# Patient Record
Sex: FEMALE | Race: WHITE | NOT HISPANIC OR LATINO | Employment: OTHER | URBAN - METROPOLITAN AREA
[De-identification: names, ages, dates, MRNs, and addresses within clinical notes are randomized per-mention and may not be internally consistent; named-entity substitution may affect disease eponyms.]

---

## 2017-01-10 ENCOUNTER — TELEPHONE (OUTPATIENT)
Dept: FAMILY MEDICINE | Facility: CLINIC | Age: 82
End: 2017-01-10

## 2017-01-10 DIAGNOSIS — M47.816 ARTHRITIS, LUMBAR SPINE: Primary | ICD-10-CM

## 2017-01-10 DIAGNOSIS — M48.062 SPINAL STENOSIS OF LUMBAR REGION WITH NEUROGENIC CLAUDICATION: ICD-10-CM

## 2017-01-10 DIAGNOSIS — G89.4 CHRONIC PAIN DISORDER: ICD-10-CM

## 2017-01-10 RX ORDER — OXYCODONE AND ACETAMINOPHEN 5; 325 MG/1; MG/1
1-2 TABLET ORAL EVERY 4 HOURS PRN
Qty: 240 TABLET | Refills: 0 | Status: SHIPPED | OUTPATIENT
Start: 2017-01-10 | End: 2017-01-16

## 2017-01-10 NOTE — TELEPHONE ENCOUNTER
Reason for Call:  Medication or medication refill:    Do you use a [facility]?  Name of the pharmacy and phone number for the current request:  patient will     Name of the medication requested: oxyCODONE-acetaminophen (PERCOCET) 5-325 MG per tablet     Other request: patient will     Can we leave a detailed message on this number? YES    Phone number patient can be reached at: Home number on file [phone] (home)    Best Time: any    Call taken on 1/10/2017 at 10:05 AM by Sarah Pitts

## 2017-01-10 NOTE — TELEPHONE ENCOUNTER
Advised patient of the message below per provider.  Script brought to the  to .  Appointment made for 1/16/17 with Demetrice Gutierrez NP.  Tara Marlow RN CPC Triage.

## 2017-01-10 NOTE — TELEPHONE ENCOUNTER
Patient has been using Oxycodone for pain.  Please see below message.  Should patient come in to establish care to discuss with a new provdier.  Tara Marlow RN CPC Triage.

## 2017-01-10 NOTE — TELEPHONE ENCOUNTER
Patient needs to be seen to establish care and to determine need for her medication.  New policies will dictate that we need to have her sign a pain contract and get a urine drug screen.

## 2017-01-10 NOTE — TELEPHONE ENCOUNTER
oxyCODONE-acetaminophen (PERCOCET) 5-325 MG per tablet       Last Written Prescription Date:  12/6/16  Last Fill Quantity: 240,   # refills: 0  Last Office Visit with [facility], [facility] or [facility] prescribing provider: 11/1/16  Future Office visit:       Routing refill request to provider for review/approval because:  Drug not on the [facility], [facility] or [facility] refill protocol or controlled substance

## 2017-01-10 NOTE — TELEPHONE ENCOUNTER
Reason for call:  Patient reporting a symptom    Symptom or request: Spinal stenosis of lumbar region with neurogenic claudication    Additional comments: patient has some questions regarding this. Anything to help with this? Any other therapies?    Phone Number patient can be reached at:  Home number on file [phone] (home)    Best Time:  any    Can we leave a detailed message on this number:  YES    Call taken on 1/10/2017 at 10:07 AM by Sarah Pitts

## 2017-01-10 NOTE — TELEPHONE ENCOUNTER
Please see Dr. LOZANO's note from earlier today. I will refill one month, but she needs to come to clinic and establish with a new provider for additional refills.     Lauren Engelmann, MD

## 2017-01-12 NOTE — TELEPHONE ENCOUNTER
Called patient.  She has an appointment Monday with Demetrice Gutierrez.  Tara Marlow RN CPC Triage.

## 2017-01-16 ENCOUNTER — OFFICE VISIT (OUTPATIENT)
Dept: INTERNAL MEDICINE | Facility: CLINIC | Age: 82
End: 2017-01-16
Payer: COMMERCIAL

## 2017-01-16 ENCOUNTER — TELEPHONE (OUTPATIENT)
Dept: PALLIATIVE MEDICINE | Facility: CLINIC | Age: 82
End: 2017-01-16

## 2017-01-16 VITALS
BODY MASS INDEX: 29.07 KG/M2 | HEART RATE: 85 BPM | OXYGEN SATURATION: 97 % | WEIGHT: 154 LBS | SYSTOLIC BLOOD PRESSURE: 132 MMHG | TEMPERATURE: 98.6 F | DIASTOLIC BLOOD PRESSURE: 75 MMHG | HEIGHT: 61 IN

## 2017-01-16 DIAGNOSIS — G89.4 CHRONIC PAIN SYNDROME: Primary | ICD-10-CM

## 2017-01-16 DIAGNOSIS — G89.4 CHRONIC PAIN DISORDER: ICD-10-CM

## 2017-01-16 DIAGNOSIS — M47.816 ARTHRITIS, LUMBAR SPINE: ICD-10-CM

## 2017-01-16 DIAGNOSIS — M48.062 SPINAL STENOSIS OF LUMBAR REGION WITH NEUROGENIC CLAUDICATION: ICD-10-CM

## 2017-01-16 DIAGNOSIS — M47.817 DJD (DEGENERATIVE JOINT DISEASE), LUMBOSACRAL: ICD-10-CM

## 2017-01-16 PROCEDURE — 99000 SPECIMEN HANDLING OFFICE-LAB: CPT | Performed by: NURSE PRACTITIONER

## 2017-01-16 PROCEDURE — 80307 DRUG TEST PRSMV CHEM ANLYZR: CPT | Mod: 90 | Performed by: NURSE PRACTITIONER

## 2017-01-16 PROCEDURE — 99213 OFFICE O/P EST LOW 20 MIN: CPT | Performed by: NURSE PRACTITIONER

## 2017-01-16 RX ORDER — OXYCODONE AND ACETAMINOPHEN 5; 325 MG/1; MG/1
1-2 TABLET ORAL EVERY 4 HOURS PRN
Qty: 240 TABLET | Refills: 0 | Status: SHIPPED | OUTPATIENT
Start: 2017-01-16 | End: 2017-01-16

## 2017-01-16 ASSESSMENT — PAIN SCALES - GENERAL: PAINLEVEL: EXTREME PAIN (8)

## 2017-01-16 NOTE — MR AVS SNAPSHOT
After Visit Summary   1/16/2017    Teresa Lopez    MRN: [id number]           Patient Information     Date Of Birth          1935        Visit Information        Provider Department      1/16/2017 9:40 AM Demetrice Gutierrez APRN [facility]        Today's Diagnoses     Chronic pain syndrome    -  1     DJD (degenerative joint disease), lumbosacral         Spinal stenosis of lumbar region with neurogenic claudication         Arthritis, lumbar spine         Chronic pain disorder           Care Instructions    Return to clinic in 1 month. Schedule annual preventative exam. Come fasting and then we can check your cholesterol.  Schedule an appointment with the pain clinic.         Follow-ups after your visit        Additional Services     PA[facility] ([place]) REFERRAL       Your provider has referred you to the [facility].    Reason for Referral: Consult only - without ongoing management    Please complete the following questions:    What is your diagnosis for the patient's pain? Patient has lumbar stenosis, progressively worsening. On percocet 240 tablets/month and chronic suppressive prednisone therapy. Would consider steroid injection, or other therapies.     Do you have any specific questions for the pain specialist? No    Are there any red flags that may impact the assessment or management of the patient? None    **ANY DIAGNOSTIC TESTS THAT ARE NOT IN EPIC SHOULD BE SENT TO THE PAIN CENTER**    Please note the Pre-Op Pain Consult must be scheduled 2-3 weeks prior to the patient's surgery.  Patient's trying to schedule within 2 weeks of surgery may not be accommodated.     Pre-Op Pain Consults are only good for 30 days.    REGARDING OPIOID MEDICATIONS:  We will always address appropriateness of opioid pain medications, but we generally will not automatically take on a prescribing role. When we do take on prescribing of opioids for chronic  pain, it is in collaboration with the referring physician for an intermediate period of time (months), with an expectation that the primary physician or provider will assume the prescribing role if medications are effective at stable doses with demonstrated compliance.  Therefore, please do not assume that your prescribing responsibilities end on the day of pain clinic consultation.  Is this agreeable to you? YES    For any questions, contact the [facility] at [phone].    Please be aware that coverage of these services is subject to the terms and limitations of your health insurance plan.  Call member services at your health plan with any benefit or coverage questions.      Please bring the following with you to your appointment:    (1) Any X-Rays, CTs or MRIs which have been performed.  Contact the facility where they were done to arrange for  prior to your scheduled appointment.    (2) List of current medications   (3) This referral request   (4) Any documents/labs given to you for this referral                  Who to contact     If you have questions or need follow up information about today's clinic visit or your schedule please contact [facility] directly at [phone].  Normal or non-critical lab and imaging results will be communicated to you by [employer], letter or phone within 4 business days after the clinic has received the results. If you do not hear from us within 7 days, please contact the clinic through [employer] or phone. If you have a critical or abnormal lab result, we will notify you by phone as soon as possible.  Submit refill requests through [employer] or call your pharmacy and they will forward the refill request to us. Please allow 3 business days for your refill to be completed.          Additional Information About Your Visit        [employer] Information     [employer] lets you send messages to your doctor, view your test results, renew your  "prescriptions, schedule appointments and more. To sign up, go to [email or web address] . Click on \"Log in\" on the left side of the screen, which will take you to the Welcome page. Then click on \"Sign up Now\" on the right side of the page.     You will be asked to enter the access code listed below, as well as some personal information. Please follow the directions to create your username and password.     Your access code is: [email or web address]  Expires: 2017 10:46 AM     Your access code will  in 90 days. If you need help or a new code, please call your [facility] clinic or [phone].        Care EveryWhere ID     This is your Care EveryWhere ID. This could be used by other organizations to access your [facility] medical records  [id number]        Your Vitals Were     Pulse Temperature Height BMI (Body Mass Index) Pulse Oximetry Breastfeeding?    85 98.6  F (37  C) (Oral) 5' 1.15\" (1.553 m) 28.96 kg/m2 97% No       Blood Pressure from Last 3 Encounters:   17 132/75   16 123/84   16 132/74    Weight from Last 3 Encounters:   17 154 lb (69.854 kg)   16 157 lb (71.215 kg)   16 160 lb (72.576 kg)              We Performed the Following     Drug Screen Comprehensive , Urine with Reported Meds (Pain Care Package)     PAIN [facility]NA[facility]EMENT CENTER ([place]) REFERRAL          Today's Medication Changes          These changes are accurate as of: 17 10:46 AM.  If you have any questions, ask your nurse or doctor.               Stop taking these medicines if you haven't already. Please contact your care team if you have questions.     oxyCODONE-acetaminophen 5-325 MG per tablet   Commonly known as:  PERCOCET   Stopped by:  Demetrice Gutierrez APRN CNP                    Primary Care Provider Office Phone # Fax #    MYRON sAhford [phone]       [address] [address]        Thank you!     Thank you for " choosing [facility]  for your care. Our goal is always to provide you with excellent care. Hearing back from our patients is one way we can continue to improve our services. Please take a few minutes to complete the written survey that you may receive in the mail after your visit with us. Thank you!             Your Updated Medication List - Protect others around you: Learn how to safely use, store and throw away your medicines at www.disposemymeds.org.          This list is accurate as of: 1/16/17 10:46 AM.  Always use your most recent med list.                   Brand Name Dispense Instructions for use    ADVIL 200 MG capsule   Generic drug:  ibuprofen      1 CAPSULE EVERY 4 TO 6 HOURS AS NEEDED       amLODIPine 2.5 MG tablet    NORVASC    90 tablet    Take 1 tablet (2.5 mg) by mouth daily 1 po at bedtime       aspirin 162 MG EC tablet      Take 162 mg by mouth daily. Takes 500 mg       blood glucose monitoring test strip    no brand specified    1 Box    Use 1 daily       CALCIUM 1200 PO      None Entered       CLARITIN 10 MG capsule   Generic drug:  loratadine      Take 10 mg by mouth as needed.       GLUCOSAMINE 1500 COMPLEX PO      Take 1,500 mg by mouth daily.       losartan-hydrochlorothiazide 100-25 MG per tablet    HYZAAR    90 tablet    Take 1 tablet by mouth daily       Multi-vitamin Tabs tablet   Generic drug:  multivitamin, therapeutic with minerals      1 TABLET DAILY       ONE TOUCH LANCETS Misc     100 each    1 each daily       predniSONE 5 MG tablet    DELTASONE    120 tablet    1 tablet daily and 2 on Mon Wed Fri.       PRILOSEC PO      None Entered       SALMON OIL-1000 PO      take 1,000 Caps by mouth daily.       senna-docusate 8.6-50 MG per tablet    SENOKOT-S;PERICOLACE    120 tablet    Take 1 tablet by mouth 2 times daily       VITAMIN C PO      None Entered       VITAMIN D PO      Take 1,200 mg by mouth daily.       vitamin E 400 UNIT capsule      None Entered

## 2017-01-16 NOTE — TELEPHONE ENCOUNTER
Pain Management Center Referral      1. Confirmed address with patient? Yes  2. Confirmed phone number with patient? Yes  3. Confirmed referring provider? Yes  4. Is the PCP the same as the referring provider? Yes  5. Has the patient been to any previous pain clinics? No  (If yes, send [facility] with welcome letter)  6. Which insurance are we to bill for this appointment?  [employer]    7. Informed pt of cancellation (48 hour) policy? Yes    REGARDING OPIOID MEDICATIONS: We will always address appropriateness of opioid pain medications, but we generally will not automatically take on a prescribing role. When we do take on prescribing of opioids for chronic pain, it is in collaboration with the referring physician for an intermediate period of time (months), with an expectation that the primary physician or provider will assume the prescribing role if medications are effective at stable doses with demonstrated compliance. Therefore, please do not assume that your prescribing responsibilities end on the day of pain clinic consultation.  7. Informed pt of prescribing policy? Yes      8. Referring Provider: Demetrice Gutierrez

## 2017-01-16 NOTE — Clinical Note
[facility]    01/16/2017    Patient: Teresa Lopez  YOB: 1935  Medical Record Number: [id number]    Controlled Substance Agreement  I understand that my care provider has prescribed controlled substances (narcotics, tranquilizers, and/or stimulants) to help manage my condition(s).  I am taking this medicine to help me function or work.  I know that this is strong medicine.  It could have serious side effects and even cause a dependency on the drug.  If I stop these medicines suddenly, I could have severe withdrawal symptoms.    The risks, benefits, and side effects of these medication(s) were explained to me.  I agree that:  1. I will take part in other treatments as advised by my provider.  This may be psychiatry or counseling, physical therapy, behavioral therapy, group treatment, or a referral to a pain clinic.  I will reduce or stop my medicine when my provider tells me to do so.   2. I will take my medicines as prescribed.  I will not change the dose or schedule unless my provider tells me to.  There will be no refills if I  run out early.   I may be contacted at any time without warning and asked to complete a drug test or pill count.   3. I will keep all my appointments at the clinic.  If I miss appointments or fail to follow instructions, my provider may stop my medicine.  4. I will not ask other providers to prescribe controlled substances. And I will not accept controlled substances from other people. If I need another prescribed controlled substance for a new reason, I will notify my provider within one business day.  5. If I enroll in the [place] Medical Marijuana program, I will tell my provider.  I will also sign an agreement to share my medical records with my provider.  6. I will use one pharmacy to fill all of my controlled substance prescriptions.  If my prescription is mailed to my pharmacy, it may take 5 to 7 days for my medicine to be ready.  7. I  understand that my provider, clinic care team, and pharmacy can track controlled substance prescriptions from other providers through a central database (prescription monitoring program).  8. I will bring in my list of medications (or my medicine bottles) each time I come to the clinic.  Page 1 of 2      [facility]    01/16/2017    Patient: Teresa Lopez  YOB: 1935  Medical Record Number: [id number]    9. Refills of controlled substances will be made only during office hours.  It is up to me to make sure that I do not run out of my medicines on weekends or holidays.    10. I am responsible for my prescriptions.  If the medicine is lost or stolen, it will not be replaced.   I also agree not to share these medicines with anyone.  11. I agree to not use ANY illegal or recreational drugs.  This includes marijuana, cocaine, bath salts or other drugs.  I agree not to use alcohol unless my provider says I may.  I agree to give urine samples whenever asked.  If I fail to give a urine sample, the provider may stop my medicine.     12. I will tell my nurse or provider right away if I become pregnant or have a new medical problem treated outside of [facility].  13. I understand that this medicine can affect my thinking and judgment.  It may be unsafe for me to drive, use machinery and do dangerous tasks.  I will not do any of these things until I know how the medicine affects me.  If my dose changes, I will wait to see how it affects me.  I will contact my provider if I have concerns about medicine side effects.  I understand that if I do not follow any of the conditions above, my prescriptions or treatment may be stopped.    I agree that my provider, clinic care team, and pharmacy may work with any city, state or federal law enforcement agency that investigates the misuse, sale, or other diversion of my controlled medicine. I will allow my provider to discuss my care with or share a  copy of this agreement with any other treating provider, pharmacy or emergency room where I receive care.  I agree to give up (waive) any right of privacy or confidentiality with respect to these authorizations.   I have read this agreement and have asked questions about anything I did not understand.   ___________________________________    ___________________________  Patient Signature                                                             Date and Time  ___________________________________     ____________________________  Witness                                                                              Date and Time  ___________________________________  MYRON Bruce CNP  Page 2 of 2  Opioid Pain Medicines (also known as Narcotics)  What You Need to Know      What are opioids?   Opioids are pain medicines that must be prescribed by a doctor. Examples are:     morphine (MS Contin, Nyla)    oxycodone (Oxycontin)    oxycodone and acetaminophen (Percocet)    hydrocodone and acetaminophen (Vicodin, Norco)     fentanyl patch (Duragesic)     hydromorphone (Dilaudid)     methadone     What do opioids do well?   Opioids are best for short-term pain after a surgery or injury. They also work well for cancer pain. Unlike other pain medicines, they do not cause liver or kidney failure or ulcers. They may help some people with long-lasting (chronic) pain.     What do opioids NOT do well?   Opioids never get rid of pain entirely, and they do not work well for most patients with chronic pain. Opioids do not reduce swelling, one of the causes of pain. They also don t work well for nerve pain.     Side effects  Talk to your doctor before you start or decide to keep taking one of these medicines. Side effects include:    Lowers your breathing rate enough that it could cause death    Death due to taking more than the prescribed dose    Serious lifelong opioid use      Dependence is not the same as addiction.  Addiction is when people keep using a substance that harms their body, their mind or their relations with others. If you have a history of drug or alcohol abuse, taking opioids can cause a relapse.  Over time, opioids don t work as well. Most people will need higher and higher doses. The higher the dose, the more serious the side effects. We don t know the long-term effects of opioids.   People who have used opioids for a long time have a lower quality of life, worse depression, higher levels of pain and more visits to doctors.  Overdose from prescription drugs is the second leading cause of death in the [place] The risk of overdose rises when opioids are taken with other drugs such as:    Medicines used for anxiety and panic attacks (such as lorazepam, alprazolam, clonazepam    Other sedatives    Alcohol    Illegal drugs such as heroin  Never share your opioids with others. Be sure to store opioids in a secure place, locked if possible.Young children can easily swallow them and overdose.     Are there other ways to manage pain?  Ways to help reduce pain:    Exercise every day.    Treat health problems that may be causing pain.    Treat mental health problems like depression and anxiety.     Worse depression symptoms; Less pleasure in things you usually enjoy    Feeling tired or sluggish    Slower thoughts or cloudy thinking    Being more sensitive to pain over time; Pain is harder to control.    Trouble sleeping or restless sleep    Changes in hormone levels (for example, less testosterone).     Changes in sex drive or ability to have sex    Long lasting nausea and constipation    Trouble breathing while asleep; This is worse with lung problems like COPD or sleep apnea.    Unsafe driving    Getting sick more often    Itching    Feeling dizzy    Dry mouth    Sweating    Trouble emptying the bladder (peeing). This is worse if you have an enlarged prostate or get urinary tract infections (UTIs).    What else should I  know about opioids?  When someone takes opioids for too long or too often, they become dependent. This means that if you stop or reduce the medicine too quickly, you will have withdrawal symptoms.          Practice good sleep habits.  Try to go to bed and get up at the same time every day.    Stop smoking.  Tobacco use can make pain worse.    Do things that you enjoy.    Find a way to work through pain without drugs.  Try deep breathing, meditation, visual imagery and aromatherapy.    Ask your doctor to help you create a plan to manage your pain.

## 2017-01-16 NOTE — NURSING NOTE
"Chief Complaint   Patient presents with     Pain Management     Chronic back pain       Initial /75 mmHg  Pulse 85  Temp(Src) 98.6  F (37  C) (Oral)  Ht 5' 1.15\" (1.553 m)  Wt 154 lb (69.854 kg)  BMI 28.96 kg/m2  SpO2 97%  Breastfeeding? No Estimated body mass index is 28.96 kg/(m^2) as calculated from the following:    Height as of this encounter: 5' 1.15\" (1.553 m).    Weight as of this encounter: 154 lb (69.854 kg).  BP completed using cuff size: regular.  JAMES Tirado MA      "

## 2017-01-16 NOTE — PROGRESS NOTES
SUBJECTIVE:                                                    Teresa Lopez is a 81 year old female who presents to clinic today for the following health issues:        Chronic Pain Follow-Up       Type / Location of Pain: Lumbar area pain  Analgesia/pain control:       Recent changes:  improved      Overall control: Tolerable with discomfort  Activity level/function:      Daily activities:  Can do most things most days, with some rest    Work:  not applicable  Adverse effects:  No  Adherance    Taking medication as directed?  Yes    Participating in other treatments: Yes, going to chiropractor  Risk Factors:    Sleep:  Good    Mood/anxiety:  Non issue    Recent family or social stressors:  none noted    Other aggravating factors: prolonged standing  PHQ-9 SCORE 7/1/2013 10/13/2015   Total Score 8 -   Total Score - 10     GOLDEN-7 SCORE 2/15/2016   Total Score 8     Encounter-Level CSA:     There are no encounter-level csa.             Amount of exercise or physical activity: 2-3 days/week for an average of 15-30 minutes    Problems taking medications regularly: No    Medication side effects: none    Diet: regular (no restrictions)    Going to chiropractor for back pain  Goes every 3 weeks   up until her 70s  Likes to dance but it's more difficult    Taking percocet, 2 at 0800, 2 at 1200, 2 at 1600, 2 at 2000  On chronic prednisone  Has not been to a pain clinic  Active with [employer] three times a week  Uses a rowing machine    Here to establish care. Previous patient of Dr. Jerez      Problem list and histories reviewed & adjusted, as indicated.  Additional history: none    Patient Active Problem List   Diagnosis     Cerebral infarction (H)     Jaw Pain     Arthritis of knee     CARDIOVASCULAR SCREENING; LDL GOAL LESS THAN 100     Hypertension goal BP (blood pressure) < 140/90     Advanced directives, counseling/discussion     Hyperlipidemia LDL goal <130     OA (OSTEOARTHRITIS) OF KNEE - right      CKD (chronic kidney disease) stage 3, GFR 30-59 ml/min     Cataracts, both eyes     PVD (posterior vitreous detachment), both eyes     Chronic pain disorder     DJD (degenerative joint disease), lumbosacral     Spinal stenosis of lumbar region with neurogenic claudication     Slow transit constipation     Past Surgical History   Procedure Laterality Date     Hysterectomy, cervix status unknown  age 31       Social History   Substance Use Topics     Smoking status: Never Smoker      Smokeless tobacco: Never Used     Alcohol Use: 0.0 oz/week     0 Standard drinks or equivalent per week      Comment: 1 drink weekly     Family History   Problem Relation Age of Onset     Arthritis Mother      Unknown/Adopted Father      adopted     DIABETES Brother      CANCER Brother      Hypertension No family hx of      CEREBROVASCULAR DISEASE No family hx of      Thyroid Disease No family hx of      Glaucoma No family hx of      Macular Degeneration No family hx of          Current Outpatient Prescriptions   Medication Sig Dispense Refill     predniSONE (DELTASONE) 5 MG tablet 1 tablet daily and 2 on Mon Wed Fri. 120 tablet 3     senna-docusate (SENOKOT-S;PERICOLACE) 8.6-50 MG per tablet Take 1 tablet by mouth 2 times daily 120 tablet 12     losartan-hydrochlorothiazide (HYZAAR) 100-25 MG per tablet Take 1 tablet by mouth daily 90 tablet 3     amLODIPine (NORVASC) 2.5 MG tablet Take 1 tablet (2.5 mg) by mouth daily 1 po at bedtime 90 tablet 3     ONE TOUCH LANCETS MISC 1 each daily 100 each 12     aspirin 162 MG EC tablet Take 162 mg by mouth daily. Takes 500 mg        Glucosamine-Chondroit-Vit C-Mn (GLUCOSAMINE 1500 COMPLEX PO) Take 1,500 mg by mouth daily.       Loratadine (CLARITIN) 10 MG capsule Take 10 mg by mouth as needed.       Omega-3 Fatty Acids (SALMON OIL-1000 PO) take 1,000 Caps by mouth daily.       ADVIL 200 MG PO CAPS 1 CAPSULE EVERY 4 TO 6 HOURS AS NEEDED       MULTI-VITAMIN OR TABS 1 TABLET DAILY       CALCIUM 1200  "OR None Entered       VITAMIN C OR None Entered       VITAMIN D OR Take 1,200 mg by mouth daily.       VITAMIN E 400 UNIT OR CAPS None Entered       PRILOSEC OR None Entered       blood glucose test strip Use 1 daily 1 Box 12     Problem list, Medication list, Allergies, and Medical/Social/Surgical histories reviewed in [facility] and updated as appropriate.    ROS:  Noncontributory except as above    OBJECTIVE:                                                    /75 mmHg  Pulse 85  Temp(Src) 98.6  F (37  C) (Oral)  Ht 5' 1.15\" (1.553 m)  Wt 154 lb (69.854 kg)  BMI 28.96 kg/m2  SpO2 97%  Breastfeeding? No  Body mass index is 28.96 kg/(m^2).  GENERAL: healthy, alert and no distress  RESP: lungs clear to auscultation - no rales, rhonchi or wheezes  CV: regular rate and rhythm, normal S1 S2, no S3 or S4, no murmur, click or rub, no peripheral edema and peripheral pulses strong  MS: Patient with flexion, extension and rotation of lumbar spine      Diagnostic Test Results:  none      ASSESSMENT/PLAN:                                                        ICD-10-CM    1. Chronic pain syndrome G89.4 Drug Screen Comprehensive , Urine with Reported Meds (Pain Care Package)   2. DJD (degenerative joint disease), lumbosacral M51.37 [facility] REFERRAL   3. Spinal stenosis of lumbar region with neurogenic claudication M48.06 [facility] REFERRAL     DISCONTINUED: oxyCODONE-acetaminophen (PERCOCET) 5-325 MG per tablet   4. Arthritis, lumbar spine M47.9 DISCONTINUED: oxyCODONE-acetaminophen (PERCOCET) 5-325 MG per tablet   5. Chronic pain disorder G89.4 DISCONTINUED: oxyCODONE-acetaminophen (PERCOCET) 5-325 MG per tablet     Completed pain contract. Will be scanned into chart.  Patient had been receiving percocet by her previous primary care provider, Dr. Jerez, for years. Now establishing care with me.  I did order a refill of percocet before seeing that patient was not due " until February.  She will return in February for annual preventative exam and to establish care  Discussed the benefits of consult with our pain management team. Patient continuing to have low back pain despite 8 percocet daily. May benefit from steroid injection. She is agreeable to this.      MYRON Bruce [facility]

## 2017-01-16 NOTE — PATIENT INSTRUCTIONS
Return to clinic in 1 month. Schedule annual preventative exam. Come fasting and then we can check your cholesterol.  Schedule an appointment with the pain clinic.

## 2017-01-16 NOTE — Clinical Note
January 16, 2017    Teresa Lopez  [address] [place] [id number]    Dear Teresa,                                                                 Welcome to the [facility].  We are located on the 2nd floor ([address]) of the [facility], located at [address] [place] [id number].    Your appointment at the [facility] has been scheduled on March 1st at 3:00pm with Marion Munoz NP.    At your first visit, you will meet your team of caregivers who will help you to develop pain management strategies that will last a lifetime. You will meet with our support staff to review your insurance information, and collect your co-payment if required by your insurance company. You will also meet with a medical pain specialist and care coordinator who will assess your pain and develop a plan of care for your successful pain rehabilitation. You should expect to spend 1-2 hours at your first visit with us. Usually, patients work with us for a period of 6-12 months, and eventually return to their primary doctor once their pain management has stabilized.      To help us make your visit go as smoothly as possible, please bring the following items with you on your visit:     Completed Pain Questionnaire enclosed in this packet.  If you do not bring the completed questionnaire, we may have to reschedule your appointment.  List of any medicines that you are currently taking or have been prescribed  Important NON-[place] medical information such as medical records or tests results (X-rays, or laboratory tests)  Your health insurance card  Financial resources to cover your co-payment or balance due at the time of service (cash, personal check, Visa, and MasterCard are acceptable methods of payment)     Due to the demand for new patient evaluations, you must notify the scheduling department 48 hours in advance if you are not able to keep this appointment.  Failure to do so could affect your ability to reschedule with our clinic. Please be aware that we will not prescribe any medications at your first visit.     Please call [phone] with any questions regarding your appointment. We look forward to meeting you and working to address your health care needs.     Sincerely,    [facility]

## 2017-01-17 ASSESSMENT — PATIENT HEALTH QUESTIONNAIRE - PHQ9: SUM OF ALL RESPONSES TO PHQ QUESTIONS 1-9: 0

## 2017-01-19 LAB — PAIN DRUG SCR UR W RPTD MEDS: NORMAL

## 2017-02-16 ENCOUNTER — TELEPHONE (OUTPATIENT)
Dept: PEDIATRICS | Facility: CLINIC | Age: 82
End: 2017-02-16

## 2017-02-16 NOTE — TELEPHONE ENCOUNTER
Reason for Call:  Medication or medication refill:    Do you use a [facility]?  Name of the pharmacy and phone number for the current request:  patient will  at     Name of the medication requested: oxyCODONE-acetaminophen (PERCOCET) 5-325 MG per tablet     Other request: patient would also like to know how long she has been on this medication    Can we leave a detailed message on this number? YES    Phone number patient can be reached at: Home number on file [phone] (home)    Best Time: any    Call taken on 2/16/2017 at 9:33 AM by Sarah Pitts

## 2017-02-16 NOTE — TELEPHONE ENCOUNTER
oxyCODONE-acetaminophen (PERCOCET) 5-325 MG per tablet      Last Written Prescription Date:  1/10/17  Last Fill Quantity: 240,   # refills: 0  Last Office Visit with [facility], [facility] or [facility] prescribing provider: 11/1/16  Future Office visit:       Routing refill request to provider for review/approval because:  Drug not active on patient's medication list, controlled substance

## 2017-02-16 NOTE — PROGRESS NOTES
SUBJECTIVE:                                                            Teresa Lopez is a 81 year old female who presents for Preventive Visit.    Are you in the first 12 months of your Medicare Part B coverage?  No    Healthy Habits:    Do you get at least three servings of calcium containing foods daily (dairy, green leafy vegetables, etc.)? no, taking calcium and/or vitamin D supplement: yes - patient has no will to get up to cook     Amount of exercise or daily activities, outside of work: 3 day(s) per week also dances 4-5 per month    Problems taking medications regularly No    Medication side effects: No    Have you had an eye exam in the past two years? yes    Do you see a dentist twice per year? yes    Do you have sleep apnea, excessive snoring or daytime drowsiness?no    COGNITIVE SCREEN  1) Repeat 3 items (Banana, Sunrise, Chair)    2) Clock draw: NORMAL  3) 3 item recall: Recalls 3 objects  Results: 3 items recalled: COGNITIVE IMPAIRMENT LESS LIKELY    Mini-CogTM Copyright S Laurence. Licensed by the author for use in [facility]; reprinted with permission ([email or web address]). All rights reserved.      Has an appt with [place] pain team 3/1/17  She realized she's been on the oxycodone since 2015 and she thinks it's too long  Saw chiropractor yesterday and felt great afterward but today feels again at her baseline  Active with silver sneakers. She is still dancing  Has DJD of lumbar spine, osteoarthritis of right knee    Stroke in 2002  Denies residuals from stroke, other than states sometimes right fingers go numb since the stroke. This is not a new change for her    Lives in a 2 bedroom home. Independent. Denies safety concerns.     Complains of decreased appetite. Denies weight loss. Drinks boost every morning    She was checking her blood sugar in 2015. No longer. No diagnosis of diabetes.   Lab Results   Component Value Date    A1C 6.2 11/01/2016    A1C 6.9 02/15/2016    A1C 6.2 02/12/2015     A1C 6.1 02/10/2014    A1C 6.0 01/30/2013           All Histories reviewed and updated in EPIC as appropriate.  Social History   Substance Use Topics     Smoking status: Never Smoker     Smokeless tobacco: Never Used     Alcohol use 0.0 oz/week     0 Standard drinks or equivalent per week      Comment: 1 drink weekly       The patient does not drink >3 drinks per day nor >7 drinks per week.    Today's PHQ-2 Score:   PHQ-2 ( 1999 [employer]) 2/17/2017 2/15/2016   Q1: Little interest or pleasure in doing things 0 0   Q2: Feeling down, depressed or hopeless 0 0   PHQ-2 Score 0 0       Do you feel safe in your environment - Yes    Do you have a Health Care Directive?: Yes: Patient states has Advance Directive and will bring in a copy to clinic.    Current providers sharing in care for this patient include:   Patient Care Team:  Demetrice Gutierrez APRN CNP as PCP - General (Nurse Practitioner - Adult Health)      Hearing impairment: No    Ability to successfully perform activities of daily living: Yes, no assistance needed     Fall risk:  Fallen 2 or more times in the past year?: Yes  Any fall with injury in the past year?: No    Home safety:  none identified  click delete button to remove this line now    The following health maintenance items are reviewed in Epic and correct as of today:  Health Maintenance   Topic Date Due     FOOT EXAM Q1 YEAR( NO INBASKET)  07/01/2014     DEXA Q3 YR  02/04/2016     LIPID MONITORING Q1 YEAR( NO INBASKET)  02/15/2017     FALL RISK ASSESSMENT  02/15/2017     GOLDEN QUESTIONNAIRE 1 YEAR  02/15/2017     A1C Q6 MO( NO INBASKET)  05/01/2017     EYE EXAM Q1 YEAR( NO INBASKET)  06/01/2017     BMP Q1 YR (NO INBASKET)  07/18/2017     MICROALBUMIN Q1 YEAR( NO INBASKET)  07/18/2017     URINE DRUG SCREEN Q1 YR  01/16/2018     PHQ-9 Q1YR (NO INBASKET)  01/16/2018     TSH W/ FREE T4 REFLEX Q2 YEAR (NO INBASKET)  07/18/2018     ADVANCE DIRECTIVE PLANNING Q5 YRS (NO INBASKET)  02/25/2020     TETANUS  IMMUNIZATION (SYSTEM ASSIGNED)  10/08/2022     INFLUENZA VACCINE (SYSTEM ASSIGNED)  Completed     PNEUMOCOCCAL  Completed        ROS:  Constitutional, HEENT, cardiovascular, pulmonary, gi and gu systems are negative, except as otherwise noted.    Problem list, Medication list, Allergies, and Medical/Social/Surgical histories reviewed in [facility] and updated as appropriate.  Labs reviewed in EPIC  BP Readings from Last 3 Encounters:   02/17/17 128/73   01/16/17 132/75   11/01/16 123/84    Wt Readings from Last 3 Encounters:   02/17/17 151 lb (68.5 kg)   01/16/17 154 lb (69.9 kg)   11/01/16 157 lb (71.2 kg)                  Current Outpatient Prescriptions   Medication Sig Dispense Refill     losartan-hydrochlorothiazide (HYZAAR) 100-25 MG per tablet Take 1 tablet by mouth daily 90 tablet 3     amLODIPine (NORVASC) 2.5 MG tablet Take 1 tablet (2.5 mg) by mouth daily 1 po at bedtime 90 tablet 3     oxyCODONE-acetaminophen (PERCOCET) 5-325 MG per tablet Take 1-2 tablets by mouth every 4 hours as needed for moderate to severe pain (Not more than 8 tablets per day.) 240 tablet 0     predniSONE (DELTASONE) 5 MG tablet 1 tablet daily and 2 on Mon Wed Fri. 120 tablet 3     senna-docusate (SENOKOT-S;PERICOLACE) 8.6-50 MG per tablet Take 1 tablet by mouth 2 times daily 120 tablet 12     aspirin 162 MG EC tablet Take 162 mg by mouth daily. Takes 500 mg        Glucosamine-Chondroit-Vit C-Mn (GLUCOSAMINE 1500 COMPLEX PO) Take 1,500 mg by mouth daily.       Loratadine (CLARITIN) 10 MG capsule Take 10 mg by mouth as needed.       Omega-3 Fatty Acids (SALMON OIL-1000 PO) take 1,000 Caps by mouth daily.       MULTI-VITAMIN OR TABS 1 TABLET DAILY       CALCIUM 1200 OR None Entered       VITAMIN C OR None Entered       VITAMIN D OR Take 1,200 mg by mouth daily.       VITAMIN E 400 UNIT OR CAPS None Entered       PRILOSEC OR None Entered       [DISCONTINUED] losartan-hydrochlorothiazide (HYZAAR) 100-25 MG per tablet Take 1 tablet by mouth  "daily 90 tablet 3     [DISCONTINUED] amLODIPine (NORVASC) 2.5 MG tablet Take 1 tablet (2.5 mg) by mouth daily 1 po at bedtime 90 tablet 3     ADVIL 200 MG PO CAPS Reported on 2/17/2017       OBJECTIVE:                                                            /73 (BP Location: Right arm, Patient Position: Chair, Cuff Size: Adult Regular)  Pulse 79  Temp 97  F (36.1  C) (Oral)  Wt 151 lb (68.5 kg)  SpO2 97%  BMI 28.39 kg/m2 Estimated body mass index is 28.39 kg/(m^2) as calculated from the following:    Height as of 1/16/17: 5' 1.15\" (1.553 m).    Weight as of this encounter: 151 lb (68.5 kg).  EXAM:   GENERAL APPEARANCE: healthy, alert and no distress  EYES: Eyes grossly normal to inspection, PERRL and conjunctivae and sclerae normal  HENT: ear canals and TM's normal, nose and mouth without ulcers or lesions, oropharynx clear and oral mucous membranes moist  NECK: no adenopathy, no asymmetry, masses, or scars and thyroid normal to palpation  RESP: lungs clear to auscultation - no rales, rhonchi or wheezes  CV: regular rate and rhythm, normal S1 S2, no S3 or S4, no murmur, click or rub, no peripheral edema and peripheral pulses strong  ABDOMEN: soft, nontender, no hepatosplenomegaly, no masses and bowel sounds normal  MS: gait is age appropriate without ataxia  SKIN: no suspicious lesions or rashes  NEURO: Normal strength and tone, sensory exam grossly normal, mentation intact and speech normal  PSYCH: mentation appears normal and affect normal/bright    ASSESSMENT / PLAN:                                                            1. Routine general medical examination at a health care facility  - CBC with platelets  - Comprehensive metabolic panel  - TSH with free T4 reflex    2. Cerebral infarction, unspecified mechanism (H)  - stable, stroke 2002    3. Hypertension goal BP (blood pressure) < 140/90  - well controlled  - losartan-hydrochlorothiazide (HYZAAR) 100-25 MG per tablet; Take 1 tablet by mouth daily " " Dispense: 90 tablet; Refill: 3  - amLODIPine (NORVASC) 2.5 MG tablet; Take 1 tablet (2.5 mg) by mouth daily 1 po at bedtime  Dispense: 90 tablet; Refill: 3    4. Spinal stenosis of lumbar region with neurogenic claudication  - Pain clinic  - oxyCODONE-acetaminophen (PERCOCET) 5-325 MG per tablet; Take 1-2 tablets by mouth every 4 hours as needed for moderate to severe pain (Not more than 8 tablets per day.)  Dispense: 240 tablet; Refill:0  - Has pain contract    5. DJD (degenerative joint disease), lumbosacral  - see above    6. Chronic pain disorder  - oxyCODONE-acetaminophen (PERCOCET) 5-325 MG per tablet; Take 1-2 tablets by mouth every 4 hours as needed for moderate to severe pain (Not more than 8 tablets per day.)  Dispense: 240 tablet; Refill:0  - follow up in 3 months    7. CKD (chronic kidney disease) stage 3, GFR 30-59 ml/min  - CMP     8. Hyperlipidemia LDL goal <130  - Lipid panel reflex to direct LDL    9. Elevated hemoglobin A1c  - Hemoglobin A1c  - FOOT EXAM  - no diagnosis of diabetes on problem list. Previous A1c shows \"pre-diabetes\". At risk with chronic prednisone    10. Vitamin D deficiency  - Vitamin D Deficiency    11. Arthritis, lumbar spine  - see above  - oxyCODONE-acetaminophen (PERCOCET) 5-325 MG per tablet; Take 1-2 tablets by mouth every 4 hours as needed for moderate to severe pain (Not more than 8 tablets per day.)  Dispense: 240 tablet; Refill:0    End of Life Planning:  Patient currently has an advanced directive: Yes.  Practitioner is supportive of decision.    COUNSELING:  Reviewed preventive health counseling, as reflected in patient instructions       Regular exercise   Pain management. Staying active        Estimated body mass index is 28.39 kg/(m^2) as calculated from the following:    Height as of 1/16/17: 5' 1.15\" (1.553 m).    Weight as of this encounter: 151 lb (68.5 kg).     reports that she has never smoked. She has never used smokeless tobacco.      Appropriate " preventive services were discussed with this patient, including applicable screening as appropriate for cardiovascular disease, diabetes, osteopenia/osteoporosis, and glaucoma.  As appropriate for age/gender, discussed screening for colorectal cancer, prostate cancer, breast cancer, and cervical cancer. Checklist reviewing preventive services available has been given to the patient.    Reviewed patients plan of care and provided an AVS. The Basic Care Plan (routine screening as documented in Health Maintenance) for Teresa meets the Care Plan requirement. This Care Plan has been established and reviewed with the Patient.    Counseling Resources:  ATP IV Guidelines  Pooled Cohorts Equation Calculator  Breast Cancer Risk Calculator  FRAX Risk Assessment  ICSI Preventive Guidelines  Dietary Guidelines for Americans, 2010  USDA's MyPlate  ASA Prophylaxis  Lung CA Screening    MYRON Bruce CNP  [facility]

## 2017-02-16 NOTE — TELEPHONE ENCOUNTER
From last OV note 1/16/17:  Completed pain contract. Will be scanned into chart.  Patient had been receiving percocet by her previous primary care provider, Dr. Jerez, for years. Now establishing care with me.  I did order a refill of percocet before seeing that patient was not due until February.  She will return in February for annual preventative exam and to establish care  Discussed the benefits of consult with our pain management team. Patient continuing to have low back pain despite 8 percocet daily. May benefit from steroid injection. She is agreeable to this.    Rn tried calling patient and notified her that she is to come in for an annual exam and to establish care. Scheduled appointment for patient to come into see BK tomorrow to establish care.     Theodora Becerra RN  [facility]

## 2017-02-17 ENCOUNTER — OFFICE VISIT (OUTPATIENT)
Dept: INTERNAL MEDICINE | Facility: CLINIC | Age: 82
End: 2017-02-17
Payer: COMMERCIAL

## 2017-02-17 VITALS
HEART RATE: 79 BPM | BODY MASS INDEX: 28.39 KG/M2 | DIASTOLIC BLOOD PRESSURE: 73 MMHG | OXYGEN SATURATION: 97 % | TEMPERATURE: 97 F | SYSTOLIC BLOOD PRESSURE: 128 MMHG | WEIGHT: 151 LBS

## 2017-02-17 DIAGNOSIS — R73.09 ELEVATED HEMOGLOBIN A1C: ICD-10-CM

## 2017-02-17 DIAGNOSIS — I10 HYPERTENSION GOAL BP (BLOOD PRESSURE) < 140/90: ICD-10-CM

## 2017-02-17 DIAGNOSIS — N18.30 CKD (CHRONIC KIDNEY DISEASE) STAGE 3, GFR 30-59 ML/MIN (H): ICD-10-CM

## 2017-02-17 DIAGNOSIS — M47.817 DJD (DEGENERATIVE JOINT DISEASE), LUMBOSACRAL: ICD-10-CM

## 2017-02-17 DIAGNOSIS — E78.5 HYPERLIPIDEMIA LDL GOAL <130: ICD-10-CM

## 2017-02-17 DIAGNOSIS — M48.062 SPINAL STENOSIS OF LUMBAR REGION WITH NEUROGENIC CLAUDICATION: ICD-10-CM

## 2017-02-17 DIAGNOSIS — G89.4 CHRONIC PAIN DISORDER: ICD-10-CM

## 2017-02-17 DIAGNOSIS — Z00.00 ROUTINE GENERAL MEDICAL EXAMINATION AT A HEALTH CARE FACILITY: Primary | ICD-10-CM

## 2017-02-17 DIAGNOSIS — E55.9 VITAMIN D DEFICIENCY: ICD-10-CM

## 2017-02-17 DIAGNOSIS — I63.9 CEREBRAL INFARCTION, UNSPECIFIED MECHANISM (H): ICD-10-CM

## 2017-02-17 DIAGNOSIS — M47.816 ARTHRITIS, LUMBAR SPINE: ICD-10-CM

## 2017-02-17 LAB
ALBUMIN SERPL-MCNC: 3.7 G/DL (ref 3.4–5)
ALP SERPL-CCNC: 89 U/L (ref 40–150)
ALT SERPL W P-5'-P-CCNC: 23 U/L (ref 0–50)
ANION GAP SERPL CALCULATED.3IONS-SCNC: 13 MMOL/L (ref 3–14)
AST SERPL W P-5'-P-CCNC: 23 U/L (ref 0–45)
BILIRUB SERPL-MCNC: 0.5 MG/DL (ref 0.2–1.3)
BUN SERPL-MCNC: 23 MG/DL (ref 7–30)
CALCIUM SERPL-MCNC: 9.7 MG/DL (ref 8.5–10.1)
CHLORIDE SERPL-SCNC: 100 MMOL/L (ref 94–109)
CHOLEST SERPL-MCNC: 290 MG/DL
CO2 SERPL-SCNC: 25 MMOL/L (ref 20–32)
CREAT SERPL-MCNC: 1.17 MG/DL (ref 0.52–1.04)
ERYTHROCYTE [DISTWIDTH] IN BLOOD BY AUTOMATED COUNT: 12.6 % (ref 10–15)
GFR SERPL CREATININE-BSD FRML MDRD: 44 ML/MIN/1.7M2
GLUCOSE SERPL-MCNC: 142 MG/DL (ref 70–99)
HBA1C MFR BLD: 6.5 % (ref 4.3–6)
HCT VFR BLD AUTO: 37.7 % (ref 35–47)
HDLC SERPL-MCNC: 87 MG/DL
HGB BLD-MCNC: 13.3 G/DL (ref 11.7–15.7)
LDLC SERPL CALC-MCNC: 167 MG/DL
MCH RBC QN AUTO: 35.8 PG (ref 26.5–33)
MCHC RBC AUTO-ENTMCNC: 35.3 G/DL (ref 31.5–36.5)
MCV RBC AUTO: 102 FL (ref 78–100)
NONHDLC SERPL-MCNC: 203 MG/DL
PLATELET # BLD AUTO: 335 10E9/L (ref 150–450)
POTASSIUM SERPL-SCNC: 3.6 MMOL/L (ref 3.4–5.3)
PROT SERPL-MCNC: 8 G/DL (ref 6.8–8.8)
RBC # BLD AUTO: 3.71 10E12/L (ref 3.8–5.2)
SODIUM SERPL-SCNC: 138 MMOL/L (ref 133–144)
T4 FREE SERPL-MCNC: 1.16 NG/DL (ref 0.76–1.46)
TRIGL SERPL-MCNC: 182 MG/DL
TSH SERPL DL<=0.005 MIU/L-ACNC: 4.13 MU/L (ref 0.4–4)
WBC # BLD AUTO: 11.2 10E9/L (ref 4–11)

## 2017-02-17 PROCEDURE — 99397 PER PM REEVAL EST PAT 65+ YR: CPT | Performed by: NURSE PRACTITIONER

## 2017-02-17 PROCEDURE — 80053 COMPREHEN METABOLIC PANEL: CPT | Performed by: NURSE PRACTITIONER

## 2017-02-17 PROCEDURE — 99207 C FOOT EXAM  NO CHARGE: CPT | Mod: 25 | Performed by: NURSE PRACTITIONER

## 2017-02-17 PROCEDURE — 36415 COLL VENOUS BLD VENIPUNCTURE: CPT | Performed by: NURSE PRACTITIONER

## 2017-02-17 PROCEDURE — 99213 OFFICE O/P EST LOW 20 MIN: CPT | Mod: 25 | Performed by: NURSE PRACTITIONER

## 2017-02-17 PROCEDURE — 83036 HEMOGLOBIN GLYCOSYLATED A1C: CPT | Performed by: NURSE PRACTITIONER

## 2017-02-17 PROCEDURE — 80061 LIPID PANEL: CPT | Performed by: NURSE PRACTITIONER

## 2017-02-17 PROCEDURE — 84439 ASSAY OF FREE THYROXINE: CPT | Performed by: NURSE PRACTITIONER

## 2017-02-17 PROCEDURE — 85027 COMPLETE CBC AUTOMATED: CPT | Performed by: NURSE PRACTITIONER

## 2017-02-17 PROCEDURE — 84443 ASSAY THYROID STIM HORMONE: CPT | Performed by: NURSE PRACTITIONER

## 2017-02-17 PROCEDURE — 82306 VITAMIN D 25 HYDROXY: CPT | Performed by: NURSE PRACTITIONER

## 2017-02-17 RX ORDER — AMLODIPINE BESYLATE 2.5 MG/1
2.5 TABLET ORAL DAILY
Qty: 90 TABLET | Refills: 3 | Status: SHIPPED
Start: 2017-02-17 | End: 2018-04-16

## 2017-02-17 RX ORDER — LOSARTAN POTASSIUM AND HYDROCHLOROTHIAZIDE 25; 100 MG/1; MG/1
1 TABLET ORAL DAILY
Qty: 90 TABLET | Refills: 3 | Status: SHIPPED
Start: 2017-02-17 | End: 2018-03-15

## 2017-02-17 RX ORDER — OXYCODONE AND ACETAMINOPHEN 5; 325 MG/1; MG/1
1-2 TABLET ORAL EVERY 4 HOURS PRN
Qty: 240 TABLET | Refills: 0 | Status: SHIPPED | OUTPATIENT
Start: 2017-02-17 | End: 2017-03-22

## 2017-02-17 ASSESSMENT — PAIN SCALES - GENERAL: PAINLEVEL: WORST PAIN (10)

## 2017-02-17 NOTE — LETTER
[facility]  [address] [place]  [address]  [phone]        February 24, 2017    Teresa Lopez  [address] [facility] [address]        Dear Teresa,    The results of your recent labs are enclosed.  Your hemoglobin A1c is 6.5%. This is likely from the steroid (prednisone). 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 You do not need to take a medication to treat it as your diabetes is under control.   Your cholesterol increased from last year. Try to decrease the unhealthy fats you are eating, and eat more produce, whole grains and healthy (unsaturated fats).   Your white blood cell count is mildly elevated. Did you recently have an infection? That would explain why it's elevated. If you don't think you had any signs of an illness, we should recheck it in 1 month. You will need to schedule a lab visit for this.   Your other labs are stable.  I hope your visit with the pain team goes well. Staying active through dancing and silver sneakers will help to lower your cholesterol and keep your diabetes under control.     Please call the clinic if you have any concerns.    Results for orders placed or performed in visit on 02/17/17   CBC with platelets   Result Value Ref Range    WBC 11.2 (H) 4.0 - 11.0 10e9/L    RBC Count 3.71 (L) 3.8 - 5.2 10e12/L    Hemoglobin 13.3 11.7 - 15.7 g/dL    Hematocrit 37.7 35.0 - 47.0 %     (H) 78 - 100 fl    MCH 35.8 (H) 26.5 - 33.0 pg    MCHC 35.3 31.5 - 36.5 g/dL    RDW 12.6 10.0 - 15.0 %    Platelet Count 335 150 - 450 10e9/L   Comprehensive metabolic panel   Result Value Ref Range    Sodium 138 133 - 144 mmol/L    Potassium 3.6 3.4 - 5.3 mmol/L    Chloride 100 94 - 109 mmol/L    Carbon Dioxide 25 20 - 32 mmol/L    Anion Gap 13 3 - 14 mmol/L    Glucose 142 (H) 70 - 99 mg/dL    Urea Nitrogen 23 7 - 30 mg/dL    Creatinine 1.17 (H) 0.52 - 1.04 mg/dL    GFR Estimate 44 (L) >60 mL/min/1.7m2    GFR Estimate If Black 54 (L)  >60 mL/min/1.7m2    Calcium 9.7 8.5 - 10.1 mg/dL    Bilirubin Total 0.5 0.2 - 1.3 mg/dL    Albumin 3.7 3.4 - 5.0 g/dL    Protein Total 8.0 6.8 - 8.8 g/dL    Alkaline Phosphatase 89 40 - 150 U/L    ALT 23 0 - 50 U/L    AST 23 0 - 45 U/L   Lipid panel reflex to direct LDL   Result Value Ref Range    Cholesterol 290 (H) <200 mg/dL    Triglycerides 182 (H) <150 mg/dL    HDL Cholesterol 87 >49 mg/dL    LDL Cholesterol Calculated 167 (H) <100 mg/dL    Non HDL Cholesterol 203 (H) <130 mg/dL   TSH with free T4 reflex   Result Value Ref Range    TSH 4.13 (H) 0.40 - 4.00 mU/L   Vitamin D Deficiency   Result Value Ref Range    Vitamin D Deficiency screening 28 20 - 75 ug/L   Hemoglobin A1c   Result Value Ref Range    Hemoglobin A1C 6.5 (H) 4.3 - 6.0 %   T4 free   Result Value Ref Range    T4 Free 1.16 0.76 - 1.46 ng/dL       If you have any questions please call the clinic at [phone].    Sincerely,    Deemtrice SANCHES CNP  LMD

## 2017-02-17 NOTE — MR AVS SNAPSHOT
After Visit Summary   2/17/2017    Teresa Lopez    MRN: [id number]           Patient Information     Date Of Birth          1935        Visit Information        Provider Department      2/17/2017 9:00 AM Demetrice Gutierrez APRN [facility]        Today's Diagnoses     Routine general medical examination at a health care facility    -  1    Cerebral infarction, unspecified mechanism (H)        Hypertension goal BP (blood pressure) < 140/90        Spinal stenosis of lumbar region with neurogenic claudication        DJD (degenerative joint disease), lumbosacral        Chronic pain disorder        CKD (chronic kidney disease) stage 3, GFR 30-59 ml/min        Hyperlipidemia LDL goal <130        Elevated hemoglobin A1c        Vitamin D deficiency        Arthritis, lumbar spine          Care Instructions    Follow up in 3 months  I will send you a letter in the mail with your lab results      Preventive Health Recommendations    Female Ages 65 +    Yearly exam:     See your health care provider every year in order to  o Review health changes.   o Discuss preventive care.    o Review your medicines if your doctor has prescribed any.      You no longer need a yearly Pap test unless you've had an abnormal Pap test in the past 10 years. If you have vaginal symptoms, such as bleeding or discharge, be sure to talk with your provider about a Pap test.      Every 1 to 2 years, have a mammogram.  If you are over 69, talk with your health care provider about whether or not you want to continue having screening mammograms.      Every 10 years, have a colonoscopy. Or, have a yearly FIT test (stool test). These exams will check for colon cancer.       Have a cholesterol test every 5 years, or more often if your doctor advises it.       Have a diabetes test (fasting glucose) every three years. If you are at risk for diabetes, you should have this test more often.       At age 65, have a bone  density scan (DEXA) to check for osteoporosis (brittle bone disease).    Shots:    Get a flu shot each year.    Get a tetanus shot every 10 years.    Talk to your doctor about your pneumonia vaccines. There are now two you should receive - Pneumovax (PPSV 23) and Prevnar (PCV 13).    Talk to your doctor about the shingles vaccine.    Talk to your doctor about the hepatitis B vaccine.    Nutrition:     Eat at least 5 servings of fruits and vegetables each day.      Eat whole-grain bread, whole-wheat pasta and brown rice instead of white grains and rice.      Talk to your provider about Calcium and Vitamin D.     Lifestyle    Exercise at least 150 minutes a week (30 minutes a day, 5 days a week). This will help you control your weight and prevent disease.      Limit alcohol to one drink per day.      No smoking.       Wear sunscreen to prevent skin cancer.       See your dentist twice a year for an exam and cleaning.      See your eye doctor every 1 to 2 years to screen for conditions such as glaucoma, macular degeneration and cataracts.        Follow-ups after your visit        Your next 10 appointments already scheduled     Mar 01, 2017  3:00 PM CST   New Visit with MYRON Ahumada [facility]    [address] [facility] [address] [phone]              Who to contact     If you have questions or need follow up information about today's clinic visit or your schedule please contact [facility] directly at [phone].  Normal or non-critical lab and imaging results will be communicated to you by MyChart, letter or phone within 4 business days after the clinic has received the results. If you do not hear from us within 7 days, please contact the clinic through [employer] or phone. If you have a critical or abnormal lab result, we will notify you by phone as soon as possible.  Submit refill requests through [employer] or  "call your pharmacy and they will forward the refill request to us. Please allow 3 business days for your refill to be completed.          Additional Information About Your Visit        MyChart Information     [employer] lets you send messages to your doctor, view your test results, renew your prescriptions, schedule appointments and more. To sign up, go to [email or web address] . Click on \"Log in\" on the left side of the screen, which will take you to the Welcome page. Then click on \"Sign up Now\" on the right side of the page.     You will be asked to enter the access code listed below, as well as some personal information. Please follow the directions to create your username and password.     Your access code is: [email or web address]  Expires: 2017 10:46 AM     Your access code will  in 90 days. If you need help or a new code, please call your [place] clinic or [phone].        Care EveryWhere ID     This is your Care EveryWhere ID. This could be used by other organizations to access your [place] medical records  [id number]        Your Vitals Were     Pulse Temperature Pulse Oximetry BMI (Body Mass Index)          79 97  F (36.1  C) (Oral) 97% 28.39 kg/m2         Blood Pressure from Last 3 Encounters:   17 128/73   17 132/75   16 123/84    Weight from Last 3 Encounters:   17 151 lb (68.5 kg)   17 154 lb (69.9 kg)   16 157 lb (71.2 kg)              We Performed the Following     CBC with platelets     Comprehensive metabolic panel     Hemoglobin A1c     Lipid panel reflex to direct LDL     TSH with free T4 reflex     Vitamin D Deficiency          Today's Medication Changes          These changes are accurate as of: 17  9:36 AM.  If you have any questions, ask your nurse or doctor.               Start taking these medicines.        Dose/Directions    oxyCODONE-acetaminophen 5-325 MG per tablet   Commonly known as:  PERCOCET   Used for:  Arthritis, lumbar spine, " Chronic pain disorder, Spinal stenosis of lumbar region with neurogenic claudication   Started by:  Demetrice Gutierrez APRN CNP        Dose:  1-2 tablet   Take 1-2 tablets by mouth every 4 hours as needed for moderate to severe pain (Not more than 8 tablets per day.)   Quantity:  240 tablet   Refills:  0            Where to get your medicines      These medications were sent to [facility]/pharmacy #[id number] - [place], [place] - [address] [facility] [address]     Phone:  [phone]     amLODIPine 2.5 MG tablet    losartan-hydrochlorothiazide 100-25 MG per tablet         Some of these will need a paper prescription and others can be bought over the counter.  Ask your nurse if you have questions.     Bring a paper prescription for each of these medications     oxyCODONE-acetaminophen 5-325 MG per tablet                Primary Care Provider Office Phone # Fax #    MYRON Ashford [phone]       [facility] [address] [facility] [address]        Thank you!     Thank you for choosing [facility]  for your care. Our goal is always to provide you with excellent care. Hearing back from our patients is one way we can continue to improve our services. Please take a few minutes to complete the written survey that you may receive in the mail after your visit with us. Thank you!             Your Updated Medication List - Protect others around you: Learn how to safely use, store and throw away your medicines at www.disposemymeds.org.          This list is accurate as of: 2/17/17  9:36 AM.  Always use your most recent med list.                   Brand Name Dispense Instructions for use    ADVIL 200 MG capsule   Generic drug:  ibuprofen      Reported on 2/17/2017       amLODIPine 2.5 MG tablet    NORVASC    90 tablet    Take 1 tablet (2.5 mg) by mouth daily 1 po at bedtime       aspirin 162 MG EC tablet      Take 162 mg  by mouth daily. Takes 500 mg       CALCIUM 1200 PO      None Entered       CLARITIN 10 MG capsule   Generic drug:  loratadine      Take 10 mg by mouth as needed.       GLUCOSAMINE 1500 COMPLEX PO      Take 1,500 mg by mouth daily.       losartan-hydrochlorothiazide 100-25 MG per tablet    HYZAAR    90 tablet    Take 1 tablet by mouth daily       Multi-vitamin Tabs tablet   Generic drug:  multivitamin, therapeutic with minerals      1 TABLET DAILY       oxyCODONE-acetaminophen 5-325 MG per tablet    PERCOCET    240 tablet    Take 1-2 tablets by mouth every 4 hours as needed for moderate to severe pain (Not more than 8 tablets per day.)       predniSONE 5 MG tablet    DELTASONE    120 tablet    1 tablet daily and 2 on Mon Wed Fri.       PRILOSEC PO      None Entered       SALMON OIL-1000 PO      take 1,000 Caps by mouth daily.       senna-docusate 8.6-50 MG per tablet    SENOKOT-S;PERICOLACE    120 tablet    Take 1 tablet by mouth 2 times daily       VITAMIN C PO      None Entered       VITAMIN D PO      Take 1,200 mg by mouth daily.       vitamin E 400 UNIT capsule      None Entered

## 2017-02-17 NOTE — NURSING NOTE
"Chief Complaint   Patient presents with     Physical       Initial /73 (BP Location: Right arm, Patient Position: Chair, Cuff Size: Adult Regular)  Pulse 79  Temp 97  F (36.1  C) (Oral)  Wt 151 lb (68.5 kg)  SpO2 97%  BMI 28.39 kg/m2 Estimated body mass index is 28.39 kg/(m^2) as calculated from the following:    Height as of 1/16/17: 5' 1.15\" (1.553 m).    Weight as of this encounter: 151 lb (68.5 kg).  Medication Reconciliation: complete   TIMOTHY Pinzon CMA      "

## 2017-02-18 LAB — DEPRECATED CALCIDIOL+CALCIFEROL SERPL-MC: 28 UG/L (ref 20–75)

## 2017-02-21 PROBLEM — T38.0X5A STEROID-INDUCED DIABETES MELLITUS (H): Status: ACTIVE | Noted: 2017-02-21

## 2017-02-21 PROBLEM — E09.9 STEROID-INDUCED DIABETES MELLITUS (H): Status: ACTIVE | Noted: 2017-02-21

## 2017-02-24 NOTE — PROGRESS NOTES
[facility]  [address] [facility] [address]  Phone: [phone]      02/24/17    Teresa Lopez  [address] [facility] [address]      Dear Teresa,    The results of your recent labs are enclosed.  Your hemoglobin A1c is 6.5%. This is likely from the steroid (prednisone). 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 You do not need to take a medication to treat it as your diabetes is under control.   Your cholesterol increased from last year. Try to decrease the unhealthy fats you are eating, and eat more produce, whole grains and healthy (unsaturated fats).   Your white blood cell count is mildly elevated. Did you recently have an infection? That would explain why it's elevated. If you don't think you had any signs of an illness, we should recheck it in 1 month. You will need to schedule a lab visit for this.   Your other labs are stable.  I hope your visit with the pain team goes well. Staying active through dancing and silver sneakers will help to lower your cholesterol and keep your diabetes under control.     Please call the clinic if you have any concerns.    Sincerely,    MYRON Bruce CNP    [facility] Care Team

## 2017-03-01 ENCOUNTER — OFFICE VISIT (OUTPATIENT)
Dept: PALLIATIVE MEDICINE | Facility: CLINIC | Age: 82
End: 2017-03-01
Payer: COMMERCIAL

## 2017-03-01 VITALS
DIASTOLIC BLOOD PRESSURE: 95 MMHG | WEIGHT: 152 LBS | HEART RATE: 86 BPM | SYSTOLIC BLOOD PRESSURE: 169 MMHG | BODY MASS INDEX: 28.58 KG/M2

## 2017-03-01 DIAGNOSIS — M79.18 MYOFASCIAL PAIN: ICD-10-CM

## 2017-03-01 DIAGNOSIS — G89.29 CHRONIC BILATERAL LOW BACK PAIN WITHOUT SCIATICA: Primary | ICD-10-CM

## 2017-03-01 DIAGNOSIS — M48.062 SPINAL STENOSIS OF LUMBAR REGION WITH NEUROGENIC CLAUDICATION: ICD-10-CM

## 2017-03-01 DIAGNOSIS — M47.817 LUMBOSACRAL SPONDYLOSIS WITHOUT MYELOPATHY: ICD-10-CM

## 2017-03-01 DIAGNOSIS — M54.50 CHRONIC BILATERAL LOW BACK PAIN WITHOUT SCIATICA: Primary | ICD-10-CM

## 2017-03-01 DIAGNOSIS — M25.551 HIP PAIN, RIGHT: ICD-10-CM

## 2017-03-01 PROCEDURE — 99215 OFFICE O/P EST HI 40 MIN: CPT | Performed by: NURSE PRACTITIONER

## 2017-03-01 ASSESSMENT — PAIN SCALES - GENERAL: PAINLEVEL: EXTREME PAIN (9)

## 2017-03-01 NOTE — PATIENT INSTRUCTIONS
PLAN:  THIS IS A CONSULT ONLY. THESE ARE MY RECOMMENDATIONS:  I recommend obtaining a lumbar MRI.  I recommend obtaining a right hip x-ray (she tweaked this recently and it is bothering her)  I recommend starting low dose gabapentin for chronic pain  I also recommend potentially slowly doing some taper of the pain medication, I would consider doing this very slowly, by reducing 1/2 to one tablet per day every 1-2 months  I recommend trying aquatic therapy with [facility].  Once we have the MRI and hip x-rays back, patient may benefit from seeing non-surgical ortho for possible hip joint injection and she may also benefit from having a lumbar epidural steroid injection. We must have the MRI imaging before we do this, however.     Follow up with me only if needed if you and Demetrice SANCHES CNP decide you should follow with me in the pain clinic.   ----------------------------------------------------------------  Nurse Triage line:  [phone]   Call this number with any questions or concerns. You may leave a detailed message anytime. Calls are typically returned Monday through Friday between 8 AM and 4:30 PM. We usually get back to you within 2 business days depending on the issue/request.       Medication refills:    For non-narcotic medications, call your pharmacy directly to request a refill. The pharmacy will contact the [facility] for authorization. Please allow 3-4 days for these refills to be processed.     For narcotic refills, call the nurse triage line or send a [facility] [employer] message. Please contact us 7-10 days before your refill is due. The message MUST include the name of the specific medication(s) requested and how you would like to receive the prescription(s). The options are as follows:    Pain Clinic staff can mail the prescription to your pharmacy. Please tell us the name of the pharmacy.    You may pick the prescription up at the Pain Clinic (tell us the location) or during a  clinic visit with your pain provider    Pain Clinic staff can deliver the prescription to the [facility] pharmacy in the clinic building. Please tell us the location.      Scheduling number: [phone].  Call this number to schedule or change appointments.    We believe regular attendance is key to your success in our program.    Any time you are unable to keep your appointment we ask that you call us at least 24 hours in advance to let us know. This will allow us to offer the appointment time to another patient.

## 2017-03-01 NOTE — Clinical Note
Demetrice Thank you for this very nice consult. Please see my notes for recommendations. Feel free to message me with any questions or concerns.  Marion SANCHES, МАРИЯ CNP, FNP [facility] [place]

## 2017-03-01 NOTE — NURSING NOTE
"Chief Complaint   Patient presents with     Pain       Initial Wt 68.9 kg (152 lb)  BMI 28.58 kg/m2 Estimated body mass index is 28.58 kg/(m^2) as calculated from the following:    Height as of 1/16/17: 1.553 m (5' 1.15\").    Weight as of this encounter: 68.9 kg (152 lb).  Medication Reconciliation: afshin Ojeda CMA (AAMA)      "

## 2017-03-01 NOTE — PROGRESS NOTES
"  [facility] Consultation    Date of visit: 3/1/2017    Reason for consultation:    Teresa Lopez is a 81 year old female who is seen in consultation today at the request of her provider, Demetrice SANCHES CNP for  A CONSULT ONLY WITHOUT ONGOING MANAGEMENT RE: PROGRESSIVELY WORSENING LUMBAR SPINAL STENOSIS, ON PERCOCET 240 TABS PER MONTH AND CHRONIC SUPPRESSIVE PREDNISONE THERAPY, WOULD CONSIDER STEROID INJECTION OR OTHER THERAPIES.    Primary Care Provider is Demetrice Gutierrez.  Pain medications are being prescribed by Nicolasa SANCHES CNP.    Please see the [facility] [place] health questionnaire which the patient completed and reviewed with me in detail.    Chief Complaint:  Low back pain   Chief Complaint   Patient presents with     Pain       Pain history:  Teresa Lopez is a 81 year old female who first started having problems with pain as follows:     -Chronic low back pain  This low back pain began about 3 years ago and has worsened considerably in the past year. She has pain in the low back and into the buttocks and into the legs Left > right down to the level of the knees. This pain improves when she sits down (neurogenic claudication).  She is a dancing girl and loves to dance and play bingo.     Pain rating: intensity ranges from 0/10 to 10/10, and Averages 8/10 on a 0-10 scale.    Describes pain as \"aching and sharp.\"  Pain is constant.    Home self care includes: heating pad is very helpful and chiropractic    Aggravating factors include: standing too long (> 15 minutes); walking too much (half a block)    Relieving factors include: sitting and lying down    Any bowel or bladder incontinence: none    Current pain-related medication treatments include:  -Percocet 5/325mg take 1-2 tabs every 4 hours as needed for pain (she thinks it is helpful)  -Prednisone 5mg daily and 2 tabs Mon/Wed/Fri (helpful)  -Senna PRN   -ASA QD   -ibuprofen 200mg PRN (takes once per day at " 1000-helpful)    Other pertinent medications:  None    Previous medication treatments included:  OPIATES: Oxycodone (helpful), hydrocodone (not helpful)  NSAIDS: ibuprofen (helpful)  MUSCLE RELAXANTS: none  ANTI-MIGRAINE MEDS: none  ANTI-DEPRESSANTS:  Long time ago  SLEEP AIDS: none  ANTI-CONVULSANTS: none  TOPICALS: none  Other meds: Tylenol (unsure)      Other treatments have included:  Teresa Lopez has not been seen at a pain clinic in the past.    PT: none  Chiropractic: yes, helpful  [employer] Silver Sneakers: helpful  Acupuncture: none  TENs Unit: none    Injections: none    Past Medical History:  Past Medical History   Diagnosis Date     Cataract 3/7/2012     CVA (cerebral infarction)      Diabetes mellitus (H)      DJD (degenerative joint disease)      HTN      Past Surgical History:  Past Surgical History   Procedure Laterality Date     Hysterectomy, cervix status unknown  age 31     Medications:  Current Outpatient Prescriptions   Medication Sig Dispense Refill     losartan-hydrochlorothiazide (HYZAAR) 100-25 MG per tablet Take 1 tablet by mouth daily 90 tablet 3     amLODIPine (NORVASC) 2.5 MG tablet Take 1 tablet (2.5 mg) by mouth daily 1 po at bedtime 90 tablet 3     oxyCODONE-acetaminophen (PERCOCET) 5-325 MG per tablet Take 1-2 tablets by mouth every 4 hours as needed for moderate to severe pain (Not more than 8 tablets per day.) 240 tablet 0     predniSONE (DELTASONE) 5 MG tablet 1 tablet daily and 2 on Mon Wed Fri. 120 tablet 3     senna-docusate (SENOKOT-S;PERICOLACE) 8.6-50 MG per tablet Take 1 tablet by mouth 2 times daily 120 tablet 12     aspirin 162 MG EC tablet Take 162 mg by mouth daily. Takes 500 mg        Glucosamine-Chondroit-Vit C-Mn (GLUCOSAMINE 1500 COMPLEX PO) Take 1,500 mg by mouth daily.       Loratadine (CLARITIN) 10 MG capsule Take 10 mg by mouth as needed.       Omega-3 Fatty Acids (SALMON OIL-1000 PO) take 1,000 Caps by mouth daily.       ADVIL 200 MG PO CAPS Reported on 2/17/2017        MULTI-VITAMIN OR TABS 1 TABLET DAILY       CALCIUM 1200 OR None Entered       VITAMIN C OR None Entered       VITAMIN D OR Take 1,200 mg by mouth daily.       VITAMIN E 400 UNIT OR CAPS None Entered       PRILOSEC OR None Entered       Allergies:     Allergies   Allergen Reactions     Sulfa Drugs      Social History:  Home situation: lives alone  Occupation/Schooling: retired  Tobacco use: never  Alcohol use: rarely  Drug use: none  History of chemical dependency treatment: none    Family history:  Family History   Problem Relation Age of Onset     Arthritis Mother      Unknown/Adopted Father      adopted     DIABETES Brother      CANCER Brother      Hypertension No family hx of      CEREBROVASCULAR DISEASE No family hx of      Thyroid Disease No family hx of      Glaucoma No family hx of      Macular Degeneration No family hx of          Review of Systems:  Skin: negative  Eyes: negative  Ears/Nose/Throat: negative  Respiratory: No shortness of breath, dyspnea on exertion, cough, or hemoptysis  Cardiovascular: negative  Gastrointestinal: negative  Genitourinary: negative  Musculoskeletal: back pain, neck pain, arthritis, joint pain and stiffness  Neurologic: negative  Psychiatric: negative  Hematologic/Lymphatic/Immunologic: negative  Endocrine: negative    Physical Exam:  Vitals:    03/01/17 1511 03/01/17 1515   BP: (!) 188/99 (!) 169/95   Pulse: 86 86   Weight: 68.9 kg (152 lb)      Exam:  Constitutional: healthy, alert and no distress  Head: normocephalic. Atraumatic.   Eyes: no redness or jaundice noted   ENT: oropharnx normal.  MMM.  Neck supple.    Cardiovascular: RRR no m/g/r   Respiratory: clear to ascultation A/P. Respirations easy and unlabored. Able to speak in full sentences without SOB or cough noted.    Gastrointestinal: soft, non-tender, normoactive bowel sounds   : deferred  Skin: no suspicious lesions or rashes  Psychiatric: mentation appears normal and affect normal/bright    Musculoskeletal  exam:  Gait/Station/Posture: fair posture. Slow gait, antalgic on the right side.  Rises onto the left toes and heel but cannot on the right due to pain. Difficult to perform tandem gait without significant support.     Cervical spine:    Flex:  40 degrees   Ext: 30 degrees   Rotation to right: 90 degrees   Rotation to left: 90 degrees       Thoracic spine:  Normal     Lumbar spine:    Flex:  80 degrees   Ext: 20 degrees   Rotation/ext to right: painful    Rotation/ext to left: painful    SI joints: non-tender   Greater trochanters: non-tender    Myofascial tenderness:  Lower lumbar paraspinals  Straight leg exam: negative  Aurelio's maneuver: negative    Neurologic exam:  CN:  Cranial nerves 2-12 are  Grossly normal  Motor:  5/5 UE and LE strength  Reflexes:     Biceps:     R:  2+/4 L: 2+/4   Brachioradialis   R:  2+/4 L: 2+/4      Patella:  R:  2+/4 L: 2+/4   Achilles:  R:  2+/4 L: 2+/4  Other reflexes:  Toes downgoing. Martínez's negative   Sensory:  (upper and lower extremities):   Light touch: normal    Vibration: normal    Pin prick: normal    Allodynia: absent    Dysethesia: absent    Hyperalgesia: absent     Diagnostic tests:  XR LUMBAR SPINE 2-3 VIEWS 2/10/2014 3:17 PM     HISTORY: Spondylosis without myelopathy.     COMPARISON: None.     IMPRESSION  IMPRESSION: 3 views of the spine. Lumbar scoliosis convex right. 0.8  cm anterior listhesis of L4 on L5, chronicity and etiology not  delineated. Multilevel degenerative changes. No obvious acute process.    Other testing (labs, diagnostics):  2/17/2017  Cr. 1.17  Est. GFR 44      Screening tools:    PHQ-9 score today is 1. Patient DENIES any suicidal ideation.     DIRE Score for ongoing opioid management is calculated as follows:    Diagnosis = 2    Intractability = 1    Risk: Psych = 3  Chem Hlth = 2  Reliability = 3  Social = 3    Efficacy = 2    Total DIRE Score = 16 (14 or higher predicts good candidate for ongoing opioid management; 13 or lower predicts  poor candidate for opioid management)     Assessment:  1. Chronic lower back pain  2. Likely spinal stenosis with neurogenic claudication  3. Right hip pain  4. Lumbar spondylosis  5. Myofascial pain  6. PMHx includes: HTN, DM, CVA, cataracts  7. PSHx includes: hysterectomy        Plan:  CONSULT ONLY. THE FOLLOWING ARE RECOMMENDATIONS:    1. Physical Therapy: CONSIDER AQUATIC THERAPY AT [facility]  2. Clinical Health Psychologist to address issues of relaxation, behavioral change, coping style, and other factors important to improvement: NONE NEEDED AT PRESENT  3. Diagnostic Studies: PATIENT NEEDS TO HAVE A LUMBAR MRI DONE TO FURTHER CHARACTERIZE HER LUMBAR ISSUES. I THINK SHE MAY BENEFIT FROM A LUMBAR EPIDURAL STEROID INJECTION FROM HIGHLY SUSPECTED LUMBAR SPINAL STENOSIS WITH SYMPTOMS CONSISTENT WITH NEUROGENIC CLAUDICATION.---PATIENT SHOULD ALSO HAVE A RIGHT HIP X-RAYS AS I SUSPECT SHE HAS OSTEOARTHRITIS AND MAY BENEFIT FROM RIGHT HIP JOINT INJECTION. I RECOMMEND USING DR. POWER ELLINGTON IN [facility] IN [place] AS HE DOES ULTRASOUND GUIDED INTRA-ARTICULAR INJECTIONS  4. Medication Management:   1. START LOW DOSE GABAPENTIN FOR CHRONIC PAIN, 100MG DOSING AT BEDTIME AND INCREASE BY 100MG PER DAY EVERY 7 DAYS UNTIL TAKING 300MG THREE TIMES PER DAY  2. I RECOMMEND A SLOW REDUCTION IN HER USE OF THE PERCOCET. I WOULD RECOMMEND REDUCING THIS BY 1/2 TO 1 TABLET PER DAY EVERY 1-2 MONTHS.   5. Further procedures recommended: ONCE HIP X-RAYS AND LUMBAR MRI ARE OBTAINED, THEN CAN CONSIDER HIP JOINT INJECTION AS NOTED ABOVE AS WELL AS POSSIBLE LUMBAR EPIDURAL STEROID INJECTION.  6. Acupuncture: NONE  7. Urine toxicology screen today: RECOMMEND THIS YEARLY AND SIGNING AN OPIATE AGREEMENT ANNUALLY   8. Release of information: NONE  9. Follow up: WITH ME ONLY IF NEEDED IF HER Primary Care Provider DECIDES PATIENT SHOULD FOLLOW WITH ME WITH THE PAIN CLINIC FOR 6-12 MONTHS AVERAGE.     Total time spent was 75 minutes,  and more than 50% of face to face time was spent in counseling and/or coordination of care regarding principles of multidisciplinary care, medication management.      Marion SANCHES, RN CNP, FNP  [facility]

## 2017-03-01 NOTE — MR AVS SNAPSHOT
After Visit Summary   3/1/2017    Teresa Lopez    MRN: [id number]           Patient Information     Date Of Birth          1935        Visit Information        Provider Department      3/1/2017 3:00 PM Marion Munoz APRN CNP [facility]        Today's Diagnoses     Chronic bilateral low back pain without sciatica    -  1    Hip pain, right        Spinal stenosis of lumbar region with neurogenic claudication        Myofascial pain        Lumbosacral spondylosis without myelopathy          Care Instructions    PLAN:  THIS IS A CONSULT ONLY. THESE ARE MY RECOMMENDATIONS:  I recommend obtaining a lumbar MRI.  I recommend obtaining a right hip x-ray (she tweaked this recently and it is bothering her)  I recommend starting low dose gabapentin for chronic pain  I also recommend potentially slowly doing some taper of the pain medication, I would consider doing this very slowly, by reducing 1/2 to one tablet per day every 1-2 months  I recommend trying aquatic therapy with [facility].  Once we have the MRI and hip x-rays back, patient may benefit from seeing non-surgical ortho for possible hip joint injection and she may also benefit from having a lumbar epidural steroid injection. We must have the MRI imaging before we do this, however.     Follow up with me only if needed if you and Demetrice SANCHES CNP decide you should follow with me in the pain clinic.   ----------------------------------------------------------------  Nurse Triage line:  [phone]   Call this number with any questions or concerns. You may leave a detailed message anytime. Calls are typically returned Monday through Friday between 8 AM and 4:30 PM. We usually get back to you within 2 business days depending on the issue/request.       Medication refills:    For non-narcotic medications, call your pharmacy directly to request a refill. The pharmacy will contact the [facility] Management Center for  authorization. Please allow 3-4 days for these refills to be processed.     For narcotic refills, call the nurse triage line or send a [email or web address] message. Please contact us 7-10 days before your refill is due. The message MUST include the name of the specific medication(s) requested and how you would like to receive the prescription(s). The options are as follows:    Pain Clinic staff can mail the prescription to your pharmacy. Please tell us the name of the pharmacy.    You may pick the prescription up at the Pain Clinic (tell us the location) or during a clinic visit with your pain provider    Pain Clinic staff can deliver the prescription to the [place] pharmacy in the clinic building. Please tell us the location.      Scheduling number: [phone].  Call this number to schedule or change appointments.    We believe regular attendance is key to your success in our program.    Any time you are unable to keep your appointment we ask that you call us at least 24 hours in advance to let us know. This will allow us to offer the appointment time to another patient.             Follow-ups after your visit        Who to contact     If you have questions or need follow up information about today's clinic visit or your schedule please contact [facility] directly at [phone].  Normal or non-critical lab and imaging results will be communicated to you by MyChart, letter or phone within 4 business days after the clinic has received the results. If you do not hear from us within 7 days, please contact the clinic through [employer] or phone. If you have a critical or abnormal lab result, we will notify you by phone as soon as possible.  Submit refill requests through [email or web address] or call your pharmacy and they will forward the refill request to us. Please allow 3 business days for your refill to be completed.          Additional Information About Your Visit        [email or web address] Information     [email or web address] lets you send messages  "to your doctor, view your test results, renew your prescriptions, schedule appointments and more. To sign up, go to [email or web address] . Click on \"Log in\" on the left side of the screen, which will take you to the Welcome page. Then click on \"Sign up Now\" on the right side of the page.     You will be asked to enter the access code listed below, as well as some personal information. Please follow the directions to create your username and password.     Your access code is: [email or web address]  Expires: 2017 10:46 AM     Your access code will  in 90 days. If you need help or a new code, please call your [place] clinic or [phone].        Care EveryWhere ID     This is your Care EveryWhere ID. This could be used by other organizations to access your [place] medical records  [id number]        Your Vitals Were     Pulse BMI (Body Mass Index)                86 28.58 kg/m2           Blood Pressure from Last 3 Encounters:   17 (!) 169/95   17 128/73   17 132/75    Weight from Last 3 Encounters:   17 68.9 kg (152 lb)   17 68.5 kg (151 lb)   17 69.9 kg (154 lb)              Today, you had the following     No orders found for display       Primary Care Provider Office Phone # Fax #    Demetrice Dojohn APRJOHN [phone]       [address] [address]        Thank you!     Thank you for choosing [facility]3]  for your care. Our goal is always to provide you with excellent care. Hearing back from our patients is one way we can continue to improve our services. Please take a few minutes to complete the written survey that you may receive in the mail after your visit with us. Thank you!             Your Updated Medication List - Protect others around you: Learn how to safely use, store and throw away your medicines at www.disposemymeds.org.          This list is accurate as of: 3/1/17  4:41 PM.  " Always use your most recent med list.                   Brand Name Dispense Instructions for use    ADVIL 200 MG capsule   Generic drug:  ibuprofen      Reported on 2/17/2017       amLODIPine 2.5 MG tablet    NORVASC    90 tablet    Take 1 tablet (2.5 mg) by mouth daily 1 po at bedtime       aspirin 162 MG EC tablet      Take 162 mg by mouth daily. Takes 500 mg       CALCIUM 1200 PO      None Entered       CLARITIN 10 MG capsule   Generic drug:  loratadine      Take 10 mg by mouth as needed.       GLUCOSAMINE 1500 COMPLEX PO      Take 1,500 mg by mouth daily.       losartan-hydrochlorothiazide 100-25 MG per tablet    HYZAAR    90 tablet    Take 1 tablet by mouth daily       Multi-vitamin Tabs tablet   Generic drug:  multivitamin, therapeutic with minerals      1 TABLET DAILY       oxyCODONE-acetaminophen 5-325 MG per tablet    PERCOCET    240 tablet    Take 1-2 tablets by mouth every 4 hours as needed for moderate to severe pain (Not more than 8 tablets per day.)       predniSONE 5 MG tablet    DELTASONE    120 tablet    1 tablet daily and 2 on Mon Wed Fri.       PRILOSEC PO      None Entered       SALMON OIL-1000 PO      take 1,000 Caps by mouth daily.       senna-docusate 8.6-50 MG per tablet    SENOKOT-S;PERICOLACE    120 tablet    Take 1 tablet by mouth 2 times daily       VITAMIN C PO      None Entered       VITAMIN D PO      Take 1,200 mg by mouth daily.       vitamin E 400 UNIT capsule      None Entered

## 2017-03-13 ENCOUNTER — TELEPHONE (OUTPATIENT)
Dept: INTERNAL MEDICINE | Facility: CLINIC | Age: 82
End: 2017-03-13

## 2017-03-13 DIAGNOSIS — M25.551 HIP PAIN, RIGHT: ICD-10-CM

## 2017-03-13 DIAGNOSIS — M48.062 SPINAL STENOSIS OF LUMBAR REGION WITH NEUROGENIC CLAUDICATION: ICD-10-CM

## 2017-03-13 DIAGNOSIS — F40.240 CLAUSTROPHOBIA: ICD-10-CM

## 2017-03-13 DIAGNOSIS — M47.817 DJD (DEGENERATIVE JOINT DISEASE), LUMBOSACRAL: Primary | ICD-10-CM

## 2017-03-13 RX ORDER — GABAPENTIN 300 MG/1
CAPSULE ORAL
Qty: 90 CAPSULE | Refills: 1 | Status: SHIPPED
Start: 2017-03-13 | End: 2017-05-25 | Stop reason: DRUGHIGH

## 2017-03-13 RX ORDER — DIAZEPAM 5 MG
TABLET ORAL
Qty: 2 TABLET | Refills: 0 | Status: SHIPPED | OUTPATIENT
Start: 2017-03-13 | End: 2017-04-20

## 2017-03-13 NOTE — TELEPHONE ENCOUNTER
Called patient to discuss recommendations made at pain consult  Ordered MRI and Hip xray  I will call patient once resulted and consider appropriate referrals for epidural injection, steroid injection  Patient will start gabapentin. Discussed proper dosing and potential side effects and to report fluid retention, weight gain, increased fatigue  Patient reports difficulty with previous MRI d/t claustrophobia.  Ordered 5 mg valium to take before MRI. May repeat x1. I signed the prescription and placed in outgoing/fax box. Please fax to [facility] pharmacy  Her daughter will drive her to MRI

## 2017-03-17 ENCOUNTER — RADIANT APPOINTMENT (OUTPATIENT)
Dept: MRI IMAGING | Facility: CLINIC | Age: 82
End: 2017-03-17
Attending: NURSE PRACTITIONER
Payer: COMMERCIAL

## 2017-03-17 ENCOUNTER — TELEPHONE (OUTPATIENT)
Dept: INTERNAL MEDICINE | Facility: CLINIC | Age: 82
End: 2017-03-17

## 2017-03-17 ENCOUNTER — RADIANT APPOINTMENT (OUTPATIENT)
Dept: GENERAL RADIOLOGY | Facility: CLINIC | Age: 82
End: 2017-03-17
Attending: NURSE PRACTITIONER
Payer: COMMERCIAL

## 2017-03-17 DIAGNOSIS — M48.062 SPINAL STENOSIS OF LUMBAR REGION WITH NEUROGENIC CLAUDICATION: ICD-10-CM

## 2017-03-17 DIAGNOSIS — M16.11 ARTHRITIS OF RIGHT HIP: Primary | ICD-10-CM

## 2017-03-17 DIAGNOSIS — M47.817 DJD (DEGENERATIVE JOINT DISEASE), LUMBOSACRAL: ICD-10-CM

## 2017-03-17 DIAGNOSIS — M25.551 HIP PAIN, RIGHT: ICD-10-CM

## 2017-03-17 PROCEDURE — 72148 MRI LUMBAR SPINE W/O DYE: CPT | Mod: TC

## 2017-03-17 PROCEDURE — 73502 X-RAY EXAM HIP UNI 2-3 VIEWS: CPT

## 2017-03-20 NOTE — TELEPHONE ENCOUNTER
Please let her know I reviewed the results of the xray and MRI. Xray showed moderate to severe osteoarthritis of the right hip. She would benefit from a steroid injection. Marion Munoz CNP had recommended Dr. Jean Valverde for an ultrasound guided intra-articular injection. I put in a referral with the [facility] team. Dr. Valverde is at the [facility]. She can call  [phone] to schedule that appointment. MRI showed lumbar spinal stenosis. When the patient saw Marion Munoz at the pain clinic, she had recommend an epidural steroid injection. Given the findings of the MRI, she would likely benefit from this. She can call [phone] to schedule this. She shouldn't need an additional referral for this injection as she is already following with the pain team, but please let me know if I need to put one in.  Also let her know that I'm sorry I couldn't call her with the results, but I am out of the clinic today. If she has any questions, I can call her tomorrow.

## 2017-03-20 NOTE — TELEPHONE ENCOUNTER
Called and spoke to patient, relayed message (below). Patient verbalized understanding.  She denies questions at this time but will call back if anything comes up.     Dee Sr RN   March 20, 2017 9:26 AM  [facility] Triage   [phone]

## 2017-03-21 ENCOUNTER — TELEPHONE (OUTPATIENT)
Dept: PALLIATIVE MEDICINE | Facility: CLINIC | Age: 82
End: 2017-03-21

## 2017-03-21 DIAGNOSIS — M48.062 SPINAL STENOSIS OF LUMBAR REGION WITH NEUROGENIC CLAUDICATION: Primary | ICD-10-CM

## 2017-03-21 NOTE — TELEPHONE ENCOUNTER
Please let patient know that I placed the referral for the epidural injection for the lumbar spinal stenosis. She can now call to schedule  She should schedule this at least 2 weeks apart from the hip injection

## 2017-03-21 NOTE — TELEPHONE ENCOUNTER
Patient calling to schedule a lumbar injection, please review and place appropriate order.      Stacy THORNTON    [facility]

## 2017-03-21 NOTE — TELEPHONE ENCOUNTER
I am not planning on following up with the patient as I saw her as a consult only. I will also forward this note to her Primary Care Provider Nicolasa Gutierrez so that she can place the order for a lumbar epidural steroid injection.  Any steroid injections should be done at least 14 days apart (from any injection she may receive from Dr. Jean Valverde).    Thanks.  Marion SANCHES, RN CNP, FNP  [facility] [place]

## 2017-03-22 DIAGNOSIS — M48.062 SPINAL STENOSIS OF LUMBAR REGION WITH NEUROGENIC CLAUDICATION: ICD-10-CM

## 2017-03-22 DIAGNOSIS — M47.816 ARTHRITIS, LUMBAR SPINE: ICD-10-CM

## 2017-03-22 DIAGNOSIS — G89.4 CHRONIC PAIN DISORDER: ICD-10-CM

## 2017-03-22 RX ORDER — OXYCODONE AND ACETAMINOPHEN 5; 325 MG/1; MG/1
1-2 TABLET ORAL EVERY 4 HOURS PRN
Qty: 240 TABLET | Refills: 0 | Status: SHIPPED | OUTPATIENT
Start: 2017-03-22 | End: 2017-03-23

## 2017-03-22 NOTE — TELEPHONE ENCOUNTER
Reason for Call:  Medication or medication refill:    Do you use a [facility]?  Name of the pharmacy and phone number for the current request:  [facility]    Name of the medication requested: oxyCODONE-acetaminophen (PERCOCET) 5-325 MG per tablet    Other request: Patient will pick hard copy up at     Can we leave a detailed message on this number? YES    Phone number patient can be reached at: Home number on file [phone] (home)    Best Time: any  oxyCODONE-acetaminophen (PERCOCET) 5-325 MG per tablet      Last Written Prescription Date:  02/17/2017  Last Fill Quantity: 240,   # refills: 0  Last Office Visit with [facility], P or [email or web address] prescribing provider: 02/17/2017  Future Office visit:       Routing refill request to provider for review/approval because:  Drug not on the [facility], [facility] or [email or web address] refill protocol or controlled substance      Call taken on 3/22/2017 at 9:29 AM by Pratima Alexander

## 2017-03-22 NOTE — TELEPHONE ENCOUNTER
Patient returned phone call and informed that referral has been placed. Patient given scheduling line phone number.

## 2017-03-23 DIAGNOSIS — G89.4 CHRONIC PAIN DISORDER: ICD-10-CM

## 2017-03-23 DIAGNOSIS — M48.062 SPINAL STENOSIS OF LUMBAR REGION WITH NEUROGENIC CLAUDICATION: ICD-10-CM

## 2017-03-23 DIAGNOSIS — M47.816 ARTHRITIS, LUMBAR SPINE: ICD-10-CM

## 2017-03-23 RX ORDER — OXYCODONE AND ACETAMINOPHEN 5; 325 MG/1; MG/1
1-2 TABLET ORAL EVERY 4 HOURS PRN
Qty: 240 TABLET | Refills: 0 | Status: SHIPPED | OUTPATIENT
Start: 2017-03-23 | End: 2017-05-22

## 2017-03-23 NOTE — TELEPHONE ENCOUNTER
I signed a new prescription today. I do not see the one I sent from home in the clinic. Nor do I see documentation that another provider signed it.   If it is found, it will need to be shred

## 2017-03-30 ENCOUNTER — TELEPHONE (OUTPATIENT)
Dept: INTERNAL MEDICINE | Facility: CLINIC | Age: 82
End: 2017-03-30

## 2017-03-30 NOTE — TELEPHONE ENCOUNTER
Reason for Call:  Other     Detailed comments: Patient calling to update Demetrice Gutierrez that she has been taking the gabapentin. For a couple days she has absolutely no pain in her hip. She is amazed. She does feel tightness still in the lumbar spine. Patient is scheduled to have injections tomorrow and now she is questioning if she still needs it. She is wondering if she should postpone these injections now that her hip is feeling so much better. Patient is also inquiring if she will need to be on the gabapentin ongoing or if this is something she will be able to stop at some point in the future. Patient still having pain in her right ankle that she has had for the last year. Patient thinks it is possible that she didn't share this with Demetrice.  Patient feels like this is a concern that she previously shared with Dr Jerez that never was really addressed by him. Patient is also still taking the oxycodone as prescribed and is feeling that she doesn't really need this anymore. Patient states this never really has helped but has taken it because Dr Jerez told her that she should take it.     Phone Number Patient can be reached at: Home number on file [phone] (home)    Best Time: any    Can we leave a detailed message on this number? YES    Call taken on 3/30/2017 at 9:07 AM by Pratima Alexander

## 2017-03-30 NOTE — TELEPHONE ENCOUNTER
I called patient and advised her of provider response below.  She did not talk to the pain clinic about her foot as she thought they were to address just her back.  She will call for appt to evaluate foot after she sees how the injection goes tomorrow.   She plans to proceed even though the gabapentin is helping.  Cherelle Holden RN  [facility]

## 2017-03-30 NOTE — TELEPHONE ENCOUNTER
I'm glad that the gabapentin is working for you. This is not a medication that makes the cause of the pain go away, so yes, you will need to take it as long as your symptoms are distressing you. If your hip pain is not bothering you you can postpone the injection and always reschedule it in the future.   The oxycodone is to be taken as needed, so if you feel like you aren't having pain you don't need to take it.   I don't know anything about the foot pain you are having it and I don't see mention of it in previous notes. Did you mention it to the pain specialist when you went in for your consult? If you have foot pain that is distressing to you, I recommend you make an appointment for a clinic visit.

## 2017-03-31 ENCOUNTER — OFFICE VISIT (OUTPATIENT)
Dept: ORTHOPEDICS | Facility: CLINIC | Age: 82
End: 2017-03-31
Payer: COMMERCIAL

## 2017-03-31 VITALS
WEIGHT: 152 LBS | HEIGHT: 61 IN | BODY MASS INDEX: 28.7 KG/M2 | SYSTOLIC BLOOD PRESSURE: 165 MMHG | DIASTOLIC BLOOD PRESSURE: 90 MMHG

## 2017-03-31 DIAGNOSIS — M16.11 PRIMARY OSTEOARTHRITIS OF RIGHT HIP: ICD-10-CM

## 2017-03-31 DIAGNOSIS — M25.551 RIGHT HIP PAIN: Primary | ICD-10-CM

## 2017-03-31 PROCEDURE — 99203 OFFICE O/P NEW LOW 30 MIN: CPT | Performed by: FAMILY MEDICINE

## 2017-03-31 NOTE — PROGRESS NOTES
"Teresa Lopez  :  1935  DOS: 3/31/2017  MRN: [id number]    Sports Medicine Clinic Visit    PCP: Demetrice Gutierrez    Teresa Lopez is a 81 year old female who is seen in consultation at the request of  Marion Munoz CNP presenting with chronic right hip pain.    Injury: Gradual onset of chronic mild-moderate right hip pain over last 6 - 7 months.  Pain located in deep lateral, anterior hip, radiating to right thigh.  Reports intermittent radiating, pain to right anterior, lateral thigh.  Additional Features:  Positive: grinding and weakness.  Symptoms are better with Rest.  Symptoms are worse with: prolonged walking, tying shoes, going from sit to stand position.  Other evaluation and/or treatments so far consists of: Rest and pain management consult, pain medication.  Recent imaging completed: X-rays completed 3/17/17.  Prior History of related problems: Patient is currently being treated for chronic low back pain issues by pain management.  Difficult to keep her focused on particular issue today.    Social History: retired  Presents with her daughter today    Review of Systems  Musculoskeletal: as above  Remainder of review of systems is negative including constitutional, CV, pulmonary, GI, Skin and Neurologic except as noted in HPI or medical history.    Past Medical History:   Diagnosis Date     Cataract 3/7/2012     CVA (cerebral infarction)      Diabetes mellitus (H)      DJD (degenerative joint disease)      HTN      Past Surgical History:   Procedure Laterality Date     HYSTERECTOMY, CERVIX STATUS UNKNOWN  age 31       Objective  /90  Ht 5' 1.15\" (1.553 m)  Wt 152 lb (68.9 kg)  BMI 28.58 kg/m2    General: healthy, alert and in no distress    HEENT: no scleral icterus or conjunctival erythema   Skin: no suspicious lesions or rash. No jaundice.   CV: regular rhythm by palpation, 2+ distal pulses, no pedal edema    Resp: normal respiratory effort without conversational dyspnea   Psych: " normal mood and affect    Gait: antalgic, appropriate coordination and balance, trendelenburg  Neuro: normal light touch sensory exam of the extremities. Motor strength as noted below     Right hip exam    Inspection:        no edema or ecchymosis in hip area    ROM:       Flexion 110       internal rotation 15      external rotation 30      Range of motion limited by pain    Strength:        flexion 4/5       extension 4/5       abduction 3/5       adduction 4/5    Tender:        greater trochanter       Anterior hip joint       Mild ipsilateral SI joint TTP    Non Tender:        remainder of hip area       illiac crest       ASIS       pubis    Sensation:        grossly intact in hip and thigh    Skin:       well perfused       capillary refill brisk    Special Tests:        neg (-) LUDY       positive (+) FADIR       positive (+) scour       neg (-) Ankit    Neg slump and SLR, knee stable on exam      Radiology  XR HIP RIGHT 2-3 VIEWS 3/17/2017 2:49 PM     COMPARISON: None.     HISTORY: Right hip pain, no trauma history.         IMPRESSION: Moderate to severe left hip osteoarthritis with  significant joint space loss. No fractures are seen.    Assessment:  1. Right hip pain    2. Primary osteoarthritis of right hip        Plan:  Discussed the assessment with the patient.  Follow up: prn  Offered injection today to the right hip joint  She had a difficult time staying on topic in our visit, very tangential  Despite her multiple regional complaints, her description and exam seems to point to hip as primary pain generator  Troch bursa injection an option in the future as well  Lumbar issue mgmt per pain mgmt team following her  She can return for injection for diagnostic and therapeutic value any time  PT options reviewed, as well as HEP  Topical OTC medication use and oral OTC medication use reviewed  Currently on prednisone  We discussed modified progressive pain-free activity as tolerated  Home handouts provided  and supportive care reviewed  All questions were answered today  Contact us with additional questions or concerns  Signs and sx of concern reviewed    Thanks very much for sending this nice lady to us, I will keep you updated with her progress      Jean Valverde DO, CAQ  Primary Care Sports Medicine  [facility]           Disclaimer: This note consists of symbols derived from keyboarding, dictation and/or voice recognition software. As a result, there may be errors in the script that have gone undetected. Please consider this when interpreting information found in this chart.

## 2017-03-31 NOTE — NURSING NOTE
"Chief Complaint   Patient presents with     Musculoskeletal Problem     right hip pain > 6 months       Initial /90  Ht 5' 1.15\" (1.553 m)  Wt 152 lb (68.9 kg)  BMI 28.58 kg/m2 Estimated body mass index is 28.58 kg/(m^2) as calculated from the following:    Height as of this encounter: 5' 1.15\" (1.553 m).    Weight as of this encounter: 152 lb (68.9 kg).  Medication Reconciliation: complete     Andrew Daniel, ATC  "

## 2017-04-05 ENCOUNTER — TELEPHONE (OUTPATIENT)
Dept: INTERNAL MEDICINE | Facility: CLINIC | Age: 82
End: 2017-04-05

## 2017-04-05 NOTE — TELEPHONE ENCOUNTER
Attempted to call patient at [phone] (home) , no answer.  Left VM to return call to RN Triage line.    Shelby Chatterjee RN  [facility]

## 2017-04-05 NOTE — TELEPHONE ENCOUNTER
I think she should be seen in clinic.   I have not discussed the ankle pain with her before and I don't see mention of it in her record so that she be addressed in clinic.  We also can discuss tapering off the prednisone.

## 2017-04-05 NOTE — TELEPHONE ENCOUNTER
Called and spoke with patient.      1.) She would like to taper off of the Prednisone, she's been tapering off herself so far.  Can Gabapentin take the place of Prednisone as the Prednisone is giving her big cheeks?    2.) She has pain in her lumbar area of the back, at the waist line right over the top.  She does have a chiropractor.  She would like to know if Demetrice would advise to see chiropractor or what treatment she'd advise for the lumbar pain?     3.) The ankle pain - they did Xrays and didn't see anything.  She's walking lopsided.  She did not ask the pain team about this.  RN advised she was going to make an appt to discuss, she would like to know what PCP advises.      Routed to PCP to advise.    Shelby Chatterjee RN  [facility]

## 2017-04-05 NOTE — TELEPHONE ENCOUNTER
Reason for Call:  Other     Detailed comments: Patient said that she feels better from the injection she had with Dr Valverde and would like to discuss treatments of possibly a chiropractor and tapering off her prednisone due to Dr Valverde said she does not need to use.    Phone Number Patient can be reached at: Home number on file [phone] (home)    Best Time:     Can we leave a detailed message on this number? Not Applicable    Call taken on 4/5/2017 at 9:36 AM by Angella Davis

## 2017-04-07 NOTE — TELEPHONE ENCOUNTER
I already put in the referral for the hip injection. This would be done with Dr. Valverde. She can call his office to schedule that.  I would like to see her in clinic next week. I can help her taper off of the prednisone, but I would like to discuss this with her in clinic so it's done safely.   That is ok to go to the chiropractor.

## 2017-04-07 NOTE — TELEPHONE ENCOUNTER
She is scheduled to see you next week.  Perhaps when she's here TC can assist with scheduling with Dr. Valverde; she seems to get flustered when discussing setting up more than 1 appointment.    Shelby Chatterjee RN  [facility]

## 2017-04-07 NOTE — TELEPHONE ENCOUNTER
Called and spoke with patient.  She's still experiencing pain at the top of her hip bone almost like a bruise.  She wonders if she can get a shot in her hip for this.  RN advised she needs to make an appointment.     She states she's been tapering off of the Prednisone herself and will discuss this with PCP.    She would like to continue going to the chiropractor to keep herself limber for dancing.'    Routed to PCP for FYI.    Shelby Chatterjee RN  [facility]

## 2017-04-20 ENCOUNTER — OFFICE VISIT (OUTPATIENT)
Dept: INTERNAL MEDICINE | Facility: CLINIC | Age: 82
End: 2017-04-20
Payer: COMMERCIAL

## 2017-04-20 ENCOUNTER — RADIANT APPOINTMENT (OUTPATIENT)
Dept: GENERAL RADIOLOGY | Facility: CLINIC | Age: 82
End: 2017-04-20
Attending: NURSE PRACTITIONER
Payer: COMMERCIAL

## 2017-04-20 VITALS
OXYGEN SATURATION: 99 % | WEIGHT: 148 LBS | BODY MASS INDEX: 27.83 KG/M2 | HEART RATE: 92 BPM | DIASTOLIC BLOOD PRESSURE: 74 MMHG | TEMPERATURE: 98 F | SYSTOLIC BLOOD PRESSURE: 116 MMHG

## 2017-04-20 DIAGNOSIS — M47.817 DJD (DEGENERATIVE JOINT DISEASE), LUMBOSACRAL: ICD-10-CM

## 2017-04-20 DIAGNOSIS — R50.9 FEVER, UNSPECIFIED: Primary | ICD-10-CM

## 2017-04-20 DIAGNOSIS — M25.571 CHRONIC PAIN OF RIGHT ANKLE: ICD-10-CM

## 2017-04-20 DIAGNOSIS — M19.071 ARTHRITIS OF RIGHT ANKLE: ICD-10-CM

## 2017-04-20 DIAGNOSIS — L98.8 SKIN PLAQUE: ICD-10-CM

## 2017-04-20 DIAGNOSIS — G89.29 CHRONIC PAIN OF RIGHT ANKLE: ICD-10-CM

## 2017-04-20 DIAGNOSIS — F11.93 OPIOID WITHDRAWAL (H): ICD-10-CM

## 2017-04-20 DIAGNOSIS — Z71.89 ADVANCED DIRECTIVES, COUNSELING/DISCUSSION: ICD-10-CM

## 2017-04-20 DIAGNOSIS — L30.4 INTERTRIGO: ICD-10-CM

## 2017-04-20 LAB
ERYTHROCYTE [DISTWIDTH] IN BLOOD BY AUTOMATED COUNT: 12.2 % (ref 10–15)
FLUAV+FLUBV AG SPEC QL: NEGATIVE
FLUAV+FLUBV AG SPEC QL: NORMAL
HCT VFR BLD AUTO: 39.3 % (ref 35–47)
HGB BLD-MCNC: 13.9 G/DL (ref 11.7–15.7)
MCH RBC QN AUTO: 36 PG (ref 26.5–33)
MCHC RBC AUTO-ENTMCNC: 35.4 G/DL (ref 31.5–36.5)
MCV RBC AUTO: 102 FL (ref 78–100)
PLATELET # BLD AUTO: 405 10E9/L (ref 150–450)
RBC # BLD AUTO: 3.86 10E12/L (ref 3.8–5.2)
SPECIMEN SOURCE: NORMAL
WBC # BLD AUTO: 8 10E9/L (ref 4–11)

## 2017-04-20 PROCEDURE — 85027 COMPLETE CBC AUTOMATED: CPT | Performed by: NURSE PRACTITIONER

## 2017-04-20 PROCEDURE — 73610 X-RAY EXAM OF ANKLE: CPT | Mod: RT

## 2017-04-20 PROCEDURE — 36415 COLL VENOUS BLD VENIPUNCTURE: CPT | Performed by: NURSE PRACTITIONER

## 2017-04-20 PROCEDURE — 99215 OFFICE O/P EST HI 40 MIN: CPT | Performed by: NURSE PRACTITIONER

## 2017-04-20 PROCEDURE — 87804 INFLUENZA ASSAY W/OPTIC: CPT | Performed by: NURSE PRACTITIONER

## 2017-04-20 RX ORDER — PREDNISONE 1 MG/1
TABLET ORAL
Qty: 210 TABLET | Refills: 0 | Status: SHIPPED | OUTPATIENT
Start: 2017-04-20 | End: 2017-07-31

## 2017-04-20 RX ORDER — NYSTATIN 100000 [USP'U]/G
POWDER TOPICAL 3 TIMES DAILY PRN
Qty: 60 G | Refills: 1 | Status: SHIPPED | OUTPATIENT
Start: 2017-04-20 | End: 2017-05-25

## 2017-04-20 ASSESSMENT — PAIN SCALES - GENERAL: PAINLEVEL: NO PAIN (0)

## 2017-04-20 NOTE — MR AVS SNAPSHOT
After Visit Summary   4/20/2017    Teresa Lopez    MRN: [id number]           Patient Information     Date Of Birth          1935        Visit Information        Provider Department      4/20/2017 9:40 AM Demetrice Gutierrez APRN [facility]        Today's Diagnoses     Fever, unspecified    -  1    Advanced directives, counseling/discussion        Intertrigo        Skin plaque        DJD (degenerative joint disease), lumbosacral        Chronic pain of right ankle          Care Instructions    Keep your groin and under the breast folds clean and dry. Apply thin coat of powder three times daily    Schedule appointment with your dermatologist. If you need a referral from your insurance, call clinic and let me know the name of the dermatologist and I will put in a referral    Restart prednisone at low dose to taper you off slowly:  Take 4 mg (4 tablets) daily for 3 weeks. Then take 3 mg (3 tablets) daily for 3 weeks. Then 2 mg (2 tablets) daily for 3 weeks. Then 1 mg (1 tablet) daily for 3 weeks. Then 1 tablet every other day for 3 weeks.    Take 1 percocet three times daily (with meals)                Follow-ups after your visit        Additional Services     DERMATOLOGY REFERRAL       Your provider has referred you to: N: [facility]  [place] [phone]   [email or web address]    Please be aware that coverage of these services is subject to the terms and limitations of your health insurance plan.  Call member services at your health plan with any benefit or coverage questions.      Please bring the following with you to your appointment:    (1) Any X-Rays, CTs or MRIs which have been performed.  Contact the facility where they were done to arrange for  prior to your scheduled appointment.    (2) List of current medications  (3) This referral request   (4) Any documents/labs given to you for this referral                  Who to  "contact     If you have questions or need follow up information about today's clinic visit or your schedule please contact [facility] directly at [phone].  Normal or non-critical lab and imaging results will be communicated to you by MyChart, letter or phone within 4 business days after the clinic has received the results. If you do not hear from us within 7 days, please contact the clinic through MyChart or phone. If you have a critical or abnormal lab result, we will notify you by phone as soon as possible.  Submit refill requests through [employer] or call your pharmacy and they will forward the refill request to us. Please allow 3 business days for your refill to be completed.          Additional Information About Your Visit        [employer] Information     [employer] lets you send messages to your doctor, view your test results, renew your prescriptions, schedule appointments and more. To sign up, go to [email or web address] . Click on \"Log in\" on the left side of the screen, which will take you to the Welcome page. Then click on \"Sign up Now\" on the right side of the page.     You will be asked to enter the access code listed below, as well as some personal information. Please follow the directions to create your username and password.     Your access code is: [email or web address]  Expires: 2017 10:56 AM     Your access code will  in 90 days. If you need help or a new code, please call your [facility] clinic or [phone].        Care EveryWhere ID     This is your Care EveryWhere ID. This could be used by other organizations to access your [facility] medical records  [id number]        Your Vitals Were     Pulse Temperature Pulse Oximetry Breastfeeding? BMI (Body Mass Index)       92 98  F (36.7  C) (Oral) 99% No 27.83 kg/m2        Blood Pressure from Last 3 Encounters:   17 116/74   17 165/90   17 (!) 169/95    Weight from Last 3 Encounters:   17 148 lb (67.1 " kg)   03/31/17 152 lb (68.9 kg)   03/01/17 152 lb (68.9 kg)              We Performed the Following     CBC with platelets     DERMATOLOGY REFERRAL     Influenza A/B antigen          Today's Medication Changes          These changes are accurate as of: 4/20/17 10:56 AM.  If you have any questions, ask your nurse or doctor.               Start taking these medicines.        Dose/Directions    nystatin 915335 UNIT/GM Powd   Commonly known as:  MYCOSTATIN   Used for:  Intertrigo   Started by:  Demetrice Gutierrez APRN CNP        Apply topically 3 times daily as needed   Quantity:  60 g   Refills:  1         These medicines have changed or have updated prescriptions.        Dose/Directions    * predniSONE 5 MG tablet   Commonly known as:  DELTASONE   This may have changed:  Another medication with the same name was added. Make sure you understand how and when to take each.   Used for:  DJD (degenerative joint disease), lumbosacral, Spinal stenosis of lumbar region with neurogenic claudication   Changed by:  Remigio Jerez MD        1 tablet daily and 2 on Mon Wed Fri.   Quantity:  120 tablet   Refills:  3       * predniSONE 1 MG tablet   Commonly known as:  DELTASONE   This may have changed:  You were already taking a medication with the same name, and this prescription was added. Make sure you understand how and when to take each.   Used for:  DJD (degenerative joint disease), lumbosacral   Changed by:  Demetrice Gutierrez APRN CNP        Take 4 mg (4 tablets) daily for 3 weeks. Then take 3 mg (3 tablets) daily for 3 weeks. Then 2 mg (2 tablets) daily for 3 weeks. Then 1 mg (1 tablet) daily for 3 weeks. Then 1 tablet every other day for 3 weeks.   Quantity:  210 tablet   Refills:  0       * Notice:  This list has 2 medication(s) that are the same as other medications prescribed for you. Read the directions carefully, and ask your doctor or other care provider to review them with you.         Where to get your  medicines      These medications were sent to [id number] - [place], [place] - [address], [facility] [address]     Phone:  [phone]     nystatin 182979 UNIT/GM Powd         Some of these will need a paper prescription and others can be bought over the counter.  Ask your nurse if you have questions.     Bring a paper prescription for each of these medications     predniSONE 1 MG tablet                Primary Care Provider Office Phone # Fax #    Demetrice Gutierrez MYRON DEMOND [phone]       [facility] [address]  [facility] [address]        Thank you!     Thank you for choosing [facility]  for your care. Our goal is always to provide you with excellent care. Hearing back from our patients is one way we can continue to improve our services. Please take a few minutes to complete the written survey that you may receive in the mail after your visit with us. Thank you!             Your Updated Medication List - Protect others around you: Learn how to safely use, store and throw away your medicines at www.disposemymeds.org.          This list is accurate as of: 4/20/17 10:56 AM.  Always use your most recent med list.                   Brand Name Dispense Instructions for use    ADVIL 200 MG capsule   Generic drug:  ibuprofen      Reported on 4/20/2017       amLODIPine 2.5 MG tablet    NORVASC    90 tablet    Take 1 tablet (2.5 mg) by mouth daily 1 po at bedtime       aspirin 162 MG EC tablet      Take 162 mg by mouth daily Reported on 4/20/2017       CALCIUM 1200 PO      Reported on 4/20/2017       CLARITIN 10 MG capsule   Generic drug:  loratadine      Take 10 mg by mouth as needed Reported on 4/20/2017       diazepam 5 MG tablet    VALIUM    2 tablet    Take 1 tablet 30 minutes before MRI. May repeat x1 prior to MRI if desired effect not achieved.       gabapentin 300 MG capsule    NEURONTIN     90 capsule    Take 1 tablet (300 mg) every night for 7 days,  then 1 tablet twice daily for 7 days, then 1 tablet three times daily for 7 days. Add 1 tablet every 7 days until taking 3 tablets 3 times daily.       GLUCOSAMINE 1500 COMPLEX PO      Take 1,500 mg by mouth daily Reported on 4/20/2017       losartan-hydrochlorothiazide 100-25 MG per tablet    HYZAAR    90 tablet    Take 1 tablet by mouth daily       Multi-vitamin Tabs tablet   Generic drug:  multivitamin, therapeutic with minerals      1 TABLET DAILY       nystatin 987795 UNIT/GM Powd    MYCOSTATIN    60 g    Apply topically 3 times daily as needed       oxyCODONE-acetaminophen 5-325 MG per tablet    PERCOCET    240 tablet    Take 1-2 tablets by mouth every 4 hours as needed for moderate to severe pain (Not more than 8 tablets per day.)       * predniSONE 5 MG tablet    DELTASONE    120 tablet    1 tablet daily and 2 on Mon Wed Fri.       * predniSONE 1 MG tablet    DELTASONE    210 tablet    Take 4 mg (4 tablets) daily for 3 weeks. Then take 3 mg (3 tablets) daily for 3 weeks. Then 2 mg (2 tablets) daily for 3 weeks. Then 1 mg (1 tablet) daily for 3 weeks. Then 1 tablet every other day for 3 weeks.       PRILOSEC PO      None Entered       SALMON OIL-1000 PO      take 1,000 Caps by mouth daily.       senna-docusate 8.6-50 MG per tablet    SENOKOT-S;PERICOLACE    120 tablet    Take 1 tablet by mouth 2 times daily       VITAMIN C PO      None Entered       VITAMIN D PO      Take 1,200 mg by mouth daily.       vitamin E 400 UNIT capsule      None Entered       * Notice:  This list has 2 medication(s) that are the same as other medications prescribed for you. Read the directions carefully, and ask your doctor or other care provider to review them with you.

## 2017-04-20 NOTE — PROGRESS NOTES
"  SUBJECTIVE:                                                    Teresa Lopez is a 81 year old female who presents to clinic today for the following health issues:      Acute Illness   Acute illness concerns: feverish and bodyaches  Onset: 1 week    Fever: YES    Chills/Sweats: YES    Headache (location?): YES- did    Sinus Pressure:no    Conjunctivitis:  no    Ear Pain: no    Rhinorrhea: no    Congestion: no    Sore Throat: no     Cough: no    Wheeze: no    Decreased Appetite: YES    Nausea: no    Vomiting: no    Diarrhea:  YES- loose stools    Dysuria/Freq.: no    Fatigue/Achiness: YES- bodyaches    Sick/Strep Exposure: no     Therapies Tried and outcome: Excedrin    She is with her daughter today  Her daughter reports that she called her 1 week ago and was incoherent. She was in bed. Temp 99.5  Her daughter was worried she had a stroke but she went to see her and she appeared ill and thought it was related to infection  Daughter thinks her mental status is now at basline  Complains of body aches, diarrhea, fatigue, slow speech  Diarrhea has resolve  Denies dysuria, hematuria      Rash  Duration of complaint: Under her breast and groin area x 4 days      Was previously on prednisone, 5 mg daily and 10 mg Mon, Wed and Fri. She reports that she weaned herself off over past 2 months. She went down to 5 mg daily for 6 weeks, then stopped 1 week ago. (There is no mention of this when she had consult with pain team in [place] 3/1/17)  She was previously on 8 percocet daily. Scheduled PRN but she was taking approximately 8 per day. She reports she is now down to 1/day. She is not sure when she made the change.   There are some inconsistencies in her story about when she made these medication changes.     Her daughter reports that patient had the right hip injection. \"It was helpful\"  There is no documentation of patient having received hip injection. Patient saw Dr. Valverde 3/31/17 where they appeared to discuss the " "injection, but it was not given. I explained this to patient and her daughter, but her daughter was quite adamant that she did receive it, and stated that her sister was there for the appointment.   Her low back is bothering her. Steroid epidural injection has not been done, per patient    Right ankle pain for 1-2 years  She didn't mention this at pain consult because she didn't think that's what the visit is for  She says the ankle pain is her most bothersome pain  Aggravated with activity  Remembers it first occurred when she was trying to get out of a car \"and I heard a pop\"  Went to  and xray was negative for acute fracture  No previous mention in chart of ankle pain        Problem list and histories reviewed & adjusted, as indicated.  Additional history: none    Patient Active Problem List   Diagnosis     Cerebral infarction (H)     Jaw Pain     Arthritis of knee     CARDIOVASCULAR SCREENING; LDL GOAL LESS THAN 100     Hypertension goal BP (blood pressure) < 140/90     Advanced directives, counseling/discussion     Hyperlipidemia LDL goal <130     OA (OSTEOARTHRITIS) OF KNEE - right     CKD (chronic kidney disease) stage 3, GFR 30-59 ml/min     Cataracts, both eyes     PVD (posterior vitreous detachment), both eyes     Chronic pain disorder     DJD (degenerative joint disease), lumbosacral     Spinal stenosis of lumbar region with neurogenic claudication     Slow transit constipation     Steroid-induced diabetes mellitus (H)     Past Surgical History:   Procedure Laterality Date     HYSTERECTOMY, CERVIX STATUS UNKNOWN  age 31       Social History   Substance Use Topics     Smoking status: Never Smoker     Smokeless tobacco: Never Used     Alcohol use 0.0 oz/week     0 Standard drinks or equivalent per week      Comment: 1 drink weekly     Family History   Problem Relation Age of Onset     Arthritis Mother      Unknown/Adopted Father      adopted     DIABETES Brother      CANCER Brother      Hypertension No " family hx of      CEREBROVASCULAR DISEASE No family hx of      Thyroid Disease No family hx of      Glaucoma No family hx of      Macular Degeneration No family hx of          Current Outpatient Prescriptions   Medication Sig Dispense Refill     nystatin (MYCOSTATIN) 120872 UNIT/GM POWD Apply topically 3 times daily as needed 60 g 1     predniSONE (DELTASONE) 1 MG tablet Take 4 mg (4 tablets) daily for 3 weeks. Then take 3 mg (3 tablets) daily for 3 weeks. Then 2 mg (2 tablets) daily for 3 weeks. Then 1 mg (1 tablet) daily for 3 weeks. Then 1 tablet every other day for 3 weeks. 210 tablet 0     oxyCODONE-acetaminophen (PERCOCET) 5-325 MG per tablet Take 1-2 tablets by mouth every 4 hours as needed for moderate to severe pain (Not more than 8 tablets per day.) 240 tablet 0     gabapentin (NEURONTIN) 300 MG capsule Take 1 tablet (300 mg) every night for 7 days,  then 1 tablet twice daily for 7 days, then 1 tablet three times daily for 7 days. Add 1 tablet every 7 days until taking 3 tablets 3 times daily. 90 capsule 1     losartan-hydrochlorothiazide (HYZAAR) 100-25 MG per tablet Take 1 tablet by mouth daily 90 tablet 3     amLODIPine (NORVASC) 2.5 MG tablet Take 1 tablet (2.5 mg) by mouth daily 1 po at bedtime 90 tablet 3     senna-docusate (SENOKOT-S;PERICOLACE) 8.6-50 MG per tablet Take 1 tablet by mouth 2 times daily 120 tablet 12     Omega-3 Fatty Acids (SALMON OIL-1000 PO) take 1,000 Caps by mouth daily.       MULTI-VITAMIN OR TABS 1 TABLET DAILY       VITAMIN C OR None Entered       VITAMIN D OR Take 1,200 mg by mouth daily.       VITAMIN E 400 UNIT OR CAPS None Entered       PRILOSEC OR None Entered       aspirin 162 MG EC tablet Take 162 mg by mouth daily Reported on 4/20/2017       Glucosamine-Chondroit-Vit C-Mn (GLUCOSAMINE 1500 COMPLEX PO) Take 1,500 mg by mouth daily Reported on 4/20/2017       Loratadine (CLARITIN) 10 MG capsule Take 10 mg by mouth as needed Reported on 4/20/2017       ADVIL 200 MG PO  CAPS Reported on 4/20/2017       CALCIUM 1200 OR Reported on 4/20/2017         Reviewed and updated as needed this visit by clinical staff  Tobacco  Allergies  Meds  Med Hx  Surg Hx  Fam Hx  Soc Hx      Reviewed and updated as needed this visit by Provider         ROS:  Constitutional, HEENT, cardiovascular, pulmonary, gi and gu systems are negative, except as otherwise noted.    OBJECTIVE:                                                    /74 (BP Location: Right arm, Patient Position: Chair, Cuff Size: Adult Regular)  Pulse 92  Temp 98  F (36.7  C) (Oral)  Wt 148 lb (67.1 kg)  SpO2 99%  Breastfeeding? No  BMI 27.83 kg/m2  Body mass index is 27.83 kg/(m^2).  GENERAL: alert, no distress and fatigued  EYES: Eyes grossly normal to inspection, PERRL and conjunctivae and sclerae normal  HENT: ear canals and TM's normal, nose and mouth without ulcers or lesions  NECK: no adenopathy, no asymmetry, masses, or scars and thyroid normal to palpation  RESP: lungs clear to auscultation - no rales, rhonchi or wheezes  CV: regular rate and rhythm, normal S1 S2, no S3 or S4, no murmur, click or rub, no peripheral edema and peripheral pulses strong  ABDOMEN: soft, nontender, no hepatosplenomegaly, no masses and bowel sounds normal  MS: Skin is moist and erythematous under breast folds and in groin. Tender to palpation under breast folds. Has erythematous macules with plaques under breast folds.   SKIN: no suspicious lesions or rashes, no erythema, ecchymosis, skin intact  NEURO: Normal strength and tone, mentation intact and speech normal  PSYCH: confused, tangential and appearance well groomed    Diagnostic Test Results:  Flu: neg  Xray: arthritis of tibiotalar joint     ASSESSMENT/PLAN:                                                        ICD-10-CM    1. Fever, unspecified R50.9 Influenza A/B antigen     CBC with platelets   2. Opioid withdrawal (H) F11.23    3. Intertrigo L30.4 nystatin (MYCOSTATIN) 405899  UNIT/GM POWD     DERMATOLOGY REFERRAL   4. Skin plaque L98.8 DERMATOLOGY REFERRAL   5. DJD (degenerative joint disease), lumbosacral M51.37 predniSONE (DELTASONE) 1 MG tablet   6. Chronic pain of right ankle M25.571 XR Ankle Right G/E 3 Views    G89.29    7. Advanced directives, counseling/discussion Z71.89    8. Arthritis of right ankle M19.90        Patient with body aches, fatigue and low grade fever. Negative flu test. She rapidly tapered herself off of oxycodone, but 8 tablets/day (for many years) to 1 tablet/day is too rapid. Concern that symptoms could be related to withdrawal. I don't know why she did she an abrupt taper as she has continued to complain about pain. I have some concern over her confusion but her daughter thinks she's back to baseline. I am going to increase the oxycodone to 3 tablets/day to help with pain. Will help with arthritic pain in right ankle. May also help to reduce some of her symptoms. Also concern for adrenal suppression after abruptly discontinuing prednisone. Will do slow taper. Instructions given to patient and daughter and explained the importance of gradual taper.   Nystatin for intertrigo but she also has some scaling and macules under breast folds that are concerning to me. Would like to be addressed by derm. She follows at [facility] dermatology and will make an appointment.   Follow up in 1 month.     More than 45 minutes spent with patient, more than 50% of which was spent on counseling and coordination of care.       MYRON [facility]      This encounter has been reviewed.  The patient was not examined by me.  MELANI BUSTOS M.D.   Supervising Physician in Internal Medicine, [place].

## 2017-04-20 NOTE — PATIENT INSTRUCTIONS
Keep your groin and under the breast folds clean and dry. Apply thin coat of powder three times daily    Schedule appointment with your dermatologist. If you need a referral from your insurance, call clinic and let me know the name of the dermatologist and I will put in a referral    Restart prednisone at low dose to taper you off slowly:  Take 4 mg (4 tablets) daily for 3 weeks. Then take 3 mg (3 tablets) daily for 3 weeks. Then 2 mg (2 tablets) daily for 3 weeks. Then 1 mg (1 tablet) daily for 3 weeks. Then 1 tablet every other day for 3 weeks.    Take 1 percocet three times daily (with meals)

## 2017-04-20 NOTE — NURSING NOTE
"Chief Complaint   Patient presents with     Sick     Derm Problem       Initial /74 (BP Location: Right arm, Patient Position: Chair, Cuff Size: Adult Regular)  Pulse 92  Temp 98  F (36.7  C) (Oral)  Wt 148 lb (67.1 kg)  SpO2 99%  Breastfeeding? No  BMI 27.83 kg/m2 Estimated body mass index is 27.83 kg/(m^2) as calculated from the following:    Height as of 3/31/17: 5' 1.15\" (1.553 m).    Weight as of this encounter: 148 lb (67.1 kg).  Medication Reconciliation: complete   JAMES Tirado MA      "

## 2017-04-21 ENCOUNTER — TELEPHONE (OUTPATIENT)
Dept: INTERNAL MEDICINE | Facility: CLINIC | Age: 82
End: 2017-04-21

## 2017-04-21 DIAGNOSIS — M19.079 ARTHRITIS OF ANKLE: Primary | ICD-10-CM

## 2017-04-21 NOTE — TELEPHONE ENCOUNTER
Attempted to call patient at [phone] (home), no answer.  Left VM to return call to RN Triage line.    Shelby Chatterjee RN  [facility]

## 2017-04-21 NOTE — TELEPHONE ENCOUNTER
Please call patient and let her know that I put in a referral for her to see our podiatrist in clinic for her ankle pain. Her xray shows arthritis and she may respond to an injection.   Please help her to schedule this appointment.

## 2017-04-24 NOTE — TELEPHONE ENCOUNTER
Called and spoke with patient, scheduled with Dr. Lewis for Friday the 28th.    Shelby Chatterjee RN  [facility]

## 2017-04-25 ENCOUNTER — TELEPHONE (OUTPATIENT)
Dept: INTERNAL MEDICINE | Facility: CLINIC | Age: 82
End: 2017-04-25

## 2017-04-25 NOTE — TELEPHONE ENCOUNTER
Maybe she was referring to the missed call from Friday?      Called and spoke with patient, advised of this.      Shelby Chatterjee RN  [facility]

## 2017-04-28 ENCOUNTER — OFFICE VISIT (OUTPATIENT)
Dept: PODIATRY | Facility: CLINIC | Age: 82
End: 2017-04-28
Payer: COMMERCIAL

## 2017-04-28 VITALS — HEART RATE: 86 BPM | OXYGEN SATURATION: 97 % | BODY MASS INDEX: 27.83 KG/M2 | WEIGHT: 148 LBS

## 2017-04-28 DIAGNOSIS — M19.071 PRIMARY LOCALIZED OSTEOARTHROSIS, ANKLE AND FOOT, RIGHT: Primary | ICD-10-CM

## 2017-04-28 PROCEDURE — 99203 OFFICE O/P NEW LOW 30 MIN: CPT | Mod: 25 | Performed by: PODIATRIST

## 2017-04-28 PROCEDURE — 20605 DRAIN/INJ JOINT/BURSA W/O US: CPT | Mod: RT | Performed by: PODIATRIST

## 2017-04-28 NOTE — MR AVS SNAPSHOT
After Visit Summary   4/28/2017    Teresa Lopez    MRN: [id number]           Patient Information     Date Of Birth          1935        Visit Information        Provider Department      4/28/2017 10:15 AM German Lewis DPM [facility]        Care Instructions    We wish you continued good healing. If you have any questions or concerns, please do not hesitate to contact us at [phone].      Please remember to call and schedule a follow up appointment if one was recommended at your earliest convenience.   PODIATRY CLINIC HOURS  TELEPHONE NUMBER    Dr. German MORRISPBETTINA FAC FAS    Clinics:  [facility]        Angela Esteves MA  Medical Assistant  Tuesday 1PM-6PM  [facility]  Wednesday 7AM-2PM  [place]  Thursday 10AM-6PM  [place] Friday 7AM-345PM  [place]  Specialty schedulers:   [phone] to make an appointment with any Specialty Provider.        Urgent Care locations:    [facility] Monday-Friday 5 pm - 9 pm. Saturday-Sunday 9 am -5pm    Monday-Friday 11 am - 9 pm Saturday 9 am - 5 pm     Monday-Sunday 12 noon-8PM [phone]     If you need a medication refill, please contact us you may need lab work and/or a follow up visit prior to your refill (i.e. Antifungal medications).    fypiohart (secure e-mail communication and access to your chart) to send a message or to make an appointment.    If MRI needed please call Kiko Pritchard at [phone]        Weight management plan: Patient was referred to their PCP to discuss a diet and exercise plan.          Follow-ups after your visit        Your next 10 appointments already scheduled     Apr 28, 2017 10:15 AM CDT   New Visit with German Lewis DPM   [facility]    [address] [place] [address]  "[facility] [id number] [phone]              Who to contact     If you have questions or need follow up information about today's clinic visit or your schedule please contact [facility] directly at [phone].  Normal or non-critical lab and imaging results will be communicated to you by MyChart, letter or phone within 4 business days after the clinic has received the results. If you do not hear from us within 7 days, please contact the clinic through MyChart or phone. If you have a critical or abnormal lab result, we will notify you by phone as soon as possible.  Submit refill requests through [email or web address] or call your pharmacy and they will forward the refill request to us. Please allow 3 business days for your refill to be completed.          Additional Information About Your Visit        VODECLICharYouHelp Information     [email or web address] lets you send messages to your doctor, view your test results, renew your prescriptions, schedule appointments and more. To sign up, go to [email or web address] . Click on \"Log in\" on the left side of the screen, which will take you to the Welcome page. Then click on \"Sign up Now\" on the right side of the page.     You will be asked to enter the access code listed below, as well as some personal information. Please follow the directions to create your username and password.     Your access code is: [email or web address]  Expires: 2017 10:56 AM     Your access code will  in 90 days. If you need help or a new code, please call your [facility] or [phone].        Care EveryWhere ID     This is your Care EveryWhere ID. This could be used by other organizations to access your [place] medical records  [id number]        Your Vitals Were     Pulse Pulse Oximetry BMI (Body Mass Index)             86 97% 27.83 kg/m2          Blood Pressure from Last 3 Encounters:   17 116/74   17 165/90   17 (!) 169/95    Weight from Last 3 Encounters:   17 " 67.1 kg (148 lb)   04/20/17 67.1 kg (148 lb)   03/31/17 68.9 kg (152 lb)              Today, you had the following     No orders found for display       Primary Care Provider Office Phone # Fax #    MYRON Bose [phone]       [facility] [address] [facility] [address]        Thank you!     Thank you for choosing [facility]  for your care. Our goal is always to provide you with excellent care. Hearing back from our patients is one way we can continue to improve our services. Please take a few minutes to complete the written survey that you may receive in the mail after your visit with us. Thank you!             Your Updated Medication List - Protect others around you: Learn how to safely use, store and throw away your medicines at www.disposemymeds.org.          This list is accurate as of: 4/28/17 10:13 AM.  Always use your most recent med list.                   Brand Name Dispense Instructions for use    ADVIL 200 MG capsule   Generic drug:  ibuprofen      Reported on 4/20/2017       amLODIPine 2.5 MG tablet    NORVASC    90 tablet    Take 1 tablet (2.5 mg) by mouth daily 1 po at bedtime       aspirin 162 MG EC tablet      Take 162 mg by mouth daily Reported on 4/20/2017       CALCIUM 1200 PO      Reported on 4/20/2017       CLARITIN 10 MG capsule   Generic drug:  loratadine      Take 10 mg by mouth as needed Reported on 4/20/2017       gabapentin 300 MG capsule    NEURONTIN    90 capsule    Take 1 tablet (300 mg) every night for 7 days,  then 1 tablet twice daily for 7 days, then 1 tablet three times daily for 7 days. Add 1 tablet every 7 days until taking 3 tablets 3 times daily.       GLUCOSAMINE 1500 COMPLEX PO      Take 1,500 mg by mouth daily Reported on 4/20/2017       losartan-hydrochlorothiazide 100-25 MG per tablet    HYZAAR    90 tablet    Take 1 tablet by mouth daily       Multi-vitamin Tabs tablet    Generic drug:  multivitamin, therapeutic with minerals      1 TABLET DAILY       nystatin 710887 UNIT/GM Powd    MYCOSTATIN    60 g    Apply topically 3 times daily as needed       oxyCODONE-acetaminophen 5-325 MG per tablet    PERCOCET    240 tablet    Take 1-2 tablets by mouth every 4 hours as needed for moderate to severe pain (Not more than 8 tablets per day.)       predniSONE 1 MG tablet    DELTASONE    210 tablet    Take 4 mg (4 tablets) daily for 3 weeks. Then take 3 mg (3 tablets) daily for 3 weeks. Then 2 mg (2 tablets) daily for 3 weeks. Then 1 mg (1 tablet) daily for 3 weeks. Then 1 tablet every other day for 3 weeks.       PRILOSEC PO      None Entered       SALMON OIL-1000 PO      take 1,000 Caps by mouth daily.       senna-docusate 8.6-50 MG per tablet    SENOKOT-S;PERICOLACE    120 tablet    Take 1 tablet by mouth 2 times daily       VITAMIN C PO      None Entered       VITAMIN D PO      Take 1,200 mg by mouth daily.       vitamin E 400 UNIT capsule      None Entered

## 2017-04-28 NOTE — PATIENT INSTRUCTIONS
We wish you continued good healing. If you have any questions or concerns, please do not hesitate to contact us at [phone].      Please remember to call and schedule a follow up appointment if one was recommended at your earliest convenience.   PODIATRY CLINIC HOURS  TELEPHONE NUMBER    Dr. German Lewis D.P.M [facility]    Clinics:  [facility]        Angela Esteves MA  Medical Assistant  Tuesday 1PM-6PM  [facility]  Wednesday 7AM-2PM  [place]  Thursday 10AM-6PM  [place] Friday 7AM-345PM  [place]  Specialty schedulers:   [phone] to make an appointment with any Specialty Provider.        Urgent Care locations:    [facility] Monday-Friday 5 pm - 9 pm. Saturday-Sunday 9 am -5pm    Monday-Friday 11 am - 9 pm Saturday 9 am - 5 pm     Monday-Sunday 12 noon-8PM [phone]     If you need a medication refill, please contact us you may need lab work and/or a follow up visit prior to your refill (i.e. Antifungal medications).    Flamsredhart (secure e-mail communication and access to your chart) to send a message or to make an appointment.    If MRI needed please call Kiko Pritchard at [phone]        Weight management plan: Patient was referred to their PCP to discuss a diet and exercise plan.

## 2017-04-28 NOTE — NURSING NOTE
"Chief Complaint   Patient presents with     Ankle Pain     R injuried on Dec 2015       Initial Pulse 86  Wt 67.1 kg (148 lb)  SpO2 97%  BMI 27.83 kg/m2 Estimated body mass index is 27.83 kg/(m^2) as calculated from the following:    Height as of 3/31/17: 1.553 m (5' 1.15\").    Weight as of this encounter: 67.1 kg (148 lb).  Medication Reconciliation: complete  "

## 2017-04-28 NOTE — LETTER
4/28/2017       RE: Teresa Lopez  [address] [facility] [address]           Dear Colleague,    Thank you for referring your patient, Teresa Lopez, to the [facility]. Please see a copy of my visit note below.    S:  Patient seen in consult from Demetrice Gutierrez and complains of ankle pain.  Points to lateral ankle gutter as to where worse pain is.  Has had this for 2 years.  states she was walking and heard a pop.   Describes it as a burning pain.  Aggrevated by activity and relieved by rest.  Slowly getting worse.  Uneven surfaces bother this.  Positive history of post static dyskinesia.    ROS:  Denies bruising, swelling,weakness, or numbness.       Allergies   Allergen Reactions     Sulfa Drugs        Current Outpatient Prescriptions   Medication Sig Dispense Refill     nystatin (MYCOSTATIN) 247433 UNIT/GM POWD Apply topically 3 times daily as needed 60 g 1     predniSONE (DELTASONE) 1 MG tablet Take 4 mg (4 tablets) daily for 3 weeks. Then take 3 mg (3 tablets) daily for 3 weeks. Then 2 mg (2 tablets) daily for 3 weeks. Then 1 mg (1 tablet) daily for 3 weeks. Then 1 tablet every other day for 3 weeks. 210 tablet 0     oxyCODONE-acetaminophen (PERCOCET) 5-325 MG per tablet Take 1-2 tablets by mouth every 4 hours as needed for moderate to severe pain (Not more than 8 tablets per day.) 240 tablet 0     gabapentin (NEURONTIN) 300 MG capsule Take 1 tablet (300 mg) every night for 7 days,  then 1 tablet twice daily for 7 days, then 1 tablet three times daily for 7 days. Add 1 tablet every 7 days until taking 3 tablets 3 times daily. 90 capsule 1     losartan-hydrochlorothiazide (HYZAAR) 100-25 MG per tablet Take 1 tablet by mouth daily 90 tablet 3     amLODIPine (NORVASC) 2.5 MG tablet Take 1 tablet (2.5 mg) by mouth daily 1 po at bedtime 90 tablet 3     senna-docusate (SENOKOT-S;PERICOLACE) 8.6-50 MG per tablet Take 1 tablet by mouth 2 times daily 120 tablet 12      aspirin 162 MG EC tablet Take 162 mg by mouth daily Reported on 4/20/2017       Glucosamine-Chondroit-Vit C-Mn (GLUCOSAMINE 1500 COMPLEX PO) Take 1,500 mg by mouth daily Reported on 4/20/2017       Loratadine (CLARITIN) 10 MG capsule Take 10 mg by mouth as needed Reported on 4/20/2017       Omega-3 Fatty Acids (SALMON OIL-1000 PO) take 1,000 Caps by mouth daily.       ADVIL 200 MG PO CAPS Reported on 4/20/2017       MULTI-VITAMIN OR TABS 1 TABLET DAILY       CALCIUM 1200 OR Reported on 4/20/2017       VITAMIN C OR None Entered       VITAMIN D OR Take 1,200 mg by mouth daily.       VITAMIN E 400 UNIT OR CAPS None Entered       PRILOSEC OR None Entered         Patient Active Problem List   Diagnosis     Cerebral infarction (H)     Jaw Pain     Arthritis of knee     CARDIOVASCULAR SCREENING; LDL GOAL LESS THAN 100     Hypertension goal BP (blood pressure) < 140/90     Advanced directives, counseling/discussion     Hyperlipidemia LDL goal <130     OA (OSTEOARTHRITIS) OF KNEE - right     CKD (chronic kidney disease) stage 3, GFR 30-59 ml/min     Cataracts, both eyes     PVD (posterior vitreous detachment), both eyes     Chronic pain disorder     DJD (degenerative joint disease), lumbosacral     Spinal stenosis of lumbar region with neurogenic claudication     Slow transit constipation     Steroid-induced diabetes mellitus (H)       Past Medical History:   Diagnosis Date     Cataract 3/7/2012     CVA (cerebral infarction)      Diabetes mellitus (H)      DJD (degenerative joint disease)      HTN        Past Surgical History:   Procedure Laterality Date     HYSTERECTOMY, CERVIX STATUS UNKNOWN  age 31       Family History   Problem Relation Age of Onset     Arthritis Mother      Unknown/Adopted Father      adopted     DIABETES Brother      CANCER Brother      Hypertension No family hx of      CEREBROVASCULAR DISEASE No family hx of      Thyroid Disease No family hx of      Glaucoma No family hx of      Macular Degeneration  No family hx of        Social History   Substance Use Topics     Smoking status: Never Smoker     Smokeless tobacco: Never Used     Alcohol use 0.0 oz/week     0 Standard drinks or equivalent per week      Comment: 1 drink weekly         O:  Good historian.  A&O X 3.  Pulses DP, PT 2/4 b/l.  CRT < 3 seconds X 10 digits.  Bilateral mild ankle edema or varicosities noted.  Sensation to light touch intact b/l.  Reflexes 2/4 b/l.  Skin thin with scant hair b/l.  Normal arch with weightbearing.  No forefoot or rear foot deformities noted.  MS 5/5 all compartments.  Normal ROM all fore foot and rearfoot joints.  No equinus.    No pain with stressing any muscle compartments.  No erythema edema or ecchymosis or masses noted.  Pain with palpation lateral gutter ankle and less medial.    RIGHT ANKLE THREE OR MORE VIEWS 4/20/2017 11:12 AM      HISTORY: Concern for arthritis. Chronic lateral ankle pain.     COMPARISON: None.         IMPRESSION: No acute fracture or dislocation. There is arthritis in  the ankle joint with narrowing of the tibiotalar joint laterally. The  bones are demineralized. Multiple vascular calcifications.        A:  Right ankle joint arthritis.    P:  X-rays of ankle personally reviewed. Discussed importance of wearing these in a good shoes and avoiding activities that bother this.  Risks complications, and efficacy of cortisone injection discussed.   After written consent, sterile prep, injected right ankle joint with 1 cc 1% lidocaine plain and 2 cc kenalog 10 mg.  Explained she may have injections 3X/yr.    Also discussed brace to limit ankle ROM.   RETURN TO CLINIC PRN.   Thank you for allowing me participate in the care of this patient.          German Lewis DPM, FACFAS      Again, thank you for allowing me to participate in the care of your patient.        Sincerely,              German Lewis DPM

## 2017-04-28 NOTE — PROGRESS NOTES
S:  Patient seen in consult from Demetrice Gutierrez and complains of ankle pain.  Points to lateral ankle gutter as to where worse pain is.  Has had this for 2 years.  states she was walking and heard a pop.   Describes it as a burning pain.  Aggrevated by activity and relieved by rest.  Slowly getting worse.  Uneven surfaces bother this.  Positive history of post static dyskinesia.    ROS:  Denies bruising, swelling,weakness, or numbness.       Allergies   Allergen Reactions     Sulfa Drugs        Current Outpatient Prescriptions   Medication Sig Dispense Refill     nystatin (MYCOSTATIN) 979162 UNIT/GM POWD Apply topically 3 times daily as needed 60 g 1     predniSONE (DELTASONE) 1 MG tablet Take 4 mg (4 tablets) daily for 3 weeks. Then take 3 mg (3 tablets) daily for 3 weeks. Then 2 mg (2 tablets) daily for 3 weeks. Then 1 mg (1 tablet) daily for 3 weeks. Then 1 tablet every other day for 3 weeks. 210 tablet 0     oxyCODONE-acetaminophen (PERCOCET) 5-325 MG per tablet Take 1-2 tablets by mouth every 4 hours as needed for moderate to severe pain (Not more than 8 tablets per day.) 240 tablet 0     gabapentin (NEURONTIN) 300 MG capsule Take 1 tablet (300 mg) every night for 7 days,  then 1 tablet twice daily for 7 days, then 1 tablet three times daily for 7 days. Add 1 tablet every 7 days until taking 3 tablets 3 times daily. 90 capsule 1     losartan-hydrochlorothiazide (HYZAAR) 100-25 MG per tablet Take 1 tablet by mouth daily 90 tablet 3     amLODIPine (NORVASC) 2.5 MG tablet Take 1 tablet (2.5 mg) by mouth daily 1 po at bedtime 90 tablet 3     senna-docusate (SENOKOT-S;PERICOLACE) 8.6-50 MG per tablet Take 1 tablet by mouth 2 times daily 120 tablet 12     aspirin 162 MG EC tablet Take 162 mg by mouth daily Reported on 4/20/2017       Glucosamine-Chondroit-Vit C-Mn (GLUCOSAMINE 1500 COMPLEX PO) Take 1,500 mg by mouth daily Reported on 4/20/2017       Loratadine (CLARITIN) 10 MG capsule Take 10 mg by mouth as needed  Reported on 4/20/2017       Omega-3 Fatty Acids (SALMON OIL-1000 PO) take 1,000 Caps by mouth daily.       ADVIL 200 MG PO CAPS Reported on 4/20/2017       MULTI-VITAMIN OR TABS 1 TABLET DAILY       CALCIUM 1200 OR Reported on 4/20/2017       VITAMIN C OR None Entered       VITAMIN D OR Take 1,200 mg by mouth daily.       VITAMIN E 400 UNIT OR CAPS None Entered       PRILOSEC OR None Entered         Patient Active Problem List   Diagnosis     Cerebral infarction (H)     Jaw Pain     Arthritis of knee     CARDIOVASCULAR SCREENING; LDL GOAL LESS THAN 100     Hypertension goal BP (blood pressure) < 140/90     Advanced directives, counseling/discussion     Hyperlipidemia LDL goal <130     OA (OSTEOARTHRITIS) OF KNEE - right     CKD (chronic kidney disease) stage 3, GFR 30-59 ml/min     Cataracts, both eyes     PVD (posterior vitreous detachment), both eyes     Chronic pain disorder     DJD (degenerative joint disease), lumbosacral     Spinal stenosis of lumbar region with neurogenic claudication     Slow transit constipation     Steroid-induced diabetes mellitus (H)       Past Medical History:   Diagnosis Date     Cataract 3/7/2012     CVA (cerebral infarction)      Diabetes mellitus (H)      DJD (degenerative joint disease)      HTN        Past Surgical History:   Procedure Laterality Date     HYSTERECTOMY, CERVIX STATUS UNKNOWN  age 31       Family History   Problem Relation Age of Onset     Arthritis Mother      Unknown/Adopted Father      adopted     DIABETES Brother      CANCER Brother      Hypertension No family hx of      CEREBROVASCULAR DISEASE No family hx of      Thyroid Disease No family hx of      Glaucoma No family hx of      Macular Degeneration No family hx of        Social History   Substance Use Topics     Smoking status: Never Smoker     Smokeless tobacco: Never Used     Alcohol use 0.0 oz/week     0 Standard drinks or equivalent per week      Comment: 1 drink weekly         O:  Good historian.  A&O X  3.  Pulses DP, PT 2/4 b/l.  CRT < 3 seconds X 10 digits.  Bilateral mild ankle edema or varicosities noted.  Sensation to light touch intact b/l.  Reflexes 2/4 b/l.  Skin thin with scant hair b/l.  Normal arch with weightbearing.  No forefoot or rear foot deformities noted.  MS 5/5 all compartments.  Normal ROM all fore foot and rearfoot joints.  No equinus.    No pain with stressing any muscle compartments.  No erythema edema or ecchymosis or masses noted.  Pain with palpation lateral gutter ankle and less medial.    RIGHT ANKLE THREE OR MORE VIEWS 4/20/2017 11:12 AM      HISTORY: Concern for arthritis. Chronic lateral ankle pain.     COMPARISON: None.         IMPRESSION: No acute fracture or dislocation. There is arthritis in  the ankle joint with narrowing of the tibiotalar joint laterally. The  bones are demineralized. Multiple vascular calcifications.        A:  Right ankle joint arthritis.    P:  X-rays of ankle personally reviewed. Discussed importance of wearing these in a good shoes and avoiding activities that bother this.  Risks complications, and efficacy of cortisone injection discussed.   After written consent, sterile prep, injected right ankle joint with 1 cc 1% lidocaine plain and 2 cc kenalog 10 mg.  Explained she may have injections 3X/yr.    Also discussed brace to limit ankle ROM.   RETURN TO CLINIC PRN.   Thank you for allowing me participate in the care of this patient.          German Lewis DPM, FACFAS

## 2017-05-19 ENCOUNTER — OFFICE VISIT (OUTPATIENT)
Dept: PODIATRY | Facility: CLINIC | Age: 82
End: 2017-05-19
Payer: COMMERCIAL

## 2017-05-19 VITALS — BODY MASS INDEX: 27.45 KG/M2 | SYSTOLIC BLOOD PRESSURE: 140 MMHG | WEIGHT: 146 LBS | DIASTOLIC BLOOD PRESSURE: 64 MMHG

## 2017-05-19 DIAGNOSIS — M19.071 PRIMARY LOCALIZED OSTEOARTHROSIS, ANKLE AND FOOT, RIGHT: Primary | ICD-10-CM

## 2017-05-19 PROCEDURE — 99207 ZZC DROP WITH A PROCEDURE: CPT | Mod: 25 | Performed by: PODIATRIST

## 2017-05-19 PROCEDURE — 20605 DRAIN/INJ JOINT/BURSA W/O US: CPT | Mod: RT | Performed by: PODIATRIST

## 2017-05-19 NOTE — LETTER
5/19/2017       RE: Teresa Lopez  [address] [facility] [address]           Dear Colleague,    Thank you for referring your patient, Teresa Lopez, to the [facility]. Please see a copy of my visit note below.    S:  4/28/17  Patient seen in consult from Demetrice Gutierrez and complains of ankle pain.  Points to lateral ankle gutter as to where worse pain is.  Has had this for 2 years.  states she was walking and heard a pop.   Describes it as a burning pain.  Aggrevated by activity and relieved by rest.  Slowly getting worse.  Uneven surfaces bother this.  Positive history of post static dyskinesia.    5/19/17  Patient states injection did not help at all and she is wondering if this went in the joint.      ROS:  Denies bruising, swelling,weakness, or numbness.       Allergies   Allergen Reactions     Sulfa Drugs        Current Outpatient Prescriptions   Medication Sig Dispense Refill     nystatin (MYCOSTATIN) 000216 UNIT/GM POWD Apply topically 3 times daily as needed 60 g 1     predniSONE (DELTASONE) 1 MG tablet Take 4 mg (4 tablets) daily for 3 weeks. Then take 3 mg (3 tablets) daily for 3 weeks. Then 2 mg (2 tablets) daily for 3 weeks. Then 1 mg (1 tablet) daily for 3 weeks. Then 1 tablet every other day for 3 weeks. 210 tablet 0     oxyCODONE-acetaminophen (PERCOCET) 5-325 MG per tablet Take 1-2 tablets by mouth every 4 hours as needed for moderate to severe pain (Not more than 8 tablets per day.) 240 tablet 0     gabapentin (NEURONTIN) 300 MG capsule Take 1 tablet (300 mg) every night for 7 days,  then 1 tablet twice daily for 7 days, then 1 tablet three times daily for 7 days. Add 1 tablet every 7 days until taking 3 tablets 3 times daily. 90 capsule 1     losartan-hydrochlorothiazide (HYZAAR) 100-25 MG per tablet Take 1 tablet by mouth daily 90 tablet 3     amLODIPine (NORVASC) 2.5 MG tablet Take 1 tablet (2.5 mg) by mouth daily 1 po at bedtime 90 tablet 3      senna-docusate (SENOKOT-S;PERICOLACE) 8.6-50 MG per tablet Take 1 tablet by mouth 2 times daily 120 tablet 12     aspirin 162 MG EC tablet Take 162 mg by mouth daily Reported on 4/20/2017       Glucosamine-Chondroit-Vit C-Mn (GLUCOSAMINE 1500 COMPLEX PO) Take 1,500 mg by mouth daily Reported on 4/20/2017       Loratadine (CLARITIN) 10 MG capsule Take 10 mg by mouth as needed Reported on 4/20/2017       Omega-3 Fatty Acids (SALMON OIL-1000 PO) take 1,000 Caps by mouth daily.       ADVIL 200 MG PO CAPS Reported on 4/20/2017       MULTI-VITAMIN OR TABS 1 TABLET DAILY       CALCIUM 1200 OR Reported on 4/20/2017       VITAMIN C OR None Entered       VITAMIN D OR Take 1,200 mg by mouth daily.       VITAMIN E 400 UNIT OR CAPS None Entered       PRILOSEC OR None Entered         Patient Active Problem List   Diagnosis     Cerebral infarction (H)     Jaw Pain     Arthritis of knee     CARDIOVASCULAR SCREENING; LDL GOAL LESS THAN 100     Hypertension goal BP (blood pressure) < 140/90     Advanced directives, counseling/discussion     Hyperlipidemia LDL goal <130     OA (OSTEOARTHRITIS) OF KNEE - right     CKD (chronic kidney disease) stage 3, GFR 30-59 ml/min     Cataracts, both eyes     PVD (posterior vitreous detachment), both eyes     Chronic pain disorder     DJD (degenerative joint disease), lumbosacral     Spinal stenosis of lumbar region with neurogenic claudication     Slow transit constipation     Steroid-induced diabetes mellitus (H)       Past Medical History:   Diagnosis Date     Cataract 3/7/2012     CVA (cerebral infarction)      Diabetes mellitus (H)      DJD (degenerative joint disease)      HTN        Past Surgical History:   Procedure Laterality Date     HYSTERECTOMY, CERVIX STATUS UNKNOWN  age 31       Family History   Problem Relation Age of Onset     Arthritis Mother      Unknown/Adopted Father      adopted     DIABETES Brother      CANCER Brother      Hypertension No family hx of      CEREBROVASCULAR  DISEASE No family hx of      Thyroid Disease No family hx of      Glaucoma No family hx of      Macular Degeneration No family hx of        Social History   Substance Use Topics     Smoking status: Never Smoker     Smokeless tobacco: Never Used     Alcohol use 0.0 oz/week     0 Standard drinks or equivalent per week      Comment: 1 drink weekly         O:  Good historian.  A&O X 3.  Pulses DP, PT 2/4 b/l.  CRT < 3 seconds X 10 digits.  Bilateral mild ankle edema or varicosities noted.  Sensation to light touch intact b/l.  Reflexes 2/4 b/l.  Skin thin with scant hair b/l.  Normal arch with weightbearing.  No forefoot or rear foot deformities noted.  MS 5/5 all compartments.  Normal ROM all fore foot and rearfoot joints.  No equinus.    No pain with stressing any muscle compartments.  No erythema edema or ecchymosis or masses noted.  Pain with palpation lateral gutter ankle and less medial.    RIGHT ANKLE THREE OR MORE VIEWS 4/20/2017 11:12 AM      HISTORY: Concern for arthritis. Chronic lateral ankle pain.     COMPARISON: None.         IMPRESSION: No acute fracture or dislocation. There is arthritis in  the ankle joint with narrowing of the tibiotalar joint laterally. The  bones are demineralized. Multiple vascular calcifications.        A:  Right ankle joint arthritis.    P:  Discussed I may have missed joint with arthritis.  She would like to try again.  Risks complications, and efficacy of cortisone injection discussed.   After written consent, sterile prep, injected right ankle joint with 1 cc 1% lidocaine plain and 2 cc kenalog 10 mg.  Injected lateral this time and proximal block first.  Waited 5 minutes and then walked and no pain.  Explained she may have injections 3X/yr.    Also discussed brace to limit ankle ROM.   RETURN TO CLINIC PRN.          German Lewis DPDEYANIRA, FACFAS      Again, thank you for allowing me to participate in the care of your patient.        Sincerely,              German Lewis,  PAWAN

## 2017-05-19 NOTE — PROGRESS NOTES
S:  4/28/17  Patient seen in consult from Demetrice Gutierrez and complains of ankle pain.  Points to lateral ankle gutter as to where worse pain is.  Has had this for 2 years.  states she was walking and heard a pop.   Describes it as a burning pain.  Aggrevated by activity and relieved by rest.  Slowly getting worse.  Uneven surfaces bother this.  Positive history of post static dyskinesia.    5/19/17  Patient states injection did not help at all and she is wondering if this went in the joint.      ROS:  Denies bruising, swelling,weakness, or numbness.       Allergies   Allergen Reactions     Sulfa Drugs        Current Outpatient Prescriptions   Medication Sig Dispense Refill     nystatin (MYCOSTATIN) 057818 UNIT/GM POWD Apply topically 3 times daily as needed 60 g 1     predniSONE (DELTASONE) 1 MG tablet Take 4 mg (4 tablets) daily for 3 weeks. Then take 3 mg (3 tablets) daily for 3 weeks. Then 2 mg (2 tablets) daily for 3 weeks. Then 1 mg (1 tablet) daily for 3 weeks. Then 1 tablet every other day for 3 weeks. 210 tablet 0     oxyCODONE-acetaminophen (PERCOCET) 5-325 MG per tablet Take 1-2 tablets by mouth every 4 hours as needed for moderate to severe pain (Not more than 8 tablets per day.) 240 tablet 0     gabapentin (NEURONTIN) 300 MG capsule Take 1 tablet (300 mg) every night for 7 days,  then 1 tablet twice daily for 7 days, then 1 tablet three times daily for 7 days. Add 1 tablet every 7 days until taking 3 tablets 3 times daily. 90 capsule 1     losartan-hydrochlorothiazide (HYZAAR) 100-25 MG per tablet Take 1 tablet by mouth daily 90 tablet 3     amLODIPine (NORVASC) 2.5 MG tablet Take 1 tablet (2.5 mg) by mouth daily 1 po at bedtime 90 tablet 3     senna-docusate (SENOKOT-S;PERICOLACE) 8.6-50 MG per tablet Take 1 tablet by mouth 2 times daily 120 tablet 12     aspirin 162 MG EC tablet Take 162 mg by mouth daily Reported on 4/20/2017       Glucosamine-Chondroit-Vit C-Mn (GLUCOSAMINE 1500 COMPLEX PO) Take 1,500  mg by mouth daily Reported on 4/20/2017       Loratadine (CLARITIN) 10 MG capsule Take 10 mg by mouth as needed Reported on 4/20/2017       Omega-3 Fatty Acids (SALMON OIL-1000 PO) take 1,000 Caps by mouth daily.       ADVIL 200 MG PO CAPS Reported on 4/20/2017       MULTI-VITAMIN OR TABS 1 TABLET DAILY       CALCIUM 1200 OR Reported on 4/20/2017       VITAMIN C OR None Entered       VITAMIN D OR Take 1,200 mg by mouth daily.       VITAMIN E 400 UNIT OR CAPS None Entered       PRILOSEC OR None Entered         Patient Active Problem List   Diagnosis     Cerebral infarction (H)     Jaw Pain     Arthritis of knee     CARDIOVASCULAR SCREENING; LDL GOAL LESS THAN 100     Hypertension goal BP (blood pressure) < 140/90     Advanced directives, counseling/discussion     Hyperlipidemia LDL goal <130     OA (OSTEOARTHRITIS) OF KNEE - right     CKD (chronic kidney disease) stage 3, GFR 30-59 ml/min     Cataracts, both eyes     PVD (posterior vitreous detachment), both eyes     Chronic pain disorder     DJD (degenerative joint disease), lumbosacral     Spinal stenosis of lumbar region with neurogenic claudication     Slow transit constipation     Steroid-induced diabetes mellitus (H)       Past Medical History:   Diagnosis Date     Cataract 3/7/2012     CVA (cerebral infarction)      Diabetes mellitus (H)      DJD (degenerative joint disease)      HTN        Past Surgical History:   Procedure Laterality Date     HYSTERECTOMY, CERVIX STATUS UNKNOWN  age 31       Family History   Problem Relation Age of Onset     Arthritis Mother      Unknown/Adopted Father      adopted     DIABETES Brother      CANCER Brother      Hypertension No family hx of      CEREBROVASCULAR DISEASE No family hx of      Thyroid Disease No family hx of      Glaucoma No family hx of      Macular Degeneration No family hx of        Social History   Substance Use Topics     Smoking status: Never Smoker     Smokeless tobacco: Never Used     Alcohol use 0.0  oz/week     0 Standard drinks or equivalent per week      Comment: 1 drink weekly         O:  Good historian.  A&O X 3.  Pulses DP, PT 2/4 b/l.  CRT < 3 seconds X 10 digits.  Bilateral mild ankle edema or varicosities noted.  Sensation to light touch intact b/l.  Reflexes 2/4 b/l.  Skin thin with scant hair b/l.  Normal arch with weightbearing.  No forefoot or rear foot deformities noted.  MS 5/5 all compartments.  Normal ROM all fore foot and rearfoot joints.  No equinus.    No pain with stressing any muscle compartments.  No erythema edema or ecchymosis or masses noted.  Pain with palpation lateral gutter ankle and less medial.    RIGHT ANKLE THREE OR MORE VIEWS 4/20/2017 11:12 AM      HISTORY: Concern for arthritis. Chronic lateral ankle pain.     COMPARISON: None.         IMPRESSION: No acute fracture or dislocation. There is arthritis in  the ankle joint with narrowing of the tibiotalar joint laterally. The  bones are demineralized. Multiple vascular calcifications.        A:  Right ankle joint arthritis.    P:  Discussed I may have missed joint with arthritis.  She would like to try again.  Risks complications, and efficacy of cortisone injection discussed.   After written consent, sterile prep, injected right ankle joint with 1 cc 1% lidocaine plain and 2 cc kenalog 10 mg.  Injected lateral this time and proximal block first.  Waited 5 minutes and then walked and no pain.  Explained she may have injections 3X/yr.    Also discussed brace to limit ankle ROM.   RETURN TO CLINIC PRN.          German Lewis DPM, FACFAS

## 2017-05-19 NOTE — MR AVS SNAPSHOT
After Visit Summary   5/19/2017    Teresa Lopez    MRN: [id number]           Patient Information     Date Of Birth          1935        Visit Information        Provider Department      5/19/2017 9:15 AM German Lewis DPM [facility]        Care Instructions    We wish you continued good healing. If you have any questions or concerns, please do not hesitate to contact us at [phone].      Please remember to call and schedule a follow up appointment if one was recommended at your earliest convenience.   PODIATRY CLINIC HOURS  TELEPHONE NUMBER    Dr. German MORRISPBETTINA FAC FAS    Clinics:  [facility]        Angela Esteves MA  Medical Assistant  Tuesday 1PM-6PM  [facility]  Wednesday 7AM-2PM  [place]  Thursday 10AM-6PM  [place] Friday 7AM-345PM  [place]  Specialty schedulers:   [phone] to make an appointment with any Specialty Provider.        Urgent Care locations:    [facility] Monday-Friday 5 pm - 9 pm. Saturday-Sunday 9 am -5pm    Monday-Friday 11 am - 9 pm Saturday 9 am - 5 pm     Monday-Sunday 12 noon-8PM [phone]     If you need a medication refill, please contact us you may need lab work and/or a follow up visit prior to your refill (i.e. Antifungal medications).    [employer] (secure e-mail communication and access to your chart) to send a message or to make an appointment.    If MRI needed please call Kiko Pritchard at [phone]        Weight management plan: Patient was referred to their PCP to discuss a diet and exercise plan.          Follow-ups after your visit        Your next 10 appointments already scheduled     May 19, 2017  9:15 AM CDT   New Visit with German Lewis DPM   [facility] ([facility]    [address] [place] [address]  "[facility] [id number] [phone]              Who to contact     If you have questions or need follow up information about today's clinic visit or your schedule please contact [facility] directly at [phone].  Normal or non-critical lab and imaging results will be communicated to you by MyChart, letter or phone within 4 business days after the clinic has received the results. If you do not hear from us within 7 days, please contact the clinic through MyChart or phone. If you have a critical or abnormal lab result, we will notify you by phone as soon as possible.  Submit refill requests through enavu or call your pharmacy and they will forward the refill request to us. Please allow 3 business days for your refill to be completed.          Additional Information About Your Visit        [employer] Information     enavu lets you send messages to your doctor, view your test results, renew your prescriptions, schedule appointments and more. To sign up, go to [email or web address] . Click on \"Log in\" on the left side of the screen, which will take you to the Welcome page. Then click on \"Sign up Now\" on the right side of the page.     You will be asked to enter the access code listed below, as well as some personal information. Please follow the directions to create your username and password.     Your access code is: [email or web address]  Expires: 2017 10:56 AM     Your access code will  in 90 days. If you need help or a new code, please call your [facility] or [phone].        Care EveryWhere ID     This is your Care EveryWhere ID. This could be used by other organizations to access your [place] medical records  [id number]        Your Vitals Were     BMI (Body Mass Index)                   27.45 kg/m2            Blood Pressure from Last 3 Encounters:   17 140/64   17 116/74   17 165/90    Weight from Last 3 Encounters:   17 66.2 kg (146 lb) "   04/28/17 67.1 kg (148 lb)   04/20/17 67.1 kg (148 lb)              Today, you had the following     No orders found for display       Primary Care Provider Office Phone # Fax #    MYRON Bose [phone]       [facility] [address]  [facility] [address]        Thank you!     Thank you for choosing [facility]  for your care. Our goal is always to provide you with excellent care. Hearing back from our patients is one way we can continue to improve our services. Please take a few minutes to complete the written survey that you may receive in the mail after your visit with us. Thank you!             Your Updated Medication List - Protect others around you: Learn how to safely use, store and throw away your medicines at www.disposemymeds.org.          This list is accurate as of: 5/19/17  9:11 AM.  Always use your most recent med list.                   Brand Name Dispense Instructions for use    ADVIL 200 MG capsule   Generic drug:  ibuprofen      Reported on 4/20/2017       amLODIPine 2.5 MG tablet    NORVASC    90 tablet    Take 1 tablet (2.5 mg) by mouth daily 1 po at bedtime       aspirin 162 MG EC tablet      Take 162 mg by mouth daily Reported on 4/20/2017       CALCIUM 1200 PO      Reported on 4/20/2017       CLARITIN 10 MG capsule   Generic drug:  loratadine      Take 10 mg by mouth as needed Reported on 4/20/2017       gabapentin 300 MG capsule    NEURONTIN    90 capsule    Take 1 tablet (300 mg) every night for 7 days,  then 1 tablet twice daily for 7 days, then 1 tablet three times daily for 7 days. Add 1 tablet every 7 days until taking 3 tablets 3 times daily.       GLUCOSAMINE 1500 COMPLEX PO      Take 1,500 mg by mouth daily Reported on 4/20/2017       losartan-hydrochlorothiazide 100-25 MG per tablet    HYZAAR    90 tablet    Take 1 tablet by mouth daily       Multi-vitamin Tabs tablet   Generic drug:   multivitamin, therapeutic with minerals      1 TABLET DAILY       nystatin 977015 UNIT/GM Powd    MYCOSTATIN    60 g    Apply topically 3 times daily as needed       oxyCODONE-acetaminophen 5-325 MG per tablet    PERCOCET    240 tablet    Take 1-2 tablets by mouth every 4 hours as needed for moderate to severe pain (Not more than 8 tablets per day.)       predniSONE 1 MG tablet    DELTASONE    210 tablet    Take 4 mg (4 tablets) daily for 3 weeks. Then take 3 mg (3 tablets) daily for 3 weeks. Then 2 mg (2 tablets) daily for 3 weeks. Then 1 mg (1 tablet) daily for 3 weeks. Then 1 tablet every other day for 3 weeks.       PRILOSEC PO      None Entered       SALMON OIL-1000 PO      take 1,000 Caps by mouth daily.       senna-docusate 8.6-50 MG per tablet    SENOKOT-S;PERICOLACE    120 tablet    Take 1 tablet by mouth 2 times daily       VITAMIN C PO      None Entered       VITAMIN D PO      Take 1,200 mg by mouth daily.       vitamin E 400 UNIT capsule      None Entered

## 2017-05-19 NOTE — NURSING NOTE
"Chief Complaint   Patient presents with     Follow Up For     Ankle Pain     right injection and brace not helping       Initial /64  Wt 66.2 kg (146 lb)  BMI 27.45 kg/m2 Estimated body mass index is 27.45 kg/(m^2) as calculated from the following:    Height as of 3/31/17: 1.553 m (5' 1.15\").    Weight as of this encounter: 66.2 kg (146 lb).  Medication Reconciliation: complete  "

## 2017-05-19 NOTE — PATIENT INSTRUCTIONS
We wish you continued good healing. If you have any questions or concerns, please do not hesitate to contact us at [phone].      Please remember to call and schedule a follow up appointment if one was recommended at your earliest convenience.   PODIATRY CLINIC HOURS  TELEPHONE NUMBER    Dr. German Lewis D.P.M [facility]    Clinics:  [facility]        Angela Esteves MA  Medical Assistant  Tuesday 1PM-6PM  [facility]  Wednesday 7AM-2PM  [place]  Thursday 10AM-6PM  [place] Friday 7AM-345PM  [place]  Specialty schedulers:   [phone] to make an appointment with any Specialty Provider.        Urgent Care locations:    [facility] Monday-Friday 5 pm - 9 pm. Saturday-Sunday 9 am -5pm    Monday-Friday 11 am - 9 pm Saturday 9 am - 5 pm     Monday-Sunday 12 noon-8PM [phone]     If you need a medication refill, please contact us you may need lab work and/or a follow up visit prior to your refill (i.e. Antifungal medications).    [employer] (secure e-mail communication and access to your chart) to send a message or to make an appointment.    If MRI needed please call [facility] Pritchard at [phone]        Weight management plan: Patient was referred to their PCP to discuss a diet and exercise plan.

## 2017-05-22 ENCOUNTER — TELEPHONE (OUTPATIENT)
Dept: INTERNAL MEDICINE | Facility: CLINIC | Age: 82
End: 2017-05-22

## 2017-05-22 DIAGNOSIS — G89.4 CHRONIC PAIN DISORDER: ICD-10-CM

## 2017-05-22 DIAGNOSIS — M48.062 SPINAL STENOSIS OF LUMBAR REGION WITH NEUROGENIC CLAUDICATION: ICD-10-CM

## 2017-05-22 DIAGNOSIS — M47.816 ARTHRITIS, LUMBAR SPINE: ICD-10-CM

## 2017-05-22 RX ORDER — OXYCODONE AND ACETAMINOPHEN 5; 325 MG/1; MG/1
1-2 TABLET ORAL EVERY 8 HOURS PRN
Qty: 90 TABLET | Refills: 0 | Status: SHIPPED | OUTPATIENT
Start: 2017-05-22 | End: 2017-07-31

## 2017-05-22 NOTE — TELEPHONE ENCOUNTER
Controlled Substance Refill Request for Percocet 5-325mg     Last refill: 3/23/17 - 240qty    Last clinic visit: 4/20/17     Next appt: 5/25/17    Controlled substance agreement on file: Yes:  Date 1/16/17.    Documentation in problem list reviewed:  Yes    Processing:  Patient will  in clinic       Shelby Chatterjee RN  [facility]

## 2017-05-22 NOTE — TELEPHONE ENCOUNTER
Patient notified that script is available for  at the [facility] . Patient reminded of message below as discussed in clinic.

## 2017-05-22 NOTE — TELEPHONE ENCOUNTER
Reason for Call:  Medication or medication refill:    Name of the pharmacy and phone number for the current request: Hard Copy    Name of the medication requested: Oxycodone    Other request: Please call when completed    Can we leave a detailed message on this number? YES    Phone number patient can be reached at: Home number on file [phone] (home)    Best Time: anytime    Call taken on 5/22/2017 at 8:32 AM by Sejal Bear

## 2017-05-22 NOTE — TELEPHONE ENCOUNTER
I decreased quantity to 90 per our discussion at last clinic visit. She had previously weaned herself down to 1 tablet/day. I encouraged her to increase dosing to max 3 tablets/day due to pain level, as well as concern for withdrawal with self induced rapid taper.

## 2017-05-22 NOTE — TELEPHONE ENCOUNTER
Routing refill request to provider for review/approval because:  Drug not on the [facility] refill protocol       Shelby Chatterjee RN  [facility]

## 2017-05-25 ENCOUNTER — OFFICE VISIT (OUTPATIENT)
Dept: FAMILY MEDICINE | Facility: CLINIC | Age: 82
End: 2017-05-25
Payer: COMMERCIAL

## 2017-05-25 VITALS
DIASTOLIC BLOOD PRESSURE: 78 MMHG | SYSTOLIC BLOOD PRESSURE: 117 MMHG | TEMPERATURE: 97.8 F | WEIGHT: 153 LBS | BODY MASS INDEX: 28.77 KG/M2 | HEART RATE: 79 BPM | OXYGEN SATURATION: 99 %

## 2017-05-25 DIAGNOSIS — M48.062 SPINAL STENOSIS OF LUMBAR REGION WITH NEUROGENIC CLAUDICATION: ICD-10-CM

## 2017-05-25 DIAGNOSIS — M47.817 DJD (DEGENERATIVE JOINT DISEASE), LUMBOSACRAL: ICD-10-CM

## 2017-05-25 PROCEDURE — 99213 OFFICE O/P EST LOW 20 MIN: CPT | Performed by: NURSE PRACTITIONER

## 2017-05-25 RX ORDER — GABAPENTIN 300 MG/1
300 CAPSULE ORAL 3 TIMES DAILY
Qty: 270 CAPSULE | Refills: 3 | Status: SHIPPED | OUTPATIENT
Start: 2017-05-25 | End: 2018-07-11

## 2017-05-25 ASSESSMENT — ANXIETY QUESTIONNAIRES
7. FEELING AFRAID AS IF SOMETHING AWFUL MIGHT HAPPEN: NOT AT ALL
6. BECOMING EASILY ANNOYED OR IRRITABLE: NOT AT ALL
3. WORRYING TOO MUCH ABOUT DIFFERENT THINGS: NOT AT ALL
1. FEELING NERVOUS, ANXIOUS, OR ON EDGE: NOT AT ALL
IF YOU CHECKED OFF ANY PROBLEMS ON THIS QUESTIONNAIRE, HOW DIFFICULT HAVE THESE PROBLEMS MADE IT FOR YOU TO DO YOUR WORK, TAKE CARE OF THINGS AT HOME, OR GET ALONG WITH OTHER PEOPLE: NOT DIFFICULT AT ALL
5. BEING SO RESTLESS THAT IT IS HARD TO SIT STILL: NOT AT ALL
2. NOT BEING ABLE TO STOP OR CONTROL WORRYING: NOT AT ALL
GAD7 TOTAL SCORE: 0

## 2017-05-25 ASSESSMENT — PATIENT HEALTH QUESTIONNAIRE - PHQ9: 5. POOR APPETITE OR OVEREATING: NOT AT ALL

## 2017-05-25 ASSESSMENT — PAIN SCALES - GENERAL: PAINLEVEL: SEVERE PAIN (6)

## 2017-05-25 NOTE — NURSING NOTE
"Chief Complaint   Patient presents with     RECHECK     Health Maintenance     GAD7       Initial /78 (BP Location: Right arm, Patient Position: Chair, Cuff Size: Adult Regular)  Pulse 79  Temp 97.8  F (36.6  C) (Oral)  Wt 153 lb (69.4 kg)  SpO2 99%  Breastfeeding? No  BMI 28.77 kg/m2 Estimated body mass index is 28.77 kg/(m^2) as calculated from the following:    Height as of 3/31/17: 5' 1.15\" (1.553 m).    Weight as of this encounter: 153 lb (69.4 kg).  Medication Reconciliation: complete.  JAMES Tirado MA      "

## 2017-05-25 NOTE — MR AVS SNAPSHOT
After Visit Summary   5/25/2017    Teresa Lopez    MRN: [id number]           Patient Information     Date Of Birth          1935        Visit Information        Provider Department      5/25/2017 9:40 AM Demetrice Gutierrez APRN CNP [facility]        Today's Diagnoses     DJD (degenerative joint disease), lumbosacral        Spinal stenosis of lumbar region with neurogenic claudication          Care Instructions    We are weaning you off of the prednisone. Take 3 mg (which is 3 tablets) once daily for 3 weeks  Decrease to 2 mg (2 tablets) daily for 3 weeks  Then 1 tablet daily for 3 weeks  Then 1 tablet every other day for 3 weeks.   Then you're done with the prednisone    I want you to take gabapentin (Neurontin) 300 mg tablet three times daily    Continue with 1 tablet Percocet three times daily  You can take tylenol as needed for pain not relieved with the Perocet. You could take 500-650 mg three times daily as needed for pain  There is tylenol in the percocet so do not take more than an additional 2000 mg daily            Follow-ups after your visit        Who to contact     If you have questions or need follow up information about today's clinic visit or your schedule please contact [facility] directly at [phone].  Normal or non-critical lab and imaging results will be communicated to you by MyChart, letter or phone within 4 business days after the clinic has received the results. If you do not hear from us within 7 days, please contact the clinic through "[employer]hart or phone. If you have a critical or abnormal lab result, we will notify you by phone as soon as possible.  Submit refill requests through [employer] or call your pharmacy and they will forward the refill request to us. Please allow 3 business days for your refill to be completed.          Additional Information About Your Visit        "[employer]hart Information     [employer] lets you send  "messages to your doctor, view your test results, renew your prescriptions, schedule appointments and more. To sign up, go to [email or web address] . Click on \"Log in\" on the left side of the screen, which will take you to the Welcome page. Then click on \"Sign up Now\" on the right side of the page.     You will be asked to enter the access code listed below, as well as some personal information. Please follow the directions to create your username and password.     Your access code is: [email or web address]  Expires: 2017 10:56 AM     Your access code will  in 90 days. If you need help or a new code, please call your [place] clinic or [phone].        Care EveryWhere ID     This is your Care EveryWhere ID. This could be used by other organizations to access your [place] medical records  [id number]        Your Vitals Were     Pulse Temperature Pulse Oximetry Breastfeeding? BMI (Body Mass Index)       79 97.8  F (36.6  C) (Oral) 99% No 28.77 kg/m2        Blood Pressure from Last 3 Encounters:   17 117/78   17 140/64   17 116/74    Weight from Last 3 Encounters:   17 153 lb (69.4 kg)   17 146 lb (66.2 kg)   17 148 lb (67.1 kg)              Today, you had the following     No orders found for display         Today's Medication Changes          These changes are accurate as of: 17 10:29 AM.  If you have any questions, ask your nurse or doctor.               These medicines have changed or have updated prescriptions.        Dose/Directions    gabapentin 300 MG capsule   Commonly known as:  NEURONTIN   This may have changed:    - how much to take  - how to take this  - when to take this  - additional instructions   Used for:  DJD (degenerative joint disease), lumbosacral, Spinal stenosis of lumbar region with neurogenic claudication   Changed by:  Demetrice Gutierrez APRN CNP        Dose:  300 mg   Take 1 capsule (300 mg) by mouth 3 times daily   Quantity:  270 " capsule   Refills:  3            Where to get your medicines      These medications were sent to [id number] - [place], [place] - [address], [facility] [address]     Phone:  [phone]     gabapentin 300 MG capsule                Primary Care Provider Office Phone # Fax #    MYRON Bose [phone]       [facility] [address] [facility] [address]        Thank you!     Thank you for choosing [facility]  for your care. Our goal is always to provide you with excellent care. Hearing back from our patients is one way we can continue to improve our services. Please take a few minutes to complete the written survey that you may receive in the mail after your visit with us. Thank you!             Your Updated Medication List - Protect others around you: Learn how to safely use, store and throw away your medicines at www.disposemymeds.org.          This list is accurate as of: 5/25/17 10:29 AM.  Always use your most recent med list.                   Brand Name Dispense Instructions for use    ADVIL 200 MG capsule   Generic drug:  ibuprofen      Reported on 4/20/2017       amLODIPine 2.5 MG tablet    NORVASC    90 tablet    Take 1 tablet (2.5 mg) by mouth daily 1 po at bedtime       aspirin 162 MG EC tablet      Take 162 mg by mouth daily Reported on 4/20/2017       CALCIUM 1200 PO      Reported on 4/20/2017       CLARITIN 10 MG capsule   Generic drug:  loratadine      Take 10 mg by mouth as needed Reported on 4/20/2017       gabapentin 300 MG capsule    NEURONTIN    270 capsule    Take 1 capsule (300 mg) by mouth 3 times daily       GLUCOSAMINE 1500 COMPLEX PO      Take 1,500 mg by mouth daily Reported on 4/20/2017       losartan-hydrochlorothiazide 100-25 MG per tablet    HYZAAR    90 tablet    Take 1 tablet by mouth daily       Multi-vitamin Tabs tablet   Generic drug:   multivitamin, therapeutic with minerals      1 TABLET DAILY       nystatin 874815 UNIT/GM Powd    MYCOSTATIN    60 g    Apply topically 3 times daily as needed       oxyCODONE-acetaminophen 5-325 MG per tablet    PERCOCET    90 tablet    Take 1-2 tablets by mouth every 8 hours as needed for moderate to severe pain (No more than 3 tablets/day)       predniSONE 1 MG tablet    DELTASONE    210 tablet    Take 4 mg (4 tablets) daily for 3 weeks. Then take 3 mg (3 tablets) daily for 3 weeks. Then 2 mg (2 tablets) daily for 3 weeks. Then 1 mg (1 tablet) daily for 3 weeks. Then 1 tablet every other day for 3 weeks.       PRILOSEC PO      None Entered       SALMON OIL-1000 PO      take 1,000 Caps by mouth daily.       senna-docusate 8.6-50 MG per tablet    SENOKOT-S;PERICOLACE    120 tablet    Take 1 tablet by mouth 2 times daily       VITAMIN C PO      None Entered       VITAMIN D PO      Take 1,200 mg by mouth daily.       vitamin E 400 UNIT capsule      None Entered

## 2017-05-25 NOTE — PROGRESS NOTES
"  SUBJECTIVE:                                                    Teresa Lopez is a 81 year old female who presents to clinic today for the following health issues:      Patient is here today to follow up on her arthritis pain, feeling much better, finding ways the deal with her arthritis pain with gridles and wraps.  Longstanding history of Oxycodone use  Pain resolved after receiving right hip injection  Weaning off of prednisone    Has been taking 3 oxycodone daily  On 4 mg prednisone  On 300 mg gabapentin in am and hs    Received steroid injection in right ankle 5/19 which helped    She has been working out at the [facility]  Going to chiropractor which is helping for lumbar back pain  She has been dancing. Two-step \"I can't do the waltz because of my ankle\"    She is keeping track of medications in a journal which her daughter started    Problem list and histories reviewed & adjusted, as indicated.  Additional history: none    Patient Active Problem List   Diagnosis     Cerebral infarction (H)     Jaw Pain     Arthritis of knee     CARDIOVASCULAR SCREENING; LDL GOAL LESS THAN 100     Hypertension goal BP (blood pressure) < 140/90     Advanced directives, counseling/discussion     Hyperlipidemia LDL goal <130     OA (OSTEOARTHRITIS) OF KNEE - right     CKD (chronic kidney disease) stage 3, GFR 30-59 ml/min     Cataracts, both eyes     PVD (posterior vitreous detachment), both eyes     Chronic pain disorder     DJD (degenerative joint disease), lumbosacral     Spinal stenosis of lumbar region with neurogenic claudication     Slow transit constipation     Steroid-induced diabetes mellitus (H)     Past Surgical History:   Procedure Laterality Date     HYSTERECTOMY, CERVIX STATUS UNKNOWN  age 31       Social History   Substance Use Topics     Smoking status: Never Smoker     Smokeless tobacco: Never Used     Alcohol use 0.0 oz/week     0 Standard drinks or equivalent per week      Comment: 1 drink weekly     Family " History   Problem Relation Age of Onset     Arthritis Mother      Unknown/Adopted Father      adopted     DIABETES Brother      CANCER Brother      Hypertension No family hx of      CEREBROVASCULAR DISEASE No family hx of      Thyroid Disease No family hx of      Glaucoma No family hx of      Macular Degeneration No family hx of          Current Outpatient Prescriptions   Medication Sig Dispense Refill     gabapentin (NEURONTIN) 300 MG capsule Take 1 capsule (300 mg) by mouth 3 times daily 270 capsule 3     oxyCODONE-acetaminophen (PERCOCET) 5-325 MG per tablet Take 1-2 tablets by mouth every 8 hours as needed for moderate to severe pain (No more than 3 tablets/day) 90 tablet 0     predniSONE (DELTASONE) 1 MG tablet Take 4 mg (4 tablets) daily for 3 weeks. Then take 3 mg (3 tablets) daily for 3 weeks. Then 2 mg (2 tablets) daily for 3 weeks. Then 1 mg (1 tablet) daily for 3 weeks. Then 1 tablet every other day for 3 weeks. 210 tablet 0     losartan-hydrochlorothiazide (HYZAAR) 100-25 MG per tablet Take 1 tablet by mouth daily 90 tablet 3     amLODIPine (NORVASC) 2.5 MG tablet Take 1 tablet (2.5 mg) by mouth daily 1 po at bedtime 90 tablet 3     senna-docusate (SENOKOT-S;PERICOLACE) 8.6-50 MG per tablet Take 1 tablet by mouth 2 times daily 120 tablet 12     aspirin 162 MG EC tablet Take 162 mg by mouth daily Reported on 4/20/2017       Glucosamine-Chondroit-Vit C-Mn (GLUCOSAMINE 1500 COMPLEX PO) Take 1,500 mg by mouth daily Reported on 4/20/2017       Omega-3 Fatty Acids (SALMON OIL-1000 PO) take 1,000 Caps by mouth daily.       MULTI-VITAMIN OR TABS 1 TABLET DAILY       CALCIUM 1200 OR Reported on 4/20/2017       VITAMIN C OR None Entered       VITAMIN D OR Take 1,200 mg by mouth daily.       VITAMIN E 400 UNIT OR CAPS None Entered       PRILOSEC OR None Entered       [DISCONTINUED] gabapentin (NEURONTIN) 300 MG capsule Take 1 tablet (300 mg) every night for 7 days,  then 1 tablet twice daily for 7 days, then 1  tablet three times daily for 7 days. Add 1 tablet every 7 days until taking 3 tablets 3 times daily. 90 capsule 1     Loratadine (CLARITIN) 10 MG capsule Take 10 mg by mouth as needed Reported on 4/20/2017       ADVIL 200 MG PO CAPS Reported on 4/20/2017         Reviewed and updated as needed this visit by clinical staff  Tobacco  Allergies  Meds  Med Hx  Surg Hx  Fam Hx  Soc Hx      Reviewed and updated as needed this visit by Provider         ROS:  Constitutional, HEENT, cardiovascular, pulmonary, gi and gu systems are negative, except as otherwise noted.    OBJECTIVE:                                                    /78 (BP Location: Right arm, Patient Position: Chair, Cuff Size: Adult Regular)  Pulse 79  Temp 97.8  F (36.6  C) (Oral)  Wt 153 lb (69.4 kg)  SpO2 99%  Breastfeeding? No  BMI 28.77 kg/m2  Body mass index is 28.77 kg/(m^2).  GENERAL: healthy, alert and no distress  RESP: lungs clear to auscultation - no rales, rhonchi or wheezes  CV: regular rate and rhythm, normal S1 S2, no S3 or S4, no murmur, click or rub, no peripheral edema   MS: no gross musculoskeletal defects noted, no edema  PSYCH: mentation appears normal, affect normal/bright    Diagnostic Test Results:  none      ASSESSMENT/PLAN:                                                        ICD-10-CM    1. DJD (degenerative joint disease), lumbosacral M51.37 gabapentin (NEURONTIN) 300 MG capsule   2. Spinal stenosis of lumbar region with neurogenic claudication M48.06 gabapentin (NEURONTIN) 300 MG capsule       She appears much better than when I saw her last. Symptoms likely due to withdrawal from abrupt discontinuation of 8 percocet/day. Will keep at 3 percocet.  Increase gabapentin from BID to TID  Continue to wean off of prednisone  She will hold off on lumbar injection as pain is improved with visits to chriopractor  Follow up in 2 months if symptoms stable    MYRON Bruce [facility]  [place]

## 2017-05-25 NOTE — PATIENT INSTRUCTIONS
We are weaning you off of the prednisone. Take 3 mg (which is 3 tablets) once daily for 3 weeks  Decrease to 2 mg (2 tablets) daily for 3 weeks  Then 1 tablet daily for 3 weeks  Then 1 tablet every other day for 3 weeks.   Then you're done with the prednisone    I want you to take gabapentin (Neurontin) 300 mg tablet three times daily    Continue with 1 tablet Percocet three times daily  You can take tylenol as needed for pain not relieved with the Perocet. You could take 500-650 mg three times daily as needed for pain  There is tylenol in the percocet so do not take more than an additional 2000 mg daily

## 2017-05-26 ASSESSMENT — ANXIETY QUESTIONNAIRES: GAD7 TOTAL SCORE: 0

## 2017-06-08 ENCOUNTER — TELEPHONE (OUTPATIENT)
Dept: PALLIATIVE MEDICINE | Facility: CLINIC | Age: 82
End: 2017-06-08

## 2017-06-08 NOTE — TELEPHONE ENCOUNTER
Pre-screening Questions for Radiology Injections:    Injection to be done at which interventional clinic site? [facility] [place]    Procedure ordered by Demetrice Gutierrez    Procedure ordered? Lumbar Epidural Steroid Injection    What insurance would patient like us to bill for this procedure? [employer]      Worker's comp-Any injection DO NOT SCHEDULE and route to Stacy [facility].      [employer] insurance - For SI joint injections, DO NOT SCHEDULE and route Stacy Phoneix.      [employer]- MBB's must be scheduled at LEAST two weeks apart      [employer] - Any injection besides hip/shoulder/knee joint DO NOT SCHEDULE and route to Stacy [facility]. She will obtain PA and call pt back to schedule procedure or notify pt of denial.     HP [employer]-PA REQUIRED FOR NON-DELFINA OR Joint injections    Any chance of pregnancy? Not Applicable   If YES, do NOT schedule and route to RN pool    Is an  needed? No     Patient has a drive home? (mandatory) YES: Informed    Is patient taking any blood thinners (plavix, coumadin, jantoven, warfarin, heparin, pradaxa or dabigatran )? No   If hold needed, do NOT schedule, route to RN pool     Is patient taking any aspirin products? No     If more than 325mg/day do NOT schedule; route to RN pool     For CERVICAL procedures, hold all aspirin products for 6 days.      Does the patient have a bleeding or clotting disorder? No     If yes, okay to schedule AND route to RN nurse pool    **For any patients with platelet count <100, must be forwarded to provider**    Is patient diabetic?  No  If YES, have them bring their glucometer.    Does patient have an active infection or treated for one within the past week? No     Is patient currently taking any antibiotics?  No     For patients on chronic, preventative, or prophylactic antibiotics, procedures may be scheduled.     For patients on antibiotics for active or recent infection:    Siobhan Bajwa, Maral Morocho,  [place]ent[place]-antibiotic course must have been completed for 4 days    Siobhan Lopez-antibiotic course must have been completed for 7 days    Is patient currently taking any steroid medications? (i.e. Prednisone, Medrol)  Prednisone    For patients on steroid medications:    Rashawn Jacobson [place], [place]-steroid course must have been completed for 4 days    Siobhan Lopez-steroid course must have been completed for 7 days    Reviewed with patient:  If you are started on any steroids or antibiotics between now and your appointment, you must contact us because it may affect our ability to perform your procedure.  Yes    Is patient actively being treated for cancer or immunocompromised, including the spleen having been removed? No    If YES, do NOT schedule and route to RN pool     **For Dr. Ovalle patients without spleens should have the chart sent to her**    Are you able to get on and off an exam table with minimal or no assistance? Yes  If NO, do NOT schedule and route to RN pool    Are you able to roll over and lay on your stomach with minimal or no assistance? Yes  If NO, do NOT schedule and route to RN pool     Any allergies to contrast dye, iodine, shellfish, or numbing and steroid medications? No  If YES, route to RN pool AND add allergy information to appointment notes    Allergies: Sulfa drugs        Has the patient had a flu shot or any other vaccinations within 7 days before or after the procedure.  No       Does patient have an MRI/CT?  YES: 3/2017  (SI joint, hip injections, lumbar sympathetic blocks, and stellate ganglion blocks do not require an MRI)    Was the MRI done w/in the last 3 years?  Yes    Was MRI done at [facility]? Yes      If not, where was it done? N/A       If MRI was not done at [facility], [facility] or [facility] do NOT schedule and route to nursing.  If pt has an imaging disc, the injection may be scheduled but pt has to bring disc to appt. If they show up w/out disc the  injection cannot be done    Reminders (please tell patient if applicable):       Instructed pt to arrive 30 minutes early for IV start if this is for a cervical procedure, ALL sympathetic (stellate ganglion, hypogastric, or lumbar sympathetic block) and all sedation procedures (RFA, spinal cord stimulation trials).  Not Applicable    -IVs are not routinely placed for Morocho and [place] cervical case       If NPO for sedation, informed patient that it is okay to take medications with sips of water (except if they are to hold blood thinners).  Not Applicable   *DO take blood pressure medication if it is prescribed*      If this is for a cervical DELFINA, informed patient that aspirin needs to be held for 6 days.   Not Applicable      For all patients not having spinal cord stimulator (SCS) trials or radiofrequency ablations (RFAs), informed patient:  IV sedation is not provided for this procedure.  If you feel that an oral anti-anxiety medication is needed, you can discuss this further with your referring provider or primary care provider.  The Pain Clinic provider will discuss specifics of what the procedure includes at your appointment.  Most procedures last 10-20 minutes.  We use numbing medications to help with any discomfort during the procedure.  NO      Do not schedule procedures requiring IV placement in the first appointment of the day or first appointment after lunch.       For patients 85 or older we recommend having an adult stay w/ them for the remainder of the day.       Does the patient have any questions?  NO  Zenaida Ron  [facility]

## 2017-06-19 ENCOUNTER — TELEPHONE (OUTPATIENT)
Dept: PODIATRY | Facility: CLINIC | Age: 82
End: 2017-06-19

## 2017-06-19 NOTE — TELEPHONE ENCOUNTER
Reason for Call:  Other call back    Detailed comments: Patient states her right ankle injections are not helping with her pain. She is requesting something stronger to get her through her pain. Please advise her on her next steps.     Phone Number Patient can be reached at: Home number on file [phone] (home)    Best Time: any    Can we leave a detailed message on this number? YES    Call taken on 6/19/2017 at 11:42 AM by Rayo Cleveland

## 2017-06-21 NOTE — TELEPHONE ENCOUNTER
Left message for pt. Left  instructions to use the cam boot to immobilize the foot or place a referral to Dr. Hagan.  Angela Esteves MA

## 2017-07-06 NOTE — TELEPHONE ENCOUNTER
Reason for Call:  Other medical advice    Detailed comments: patient is calling to say that her ankle is still bothering her. Please call to advise    Phone Number Patient can be reached at: Home number on file [phone] (home)    Best Time: anytime    Can we leave a detailed message on this number? YES    Call taken on 7/6/2017 at 2:16 PM by Kenneth Bauman

## 2017-07-07 NOTE — TELEPHONE ENCOUNTER
Called patient.  No improvement with second injection.  She is getting epidural for low back pain in one week.  It is possible that her ankle pain is from her lower back since both injections failed.  She will start with epidural and see if this improves ankle pain.  If no improvement she will return to clinic and reevaluation and may consider mri.

## 2017-07-12 ENCOUNTER — RADIANT APPOINTMENT (OUTPATIENT)
Dept: RADIOLOGY | Facility: CLINIC | Age: 82
End: 2017-07-12
Attending: PSYCHIATRY & NEUROLOGY

## 2017-07-12 ENCOUNTER — RADIOLOGY INJECTION OFFICE VISIT (OUTPATIENT)
Dept: PALLIATIVE MEDICINE | Facility: CLINIC | Age: 82
End: 2017-07-12
Payer: COMMERCIAL

## 2017-07-12 VITALS — DIASTOLIC BLOOD PRESSURE: 74 MMHG | HEART RATE: 74 BPM | OXYGEN SATURATION: 98 % | SYSTOLIC BLOOD PRESSURE: 139 MMHG

## 2017-07-12 DIAGNOSIS — M54.16 LUMBAR RADICULOPATHY: ICD-10-CM

## 2017-07-12 DIAGNOSIS — M47.819 FACET ARTHROPATHY: Primary | ICD-10-CM

## 2017-07-12 PROCEDURE — 64493 INJ PARAVERT F JNT L/S 1 LEV: CPT | Mod: 50 | Performed by: PSYCHIATRY & NEUROLOGY

## 2017-07-12 PROCEDURE — 64494 INJ PARAVERT F JNT L/S 2 LEV: CPT | Mod: 50 | Performed by: PSYCHIATRY & NEUROLOGY

## 2017-07-12 ASSESSMENT — PAIN SCALES - GENERAL
PAINLEVEL: WORST PAIN (10)
PAINLEVEL: NO PAIN (0)

## 2017-07-12 NOTE — PROGRESS NOTES
Pre procedure Diagnosis: Facet arthropathy   Post procedure Diagnosis: Same  Procedure performed:  Bilateral L4-L5, L5-SI facet injections   Anesthesia: none  Complications: none  Operators: Bushra Alicia MD, Rosa Mcfadden MD    Indications:   Tersea Lopez is a 81 year old female was sent by Demetrice Gutierrez NP for epidural steroid injection.  They have a history of lumbar spinal stenosis, facet arthropathy, DJD.  Exam shows paraspinal tenderness, pain with external rotation of lumbar spine and they have tried conservative treatment including PT, medical management.    MRI was done on 3/17/17 which showed   1. 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  2. Advanced multilevel degenerative disc disease, worst from L1-L2  through L3-L4.  3. Multilevel central stenosis, greatest at L4-L5, L3-L4, and L2-L3.  4. Multilevel foraminal stenosis, greatest on the left at L2-L3 and  L3-L4.    Options/alternatives, benefits and risks were discussed with the patient including bleeding, infection, no pain relief, tissue trauma, exposure to radiation, reaction to medications, spinal cord injury, dural puncture, weakness, numbness and headache.  Questions were answered to her satisfaction and she agrees to proceed. Voluntary informed consent was obtained and signed.     Vitals were reviewed: Yes  Allergies were reviewed:  Yes   Medications were reviewed:  Yes   Pre-procedure pain score: 10/10    Procedure:    Under AP fluoroscopic guidance the L4-L5, L5-S1 facet joints bilaterally were identified, and the C-arm was rotated obliquely to the affected side to open the joint space. A total of 1 ml of 1% lidocaine was injected at each of the needle entry points and needle tracts. Then a 25 gauge 5 inch quincke type spinal needle was inserted and advanced under fluoroscopic guidance targeting the superior articular pillar of each joint. Once  the needle made a contact with SAP, it was rotated and was then advanced into the joint.    AP fluoroscopic views were obtained to confirm the needle placement. Then,  Omnipaque 300 contrast dye was injected after negative aspiration for heme and CSF in each joint, confirming appropriate placement.  A total of 1.5ml of Omnipaque was used, and 8.5 was wasted.     The injection was then accomplished using a solution containing 3 ml of 0.5% bupivacaine mixed with 10 mg of dexamethasone, divided between the 4  joints. The needles were removed.    Hemostasis was achieved, the area was cleaned, and bandaids were placed when appropriate.  The patient tolerated the procedure well, and was taken to the recovery room.    Images were saved to PACS.    Post-procedure pain score: 0/10  Follow-up includes:   -f/u phone call in one week  -f/u with referring provider  -if lasting and helpful, could consider repeat injections (max of 3-4 steroid injections per year.). If helpful but not long acting, recommend moving to medial branch block and radiofrequency ablation process for longer-lasting relief.  Spoke with patient and her daughter about that today.    Bushra Alicia MD  [facility]

## 2017-07-12 NOTE — NURSING NOTE
"Chief Complaint   Patient presents with     Pain     Low back pain        Initial /74  Pulse 79 Estimated body mass index is 28.77 kg/(m^2) as calculated from the following:    Height as of 3/31/17: 1.553 m (5' 1.15\").    Weight as of 5/25/17: 69.4 kg (153 lb).  Medication Reconciliation: complete     Injection intake:    If this procedure is requiring IV sedation has patient been NPO for 6  Hours? NA    Is patient on coumadin, plavix or other prescribed blood thinner?   No    If patient is on coumadin was it held for 5 days?   NA    If patient is on plavix was it held for 7 days?    NA     Does patient take aspirin?  No    If this is for a cervical procedure and patient is on aspirin has it been held for 6 days?   NA    Any allergies to contrast dye, iodine, steroid and/or numbing medications?  NO    Is patient currently taking antibiotics or have an active infection?  NO    Does patient have a ? Yes       Is patient pregnant or breastfeeding?  NO    Are the vital signs normal?  Yes    An Menjivar MA      "

## 2017-07-12 NOTE — Clinical Note
If lasting and helpful, could consider repeat injections (max of 3-4 steroid injections per year.). If helpful but not long acting, recommend moving to medial branch block and radiofrequency ablation process for longer-lasting relief.  Spoke with patient and her daughter about that today.  Bushra Alicia MD [facility]

## 2017-07-12 NOTE — PATIENT INSTRUCTIONS
[facility]   Procedure Discharge Instructions    Today you saw: Dr. Leah Alicia     You had an: Bilateral facet joint injection     Medications used:  Lidocaine   Bupivacaine   Dexamethasone Omnipaque              Be cautious when walking. Numbness and/or weakness in the lower extremities may occur for up to 6-8 hours after the procedure due to effect of the local anesthetic    Do not drive for 6 hours. The effect of the local anesthetic could slow your reflexes.     You may resume your regular activities after 24 hours    Avoid strenuous activity for the first 24 hours    You may shower, however avoid swimming, tub baths or hot tubs for 24 hours following your procedure    You may have a mild to moderate increase in pain for several days following the injection.    It may take up to 14 days for the steroid medication to start working although you may feel the effect as early as a few days after the procedure.       You may use ice packs for 10-15 minutes, 3 to 4 times a day at the injection site for comfort    Do not use heat to painful areas for 6 to 8 hours. This will give the local anesthetic time to wear off and prevent you from accidentally burning your skin.     You may use anti-inflammatory medications (such as Ibuprofen or Aleve or Advil) or Tylenol for pain control if necessary    If you have diabetes, check your blood sugar more frequently than usual as your blood sugar may be higher than normal for 10-14 days following a steroid injection. Contact your doctor who manages your diabetes if your blood sugar is higher than usual    If you experience any of the following, call the pain center nursing line during work hours at [phone] or the after hours provider line at [phone]:  -Fever over 100 degree F  -Swelling, bleeding, redness, drainage, warmth at the injection site  -Progressive weakness or numbness in your legs  -Loss of bowel or bladder function  -Unusual new onset  of pain that is not improving      Phone #s:  Appointment line: [phone];  Nurse line: [phone]

## 2017-07-12 NOTE — MR AVS SNAPSHOT
After Visit Summary   7/12/2017    Teresa Lopez    MRN: [id number]           Patient Information     Date Of Birth          1935        Visit Information        Provider Department      7/12/2017 8:15 AM Leah Alicia MD [facility] [place]        Care Instructions    [facility]   Procedure Discharge Instructions    Today you saw: Dr. Leah Alicia     You had an: Bilateral facet joint injection     Medications used:  Lidocaine   Bupivacaine   Dexamethasone Omnipaque              Be cautious when walking. Numbness and/or weakness in the lower extremities may occur for up to 6-8 hours after the procedure due to effect of the local anesthetic    Do not drive for 6 hours. The effect of the local anesthetic could slow your reflexes.     You may resume your regular activities after 24 hours    Avoid strenuous activity for the first 24 hours    You may shower, however avoid swimming, tub baths or hot tubs for 24 hours following your procedure    You may have a mild to moderate increase in pain for several days following the injection.    It may take up to 14 days for the steroid medication to start working although you may feel the effect as early as a few days after the procedure.       You may use ice packs for 10-15 minutes, 3 to 4 times a day at the injection site for comfort    Do not use heat to painful areas for 6 to 8 hours. This will give the local anesthetic time to wear off and prevent you from accidentally burning your skin.     You may use anti-inflammatory medications (such as Ibuprofen or Aleve or Advil) or Tylenol for pain control if necessary    If you have diabetes, check your blood sugar more frequently than usual as your blood sugar may be higher than normal for 10-14 days following a steroid injection. Contact your doctor who manages your diabetes if your blood sugar is higher than usual    If you experience any of the following, call the pain  "center nursing line during work hours at [phone] or the after hours provider line at [phone]:  -Fever over 100 degree F  -Swelling, bleeding, redness, drainage, warmth at the injection site  -Progressive weakness or numbness in your legs  -Loss of bowel or bladder function  -Unusual new onset of pain that is not improving      Phone #s:  Appointment line: [phone];  Nurse line: [phone]              Follow-ups after your visit        Who to contact     If you have questions or need follow up information about today's clinic visit or your schedule please contact [facility] directly at [phone].  Normal or non-critical lab and imaging results will be communicated to you by [employer], letter or phone within 4 business days after the clinic has received the results. If you do not hear from us within 7 days, please contact the clinic through [employer] or phone. If you have a critical or abnormal lab result, we will notify you by phone as soon as possible.  Submit refill requests through [employer] or call your pharmacy and they will forward the refill request to us. Please allow 3 business days for your refill to be completed.          Additional Information About Your Visit        [employer] Information     [employer] lets you send messages to your doctor, view your test results, renew your prescriptions, schedule appointments and more. To sign up, go to [email or web address] . Click on \"Log in\" on the left side of the screen, which will take you to the Welcome page. Then click on \"Sign up Now\" on the right side of the page.     You will be asked to enter the access code listed below, as well as some personal information. Please follow the directions to create your username and password.     Your access code is: [email or web address]  Expires: 2017 10:56 AM     Your access code will  in 90 days. If you need help or a new code, please call your [facility] or [phone].        Care " EveryWhere ID     This is your Care EveryWhere ID. This could be used by other organizations to access your [place] medical records  [id number]        Your Vitals Were     Pulse                   79            Blood Pressure from Last 3 Encounters:   07/12/17 121/74   05/25/17 117/78   05/19/17 140/64    Weight from Last 3 Encounters:   05/25/17 69.4 kg (153 lb)   05/19/17 66.2 kg (146 lb)   04/28/17 67.1 kg (148 lb)              Today, you had the following     No orders found for display       Primary Care Provider Office Phone # Fax #    Demetrice Gutierrez [facility] [phone]       [facility] [address] [facility] [address]        Equal Access to Services     ALTHEA MAYS : Hadii aad ku hadasho Soomaali, waaxda luqadaha, qaybta kaalmada adeegyada, waxay idiin hayaajohn mccracken . So [facility] [phone].    ATENCIÓN: Si habla español, tiene a brambila disposición servicios gratuitos de asistencia lingüística. Llame al [phone].    We comply with applicable federal civil rights laws and [place] laws. We do not discriminate on the basis of race, color, national origin, age, disability sex, sexual orientation or gender identity.            Thank you!     Thank you for choosing [facility]  for your care. Our goal is always to provide you with excellent care. Hearing back from our patients is one way we can continue to improve our services. Please take a few minutes to complete the written survey that you may receive in the mail after your visit with us. Thank you!             Your Updated Medication List - Protect others around you: Learn how to safely use, store and throw away your medicines at www.disposemymeds.org.          This list is accurate as of: 7/12/17  8:47 AM.  Always use your most recent med list.                   Brand Name Dispense Instructions for use Diagnosis    ADVIL 200 MG capsule   Generic drug:  ibuprofen       Reported on 4/20/2017        amLODIPine 2.5 MG tablet    NORVASC    90 tablet    Take 1 tablet (2.5 mg) by mouth daily 1 po at bedtime    Hypertension goal BP (blood pressure) < 140/90       aspirin 162 MG EC tablet      Take 162 mg by mouth daily Reported on 4/20/2017        CALCIUM 1200 PO      Reported on 4/20/2017        CLARITIN 10 MG capsule   Generic drug:  loratadine      Take 10 mg by mouth as needed Reported on 4/20/2017        gabapentin 300 MG capsule    NEURONTIN    270 capsule    Take 1 capsule (300 mg) by mouth 3 times daily    DJD (degenerative joint disease), lumbosacral, Spinal stenosis of lumbar region with neurogenic claudication       GLUCOSAMINE 1500 COMPLEX PO      Take 1,500 mg by mouth daily Reported on 4/20/2017        losartan-hydrochlorothiazide 100-25 MG per tablet    HYZAAR    90 tablet    Take 1 tablet by mouth daily    Hypertension goal BP (blood pressure) < 140/90       Multi-vitamin Tabs tablet   Generic drug:  multivitamin, therapeutic with minerals      1 TABLET DAILY        oxyCODONE-acetaminophen 5-325 MG per tablet    PERCOCET    90 tablet    Take 1-2 tablets by mouth every 8 hours as needed for moderate to severe pain (No more than 3 tablets/day)    Arthritis, lumbar spine (H), Chronic pain disorder, Spinal stenosis of lumbar region with neurogenic claudication       predniSONE 1 MG tablet    DELTASONE    210 tablet    Take 4 mg (4 tablets) daily for 3 weeks. Then take 3 mg (3 tablets) daily for 3 weeks. Then 2 mg (2 tablets) daily for 3 weeks. Then 1 mg (1 tablet) daily for 3 weeks. Then 1 tablet every other day for 3 weeks.    DJD (degenerative joint disease), lumbosacral       PRILOSEC PO      None Entered        SALMON OIL-1000 PO      take 1,000 Caps by mouth daily.        senna-docusate 8.6-50 MG per tablet    SENOKOT-S;PERICOLACE    120 tablet    Take 1 tablet by mouth 2 times daily    Slow transit constipation       VITAMIN C PO      None Entered        VITAMIN D PO       Take 1,200 mg by mouth daily.        vitamin E 400 UNIT capsule      None Entered

## 2017-07-12 NOTE — NURSING NOTE
Discharge Information    IV Discontiued Time:  NA    Amount of Fluid Infused:  NA    Discharge Criteria = When patient returns to baseline or as per MD order    Consciousness:  Pt is fully awake    Circulation:  BP +/- 20% of pre-procedure level    Respiration:  Patient is able to breathe deeply    O2 Sat:  Patient is able to maintain O2 Sat >92% on room air    Activity:  Moves 4 extremities on command    Ambulation:  Patient is able to stand and walk or stand and pivot into wheelchair    Dressing:  Clean/dry or No Dressing    Notes:   Discharge instructions and AVS given to patient    Patient meets criteria for discharge?  YES    Admitted to PCU?  No    Responsible adult present to accompany patient home?  Yes    Signature/Title:    Tonya Umaña RN Care Coordinator  [facility]

## 2017-07-19 ENCOUNTER — TELEPHONE (OUTPATIENT)
Dept: RADIOLOGY | Facility: CLINIC | Age: 82
End: 2017-07-19

## 2017-07-19 NOTE — TELEPHONE ENCOUNTER
Patient had a Bilateral L4-L5, L5-SI facet injections on 7/12/17.  Called patient for an update.      Left message that we were calling for an update about how s/he was doing after the injection.  LM that if s/he has any problems or questions to call the nurse line at [phone].

## 2017-07-28 ENCOUNTER — TELEPHONE (OUTPATIENT)
Dept: PALLIATIVE MEDICINE | Facility: CLINIC | Age: 82
End: 2017-07-28

## 2017-07-28 ENCOUNTER — TELEPHONE (OUTPATIENT)
Dept: FAMILY MEDICINE | Facility: CLINIC | Age: 82
End: 2017-07-28

## 2017-07-28 DIAGNOSIS — M47.817 DJD (DEGENERATIVE JOINT DISEASE), LUMBOSACRAL: ICD-10-CM

## 2017-07-28 DIAGNOSIS — M48.062 SPINAL STENOSIS OF LUMBAR REGION WITH NEUROGENIC CLAUDICATION: Primary | ICD-10-CM

## 2017-07-28 NOTE — TELEPHONE ENCOUNTER
Reason for call:  Patient reporting a symptom    Symptom or request: Back pain    Duration (how long have symptoms been present): ongoing  Have you been treated for this before? Yes    Additional comments: Patient had back injections, its been 14 days and patient states she feels worse, they didn't help at all. She feels like she can hardly walk without pain. She is seeing you on Monday but would like a call from a RN today.    Phone Number patient can be reached at:  Home number on file [phone] (home)    Best Time:  today    Can we leave a detailed message on this number:  YES    Call taken on 7/28/2017 at 10:17 AM by Sejal Bear

## 2017-07-28 NOTE — LETTER
August 4, 2017    Teresa Lopez  [address] [facility] [address]    Dear Teresa,                                                                 Welcome to the [facility].  We are located on the 2nd floor ([address]) of the [facility], located at [address] [place] [address] [place] [address].    Your appointment at the [facility] has been scheduled on October 27th at 8:00am with Marion Munoz NP.    At your first visit, you will meet your team of caregivers who will help you to develop pain management strategies that will last a lifetime. You will meet with our support staff to review your insurance information, and collect your co-payment if required by your insurance company. You will also meet with a medical pain specialist and care coordinator who will assess your pain and develop a plan of care for your successful pain rehabilitation. You should expect to spend 1-2 hours at your first visit with us. Usually, patients work with us for a period of 6-12 months, and eventually return to their primary doctor once their pain management has stabilized.      To help us make your visit go as smoothly as possible, please bring the following items with you on your visit:     Completed Pain Questionnaire enclosed in this packet.  If you do not bring the completed questionnaire, we may have to reschedule your appointment.  List of any medicines that you are currently taking or have been prescribed  Important NON-[facility] medical information such as medical records or tests results (X-rays, or laboratory tests)  Your health insurance card  Financial resources to cover your co-payment or balance due at the time of service (cash, personal check, Visa, and MasterCard are acceptable methods of payment)     Due to the demand for new patient evaluations, you must notify the scheduling department 48 hours in advance if you are not able to keep this appointment.  Failure to do so could affect your ability to reschedule with our clinic. Please be aware that we will not prescribe any medications at your first visit.     Please call [phone] with any questions regarding your appointment. We look forward to meeting you and working to address your health care needs.     Sincerely,    [facility]

## 2017-07-28 NOTE — TELEPHONE ENCOUNTER
Left VM for patient to schedule a new evaluation.          Stacy THORNTON    [facility]

## 2017-07-28 NOTE — TELEPHONE ENCOUNTER
"She has an appt for 940am Monday.      She had back shots 10 days in lumbar region, they had seemed to help for a little bit.  She thinks she needs to go for another session.  The pain seems to have moved to  up above where they injected.  She's still in pain.  This arthritis or whatever she's got is giving her so much trouble, all she can do is sit and cry.    The new med - gabapentin - is not helping.  She's been taking Tylenol alternating with Gabapentin.  This doesn't help.    She had trouble with her ankle and had 2 shots in her ankle.  Talked to the provider after that, seems to think there's nothing else they can do.  She wonders if we have a boot we can put on her, a \"prettier\" one than the black ones she's found on-line.  RN advised she should contact her insurance company to see where she can get this filled.  She wonders if there are any \"pretty\" boots at the clinic.    Previous provider had her on oxycodone and prednisone at the same time.  She states when she stopped this, she went into a \"tail-spin\" for 9 days.  She thinks this was withdrawal.  She states this did not help her either, so she does not want to start back on this.    She thinks maybe she should go to the ER or a pain clinic.  She's in pain from head to toe.  It's now in her shoulders and neck area.    She reports she can't even walk her block anymore.    She thinks that her arthritis should \"be taken out of her\" or the sharpness out of her pain.  She keeps repeating that there's got to be something else she can do.      Routed to PCP.    Shelby Chatterjee RN  [facility]    "

## 2017-07-28 NOTE — TELEPHONE ENCOUNTER
"I attempted to call patient. Left voicemail for her to return call to clinic if unable to wait for appointment next week  How is she taking the gabapentin? Is she still taking oxycodone? How frequently? We can increase the oxycodone but I need to know what she is currently taking to treat her pain  I do not know of any boots that are \"prettier\". She could check at a medical supply store.   I put in a referral for her to see our pain clinic. She has been there for injections, but I think she her pain should be managed specifically by the pain team.   She will need to call to schedule. It maybe be 1-2 months until she can be seen. Still should follow up with me as planned next week     "

## 2017-07-31 ENCOUNTER — OFFICE VISIT (OUTPATIENT)
Dept: FAMILY MEDICINE | Facility: CLINIC | Age: 82
End: 2017-07-31
Payer: COMMERCIAL

## 2017-07-31 VITALS
DIASTOLIC BLOOD PRESSURE: 79 MMHG | SYSTOLIC BLOOD PRESSURE: 132 MMHG | TEMPERATURE: 97.6 F | HEIGHT: 62 IN | HEART RATE: 78 BPM | WEIGHT: 156 LBS | BODY MASS INDEX: 28.71 KG/M2

## 2017-07-31 DIAGNOSIS — M47.817 DJD (DEGENERATIVE JOINT DISEASE), LUMBOSACRAL: Primary | ICD-10-CM

## 2017-07-31 DIAGNOSIS — M48.062 SPINAL STENOSIS OF LUMBAR REGION WITH NEUROGENIC CLAUDICATION: ICD-10-CM

## 2017-07-31 DIAGNOSIS — F03.90 DEMENTIA WITHOUT BEHAVIORAL DISTURBANCE, UNSPECIFIED DEMENTIA TYPE: ICD-10-CM

## 2017-07-31 PROCEDURE — 99214 OFFICE O/P EST MOD 30 MIN: CPT | Performed by: NURSE PRACTITIONER

## 2017-07-31 RX ORDER — OXYCODONE AND ACETAMINOPHEN 5; 325 MG/1; MG/1
1-2 TABLET ORAL EVERY 8 HOURS PRN
Qty: 120 TABLET | Refills: 0 | Status: SHIPPED | OUTPATIENT
Start: 2017-07-31 | End: 2017-09-07

## 2017-07-31 NOTE — PROGRESS NOTES
"  SUBJECTIVE:                                                    Teresa Lopez is a 81 year old female who presents to clinic today for the following health issues:      Back Pain Follow Up    I put in a referral for pain management by our pain team  She received a call on 7/28/17 to schedule and she has not returned their call  For pain she is currently on gabapentin 300 mg TID  She takes tylenol (not consistently) in between gabapentin. Doesn't know dose  Ran out of percocet. Did not request refill    She complains of midline low back pain  Does not radiate  Denies sciatica   \"I got rid of that years ago\"  (Though previous notes from me and pain consult 3/1/17 reference patient complaint of pain radiating down left leg)    History is inconsistent  She lives independently in a home  Reports her children are helping her look into assisted living  She is still driving  She denies any changes in memory, though vaguely reports her children have expressed concern    Problem list and histories reviewed & adjusted, as indicated.  Additional history: none    Patient Active Problem List   Diagnosis     Cerebral infarction (H)     Jaw Pain     Arthritis of knee     CARDIOVASCULAR SCREENING; LDL GOAL LESS THAN 100     Hypertension goal BP (blood pressure) < 140/90     Advanced directives, counseling/discussion     Hyperlipidemia LDL goal <130     OA (OSTEOARTHRITIS) OF KNEE - right     CKD (chronic kidney disease) stage 3, GFR 30-59 ml/min     Cataracts, both eyes     PVD (posterior vitreous detachment), both eyes     Chronic pain disorder     DJD (degenerative joint disease), lumbosacral     Spinal stenosis of lumbar region with neurogenic claudication     Slow transit constipation     Steroid-induced diabetes mellitus (H)     Dementia without behavioral disturbance, unspecified dementia type     Past Surgical History:   Procedure Laterality Date     HYSTERECTOMY, CERVIX STATUS UNKNOWN  age 31       Social History "   Substance Use Topics     Smoking status: Never Smoker     Smokeless tobacco: Never Used     Alcohol use 0.0 oz/week     0 Standard drinks or equivalent per week      Comment: 1 drink weekly     Family History   Problem Relation Age of Onset     Arthritis Mother      Unknown/Adopted Father      adopted     DIABETES Brother      CANCER Brother      Hypertension No family hx of      CEREBROVASCULAR DISEASE No family hx of      Thyroid Disease No family hx of      Glaucoma No family hx of      Macular Degeneration No family hx of          Current Outpatient Prescriptions   Medication Sig Dispense Refill     oxyCODONE-acetaminophen (PERCOCET) 5-325 MG per tablet Take 1-2 tablets by mouth every 8 hours as needed for pain maximum 4 tablet(s) per day 120 tablet 0     gabapentin (NEURONTIN) 300 MG capsule Take 1 capsule (300 mg) by mouth 3 times daily 270 capsule 3     losartan-hydrochlorothiazide (HYZAAR) 100-25 MG per tablet Take 1 tablet by mouth daily 90 tablet 3     amLODIPine (NORVASC) 2.5 MG tablet Take 1 tablet (2.5 mg) by mouth daily 1 po at bedtime 90 tablet 3     senna-docusate (SENOKOT-S;PERICOLACE) 8.6-50 MG per tablet Take 1 tablet by mouth 2 times daily 120 tablet 12     aspirin 162 MG EC tablet Take 162 mg by mouth daily Reported on 4/20/2017       Glucosamine-Chondroit-Vit C-Mn (GLUCOSAMINE 1500 COMPLEX PO) Take 1,500 mg by mouth daily Reported on 4/20/2017       Loratadine (CLARITIN) 10 MG capsule Take 10 mg by mouth as needed Reported on 4/20/2017       Omega-3 Fatty Acids (SALMON OIL-1000 PO) take 1,000 Caps by mouth daily.       ADVIL 200 MG PO CAPS Reported on 4/20/2017       MULTI-VITAMIN OR TABS 1 TABLET DAILY       CALCIUM 1200 OR Reported on 4/20/2017       VITAMIN C OR None Entered       VITAMIN D OR Take 1,200 mg by mouth daily.       VITAMIN E 400 UNIT OR CAPS None Entered       PRILOSEC OR None Entered           Reviewed and updated as needed this visit by clinical staffTobacco  Allergies   "Meds  Med Hx  Surg Hx  Fam Hx  Soc Hx      Reviewed and updated as needed this visit by Provider         ROS:  Noncontributory except as above    OBJECTIVE:     /79 (BP Location: Right arm, Patient Position: Chair, Cuff Size: Adult Regular)  Pulse 78  Temp 97.6  F (36.4  C) (Oral)  Ht 5' 1.5\" (1.562 m)  Wt 156 lb (70.8 kg)  BMI 29 kg/m2  Body mass index is 29 kg/(m^2).  GENERAL: healthy, alert and no distress  RESP: lungs clear to auscultation - no rales, rhonchi or wheezes  CV: regular rate and rhythm, normal S1 S2, no S3 or S4, no murmur, click or rub, no peripheral edema and peripheral pulses strong  MS: gait steady with use of assistive device, and age appropriate  NEURO: Normal strength and tone, mentation intact and speech normal, CN II-XII intact  PSYCH: affect normal/bright and insight impaired, poor historian    Diagnostic Test Results:  none     ASSESSMENT/PLAN:       ICD-10-CM    1. DJD (degenerative joint disease), lumbosacral M51.37 oxyCODONE-acetaminophen (PERCOCET) 5-325 MG per tablet   2. Spinal stenosis of lumbar region with neurogenic claudication M48.06 oxyCODONE-acetaminophen (PERCOCET) 5-325 MG per tablet   3. Dementia without behavioral disturbance, unspecified dementia type F03.90      She was previously on chronic prednisone and 8 percocet/day when I met her  I had her consult with our pain team in hopes of weaning her down from these medications  After her first injection she reported complete resolution of pain and abruptly stopped the above medications  She then developed withdrawal symptoms and I attempted to do a gradual wean off of the prednisone (which has been completed) and recommended restarting percocet at lower dose  I worry that her ability to manage her pain and arrange care with different care teams is effected by her cognitive impairment  She denied any difficulties with her memory and provides a story that is much different that what I remember and what is " documented. I did a SLUMS test and she scored 17/30 which is consistent with dementia.  Because her pain is so poorly controlled I would like for her to be followed by our pain team until we can get her on appropriate pain medications  She should also follow up with me, with family present, to discuss dementia, possible treatment options  She should not be driving. I did advise against this and will need to discuss it again at follow up      Patient Instructions   Please call the pain team at [phone] to schedule an appointment. Because your pain is so severe and poorly controlled, I think you should be followed by the pain.    You can take 1-2 Percocet every 8 hours as needed for pain that is not relieved with tylenol alone. There is tylenol (acetaminophen) in the Percocet. Do not take more 3,000 mg in 1 day    I have some concerns about your memory. Can you schedule a follow up appointment with me and bring a family member with you?      MYRON Bruce [facility]

## 2017-07-31 NOTE — PATIENT INSTRUCTIONS
Please call the pain team at [phone] to schedule an appointment. Because your pain is so severe and poorly controlled, I think you should be followed by the pain.    You can take 1-2 Percocet every 8 hours as needed for pain that is not relieved with tylenol alone. There is tylenol (acetaminophen) in the Percocet. Do not take more 3,000 mg in 1 day    I have some concerns about your memory. Can you schedule a follow up appointment with me and bring a family member with you?

## 2017-07-31 NOTE — MR AVS SNAPSHOT
After Visit Summary   7/31/2017    Teresa Lopez    MRN: [id number]           Patient Information     Date Of Birth          1935        Visit Information        Provider Department      7/31/2017 9:40 AM Demetrice Gutierrez APRN CNP [facility]        Today's Diagnoses     DJD (degenerative joint disease), lumbosacral    -  1    Spinal stenosis of lumbar region with neurogenic claudication          Care Instructions    Please call the pain team at [phone] to schedule an appointment. Because your pain is so severe and poorly controlled, I think you should be followed by the pain.    You can take 1-2 Percocet every 8 hours as needed for pain that is not relieved with tylenol alone. There is tylenol (acetaminophen) in the Percocet. Do not take more 3,000 mg in 1 day    I have some concerns about your memory. Can you schedule a follow up appointment with me and bring a family member with you?          Follow-ups after your visit        Who to contact     If you have questions or need follow up information about today's clinic visit or your schedule please contact [facility] directly at [phone].  Normal or non-critical lab and imaging results will be communicated to you by MyChart, letter or phone within 4 business days after the clinic has received the results. If you do not hear from us within 7 days, please contact the clinic through [employer] or phone. If you have a critical or abnormal lab result, we will notify you by phone as soon as possible.  Submit refill requests through [employer] or call your pharmacy and they will forward the refill request to us. Please allow 3 business days for your refill to be completed.          Additional Information About Your Visit        MyChart Information     [employer] gives you secure access to your electronic health record. If you see a primary care provider, you can also send messages to your care team  "and make appointments. If you have questions, please call your primary care clinic.  If you do not have a primary care provider, please call [phone] and they will assist you.        Care EveryWhere ID     This is your Care EveryWhere ID. This could be used by other organizations to access your [facility] medical records  [id number]        Your Vitals Were     Pulse Temperature Height BMI (Body Mass Index)          78 97.6  F (36.4  C) (Oral) 5' 1.5\" (1.562 m) 29 kg/m2         Blood Pressure from Last 3 Encounters:   07/31/17 132/79   07/12/17 139/74   05/25/17 117/78    Weight from Last 3 Encounters:   07/31/17 156 lb (70.8 kg)   05/25/17 153 lb (69.4 kg)   05/19/17 146 lb (66.2 kg)              Today, you had the following     No orders found for display         Today's Medication Changes          These changes are accurate as of: 7/31/17 10:31 AM.  If you have any questions, ask your nurse or doctor.               Start taking these medicines.        Dose/Directions    oxyCODONE-acetaminophen 5-325 MG per tablet   Commonly known as:  PERCOCET   Used for:  DJD (degenerative joint disease), lumbosacral, Spinal stenosis of lumbar region with neurogenic claudication   Started by:  Demetrice Gutierrez APRN CNP        Dose:  1-2 tablet   Take 1-2 tablets by mouth every 8 hours as needed for pain maximum 4 tablet(s) per day   Quantity:  120 tablet   Refills:  0            Where to get your medicines      Some of these will need a paper prescription and others can be bought over the counter.  Ask your nurse if you have questions.     Bring a paper prescription for each of these medications     oxyCODONE-acetaminophen 5-325 MG per tablet                Primary Care Provider Office Phone # Fax #    MYRON Bose [phone]       [facility] [address]  [facility] [address]        Equal Access to Services     ALTHEA MAYS AH: Nani garcia " Santamadelin, maryda luqadaha, qayoselinta kaalmada nory, lori cabral enriquelorena seamanjohn polly. So [facility] [phone].    ATENCIÓN: Si addis matthew, tiene a brambila disposición servicios gratuitos de asistencia lingüística. Danuta al [phone].    We comply with applicable federal civil rights laws and [place] laws. We do not discriminate on the basis of race, color, national origin, age, disability sex, sexual orientation or gender identity.            Thank you!     Thank you for choosing [facility]  for your care. Our goal is always to provide you with excellent care. Hearing back from our patients is one way we can continue to improve our services. Please take a few minutes to complete the written survey that you may receive in the mail after your visit with us. Thank you!             Your Updated Medication List - Protect others around you: Learn how to safely use, store and throw away your medicines at www.disposemymeds.org.          This list is accurate as of: 7/31/17 10:31 AM.  Always use your most recent med list.                   Brand Name Dispense Instructions for use Diagnosis    ADVIL 200 MG capsule   Generic drug:  ibuprofen      Reported on 4/20/2017        amLODIPine 2.5 MG tablet    NORVASC    90 tablet    Take 1 tablet (2.5 mg) by mouth daily 1 po at bedtime    Hypertension goal BP (blood pressure) < 140/90       aspirin 162 MG EC tablet      Take 162 mg by mouth daily Reported on 4/20/2017        CALCIUM 1200 PO      Reported on 4/20/2017        CLARITIN 10 MG capsule   Generic drug:  loratadine      Take 10 mg by mouth as needed Reported on 4/20/2017        gabapentin 300 MG capsule    NEURONTIN    270 capsule    Take 1 capsule (300 mg) by mouth 3 times daily    DJD (degenerative joint disease), lumbosacral, Spinal stenosis of lumbar region with neurogenic claudication       GLUCOSAMINE 1500 COMPLEX PO      Take 1,500 mg by mouth daily Reported on 4/20/2017         losartan-hydrochlorothiazide 100-25 MG per tablet    HYZAAR    90 tablet    Take 1 tablet by mouth daily    Hypertension goal BP (blood pressure) < 140/90       Multi-vitamin Tabs tablet   Generic drug:  multivitamin, therapeutic with minerals      1 TABLET DAILY        oxyCODONE-acetaminophen 5-325 MG per tablet    PERCOCET    120 tablet    Take 1-2 tablets by mouth every 8 hours as needed for pain maximum 4 tablet(s) per day    DJD (degenerative joint disease), lumbosacral, Spinal stenosis of lumbar region with neurogenic claudication       PRILOSEC PO      None Entered        SALMON OIL-1000 PO      take 1,000 Caps by mouth daily.        senna-docusate 8.6-50 MG per tablet    SENOKOT-S;PERICOLACE    120 tablet    Take 1 tablet by mouth 2 times daily    Slow transit constipation       VITAMIN C PO      None Entered        VITAMIN D PO      Take 1,200 mg by mouth daily.        vitamin E 400 UNIT capsule      None Entered

## 2017-07-31 NOTE — NURSING NOTE
"Chief Complaint   Patient presents with     Chronic Disease Management     Pain Mgmt       Initial /79 (BP Location: Right arm, Patient Position: Chair, Cuff Size: Adult Regular)  Pulse 78  Temp 97.6  F (36.4  C) (Oral)  Ht 5' 1.5\" (1.562 m)  Wt 156 lb (70.8 kg)  BMI 29 kg/m2 Estimated body mass index is 29 kg/(m^2) as calculated from the following:    Height as of this encounter: 5' 1.5\" (1.562 m).    Weight as of this encounter: 156 lb (70.8 kg).  Medication Reconciliation: complete    "

## 2017-08-25 ENCOUNTER — OFFICE VISIT (OUTPATIENT)
Dept: OPTOMETRY | Facility: CLINIC | Age: 82
End: 2017-08-25
Payer: COMMERCIAL

## 2017-08-25 DIAGNOSIS — H43.813 PVD (POSTERIOR VITREOUS DETACHMENT), BOTH EYES: ICD-10-CM

## 2017-08-25 DIAGNOSIS — H52.4 HYPEROPIA OF BOTH EYES WITH ASTIGMATISM AND PRESBYOPIA: Primary | ICD-10-CM

## 2017-08-25 DIAGNOSIS — H25.813 COMBINED FORMS OF AGE-RELATED CATARACT OF BOTH EYES: ICD-10-CM

## 2017-08-25 DIAGNOSIS — H52.203 HYPEROPIA OF BOTH EYES WITH ASTIGMATISM AND PRESBYOPIA: Primary | ICD-10-CM

## 2017-08-25 DIAGNOSIS — H52.03 HYPEROPIA OF BOTH EYES WITH ASTIGMATISM AND PRESBYOPIA: Primary | ICD-10-CM

## 2017-08-25 PROCEDURE — 92015 DETERMINE REFRACTIVE STATE: CPT | Performed by: OPTOMETRIST

## 2017-08-25 PROCEDURE — 92014 COMPRE OPH EXAM EST PT 1/>: CPT | Performed by: OPTOMETRIST

## 2017-08-25 ASSESSMENT — REFRACTION_WEARINGRX
OD_AXIS: 135
OD_ADD: +3.00
OD_SPHERE: +2.00
OS_ADD: +3.00
OS_AXIS: 045
OD_CYLINDER: +0.25
OS_CYLINDER: +0.50
OS_SPHERE: +1.00

## 2017-08-25 ASSESSMENT — CONF VISUAL FIELD
OD_NORMAL: 1
OS_NORMAL: 1

## 2017-08-25 ASSESSMENT — VISUAL ACUITY
CORRECTION_TYPE: GLASSES
OS_SC: 20/70 DOUBLE
OS_CC: 20/60
OD_SC: 20/120
METHOD: SNELLEN - LINEAR
OS_CC: 20/30
OD_CC: 20/40
OS_SC: 20/120
OD_CC: 20/60
OS_CC+: -2
OD_SC: 20/100

## 2017-08-25 ASSESSMENT — TONOMETRY
IOP_METHOD: APPLANATION
OS_IOP_MMHG: 14
OD_IOP_MMHG: 14

## 2017-08-25 ASSESSMENT — EXTERNAL EXAM - LEFT EYE: OS_EXAM: NORMAL

## 2017-08-25 ASSESSMENT — REFRACTION_MANIFEST
OD_AXIS: 135
OD_CYLINDER: +0.25
OS_ADD: +2.75
OS_AXIS: 035
OS_SPHERE: +1.25
OD_ADD: +2.75
OS_CYLINDER: +0.75
OD_SPHERE: +2.00

## 2017-08-25 ASSESSMENT — EXTERNAL EXAM - RIGHT EYE: OD_EXAM: NORMAL

## 2017-08-25 ASSESSMENT — CUP TO DISC RATIO
OS_RATIO: 0.2
OD_RATIO: 0.2

## 2017-08-25 ASSESSMENT — SLIT LAMP EXAM - LIDS
COMMENTS: NORMAL
COMMENTS: NORMAL

## 2017-08-25 NOTE — MR AVS SNAPSHOT
After Visit Summary   8/25/2017    Teresa Lopez    MRN: [id number]           Patient Information     Date Of Birth          1935        Visit Information        Provider Department      8/25/2017 10:30 AM Michelle Baca OD [facility]        Today's Diagnoses     Hyperopia of both eyes with astigmatism and presbyopia    -  1    Combined forms of age-related cataract of both eyes        PVD (posterior vitreous detachment), both eyes          Care Instructions        A final glasses prescription was given.  No change necessary    Referral to ophthalmology for cataract evaluation    A posterior vitreous detachment is nothing to worry about.  You have a jelly like substance that fills the inside of the eye, as you get older, that gel shrinks a little and when it shrinks, it pulls away from the back of the eye.  When it pulls away you can see a floater, as time goes on, the floater may shrink down out of your line of sight and you won't notice it so often.  There is a little greater risk of developing a retinal detachment since there's nothing pressing against the retina, so if you start to notice flashes of light or sudden vision changes, return to the clinic as soon as possible, otherwise return as needed.    Return to clinic 1 year for Comprehensive Vision Exam      Michelle Baca O.D  [facility]  [address] [place] [address]. [place]  [address]    [phone]                  Follow-ups after your visit        Additional Services     OPHTHALMOLOGY ADULT REFERRAL       Your provider has referred you to:  [facility]: [facility] [phone]   [email or web address]      Please be aware that coverage of these services is subject to the terms and limitations of your health insurance plan.  Call member services at your health plan with any benefit or coverage questions.      Please bring the following to your appointment:  >>   Any  x-rays, CTs or MRIs which have been performed.  Contact the facility where they were done to arrange for  prior to your scheduled appointment.  Any new CT, MRI or other procedures ordered by your specialist must be performed at a [facility] facility or coordinated by your clinic's referral office.    >>   List of current medications   >>   This referral request   >>   Any documents/labs given to you for this referral                  Follow-up notes from your care team     Return in about 1 year (around 8/25/2018) for Eye Exam.      Your next 10 appointments already scheduled     Oct 27, 2017  8:00 AM CDT   New Visit with MYRON Ahumada CNP   [facility])    [address] [facility] [address] [phone]              Who to contact     If you have questions or need follow up information about today's clinic visit or your schedule please contact [facility] directly at [phone].  Normal or non-critical lab and imaging results will be communicated to you by [employer], letter or phone within 4 business days after the clinic has received the results. If you do not hear from us within 7 days, please contact the clinic through [employer] or phone. If you have a critical or abnormal lab result, we will notify you by phone as soon as possible.  Submit refill requests through [employer] or call your pharmacy and they will forward the refill request to us. Please allow 3 business days for your refill to be completed.          Additional Information About Your Visit        [employer] Information     [employer] gives you secure access to your electronic health record. If you see a primary care provider, you can also send messages to your care team and make appointments. If you have questions, please call your primary care clinic.  If you do not have a primary care provider, please call [phone] and they will assist you.        Care EveryWhere  ID     This is your Care EveryWhere ID. This could be used by other organizations to access your [facility] medical records  [id number]         Blood Pressure from Last 3 Encounters:   07/31/17 132/79   07/12/17 139/74   05/25/17 117/78    Weight from Last 3 Encounters:   07/31/17 70.8 kg (156 lb)   05/25/17 69.4 kg (153 lb)   05/19/17 66.2 kg (146 lb)              We Performed the Following     EYE EXAM (SIMPLE-NONBILLABLE)     OPHTHALMOLOGY ADULT REFERRAL     REFRACTION        Primary Care Provider Office Phone # Fax #    Demetrice Gutierrez, MYRON [phone]       [address] [facility] [address]        Equal Access to Services     ALTHEA MAYS : Hadii wilfrid ku hadasho Soomaali, waaxda luqadaha, qaybta kaalmada adeegyada, waxaelm victoriain maria elena mccracken . So [facility] [phone].    ATENCIÓN: Si habla español, tiene a brambila disposición servicios gratuitos de asistencia lingüística. Llame al [phone].    We comply with applicable federal civil rights laws and [place] laws. We do not discriminate on the basis of race, color, national origin, age, disability sex, sexual orientation or gender identity.            Thank you!     Thank you for choosing [facility]  for your care. Our goal is always to provide you with excellent care. Hearing back from our patients is one way we can continue to improve our services. Please take a few minutes to complete the written survey that you may receive in the mail after your visit with us. Thank you!             Your Updated Medication List - Protect others around you: Learn how to safely use, store and throw away your medicines at www.disposemymeds.org.          This list is accurate as of: 8/25/17 11:39 AM.  Always use your most recent med list.                   Brand Name Dispense Instructions for use Diagnosis    ADVIL 200 MG capsule   Generic drug:  ibuprofen      Reported on 4/20/2017        amLODIPine 2.5 MG tablet     NORVASC    90 tablet    Take 1 tablet (2.5 mg) by mouth daily 1 po at bedtime    Hypertension goal BP (blood pressure) < 140/90       aspirin 162 MG EC tablet      Take 162 mg by mouth daily Reported on 4/20/2017        CALCIUM 1200 PO      Reported on 4/20/2017        CLARITIN 10 MG capsule   Generic drug:  loratadine      Take 10 mg by mouth as needed Reported on 4/20/2017        gabapentin 300 MG capsule    NEURONTIN    270 capsule    Take 1 capsule (300 mg) by mouth 3 times daily    DJD (degenerative joint disease), lumbosacral, Spinal stenosis of lumbar region with neurogenic claudication       GLUCOSAMINE 1500 COMPLEX PO      Take 1,500 mg by mouth daily Reported on 4/20/2017        losartan-hydrochlorothiazide 100-25 MG per tablet    HYZAAR    90 tablet    Take 1 tablet by mouth daily    Hypertension goal BP (blood pressure) < 140/90       Multi-vitamin Tabs tablet   Generic drug:  multivitamin, therapeutic with minerals      1 TABLET DAILY        oxyCODONE-acetaminophen 5-325 MG per tablet    PERCOCET    120 tablet    Take 1-2 tablets by mouth every 8 hours as needed for pain maximum 4 tablet(s) per day    DJD (degenerative joint disease), lumbosacral, Spinal stenosis of lumbar region with neurogenic claudication       PRILOSEC PO      None Entered        SALMON OIL-1000 PO      take 1,000 Caps by mouth daily.        senna-docusate 8.6-50 MG per tablet    SENOKOT-S;PERICOLACE    120 tablet    Take 1 tablet by mouth 2 times daily    Slow transit constipation       VITAMIN C PO      None Entered        VITAMIN D PO      Take 1,200 mg by mouth daily.        vitamin E 400 UNIT capsule      None Entered

## 2017-08-25 NOTE — Clinical Note
Mahin Fisher, I'm referring this patient for a cataract evaluation.  She's very frustrated with her vision.  She used to love to sew and can't see to thread the needle or sew anymore.  Also had difficulty seeing road signs.  The cataracts didn't seem too bad at this point but everything else seemed fine.  Best corrected vision in the right eye was 20/40-1 and in the left eye was 20/30-1. She is setting up the appointment now, so I don't know the date yet. Thanks, Michelle

## 2017-08-25 NOTE — PATIENT INSTRUCTIONS
A final glasses prescription was given.  No change necessary    Referral to ophthalmology for cataract evaluation    A posterior vitreous detachment is nothing to worry about.  You have a jelly like substance that fills the inside of the eye, as you get older, that gel shrinks a little and when it shrinks, it pulls away from the back of the eye.  When it pulls away you can see a floater, as time goes on, the floater may shrink down out of your line of sight and you won't notice it so often.  There is a little greater risk of developing a retinal detachment since there's nothing pressing against the retina, so if you start to notice flashes of light or sudden vision changes, return to the clinic as soon as possible, otherwise return as needed.    Return to clinic 1 year for Comprehensive Vision Exam      Michelle Baca O.D  [address]. [facility] [place]  [address]    [phone]

## 2017-09-07 DIAGNOSIS — M48.062 SPINAL STENOSIS OF LUMBAR REGION WITH NEUROGENIC CLAUDICATION: ICD-10-CM

## 2017-09-07 DIAGNOSIS — M47.817 DJD (DEGENERATIVE JOINT DISEASE), LUMBOSACRAL: ICD-10-CM

## 2017-09-07 RX ORDER — OXYCODONE AND ACETAMINOPHEN 5; 325 MG/1; MG/1
1-2 TABLET ORAL EVERY 8 HOURS PRN
Qty: 120 TABLET | Refills: 0 | Status: SHIPPED | OUTPATIENT
Start: 2017-09-07 | End: 2017-10-18

## 2017-09-07 NOTE — TELEPHONE ENCOUNTER
Last Rx was 120 tablets on 7/31/17.  Patient was seen that day as well.      Routing refill request to provider for review/approval because:  Drug not on the [facility] refill protocol     Cherelle Holden RN  [facility]

## 2017-09-07 NOTE — TELEPHONE ENCOUNTER
Refill signed  Please remind patient that I recommend she be seen by our pain team as her pain has been poorly controlled at our last visits  I also wanted her to follow up in clinic (with family present) regarding her memory

## 2017-09-07 NOTE — TELEPHONE ENCOUNTER
Reason for Call:  Medication or medication refill:    Name of the pharmacy and phone number for the current request: Hard Copy    Name of the medication requested: Oxycodone    Other request: Please call patient when completed.    Can we leave a detailed message on this number? YES    Phone number patient can be reached at: Home number on file [phone] (home)    Best Time: anytime    Call taken on 9/7/2017 at 11:53 AM by Sejal Bear

## 2017-09-08 ENCOUNTER — TELEPHONE (OUTPATIENT)
Dept: PODIATRY | Facility: CLINIC | Age: 82
End: 2017-09-08

## 2017-09-08 NOTE — TELEPHONE ENCOUNTER
Patient informed that script is available for  at the [facility] . Patient has pain appt scheduled on 10/28/17. Patient scheduled follow up with Demetrice on 09/18/2017.

## 2017-09-08 NOTE — TELEPHONE ENCOUNTER
Reason for Call: Request for an order or referral:    Order or referral being requested: Patient states that she was advised to get an ankle brace at her last appointment. Patient states she got one at [facility] but it is not very supportive. Patient voiced frustration that nobody assisted her previously with getting the brace and left it up to her. She is wondering if there is somewhere that Dr Lewis suggests that she can get a fitted ankle brace that would be more supportive.     Date needed: as soon as possible    Has the patient been seen by the PCP for this problem? Not Applicable    Additional comments:     Phone number Patient can be reached at:  Home number on file [phone] (home)    Best Time:  any    Can we leave a detailed message on this number?  YES    Call taken on 9/8/2017 at 12:00 PM by Pratima Alexander

## 2017-09-18 ENCOUNTER — OFFICE VISIT (OUTPATIENT)
Dept: FAMILY MEDICINE | Facility: CLINIC | Age: 82
End: 2017-09-18
Payer: COMMERCIAL

## 2017-09-18 VITALS
TEMPERATURE: 97.1 F | SYSTOLIC BLOOD PRESSURE: 134 MMHG | DIASTOLIC BLOOD PRESSURE: 92 MMHG | HEART RATE: 94 BPM | OXYGEN SATURATION: 96 % | WEIGHT: 155 LBS | BODY MASS INDEX: 28.81 KG/M2

## 2017-09-18 DIAGNOSIS — M19.071 ARTHRITIS OF ANKLE, RIGHT: ICD-10-CM

## 2017-09-18 DIAGNOSIS — R41.89 COGNITIVE DECLINE: Primary | ICD-10-CM

## 2017-09-18 DIAGNOSIS — M47.817 DJD (DEGENERATIVE JOINT DISEASE), LUMBOSACRAL: ICD-10-CM

## 2017-09-18 DIAGNOSIS — M48.062 SPINAL STENOSIS OF LUMBAR REGION WITH NEUROGENIC CLAUDICATION: ICD-10-CM

## 2017-09-18 PROCEDURE — G0008 ADMIN INFLUENZA VIRUS VAC: HCPCS | Performed by: NURSE PRACTITIONER

## 2017-09-18 PROCEDURE — 99214 OFFICE O/P EST MOD 30 MIN: CPT | Mod: 25 | Performed by: NURSE PRACTITIONER

## 2017-09-18 PROCEDURE — 90662 IIV NO PRSV INCREASED AG IM: CPT | Performed by: NURSE PRACTITIONER

## 2017-09-18 ASSESSMENT — PAIN SCALES - GENERAL: PAINLEVEL: NO PAIN (0)

## 2017-09-18 NOTE — PROGRESS NOTES
SUBJECTIVE:   Teresa Lopez is a 82 year old female who presents to clinic today for the following health issues:      Patient is her today to follow up on memory loss, her daughters (Radha and Brooke) are here today and want to discuss with you as well about her memory.    She is with her daughters today  They don't have concerns about her memory  She is dancing, participating in bingo, card games  She laughs at the thought of cognitive decline  (Previously scored 17/30 on SLUMS test)    She is using heating pad for her back  Midline low back pain radiates bilaterally  Relieved with heating pad  Her reporting on using the percocet is inconsistent    She continues to have right ankle pain  Failed injections x2  Not sure what type of boot she should be wearing  Last telephone encounter with Dr. Lewis patient was advised to follow up in clinic after DELFINA    Patient previously referred to pain clinic by me for ongoing management  This appointment is scheduled for 10/27      Problem list and histories reviewed & adjusted, as indicated.  Additional history: none    Patient Active Problem List   Diagnosis     Cerebral infarction (H)     Jaw Pain     Arthritis of knee     CARDIOVASCULAR SCREENING; LDL GOAL LESS THAN 100     Hypertension goal BP (blood pressure) < 140/90     Advanced directives, counseling/discussion     Hyperlipidemia LDL goal <130     OA (OSTEOARTHRITIS) OF KNEE - right     CKD (chronic kidney disease) stage 3, GFR 30-59 ml/min     Cataracts, both eyes     PVD (posterior vitreous detachment), both eyes     Chronic pain disorder     DJD (degenerative joint disease), lumbosacral     Spinal stenosis of lumbar region with neurogenic claudication     Slow transit constipation     Steroid-induced diabetes mellitus (H)     Dementia without behavioral disturbance, unspecified dementia type     Past Surgical History:   Procedure Laterality Date     HYSTERECTOMY, CERVIX STATUS UNKNOWN  age 31       Social  History   Substance Use Topics     Smoking status: Never Smoker     Smokeless tobacco: Never Used     Alcohol use 0.0 oz/week     0 Standard drinks or equivalent per week      Comment: 1 drink weekly     Family History   Problem Relation Age of Onset     Arthritis Mother      Unknown/Adopted Father      adopted     DIABETES Brother      CANCER Brother      Hypertension No family hx of      CEREBROVASCULAR DISEASE No family hx of      Thyroid Disease No family hx of      Glaucoma No family hx of      Macular Degeneration No family hx of              Reviewed and updated as needed this visit by clinical staffTobacco  Allergies  Meds  Med Hx  Surg Hx  Fam Hx  Soc Hx      Reviewed and updated as needed this visit by Provider         ROS:  Constitutional, HEENT, cardiovascular, pulmonary, gi and gu systems are negative, except as otherwise noted.      OBJECTIVE:   BP (!) 134/92 (BP Location: Left arm, Patient Position: Chair, Cuff Size: Adult Regular)  Pulse 94  Temp 97.1  F (36.2  C) (Oral)  Wt 155 lb (70.3 kg)  SpO2 96%  Breastfeeding? No  BMI 28.81 kg/m2  Body mass index is 28.81 kg/(m^2).  GENERAL: healthy, alert and no distress  RESP: lungs clear to auscultation - no rales, rhonchi or wheezes  CV: regular rate and rhythm, normal S1 S2, no S3 or S4, no murmur, click or rub, no peripheral edema and peripheral pulses strong  MS: gait slow and steady  PSYCH: affect normal/bright, poor historian, cognitive impairment present    Diagnostic Test Results:  none     ASSESSMENT/PLAN:       ICD-10-CM    1. Cognitive decline R41.89 NEUROPSYCHOLOGY REFERRAL   2. Spinal stenosis of lumbar region with neurogenic claudication M48.06    3. DJD (degenerative joint disease), lumbosacral M51.37    4. Arthritis of ankle, right M19.071      Consent to communicate completed today  Her daughters initially denied any concerns, but after I shared my concerns I had the impression that they were in agreement and it's difficult for  them to bring up these issues on their own  They were in agreement with pursuing neuropsych testing  They have already begun looking for senior apartments for Teresa as she currently lives independently in a home  I expressed concerns about her driving, especially with use of percocet  Will wait for neuropsych testing. Patient may need testing through [facility] assessment  I did not change pain medications today. I'm actually not sure how much percocet she's taking as she is a poor historian  Recommend to follow up in clinic after neuropsych testing (if appropriate)    Patient Instructions   Follow up with Dr. Lewis for ongoing ankle pain  - he mentioned considering MRI for ongoing ankle pain  - discuss appropriate boot to wear for your ankle pain    Schedule neuropsych testing with [facility]. Call [phone] to schedule    Keep your appointment with pain team on October 27      MYRON [facility]  Injectable Influenza Immunization Documentation    1.  Is the person to be vaccinated sick today? No  2. Does the person to be vaccinated have an allergy to a component   of the vaccine? No    3. Has the person to be vaccinated ever had a serious reaction   to influenza vaccine in the past? No    4. Has the person to be vaccinated ever had Guillain-Barré syndrome? No    Form completed by JAMES Tirado MA  Patient/or Parent of Patient instructed to wait 20 minutes after injection in the case of a allergic reaction.

## 2017-09-18 NOTE — PATIENT INSTRUCTIONS
Follow up with Dr. Lewis for ongoing ankle pain  - he mentioned considering MRI for ongoing ankle pain  - discuss appropriate boot to wear for your ankle pain    Schedule neuropsych testing with [facility]. Call [phone] to schedule    Keep your appointment with pain team on October 27

## 2017-09-18 NOTE — NURSING NOTE
"Chief Complaint   Patient presents with     RECHECK     Follow up memory       Initial BP (!) 134/92 (BP Location: Left arm, Patient Position: Chair, Cuff Size: Adult Regular)  Pulse 94  Temp 97.1  F (36.2  C) (Oral)  Wt 155 lb (70.3 kg)  SpO2 96%  Breastfeeding? No  BMI 28.81 kg/m2 Estimated body mass index is 28.81 kg/(m^2) as calculated from the following:    Height as of 7/31/17: 5' 1.5\" (1.562 m).    Weight as of this encounter: 155 lb (70.3 kg).  Medication Reconciliation: complete.  JAMES Tirado MA      "

## 2017-09-18 NOTE — MR AVS SNAPSHOT
After Visit Summary   9/18/2017    Teresa Lopez    MRN: [id number]           Patient Information     Date Of Birth          1935        Visit Information        Provider Department      9/18/2017 2:00 PM Demetrice Gutierrez APRN [facility]        Today's Diagnoses     Cognitive decline    -  1      Care Instructions    Follow up with Dr. Lewis for ongoing ankle pain  - he mentioned considering MRI for ongoing ankle pain  - discuss appropriate boot to wear for your ankle pain    Schedule neuropsych testing with [facility]. Call [phone] to schedule    Keep your appointment with pain team on October 27          Follow-ups after your visit        Additional Services     NEUROPSYCHOLOGY REFERRAL       Your provider has referred you to:    [facility]: [facility] [place] [phone]   [email or web address]    All scheduling is subject to the client's specific insurance plan & benefits, provider/location availability, and provider clinical specialities.  Please arrive 15 minutes early for your first appointment and bring your completed paperwork.    Please be aware that coverage of these services is subject to the terms and limitations of your health insurance plan.  Call member services at your health plan with any benefit or coverage questions.    Please bring the following to your appointment:  >>   Any x-rays, CTs or MRIs which have been performed.  Contact the facility where they were done to arrange for  prior to your scheduled appointment.  Any new CT, MRI or other procedures ordered by your specialist must be performed at a [facility] facility or coordinated by your clinic's referral office.    >>   List of current medications   >>   This referral request   >>   Any documents/labs given to you for this referral                  Your next 10 appointments already scheduled     Oct 05,  2017  9:45 AM CDT   [facility] Visit with Moose Fisher MD   [facility]    [address]  [place] [id number] [phone]            Oct 27, 2017  8:00 AM CDT   New Visit with MYRON Ahumada CNP   [facility] ([facility])    [address] [place] [address] [phone]              Who to contact     If you have questions or need follow up information about today's clinic visit or your schedule please contact [facility] directly at [phone].  Normal or non-critical lab and imaging results will be communicated to you by Spontactshart, letter or phone within 4 business days after the clinic has received the results. If you do not hear from us within 7 days, please contact the clinic through Spontactshart or phone. If you have a critical or abnormal lab result, we will notify you by phone as soon as possible.  Submit refill requests through Hansoft or call your pharmacy and they will forward the refill request to us. Please allow 3 business days for your refill to be completed.          Additional Information About Your Visit        SpontactsharIngresse Information     Hansoft gives you secure access to your electronic health record. If you see a primary care provider, you can also send messages to your care team and make appointments. If you have questions, please call your primary care clinic.  If you do not have a primary care provider, please call [phone] and they will assist you.        Care EveryWhere ID     This is your Care EveryWhere ID. This could be used by other organizations to access your [place] medical records  [id number]        Your Vitals Were     Pulse Temperature Pulse Oximetry Breastfeeding? BMI (Body Mass Index)       94 97.1  F (36.2  C) (Oral) 96% No 28.81 kg/m2        Blood Pressure from Last 3 Encounters:   09/18/17 (!) 134/92   07/31/17 132/79   07/12/17 139/74    Weight  from Last 3 Encounters:   09/18/17 155 lb (70.3 kg)   07/31/17 156 lb (70.8 kg)   05/25/17 153 lb (69.4 kg)              We Performed the Following     FLU VACCINE, INCREASED ANTIGEN, PRESV FREE, AGE 65+ [45584]     NEUROPSYCHOLOGY REFERRAL     Vaccine Administration, Initial [42947]        Primary Care Provider Office Phone # Fax #    MYRON Bose [facility] [phone]       [address] [place], [place]. \Hospital Has a New Name and Outlook.\"" [id number]        Equal Access to Services     [facility]: Hadii aad ku hadasho Soomaali, waaxda luqadaha, qaybta kaalmada adeegyada, waxalem mccracken . So [facility] [phone].    ATENCIÓN: Si habla español, tiene a brambila disposición servicios gratuitos de asistencia lingüística. Llame al [phone].    We comply with applicable federal civil rights laws and [place] laws. We do not discriminate on the basis of race, color, national origin, age, disability sex, sexual orientation or gender identity.            Thank you!     Thank you for choosing [facility]  for your care. Our goal is always to provide you with excellent care. Hearing back from our patients is one way we can continue to improve our services. Please take a few minutes to complete the written survey that you may receive in the mail after your visit with us. Thank you!             Your Updated Medication List - Protect others around you: Learn how to safely use, store and throw away your medicines at www.disposemymeds.org.          This list is accurate as of: 9/18/17  2:56 PM.  Always use your most recent med list.                   Brand Name Dispense Instructions for use Diagnosis    ADVIL 200 MG capsule   Generic drug:  ibuprofen      Reported on 4/20/2017        amLODIPine 2.5 MG tablet    NORVASC    90 tablet    Take 1 tablet (2.5 mg) by mouth daily 1 po at bedtime    Hypertension goal BP (blood pressure) < 140/90       aspirin 162 MG EC tablet      Take 162 mg by  mouth daily Reported on 4/20/2017        CALCIUM 1200 PO      Reported on 4/20/2017        CLARITIN 10 MG capsule   Generic drug:  loratadine      Take 10 mg by mouth as needed Reported on 4/20/2017        gabapentin 300 MG capsule    NEURONTIN    270 capsule    Take 1 capsule (300 mg) by mouth 3 times daily    DJD (degenerative joint disease), lumbosacral, Spinal stenosis of lumbar region with neurogenic claudication       GLUCOSAMINE 1500 COMPLEX PO      Take 1,500 mg by mouth daily Reported on 4/20/2017        losartan-hydrochlorothiazide 100-25 MG per tablet    HYZAAR    90 tablet    Take 1 tablet by mouth daily    Hypertension goal BP (blood pressure) < 140/90       Multi-vitamin Tabs tablet   Generic drug:  multivitamin, therapeutic with minerals      1 TABLET DAILY        oxyCODONE-acetaminophen 5-325 MG per tablet    PERCOCET    120 tablet    Take 1-2 tablets by mouth every 8 hours as needed for pain maximum 4 tablet(s) per day    DJD (degenerative joint disease), lumbosacral, Spinal stenosis of lumbar region with neurogenic claudication       PRILOSEC PO      None Entered        SALMON OIL-1000 PO      take 1,000 Caps by mouth daily.        senna-docusate 8.6-50 MG per tablet    SENOKOT-S;PERICOLACE    120 tablet    Take 1 tablet by mouth 2 times daily    Slow transit constipation       VITAMIN C PO      None Entered        VITAMIN D PO      Take 1,200 mg by mouth daily.        vitamin E 400 UNIT capsule      None Entered

## 2017-09-28 ENCOUNTER — TELEPHONE (OUTPATIENT)
Dept: FAMILY MEDICINE | Facility: CLINIC | Age: 82
End: 2017-09-28

## 2017-09-28 NOTE — TELEPHONE ENCOUNTER
There is a neuropsych referral in [facility].  Where is patient trying to go?  I called patient, she is calling the number on the referral for [facility].  I called [facility], they don't find this in Epic.  Requested I print referral and fax to [phone].  Done.  I called patient and advised she can try calling again to schedule.    Cherelle Holden RN  [facility]

## 2017-09-28 NOTE — TELEPHONE ENCOUNTER
Reason for Call:  Other referral    Detailed comments: Patient called to make appointment with neurology as instructed at most recent appointment.  Neurology clinic is asking for letter/referral to schedule appointment.  Please call patient to advise.    Phone Number Patient can be reached at: Home number on file [phone] (home)    Best Time: Any    Can we leave a detailed message on this number? YES    Call taken on 9/28/2017 at 3:02 PM by David Andres

## 2017-10-04 ENCOUNTER — TELEPHONE (OUTPATIENT)
Dept: PODIATRY | Facility: CLINIC | Age: 82
End: 2017-10-04

## 2017-10-04 NOTE — TELEPHONE ENCOUNTER
Reason for call:  Patient reporting a symptom    Symptom or request: Arthritis in ankle, very sore    Duration (how long have symptoms been present):  Long time    Have you been treated for this before? Yes    Additional comments: Patient saw Dr Lewis and discussed getting cortisone shots. Patient has had two shots.  She is requesting a brace, boot, or orthotics to help with the pain and to allow her to keep walking.    Phone Number patient can be reached at:  Home number on file [phone] (home)    Best Time:  Any     Can we leave a detailed message on this number:  YES    Call taken on 10/4/2017 at 1:33 PM by Ariella Silveira

## 2017-10-05 ENCOUNTER — OFFICE VISIT (OUTPATIENT)
Dept: OPHTHALMOLOGY | Facility: CLINIC | Age: 82
End: 2017-10-05
Payer: COMMERCIAL

## 2017-10-05 ENCOUNTER — TELEPHONE (OUTPATIENT)
Dept: PODIATRY | Facility: CLINIC | Age: 82
End: 2017-10-05

## 2017-10-05 DIAGNOSIS — H52.4 PRESBYOPIA: ICD-10-CM

## 2017-10-05 DIAGNOSIS — H25.813 COMBINED FORM OF AGE-RELATED CATARACT, BOTH EYES: Primary | ICD-10-CM

## 2017-10-05 DIAGNOSIS — H43.813 POSTERIOR VITREOUS DETACHMENT OF BOTH EYES: ICD-10-CM

## 2017-10-05 PROCEDURE — 92004 COMPRE OPH EXAM NEW PT 1/>: CPT | Performed by: OPHTHALMOLOGY

## 2017-10-05 ASSESSMENT — REFRACTION_MANIFEST
OS_ADD: +2.75
OD_AXIS: 035
OS_AXIS: 040
OD_SPHERE: +2.00
OD_ADD: +2.75
OD_CYLINDER: +0.25
OS_CYLINDER: +0.75
OS_SPHERE: +1.50

## 2017-10-05 ASSESSMENT — EXTERNAL EXAM - LEFT EYE: OS_EXAM: MILD TO MOD BROW, 2+ BROW PTOSIS

## 2017-10-05 ASSESSMENT — TONOMETRY
OD_IOP_MMHG: 13
IOP_METHOD: APPLANATION
OS_IOP_MMHG: 15

## 2017-10-05 ASSESSMENT — CONF VISUAL FIELD
METHOD: COUNTING FINGERS
OS_NORMAL: 1
OD_NORMAL: 1

## 2017-10-05 ASSESSMENT — VISUAL ACUITY
OD_CC+: -1
METHOD: SNELLEN - LINEAR
OD_BAT_LOW: 20/40-1
OD_BAT_MED: 20/60-1
OS_BAT_LOW: 20/30-2
OS_BAT_MED: 20/60-1
OS_CC: 20/30
OD_PH_CC: 20/40
CORRECTION_TYPE: GLASSES
OD_CC: 20/40

## 2017-10-05 ASSESSMENT — CUP TO DISC RATIO
OD_RATIO: 0.3
OS_RATIO: 0.3

## 2017-10-05 ASSESSMENT — SLIT LAMP EXAM - LIDS
COMMENTS: 1+, DERMATOCHALASIS
COMMENTS: 1+, DERMATOCHALASIS

## 2017-10-05 ASSESSMENT — EXTERNAL EXAM - RIGHT EYE: OD_EXAM: MILD TO MOD BROW, 2+ BROW PTOSIS

## 2017-10-05 NOTE — PROGRESS NOTES
Current Eye Medications:  no     Subjective:  Cataract eval. Vision is little more blurred, trouble seeing street signs. Not able to thread needle, can't sew. Zero pain both eyes. Little watering left eye > right for years.     Lab Results   Component Value Date    A1C 6.5 02/17/2017    A1C 6.2 11/01/2016    A1C 6.9 02/15/2016    A1C 6.2 02/12/2015    A1C 6.1 02/10/2014       GFT. RH.     Objective:  See Ophthalmology Exam.       Assessment: Visually significant cataract right eye > left eye.       ICD-10-CM    1. Combined form of age-related cataract, both eyes H25.813    2. Posterior vitreous detachment of both eyes H43.813    3. Presbyopia H52.4          Plan: Discussed Kelman phacoemulsification/ posterior chamber lens right eye local standby.  Risks, benefits, complications, and alternatives discussed with patient including possibility of limitations from coexistent eye disease and loss of vision.  Target refraction and multifocal lens options discussed.  Patient understands.    Offered cataract surgery right eye  at anytime. Call Skye MCMILLAN @ [phone] to schedule.   Moose Fisher M.D.

## 2017-10-05 NOTE — MR AVS SNAPSHOT
After Visit Summary   10/5/2017    Teresa Lopez    MRN: [id number]           Patient Information     Date Of Birth          1935        Visit Information        Provider Department      10/5/2017 9:45 AM Moose Fisher MD [facility]        Today's Diagnoses     Combined form of age-related cataract, both eyes    -  1    PVD (posterior vitreous detachment), both eyes        Presbyopia        Regular astigmatism of both eyes        Hypermetropia of both eyes          Care Instructions    Discussed Kelman phacoemulsification/ posterior chamber lens right eye local standby.  Risks, benefits, complications, and alternatives discussed with patient including possibility of limitations from coexistent eye disease and loss of vision.  Target refraction and multifocal lens options discussed. Patient understands.    Offered cataract surgery right eye at anytime. Call Skye MCMILLAN @ [phone] to schedule.   Otherwise Call in June 2018 for an appointment in October 2018 for Complete Exam    Dr. Fisher [phone]                Follow-ups after your visit        Your next 10 appointments already scheduled     Oct 27, 2017  8:00 AM CDT   New Visit with MYRON Ahumada CNP   [facility] ([facility])    [address] [address] [phone]              Who to contact     If you have questions or need follow up information about today's clinic visit or your schedule please contact [facility] directly at [phone].  Normal or non-critical lab and imaging results will be communicated to you by MyChart, letter or phone within 4 business days after the clinic has received the results. If you do not hear from us within 7 days, please contact the clinic through MyChart or phone. If you have a critical or abnormal lab result, we will notify you by phone as soon as possible.  Submit refill requests through Doblet or  call your pharmacy and they will forward the refill request to us. Please allow 3 business days for your refill to be completed.          Additional Information About Your Visit        Zenedyhart Information     [employer] gives you secure access to your electronic health record. If you see a primary care provider, you can also send messages to your care team and make appointments. If you have questions, please call your primary care clinic.  If you do not have a primary care provider, please call [phone] and they will assist you.        Care EveryWhere ID     This is your Care EveryWhere ID. This could be used by other organizations to access your [facility] medical records  [id number]         Blood Pressure from Last 3 Encounters:   09/18/17 (!) 134/92   07/31/17 132/79   07/12/17 139/74    Weight from Last 3 Encounters:   09/18/17 70.3 kg (155 lb)   07/31/17 70.8 kg (156 lb)   05/25/17 69.4 kg (153 lb)              Today, you had the following     No orders found for display       Primary Care Provider Office Phone # Fax #    Demetrice Richardson Matt, MYRON [facility] [phone]       [address] [address]        Equal Access to Services     ALTHEA MAYS : Hadii wilfrid june hadasho Soomaali, waaxda luqadaha, qaybta kaalmada adeegyada, lori mccracken . So [facility] [place] [phone].    ATENCIÓN: Si habla español, tiene a brambila disposición servicios gratuitos de asistencia lingüística. Llame al [phone].    We comply with applicable federal civil rights laws and [place] laws. We do not discriminate on the basis of race, color, national origin, age, disability, sex, sexual orientation, or gender identity.            Thank you!     Thank you for choosing [facility]  for your care. Our goal is always to provide you with excellent care. Hearing back from our patients is one way we can continue to improve our services. Please take a few minutes to complete the  written survey that you may receive in the mail after your visit with us. Thank you!             Your Updated Medication List - Protect others around you: Learn how to safely use, store and throw away your medicines at www.disposemymeds.org.          This list is accurate as of: 10/5/17 11:24 AM.  Always use your most recent med list.                   Brand Name Dispense Instructions for use Diagnosis    ADVIL 200 MG capsule   Generic drug:  ibuprofen      Reported on 4/20/2017        amLODIPine 2.5 MG tablet    NORVASC    90 tablet    Take 1 tablet (2.5 mg) by mouth daily 1 po at bedtime    Hypertension goal BP (blood pressure) < 140/90       aspirin 162 MG EC tablet      Take 162 mg by mouth daily Reported on 4/20/2017        CALCIUM 1200 PO      Reported on 4/20/2017        CLARITIN 10 MG capsule   Generic drug:  loratadine      Take 10 mg by mouth as needed Reported on 4/20/2017        gabapentin 300 MG capsule    NEURONTIN    270 capsule    Take 1 capsule (300 mg) by mouth 3 times daily    DJD (degenerative joint disease), lumbosacral, Spinal stenosis of lumbar region with neurogenic claudication       GLUCOSAMINE 1500 COMPLEX PO      Take 1,500 mg by mouth daily Reported on 4/20/2017        losartan-hydrochlorothiazide 100-25 MG per tablet    HYZAAR    90 tablet    Take 1 tablet by mouth daily    Hypertension goal BP (blood pressure) < 140/90       Multi-vitamin Tabs tablet   Generic drug:  multivitamin, therapeutic with minerals      1 TABLET DAILY        oxyCODONE-acetaminophen 5-325 MG per tablet    PERCOCET    120 tablet    Take 1-2 tablets by mouth every 8 hours as needed for pain maximum 4 tablet(s) per day    DJD (degenerative joint disease), lumbosacral, Spinal stenosis of lumbar region with neurogenic claudication       PRILOSEC PO      None Entered        SALMON OIL-1000 PO      take 1,000 Caps by mouth daily.        senna-docusate 8.6-50 MG per tablet    SENOKOT-S;PERICOLACE    120 tablet    Take 1  tablet by mouth 2 times daily    Slow transit constipation       VITAMIN C PO      None Entered        VITAMIN D PO      Take 1,200 mg by mouth daily.        vitamin E 400 UNIT capsule      None Entered

## 2017-10-05 NOTE — TELEPHONE ENCOUNTER
Spoke with pt she would like to try a better ankle brace that has better support then the one she bought OTC. She will come into the  clinic we will fit her with a ankle stabilizer . Pt is very active would like to stay that way.  Angela Esteves MA

## 2017-10-05 NOTE — TELEPHONE ENCOUNTER
Reason for Call:  Other call back and returning call    Detailed comments: patient returning the call,     Phone Number Patient can be reached at: Home number on file [phone] (home)    Best Time: today,     Can we leave a detailed message on this number? YES    Call taken on 10/5/2017 at 2:11 PM by Eduar Sewell

## 2017-10-05 NOTE — PATIENT INSTRUCTIONS
Discussed Kelman phacoemulsification/ posterior chamber lens right eye local standby.  Risks, benefits, complications, and alternatives discussed with patient including possibility of limitations from coexistent eye disease and loss of vision.  Target refraction and multifocal lens options discussed. Patient understands.    Offered cataract surgery right eye at anytime. Call Skye DEYANIRA @ [phone] to schedule.   Otherwise Call in June 2018 for an appointment in October 2018 for Complete Exam    Dr. Fisher [phone]

## 2017-10-06 ENCOUNTER — OFFICE VISIT (OUTPATIENT)
Dept: PODIATRY | Facility: CLINIC | Age: 82
End: 2017-10-06
Payer: COMMERCIAL

## 2017-10-06 DIAGNOSIS — M19.071 PRIMARY LOCALIZED OSTEOARTHROSIS, ANKLE AND FOOT, RIGHT: Primary | ICD-10-CM

## 2017-10-06 PROBLEM — H43.813 POSTERIOR VITREOUS DETACHMENT OF BOTH EYES: Status: ACTIVE | Noted: 2017-10-06

## 2017-10-06 PROCEDURE — 99213 OFFICE O/P EST LOW 20 MIN: CPT | Performed by: PODIATRIST

## 2017-10-06 NOTE — LETTER
10/6/2017         RE: Teresa Lopez  [address]  [place] [address]        Dear Colleague,    Thank you for referring your patient, Teresa Lopez, to the [facility]. Please see a copy of my visit note below.    S:  4/28/17  Patient seen in consult from Demetrice Gutierrez and complains of ankle pain.  Points to lateral ankle gutter as to where worse pain is.  Has had this for 2 years.  states she was walking and heard a pop.   Describes it as a burning pain.  Aggrevated by activity and relieved by rest.  Slowly getting worse.  Uneven surfaces bother this.  Positive history of post static dyskinesia.    5/19/17  Patient states injection did not help at all and she is wondering if this went in the joint.    10/6/17  Last injection did not help.  Lateral ankle is where most of her pain is.  Wearing ankle brace.        ROS:  Denies bruising, swelling,weakness, or numbness.       Allergies   Allergen Reactions     Sulfa Drugs        Current Outpatient Prescriptions   Medication Sig Dispense Refill     oxyCODONE-acetaminophen (PERCOCET) 5-325 MG per tablet Take 1-2 tablets by mouth every 8 hours as needed for pain maximum 4 tablet(s) per day 120 tablet 0     gabapentin (NEURONTIN) 300 MG capsule Take 1 capsule (300 mg) by mouth 3 times daily 270 capsule 3     losartan-hydrochlorothiazide (HYZAAR) 100-25 MG per tablet Take 1 tablet by mouth daily 90 tablet 3     amLODIPine (NORVASC) 2.5 MG tablet Take 1 tablet (2.5 mg) by mouth daily 1 po at bedtime 90 tablet 3     senna-docusate (SENOKOT-S;PERICOLACE) 8.6-50 MG per tablet Take 1 tablet by mouth 2 times daily 120 tablet 12     aspirin 162 MG EC tablet Take 162 mg by mouth daily Reported on 4/20/2017       Glucosamine-Chondroit-Vit C-Mn (GLUCOSAMINE 1500 COMPLEX PO) Take 1,500 mg by mouth daily Reported on 4/20/2017       Loratadine (CLARITIN) 10 MG capsule Take 10 mg by mouth as needed Reported on 4/20/2017       Omega-3 Fatty Acids  (SALMON OIL-1000 PO) take 1,000 Caps by mouth daily.       ADVIL 200 MG PO CAPS Reported on 4/20/2017       MULTI-VITAMIN OR TABS 1 TABLET DAILY       CALCIUM 1200 OR Reported on 4/20/2017       VITAMIN C OR None Entered       VITAMIN D OR Take 1,200 mg by mouth daily.       VITAMIN E 400 UNIT OR CAPS None Entered       PRILOSEC OR None Entered         Patient Active Problem List   Diagnosis     Cerebral infarction (H)     Jaw Pain     Arthritis of knee     CARDIOVASCULAR SCREENING; LDL GOAL LESS THAN 100     Hypertension goal BP (blood pressure) < 140/90     Advanced directives, counseling/discussion     Hyperlipidemia LDL goal <130     OA (OSTEOARTHRITIS) OF KNEE - right     CKD (chronic kidney disease) stage 3, GFR 30-59 ml/min     PVD (posterior vitreous detachment), both eyes     Chronic pain disorder     DJD (degenerative joint disease), lumbosacral     Spinal stenosis of lumbar region with neurogenic claudication     Slow transit constipation     Steroid-induced diabetes mellitus (H)     Dementia without behavioral disturbance, unspecified dementia type     Combined form of age-related cataract, both eyes       Past Medical History:   Diagnosis Date     Cataract 3/7/2012     CVA (cerebral infarction)      Diabetes mellitus (H)      DJD (degenerative joint disease)      HTN        Past Surgical History:   Procedure Laterality Date     HYSTERECTOMY, CERVIX STATUS UNKNOWN  age 31       Family History   Problem Relation Age of Onset     Arthritis Mother      Unknown/Adopted Father      adopted     DIABETES Brother      CANCER Brother      Hypertension No family hx of      CEREBROVASCULAR DISEASE No family hx of      Thyroid Disease No family hx of      Glaucoma No family hx of      Macular Degeneration No family hx of        Social History   Substance Use Topics     Smoking status: Never Smoker     Smokeless tobacco: Never Used     Alcohol use 0.0 oz/week     0 Standard drinks or equivalent per week      Comment:  1 drink weekly         O:  Good historian.  A&O X 3.  Pulses DP, PT 2/4 b/l.  CRT < 3 seconds X 10 digits.  Bilateral mild ankle edema or varicosities noted.  Sensation to light touch intact b/l.  Reflexes 2/4 b/l.  Skin thin with scant hair b/l.  Normal arch with weightbearing.  No forefoot or rear foot deformities noted.  MS 5/5 all compartments.  Normal ROM all fore foot and rearfoot joints.  No equinus.    No pain with stressing any muscle compartments.  No erythema edema or ecchymosis or masses noted.  Pain with palpation lateral gutter ankle and less medial.    RIGHT ANKLE THREE OR MORE VIEWS 4/20/2017 11:12 AM      HISTORY: Concern for arthritis. Chronic lateral ankle pain.     COMPARISON: None.         IMPRESSION: No acute fracture or dislocation. There is arthritis in  the ankle joint with narrowing of the tibiotalar joint laterally. The  bones are demineralized. Multiple vascular calcifications.        A:  Right ankle joint arthritis.    P:  Rx for [place] AFO.  If not better will refer to [facility] to discuss ankle replacement.    RETURN TO CLINIC PRN.          German Lewis DPM, FACFAS      Again, thank you for allowing me to participate in the care of your patient.        Sincerely,        German Lewis DPM

## 2017-10-06 NOTE — PATIENT INSTRUCTIONS
Weight management plan: Patient was referred to their PCP to discuss a diet and exercise plan.

## 2017-10-06 NOTE — MR AVS SNAPSHOT
After Visit Summary   10/6/2017    Teresa Lopez    MRN: [id number]           Patient Information     Date Of Birth          1935        Visit Information        Provider Department      10/6/2017 12:00 PM German Lewis, PAWAN [facility]        Today's Diagnoses     Primary localized osteoarthrosis, ankle and foot, right    -  1      Care Instructions    Weight management plan: Patient was referred to their PCP to discuss a diet and exercise plan.              Follow-ups after your visit        Your next 10 appointments already scheduled     Oct 27, 2017  8:00 AM CDT   New Visit with MYRON Ahumada CNP   [facility] ([facility])    [address] [facility] [address] [phone]              Who to contact     If you have questions or need follow up information about today's clinic visit or your schedule please contact [facility] directly at [phone].  Normal or non-critical lab and imaging results will be communicated to you by SquadMailhart, letter or phone within 4 business days after the clinic has received the results. If you do not hear from us within 7 days, please contact the clinic through Panelflyt or phone. If you have a critical or abnormal lab result, we will notify you by phone as soon as possible.  Submit refill requests through Space Ape or call your pharmacy and they will forward the refill request to us. Please allow 3 business days for your refill to be completed.          Additional Information About Your Visit        MyChart Information     Space Ape gives you secure access to your electronic health record. If you see a primary care provider, you can also send messages to your care team and make appointments. If you have questions, please call your primary care clinic.  If you do not have a primary care provider, please call [phone] and they will assist you.        Care  EveryWhere ID     This is your Care EveryWhere ID. This could be used by other organizations to access your [facility] medical records  [id number]         Blood Pressure from Last 3 Encounters:   09/18/17 (!) 134/92   07/31/17 132/79   07/12/17 139/74    Weight from Last 3 Encounters:   09/18/17 70.3 kg (155 lb)   07/31/17 70.8 kg (156 lb)   05/25/17 69.4 kg (153 lb)              Today, you had the following     No orders found for display       Primary Care Provider Office Phone # Fax #    Demetrice Gutierrez, MYRON [facility] [phone]       [address] [facility] [address]        Equal Access to Services     ALTHEA MAYS : Hadii aad ku hadasho Soomaali, waaxda luqadaha, qaybta kaalmada adeegyada, waxay idiin hayolegario mccracken . So [facility] [phone].    ATENCIÓN: Si habla español, tiene a brambila disposición servicios gratuitos de asistencia lingüística. Llame al [phone].    We comply with applicable federal civil rights laws and [place] laws. We do not discriminate on the basis of race, color, national origin, age, disability, sex, sexual orientation, or gender identity.            Thank you!     Thank you for choosing [facility]  for your care. Our goal is always to provide you with excellent care. Hearing back from our patients is one way we can continue to improve our services. Please take a few minutes to complete the written survey that you may receive in the mail after your visit with us. Thank you!             Your Updated Medication List - Protect others around you: Learn how to safely use, store and throw away your medicines at www.disposemymeds.org.          This list is accurate as of: 10/6/17 12:15 PM.  Always use your most recent med list.                   Brand Name Dispense Instructions for use Diagnosis    ADVIL 200 MG capsule   Generic drug:  ibuprofen      Reported on 4/20/2017        amLODIPine 2.5 MG tablet    NORVASC    90 tablet     Take 1 tablet (2.5 mg) by mouth daily 1 po at bedtime    Hypertension goal BP (blood pressure) < 140/90       aspirin 162 MG EC tablet      Take 162 mg by mouth daily Reported on 4/20/2017        CALCIUM 1200 PO      Reported on 4/20/2017        CLARITIN 10 MG capsule   Generic drug:  loratadine      Take 10 mg by mouth as needed Reported on 4/20/2017        gabapentin 300 MG capsule    NEURONTIN    270 capsule    Take 1 capsule (300 mg) by mouth 3 times daily    DJD (degenerative joint disease), lumbosacral, Spinal stenosis of lumbar region with neurogenic claudication       GLUCOSAMINE 1500 COMPLEX PO      Take 1,500 mg by mouth daily Reported on 4/20/2017        losartan-hydrochlorothiazide 100-25 MG per tablet    HYZAAR    90 tablet    Take 1 tablet by mouth daily    Hypertension goal BP (blood pressure) < 140/90       Multi-vitamin Tabs tablet   Generic drug:  multivitamin, therapeutic with minerals      1 TABLET DAILY        oxyCODONE-acetaminophen 5-325 MG per tablet    PERCOCET    120 tablet    Take 1-2 tablets by mouth every 8 hours as needed for pain maximum 4 tablet(s) per day    DJD (degenerative joint disease), lumbosacral, Spinal stenosis of lumbar region with neurogenic claudication       PRILOSEC PO      None Entered        SALMON OIL-1000 PO      take 1,000 Caps by mouth daily.        senna-docusate 8.6-50 MG per tablet    SENOKOT-S;PERICOLACE    120 tablet    Take 1 tablet by mouth 2 times daily    Slow transit constipation       VITAMIN C PO      None Entered        VITAMIN D PO      Take 1,200 mg by mouth daily.        vitamin E 400 UNIT capsule      None Entered

## 2017-10-06 NOTE — PROGRESS NOTES
S:  4/28/17  Patient seen in consult from Demetrice Gutierrez and complains of ankle pain.  Points to lateral ankle gutter as to where worse pain is.  Has had this for 2 years.  states she was walking and heard a pop.   Describes it as a burning pain.  Aggrevated by activity and relieved by rest.  Slowly getting worse.  Uneven surfaces bother this.  Positive history of post static dyskinesia.    5/19/17  Patient states injection did not help at all and she is wondering if this went in the joint.    10/6/17  Last injection did not help.  Lateral ankle is where most of her pain is.  Wearing ankle brace.        ROS:  Denies bruising, swelling,weakness, or numbness.       Allergies   Allergen Reactions     Sulfa Drugs        Current Outpatient Prescriptions   Medication Sig Dispense Refill     oxyCODONE-acetaminophen (PERCOCET) 5-325 MG per tablet Take 1-2 tablets by mouth every 8 hours as needed for pain maximum 4 tablet(s) per day 120 tablet 0     gabapentin (NEURONTIN) 300 MG capsule Take 1 capsule (300 mg) by mouth 3 times daily 270 capsule 3     losartan-hydrochlorothiazide (HYZAAR) 100-25 MG per tablet Take 1 tablet by mouth daily 90 tablet 3     amLODIPine (NORVASC) 2.5 MG tablet Take 1 tablet (2.5 mg) by mouth daily 1 po at bedtime 90 tablet 3     senna-docusate (SENOKOT-S;PERICOLACE) 8.6-50 MG per tablet Take 1 tablet by mouth 2 times daily 120 tablet 12     aspirin 162 MG EC tablet Take 162 mg by mouth daily Reported on 4/20/2017       Glucosamine-Chondroit-Vit C-Mn (GLUCOSAMINE 1500 COMPLEX PO) Take 1,500 mg by mouth daily Reported on 4/20/2017       Loratadine (CLARITIN) 10 MG capsule Take 10 mg by mouth as needed Reported on 4/20/2017       Omega-3 Fatty Acids (SALMON OIL-1000 PO) take 1,000 Caps by mouth daily.       ADVIL 200 MG PO CAPS Reported on 4/20/2017       MULTI-VITAMIN OR TABS 1 TABLET DAILY       CALCIUM 1200 OR Reported on 4/20/2017       VITAMIN C OR None Entered       VITAMIN D OR Take 1,200 mg by  mouth daily.       VITAMIN E 400 UNIT OR CAPS None Entered       PRILOSEC OR None Entered         Patient Active Problem List   Diagnosis     Cerebral infarction (H)     Jaw Pain     Arthritis of knee     CARDIOVASCULAR SCREENING; LDL GOAL LESS THAN 100     Hypertension goal BP (blood pressure) < 140/90     Advanced directives, counseling/discussion     Hyperlipidemia LDL goal <130     OA (OSTEOARTHRITIS) OF KNEE - right     CKD (chronic kidney disease) stage 3, GFR 30-59 ml/min     PVD (posterior vitreous detachment), both eyes     Chronic pain disorder     DJD (degenerative joint disease), lumbosacral     Spinal stenosis of lumbar region with neurogenic claudication     Slow transit constipation     Steroid-induced diabetes mellitus (H)     Dementia without behavioral disturbance, unspecified dementia type     Combined form of age-related cataract, both eyes       Past Medical History:   Diagnosis Date     Cataract 3/7/2012     CVA (cerebral infarction)      Diabetes mellitus (H)      DJD (degenerative joint disease)      HTN        Past Surgical History:   Procedure Laterality Date     HYSTERECTOMY, CERVIX STATUS UNKNOWN  age 31       Family History   Problem Relation Age of Onset     Arthritis Mother      Unknown/Adopted Father      adopted     DIABETES Brother      CANCER Brother      Hypertension No family hx of      CEREBROVASCULAR DISEASE No family hx of      Thyroid Disease No family hx of      Glaucoma No family hx of      Macular Degeneration No family hx of        Social History   Substance Use Topics     Smoking status: Never Smoker     Smokeless tobacco: Never Used     Alcohol use 0.0 oz/week     0 Standard drinks or equivalent per week      Comment: 1 drink weekly         O:  Good historian.  A&O X 3.  Pulses DP, PT 2/4 b/l.  CRT < 3 seconds X 10 digits.  Bilateral mild ankle edema or varicosities noted.  Sensation to light touch intact b/l.  Reflexes 2/4 b/l.  Skin thin with scant hair b/l.  Normal  arch with weightbearing.  No forefoot or rear foot deformities noted.  MS 5/5 all compartments.  Normal ROM all fore foot and rearfoot joints.  No equinus.    No pain with stressing any muscle compartments.  No erythema edema or ecchymosis or masses noted.  Pain with palpation lateral gutter ankle and less medial.    RIGHT ANKLE THREE OR MORE VIEWS 4/20/2017 11:12 AM      HISTORY: Concern for arthritis. Chronic lateral ankle pain.     COMPARISON: None.         IMPRESSION: No acute fracture or dislocation. There is arthritis in  the ankle joint with narrowing of the tibiotalar joint laterally. The  bones are demineralized. Multiple vascular calcifications.        A:  Right ankle joint arthritis.    P:  Rx for [place] AFO.  If not better will refer to [facility] to discuss ankle replacement.    RETURN TO CLINIC PRN.          German Lewis DPM, FACFAS

## 2017-10-12 ENCOUNTER — OFFICE VISIT (OUTPATIENT)
Dept: PODIATRY | Facility: CLINIC | Age: 82
End: 2017-10-12
Payer: COMMERCIAL

## 2017-10-12 VITALS — BODY MASS INDEX: 28.81 KG/M2 | OXYGEN SATURATION: 96 % | HEART RATE: 86 BPM | WEIGHT: 155 LBS

## 2017-10-12 DIAGNOSIS — M19.071 PRIMARY LOCALIZED OSTEOARTHROSIS, ANKLE AND FOOT, RIGHT: Primary | ICD-10-CM

## 2017-10-12 PROCEDURE — 99213 OFFICE O/P EST LOW 20 MIN: CPT | Performed by: PODIATRIST

## 2017-10-12 NOTE — PROGRESS NOTES
S:  4/28/17  Patient seen in consult from Demetrice Gutierrez and complains of ankle pain.  Points to lateral ankle gutter as to where worse pain is.  Has had this for 2 years.  states she was walking and heard a pop.   Describes it as a burning pain.  Aggrevated by activity and relieved by rest.  Slowly getting worse.  Uneven surfaces bother this.  Positive history of post static dyskinesia.    5/19/17  Patient states injection did not help at all and she is wondering if this went in the joint.    10/6/17  Last injection did not help.  Lateral ankle is where most of her pain is.  Wearing ankle brace.     10/12/17  Patient here with daughter in law today.  Her ankle still hurts.  Would again like to discuss options.         ROS:  Denies bruising, weakness, or numbness.       Allergies   Allergen Reactions     Sulfa Drugs        Current Outpatient Prescriptions   Medication Sig Dispense Refill     oxyCODONE-acetaminophen (PERCOCET) 5-325 MG per tablet Take 1-2 tablets by mouth every 8 hours as needed for pain maximum 4 tablet(s) per day 120 tablet 0     gabapentin (NEURONTIN) 300 MG capsule Take 1 capsule (300 mg) by mouth 3 times daily 270 capsule 3     losartan-hydrochlorothiazide (HYZAAR) 100-25 MG per tablet Take 1 tablet by mouth daily 90 tablet 3     amLODIPine (NORVASC) 2.5 MG tablet Take 1 tablet (2.5 mg) by mouth daily 1 po at bedtime 90 tablet 3     senna-docusate (SENOKOT-S;PERICOLACE) 8.6-50 MG per tablet Take 1 tablet by mouth 2 times daily 120 tablet 12     aspirin 162 MG EC tablet Take 162 mg by mouth daily Reported on 4/20/2017       Glucosamine-Chondroit-Vit C-Mn (GLUCOSAMINE 1500 COMPLEX PO) Take 1,500 mg by mouth daily Reported on 4/20/2017       Loratadine (CLARITIN) 10 MG capsule Take 10 mg by mouth as needed Reported on 4/20/2017       Omega-3 Fatty Acids (SALMON OIL-1000 PO) take 1,000 Caps by mouth daily.       ADVIL 200 MG PO CAPS Reported on 4/20/2017       MULTI-VITAMIN OR TABS 1 TABLET DAILY        CALCIUM 1200 OR Reported on 4/20/2017       VITAMIN C OR None Entered       VITAMIN D OR Take 1,200 mg by mouth daily.       VITAMIN E 400 UNIT OR CAPS None Entered       PRILOSEC OR None Entered         Patient Active Problem List   Diagnosis     Cerebral infarction (H)     Jaw Pain     Arthritis of knee     CARDIOVASCULAR SCREENING; LDL GOAL LESS THAN 100     Hypertension goal BP (blood pressure) < 140/90     Advanced directives, counseling/discussion     Hyperlipidemia LDL goal <130     OA (OSTEOARTHRITIS) OF KNEE - right     CKD (chronic kidney disease) stage 3, GFR 30-59 ml/min     Chronic pain disorder     DJD (degenerative joint disease), lumbosacral     Spinal stenosis of lumbar region with neurogenic claudication     Slow transit constipation     Steroid-induced diabetes mellitus (H)     Dementia without behavioral disturbance, unspecified dementia type     Combined form of age-related cataract, mod, both eyes     Posterior vitreous detachment of both eyes       Past Medical History:   Diagnosis Date     Cataract 3/7/2012     CVA (cerebral infarction)      Diabetes mellitus (H)      DJD (degenerative joint disease)      HTN        Past Surgical History:   Procedure Laterality Date     HYSTERECTOMY, CERVIX STATUS UNKNOWN  age 31       Family History   Problem Relation Age of Onset     Arthritis Mother      Unknown/Adopted Father      adopted     DIABETES Brother      CANCER Brother      Hypertension No family hx of      CEREBROVASCULAR DISEASE No family hx of      Thyroid Disease No family hx of      Glaucoma No family hx of      Macular Degeneration No family hx of        Social History   Substance Use Topics     Smoking status: Never Smoker     Smokeless tobacco: Never Used     Alcohol use 0.0 oz/week     0 Standard drinks or equivalent per week      Comment: 1 drink weekly         O:  Good historian.  A&O X 3.  Pulses DP, PT 2/4 b/l.  CRT < 3 seconds X 10 digits.  Bilateral mild ankle edema or  varicosities noted.  Sensation to light touch intact b/l.  Reflexes 2/4 b/l.  Skin thin with scant hair b/l.  Normal arch with weightbearing.  No forefoot or rear foot deformities noted.  MS 5/5 all compartments.  Normal ROM all fore foot and rearfoot joints.  No equinus.    No pain with stressing any muscle compartments.  No erythema edema or ecchymosis or masses noted.  Pain with palpation lateral gutter ankle and less medial.    RIGHT ANKLE THREE OR MORE VIEWS 4/20/2017 11:12 AM      HISTORY: Concern for arthritis. Chronic lateral ankle pain.     COMPARISON: None.         IMPRESSION: No acute fracture or dislocation. There is arthritis in  the ankle joint with narrowing of the tibiotalar joint laterally. The  bones are demineralized. Multiple vascular calcifications.        A:  Right ankle joint arthritis.    P:  explained to both that she unfortunately has arthritis.  Injections have not really helped.  Will try to immobility, Rx for [place] AFO.  Other option is for referral to [facility] to discuss candidacy for ankle replacement.    RETURN TO CLINIC PRN.          German Lewis DPM, FACFAS

## 2017-10-12 NOTE — MR AVS SNAPSHOT
After Visit Summary   10/12/2017    Teresa Lopez    MRN: [id number]           Patient Information     Date Of Birth          1935        Visit Information        Provider Department      10/12/2017 12:15 PM German Lewis, PAWAN [facility]        Today's Diagnoses     Primary localized osteoarthrosis, ankle and foot, right    -  1      Care Instructions    We wish you continued good healing. If you have any questions or concerns, please do not hesitate to contact us at [phone]      Please remember to call and schedule a follow up appointment if one was recommended at your earliest convenience.   PODIATRY CLINIC HOURS  TELEPHONE NUMBER    Dr. German MORRISPBETTINA [facility]    Clinics:  [facility]        Angela Esteves MA  Medical Assistant  Tuesday 1PM-6PM  [place]  Wednesday 7AM-2PM  [place]  Thursday 10AM-6PM  [place]  Friday 7AM-345PM  [place]  Specialty schedulers:   [phone] to make an appointment with any Specialty Provider.        Urgent Care locations:    [facility] Monday-Friday 5 pm - 9 pm. Saturday-Sunday 9 am -5pm    Monday-Friday 11 am - 9 pm Saturday 9 am - 5 pm     Monday-Sunday 12 noon-8PM [phone]     If you need a medication refill, please contact us you may need lab work and/or a follow up visit prior to your refill (i.e. Antifungal medications).    YesPlz!hart (secure e-mail communication and access to your chart) to send a message or to make an appointment.    If MRI needed please call [facility] at [phone]          Weight management plan: Patient was referred to their PCP to discuss a diet and exercise plan.            Follow-ups after your visit        Your next 10 appointments already scheduled     Oct 27, 2017  8:00 AM CDT   New Visit with MYRON Ahumada CNP   [facility]  [place] ([facility] [place])    [address]  [place] [address] [phone]              Who to contact     If you have questions or need follow up information about today's clinic visit or your schedule please contact [facility] directly at [phone].  Normal or non-critical lab and imaging results will be communicated to you by MyChart, letter or phone within 4 business days after the clinic has received the results. If you do not hear from us within 7 days, please contact the clinic through We Are Knittershart or phone. If you have a critical or abnormal lab result, we will notify you by phone as soon as possible.  Submit refill requests through [email or web address] or call your pharmacy and they will forward the refill request to us. Please allow 3 business days for your refill to be completed.          Additional Information About Your Visit        We Are Knittershart Information     [email or web address] gives you secure access to your electronic health record. If you see a primary care provider, you can also send messages to your care team and make appointments. If you have questions, please call your primary care clinic.  If you do not have a primary care provider, please call [phone] and they will assist you.        Care EveryWhere ID     This is your Care EveryWhere ID. This could be used by other organizations to access your [facility] medical records  [id number]        Your Vitals Were     Pulse Pulse Oximetry BMI (Body Mass Index)             86 96% 28.81 kg/m2          Blood Pressure from Last 3 Encounters:   09/18/17 (!) 134/92   07/31/17 132/79   07/12/17 139/74    Weight from Last 3 Encounters:   10/12/17 70.3 kg (155 lb)   09/18/17 70.3 kg (155 lb)   07/31/17 70.8 kg (156 lb)              Today, you had the following     No orders found for display       Primary Care Provider Office Phone # Fax #    MYRON Bose [facility] [phone] [address]  [facility] [address]        Equal Access to Services     [facility] : Hadii wilfrid june jose Gallegos, waaxda luqadaha, qaybta kaalmada mariannahosseinrosi, lori nunezmaria lakin matias. So [facility] [phone].    ATENCIÓN: Si habla español, tiene a brambila disposición servicios gratuitos de asistencia lingüística. Llame al [phone].    We comply with applicable federal civil rights laws and [place] laws. We do not discriminate on the basis of race, color, national origin, age, disability, sex, sexual orientation, or gender identity.            Thank you!     Thank you for choosing [facility]  for your care. Our goal is always to provide you with excellent care. Hearing back from our patients is one way we can continue to improve our services. Please take a few minutes to complete the written survey that you may receive in the mail after your visit with us. Thank you!             Your Updated Medication List - Protect others around you: Learn how to safely use, store and throw away your medicines at www.disposemymeds.org.          This list is accurate as of: 10/12/17  5:39 PM.  Always use your most recent med list.                   Brand Name Dispense Instructions for use Diagnosis    ADVIL 200 MG capsule   Generic drug:  ibuprofen      Reported on 4/20/2017        amLODIPine 2.5 MG tablet    NORVASC    90 tablet    Take 1 tablet (2.5 mg) by mouth daily 1 po at bedtime    Hypertension goal BP (blood pressure) < 140/90       aspirin 162 MG EC tablet      Take 162 mg by mouth daily Reported on 4/20/2017        CALCIUM 1200 PO      Reported on 4/20/2017        CLARITIN 10 MG capsule   Generic drug:  loratadine      Take 10 mg by mouth as needed Reported on 4/20/2017        gabapentin 300 MG capsule    NEURONTIN    270 capsule    Take 1 capsule (300 mg) by mouth 3 times daily    DJD (degenerative joint disease), lumbosacral, Spinal stenosis of lumbar region with neurogenic claudication       GLUCOSAMINE  1500 COMPLEX PO      Take 1,500 mg by mouth daily Reported on 4/20/2017        losartan-hydrochlorothiazide 100-25 MG per tablet    HYZAAR    90 tablet    Take 1 tablet by mouth daily    Hypertension goal BP (blood pressure) < 140/90       Multi-vitamin Tabs tablet   Generic drug:  multivitamin, therapeutic with minerals      1 TABLET DAILY        oxyCODONE-acetaminophen 5-325 MG per tablet    PERCOCET    120 tablet    Take 1-2 tablets by mouth every 8 hours as needed for pain maximum 4 tablet(s) per day    DJD (degenerative joint disease), lumbosacral, Spinal stenosis of lumbar region with neurogenic claudication       PRILOSEC PO      None Entered        SALMON OIL-1000 PO      take 1,000 Caps by mouth daily.        senna-docusate 8.6-50 MG per tablet    SENOKOT-S;PERICOLACE    120 tablet    Take 1 tablet by mouth 2 times daily    Slow transit constipation       VITAMIN C PO      None Entered        VITAMIN D PO      Take 1,200 mg by mouth daily.        vitamin E 400 UNIT capsule      None Entered

## 2017-10-12 NOTE — LETTER
10/12/2017         RE: Teresa Lopez  [address]  [facility] [address]        Dear Colleague,    Thank you for referring your patient, Teresa Lopez, to the [facility]. Please see a copy of my visit note below.    S:  4/28/17  Patient seen in consult from Demetrice Gutierrez and complains of ankle pain.  Points to lateral ankle gutter as to where worse pain is.  Has had this for 2 years.  states she was walking and heard a pop.   Describes it as a burning pain.  Aggrevated by activity and relieved by rest.  Slowly getting worse.  Uneven surfaces bother this.  Positive history of post static dyskinesia.    5/19/17  Patient states injection did not help at all and she is wondering if this went in the joint.    10/6/17  Last injection did not help.  Lateral ankle is where most of her pain is.  Wearing ankle brace.     10/12/17  Patient here with daughter in law today.  Her ankle still hurts.  Would again like to discuss options.         ROS:  Denies bruising, weakness, or numbness.       Allergies   Allergen Reactions     Sulfa Drugs        Current Outpatient Prescriptions   Medication Sig Dispense Refill     oxyCODONE-acetaminophen (PERCOCET) 5-325 MG per tablet Take 1-2 tablets by mouth every 8 hours as needed for pain maximum 4 tablet(s) per day 120 tablet 0     gabapentin (NEURONTIN) 300 MG capsule Take 1 capsule (300 mg) by mouth 3 times daily 270 capsule 3     losartan-hydrochlorothiazide (HYZAAR) 100-25 MG per tablet Take 1 tablet by mouth daily 90 tablet 3     amLODIPine (NORVASC) 2.5 MG tablet Take 1 tablet (2.5 mg) by mouth daily 1 po at bedtime 90 tablet 3     senna-docusate (SENOKOT-S;PERICOLACE) 8.6-50 MG per tablet Take 1 tablet by mouth 2 times daily 120 tablet 12     aspirin 162 MG EC tablet Take 162 mg by mouth daily Reported on 4/20/2017       Glucosamine-Chondroit-Vit C-Mn (GLUCOSAMINE 1500 COMPLEX PO) Take 1,500 mg by mouth daily Reported on 4/20/2017       Loratadine  (CLARITIN) 10 MG capsule Take 10 mg by mouth as needed Reported on 4/20/2017       Omega-3 Fatty Acids (SALMON OIL-1000 PO) take 1,000 Caps by mouth daily.       ADVIL 200 MG PO CAPS Reported on 4/20/2017       MULTI-VITAMIN OR TABS 1 TABLET DAILY       CALCIUM 1200 OR Reported on 4/20/2017       VITAMIN C OR None Entered       VITAMIN D OR Take 1,200 mg by mouth daily.       VITAMIN E 400 UNIT OR CAPS None Entered       PRILOSEC OR None Entered         Patient Active Problem List   Diagnosis     Cerebral infarction (H)     Jaw Pain     Arthritis of knee     CARDIOVASCULAR SCREENING; LDL GOAL LESS THAN 100     Hypertension goal BP (blood pressure) < 140/90     Advanced directives, counseling/discussion     Hyperlipidemia LDL goal <130     OA (OSTEOARTHRITIS) OF KNEE - right     CKD (chronic kidney disease) stage 3, GFR 30-59 ml/min     Chronic pain disorder     DJD (degenerative joint disease), lumbosacral     Spinal stenosis of lumbar region with neurogenic claudication     Slow transit constipation     Steroid-induced diabetes mellitus (H)     Dementia without behavioral disturbance, unspecified dementia type     Combined form of age-related cataract, mod, both eyes     Posterior vitreous detachment of both eyes       Past Medical History:   Diagnosis Date     Cataract 3/7/2012     CVA (cerebral infarction)      Diabetes mellitus (H)      DJD (degenerative joint disease)      HTN        Past Surgical History:   Procedure Laterality Date     HYSTERECTOMY, CERVIX STATUS UNKNOWN  age 31       Family History   Problem Relation Age of Onset     Arthritis Mother      Unknown/Adopted Father      adopted     DIABETES Brother      CANCER Brother      Hypertension No family hx of      CEREBROVASCULAR DISEASE No family hx of      Thyroid Disease No family hx of      Glaucoma No family hx of      Macular Degeneration No family hx of        Social History   Substance Use Topics     Smoking status: Never Smoker     Smokeless  tobacco: Never Used     Alcohol use 0.0 oz/week     0 Standard drinks or equivalent per week      Comment: 1 drink weekly         O:  Good historian.  A&O X 3.  Pulses DP, PT 2/4 b/l.  CRT < 3 seconds X 10 digits.  Bilateral mild ankle edema or varicosities noted.  Sensation to light touch intact b/l.  Reflexes 2/4 b/l.  Skin thin with scant hair b/l.  Normal arch with weightbearing.  No forefoot or rear foot deformities noted.  MS 5/5 all compartments.  Normal ROM all fore foot and rearfoot joints.  No equinus.    No pain with stressing any muscle compartments.  No erythema edema or ecchymosis or masses noted.  Pain with palpation lateral gutter ankle and less medial.    RIGHT ANKLE THREE OR MORE VIEWS 4/20/2017 11:12 AM      HISTORY: Concern for arthritis. Chronic lateral ankle pain.     COMPARISON: None.         IMPRESSION: No acute fracture or dislocation. There is arthritis in  the ankle joint with narrowing of the tibiotalar joint laterally. The  bones are demineralized. Multiple vascular calcifications.        A:  Right ankle joint arthritis.    P:  explained to both that she unfortunately has arthritis.  Injections have not really helped.  Will try to immobility, Rx for [place] AFO.  Other option is for referral to [facility] to discuss candidacy for ankle replacement.    RETURN TO CLINIC PRN.          German Lewis DPM, FACFAS      Again, thank you for allowing me to participate in the care of your patient.        Sincerely,        German Lewis DPM

## 2017-10-12 NOTE — PATIENT INSTRUCTIONS
We wish you continued good healing. If you have any questions or concerns, please do not hesitate to contact us at [phone]      Please remember to call and schedule a follow up appointment if one was recommended at your earliest convenience.   PODIATRY CLINIC HOURS  TELEPHONE NUMBER    Dr. German Lewis D.P.[facility]    Clinics:  [facility]        Angela Esteves MA  Medical Assistant  Tuesday 1PM-6PM  [facility]  Wednesday 7AM-2PM  [place]  Thursday 10AM-6PM  [place] Friday 7AM-345PM  [place]  Specialty schedulers:   [phone] to make an appointment with any Specialty Provider.        Urgent Care locations:    [facility] Monday-Friday 5 pm - 9 pm. Saturday-Sunday 9 am -5pm    Monday-Friday 11 am - 9 pm Saturday 9 am - 5 pm     Monday-Sunday 12 noon-8PM [phone]     If you need a medication refill, please contact us you may need lab work and/or a follow up visit prior to your refill (i.e. Antifungal medications).    [facility] (secure e-mail communication and access to your chart) to send a message or to make an appointment.    If MRI needed please call [facility] Pritchard at [phone]          Weight management plan: Patient was referred to their PCP to discuss a diet and exercise plan.

## 2017-10-18 DIAGNOSIS — M47.817 DJD (DEGENERATIVE JOINT DISEASE), LUMBOSACRAL: ICD-10-CM

## 2017-10-18 DIAGNOSIS — M48.062 SPINAL STENOSIS OF LUMBAR REGION WITH NEUROGENIC CLAUDICATION: ICD-10-CM

## 2017-10-18 RX ORDER — OXYCODONE AND ACETAMINOPHEN 5; 325 MG/1; MG/1
1-2 TABLET ORAL EVERY 8 HOURS PRN
Qty: 120 TABLET | Refills: 0 | Status: SHIPPED | OUTPATIENT
Start: 2017-10-18 | End: 2017-11-27

## 2017-10-18 NOTE — TELEPHONE ENCOUNTER
Script signed  Patient has upcoming appointment with pain team. She needs to keep this appointment  Please also remind her to schedule neuropsych testing. We discussed this at her last office visit. Call [phone] to schedule

## 2017-10-18 NOTE — TELEPHONE ENCOUNTER
Patient is requesting a written script for Percocet.  Last refill 9/7/17 # 120  Last OV 9/18/17    Tara Marlow RN [facility] Triage.

## 2017-10-18 NOTE — TELEPHONE ENCOUNTER
Patient notified that script is available for  at the [facility] . Message below communicated. Patient states that she scheduled the neuropysch testing for Dec 29th. TC does not see that in future appointment list. Patient will call to verify they have her on the schedule.

## 2017-10-27 ENCOUNTER — TELEPHONE (OUTPATIENT)
Dept: PALLIATIVE MEDICINE | Facility: CLINIC | Age: 82
End: 2017-10-27

## 2017-10-27 ENCOUNTER — OFFICE VISIT (OUTPATIENT)
Dept: PALLIATIVE MEDICINE | Facility: CLINIC | Age: 82
End: 2017-10-27
Payer: COMMERCIAL

## 2017-10-27 VITALS
BODY MASS INDEX: 27.88 KG/M2 | WEIGHT: 150 LBS | SYSTOLIC BLOOD PRESSURE: 154 MMHG | DIASTOLIC BLOOD PRESSURE: 87 MMHG | HEART RATE: 89 BPM

## 2017-10-27 DIAGNOSIS — M51.369 DDD (DEGENERATIVE DISC DISEASE), LUMBAR: ICD-10-CM

## 2017-10-27 DIAGNOSIS — M48.062 SPINAL STENOSIS OF LUMBAR REGION WITH NEUROGENIC CLAUDICATION: Primary | ICD-10-CM

## 2017-10-27 DIAGNOSIS — M53.3 SI (SACROILIAC) JOINT DYSFUNCTION: ICD-10-CM

## 2017-10-27 DIAGNOSIS — M47.817 LUMBOSACRAL SPONDYLOSIS WITHOUT MYELOPATHY: ICD-10-CM

## 2017-10-27 DIAGNOSIS — M79.18 MYOFASCIAL PAIN: ICD-10-CM

## 2017-10-27 PROCEDURE — 99354 ZZC PROLONGED SERV,OFFICE,1ST HR: CPT | Performed by: NURSE PRACTITIONER

## 2017-10-27 PROCEDURE — 99215 OFFICE O/P EST HI 40 MIN: CPT | Performed by: NURSE PRACTITIONER

## 2017-10-27 ASSESSMENT — PATIENT HEALTH QUESTIONNAIRE - PHQ9: SUM OF ALL RESPONSES TO PHQ QUESTIONS 1-9: 9

## 2017-10-27 ASSESSMENT — PAIN SCALES - GENERAL: PAINLEVEL: WORST PAIN (10)

## 2017-10-27 NOTE — MR AVS SNAPSHOT
After Visit Summary   10/27/2017    Teresa Lopez    MRN: [id number]           Patient Information     Date Of Birth          1935        Visit Information        Provider Department      10/27/2017 8:00 AM Marion Munoz APRN [facility]        Today's Diagnoses     Spinal stenosis of lumbar region with neurogenic claudication    -  1    DDD (degenerative disc disease), lumbar        Lumbosacral spondylosis without myelopathy        SI (sacroiliac) joint dysfunction        Myofascial pain          Care Instructions    PLAN:  1. Schedule with PHYSICAL THERAPY closer to your home  2. Continue gentle home activity  3. Medications:   1. I agree with the gabapentin current dosing  2. I agree with the Percocet at current dosing, will leave this with Demetrice Gutierrez your Primary Care Provider. Reduce this slowly as able (reduce by a half-tablet per day every few days as able)  3. Continue use of Tiger Balm at home, this is OK to use up to 4 times per day  4. Procedures: schedule lumbar epidural steroid injection. Our office to call you.  5. Follow-up with me in 8 weeks.    Nurse Triage line: [phone]   Call this number with any questions or concerns. You may leave a detailed message anytime. Calls are typically returned Monday through Friday between 8 AM and 4:30 PM. We usually get back to you within 2 business days depending on the issue/request.   Medication refills:     For non-narcotic medications, call your pharmacy directly to request a refill. The pharmacy will contact the [facility] for authorization. Please allow 3-4 days for these refills to be processed.     For narcotic refills, call the nurse triage line or send a [employer] message. Please contact us 7-10 days before your refill is due. The message MUST include the name of the specific medication(s) requested and how you would like to receive the prescription(s). The options are as follows:     Pain Clinic  staff can mail the prescription to your pharmacy. Please tell us the name of the pharmacy.     You may pick the prescription up at the Pain Clinic (tell us the location) or during a clinic visit with your pain provider     Pain Clinic staff can deliver the prescription to the [facility] pharmacy in the clinic building. Please tell us the location.   Scheduling number: [phone]. Call this number to schedule or change appointments.   We believe regular attendance is key to your success in our program.   Any time you are unable to keep your appointment we ask that you call us at least 24 hours in advance to let us know. This will allow us to offer the appointment time to another patient.                 Follow-ups after your visit        Additional Services     [facility] PT, HAND, AND CHIROPRACTIC REFERRAL       **This order will print in the [facility] Scheduling Office**    Physical Therapy, Hand Therapy and Chiropractic Care are available through:    *[facility]  *[facility]  *[facility]    Call one number to schedule at any of the above locations: [phone].    Your provider has referred you to: [facility] or [facility] physical therapy    Indication/Reason for Referral: chronic low back pain from lumbar spinal stenosis, lumbar DDD, lumbar spondylosis, SI joint dysfunction. She is an 82 year old that actually is quite active. She has positive grocery cart sign and some balance issues. Needs core work and balance and posture work. Evaluate for possible use of a walker  Onset of Illness: years ago  Therapy Orders: Evaluate and Treat  Special Programs: None  Special Request: None    Lucia Myers      Additional Comments for the Therapist or Chiropractor: see above    Please be aware that coverage of these services is subject to the terms and limitations of your health insurance plan.  Call member services at your health plan with any benefit or coverage  questions.      Please bring the following to your appointment:    *Your personal calendar for scheduling future appointments  *Comfortable clothing            PAIN INJECTION EVAL/TREAT/FOLLOW UP                 Who to contact     If you have questions or need follow up information about today's clinic visit or your schedule please contact [facility] directly at [phone].  Normal or non-critical lab and imaging results will be communicated to you by MyChart, letter or phone within 4 business days after the clinic has received the results. If you do not hear from us within 7 days, please contact the clinic through [employer] or phone. If you have a critical or abnormal lab result, we will notify you by phone as soon as possible.  Submit refill requests through [employer] or call your pharmacy and they will forward the refill request to us. Please allow 3 business days for your refill to be completed.          Additional Information About Your Visit        MyChart Information     [employer] gives you secure access to your electronic health record. If you see a primary care provider, you can also send messages to your care team and make appointments. If you have questions, please call your primary care clinic.  If you do not have a primary care provider, please call [phone] and they will assist you.        Care EveryWhere ID     This is your Care EveryWhere ID. This could be used by other organizations to access your [facility] medical records  [id number]        Your Vitals Were     Pulse BMI (Body Mass Index)                89 27.88 kg/m2           Blood Pressure from Last 3 Encounters:   10/27/17 154/87   09/18/17 (!) 134/92   07/31/17 132/79    Weight from Last 3 Encounters:   10/27/17 68 kg (150 lb)   10/12/17 70.3 kg (155 lb)   09/18/17 70.3 kg (155 lb)              We Performed the Following     [facility] PT, HAND, AND CHIROPRACTIC REFERRAL     PAIN INJECTION EVAL/TREAT/FOLLOW UP        Primary Care  Provider Office Phone # Fax #    MYRON Bose [facility] [phone]       [address] [facility] [address]        Equal Access to Services     ALTHEA MAYS : Hadii wilfrid ku hadmarleno Soomaali, waaxda luqadaha, qaybta kaalmada adeegyada, lori esmein hayaajohn nicolenuvia potts kaykay matias. So [facility] [place] [phone].    ATENCIÓN: Si habla español, tiene a brambila disposición servicios gratuitos de asistencia lingüística. Llame al [phone].    We comply with applicable federal civil rights laws and [place] laws. We do not discriminate on the basis of race, color, national origin, age, disability, sex, sexual orientation, or gender identity.            Thank you!     Thank you for choosing [facility]  for your care. Our goal is always to provide you with excellent care. Hearing back from our patients is one way we can continue to improve our services. Please take a few minutes to complete the written survey that you may receive in the mail after your visit with us. Thank you!             Your Updated Medication List - Protect others around you: Learn how to safely use, store and throw away your medicines at www.disposemymeds.org.          This list is accurate as of: 10/27/17  9:15 AM.  Always use your most recent med list.                   Brand Name Dispense Instructions for use Diagnosis    ADVIL 200 MG capsule   Generic drug:  ibuprofen      Reported on 4/20/2017        amLODIPine 2.5 MG tablet    NORVASC    90 tablet    Take 1 tablet (2.5 mg) by mouth daily 1 po at bedtime    Hypertension goal BP (blood pressure) < 140/90       aspirin 162 MG EC tablet      Take 162 mg by mouth daily Reported on 4/20/2017        CALCIUM 1200 PO      Reported on 4/20/2017        CLARITIN 10 MG capsule   Generic drug:  loratadine      Take 10 mg by mouth as needed Reported on 4/20/2017        gabapentin 300 MG capsule    NEURONTIN    270 capsule    Take 1 capsule (300 mg) by mouth 3 times daily    DJD  (degenerative joint disease), lumbosacral, Spinal stenosis of lumbar region with neurogenic claudication       GLUCOSAMINE 1500 COMPLEX PO      Take 1,500 mg by mouth daily Reported on 4/20/2017        losartan-hydrochlorothiazide 100-25 MG per tablet    HYZAAR    90 tablet    Take 1 tablet by mouth daily    Hypertension goal BP (blood pressure) < 140/90       Multi-vitamin Tabs tablet   Generic drug:  multivitamin, therapeutic with minerals      1 TABLET DAILY        oxyCODONE-acetaminophen 5-325 MG per tablet    PERCOCET    120 tablet    Take 1-2 tablets by mouth every 8 hours as needed for pain maximum 4 tablet(s) per day    DJD (degenerative joint disease), lumbosacral, Spinal stenosis of lumbar region with neurogenic claudication       PRILOSEC PO      None Entered        SALMON OIL-1000 PO      take 1,000 Caps by mouth daily.        senna-docusate 8.6-50 MG per tablet    SENOKOT-S;PERICOLACE    120 tablet    Take 1 tablet by mouth 2 times daily    Slow transit constipation       VITAMIN C PO      None Entered        VITAMIN D PO      Take 1,200 mg by mouth daily.        vitamin E 400 UNIT capsule      None Entered

## 2017-10-27 NOTE — TELEPHONE ENCOUNTER
Pre-screening Questions for Radiology Injections:    Injection to be done at which interventional clinic site? [facility]    Procedure ordered by Marion Munoz    Procedure ordered? Lumbar Epidural Steroid Injection    What insurance would patient like us to bill for this procedure? [employer], Medicare      Worker's comp-Any injection DO NOT SCHEDULE and route to Monica Andujar.      [employer] insurance - For SI joint injections, DO NOT SCHEDULE and route Stacy Phoenix.      [employer]- MBB's must be scheduled at LEAST two weeks apart      [employer] - Any injection besides hip/shoulder/knee joint DO NOT SCHEDULE and route to Stacy Phoenix. She will obtain PA and call pt back to schedule procedure or notify pt of denial.     HP CIGNA-PA REQUIRED FOR NON-DELFINA OR Joint injections    Any chance of pregnancy? NO   If YES, do NOT schedule and route to RN pool    Is an  needed? No     Patient has a drive home? (mandatory) YES: INFORMED    Is patient taking any blood thinners (plavix, coumadin, jantoven, warfarin, heparin, pradaxa or dabigatran )? No   If hold needed, do NOT schedule, route to RN pool     Is patient taking any aspirin products? No     If more than 325mg/day do NOT schedule; route to RN pool     For CERVICAL procedures, hold all aspirin products for 6 days.      Does the patient have a bleeding or clotting disorder? No     If YES, okay to schedule AND route to RN nurse pool    **For any patients with platelet count <100, must be forwarded to provider**    Is patient diabetic?  No  If YES, have them bring their glucometer.    Does patient have an active infection or treated for one within the past week? No     Is patient currently taking any antibiotics?  No     For patients on chronic, preventative, or prophylactic antibiotics, procedures may be scheduled.     For patients on antibiotics for active or recent infection:    Maral Jacobson Burton,  [place]-antibiotic course must have been completed for 4 days    Dr. Paulino-antibiotic course must have been completed for 7 days    Is patient currently taking any steroid medications? (i.e. Prednisone, Medrol)  No     For patients on steroid medications:    Maral Jacobson Burton, [place]-steroid course must have been completed for 4 days    -steroid course must have been completed for 7 days    Reviewed with patient:  If you are started on any steroids or antibiotics between now and your appointment, you must contact us because it may affect our ability to perform your procedure.  Yes    Is patient actively being treated for cancer or immunocompromised? No  If YES, do NOT schedule and route to RN pool     Are you able to get on and off an exam table with minimal or no assistance? Yes  If NO, do NOT schedule and route to RN pool    Are you able to roll over and lay on your stomach with minimal or no assistance? Yes  If NO, do NOT schedule and route to RN pool     Any allergies to contrast dye, iodine, shellfish, or numbing and steroid medications? No  If YES, route to RN pool AND add allergy information to appointment notes    Allergies: Sulfa drugs      Has the patient had a flu shot or any other vaccinations within 7 days before or after the procedure.  No     Does patient have an MRI/CT?  YES: MRI  (SI joint, hip injections, lumbar sympathetic blocks, and stellate ganglion blocks do not require an MRI)    Was the MRI done w/in the last 3 years?  Yes    Was MRI done at [facility]? Yes      If not, where was it done? N/A       If MRI was not done at [facility], [facility] or [facility] do NOT schedule and route to nursing.  If pt has an imaging disc, the injection may be scheduled but pt has to bring disc to appt. If they show up w/out disc the injection cannot be done    Reminders (please tell patient if applicable):       Instructed pt to arrive 30 minutes early for IV start if this is for a cervical  procedure, ALL sympathetic (stellate ganglion, hypogastric, or lumbar sympathetic block) and all sedation procedures (RFA, spinal cord stimulation trials).  Not Applicable   -IVs are not routinely placed for Dr. Laura cervical cases   -Dr. Eid: IVs for cervical ESIs and cervical TBDs (not CMBBs/facet inj)      If NPO for sedation, informed patient that it is okay to take medications with sips of water (except if they are to hold blood thinners).  Not Applicable   *DO take blood pressure medication if it is prescribed*      If this is for a cervical DELFINA, informed patient that aspirin needs to be held for 6 days.   Not Applicable      For all patients not having spinal cord stimulator (SCS) trials or radiofrequency ablations (RFAs), informed patient:    IV sedation is not provided for this procedure.  If you feel that an oral anti-anxiety medication is needed, you can discuss this further with your referring provider or primary care provider.  The Pain Clinic provider will discuss specifics of what the procedure includes at your appointment.  Most procedures last 10-20 minutes.  We use numbing medications to help with any discomfort during the procedure.  Not Applicable      Do not schedule procedures requiring IV placement in the first appointment of the day or first appointment after lunch. N/A      For patients 85 or older we recommend having an adult stay w/ them for the remainder of the day.   N/A    Does the patient have any questions?  NO  Liliya Cain  [facility]

## 2017-10-27 NOTE — PROGRESS NOTES
[facility] Consultation    Date of visit: 10/27/2017    Reason for consultation:    Teresa Lopez is a 82 year old female who is seen in consultation today at the request of her provider, Demetrice SANCHES CNP re: patient's DJD lumbar spine, SI joint pain, spinal stenosis with neurogenic claudication      Primary Care Provider is Demetrice Gutierrez.  Pain medications are being prescribed by Demetrice SANCHES CNP    Please see the [facility] health questionnaire which the patient completed and reviewed with me in detail.    Chief Complaint:  Low back pain, neck pain, shoulder pain, bilateral knee pain and bilateral ankle pain. The low back pain is the worst  Chief Complaint   Patient presents with     Pain     Back pain        Pain history:  Teresa Lopez is a 82 year old female who first started having problems with pain as follows:     -chronic low back pain  She has had low back pain for about 4 years, came on slowly over time. Her pain is in the low back and is worst when she stands and walks. Her pain goes away when she sits down or lays down.     -neck pain  She has had neck pain. She has some shoulder pain and notes she has numbness in the first 4 fingers of both hands, worst on the right side. Pain is in the posterior neck and radiates down to the upper shoulder girdle. She had some shooting pain in the left shoulder recently. She denies pain radiating down her arms.     -bilateral knee pain  She has medial joint pain in both knees. She does need to wear knee braces that she wore when she used to ice skate. She ice skated for years and quit only about 10 years ago.    -bilateral ankle pain  She has had this for several years. She has had injections into the ankle joints but these were not very helpful. She is being fit for a right ankle boot.     She is very active. She watches dancing and dances as she is able.     Pain rating: intensity ranges from 0/10 to 10/10,  "and Averages unable to say on a 0-10 scale.    Describes pain as \"aching.\"  Pain is constant when she is up.    Home self care includes: wears a pain belt that is helpful (this is like a abdominal binder or low back belt). Wears 2 wraps on her right ankle    Aggravating factors include: walking and standing    Relieving factors include: sitting down or lying down    Any bowel or bladder incontinence: none    Current pain-related medication treatments include:  -Percocet 5.325mg may take 1-2 tabs Q 8 hours PRN pain (she uses 4 tabs per day, not very helpful)  -gabapentin 300mg TID (she is not sure)      Other pertinent medications:  -Senna-docusate PRN     Previous medication treatments included:  OPIATES:hydrocodone (not helpful), oxycodone (somewhat helpful)  NSAIDS: ibuprofen (not helpful), Aleve (not helpful)  MUSCLE RELAXANTS: none  ANTI-MIGRAINE MEDS: none  ANTI-DEPRESSANTS: none  SLEEP AIDS: none  ANTI-CONVULSANTS: gabapentin (unsure if helpful)  TOPICALS: none  Other meds: Tylenol (not helpful)      Other treatments have included:  Teresa Lopez has not been seen at a pain clinic in the past.    PT: none  Chiropractic care: tried, helpful  Acupuncture: none  TENs Unit: none    Injections:   7/12/2017 bilateral L4-5 and L5-S1 facet joint injections with Dr. Bushra Alicia (somewhat helpful, relief did not last long)    Past Medical History:  Past Medical History:   Diagnosis Date     Cataract 3/7/2012     CVA (cerebral infarction)      Diabetes mellitus (H)      DJD (degenerative joint disease)      HTN      Past Surgical History:  Past Surgical History:   Procedure Laterality Date     HYSTERECTOMY, CERVIX STATUS UNKNOWN  age 31     Medications:  Current Outpatient Prescriptions   Medication Sig Dispense Refill     oxyCODONE-acetaminophen (PERCOCET) 5-325 MG per tablet Take 1-2 tablets by mouth every 8 hours as needed for pain maximum 4 tablet(s) per day 120 tablet 0     gabapentin (NEURONTIN) 300 MG capsule " Take 1 capsule (300 mg) by mouth 3 times daily 270 capsule 3     losartan-hydrochlorothiazide (HYZAAR) 100-25 MG per tablet Take 1 tablet by mouth daily 90 tablet 3     amLODIPine (NORVASC) 2.5 MG tablet Take 1 tablet (2.5 mg) by mouth daily 1 po at bedtime 90 tablet 3     senna-docusate (SENOKOT-S;PERICOLACE) 8.6-50 MG per tablet Take 1 tablet by mouth 2 times daily 120 tablet 12     aspirin 162 MG EC tablet Take 162 mg by mouth daily Reported on 4/20/2017       Glucosamine-Chondroit-Vit C-Mn (GLUCOSAMINE 1500 COMPLEX PO) Take 1,500 mg by mouth daily Reported on 4/20/2017       Loratadine (CLARITIN) 10 MG capsule Take 10 mg by mouth as needed Reported on 4/20/2017       Omega-3 Fatty Acids (SALMON OIL-1000 PO) take 1,000 Caps by mouth daily.       ADVIL 200 MG PO CAPS Reported on 4/20/2017       MULTI-VITAMIN OR TABS 1 TABLET DAILY       CALCIUM 1200 OR Reported on 4/20/2017       VITAMIN C OR None Entered       VITAMIN D OR Take 1,200 mg by mouth daily.       VITAMIN E 400 UNIT OR CAPS None Entered       PRILOSEC OR None Entered       Allergies:     Allergies   Allergen Reactions     Sulfa Drugs      Social History:  Home situation: . 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  Tobacco use: none  Alcohol use: very little, about 2 drinks per month  Drug use: none  History of chemical dependency treatment: none    Family history:  Family History   Problem Relation Age of Onset     Arthritis Mother      Unknown/Adopted Father      adopted     DIABETES Brother      CANCER Brother      Hypertension No family hx of      CEREBROVASCULAR DISEASE No family hx of      Thyroid Disease No family hx of      Glaucoma No family hx of      Macular Degeneration No family hx of          Review of Systems:  Skin: negative  Eyes: headache  Ears/Nose/Throat: negative  Respiratory: No shortness of breath, dyspnea on exertion, cough, or  hemoptysis  Cardiovascular: negative  Gastrointestinal: negative  Genitourinary: negative  Musculoskeletal: back pain, neck pain, arthritis, joint pain and joint stiffness  Neurologic: numbness or tingling of hands  Psychiatric: negative  Hematologic/Lymphatic/Immunologic: negative  Endocrine: negative    Physical Exam:  Vitals:    10/27/17 0758   BP: 154/87   Pulse: 89   Weight: 68 kg (150 lb)     Exam:  Constitutional: healthy, alert and no distress  Head: normocephalic. Atraumatic.   Eyes: no redness or jaundice noted   ENT: oropharnx normal.  MMM.  Neck supple.    Cardiovascular: RRR no m/g/r   Respiratory: clear to auscultation A/P. Respirations easy and unlabored. Able to speak in full sentences without SOB or cough noted.   Gastrointestinal: soft, non-tender, normoactive bowel sounds   : deferred  Skin: no suspicious lesions or rashes  Psychiatric: mentation appears normal and affect normal/bright    Musculoskeletal exam:  Gait/Station/Posture: fair posture  Normal gait. Able to rise onto toes and heels. Able to perform tandem gait.    Cervical spine:    Flex:  40 degrees   Ext: 20 degrees   Rotation to right: 85 degrees   Rotation to left: 85 degrees       Thoracic spine:  Normal     Lumbar spine:    Flex:  >90 degrees   Ext: 20 degrees   Rotation/ext to right: painful    Rotation/ext to left: painful    SI joints: tenderness bilaterally   Piriformis: non-tender   Greater trochanters: non-tender    Myofascial tenderness:  Lower lumbar paraspinals  Straight leg exam: negative  Aurelio's maneuver: positive bilaterally    Neurologic exam:  CN:  Cranial nerves 2-12 are  Grossly normal  Motor:  5/5 UE and LE strength/  Reflexes:     Biceps:     R:  2+/4 L: 2+/4   Brachioradialis   R:  2+/4 L: 2+/4      Patella:  R:  2+/4 L: 2+/4   Achilles:  R:  1+/4 L: 1+/4  Other reflexes:  Toes downgoing   Sensory:  (upper and lower extremities):   Light touch: normal    Vibration: normal    Pin prick: normal    Allodynia:  absent    Dysethesia: absent    Hyperalgesia: absent     Diagnostic tests:  MR LUMBAR SPINE WITHOUT CONTRAST  3/17/2017 2:44 PM     HISTORY:  Lumbar back pain with neurogenic claudication. Assess for  stenosis and whether patient candidate for epidural injection. Other  intervertebral disc degeneration, lumbosacral region. Spinal stenosis,  lumbar region.     TECHNIQUE: Sagittal T1 and T2, sagittal IR, and transverse proton  density and T2-weighted pulse sequences.     FINDINGS: Five lumbar vertebrae are assumed. Degenerative lumbar  scoliosis is noted, apex to the right at L3. Degenerative  retrolisthesis is noted at L3-L4 and L4-L5. Vertebral body heights are  well maintained. Advanced apophyseal joint degenerative changes are  noted at L3-L4 through L5-S1 without periarticular reactive marrow  edema. Advanced degenerative disc disease is noted, greatest from  L1-L2 through L3-L4.      L1-L2: Diffuse annular bulge with moderate foraminal stenosis, left  greater than right. Mild central stenosis.     L2-L3: Annular bulge and prominence of the dorsal epidural fat  resulting in moderate central stenosis. Moderate to severe left and  mild right foraminal stenosis.     L3-L4: Diffuse annular bulge and ligamentum flavum thickening  resulting in moderate to severe central and bilateral subarticular  lateral stenosis. Moderate bilateral foraminal stenosis.     L4-L5: Ligamentum flavum thickening and degenerative spondylolisthesis  resulting in moderate to severe central and bilateral subarticular  lateral stenosis. Moderate to severe bilateral foraminal stenosis.     L5-S1: Mild annular bulge. No central or foraminal stenosis.         IMPRESSION:  1. 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  2. Advanced multilevel degenerative disc disease, worst from L1-L2  through L3-L4.  3. Multilevel central stenosis, greatest at L4-L5,  L3-L4, and L2-L3.  4. Multilevel foraminal stenosis, greatest on the left at L2-L3 and  L3-L4.    Other testing (labs, diagnostics):  2/17/2017  Cr. 1.17  Est GFR 44      Screening tools:    PHQ-9 score today is 9. Patient denies any suicidal ideation.     DIRE Score for ongoing opioid management is calculated as follows:    Diagnosis = 2-3    Intractability = 2    Risk: Psych = 2  Chem Hlth = 2  Reliability = 2  Social = 3    Efficacy = 2    Total DIRE Score = 15-16 (14 or higher predicts good candidate for ongoing opioid management; 13 or lower predicts poor candidate for opioid management)     Assessment:  1. Lumbar spinal stenosis with neurogenic claudication (pain with standing or walking that goes away when sit or lie down)  2. Lumbar DDD  3. Lumbar spondylosis (pain with extension/rotation of the lumbar spine indicates facetogenic component of pain)  4. SI joint dysfunction bilaterally  5. Myofascial pain  6. PMHx includes: HTN, DDD, diabetes, CVA, cataracts  7. PSHx includes: hysterectomy (age 31)      Plan:  Diagnosis reviewed, treatment option addressed, and risk/benefits discussed.  Self-care instructions given.  I am recommending a multidisciplinary treatment plan to help this patient better manage her pain.      1. Physical Therapy: schedule with PHYSICAL THERAPY closer to her home with [facility]  2. Clinical Health Psychologist to address issues of relaxation, behavioral change, coping style, and other factors important to improvement: none  3. Introductory groups: not ordered  4. Diagnostic Studies: none  5. Medication Management:   1. I agree with current gabapentin dosing  2. I agree with current Percocet use. Will leave this with Demetrice Gutierrez her Primary Care Provider. Patient may reduce this slowly as able (instructed if she reduces not to reduce more than a half-tablet per day every few days AS ABLE)  3. Continue use of Tiger Balm ointment at home, this is OK to use up to QID PRN  6. Further  procedures recommended: schedule lumbar DELFINA, our office to contact pt  7. Acupuncture: check with insurer and I will place order if covered  8. Urine toxicology screen today: none here. Recommend yearly and opiate agreement with Primary Care Provider   9. Recommendations/follow-up for PCP:  See above  10. Release of information: none  11. Follow up: 8 weeks    Total time spent was 75 minutes, and more than 50% of face to face time was spent in counseling and/or coordination of care regarding principles of multidisciplinary care, medication management, and interventional options.       Marion SANCHES, RN CNP, FNP  [facility]

## 2017-10-27 NOTE — PATIENT INSTRUCTIONS
PLAN:  1. Schedule with PHYSICAL THERAPY closer to your home  2. Continue gentle home activity  3. Medications:   1. I agree with the gabapentin current dosing  2. I agree with the Percocet at current dosing, will leave this with Demetrice Gutierrez your Primary Care Provider. Reduce this slowly as able (reduce by a half-tablet per day every few days as able)  3. Continue use of Tiger Balm at home, this is OK to use up to 4 times per day  4. Procedures: schedule lumbar epidural steroid injection. Our office to call you.  5. Follow-up with me in 8 weeks.    Nurse Triage line: [phone]   Call this number with any questions or concerns. You may leave a detailed message anytime. Calls are typically returned Monday through Friday between 8 AM and 4:30 PM. We usually get back to you within 2 business days depending on the issue/request.   Medication refills:     For non-narcotic medications, call your pharmacy directly to request a refill. The pharmacy will contact the [facility] Management Center for authorization. Please allow 3-4 days for these refills to be processed.     For narcotic refills, call the nurse triage line or send a [facility] message. Please contact us 7-10 days before your refill is due. The message MUST include the name of the specific medication(s) requested and how you would like to receive the prescription(s). The options are as follows:     Pain Clinic staff can mail the prescription to your pharmacy. Please tell us the name of the pharmacy.     You may pick the prescription up at the Pain Clinic (tell us the location) or during a clinic visit with your pain provider     Pain Clinic staff can deliver the prescription to the [place] pharmacy in the clinic building. Please tell us the location.   Scheduling number: [phone]. Call this number to schedule or change appointments.   We believe regular attendance is key to your success in our program.   Any time you are unable to keep your appointment we ask  that you call us at least 24 hours in advance to let us know. This will allow us to offer the appointment time to another patient.

## 2017-10-27 NOTE — NURSING NOTE
"Chief Complaint   Patient presents with     Pain       Initial /87  Pulse 89  Wt 68 kg (150 lb)  BMI 27.88 kg/m2 Estimated body mass index is 27.88 kg/(m^2) as calculated from the following:    Height as of 7/31/17: 1.562 m (5' 1.5\").    Weight as of this encounter: 68 kg (150 lb).  Medication Reconciliation: complete     An Menjivar MA     "

## 2017-10-27 NOTE — Clinical Note
Mahin Suresh I am leaving her opiates and gabapentin with you. What an active 82 year old. We will start with a LESI for her neurogenic claudication. She may benefit from other injections, but it is the back pain and claudication symptoms that trouble her the most.  Thanks. Marion SANCHES [facility]

## 2017-11-10 ENCOUNTER — RADIOLOGY INJECTION OFFICE VISIT (OUTPATIENT)
Dept: PALLIATIVE MEDICINE | Facility: CLINIC | Age: 82
End: 2017-11-10
Payer: COMMERCIAL

## 2017-11-10 ENCOUNTER — RADIANT APPOINTMENT (OUTPATIENT)
Dept: RADIOLOGY | Facility: CLINIC | Age: 82
End: 2017-11-10
Attending: ANESTHESIOLOGY

## 2017-11-10 ENCOUNTER — TELEPHONE (OUTPATIENT)
Dept: FAMILY MEDICINE | Facility: CLINIC | Age: 82
End: 2017-11-10

## 2017-11-10 VITALS — OXYGEN SATURATION: 99 % | HEART RATE: 77 BPM | DIASTOLIC BLOOD PRESSURE: 75 MMHG | SYSTOLIC BLOOD PRESSURE: 137 MMHG

## 2017-11-10 DIAGNOSIS — M54.16 LUMBAR RADICULOPATHY: ICD-10-CM

## 2017-11-10 DIAGNOSIS — M47.817 DJD (DEGENERATIVE JOINT DISEASE), LUMBOSACRAL: ICD-10-CM

## 2017-11-10 DIAGNOSIS — M48.062 SPINAL STENOSIS OF LUMBAR REGION WITH NEUROGENIC CLAUDICATION: ICD-10-CM

## 2017-11-10 DIAGNOSIS — M48.062 SPINAL STENOSIS OF LUMBAR REGION WITH NEUROGENIC CLAUDICATION: Primary | ICD-10-CM

## 2017-11-10 PROCEDURE — 62323 NJX INTERLAMINAR LMBR/SAC: CPT | Performed by: ANESTHESIOLOGY

## 2017-11-10 ASSESSMENT — PAIN SCALES - GENERAL
PAINLEVEL: MILD PAIN (2)
PAINLEVEL: WORST PAIN (10)

## 2017-11-10 NOTE — PROGRESS NOTES
Pre procedure Diagnosis: Lumbar radiculopathy, lumbar stenosis, lumbar degenerative disc disease  Post procedure Diagnosis: Same  Procedure performed: caudal epidural steroid injection utilizing an epidural catheter, fluoroscopically guided, contrast controlled  Anesthesia: local  Complications: none  Operators: Trent Bajwa MD     Indications:   Teresa Lopez is a 82 year old female was sent by MYRON Simon for lumbar epidural steroid injection.  They have a history of chronic lower back pain with intermittent lower extremity pain and pain radiating into the left groin.  Exam shows lumbar tenderness, forward flexed, antalgic gait, straight leg raise was not performed and they have tried conservative treatment including activity modifications, medications, and previous interventional procedures.    MRI was done on 3/17/17 which showed   FINDINGS: Five lumbar vertebrae are assumed. Degenerative lumbar  scoliosis is noted, apex to the right at L3. Degenerative  retrolisthesis is noted at L3-L4 and L4-L5. Vertebral body heights are  well maintained. Advanced apophyseal joint degenerative changes are  noted at L3-L4 through L5-S1 without periarticular reactive marrow  edema. Advanced degenerative disc disease is noted, greatest from  L1-L2 through L3-L4.      L1-L2: Diffuse annular bulge with moderate foraminal stenosis, left  greater than right. Mild central stenosis.     L2-L3: Annular bulge and prominence of the dorsal epidural fat  resulting in moderate central stenosis. Moderate to severe left and  mild right foraminal stenosis.     L3-L4: Diffuse annular bulge and ligamentum flavum thickening  resulting in moderate to severe central and bilateral subarticular  lateral stenosis. Moderate bilateral foraminal stenosis.     L4-L5: Ligamentum flavum thickening and degenerative spondylolisthesis  resulting in moderate to severe central and bilateral subarticular  lateral stenosis. Moderate to severe  bilateral foraminal stenosis.     L5-S1: Mild annular bulge. No central or foraminal stenosis.         IMPRESSION:  1. 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  2. Advanced multilevel degenerative disc disease, worst from L1-L2  through L3-L4.  3. Multilevel central stenosis, greatest at L4-L5, L3-L4, and L2-L3.  4. Multilevel foraminal stenosis, greatest on the left at L2-L3 and  L3-L4.    Options/alternatives, benefits and risks were discussed with the patient including bleeding, infection, tissue trauma, numbness, weakness, paralysis, spinal cord injury, radiation exposure, headache, reaction to medications including seizure, and effects on the bladder from local anesthetic.  Questions were answered to her satisfaction and she agrees to proceed. Voluntary informed consent was obtained and signed.     Vitals were reviewed: Yes  /70  Pulse 82  Allergies were reviewed:  Yes   Medications were reviewed:  Yes   Pre-procedure pain score: 10/10    Procedure:  After obtaining signed informed consent, patient was brought into the procedure suite and was placed in a prone position on the procedure table.   A Pause for the Cause was performed.  Patient was prepped and draped in the usual sterile fashion.     The sacral hiatus and cornua were palpated.  Under lateral fluoroscopic guidance sacral hiatus was identified.  2 ml of lidocaine 1% was then injected at the needle entry point to anesthetize the skin. Then a 17 gauge 3.5 inch Tuohy needle was inserted into the sacral hiatus utilizing fluoroscopic guidance.  Aspiration was negative for blood or CSF.  Needle placement was verified by injecting Omnipaque 300 0.5 ml utilizing real time fluoroscopy that showed good needle placement and adequate spread in the lower sacral epidural space without intravascular or intrathecal uptake.  Then, an epidural catheter was  advanced through the Tuohy needle into the epidural space without resistance.  Aspiration was negative.  Catheter placement was verified by injecting Omnipaque 300 1 ml under real time fluoroscopy that showed catheter placement at L5-S1 with epidural spread without intravascular or intrathecal uptake.  Omnipaque wasted:  8.5 ml.    Then, after repeated negative aspiration, a test dose was performed by injecting 1 ml of Lidocaine 1 % into the lumbar epidural space which was negative for heart rate or blood pressure changes, tinnitus, metallic taste, lower extremity paresthesias, or other complaints.    Then, after repeated negative aspiration, a combination of Depomedrol 40 mg, Decadron 5 mg, Bupivicaine 0.5 % 1 ml, diluted with 3.5 ml of normal saline for a total injectate volume of 6 ml was injected in a slow and incremental fashion and the needle and catheter were withdrawn.  The injection site was cleaned and sterile dressing applied.     The patient tolerated the procedure well without complications and was taken to the recovery area for continued observation.  They were subsequently discharged to home in good condition after meeting discharge criteria.      Images were saved to PACS.    Post-procedure pain score: 2/10  Follow-up includes:   -f/u phone call in one week  -f/u with referring provider    Trent Bajwa MD  [facility] [place]         no

## 2017-11-10 NOTE — TELEPHONE ENCOUNTER
Reason for Call:  Other prescription    Detailed comments: Patient has questions on wether or not she should continue using her pain medication with the epidural that she had today.    Phone Number Patient can be reached at: Home number on file [phone] (home)    Best Time: Anyrime    Can we leave a detailed message on this number? YES    Call taken on 11/10/2017 at 3:54 PM by Susana Kwon

## 2017-11-10 NOTE — NURSING NOTE
"Chief Complaint   Patient presents with     Pain       Initial /70  Pulse 82 Estimated body mass index is 27.88 kg/(m^2) as calculated from the following:    Height as of 7/31/17: 1.562 m (5' 1.5\").    Weight as of 10/27/17: 68 kg (150 lb).  Medication Reconciliation: complete     Pre-procedure Intake    Have you been fasting? NA    If yes, for how long? N/A     Are you taking a prescribed blood thinner such as coumadin, Plavix, Xarelto?    No    If yes, when did you take your last dose? N/A     Do you take aspirin?  Yes    If cervical procedure, have you held aspirin for 6 days?   No     Do you have any allergies to contrast dye, iodine, steroid and/or numbing medications?  NO    Are you currently taking antibiotics or have an active infection?  NO    Have you had a fever/elevated temperature within the past week? NO    Are you currently taking oral steroids? NO    Do you have a ? Yes       Are you pregnant or breastfeeding?  NO    Are the vital signs normal?  Yes    An Menjivar MA            "

## 2017-11-10 NOTE — TELEPHONE ENCOUNTER
I called patient who advises she had her epidural today.   She says it went well but she wonders if she is supposed to keep taking her gabapentin and percocet.    I advised her to definitely keep taking the gabapentin as it is unsafe to suddenly stop that.    Advised her the injection should slowly improve her condition and generally at full effect in about 2 weeks.   She can continue to use the percocet prn but would expect she could taper down over the next week or so.      She wonders if she needs to be seen to discuss the gabapentin plan.    She was last seen by Demetrice 9/18/17.       Routed to Demetrice to advise.    Cherelle Holden RN  [facility]

## 2017-11-10 NOTE — PATIENT INSTRUCTIONS
[facility]   Procedure Discharge Instructions    Today you saw: Dr. Trent Bajwa    You had an:  Caudal Lumbar Epidural steroid injection     Medications used:  Lidocaine Lidocaine with Epi  Bupivacaine   Dexamethasone Depo-medrol  Omnipaque Normal saline          Be cautious when walking. Numbness and/or weakness in the lower extremities may occur for up to 6-8 hours after the procedure due to effect of the local anesthetic    Do not drive for 6 hours. The effect of the local anesthetic could slow your reflexes.     You may resume your regular activities after 24 hours    Avoid strenuous activity for the first 24 hours    You may shower, however avoid swimming, tub baths or hot tubs for 24 hours following your procedure    You may have a mild to moderate increase in pain for several days following the injection.    It may take up to 14 days for the steroid medication to start working although you may feel the effect as early as a few days after the procedure.       You may use ice packs for 10-15 minutes, 3 to 4 times a day at the injection site for comfort    Do not use heat to painful areas for 6 to 8 hours. This will give the local anesthetic time to wear off and prevent you from accidentally burning your skin.     You may use anti-inflammatory medications (such as Ibuprofen or Aleve or Advil) or Tylenol for pain control if necessary    If you have diabetes, check your blood sugar more frequently than usual as your blood sugar may be higher than normal for 10-14 days following a steroid injection. Contact your doctor who manages your diabetes if your blood sugar is higher than usual    If you experience any of the following, call the pain center nursing line during work hours at [phone] or the after hours provider line at [phone]:  -Fever over 100 degree F  -Swelling, bleeding, redness, drainage, warmth at the injection site  -Progressive weakness or numbness in your legs    -Loss of bowel or bladder function  -Unusual new onset of pain that is not improving      Phone #s:  Appointment line: [phone];  Nurse line: [phone]

## 2017-11-10 NOTE — NURSING NOTE
Discharge Information    IV Discontiued Time:  NA    Amount of Fluid Infused:  NA    Discharge Criteria = When patient returns to baseline or as per MD order    Consciousness:  Pt is fully awake    Circulation:  BP +/- 20% of pre-procedure level    Respiration:  Patient is able to breathe deeply    O2 Sat:  Patient is able to maintain O2 Sat >92% on room air    Activity:  Moves 4 extremities on command    Ambulation:  Patient is able to stand and walk or stand and pivot into wheelchair    Dressing:  Clean/dry or No Dressing    Notes:   Discharge instructions and AVS given to patient    Patient meets criteria for discharge?  YES    Admitted to PCU?  No    Responsible adult present to accompany patient home?  Yes    Signature/Title:    Tonya Umaña RN Care Coordinator  [facility]

## 2017-11-10 NOTE — MR AVS SNAPSHOT
After Visit Summary   11/10/2017    Teresa Lopez    MRN: [id number]           Patient Information     Date Of Birth          1935        Visit Information        Provider Department      11/10/2017 8:15 AM Trent Patel MD [facility]        Care Instructions    [facility]   Procedure Discharge Instructions    Today you saw: Dr. Trent Bajwa    You had an:  Caudal Lumbar Epidural steroid injection     Medications used:  Lidocaine Lidocaine with Epi  Bupivacaine   Dexamethasone Depo-medrol  Omnipaque Normal saline          Be cautious when walking. Numbness and/or weakness in the lower extremities may occur for up to 6-8 hours after the procedure due to effect of the local anesthetic    Do not drive for 6 hours. The effect of the local anesthetic could slow your reflexes.     You may resume your regular activities after 24 hours    Avoid strenuous activity for the first 24 hours    You may shower, however avoid swimming, tub baths or hot tubs for 24 hours following your procedure    You may have a mild to moderate increase in pain for several days following the injection.    It may take up to 14 days for the steroid medication to start working although you may feel the effect as early as a few days after the procedure.       You may use ice packs for 10-15 minutes, 3 to 4 times a day at the injection site for comfort    Do not use heat to painful areas for 6 to 8 hours. This will give the local anesthetic time to wear off and prevent you from accidentally burning your skin.     You may use anti-inflammatory medications (such as Ibuprofen or Aleve or Advil) or Tylenol for pain control if necessary    If you have diabetes, check your blood sugar more frequently than usual as your blood sugar may be higher than normal for 10-14 days following a steroid injection. Contact your doctor who manages your diabetes if your blood sugar is higher than usual    If  you experience any of the following, call the pain center nursing line during work hours at [phone] or the after hours provider line at [phone]:  -Fever over 100 degree F  -Swelling, bleeding, redness, drainage, warmth at the injection site  -Progressive weakness or numbness in your legs   -Loss of bowel or bladder function  -Unusual new onset of pain that is not improving      Phone #s:  Appointment line: [phone];  Nurse line: [phone]              Follow-ups after your visit        Who to contact     If you have questions or need follow up information about today's clinic visit or your schedule please contact [facility] directly at [phone].  Normal or non-critical lab and imaging results will be communicated to you by [employer], letter or phone within 4 business days after the clinic has received the results. If you do not hear from us within 7 days, please contact the clinic through "[employer]t or phone. If you have a critical or abnormal lab result, we will notify you by phone as soon as possible.  Submit refill requests through [employer] or call your pharmacy and they will forward the refill request to us. Please allow 3 business days for your refill to be completed.          Additional Information About Your Visit        [employer] Information     [employer] gives you secure access to your electronic health record. If you see a primary care provider, you can also send messages to your care team and make appointments. If you have questions, please call your primary care clinic.  If you do not have a primary care provider, please call [phone] and they will assist you.        Care EveryWhere ID     This is your Care EveryWhere ID. This could be used by other organizations to access your [facility] medical records  [id number]        Your Vitals Were     Pulse                   82            Blood Pressure from Last 3 Encounters:   11/10/17 112/70   10/27/17 154/87   09/18/17 (!) 134/92     Weight from Last 3 Encounters:   10/27/17 68 kg (150 lb)   10/12/17 70.3 kg (155 lb)   09/18/17 70.3 kg (155 lb)              Today, you had the following     No orders found for display       Primary Care Provider Office Phone # Fax MYRON Caba [facility] [phone]       [address] [facility] [address]        Equal Access to Services     ALTHEA MAYS : Hadii aad ku hadasho Soomaali, waaxda luqadaha, qaybta kaalmada adeegyada, waxay idiin hayaan adeeg kharash la'olegario . So [facility] [phone].    ATENCIÓN: Si florla espakash, tiene a brambila disposición servicios gratuitos de asistencia lingüística. Llame al [phone].    We comply with applicable federal civil rights laws and [place] laws. We do not discriminate on the basis of race, color, national origin, age, disability, sex, sexual orientation, or gender identity.            Thank you!     Thank you for choosing [facility]  for your care. Our goal is always to provide you with excellent care. Hearing back from our patients is one way we can continue to improve our services. Please take a few minutes to complete the written survey that you may receive in the mail after your visit with us. Thank you!             Your Updated Medication List - Protect others around you: Learn how to safely use, store and throw away your medicines at www.disposemymeds.org.          This list is accurate as of: 11/10/17  8:56 AM.  Always use your most recent med list.                   Brand Name Dispense Instructions for use Diagnosis    ADVIL 200 MG capsule   Generic drug:  ibuprofen      Reported on 4/20/2017        amLODIPine 2.5 MG tablet    NORVASC    90 tablet    Take 1 tablet (2.5 mg) by mouth daily 1 po at bedtime    Hypertension goal BP (blood pressure) < 140/90       aspirin 162 MG EC tablet      Take 162 mg by mouth daily Reported on 4/20/2017        CALCIUM 1200 PO      Reported on 4/20/2017        CLARITIN 10 MG  capsule   Generic drug:  loratadine      Take 10 mg by mouth as needed Reported on 4/20/2017        gabapentin 300 MG capsule    NEURONTIN    270 capsule    Take 1 capsule (300 mg) by mouth 3 times daily    DJD (degenerative joint disease), lumbosacral, Spinal stenosis of lumbar region with neurogenic claudication       GLUCOSAMINE 1500 COMPLEX PO      Take 1,500 mg by mouth daily Reported on 4/20/2017        losartan-hydrochlorothiazide 100-25 MG per tablet    HYZAAR    90 tablet    Take 1 tablet by mouth daily    Hypertension goal BP (blood pressure) < 140/90       Multi-vitamin Tabs tablet   Generic drug:  multivitamin, therapeutic with minerals      1 TABLET DAILY        oxyCODONE-acetaminophen 5-325 MG per tablet    PERCOCET    120 tablet    Take 1-2 tablets by mouth every 8 hours as needed for pain maximum 4 tablet(s) per day    DJD (degenerative joint disease), lumbosacral, Spinal stenosis of lumbar region with neurogenic claudication       PRILOSEC PO      None Entered        SALMON OIL-1000 PO      take 1,000 Caps by mouth daily.        senna-docusate 8.6-50 MG per tablet    SENOKOT-S;PERICOLACE    120 tablet    Take 1 tablet by mouth 2 times daily    Slow transit constipation       VITAMIN C PO      None Entered        VITAMIN D PO      Take 1,200 mg by mouth daily.        vitamin E 400 UNIT capsule      None Entered

## 2017-11-13 NOTE — TELEPHONE ENCOUNTER
I'm glad her pain is improving  She should not abruptly stop the gabapentin and percocet.   Last time she abruptly stopped the percocet she went through withdrawal  She can decrease the percocet by no more than 1/2 tablet/day every 2-3 days  Perhaps it would be helpful to wean off of the percocet first and then the gabapentin in the future  I would be happy to see her in clinic to discuss. Otherwise, she can write this on her calendar to make sure she safely weans off of the percocet

## 2017-11-13 NOTE — TELEPHONE ENCOUNTER
Called patient and provided message below as per Demetrice Gutierrez CNP. Discussed weaning process in detail.  Patient verbalized understanding.    Amilcar Ambrocio RN

## 2017-11-16 ENCOUNTER — TELEPHONE (OUTPATIENT)
Dept: FAMILY MEDICINE | Facility: CLINIC | Age: 82
End: 2017-11-16

## 2017-11-16 NOTE — TELEPHONE ENCOUNTER
"Patient had injection on 11/10/17, see portion of instructions copied below:          It may take up to 14 days for the steroid medication to start working although you may feel the effect as early as a few days after the procedure.       You may use ice packs for 10-15 minutes, 3 to 4 times a day at the injection site for comfort    Do not use heat to painful areas for 6 to 8 hours. This will give the local anesthetic time to wear off and prevent you from accidentally burning your skin.     You may use anti-inflammatory medications (such as Ibuprofen or Aleve or Advil) or Tylenol for pain control if necessary        I see patient was last seen by Demetrice Gutierrez 9/18/17.   Neuropsych testing was advised, no follow up plan with PCP noted.    I called patient who reports the epidural did help, she is trying to wean off the percocet but it is too hard to cut the pills (she says Demetrice wanted her to cut one of her pills in half and take 3.5 tablets daily).  She says she even took her pills to a pharmacist who tried to cut them but it just disintegrated.   She plans to just take 3 daily, she is feeling much better since the injection, hopes to wean completely off the percocet and wonders if she'll wean off the gabapentin eventually?   She plans to continue taking that now.    She found her instructions regarding the heating pad.   No further concerns about that.     She is scheduled for neuropsych eval in [place], 12/29/17 at 11:30.    She is not going to PT recently, is planning to go back to \"[employer]\" exercise classes instead.    She states her attitude is much improved, she is wanting to get out and about more.       Routed to Demetrice to address any concerns with patient's plan to wean the oxycodone by only taking 3 tabs a day, any follow up plan to be communicated.    Cherelle Holden RN  [facility]      "

## 2017-11-16 NOTE — TELEPHONE ENCOUNTER
Reason for Call:  Other     Detailed comments: Patient had a question regarding the use of a heating pad after her epidural injection. Also had questions regarding tapering off of her gabapentin and oxycodone      Phone Number Patient can be reached at: Home number on file [phone] (home)    Best Time:     Can we leave a detailed message on this number? Not Applicable    Call taken on 11/16/2017 at 11:53 AM by Angella Davis

## 2017-11-17 ENCOUNTER — TELEPHONE (OUTPATIENT)
Dept: PALLIATIVE MEDICINE | Facility: CLINIC | Age: 82
End: 2017-11-17

## 2017-11-17 NOTE — TELEPHONE ENCOUNTER
Patient had a caudal epidural steroid injection on 11/10/17.  Called patient for an update.      Left message that we were calling for an update about how she was doing after the injection.  LM that if she has any problems or questions to call the nurse line at [phone].

## 2017-11-27 ENCOUNTER — TELEPHONE (OUTPATIENT)
Dept: FAMILY MEDICINE | Facility: CLINIC | Age: 82
End: 2017-11-27

## 2017-11-27 DIAGNOSIS — M48.062 SPINAL STENOSIS OF LUMBAR REGION WITH NEUROGENIC CLAUDICATION: ICD-10-CM

## 2017-11-27 DIAGNOSIS — M47.817 DJD (DEGENERATIVE JOINT DISEASE), LUMBOSACRAL: ICD-10-CM

## 2017-11-27 RX ORDER — OXYCODONE AND ACETAMINOPHEN 5; 325 MG/1; MG/1
1-2 TABLET ORAL EVERY 8 HOURS PRN
Qty: 120 TABLET | Refills: 0 | Status: SHIPPED | OUTPATIENT
Start: 2017-11-27 | End: 2018-01-05

## 2017-11-27 NOTE — TELEPHONE ENCOUNTER
Routing refill request to provider for review/approval because:  Drug not on the [facility] refill protocol       Also see note regarding request for PT.    Tara Marlow RN [facility] Triage.

## 2017-11-27 NOTE — TELEPHONE ENCOUNTER
Reason for Call:  Medication or medication refill:    Do you use a [facility]?  Name of the pharmacy and phone number for the current request:   at clinic    Name of the medication requested: oxycodone    Other request: patient thinks she is going to be starting physical therapy but not sure where she should go- please advise/    Can we leave a detailed message on this number? YES    Phone number patient can be reached at: Home number on file [phone] (home)    Best Time: any    Call taken on 11/27/2017 at 12:36 PM by Hallie Seo

## 2017-11-28 ENCOUNTER — TELEPHONE (OUTPATIENT)
Dept: PALLIATIVE MEDICINE | Facility: CLINIC | Age: 82
End: 2017-11-28

## 2017-11-28 NOTE — TELEPHONE ENCOUNTER
Called and spoke with patient, she requests that we write number for PT on the prescription envelope.  RN did so as requested.      Shelby Chatterjee RN  [facility]

## 2017-11-28 NOTE — TELEPHONE ENCOUNTER
Pt LM at 1008 stating that they had an DELFINA on 11/10 and that she is still having pain on her right side. She is unsure if she needs another injection or if something else can be done. Would like to speak to someone about it. Phone # [phone]     Liliya Cain    [facility]

## 2017-11-29 NOTE — TELEPHONE ENCOUNTER
Per Marion Munoz, MYRON CNP's 10/27/17 eval notes:  1. Physical Therapy: schedule with PHYSICAL THERAPY closer to her home with [facility]/NANDA-did not schedule  2. Clinical Health Psychologist to address issues of relaxation, behavioral change, coping style, and other factors important to improvement: none  3. Introductory groups: not ordered  4. Diagnostic Studies: none  5. Medication Management:   1. I agree with current gabapentin dosing  2. I agree with current Percocet use. Will leave this with Demetrice Gutierrez her Primary Care Provider. Patient may reduce this slowly as able (instructed if she reduces not to reduce more than a half-tablet per day every few days AS ABLE)  3. Continue use of Tiger Balm ointment at home, this is OK to use up to QID PRN  6. Further procedures recommended: schedule lumbar DELFINA, our office to contact pt-done on 11/10/17  7. Acupuncture: check with insurer and I will place order if covered  8. Urine toxicology screen today: none here. Recommend yearly and opiate agreement with Primary Care Provider   9. Recommendations/follow-up for PCP:  See above  10. Release of information: none  11. Follow up: 8 weeks-did not schedule    Called pt and left message that would call back later.    Natalie Garcia RN-BSN  [facility]

## 2017-12-01 ENCOUNTER — THERAPY VISIT (OUTPATIENT)
Dept: PHYSICAL THERAPY | Facility: CLINIC | Age: 82
End: 2017-12-01
Payer: COMMERCIAL

## 2017-12-01 DIAGNOSIS — M54.50 LUMBAGO: Primary | ICD-10-CM

## 2017-12-01 PROCEDURE — 97112 NEUROMUSCULAR REEDUCATION: CPT | Mod: GP | Performed by: PHYSICAL THERAPIST

## 2017-12-01 PROCEDURE — 97110 THERAPEUTIC EXERCISES: CPT | Mod: GP | Performed by: PHYSICAL THERAPIST

## 2017-12-01 PROCEDURE — 97161 PT EVAL LOW COMPLEX 20 MIN: CPT | Mod: GP | Performed by: PHYSICAL THERAPIST

## 2017-12-01 NOTE — MR AVS SNAPSHOT
After Visit Summary   12/1/2017    Teresa Lopez    MRN: [id number]           Patient Information     Date Of Birth          1935        Visit Information        Provider Department      12/1/2017 12:00 PM Zenaida Hernandez, PT [facility]        Today's Diagnoses     Lumbago    -  1       Follow-ups after your visit        Your next 10 appointments already scheduled     Dec 05, 2017  9:50 AM CST   NANDA Spine with Cosmo Márquez PT   [facility] [place] [facility] ([facility])    [address]  [facility] [address] [phone]            Dec 12, 2017  9:10 AM CST   NANDA Spine with Cosmo Márquez PT   [facility] [place] [facility] ([facility])    [address] [facility] [address] [phone]              Who to contact     If you have questions or need follow up information about today's clinic visit or your schedule please contact [facility] directly at [phone].  Normal or non-critical lab and imaging results will be communicated to you by [employer], letter or phone within 4 business days after the clinic has received the results. If you do not hear from us within 7 days, please contact the clinic through [employer] or phone. If you have a critical or abnormal lab result, we will notify you by phone as soon as possible.  Submit refill requests through [employer] or call your pharmacy and they will forward the refill request to us. Please allow 3 business days for your refill to be completed.          Additional Information About Your Visit        [employer] Information     [employer] gives you secure access to your electronic health record. If you see a primary care provider, you can also send messages to your care team and make appointments. If you have questions, please call your primary care clinic.  If you do not have a primary  care provider, please call [phone] and they will assist you.        Care EveryWhere ID     This is your Care EveryWhere ID. This could be used by other organizations to access your [place] medical records  [id number]         Blood Pressure from Last 3 Encounters:   11/10/17 137/75   10/27/17 154/87   09/18/17 (!) 134/92    Weight from Last 3 Encounters:   10/27/17 68 kg (150 lb)   10/12/17 70.3 kg (155 lb)   09/18/17 70.3 kg (155 lb)              We Performed the Following     HC PT EVAL, LOW COMPLEXITY     NEUROMUSCULAR RE-EDUCATION     THERAPEUTIC EXERCISES        Primary Care Provider Office Phone # Fax #    Demetrice Dolarissa APRLARISSA [facility] [phone]       [address] [facility] [address]        Equal Access to Services     ALTHEA MAYS : Hadii aad ku hadasho Soomaali, waaxda luqadaha, qaybta kaalmada adeegyada, lori victoriain hayolegario mccracken . So [facility] [phone].    ATENCIÓN: Si habla español, tiene a brambila disposición servicios gratuitos de asistencia lingüística. Llame al [phone].    We comply with applicable federal civil rights laws and [place] laws. We do not discriminate on the basis of race, color, national origin, age, disability, sex, sexual orientation, or gender identity.            Thank you!     Thank you for choosing [facility]  for your care. Our goal is always to provide you with excellent care. Hearing back from our patients is one way we can continue to improve our services. Please take a few minutes to complete the written survey that you may receive in the mail after your visit with us. Thank you!             Your Updated Medication List - Protect others around you: Learn how to safely use, store and throw away your medicines at www.disposemymeds.org.          This list is accurate as of: 12/1/17 11:59 PM.  Always use your most recent med list.                   Brand Name Dispense Instructions for  use Diagnosis    ADVIL 200 MG capsule   Generic drug:  ibuprofen      Reported on 4/20/2017        amLODIPine 2.5 MG tablet    NORVASC    90 tablet    Take 1 tablet (2.5 mg) by mouth daily 1 po at bedtime    Hypertension goal BP (blood pressure) < 140/90       aspirin 162 MG EC tablet      Take 162 mg by mouth daily Reported on 4/20/2017        CALCIUM 1200 PO      Reported on 4/20/2017        CLARITIN 10 MG capsule   Generic drug:  loratadine      Take 10 mg by mouth as needed Reported on 4/20/2017        gabapentin 300 MG capsule    NEURONTIN    270 capsule    Take 1 capsule (300 mg) by mouth 3 times daily    DJD (degenerative joint disease), lumbosacral, Spinal stenosis of lumbar region with neurogenic claudication       GLUCOSAMINE 1500 COMPLEX PO      Take 1,500 mg by mouth daily Reported on 4/20/2017        losartan-hydrochlorothiazide 100-25 MG per tablet    HYZAAR    90 tablet    Take 1 tablet by mouth daily    Hypertension goal BP (blood pressure) < 140/90       Multi-vitamin Tabs tablet   Generic drug:  multivitamin, therapeutic with minerals      1 TABLET DAILY        oxyCODONE-acetaminophen 5-325 MG per tablet    PERCOCET    120 tablet    Take 1-2 tablets by mouth every 8 hours as needed for pain maximum 4 tablet(s) per day    DJD (degenerative joint disease), lumbosacral, Spinal stenosis of lumbar region with neurogenic claudication       PRILOSEC PO      None Entered        SALMON OIL-1000 PO      take 1,000 Caps by mouth daily.        senna-docusate 8.6-50 MG per tablet    SENOKOT-S;PERICOLACE    120 tablet    Take 1 tablet by mouth 2 times daily    Slow transit constipation       VITAMIN C PO      None Entered        VITAMIN D PO      Take 1,200 mg by mouth daily.        vitamin E 400 UNIT capsule      None Entered

## 2017-12-01 NOTE — PROGRESS NOTES
[facility] Initial Evaluation    Subjective:    Patient is a 82 year old female presenting with rehab back hpi. The history is provided by the patient.   Teresa Lopez is a 82 year old female with a lumbar condition.  Condition occurred with:  Insidious onset.  Condition occurred: for unknown reasons.  This is a chronic and recurrent condition  Pain began many years ago. PT referral on 11/28/17.    Patient reports pain:  Lower lumbar spine, lumbar spine left and lumbar spine right.  Radiates to:  Gluteals left and gluteals right.  Pain is described as aching and is constant and reported as 10/10 (at worst).  Associated symptoms:  Loss of motion/stiffness and loss of strength. Worse during: no pattern.  Symptoms are exacerbated by standing and walking and relieved by rest, heat, analgesics and other (cortisone injection).  Since onset symptoms are gradually worsening.        General health as reported by patient is good.  Pertinent medical history includes:  Stroke.  Medical allergies: no.  Other surgeries include:  No.  Current medications:  Pain medication.  Current occupation is Retired.        Barriers include:  None as reported by the patient.    Red flags:  None as reported by the patient.                        Objective:    Standing Alignment:        Lumbar:  Posterior pelvic tilt                Flexibility/Screens:       Lower Extremity:  Decreased left lower extremity flexibility:Piriformis and Hamstrings    Decreased right lower extremity flexibility:  Piriformis and Hamstrings               Lumbar/SI Evaluation  ROM:  AROM Lumbar: normal  AROM Lumbar:   Flexion:        Ext:                    *pain   Side Bend:        Left:     Right:   Rotation:           Left:     Right:   Side Glide:        Left:     Right:           Lumbar Myotomes:  normal                Lumbar Dermtomes:  normal                Neural Tension/Mobility:      Left side:SLR w/DF or Slump  negative.   Right side:    Slump positive.  Right side:   SLR w/DF  negative.   Lumbar Palpation:    Tenderness present at Left:    Piriformis and Gluteus Medius  Tenderness present at Right: Piriformis and Gluteus Medius    Lumbar Provocation:      Left negative with:  PROM hip    Right negative with:  PROM hip    SI joint/Sacrum:          Left negative at:    Thigh thrust and Sacral thrust    Right negative at:  Thigh thrust and Sacral thrust                                      Hip Evaluation  HIP AROM:  AROM:    Left Hip:     Normal    Right Hip:   Normal                    Hip Strength:    Flexion:   Left: 4+/5   Pain:  Right: 4+/5   Pain:                    Extension:  Left: 3/5  Pain:Right: 3/5    Pain:    Abduction:  Left: 4-/5     Pain:Right: 4-/5    Pain:  Adduction:  Left: 4+/5    Pain:Right: 4+/5   Pain:                Hip Special Testing:    Left hip positive for the following special tests:  Yaritza   Right hip positive for the following special tests:  Yaritza      Functional Testing:          Quad:      Bilateral leg squat:  Moderate loss of control and excessive anterior knee excursion                  General     ROS    Assessment/Plan:      Patient is a 82 year old female with lumbar complaints.    Patient has the following significant findings with corresponding treatment plan.                Diagnosis 1:  LBP  Pain -  manual therapy, self management, education and home program  Decreased ROM/flexibility - manual therapy, therapeutic exercise and home program  Decreased strength - therapeutic exercise, therapeutic activities and home program  Impaired balance - neuro re-education, gait training, therapeutic activities, adaptive equipment/assistive device and home program  Impaired gait - gait training, assistive devices and home program    Therapy Evaluation Codes:   1) History comprised of:   Personal factors that impact the plan of care:      None.    Comorbidity factors that impact the plan of care are:      Stroke.      Medications impacting care: None.  2) Examination of Body Systems comprised of:   Body structures and functions that impact the plan of care:      Hip, Lumbar spine, Pelvis and Thoracic Spine.   Activity limitations that impact the plan of care are:      Bending, Sitting, Squatting/kneeling, Stairs, Standing, Walking and Sleeping.  3) Clinical presentation characteristics are:   Stable/Uncomplicated.  4) Decision-Making    Low complexity using standardized patient assessment instrument and/or measureable assessment of functional outcome.  Cumulative Therapy Evaluation is: Low complexity.    Previous and current functional limitations:  (See Goal Flow Sheet for this information)    Short term and Long term goals: (See Goal Flow Sheet for this information)     Communication ability:  Patient appears to be able to clearly communicate and understand verbal and written communication and follow directions correctly.  Treatment Explanation - The following has been discussed with the patient:   RX ordered/plan of care  Anticipated outcomes  Possible risks and side effects  This patient would benefit from PT intervention to resume normal activities.   Rehab potential is good.    Frequency:  1 X week, once daily  Duration:  for 8 weeks  Discharge Plan:  Achieve all LTG.  Independent in home treatment program.  Reach maximal therapeutic benefit.    Please refer to the daily flowsheet for treatment today, total treatment time and time spent performing 1:1 timed codes.

## 2017-12-04 PROBLEM — M54.50 LUMBAGO: Status: ACTIVE | Noted: 2017-12-04

## 2017-12-05 ENCOUNTER — THERAPY VISIT (OUTPATIENT)
Dept: PHYSICAL THERAPY | Facility: CLINIC | Age: 82
End: 2017-12-05
Payer: COMMERCIAL

## 2017-12-05 DIAGNOSIS — M54.50 LUMBAGO: ICD-10-CM

## 2017-12-05 PROCEDURE — 97112 NEUROMUSCULAR REEDUCATION: CPT | Mod: GP | Performed by: PHYSICAL THERAPIST

## 2017-12-05 PROCEDURE — 97110 THERAPEUTIC EXERCISES: CPT | Mod: GP | Performed by: PHYSICAL THERAPIST

## 2017-12-05 NOTE — TELEPHONE ENCOUNTER
Contact Attempt      Outreach attempted x 3.  Left message on voicemail with call back information and requested return call. Stated this would be the last call unless called back.     Plan:  Will await for CB.     Radha Umaañ RN, [facility]  Pain Clinic Care Coordinator

## 2017-12-11 NOTE — TELEPHONE ENCOUNTER
Have not heard back from pt.  She does not use [employer].  No future appts scheduled.  She has our #'s if/when she decides to call.    Natalie Garcia RN-BSN  [facility]

## 2017-12-12 ENCOUNTER — THERAPY VISIT (OUTPATIENT)
Dept: PHYSICAL THERAPY | Facility: CLINIC | Age: 82
End: 2017-12-12
Payer: COMMERCIAL

## 2017-12-12 DIAGNOSIS — M54.50 CHRONIC BILATERAL LOW BACK PAIN WITHOUT SCIATICA: ICD-10-CM

## 2017-12-12 DIAGNOSIS — G89.29 CHRONIC BILATERAL LOW BACK PAIN WITHOUT SCIATICA: ICD-10-CM

## 2017-12-12 PROCEDURE — 97140 MANUAL THERAPY 1/> REGIONS: CPT | Mod: GP | Performed by: PHYSICAL THERAPIST

## 2017-12-12 PROCEDURE — 97110 THERAPEUTIC EXERCISES: CPT | Mod: GP | Performed by: PHYSICAL THERAPIST

## 2017-12-12 NOTE — PROGRESS NOTES
Subjective:    HPI                    Objective:    System    Physical Exam    General     ROS    Assessment/Plan:      PROGRESS  REPORT    Progress reporting period is from 12/1/2017 to 12/12/2017.       SUBJECTIVE  Subjective changes noted by patient:   Hip and ankle on R is painful.      Current pain level is  No change.     Previous pain level was  : 10/10 (at worst).   Changes in function:  None  Adverse reaction to treatment or activity: No changes with pain.  Most difficulty in the morning.    OBJECTIVE  Pt arrives with walking stick today.    Correction with exercises.   Slow, guarded movements.    No changes in pain or function.    ASSESSMENT/PLAN  Updated problem list and treatment plan: Diagnosis 1:  LBP  Pain -  manual therapy, self management, education and home program  Decreased ROM/flexibility - manual therapy, therapeutic exercise and home program  Decreased strength - therapeutic exercise, therapeutic activities and home program  Impaired balance - neuro re-education, gait training, therapeutic activities, adaptive equipment/assistive device and home program  Impaired gait - gait training, assistive devices and home program     STG/LTGs have been met or progress has been made towards goals:  None  Assessment of Progress: The patient's condition is unchanged.  Self Management Plans:  Patient has been instructed in a home treatment program.    Teresa continues to require the following intervention to meet STG and LTG's:  Pt instructed to F/U with MD    Recommendations:  This patient would benefit from further evaluation.    Please refer to the daily flowsheet for treatment today, total treatment time and time spent performing 1:1 timed codes.

## 2017-12-12 NOTE — MR AVS SNAPSHOT
After Visit Summary   12/12/2017    Teresa Lopez    MRN: [id number]           Patient Information     Date Of Birth          1935        Visit Information        Provider Department      12/12/2017 9:10 AM Cosmo Márquez, PT [facility] PT        Today's Diagnoses     Chronic bilateral low back pain without sciatica           Follow-ups after your visit        Who to contact     If you have questions or need follow up information about today's clinic visit or your schedule please contact [facility] PT directly at [phone].  Normal or non-critical lab and imaging results will be communicated to you by [employer], letter or phone within 4 business days after the clinic has received the results. If you do not hear from us within 7 days, please contact the clinic through [employer] or phone. If you have a critical or abnormal lab result, we will notify you by phone as soon as possible.  Submit refill requests through [employer] or call your pharmacy and they will forward the refill request to us. Please allow 3 business days for your refill to be completed.          Additional Information About Your Visit        MyChart Information     [employer] gives you secure access to your electronic health record. If you see a primary care provider, you can also send messages to your care team and make appointments. If you have questions, please call your primary care clinic.  If you do not have a primary care provider, please call [phone] and they will assist you.        Care EveryWhere ID     This is your Care EveryWhere ID. This could be used by other organizations to access your [place] medical records  [id number]         Blood Pressure from Last 3 Encounters:   11/10/17 137/75   10/27/17 154/87   09/18/17 (!) 134/92    Weight from Last 3 Encounters:   10/27/17 68 kg (150 lb)   10/12/17 70.3 kg (155 lb)   09/18/17 70.3 kg (155 lb)               We Performed the Following     NANDA PROGRESS NOTES REPORT     MANUAL THER TECH,1+REGIONS,EA 15 MIN     THERAPEUTIC EXERCISES        Primary Care Provider Office Phone # Fax #    MYRON Bose [facility] [phone]       [address] [facility] [address]        Equal Access to Services     ALTHEA MAYS : Hadii aad ku hadasho Soomaali, waaxda luqadaha, qaybta kaalmada adeegyada, waxay idiin hayaan adeeg kharash la'joeyn . So [facility] [phone].    ATENCIÓN: Si habla español, tiene a brambila disposición servicios gratuitos de asistencia lingüística. Llame al [phone].    We comply with applicable federal civil rights laws and [place] laws. We do not discriminate on the basis of race, color, national origin, age, disability, sex, sexual orientation, or gender identity.            Thank you!     Thank you for choosing [facility]  for your care. Our goal is always to provide you with excellent care. Hearing back from our patients is one way we can continue to improve our services. Please take a few minutes to complete the written survey that you may receive in the mail after your visit with us. Thank you!             Your Updated Medication List - Protect others around you: Learn how to safely use, store and throw away your medicines at www.disposemymeds.org.          This list is accurate as of: 12/12/17 11:54 AM.  Always use your most recent med list.                   Brand Name Dispense Instructions for use Diagnosis    ADVIL 200 MG capsule   Generic drug:  ibuprofen      Reported on 4/20/2017        amLODIPine 2.5 MG tablet    NORVASC    90 tablet    Take 1 tablet (2.5 mg) by mouth daily 1 po at bedtime    Hypertension goal BP (blood pressure) < 140/90       aspirin 162 MG EC tablet      Take 162 mg by mouth daily Reported on 4/20/2017        CALCIUM 1200 PO      Reported on 4/20/2017        CLARITIN 10 MG capsule   Generic drug:   loratadine      Take 10 mg by mouth as needed Reported on 4/20/2017        gabapentin 300 MG capsule    NEURONTIN    270 capsule    Take 1 capsule (300 mg) by mouth 3 times daily    DJD (degenerative joint disease), lumbosacral, Spinal stenosis of lumbar region with neurogenic claudication       GLUCOSAMINE 1500 COMPLEX PO      Take 1,500 mg by mouth daily Reported on 4/20/2017        losartan-hydrochlorothiazide 100-25 MG per tablet    HYZAAR    90 tablet    Take 1 tablet by mouth daily    Hypertension goal BP (blood pressure) < 140/90       Multi-vitamin Tabs tablet   Generic drug:  multivitamin, therapeutic with minerals      1 TABLET DAILY        oxyCODONE-acetaminophen 5-325 MG per tablet    PERCOCET    120 tablet    Take 1-2 tablets by mouth every 8 hours as needed for pain maximum 4 tablet(s) per day    DJD (degenerative joint disease), lumbosacral, Spinal stenosis of lumbar region with neurogenic claudication       PRILOSEC PO      None Entered        SALMON OIL-1000 PO      take 1,000 Caps by mouth daily.        senna-docusate 8.6-50 MG per tablet    SENOKOT-S;PERICOLACE    120 tablet    Take 1 tablet by mouth 2 times daily    Slow transit constipation       VITAMIN C PO      None Entered        VITAMIN D PO      Take 1,200 mg by mouth daily.        vitamin E 400 UNIT capsule      None Entered

## 2017-12-14 ENCOUNTER — TELEPHONE (OUTPATIENT)
Dept: FAMILY MEDICINE | Facility: CLINIC | Age: 82
End: 2017-12-14

## 2017-12-14 DIAGNOSIS — M25.571 PAIN IN JOINT, ANKLE AND FOOT, RIGHT: Primary | ICD-10-CM

## 2017-12-14 NOTE — TELEPHONE ENCOUNTER
Reason for call:  Patient reporting a symptom    Symptom or request: Pain, patient states she had a epidural in her back one month ago. She wears a boot for her ankle and states a lot of pain with ankle. Patient is asking if she should see you or follow up with pain clinic.  Patient states she used up her 3 visits for physical therapy and states they did help, can she have a order for more PT?    Have you been treated for this before? Yes    Additional comments: please call to advise.    Phone Number patient can be reached at:    Teresa Lopez (Self) [phone] (H)         Best Time:  anytime    Can we leave a detailed message on this number:  YES    Call taken on 12/14/2017 at 12:50 PM by Sejal Bear

## 2017-12-14 NOTE — TELEPHONE ENCOUNTER
I called patient back, she states the podiatrist is who saw her for her ankle pain, I do see note from 10/12/17 appt with Dr. Lewis in which he saw her for this.   Advised she call him.   Advised her of PT referral sent, call next week if they have not called her for this.    Patient verbalized understanding of and agreement with plan.    Cherelle Holden RN  [facility] [place] [facility]

## 2017-12-14 NOTE — TELEPHONE ENCOUNTER
Routed to Demetrice to advise, see us in clinic or go back to pain clinic?   She was last seen by Nicolasa 9/18/17.  She also wants continuation of PT.    See note from PT visit 12/12/17:    STG/LTGs have been met or progress has been made towards goals:  None  Assessment of Progress: The patient's condition is unchanged.  Self Management Plans:  Patient has been instructed in a home treatment program.     Teresa continues to require the following intervention to meet STG and LTG's:  Pt instructed to F/U with MD     Recommendations:  This patient would benefit from further evaluation.      Routed to provider to advise.  Cherelle Holden RN  [facility]

## 2017-12-14 NOTE — TELEPHONE ENCOUNTER
I put in a referral for physical therapy  I would like for you to follow up with the pain team regarding your ankle pain  I referred you to our pain team months ago for ongoing management because we were having such a difficulty time managing it

## 2017-12-15 ENCOUNTER — TELEPHONE (OUTPATIENT)
Dept: PALLIATIVE MEDICINE | Facility: CLINIC | Age: 82
End: 2017-12-15

## 2017-12-15 NOTE — TELEPHONE ENCOUNTER
Contact Attempt      Outreach attempted x 1.  Left message on voicemail with call back information and requested return call.    Plan:  Will await for CB.     Radha Umaña RN, [facility]  Pain Clinic Care Coordinator

## 2017-12-15 NOTE — TELEPHONE ENCOUNTER
"Patient calling in stating \"she needs help\".  Complaining of more back pain and wants advice.   Please call patient.     Zenaida WYATT    [facility]    "

## 2017-12-21 NOTE — TELEPHONE ENCOUNTER
"Went through with pt that we had tried to contact her multiple times before and we could never get a hold of her. Pt stated that she is hurting \"all over\". There was an appt tomorrow and pt scheduled to see provider will discuss a new plan then with her.     Radha Umaña RN, [facility]  Pain Clinic Care Coordinator   "

## 2017-12-22 ENCOUNTER — OFFICE VISIT (OUTPATIENT)
Dept: PALLIATIVE MEDICINE | Facility: CLINIC | Age: 82
End: 2017-12-22
Payer: COMMERCIAL

## 2017-12-22 VITALS — HEART RATE: 85 BPM | SYSTOLIC BLOOD PRESSURE: 123 MMHG | DIASTOLIC BLOOD PRESSURE: 74 MMHG

## 2017-12-22 DIAGNOSIS — G57.03 PIRIFORMIS SYNDROME OF BOTH SIDES: ICD-10-CM

## 2017-12-22 DIAGNOSIS — M48.062 SPINAL STENOSIS OF LUMBAR REGION WITH NEUROGENIC CLAUDICATION: ICD-10-CM

## 2017-12-22 DIAGNOSIS — G89.29 CHRONIC RIGHT HIP PAIN: ICD-10-CM

## 2017-12-22 DIAGNOSIS — M25.551 CHRONIC RIGHT HIP PAIN: ICD-10-CM

## 2017-12-22 DIAGNOSIS — G89.29 CHRONIC BILATERAL LOW BACK PAIN WITHOUT SCIATICA: ICD-10-CM

## 2017-12-22 DIAGNOSIS — M54.50 CHRONIC BILATERAL LOW BACK PAIN WITHOUT SCIATICA: ICD-10-CM

## 2017-12-22 DIAGNOSIS — M16.11 PRIMARY OSTEOARTHRITIS OF RIGHT HIP: Primary | ICD-10-CM

## 2017-12-22 PROCEDURE — 99214 OFFICE O/P EST MOD 30 MIN: CPT | Performed by: NURSE PRACTITIONER

## 2017-12-22 ASSESSMENT — PAIN SCALES - GENERAL: PAINLEVEL: WORST PAIN (10)

## 2017-12-22 NOTE — PROGRESS NOTES
[facility]    12/22/17    Chief complaint:  Low back pain, neck pain, shoulder pain, bilateral knee pain and bilateral ankle pain and right hip pain. The right hip pain is the worst, points to groin area as most painful    Interval history:  Teresa Lopez is a 82 year old female is known to me for .   Primary osteoarthritis    Chronic right hip pain   Chronic bilateral low back without sciatica   Spinal stenosis of lumbar region with neurogenic claudication   Piriformis syndrome of both sides   PMHx DJD, CVA, HTN, diabetes mellitus, and cataract   PSHx Hysterectomy    Recommendations/plan at the last visit included:10/27/2017  1. Physical Therapy: schedule with PHYSICAL THERAPY closer to her home with [facility]  2. Clinical Health Psychologist to address issues of relaxation, behavioral change, coping style, and other factors important to improvement: none  3. Introductory groups: not ordered  4. Diagnostic Studies: none  5. Medication Management:   1. I agree with current gabapentin dosing  2. I agree with current Percocet use. Will leave this with Demetrice Gutierrez her Primary Care Provider. Patient may reduce this slowly as able (instructed if she reduces not to reduce more than a half-tablet per day every few days AS ABLE)  3. Continue use of Tiger Balm ointment at home, this is OK to use up to QID PRN  6. Further procedures recommended: schedule lumbar DELFINA, our office to contact pt  7. Acupuncture: check with insurer and I will place order if covered  8. Urine toxicology screen today: none here. Recommend yearly and opiate agreement with Primary Care Provider   9. Recommendations/follow-up for PCP:  See above  10. Release of information: none  11. Follow up: 8 weeks    Since her last visit, Teresa Lopez reports:    -The previous injection seemed to help for a short time,  but the pain remains.  -The pt is seeing orthopedics for ankle pain and is having special boots made,  -The pt reports that  she is concerned about being home bound and falling in the winter. There is some concern about balance. She does use walking sticks. Encouraged her to use her walking sticks vs using her daughter for support  -Physical therapy seems to help the pt.  -The pt reports right sided groin area hip pain that is aggravated when she gets out of bed. The groin area pain is aggravated by walking or twisting.  -She uses walking sticks to get around. Also discussed I can order a walker if she so chooses      At this point, the patient's participation with our multidisciplinary team includes:  The patient has been compliant with the program.  Pain Group None  PT - Continue physical therapy  Health Psych  None      Pain scores:  Pain intensity on average is 10 on a scale of 0-10.  Range is 10/10. Pain is described as sharp, aching, and unbearable.  Pain is continuous in nature.    Current pain-related medication treatments include:  -Percocet 5.325mg may take 1-2 tabs Q 8 hours PRN pain (she uses 4 tabs per day, somewhat helpful)  -gabapentin 300mg TID (she is not sure)        Other pertinent medications:  -Senna-docusate PRN      Previous medication treatments included:  OPIATES:hydrocodone (not helpful), oxycodone (somewhat helpful)  NSAIDS: ibuprofen (not helpful), Aleve (not helpful)  MUSCLE RELAXANTS: none  ANTI-MIGRAINE MEDS: none  ANTI-DEPRESSANTS: none  SLEEP AIDS: none  ANTI-CONVULSANTS: gabapentin (unsure if helpful)  TOPICALS: none  Other meds: Tylenol (not helpful)    Injections:  11/10/2017 Caudal epidural steroid injection with Dr. Reymundo Bajwa (helpful for a very short period of time)    THE 4 A's OF OPIOID MAINTENANCE ANALGESIA    Analgesia: somewhat helpful    Activity: ADLs    Adverse effects: none    Adherence to Rx protocol: yes      Side Effects: no side effect  Patient is using the medication as prescribed: YES    Medications:  Current Outpatient Prescriptions   Medication Sig Dispense Refill      oxyCODONE-acetaminophen (PERCOCET) 5-325 MG per tablet Take 1-2 tablets by mouth every 8 hours as needed for pain maximum 4 tablet(s) per day 120 tablet 0     gabapentin (NEURONTIN) 300 MG capsule Take 1 capsule (300 mg) by mouth 3 times daily 270 capsule 3     losartan-hydrochlorothiazide (HYZAAR) 100-25 MG per tablet Take 1 tablet by mouth daily 90 tablet 3     amLODIPine (NORVASC) 2.5 MG tablet Take 1 tablet (2.5 mg) by mouth daily 1 po at bedtime 90 tablet 3     senna-docusate (SENOKOT-S;PERICOLACE) 8.6-50 MG per tablet Take 1 tablet by mouth 2 times daily 120 tablet 12     aspirin 162 MG EC tablet Take 162 mg by mouth daily Reported on 4/20/2017       Glucosamine-Chondroit-Vit C-Mn (GLUCOSAMINE 1500 COMPLEX PO) Take 1,500 mg by mouth daily Reported on 4/20/2017       Loratadine (CLARITIN) 10 MG capsule Take 10 mg by mouth as needed Reported on 4/20/2017       Omega-3 Fatty Acids (SALMON OIL-1000 PO) take 1,000 Caps by mouth daily.       ADVIL 200 MG PO CAPS Reported on 4/20/2017       MULTI-VITAMIN OR TABS 1 TABLET DAILY       CALCIUM 1200 OR Reported on 4/20/2017       VITAMIN C OR None Entered       VITAMIN D OR Take 1,200 mg by mouth daily.       VITAMIN E 400 UNIT OR CAPS None Entered       PRILOSEC OR None Entered         Medical History: any changes in medical history since they were last seen? No    Social History:  Home situation: . 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  Tobacco use: none  Alcohol use: very little, about 2 drinks per month  Drug use: none  History of chemical dependency treatment: none    Review of Systems:  ROS is positive as per HPI as well as for fatigue, joint pain, arthritis, stiffness, neck pain, back pain, numbness/tingling and is negative for fevers, chills, sweats, or constipation, diarrhea.    This document serves as a record of the services and decisions personally performed and made by Marion  Tammy SANCHES. It was created on her behalf by Ankit Izquierdo, a trained medical scribe. The creation of this document is based on the provider's statements to the medical scribe.  Ankit Izquierdo 9:04 AM December 22, 2017    Physical Exam:  Vital signs: /74  Pulse 85    Behavioral observations:  Awake and alert    Gait:  Slow and antalgic on R     Musculoskeletal exam:    Lumbar flexion to 110 degrees  Lumbar extension to 10 degrees    SI joints are tender bilaterally  Piriformis Mildly tender bilaterally  Strength grossly equal throughout    Neuro exam:  SILT extremities     Skin/vascular/autonomic:  No suspicious legions    Other:  N/A      [place] Prescription Monitoring Program:    Reviewed regular fills of pain medication from PCP    DIRE Score for selecting candidates for long term opioid analgesia for chronic pain:  Diagnosis  2-3  Intractablility  2  Risk    Psychological health  3    Chemical health  2    Reliability  2    Social support  2  Efficacy  2    Total DIRE Score = 15-16. Note that  7-13 predicts poor outcome (compliance and efficacy) from opioid prescribing; 14-21 predicts good outcome (compliance and efficacy)  from opioid prescribing.      Assessment:   1. Primary osteoarthritis of the right hip   2. Chronic right hip pain  3. Chronic bilateral low back without sciatica  4. Spinal stenosis of lumbar region with neurogenic claudication  5. Piriformis syndrome of both sides      Plan:  1. Physical Therapy:  The patient will follow up with outside physical therapist as scheduled.  2. Clinical Health Psychologist: None needed at present  3. Diagnostic Studies:  None  4. Medication Management:    1. Continue Percocet per Primary Care Provider   2. Continue Gabapentin  5. Further procedures recommended: Referral with Dr. Valverde for right hip injection.  6. Recommendations to PCP: See above  7. Follow up: 10 week.    Total time spent face to face was 30 minutes and more than 50% of face to face  time was spent in counseling and/or coordination of care regarding the diagnosis and recommendations above  .  The information in this document, created by the medical scribe for me, accurately reflects the services I personally performed and the decisions made by me. I have reviewed and approved this document for accuracy prior to leaving the patient care area.  December 22, 2017 9:40 AM    Marion SANCHES RN CNP, FNP  [facility] [place]

## 2017-12-22 NOTE — PATIENT INSTRUCTIONS
PLAN  1. Medications:   1. Continue Percocet 5/325mg by mouth 1-2 tabs every 8 hours as needed for pain as prescribed by Primary Care Provider   2. Continue Gabapentin 300mg by mouth 3 times daily.  2. Procedures: Schedule an appointment with Dr. Valverde for a hip injection. Our office will contact you with further details.  3. Continue to use walking sticks. If you are having trouble with balance contact our office to receive a walker  Follow-up with me in 10 weeks.  ----------------------------------------------------------------  Nurse Triage line:  [phone]   Call this number with any questions or concerns. You may leave a detailed message anytime. Calls are typically returned Monday through Friday between 8 AM and 4:30 PM. We usually get back to you within 2 business days depending on the issue/request.       Medication refills:    For non-narcotic medications, call your pharmacy directly to request a refill. The pharmacy will contact the [facility] Management [facility] for authorization. Please allow 3-4 days for these refills to be processed.     For narcotic refills, call the nurse triage line or send a Therapydia message. Please contact us 7-10 days before your refill is due. The message MUST include the name of the specific medication(s) requested and how you would like to receive the prescription(s). The options are as follows:    Pain Clinic staff can mail the prescription to your pharmacy. Please tell us the name of the pharmacy.    You may pick the prescription up at the Pain Clinic (tell us the location) or during a clinic visit with your pain provider    Pain Clinic staff can deliver the prescription to the [place] pharmacy in the clinic building. Please tell us the location.      Scheduling number: [phone].  Call this number to schedule or change appointments.    We believe regular attendance is key to your success in our program.    Any time you are unable to keep your appointment we ask that you call  us at least 24 hours in advance to let us know. This will allow us to offer the appointment time to another patient.

## 2017-12-22 NOTE — MR AVS SNAPSHOT
After Visit Summary   12/22/2017    Teresa Lopez    MRN: [id number]           Patient Information     Date Of Birth          1935        Visit Information        Provider Department      12/22/2017 9:00 AM Marion Munoz APRN [facility]        Today's Diagnoses     Primary osteoarthritis of right hip    -  1    Chronic right hip pain        Chronic bilateral low back pain without sciatica        Spinal stenosis of lumbar region with neurogenic claudication        Piriformis syndrome of both sides          Care Instructions    PLAN  1. Medications:   1. Continue Percocet 5/325mg by mouth 1-2 tabs every 8 hours as needed for pain as prescribed by Primary Care Provider   2. Continue Gabapentin 300mg by mouth 3 times daily.  2. Procedures: Schedule an appointment with Dr. Valverde for a hip injection. Our office will contact you with further details.  3. Continue to use walking sticks. If you are having trouble with balance contact our office to receive a walker  Follow-up with me in 10 weeks.  ----------------------------------------------------------------  Nurse Triage line:  [phone]   Call this number with any questions or concerns. You may leave a detailed message anytime. Calls are typically returned Monday through Friday between 8 AM and 4:30 PM. We usually get back to you within 2 business days depending on the issue/request.       Medication refills:    For non-narcotic medications, call your pharmacy directly to request a refill. The pharmacy will contact the [facility] for authorization. Please allow 3-4 days for these refills to be processed.     For narcotic refills, call the nurse triage line or send a SpotHero message. Please contact us 7-10 days before your refill is due. The message MUST include the name of the specific medication(s) requested and how you would like to receive the prescription(s). The options are as follows:    Pain Clinic staff can  mail the prescription to your pharmacy. Please tell us the name of the pharmacy.    You may pick the prescription up at the Pain Clinic (tell us the location) or during a clinic visit with your pain provider    Pain Clinic staff can deliver the prescription to the [place] pharmacy in the clinic building. Please tell us the location.      Scheduling number: [phone].  Call this number to schedule or change appointments.    We believe regular attendance is key to your success in our program.    Any time you are unable to keep your appointment we ask that you call us at least 24 hours in advance to let us know. This will allow us to offer the appointment time to another patient.               Follow-ups after your visit        Additional Services     ORTHO  REFERRAL       [facility] is referring you to the Orthopedic  Services at [facility].       The  Representative will assist you in the coordination of your Orthopedic and Musculoskeletal Care as prescribed by your physician.    The  Representative will call you within 1 business day to help schedule your appointment, or you may contact the  Representative at:    All areas ~ [phone]     Type of Referral : Non Surgical       Timeframe requested: Within 2 weeks. Please schedule with Dr. Valverde and allow 40 minutes for likely right hip joint ultrasound guided steroid injection    Coverage of these services is subject to the terms and limitations of your health insurance plan.  Please call member services at your health plan with any benefit or coverage questions.      If X-rays, CT or MRI's have been performed, please contact the facility where they were done to arrange for , prior to your scheduled appointment.  Please bring this referral request to your appointment and present it to your specialist.                  Your next 10 appointments already scheduled     Chinmay  02, 2018 11:50 AM CST   (Arrive by 11:35 AM)   NANDA Extremity with Cosmo Márquez PT   [facility])    [address]  [facility] [address] [phone]              Who to contact     If you have questions or need follow up information about today's clinic visit or your schedule please contact [facility] directly at [phone].  Normal or non-critical lab and imaging results will be communicated to you by MyChart, letter or phone within 4 business days after the clinic has received the results. If you do not hear from us within 7 days, please contact the clinic through MyChart or phone. If you have a critical or abnormal lab result, we will notify you by phone as soon as possible.  Submit refill requests through [employer] or call your pharmacy and they will forward the refill request to us. Please allow 3 business days for your refill to be completed.          Additional Information About Your Visit        SocialFlow[employer]Landpoint Information     [employer] gives you secure access to your electronic health record. If you see a primary care provider, you can also send messages to your care team and make appointments. If you have questions, please call your primary care clinic.  If you do not have a primary care provider, please call [phone] and they will assist you.        Care EveryWhere ID     This is your Care EveryWhere ID. This could be used by other organizations to access your [place] medical records  [id number]        Your Vitals Were     Pulse                   85            Blood Pressure from Last 3 Encounters:   12/22/17 123/74   11/10/17 137/75   10/27/17 154/87    Weight from Last 3 Encounters:   10/27/17 68 kg (150 lb)   10/12/17 70.3 kg (155 lb)   09/18/17 70.3 kg (155 lb)              We Performed the Following     ORTHO  REFERRAL        Primary Care Provider Office Phone # Fax #    MYRON Bose  [phone]       [address] [facility] [address]        Equal Access to Services     ALTHEA MAYS : Hadii wilfrid june jose Gallegos, warudida luedmund, qaybta kaalmada nory, lori seamanjohn polly. So [facility] [phone].    ATENCIÓN: Si habla español, tiene a brambila disposición servicios gratuitos de asistencia lingüística. Llame al [phone].    We comply with applicable federal civil rights laws and [place] laws. We do not discriminate on the basis of race, color, national origin, age, disability, sex, sexual orientation, or gender identity.            Thank you!     Thank you for choosing [facility]  for your care. Our goal is always to provide you with excellent care. Hearing back from our patients is one way we can continue to improve our services. Please take a few minutes to complete the written survey that you may receive in the mail after your visit with us. Thank you!             Your Updated Medication List - Protect others around you: Learn how to safely use, store and throw away your medicines at www.disposemymeds.org.          This list is accurate as of: 12/22/17  9:32 AM.  Always use your most recent med list.                   Brand Name Dispense Instructions for use Diagnosis    ADVIL 200 MG capsule   Generic drug:  ibuprofen      Reported on 4/20/2017        amLODIPine 2.5 MG tablet    NORVASC    90 tablet    Take 1 tablet (2.5 mg) by mouth daily 1 po at bedtime    Hypertension goal BP (blood pressure) < 140/90       aspirin 162 MG EC tablet      Take 162 mg by mouth daily Reported on 4/20/2017        CALCIUM 1200 PO      Reported on 4/20/2017        CLARITIN 10 MG capsule   Generic drug:  loratadine      Take 10 mg by mouth as needed Reported on 4/20/2017        gabapentin 300 MG capsule    NEURONTIN    270 capsule    Take 1 capsule (300 mg) by mouth 3 times daily    DJD (degenerative joint disease), lumbosacral, Spinal stenosis  of lumbar region with neurogenic claudication       GLUCOSAMINE 1500 COMPLEX PO      Take 1,500 mg by mouth daily Reported on 4/20/2017        losartan-hydrochlorothiazide 100-25 MG per tablet    HYZAAR    90 tablet    Take 1 tablet by mouth daily    Hypertension goal BP (blood pressure) < 140/90       Multi-vitamin Tabs tablet   Generic drug:  multivitamin, therapeutic with minerals      1 TABLET DAILY        oxyCODONE-acetaminophen 5-325 MG per tablet    PERCOCET    120 tablet    Take 1-2 tablets by mouth every 8 hours as needed for pain maximum 4 tablet(s) per day    DJD (degenerative joint disease), lumbosacral, Spinal stenosis of lumbar region with neurogenic claudication       PRILOSEC PO      None Entered        SALMON OIL-1000 PO      take 1,000 Caps by mouth daily.        senna-docusate 8.6-50 MG per tablet    SENOKOT-S;PERICOLACE    120 tablet    Take 1 tablet by mouth 2 times daily    Slow transit constipation       VITAMIN C PO      None Entered        VITAMIN D PO      Take 1,200 mg by mouth daily.        vitamin E 400 UNIT capsule      None Entered

## 2017-12-29 ENCOUNTER — TRANSFERRED RECORDS (OUTPATIENT)
Dept: HEALTH INFORMATION MANAGEMENT | Facility: CLINIC | Age: 82
End: 2017-12-29

## 2018-01-02 ENCOUNTER — THERAPY VISIT (OUTPATIENT)
Dept: PHYSICAL THERAPY | Facility: CLINIC | Age: 83
End: 2018-01-02
Payer: COMMERCIAL

## 2018-01-02 DIAGNOSIS — M54.50 CHRONIC BILATERAL LOW BACK PAIN WITHOUT SCIATICA: ICD-10-CM

## 2018-01-02 DIAGNOSIS — G89.29 CHRONIC BILATERAL LOW BACK PAIN WITHOUT SCIATICA: ICD-10-CM

## 2018-01-02 PROCEDURE — 97110 THERAPEUTIC EXERCISES: CPT | Mod: GP | Performed by: PHYSICAL THERAPIST

## 2018-01-02 PROCEDURE — 97140 MANUAL THERAPY 1/> REGIONS: CPT | Mod: GP | Performed by: PHYSICAL THERAPIST

## 2018-01-02 NOTE — MR AVS SNAPSHOT
After Visit Summary   1/2/2018    Teresa Lopez    MRN: [id number]           Patient Information     Date Of Birth          1935        Visit Information        Provider Department      1/2/2018 11:50 AM Cosmo Márquez, PT [facility] PT        Today's Diagnoses     Chronic bilateral low back pain without sciatica           Follow-ups after your visit        Your next 10 appointments already scheduled     Jan 05, 2018  9:20 AM CST   New Visit with Jean Yeung,    [facility] [place] ([facility] [place])    [address]  [place] [id number] [phone]              Who to contact     If you have questions or need follow up information about today's clinic visit or your schedule please contact [facility] directly at [phone].  Normal or non-critical lab and imaging results will be communicated to you by MyChart, letter or phone within 4 business days after the clinic has received the results. If you do not hear from us within 7 days, please contact the clinic through Com[employer] Bioph[employer] or phone. If you have a critical or abnormal lab result, we will notify you by phone as soon as possible.  Submit refill requests through Numerify or call your pharmacy and they will forward the refill request to us. Please allow 3 business days for your refill to be completed.          Additional Information About Your Visit        MyChart Information     Numer[employer] gives you secure access to your electronic health record. If you see a primary care provider, you can also send messages to your care team and make appointments. If you have questions, please call your primary care clinic.  If you do not have a primary care provider, please call [phone] and they will assist you.        Care EveryWhere ID     This is your Care EveryWhere ID. This could be used by other  organizations to access your [facility] medical records  [id number]         Blood Pressure from Last 3 Encounters:   12/22/17 123/74   11/10/17 137/75   10/27/17 154/87    Weight from Last 3 Encounters:   10/27/17 68 kg (150 lb)   10/12/17 70.3 kg (155 lb)   09/18/17 70.3 kg (155 lb)              We Performed the Following     MANUAL THER TECH,1+REGIONS,EA 15 MIN     THERAPEUTIC EXERCISES        Primary Care Provider Office Phone # Fax #    Demetrice Gutierrez, MYRON [phone]       [address] [facility] [address]        Equal Access to Services     ALTHEA MAYS : Hadii aad ku hadasho Soomaali, waaxda luqadaha, qaybta kaalmada adeegyada, lori mccracken . So [facility] [phone].    ATENCIÓN: Si habla español, tiene a brambila disposición servicios gratuitos de asistencia lingüística. Llame al [phone].    We comply with applicable federal civil rights laws and [place] laws. We do not discriminate on the basis of race, color, national origin, age, disability, sex, sexual orientation, or gender identity.            Thank you!     Thank you for choosing [facility]  for your care. Our goal is always to provide you with excellent care. Hearing back from our patients is one way we can continue to improve our services. Please take a few minutes to complete the written survey that you may receive in the mail after your visit with us. Thank you!             Your Updated Medication List - Protect others around you: Learn how to safely use, store and throw away your medicines at www.disposemymeds.org.          This list is accurate as of: 1/2/18 12:22 PM.  Always use your most recent med list.                   Brand Name Dispense Instructions for use Diagnosis    ADVIL 200 MG capsule   Generic drug:  ibuprofen      Reported on 4/20/2017        amLODIPine 2.5 MG tablet    NORVASC    90 tablet    Take 1 tablet (2.5 mg) by mouth  daily 1 po at bedtime    Hypertension goal BP (blood pressure) < 140/90       aspirin 162 MG EC tablet      Take 162 mg by mouth daily Reported on 4/20/2017        CALCIUM 1200 PO      Reported on 4/20/2017        CLARITIN 10 MG capsule   Generic drug:  loratadine      Take 10 mg by mouth as needed Reported on 4/20/2017        gabapentin 300 MG capsule    NEURONTIN    270 capsule    Take 1 capsule (300 mg) by mouth 3 times daily    DJD (degenerative joint disease), lumbosacral, Spinal stenosis of lumbar region with neurogenic claudication       GLUCOSAMINE 1500 COMPLEX PO      Take 1,500 mg by mouth daily Reported on 4/20/2017        losartan-hydrochlorothiazide 100-25 MG per tablet    HYZAAR    90 tablet    Take 1 tablet by mouth daily    Hypertension goal BP (blood pressure) < 140/90       Multi-vitamin Tabs tablet   Generic drug:  multivitamin, therapeutic with minerals      1 TABLET DAILY        oxyCODONE-acetaminophen 5-325 MG per tablet    PERCOCET    120 tablet    Take 1-2 tablets by mouth every 8 hours as needed for pain maximum 4 tablet(s) per day    DJD (degenerative joint disease), lumbosacral, Spinal stenosis of lumbar region with neurogenic claudication       PRILOSEC PO      None Entered        SALMON OIL-1000 PO      take 1,000 Caps by mouth daily.        senna-docusate 8.6-50 MG per tablet    SENOKOT-S;PERICOLACE    120 tablet    Take 1 tablet by mouth 2 times daily    Slow transit constipation       VITAMIN C PO      None Entered        VITAMIN D PO      Take 1,200 mg by mouth daily.        vitamin E 400 UNIT capsule      None Entered

## 2018-01-05 ENCOUNTER — OFFICE VISIT (OUTPATIENT)
Dept: ORTHOPEDICS | Facility: CLINIC | Age: 83
End: 2018-01-05
Payer: COMMERCIAL

## 2018-01-05 VITALS
WEIGHT: 151 LBS | SYSTOLIC BLOOD PRESSURE: 118 MMHG | BODY MASS INDEX: 27.79 KG/M2 | DIASTOLIC BLOOD PRESSURE: 76 MMHG | HEIGHT: 62 IN

## 2018-01-05 DIAGNOSIS — M16.11 PRIMARY OSTEOARTHRITIS OF RIGHT HIP: ICD-10-CM

## 2018-01-05 DIAGNOSIS — M25.551 RIGHT HIP PAIN: Primary | ICD-10-CM

## 2018-01-05 DIAGNOSIS — M47.817 DJD (DEGENERATIVE JOINT DISEASE), LUMBOSACRAL: ICD-10-CM

## 2018-01-05 DIAGNOSIS — M48.062 SPINAL STENOSIS OF LUMBAR REGION WITH NEUROGENIC CLAUDICATION: ICD-10-CM

## 2018-01-05 PROCEDURE — 20611 DRAIN/INJ JOINT/BURSA W/US: CPT | Mod: RT | Performed by: FAMILY MEDICINE

## 2018-01-05 PROCEDURE — 99213 OFFICE O/P EST LOW 20 MIN: CPT | Mod: 25 | Performed by: FAMILY MEDICINE

## 2018-01-05 RX ORDER — TRIAMCINOLONE ACETONIDE 40 MG/ML
40 INJECTION, SUSPENSION INTRA-ARTICULAR; INTRAMUSCULAR ONCE
Qty: 1 ML | Refills: 0 | OUTPATIENT
Start: 2018-01-05 | End: 2018-01-05

## 2018-01-05 RX ORDER — OXYCODONE AND ACETAMINOPHEN 5; 325 MG/1; MG/1
1-2 TABLET ORAL EVERY 8 HOURS PRN
Qty: 120 TABLET | Refills: 0 | Status: SHIPPED | OUTPATIENT
Start: 2018-01-05 | End: 2018-03-12

## 2018-01-05 NOTE — MR AVS SNAPSHOT
After Visit Summary   1/5/2018    Teresa Lopez    MRN: [id number]           Patient Information     Date Of Birth          1935        Visit Information        Provider Department      1/5/2018 9:20 AM Jean Valverde DO [facility] [place]        Today's Diagnoses     Right hip pain    -  1    Primary osteoarthritis of right hip           Follow-ups after your visit        Your next 10 appointments already scheduled     Jan 12, 2018 10:45 AM CST   Return Visit with German Lewis DPM   [facility]    [address] [place] [id number] [phone]              Who to contact     If you have questions or need follow up information about today's clinic visit or your schedule please contact [facility] [place] directly at [phone].  Normal or non-critical lab and imaging results will be communicated to you by Entassohart, letter or phone within 4 business days after the clinic has received the results. If you do not hear from us within 7 days, please contact the clinic through Entassohart or phone. If you have a critical or abnormal lab result, we will notify you by phone as soon as possible.  Submit refill requests through [employer]Scale or call your pharmacy and they will forward the refill request to us. Please allow 3 business days for your refill to be completed.          Additional Information About Your Visit        MyChart Information     [employer] gives you secure access to your electronic health record. If you see a primary care provider, you can also send messages to your care team and make appointments. If you have questions, please call your primary care clinic.  If you do not have a primary care provider, please call [phone] and they will assist you.        Care EveryWhere ID     This is your Care EveryWhere ID. This could be used by other  "organizations to access your [place] medical records  [id number]        Your Vitals Were     Height BMI (Body Mass Index)                5' 1.5\" (1.562 m) 28.07 kg/m2           Blood Pressure from Last 3 Encounters:   01/05/18 118/76   12/22/17 123/74   11/10/17 137/75    Weight from Last 3 Encounters:   01/05/18 151 lb (68.5 kg)   10/27/17 150 lb (68 kg)   10/12/17 155 lb (70.3 kg)              We Performed the Following     HC ARTHROCENTESIS ASPIR&/INJ MAJOR JT/BURSA W/US     TRIAMCINOLONE ACET INJ NOS          Today's Medication Changes          These changes are accurate as of: 1/5/18  1:19 PM.  If you have any questions, ask your nurse or doctor.               Start taking these medicines.        Dose/Directions    triamcinolone acetonide 40 MG/ML injection   Commonly known as:  KENALOG   Used for:  Right hip pain, Primary osteoarthritis of right hip   Started by:  Jean Valverde DO        Dose:  40 mg   1 mL (40 mg) by INTRA-ARTICULAR route once for 1 dose   Quantity:  1 mL   Refills:  0            Where to get your medicines      Some of these will need a paper prescription and others can be bought over the counter.  Ask your nurse if you have questions.     Bring a paper prescription for each of these medications     oxyCODONE-acetaminophen 5-325 MG per tablet       You don't need a prescription for these medications     triamcinolone acetonide 40 MG/ML injection                Primary Care Provider Office Phone # Fax #    Demetrice Debra Gutierrez, APRN [facility] [phone]       [address] [place] [address]        Equal Access to Services     [facility] TABATHA AH: Hadii wilfrid garcia Somadelin, waaxda luqadaha, qaybta kaalmada adeconnie, lori matias. So [facility] [phone].    ATENCIÓN: Si habla español, tiene a brambila disposición servicios gratuitos de asistencia lingüística. Llame al [phone].    We comply with applicable federal civil rights laws " and [place] laws. We do not discriminate on the basis of race, color, national origin, age, disability, sex, sexual orientation, or gender identity.            Thank you!     Thank you for choosing [facility]  for your care. Our goal is always to provide you with excellent care. Hearing back from our patients is one way we can continue to improve our services. Please take a few minutes to complete the written survey that you may receive in the mail after your visit with us. Thank you!             Your Updated Medication List - Protect others around you: Learn how to safely use, store and throw away your medicines at www.disposemymeds.org.          This list is accurate as of: 1/5/18  1:19 PM.  Always use your most recent med list.                   Brand Name Dispense Instructions for use Diagnosis    ADVIL 200 MG capsule   Generic drug:  ibuprofen      Reported on 4/20/2017        amLODIPine 2.5 MG tablet    NORVASC    90 tablet    Take 1 tablet (2.5 mg) by mouth daily 1 po at bedtime    Hypertension goal BP (blood pressure) < 140/90       aspirin 162 MG EC tablet      Take 162 mg by mouth daily Reported on 4/20/2017        CALCIUM 1200 PO      Reported on 4/20/2017        CLARITIN 10 MG capsule   Generic drug:  loratadine      Take 10 mg by mouth as needed Reported on 4/20/2017        gabapentin 300 MG capsule    NEURONTIN    270 capsule    Take 1 capsule (300 mg) by mouth 3 times daily    DJD (degenerative joint disease), lumbosacral, Spinal stenosis of lumbar region with neurogenic claudication       GLUCOSAMINE 1500 COMPLEX PO      Take 1,500 mg by mouth daily Reported on 4/20/2017        losartan-hydrochlorothiazide 100-25 MG per tablet    HYZAAR    90 tablet    Take 1 tablet by mouth daily    Hypertension goal BP (blood pressure) < 140/90       Multi-vitamin Tabs tablet   Generic drug:  multivitamin, therapeutic with minerals      1 TABLET DAILY        oxyCODONE-acetaminophen  5-325 MG per tablet    PERCOCET    120 tablet    Take 1-2 tablets by mouth every 8 hours as needed for pain maximum 4 tablet(s) per day    DJD (degenerative joint disease), lumbosacral, Spinal stenosis of lumbar region with neurogenic claudication       PRILOSEC PO      None Entered        SALMON OIL-1000 PO      take 1,000 Caps by mouth daily.        senna-docusate 8.6-50 MG per tablet    SENOKOT-S;PERICOLACE    120 tablet    Take 1 tablet by mouth 2 times daily    Slow transit constipation       triamcinolone acetonide 40 MG/ML injection    KENALOG    1 mL    1 mL (40 mg) by INTRA-ARTICULAR route once for 1 dose    Right hip pain, Primary osteoarthritis of right hip       VITAMIN C PO      None Entered        VITAMIN D PO      Take 1,200 mg by mouth daily.        vitamin E 400 UNIT capsule      None Entered

## 2018-01-05 NOTE — PROGRESS NOTES
"Teresa Lopez  :  1935  DOS: 2018  MRN: [id number]    Sports Medicine Clinic Visit    PCP: Demetrice Gutierrez    Teresa Lopez is a 81 year old female who is seen in consultation at the request of  Marion Munoz CNP presenting with chronic right hip pain.    Injury: Gradual onset of chronic mild-moderate right hip pain over last 6 - 7 months.  Pain located in deep lateral, anterior hip, radiating to right thigh.  Reports intermittent radiating, pain to right anterior, lateral thigh.  Additional Features:  Positive: grinding and weakness.  Symptoms are better with Rest.  Symptoms are worse with: prolonged walking, tying shoes, going from sit to stand position.  Other evaluation and/or treatments so far consists of: Rest and pain management consult, pain medication.  Recent imaging completed: X-rays completed 3/17/17.  Prior History of related problems: Patient is currently being treated for chronic low back pain issues by pain management.  Difficult to keep her focused on particular issue today.    Social History: retired  Presents with her daughter today    Interim History - 2018  Since last visit on 17 patient has moderate severe radiating right hip pain.  Pain radiates down anterior thigh to her knee.  They desire to discuss hip injection options today.  No interim injury.         Review of Systems  Musculoskeletal: as above  Remainder of review of systems is negative including constitutional, CV, pulmonary, GI, Skin and Neurologic except as noted in HPI or medical history.    Past Medical History:   Diagnosis Date     Cataract 3/7/2012     CVA (cerebral infarction)      Diabetes mellitus (H)      DJD (degenerative joint disease)      HTN      Past Surgical History:   Procedure Laterality Date     HYSTERECTOMY, CERVIX STATUS UNKNOWN  age 31       Objective  /76  Ht 5' 1.5\" (1.562 m)  Wt 151 lb (68.5 kg)  BMI 28.07 kg/m2    General: healthy, alert and in no distress  "   HEENT: no scleral icterus or conjunctival erythema   Skin: no suspicious lesions or rash. No jaundice.   CV: regular rhythm by palpation, 2+ distal pulses, no pedal edema    Resp: normal respiratory effort without conversational dyspnea   Psych: normal mood and affect    Gait: antalgic, appropriate coordination and balance, trendelenburg  Neuro: normal light touch sensory exam of the extremities. Motor strength as noted below     Right hip exam    Inspection:        no edema or ecchymosis in hip area    ROM:       Flexion 110       internal rotation 15      external rotation 30      Range of motion limited by pain    Strength:        flexion 4/5       extension 4/5       abduction 3/5       adduction 4/5    Tender:        greater trochanter mild       Anterior hip joint       Mild ipsilateral SI joint TTP    Non Tender:        remainder of hip area       illiac crest       ASIS       pubis    Sensation:        grossly intact in hip and thigh    Skin:       well perfused       capillary refill brisk    Special Tests:        neg (-) LUDY       positive (+) FADIR       positive (+) scour       neg (-) Ankit    Neg slump and SLR, knee stable on exam    Sports Medicine Clinic Procedure  Ultrasound Guided Right Intra-Articular Hip Injection    Technique: The risks of the procedure were explained to the patient.  A consent was signed for the intra-articular hip injection.  The patient was evaluated with a OwlTing ??? ultrasound machine using a 12 MHz linear probe.   4 ml of 1% lidocaine was used for local anesthesia. The Right hip was prepped and draped in a sterile manner.  Ultrasound identification of the acetabulum, femoral head, and femoral neck in both long and short axis.  The location of the femoral vessels were noted and marked.  The probe was placed in a oblique-sagittal axis to the Right femoral neck.  A 3.5 inch 20 gauge needle was placed under ultrasound guidance into the anterior hip capsule.  A mixture of 3  ml's of 0.2% ropivacaine and 1 ml kenalog (40mg/ml) was injected without difficulty on oblique-sagittal axis view.  The needle was removed and there was good hemostasis without complications.  There was ultrasound documentation of needle placement and injection.  Pre-procedural pain 9/10.  Post procedural pain 2/10.    Radiology  XR HIP RIGHT 2-3 VIEWS 3/17/2017 2:49 PM     COMPARISON: None.     HISTORY: Right hip pain, no trauma history.         IMPRESSION: Moderate to severe left hip osteoarthritis with  significant joint space loss. No fractures are seen.    Assessment:  1. Right hip pain    2. Primary osteoarthritis of right hip        Plan:  Discussed the assessment with the patient.  Follow up: prn  Offered injection again today to the right hip joint, which she accepted  Lumbar issue mgmt per pain mgmt team following her  PT options reviewed, as well as HEP  Topical OTC medication use and oral OTC medication use reviewed  Expectations and goals of CSI reviewed  Often 2-3 days for steroid effect, and can take up to two weeks for maximum effect  We discussed modified progressive pain-free activity as tolerated  Do not overuse in first two weeks if feeling better due to concern for vulnerability while steroid is working  Supportive care reviewed  All questions were answered today  Contact us with additional questions or concerns  Signs and sx of concern reviewed      Jean Valverde DO, AGATA  Primary Care Sports Medicine  [facility]           Disclaimer: This note consists of symbols derived from keyboarding, dictation and/or voice recognition software. As a result, there may be errors in the script that have gone undetected. Please consider this when interpreting information found in this chart.

## 2018-01-05 NOTE — TELEPHONE ENCOUNTER
Controlled Substance Refill Request for Percocet 5-325mg    Last refill: 11/27/17 - 120qty    Last clinic visit: 9/18/17     Next appt: None with PCP    Controlled substance agreement on file: Yes:  Date 1/16/17.    Documentation in problem list reviewed:  Yes    Processing:  Patient will  in clinic       Shelby Chatterjee RN  [facility]

## 2018-01-05 NOTE — LETTER
2018         RE: Teresa Lopez  [address]  [facility] [address]        Dear Colleague,    Thank you for referring your patient, Teresa Lopez, to the [facility]. Please see a copy of my visit note below.    Teresa Lopez  :  1935  DOS: 2018  MRN: [id number]    Sports Medicine Clinic Visit    PCP: Demetrice Gutierrez    Teresa Lopez is a 81 year old female who is seen in consultation at the request of  Marion Munoz CNP presenting with chronic right hip pain.    Injury: Gradual onset of chronic mild-moderate right hip pain over last 6 - 7 months.  Pain located in deep lateral, anterior hip, radiating to right thigh.  Reports intermittent radiating, pain to right anterior, lateral thigh.  Additional Features:  Positive: grinding and weakness.  Symptoms are better with Rest.  Symptoms are worse with: prolonged walking, tying shoes, going from sit to stand position.  Other evaluation and/or treatments so far consists of: Rest and pain management consult, pain medication.  Recent imaging completed: X-rays completed 3/17/17.  Prior History of related problems: Patient is currently being treated for chronic low back pain issues by pain management.  Difficult to keep her focused on particular issue today.    Social History: retired  Presents with her daughter today    Interim History - 2018  Since last visit on 17 patient has moderate severe radiating right hip pain.  Pain radiates down anterior thigh to her knee.  They desire to discuss hip injection options today.  No interim injury.         Review of Systems  Musculoskeletal: as above  Remainder of review of systems is negative including constitutional, CV, pulmonary, GI, Skin and Neurologic except as noted in HPI or medical history.    Past Medical History:   Diagnosis Date     Cataract 3/7/2012     CVA (cerebral infarction)      Diabetes mellitus (H)      DJD (degenerative joint  "disease)      HTN      Past Surgical History:   Procedure Laterality Date     HYSTERECTOMY, CERVIX STATUS UNKNOWN  age 31       Objective  /76  Ht 5' 1.5\" (1.562 m)  Wt 151 lb (68.5 kg)  BMI 28.07 kg/m2    General: healthy, alert and in no distress    HEENT: no scleral icterus or conjunctival erythema   Skin: no suspicious lesions or rash. No jaundice.   CV: regular rhythm by palpation, 2+ distal pulses, no pedal edema    Resp: normal respiratory effort without conversational dyspnea   Psych: normal mood and affect    Gait: antalgic, appropriate coordination and balance, trendelenburg  Neuro: normal light touch sensory exam of the extremities. Motor strength as noted below     Right hip exam    Inspection:        no edema or ecchymosis in hip area    ROM:       Flexion 110       internal rotation 15      external rotation 30      Range of motion limited by pain    Strength:        flexion 4/5       extension 4/5       abduction 3/5       adduction 4/5    Tender:        greater trochanter mild       Anterior hip joint       Mild ipsilateral SI joint TTP    Non Tender:        remainder of hip area       illiac crest       ASIS       pubis    Sensation:        grossly intact in hip and thigh    Skin:       well perfused       capillary refill brisk    Special Tests:        neg (-) LUDY       positive (+) FADIR       positive (+) scour       neg (-) Ankit    Neg slump and SLR, knee stable on exam    Sports Medicine Clinic Procedure  Ultrasound Guided Right Intra-Articular Hip Injection    Technique: The risks of the procedure were explained to the patient.  A consent was signed for the intra-articular hip injection.  The patient was evaluated with a [email or web address] ultrasound machine using a 12 MHz linear probe.   4 ml of 1% lidocaine was used for local anesthesia. The Right hip was prepped and draped in a sterile manner.  Ultrasound identification of the acetabulum, femoral head, and femoral neck in both long and " short axis.  The location of the femoral vessels were noted and marked.  The probe was placed in a oblique-sagittal axis to the Right femoral neck.  A 3.5 inch 20 gauge needle was placed under ultrasound guidance into the anterior hip capsule.  A mixture of 3 ml's of 0.2% ropivacaine and 1 ml kenalog (40mg/ml) was injected without difficulty on oblique-sagittal axis view.  The needle was removed and there was good hemostasis without complications.  There was ultrasound documentation of needle placement and injection.  Pre-procedural pain 9/10.  Post procedural pain 2/10.    Radiology  XR HIP RIGHT 2-3 VIEWS 3/17/2017 2:49 PM     COMPARISON: None.     HISTORY: Right hip pain, no trauma history.         IMPRESSION: Moderate to severe left hip osteoarthritis with  significant joint space loss. No fractures are seen.    Assessment:  1. Right hip pain    2. Primary osteoarthritis of right hip        Plan:  Discussed the assessment with the patient.  Follow up: prn  Offered injection again today to the right hip joint, which she accepted  Lumbar issue mgmt per pain mgmt team following her  PT options reviewed, as well as HEP  Topical OTC medication use and oral OTC medication use reviewed  Expectations and goals of CSI reviewed  Often 2-3 days for steroid effect, and can take up to two weeks for maximum effect  We discussed modified progressive pain-free activity as tolerated  Do not overuse in first two weeks if feeling better due to concern for vulnerability while steroid is working  Supportive care reviewed  All questions were answered today  Contact us with additional questions or concerns  Signs and sx of concern reviewed      Jean Valverde DO, CAQ  Primary Care Sports Medicine  [facility]           Disclaimer: This note consists of symbols derived from keyboarding, dictation and/or voice recognition software. As a result, there may be errors in the script that have gone undetected. Please  consider this when interpreting information found in this chart.    Again, thank you for allowing me to participate in the care of your patient.        Sincerely,        Jean Valverde, DO

## 2018-01-05 NOTE — TELEPHONE ENCOUNTER
Reason for Call:  Medication or medication refill:    Do you use a [facility]?  Name of the pharmacy and phone number for the current request:  Patient picks up at     Name of the medication requested:oxyCODONE-acetaminophen (PERCOCET) 5-325 MG per tablet     Can we leave a detailed message on this number? YES    Phone number patient can be reached at: Home number on file [phone] (home)    Best Time: Anytime    Call taken on 1/5/2018 at 8:17 AM by María Velez

## 2018-01-05 NOTE — TELEPHONE ENCOUNTER
Routing refill request to provider for review/approval because:  Drug not on the [facility] refill protocol       Shelby Chatterjee RN  [facility]

## 2018-01-12 ENCOUNTER — OFFICE VISIT (OUTPATIENT)
Dept: PODIATRY | Facility: CLINIC | Age: 83
End: 2018-01-12
Payer: COMMERCIAL

## 2018-01-12 VITALS — OXYGEN SATURATION: 98 % | HEART RATE: 88 BPM | WEIGHT: 150 LBS | BODY MASS INDEX: 27.88 KG/M2

## 2018-01-12 DIAGNOSIS — M19.071 PRIMARY LOCALIZED OSTEOARTHROSIS OF RIGHT ANKLE AND FOOT: Primary | ICD-10-CM

## 2018-01-12 PROCEDURE — 20605 DRAIN/INJ JOINT/BURSA W/O US: CPT | Mod: RT | Performed by: PODIATRIST

## 2018-01-12 PROCEDURE — 99213 OFFICE O/P EST LOW 20 MIN: CPT | Mod: 25 | Performed by: PODIATRIST

## 2018-01-12 NOTE — PATIENT INSTRUCTIONS
We wish you continued good healing. If you have any questions or concerns, please do not hesitate to contact us at [phone]      Please remember to call and schedule a follow up appointment if one was recommended at your earliest convenience.   PODIATRY CLINIC HOURS  TELEPHONE NUMBER    Dr. German Lewis D.P.M [facility]    Clinics:  [facility]        Angela Esteves MA  Medical Assistant  Tuesday 1PM-6PM  [facility]  Wednesday 7AM-2PM  [place]  Thursday 10AM-6PM  [place] Friday 7AM-345PM  [place]  Specialty schedulers:   [phone] to make an appointment with any Specialty Provider.        Urgent Care locations:    [facility] Monday-Friday 5 pm - 9 pm. Saturday-Sunday 9 am -5pm    Monday-Friday 11 am - 9 pm Saturday 9 am - 5 pm     Monday-Sunday 12 noon-8PM [phone]     If you need a medication refill, please contact us you may need lab work and/or a follow up visit prior to your refill (i.e. Antifungal medications).    [facility] (secure e-mail communication and access to your chart) to send a message or to make an appointment.    If MRI needed please call Kiko Pritchard at [phone]        Weight management plan: Patient was referred to their PCP to discuss a diet and exercise plan.

## 2018-01-12 NOTE — LETTER
1/12/2018         RE: Teresa Lopez  [address]  [place] [address]        Dear Colleague,    Thank you for referring your patient, Teresa Lopez, to the [facility]. Please see a copy of my visit note below.    S:  4/28/17  Patient seen in consult from Demetrice Gutierrez and complains of ankle pain.  Points to lateral ankle gutter as to where worse pain is.  Has had this for 2 years.  states she was walking and heard a pop.   Describes it as a burning pain.  Aggrevated by activity and relieved by rest.  Slowly getting worse.  Uneven surfaces bother this.  Positive history of post static dyskinesia.    5/19/17  Patient states injection did not help at all and she is wondering if this went in the joint.    10/6/17  Last injection did not help.  Lateral ankle is where most of her pain is.  Wearing ankle brace.     10/12/17  Patient here with daughter in law today.  Her ankle still hurts.  Would again like to discuss options.    1/12/18  Ankle still hurts.  [place] AFO did not help her.  Elastic ankle brace seems to be the best.  Lateral ankle hurts her the most.  She would like to try another injection.  Seen with daughter today.          ROS:  Denies bruising, weakness, or numbness.       Allergies   Allergen Reactions     Sulfa Drugs        Current Outpatient Prescriptions   Medication Sig Dispense Refill     oxyCODONE-acetaminophen (PERCOCET) 5-325 MG per tablet Take 1-2 tablets by mouth every 8 hours as needed for pain maximum 4 tablet(s) per day 120 tablet 0     gabapentin (NEURONTIN) 300 MG capsule Take 1 capsule (300 mg) by mouth 3 times daily 270 capsule 3     losartan-hydrochlorothiazide (HYZAAR) 100-25 MG per tablet Take 1 tablet by mouth daily 90 tablet 3     amLODIPine (NORVASC) 2.5 MG tablet Take 1 tablet (2.5 mg) by mouth daily 1 po at bedtime 90 tablet 3     senna-docusate (SENOKOT-S;PERICOLACE) 8.6-50 MG per tablet Take 1 tablet by mouth 2 times daily 120 tablet 12      aspirin 162 MG EC tablet Take 162 mg by mouth daily Reported on 4/20/2017       Glucosamine-Chondroit-Vit C-Mn (GLUCOSAMINE 1500 COMPLEX PO) Take 1,500 mg by mouth daily Reported on 4/20/2017       Loratadine (CLARITIN) 10 MG capsule Take 10 mg by mouth as needed Reported on 4/20/2017       Omega-3 Fatty Acids (SALMON OIL-1000 PO) take 1,000 Caps by mouth daily.       ADVIL 200 MG PO CAPS Reported on 4/20/2017       MULTI-VITAMIN OR TABS 1 TABLET DAILY       CALCIUM 1200 OR Reported on 4/20/2017       VITAMIN C OR None Entered       VITAMIN D OR Take 1,200 mg by mouth daily.       VITAMIN E 400 UNIT OR CAPS None Entered       PRILOSEC OR None Entered         Patient Active Problem List   Diagnosis     Cerebral infarction (H)     Jaw Pain     Arthritis of knee     CARDIOVASCULAR SCREENING; LDL GOAL LESS THAN 100     Hypertension goal BP (blood pressure) < 140/90     Advanced directives, counseling/discussion     Hyperlipidemia LDL goal <130     OA (OSTEOARTHRITIS) OF KNEE - right     CKD (chronic kidney disease) stage 3, GFR 30-59 ml/min     Chronic pain disorder     DJD (degenerative joint disease), lumbosacral     Spinal stenosis of lumbar region with neurogenic claudication     Slow transit constipation     Steroid-induced diabetes mellitus (H)     Dementia without behavioral disturbance, unspecified dementia type     Combined form of age-related cataract, mod, both eyes     Posterior vitreous detachment of both eyes     Lumbago       Past Medical History:   Diagnosis Date     Cataract 3/7/2012     CVA (cerebral infarction)      Diabetes mellitus (H)      DJD (degenerative joint disease)      HTN        Past Surgical History:   Procedure Laterality Date     HYSTERECTOMY, CERVIX STATUS UNKNOWN  age 31       Family History   Problem Relation Age of Onset     Arthritis Mother      Unknown/Adopted Father      adopted     DIABETES Brother      CANCER Brother      Hypertension No family hx of      CEREBROVASCULAR  DISEASE No family hx of      Thyroid Disease No family hx of      Glaucoma No family hx of      Macular Degeneration No family hx of        Social History   Substance Use Topics     Smoking status: Never Smoker     Smokeless tobacco: Never Used     Alcohol use 0.0 oz/week     0 Standard drinks or equivalent per week      Comment: 1 drink weekly         O:  Good historian.  A&O X 3.  Pulses DP, PT 2/4 b/l.  CRT < 3 seconds X 10 digits.  Bilateral mild ankle edema or varicosities noted.  Sensation to light touch intact b/l.  Reflexes 2/4 b/l.  Skin thin with scant hair b/l.  Normal arch with weightbearing.  No forefoot or rear foot deformities noted.  MS 5/5 all compartments.  Normal ROM all fore foot and rearfoot joints.  No equinus.    No pain with stressing any muscle compartments.  No erythema edema or ecchymosis or masses noted.  Pain with palpation lateral gutter ankle and less medial.    RIGHT ANKLE THREE OR MORE VIEWS 4/20/2017 11:12 AM      HISTORY: Concern for arthritis. Chronic lateral ankle pain.     COMPARISON: None.         IMPRESSION: No acute fracture or dislocation. There is arthritis in  the ankle joint with narrowing of the tibiotalar joint laterally. The  bones are demineralized. Multiple vascular calcifications.        A:  Right ankle joint arthritis.    P:  Explained to both that she unfortunately has arthritis.  Injections have not really helped, but they would like to try again.  Pulled up x-ray and reviewed this with both patient and daughter.  Risks complications, and efficacy of cortisone injection discussed.  After written consent, sterile prep, injected right ankle lateral gutter with 1 cc 1% lidocaine plain and 2 cc kenalog 10 mg.   Other option is for referral to [facility] to discuss candidacy for ankle replacement.    RETURN TO CLINIC PRN.          German Lewis DPM, FACFAS      Again, thank you for allowing me to participate in the care of your patient.        Sincerely,        German  VERA Lewis, DIAMONDM

## 2018-01-12 NOTE — PROGRESS NOTES
S:  4/28/17  Patient seen in consult from Demetrice Gutierrez and complains of ankle pain.  Points to lateral ankle gutter as to where worse pain is.  Has had this for 2 years.  states she was walking and heard a pop.   Describes it as a burning pain.  Aggrevated by activity and relieved by rest.  Slowly getting worse.  Uneven surfaces bother this.  Positive history of post static dyskinesia.    5/19/17  Patient states injection did not help at all and she is wondering if this went in the joint.    10/6/17  Last injection did not help.  Lateral ankle is where most of her pain is.  Wearing ankle brace.     10/12/17  Patient here with daughter in law today.  Her ankle still hurts.  Would again like to discuss options.    1/12/18  Ankle still hurts.  [place] AF did not help her.  Elastic ankle brace seems to be the best.  Lateral ankle hurts her the most.  She would like to try another injection.  Seen with daughter today.          ROS:  Denies bruising, weakness, or numbness.       Allergies   Allergen Reactions     Sulfa Drugs        Current Outpatient Prescriptions   Medication Sig Dispense Refill     oxyCODONE-acetaminophen (PERCOCET) 5-325 MG per tablet Take 1-2 tablets by mouth every 8 hours as needed for pain maximum 4 tablet(s) per day 120 tablet 0     gabapentin (NEURONTIN) 300 MG capsule Take 1 capsule (300 mg) by mouth 3 times daily 270 capsule 3     losartan-hydrochlorothiazide (HYZAAR) 100-25 MG per tablet Take 1 tablet by mouth daily 90 tablet 3     amLODIPine (NORVASC) 2.5 MG tablet Take 1 tablet (2.5 mg) by mouth daily 1 po at bedtime 90 tablet 3     senna-docusate (SENOKOT-S;PERICOLACE) 8.6-50 MG per tablet Take 1 tablet by mouth 2 times daily 120 tablet 12     aspirin 162 MG EC tablet Take 162 mg by mouth daily Reported on 4/20/2017       Glucosamine-Chondroit-Vit C-Mn (GLUCOSAMINE 1500 COMPLEX PO) Take 1,500 mg by mouth daily Reported on 4/20/2017       Loratadine (CLARITIN) 10 MG capsule Take 10 mg by  mouth as needed Reported on 4/20/2017       Omega-3 Fatty Acids (SALMON OIL-1000 PO) take 1,000 Caps by mouth daily.       ADVIL 200 MG PO CAPS Reported on 4/20/2017       MULTI-VITAMIN OR TABS 1 TABLET DAILY       CALCIUM 1200 OR Reported on 4/20/2017       VITAMIN C OR None Entered       VITAMIN D OR Take 1,200 mg by mouth daily.       VITAMIN E 400 UNIT OR CAPS None Entered       PRILOSEC OR None Entered         Patient Active Problem List   Diagnosis     Cerebral infarction (H)     Jaw Pain     Arthritis of knee     CARDIOVASCULAR SCREENING; LDL GOAL LESS THAN 100     Hypertension goal BP (blood pressure) < 140/90     Advanced directives, counseling/discussion     Hyperlipidemia LDL goal <130     OA (OSTEOARTHRITIS) OF KNEE - right     CKD (chronic kidney disease) stage 3, GFR 30-59 ml/min     Chronic pain disorder     DJD (degenerative joint disease), lumbosacral     Spinal stenosis of lumbar region with neurogenic claudication     Slow transit constipation     Steroid-induced diabetes mellitus (H)     Dementia without behavioral disturbance, unspecified dementia type     Combined form of age-related cataract, mod, both eyes     Posterior vitreous detachment of both eyes     Lumbago       Past Medical History:   Diagnosis Date     Cataract 3/7/2012     CVA (cerebral infarction)      Diabetes mellitus (H)      DJD (degenerative joint disease)      HTN        Past Surgical History:   Procedure Laterality Date     HYSTERECTOMY, CERVIX STATUS UNKNOWN  age 31       Family History   Problem Relation Age of Onset     Arthritis Mother      Unknown/Adopted Father      adopted     DIABETES Brother      CANCER Brother      Hypertension No family hx of      CEREBROVASCULAR DISEASE No family hx of      Thyroid Disease No family hx of      Glaucoma No family hx of      Macular Degeneration No family hx of        Social History   Substance Use Topics     Smoking status: Never Smoker     Smokeless tobacco: Never Used      Alcohol use 0.0 oz/week     0 Standard drinks or equivalent per week      Comment: 1 drink weekly         O:  Good historian.  A&O X 3.  Pulses DP, PT 2/4 b/l.  CRT < 3 seconds X 10 digits.  Bilateral mild ankle edema or varicosities noted.  Sensation to light touch intact b/l.  Reflexes 2/4 b/l.  Skin thin with scant hair b/l.  Normal arch with weightbearing.  No forefoot or rear foot deformities noted.  MS 5/5 all compartments.  Normal ROM all fore foot and rearfoot joints.  No equinus.    No pain with stressing any muscle compartments.  No erythema edema or ecchymosis or masses noted.  Pain with palpation lateral gutter ankle and less medial.    RIGHT ANKLE THREE OR MORE VIEWS 4/20/2017 11:12 AM      HISTORY: Concern for arthritis. Chronic lateral ankle pain.     COMPARISON: None.         IMPRESSION: No acute fracture or dislocation. There is arthritis in  the ankle joint with narrowing of the tibiotalar joint laterally. The  bones are demineralized. Multiple vascular calcifications.        A:  Right ankle joint arthritis.    P:  Explained to both that she unfortunately has arthritis.  Injections have not really helped, but they would like to try again.  Pulled up x-ray and reviewed this with both patient and daughter.  Risks complications, and efficacy of cortisone injection discussed.  After written consent, sterile prep, injected right ankle lateral gutter with 1 cc 1% lidocaine plain and 2 cc kenalog 10 mg.   Other option is for referral to [facility] to discuss candidacy for ankle replacement.    RETURN TO CLINIC PRN.          German Lewis DPM, FACFAS

## 2018-01-12 NOTE — MR AVS SNAPSHOT
After Visit Summary   1/12/2018    Teresa Lopez    MRN: [id number]           Patient Information     Date Of Birth          1935        Visit Information        Provider Department      1/12/2018 10:45 AM German Lewis DPM [facility]        Today's Diagnoses     Primary localized osteoarthrosis of right ankle and foot    -  1      Care Instructions    We wish you continued good healing. If you have any questions or concerns, please do not hesitate to contact us at [phone]      Please remember to call and schedule a follow up appointment if one was recommended at your earliest convenience.   PODIATRY CLINIC HOURS  TELEPHONE NUMBER    Dr. German MORRISPBETTINA [facility]    Clinics:  [facility]        Angela Esteves MA  Medical Assistant  Tuesday 1PM-6PM  [place]  Wednesday 7AM-2PM  [place]  Thursday 10AM-6PM  [place]  Friday 7AM-345PM  [place]  Specialty schedulers:   [phone] to make an appointment with any Specialty Provider.        Urgent Care locations:    [facility] Monday-Friday 5 pm - 9 pm. Saturday-Sunday 9 am -5pm    Monday-Friday 11 am - 9 pm Saturday 9 am - 5 pm     Monday-Sunday 12 noon-8PM [phone]     If you need a medication refill, please contact us you may need lab work and/or a follow up visit prior to your refill (i.e. Antifungal medications).    MEDL Mobilet (secure e-mail communication and access to your chart) to send a message or to make an appointment.    If MRI needed please call [place] at [phone]        Weight management plan: Patient was referred to their PCP to discuss a diet and exercise plan.            Follow-ups after your visit        Who to contact     If you have questions or need follow up information about today's clinic visit or your schedule please contact [facility] directly at [phone].  Normal or non-critical lab and imaging results will be communicated to you by MyChart, letter or phone within 4 business days after the clinic has received the results. If you do not hear from us within 7 days, please contact the clinic through TELA Biohart or phone. If you have a critical or abnormal lab result, we will notify you by phone as soon as possible.  Submit refill requests through [employer] or call your pharmacy and they will forward the refill request to us. Please allow 3 business days for your refill to be completed.          Additional Information About Your Visit        TELA [employer] Information     Innovate2 gives you secure access to your electronic health record. If you see a primary care provider, you can also send messages to your care team and make appointments. If you have questions, please call your primary care clinic.  If you do not have a primary care provider, please call [phone] and they will assist you.        Care EveryWhere ID     This is your Care EveryWhere ID. This could be used by other organizations to access your [facility] medical records  [id number]        Your Vitals Were     Pulse Pulse Oximetry BMI (Body Mass Index)             88 98% 27.88 kg/m2          Blood Pressure from Last 3 Encounters:   01/05/18 118/76   12/22/17 123/74   11/10/17 137/75    Weight from Last 3 Encounters:   01/12/18 150 lb (68 kg)   01/05/18 151 lb (68.5 kg)   10/27/17 150 lb (68 kg)              We Performed the Following     DRAIN/INJECT MEDIUM JOINT/BURSA     TRIAMCINOLONE ACET INJ NOS        Primary Care Provider Office Phone # Fax #    Demetrice MYRON Cardoza [facility] [phone]       [address] [facility] [address]        Equal Access to Services     ALTHEA MAYS : Nani Gallegos, gualberto ugarte, lin cueto, lori matias. So [facility] [phone].    ATENCIÓN: Si  addis matthew, tiene a brambila disposición servicios gratuitos de asistencia lingüística. Danuta amos [phone].    We comply with applicable federal civil rights laws and [place] laws. We do not discriminate on the basis of race, color, national origin, age, disability, sex, sexual orientation, or gender identity.            Thank you!     Thank you for choosing [facility]  for your care. Our goal is always to provide you with excellent care. Hearing back from our patients is one way we can continue to improve our services. Please take a few minutes to complete the written survey that you may receive in the mail after your visit with us. Thank you!             Your Updated Medication List - Protect others around you: Learn how to safely use, store and throw away your medicines at www.disposemymeds.org.          This list is accurate as of: 1/12/18 12:16 PM.  Always use your most recent med list.                   Brand Name Dispense Instructions for use Diagnosis    ADVIL 200 MG capsule   Generic drug:  ibuprofen      Reported on 4/20/2017        amLODIPine 2.5 MG tablet    NORVASC    90 tablet    Take 1 tablet (2.5 mg) by mouth daily 1 po at bedtime    Hypertension goal BP (blood pressure) < 140/90       aspirin 162 MG EC tablet      Take 162 mg by mouth daily Reported on 4/20/2017        CALCIUM 1200 PO      Reported on 4/20/2017        CLARITIN 10 MG capsule   Generic drug:  loratadine      Take 10 mg by mouth as needed Reported on 4/20/2017        gabapentin 300 MG capsule    NEURONTIN    270 capsule    Take 1 capsule (300 mg) by mouth 3 times daily    DJD (degenerative joint disease), lumbosacral, Spinal stenosis of lumbar region with neurogenic claudication       GLUCOSAMINE 1500 COMPLEX PO      Take 1,500 mg by mouth daily Reported on 4/20/2017        losartan-hydrochlorothiazide 100-25 MG per tablet    HYZAAR    90 tablet    Take 1 tablet by mouth daily    Hypertension goal BP (blood  pressure) < 140/90       Multi-vitamin Tabs tablet   Generic drug:  multivitamin, therapeutic with minerals      1 TABLET DAILY        oxyCODONE-acetaminophen 5-325 MG per tablet    PERCOCET    120 tablet    Take 1-2 tablets by mouth every 8 hours as needed for pain maximum 4 tablet(s) per day    DJD (degenerative joint disease), lumbosacral, Spinal stenosis of lumbar region with neurogenic claudication       PRILOSEC PO      None Entered        SALMON OIL-1000 PO      take 1,000 Caps by mouth daily.        senna-docusate 8.6-50 MG per tablet    SENOKOT-S;PERICOLACE    120 tablet    Take 1 tablet by mouth 2 times daily    Slow transit constipation       VITAMIN C PO      None Entered        VITAMIN D PO      Take 1,200 mg by mouth daily.        vitamin E 400 UNIT capsule      None Entered

## 2018-01-22 ENCOUNTER — TELEPHONE (OUTPATIENT)
Dept: PALLIATIVE MEDICINE | Facility: CLINIC | Age: 83
End: 2018-01-22

## 2018-01-22 NOTE — TELEPHONE ENCOUNTER
Called pt.  She was scheduled to see MYRON Simon CNP on Wednesday. Further can be addressed at that time.    Natalie Garcia RN-BSN  [facility]

## 2018-01-22 NOTE — TELEPHONE ENCOUNTER
Reviewed pt's AVS and provider notes from her visit with Marion Munoz CNP on 12/22/17:    PLAN  1. Medications:   1. Continue Percocet 5/325mg by mouth 1-2 tabs every 8 hours as needed for pain as prescribed by Primary Care Provider   2. Continue Gabapentin 300mg by mouth 3 times daily.  2. Procedures: Schedule an appointment with Dr. Valverde for a hip injection. Our office will contact you with further details.  3. Continue to use walking sticks. If you are having trouble with balance contact our office to receive a walker  Follow-up with me in 10 weeks.  -----------  There is no mention of a back injection.  Patient has not scheduled a return appointment which should be made for early March.      KEYON ValienteN, RN-BC  Patient Care Supervisor/Care Coordinator  [facility]

## 2018-01-22 NOTE — TELEPHONE ENCOUNTER
Patient states that she was suppose to talk to Marion Munoz about getting an injection in her back. She states she just had an injection in her groin area by Dr. Valverde and doesn't know if she should come in to see Marion or what she should do. Patient would like a call back. Phone # [phone]      Liliya Cain    [facility]

## 2018-02-23 ENCOUNTER — OFFICE VISIT (OUTPATIENT)
Dept: PALLIATIVE MEDICINE | Facility: CLINIC | Age: 83
End: 2018-02-23
Payer: COMMERCIAL

## 2018-02-23 VITALS
SYSTOLIC BLOOD PRESSURE: 147 MMHG | HEART RATE: 101 BPM | DIASTOLIC BLOOD PRESSURE: 85 MMHG | WEIGHT: 145 LBS | BODY MASS INDEX: 26.95 KG/M2

## 2018-02-23 DIAGNOSIS — M51.369 DDD (DEGENERATIVE DISC DISEASE), LUMBAR: ICD-10-CM

## 2018-02-23 DIAGNOSIS — M25.50 MULTIPLE JOINT PAIN: ICD-10-CM

## 2018-02-23 DIAGNOSIS — M53.3 SACROILIAC JOINT DYSFUNCTION OF BOTH SIDES: ICD-10-CM

## 2018-02-23 DIAGNOSIS — G57.03 PIRIFORMIS SYNDROME OF BOTH SIDES: ICD-10-CM

## 2018-02-23 DIAGNOSIS — M48.062 SPINAL STENOSIS OF LUMBAR REGION WITH NEUROGENIC CLAUDICATION: Primary | ICD-10-CM

## 2018-02-23 PROCEDURE — 99214 OFFICE O/P EST MOD 30 MIN: CPT | Performed by: NURSE PRACTITIONER

## 2018-02-23 ASSESSMENT — PAIN SCALES - GENERAL: PAINLEVEL: WORST PAIN (10)

## 2018-02-23 NOTE — NURSING NOTE
"Chief Complaint   Patient presents with     Pain     Follow up        Initial /85  Pulse 101  Wt 65.8 kg (145 lb)  BMI 26.95 kg/m2 Estimated body mass index is 26.95 kg/(m^2) as calculated from the following:    Height as of 1/5/18: 1.562 m (5' 1.5\").    Weight as of this encounter: 65.8 kg (145 lb).  Medication Reconciliation: complete     An Menjivar MA      "

## 2018-02-23 NOTE — MR AVS SNAPSHOT
After Visit Summary   2/23/2018    Teresa Lopez    MRN: [id number]           Patient Information     Date Of Birth          1935        Visit Information        Provider Department      2/23/2018 8:30 AM Marion Munoz APRN [facility] [place]        Today's Diagnoses     Spinal stenosis of lumbar region with neurogenic claudication    -  1    DDD (degenerative disc disease), lumbar        Sacroiliac joint dysfunction of both sides        Piriformis syndrome of both sides        Multiple joint pain          Care Instructions    PLAN  1. Medications:   1. Continue Percocet per Primary Care Provider   2. Continue Gabapentin per Primary Care Provider   3. Continue Calcium supplement   4. Start to use Aspercreme with 4% lidocaine cream; this is found over-the-counter and may be used according to package instructions for topical pain relief  2. Follow-up with neurosurgery to see if you are a surgical candidate for spinal stenosis. They will contact you to set up an appointment  3. Procedures: We need to wait at least 6 weeks prior to any further injections. I would consider doing bilateral SI joint injections and/or piriformis injections. Call me and let me know how you are doing in about 6 weeks.  4. You could contact [place] [facility] office to check to see how much acupuncture would be without insurance coverage.   5. Follow-up with me in 8 weeks.    ----------------------------------------------------------------  Nurse Triage line:  [phone]   Call this number with any questions or concerns. You may leave a detailed message anytime. Calls are typically returned Monday through Friday between 8 AM and 4:30 PM. We usually get back to you within 2 business days depending on the issue/request.       Medication refills:    For non-narcotic medications, call your pharmacy directly to request a refill. The pharmacy will contact the [facility] for authorization. Please allow  3-4 days for these refills to be processed.     For narcotic refills, call the nurse triage line or send a [employer] message. Please contact us 7-10 days before your refill is due. The message MUST include the name of the specific medication(s) requested and how you would like to receive the prescription(s). The options are as follows:    Pain Clinic staff can mail the prescription to your pharmacy. Please tell us the name of the pharmacy.    You may pick the prescription up at the Pain Clinic (tell us the location) or during a clinic visit with your pain provider    Pain Clinic staff can deliver the prescription to the [place] pharmacy in the clinic building. Please tell us the location.      Scheduling number: [phone].  Call this number to schedule or change appointments.    We believe regular attendance is key to your success in our program.    Any time you are unable to keep your appointment we ask that you call us at least 24 hours in advance to let us know. This will allow us to offer the appointment time to another patient.               Follow-ups after your visit        Additional Services     ORTHO  REFERRAL       [facility] is referring you to the Orthopedic  Services at [facility].       The  Representative will assist you in the coordination of your Orthopedic and Musculoskeletal Care as prescribed by your physician.    The  Representative will call you within 1 business day to help schedule your appointment, or you may contact the  Representative at:    All areas ~ [phone]     Type of Referral : Surgical / Specialist       Timeframe requested: Within 2 weeks  Schedule with neurosurgery, she has moderate to severe lumbar spinal canal stenosis causing neurogenic claudication. Lumbar DELFINA has not been effective long term, short term good relief.     Coverage of these services is subject to the terms and  limitations of your health insurance plan.  Please call member services at your health plan with any benefit or coverage questions.      If X-rays, CT or MRI's have been performed, please contact the facility where they were done to arrange for , prior to your scheduled appointment.  Please bring this referral request to your appointment and present it to your specialist.                  Who to contact     If you have questions or need follow up information about today's clinic visit or your schedule please contact [facility] directly at [phone].  Normal or non-critical lab and imaging results will be communicated to you by Clothes Horsehart, letter or phone within 4 business days after the clinic has received the results. If you do not hear from us within 7 days, please contact the clinic through [employer] or phone. If you have a critical or abnormal lab result, we will notify you by phone as soon as possible.  Submit refill requests through [employer] or call your pharmacy and they will forward the refill request to us. Please allow 3 business days for your refill to be completed.          Additional Information About Your Visit        Clothes [employer]plex Information     [employer] gives you secure access to your electronic health record. If you see a primary care provider, you can also send messages to your care team and make appointments. If you have questions, please call your primary care clinic.  If you do not have a primary care provider, please call [phone] and they will assist you.        Care EveryWhere ID     This is your Care EveryWhere ID. This could be used by other organizations to access your [facility] medical records  [id number]        Your Vitals Were     Pulse BMI (Body Mass Index)                101 26.95 kg/m2           Blood Pressure from Last 3 Encounters:   02/23/18 147/85   01/05/18 118/76   12/22/17 123/74    Weight from Last 3 Encounters:   02/23/18 65.8 kg (145 lb)   01/12/18 68 kg  (150 lb)   01/05/18 68.5 kg (151 lb)              We Performed the Following     ORTHO  REFERRAL        Primary Care Provider Office Phone # Fax #    MYRON Bose [facility] [phone]       [address] [facility] [address]        Equal Access to Services     KAITLYN MAYS : Hadii aad ku hadasho Soomaali, waaxda luqadaha, qaybta kaalmada adeegyada, waxay idiin hayaan adeeg kharash la'aan . So [facility] [phone].    ATENCIÓN: Si habla español, tiene a brambila disposición servicios gratuitos de asistencia lingüística. Llame al [phone].    We comply with applicable federal civil rights laws and [place] laws. We do not discriminate on the basis of race, color, national origin, age, disability, sex, sexual orientation, or gender identity.            Thank you!     Thank you for choosing [facility]  for your care. Our goal is always to provide you with excellent care. Hearing back from our patients is one way we can continue to improve our services. Please take a few minutes to complete the written survey that you may receive in the mail after your visit with us. Thank you!             Your Updated Medication List - Protect others around you: Learn how to safely use, store and throw away your medicines at www.disposemymeds.org.          This list is accurate as of 2/23/18  9:20 AM.  Always use your most recent med list.                   Brand Name Dispense Instructions for use Diagnosis    ADVIL 200 MG capsule   Generic drug:  ibuprofen      Reported on 4/20/2017        amLODIPine 2.5 MG tablet    NORVASC    90 tablet    Take 1 tablet (2.5 mg) by mouth daily 1 po at bedtime    Hypertension goal BP (blood pressure) < 140/90       aspirin 162 MG EC tablet      Take 162 mg by mouth daily Reported on 4/20/2017        CALCIUM 1200 PO      Reported on 4/20/2017        CLARITIN 10 MG capsule   Generic drug:  loratadine      Take 10 mg by mouth as needed Reported on  4/20/2017        gabapentin 300 MG capsule    NEURONTIN    270 capsule    Take 1 capsule (300 mg) by mouth 3 times daily    DJD (degenerative joint disease), lumbosacral, Spinal stenosis of lumbar region with neurogenic claudication       GLUCOSAMINE 1500 COMPLEX PO      Take 1,500 mg by mouth daily Reported on 4/20/2017        losartan-hydrochlorothiazide 100-25 MG per tablet    HYZAAR    90 tablet    Take 1 tablet by mouth daily    Hypertension goal BP (blood pressure) < 140/90       Multi-vitamin Tabs tablet   Generic drug:  multivitamin, therapeutic with minerals      1 TABLET DAILY        oxyCODONE-acetaminophen 5-325 MG per tablet    PERCOCET    120 tablet    Take 1-2 tablets by mouth every 8 hours as needed for pain maximum 4 tablet(s) per day    DJD (degenerative joint disease), lumbosacral, Spinal stenosis of lumbar region with neurogenic claudication       PRILOSEC PO      None Entered        SALMON OIL-1000 PO      take 1,000 Caps by mouth daily.        senna-docusate 8.6-50 MG per tablet    SENOKOT-S;PERICOLACE    120 tablet    Take 1 tablet by mouth 2 times daily    Slow transit constipation       VITAMIN C PO      None Entered        VITAMIN D PO      Take 1,200 mg by mouth daily.        vitamin E 400 UNIT capsule      None Entered

## 2018-02-23 NOTE — NURSING NOTE
"Chief Complaint   Patient presents with     Pain       Initial /74  Pulse 85 Estimated body mass index is 27.88 kg/(m^2) as calculated from the following:    Height as of 7/31/17: 1.562 m (5' 1.5\").    Weight as of 10/27/17: 68 kg (150 lb).  Medication Reconciliation: afshin Ojeda CMA (EVONNE)      "
Right UE/Grossly Intact/Left LE/Right LE/Left UE

## 2018-02-23 NOTE — PROGRESS NOTES
[facility]    2/23/2018    Chief complaint:  Low back pain, neck pain, shoulder pain, bilateral knee pain and bilateral ankle pain and right hip pain. The right hip pain is the worst, points to groin area as most painful    Interval history:  Teresa Lopez is a 82 year old female is known to me for .   Primary osteoarthritis    Chronic right hip pain   Chronic bilateral low back without sciatica   Spinal stenosis of lumbar region with neurogenic claudication   Piriformis syndrome of both sides   PMHx DJD, CVA, HTN, diabetes mellitus, and cataract   PSHx Hysterectomy    Recommendations/plan at the last visit included:12/22/2017  1. Physical Therapy:  The patient will follow up with outside physical therapist as scheduled.  2. Clinical Health Psychologist: None needed at present  3. Diagnostic Studies:  None  4. Medication Management:    1. Continue Percocet per Primary Care Provider   2. Continue Gabapentin  5. Further procedures recommended: Referral with Dr. Valverde for right hip injection.  6. Recommendations to PCP: See above  7. Follow up: 10 week.    Since her last visit, Teresa Lopez reports:  -She had an ankle and hip injection since the last visit.  -The patient reports that she wears an ankle brace that helps alleviate symptoms and with walking. Overall her right ankle feels good, since the injection.  -The patient says that overall her hip feels good expect when she sleeps directly on the side of the hip injection.  -The patient reports that she has pain in her back and left hip. The painful symptoms are alleviated by sitting or laying down. These symptoms are aggravated by brief periods of walking. The patient uses orthopedic walking sticks to get around.      At this point, the patient's participation with our multidisciplinary team includes:  The patient has been compliant with the program.  Pain Group None  PT - Continue physical therapy  Health Psych  None      Pain scores:  Pain  intensity on average is 10 on a scale of 0-10.    Range is 10/10.   Pain is described as aching.    Pain is continuous in nature.    Current pain-related medication treatments include:  -Percocet 5.325mg may take 1-2 tabs Q 8 hours PRN pain (she uses 4 tabs per day, somewhat helpful)  -gabapentin 300mg TID (she is not sure)        Other pertinent medications:  -Senna-docusate PRN      Previous medication treatments included:  OPIATES:hydrocodone (not helpful), oxycodone (somewhat helpful)  NSAIDS: ibuprofen (not helpful), Aleve (not helpful)  MUSCLE RELAXANTS: none  ANTI-MIGRAINE MEDS: none  ANTI-DEPRESSANTS: none  SLEEP AIDS: none  ANTI-CONVULSANTS: gabapentin (unsure if helpful)  TOPICALS: none  Other meds: Tylenol (not helpful)    Injections:  11/10/2017 Caudal epidural steroid injection with Dr. Reymundo Bajwa (helpful for a very short period of time)  1/5/2018 Ultrasound guided right intra-articular hip injection with Dr. Jean Meade  1/12/2018 Right ankle injection with Dr. Lewis of podiatry.     THE 4 A's OF OPIOID MAINTENANCE ANALGESIA    Analgesia: somewhat helpful    Activity: ADLs    Adverse effects: none    Adherence to Rx protocol: yes      Side Effects: no side effect  Patient is using the medication as prescribed: YES    Medications:  Current Outpatient Prescriptions   Medication Sig Dispense Refill     oxyCODONE-acetaminophen (PERCOCET) 5-325 MG per tablet Take 1-2 tablets by mouth every 8 hours as needed for pain maximum 4 tablet(s) per day 120 tablet 0     gabapentin (NEURONTIN) 300 MG capsule Take 1 capsule (300 mg) by mouth 3 times daily 270 capsule 3     losartan-hydrochlorothiazide (HYZAAR) 100-25 MG per tablet Take 1 tablet by mouth daily 90 tablet 3     amLODIPine (NORVASC) 2.5 MG tablet Take 1 tablet (2.5 mg) by mouth daily 1 po at bedtime 90 tablet 3     senna-docusate (SENOKOT-S;PERICOLACE) 8.6-50 MG per tablet Take 1 tablet by mouth 2 times daily 120 tablet 12     aspirin 162 MG EC  tablet Take 162 mg by mouth daily Reported on 4/20/2017       Glucosamine-Chondroit-Vit C-Mn (GLUCOSAMINE 1500 COMPLEX PO) Take 1,500 mg by mouth daily Reported on 4/20/2017       Loratadine (CLARITIN) 10 MG capsule Take 10 mg by mouth as needed Reported on 4/20/2017       Omega-3 Fatty Acids (SALMON OIL-1000 PO) take 1,000 Caps by mouth daily.       ADVIL 200 MG PO CAPS Reported on 4/20/2017       MULTI-VITAMIN OR TABS 1 TABLET DAILY       CALCIUM 1200 OR Reported on 4/20/2017       VITAMIN C OR None Entered       VITAMIN D OR Take 1,200 mg by mouth daily.       VITAMIN E 400 UNIT OR CAPS None Entered       PRILOSEC OR None Entered         Medical History: any changes in medical history since they were last seen? No    Social History:  Home situation: . 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  Tobacco use: none  Alcohol use: very little, about 2 drinks per month  Drug use: none  History of chemical dependency treatment: none    Review of Systems:  ROS is positive as per HPI as well as for weight loss, headache, osteoporosis, joint pain, arthritis, stiffness, back pain, neck pain, weakness, numbness/tingling, depression, and is negative for fevers, chills, sweats, or constipation, diarrhea.    This document serves as a record of the services and decisions personally performed and made by Marion SANCHES. It was created on her behalf by Ankit Izquierdo, a trained medical scribe. The creation of this document is based on the provider's statements to the medical scribe.  Ankit Izquierdo 8:50 AM February 23, 2018    Physical Exam:  Vital signs: /85  Pulse 101  Wt 65.8 kg (145 lb)  BMI 26.95 kg/m2    Behavioral observations:  Awake and alert.accompanied today by her daughter.     Gait:  Slow and antalgic on right.    Musculoskeletal exam:    Lumbar flexion to 110 degrees  Lumbar extension to 10 degrees    SI joints are tender  bilaterally  Piriformis are tender bilaterally  Strength grossly equal throughout    Neuro exam:  SILT extremities     Skin/vascular/autonomic:  No suspicious legions    Other:  N/A      [place] Prescription Monitoring Program:    Reviewed regular fills of pain medication from PCP    DIRE Score for selecting candidates for long term opioid analgesia for chronic pain:  Diagnosis  2-3  Intractablility  2  Risk    Psychological health  3    Chemical health  2    Reliability  2    Social support  2  Efficacy  2    Total DIRE Score = 15-16. Note that  7-13 predicts poor outcome (compliance and efficacy) from opioid prescribing; 14-21 predicts good outcome (compliance and efficacy)  from opioid prescribing.      Assessment:   1. Spinal stenosis of lumbar region with neurogenic claudication  2. DDD lumbar  3. SI joint dysfunction of both sides  4. Piriformis syndrome bilaterally  5. Multiple joint pain      Plan:  1. Physical Therapy:  The patient will follow up with outside physical therapist as scheduled.  2. Clinical Health Psychologist: None needed at present  3. Diagnostic Studies:  None  4. Medication Management:    1. Continue Percocet per Primary Care Provider   2. Continue Gabapentin  3. Start to use Aspercreme with 4% Lidocaine  5. Further procedures recommended: She will wait at least 6 weeks for further injections. Possible injections are bilateral SI joint or bilateral piriformis injections.  6. Patient is referred to  neurosurgery to see if she is a surgical candidate to treat her spinal stenosis with neurogenic claudication   7. The patient will contact [facility] office to check to see how much acupuncture would be without insurance coverage.   8. Recommendations to PCP: See above  9. Follow up: 8 weeks.    Total time spent face to face was 30 minutes and more than 50% of face to face time was spent in counseling and/or coordination of care regarding the diagnosis and recommendations above    The  information in this document, created by the medical scribe for me, accurately reflects the services I personally performed and the decisions made by me. I have reviewed and approved this document for accuracy.     Marion SANCHES RN CNP, FNP  [facility] [place]

## 2018-02-23 NOTE — PATIENT INSTRUCTIONS
PLAN  1. Medications:   1. Continue Percocet per Primary Care Provider   2. Continue Gabapentin per Primary Care Provider   3. Continue Calcium supplement   4. Start to use Aspercreme with 4% lidocaine cream; this is found over-the-counter and may be used according to package instructions for topical pain relief  2. Follow-up with neurosurgery to see if you are a surgical candidate for spinal stenosis. They will contact you to set up an appointment  3. Procedures: We need to wait at least 6 weeks prior to any further injections. I would consider doing bilateral SI joint injections and/or piriformis injections. Call me and let me know how you are doing in about 6 weeks.  4. You could contact [facility] office to check to see how much acupuncture would be without insurance coverage.   5. Follow-up with me in 8 weeks.    ----------------------------------------------------------------  Nurse Triage line:  [phone]   Call this number with any questions or concerns. You may leave a detailed message anytime. Calls are typically returned Monday through Friday between 8 AM and 4:30 PM. We usually get back to you within 2 business days depending on the issue/request.       Medication refills:    For non-narcotic medications, call your pharmacy directly to request a refill. The pharmacy will contact the [facility] for authorization. Please allow 3-4 days for these refills to be processed.     For narcotic refills, call the nurse triage line or send a [employer] message. Please contact us 7-10 days before your refill is due. The message MUST include the name of the specific medication(s) requested and how you would like to receive the prescription(s). The options are as follows:    Pain Clinic staff can mail the prescription to your pharmacy. Please tell us the name of the pharmacy.    You may pick the prescription up at the Pain Clinic (tell us the location) or during a clinic visit with your pain  provider    Pain Clinic staff can deliver the prescription to the [facility] pharmacy in the clinic building. Please tell us the location.      Scheduling number: [phone].  Call this number to schedule or change appointments.    We believe regular attendance is key to your success in our program.    Any time you are unable to keep your appointment we ask that you call us at least 24 hours in advance to let us know. This will allow us to offer the appointment time to another patient.

## 2018-03-12 DIAGNOSIS — M47.817 DJD (DEGENERATIVE JOINT DISEASE), LUMBOSACRAL: ICD-10-CM

## 2018-03-12 DIAGNOSIS — M48.062 SPINAL STENOSIS OF LUMBAR REGION WITH NEUROGENIC CLAUDICATION: ICD-10-CM

## 2018-03-12 RX ORDER — OXYCODONE AND ACETAMINOPHEN 5; 325 MG/1; MG/1
1-2 TABLET ORAL EVERY 8 HOURS PRN
Qty: 120 TABLET | Refills: 0 | Status: SHIPPED | OUTPATIENT
Start: 2018-03-12 | End: 2018-04-20

## 2018-03-12 NOTE — TELEPHONE ENCOUNTER
Reason for Call:  Medication or medication refill:    Do you use a [facility]?  Name of the pharmacy and phone number for the current request:  [facility], pended    Name of the medication requested: oxyCODONE-acetaminophen (PERCOCET) 5-325 MG per tablet    Other request: patient calling to get refill.  Please call when ready for .    Can we leave a detailed message on this number? YES    Phone number patient can be reached at: Home number on file [phone] (home)    Best Time: any    Call taken on 3/12/2018 at 12:24 PM by Pat Mayo    Requested Prescriptions   Pending Prescriptions Disp Refills     oxyCODONE-acetaminophen (PERCOCET) 5-325 MG per tablet 120 tablet 0     Sig: Take 1-2 tablets by mouth every 8 hours as needed for pain maximum 4 tablet(s) per day    There is no refill protocol information for this order

## 2018-03-15 DIAGNOSIS — I10 HYPERTENSION GOAL BP (BLOOD PRESSURE) < 140/90: ICD-10-CM

## 2018-03-15 NOTE — TELEPHONE ENCOUNTER
"Requested Prescriptions   Pending Prescriptions Disp Refills     losartan-hydrochlorothiazide (HYZAAR) 100-25 MG per tablet [Pharmacy Med Name: LOSARTAN-HCTZ 100-25 MG TAB] 90 tablet 2    Last Written Prescription Date:  2-17-17  Last Fill Quantity: 90,  # refills: 3   Last office visit: 9/18/2017 with prescribing provider:     Future Office Visit:     Sig: TAKE 1 TABLET BY MOUTH EVERY DAY    Angiotensin-II Receptors Failed    3/15/2018  3:50 AM       Failed - Blood pressure under 140/90 in past 12 months    BP Readings from Last 3 Encounters:   02/23/18 147/85   01/05/18 118/76   12/22/17 123/74                Failed - Normal serum creatinine on file in past 12 months    Recent Labs   Lab Test  02/17/17   0939   CR  1.17*            Failed - Normal serum potassium on file in past 12 months    Recent Labs   Lab Test  02/17/17   0939   POTASSIUM  3.6                   Passed - Recent (12 mo) or future (30 days) visit within the authorizing provider's specialty    Patient had office visit in the last 12 months or has a visit in the next 30 days with authorizing provider or within the authorizing provider's specialty.  See \"Patient Info\" tab in inbasket, or \"Choose Columns\" in Meds & Orders section of the refill encounter.           Passed - Patient is age 18 or older       Passed - No active pregnancy on record       Passed - No positive pregnancy test in past 12 months          "

## 2018-03-16 RX ORDER — LOSARTAN POTASSIUM AND HYDROCHLOROTHIAZIDE 25; 100 MG/1; MG/1
TABLET ORAL
Qty: 90 TABLET | Refills: 2 | Status: SHIPPED | OUTPATIENT
Start: 2018-03-16 | End: 2019-01-02

## 2018-03-16 NOTE — TELEPHONE ENCOUNTER
Routing refill request to provider for review/approval because:  Failed protocol.  Tara Marlow RN [facility] Triage.

## 2018-03-22 ENCOUNTER — RADIANT APPOINTMENT (OUTPATIENT)
Dept: GENERAL RADIOLOGY | Facility: CLINIC | Age: 83
End: 2018-03-22
Attending: NURSE PRACTITIONER
Payer: COMMERCIAL

## 2018-03-22 ENCOUNTER — OFFICE VISIT (OUTPATIENT)
Dept: NEUROSURGERY | Facility: CLINIC | Age: 83
End: 2018-03-22
Payer: COMMERCIAL

## 2018-03-22 VITALS
DIASTOLIC BLOOD PRESSURE: 64 MMHG | TEMPERATURE: 97 F | HEART RATE: 93 BPM | SYSTOLIC BLOOD PRESSURE: 106 MMHG | OXYGEN SATURATION: 97 %

## 2018-03-22 DIAGNOSIS — M51.369 LUMBAR DEGENERATIVE DISC DISEASE: ICD-10-CM

## 2018-03-22 DIAGNOSIS — M51.369 LUMBAR DEGENERATIVE DISC DISEASE: Primary | ICD-10-CM

## 2018-03-22 PROCEDURE — 99213 OFFICE O/P EST LOW 20 MIN: CPT | Performed by: NURSE PRACTITIONER

## 2018-03-22 PROCEDURE — 72100 X-RAY EXAM L-S SPINE 2/3 VWS: CPT

## 2018-03-22 NOTE — NURSING NOTE
"Teresa Lopez is a 82 year old female who presents for:  Chief Complaint   Patient presents with     Neurologic Problem     referral from EDMUNDO Munoz CNP for lumbar stenosis        Initial Vitals:  There were no vitals taken for this visit. Estimated body mass index is 26.95 kg/(m^2) as calculated from the following:    Height as of 1/5/18: 5' 1.5\" (1.562 m).    Weight as of 2/23/18: 145 lb (65.8 kg).. There is no height or weight on file to calculate BSA. BP completed using cuff size: regular  Data Unavailable    Do you feel safe in your environment?  Yes  Do you need any refills today? No    Nursing Comments: referral from EDMUNDO Munoz CNP for lumbar stenosis   You rate your pain today as 6 on meds.        Pratima Gambino, DINAH,AAS    "

## 2018-03-22 NOTE — PATIENT INSTRUCTIONS
Xrays today  Schedule lumbar MRI  We will call you with results  Please contact the clinic if pain persists at [phone].

## 2018-03-22 NOTE — PROGRESS NOTES
Dr. Landry Rogers  [facility]  Neurosurgery Clinic Visit        CC: Low back pain    Primary care Provider: Demetrice Gutierrez      Reason For Visit:   I was asked by Marion Munoz CNP to consult on the patient for lumbar spinal stenosis.      HPI: Teresa Lopez is a 82 year old female with a history of low back pain.  She states the pain is a constant dull pain that increases to a more severe pain intermittently.  Her pain radiates along the buttocks and into the hips bilaterally, mainly the right hip.  She has had previous hip injections that have been somewhat helpful.  She states her low back pain increases with walking and prolonged standing.  She has increased pain with bending as well.  She is more comfortable while seated.  She denies pain radiating into the legs.  She denies foot pain, however, she has issues with her ankles.  She denies changes to bowel or bladder.  She denies recent falls.  She has had PT and injections with some benefit.  She is also taking Oxycodone and states this does provide her with pain relief, and she takes it 2-3x/day.    Pain right now:  6    Past Medical History:   Diagnosis Date     Cataract 3/7/2012     CVA (cerebral infarction)      Diabetes mellitus (H)      DJD (degenerative joint disease)      HTN        Past Medical History reviewed with patient during visit.    Past Surgical History:   Procedure Laterality Date     HYSTERECTOMY, CERVIX STATUS UNKNOWN  age 31     Past Surgical History reviewed with patient during visit.    Current Outpatient Prescriptions   Medication     losartan-hydrochlorothiazide (HYZAAR) 100-25 MG per tablet     oxyCODONE-acetaminophen (PERCOCET) 5-325 MG per tablet     gabapentin (NEURONTIN) 300 MG capsule     amLODIPine (NORVASC) 2.5 MG tablet     senna-docusate (SENOKOT-S;PERICOLACE) 8.6-50 MG per tablet     aspirin 162 MG EC tablet     Glucosamine-Chondroit-Vit C-Mn (GLUCOSAMINE 1500 COMPLEX PO)     Loratadine  (CLARITIN) 10 MG capsule     Omega-3 Fatty Acids (SALMON OIL-1000 PO)     ADVIL 200 MG PO CAPS     MULTI-VITAMIN OR TABS     CALCIUM 1200 OR     VITAMIN C OR     VITAMIN D OR     VITAMIN E 400 UNIT OR CAPS     PRILOSEC OR     No current facility-administered medications for this visit.        Allergies   Allergen Reactions     Sulfa Drugs        Social History     Social History     Marital status: Single     Spouse name: N/A     Number of children: N/A     Years of education: N/A     Social History Main Topics     Smoking status: Never Smoker     Smokeless tobacco: Never Used     Alcohol use 0.0 oz/week     0 Standard drinks or equivalent per week      Comment: 1 drink weekly     Drug use: No     Sexual activity: Not Currently     Partners: Male     Other Topics Concern     Parent/Sibling W/ Cabg, Mi Or Angioplasty Before 65f 55m? No     Social History Narrative       Family History   Problem Relation Age of Onset     Arthritis Mother      Unknown/Adopted Father      adopted     DIABETES Brother      CANCER Brother      Hypertension No family hx of      CEREBROVASCULAR DISEASE No family hx of      Thyroid Disease No family hx of      Glaucoma No family hx of      Macular Degeneration No family hx of          ROS: 10 point ROS neg other than the symptoms noted above in the HPI.    Vital Signs: /64 (BP Location: Right arm, Cuff Size: Adult Regular)  Pulse 93  Temp 97  F (36.1  C) (Oral)  SpO2 97%  Breastfeeding? No    Examination:  Constitutional:  Alert, well nourished, NAD.  HEENT: Normocephalic, atraumatic.   Pulm:  Without shortness of breath   CV:  No pitting edema of BLE.    Neurological:  Awake  Alert  Oriented x 3  Speech clear  Cranial nerves II - XII intact    Motor exam   Shoulder Abduction:  Right:  5/5   Left:  5/5  Biceps:                      Right:  5/5   Left:  5/5  Triceps:                     Right:  5/5   Left:  5/5  Wrist Extensors:       Right:  5/5   Left:  5/5  Wrist Flexors:            Right:  5/5   Left:  5/5  Intrinsics:                   Right:  5/5   Left:  5/5  Hip Flexor:                Right: 5/5  Left:  5/5  Hip Adductor:             Right:  5/5  Left:  5/5  Hip Abductor:             Right:  5/5  Left:  5/5  Gastroc Soleus:        Right:  5/5  Left:  5/5  Tib/Ant:                      Right:  5/5  Left:  5/5  EHL:                          Right:  5/5  Left:  5/5   Sensation normal to bilateral upper and lower extremities  DTRs 0 and symmetric  Clonus negative  Gait: Able to stand from a seated position. Antalgic gait, ambulates slowly.  Non-myelopathic gait.  Cervical examination reveals good range of motion.  No tenderness to palpation of the cervical spine or paraspinous muscles bilaterally.    Lumbar examination reveals mild tenderness of the spine midline and bilateral paraspinous muscles.  Negative SI joint, sciatic notch or greater trochanteric tenderness to palpation bilaterally.  Straight leg raise is negative bilaterally.      Imaging: Lumbar MRI 3/17/17:  IMPRESSION:  1. Advanced apophyseal joint degenerative arthrosis in the mid andlower lumbar spine with degenerative spondylolisthesis at L3-L4 and L4-L5.  2. Advanced multilevel degenerative disc disease, worst from L1-L2  through L3-L4.  3. Multilevel central stenosis, greatest at L4-L5, L3-L4, and L2-L3.  4. Multilevel foraminal stenosis, greatest on the left at L2-L3 and  L3-L4.    Assessment/Plan:   Lumbar DDD  Lumbar Radiculopathy    Teresa Lopez is a 82 year old female with a history of low back pain.  She states the pain is a constant dull pain that increases to a more severe pain intermittently.  Her pain radiates along the buttocks and into the hips bilaterally, mainly the right hip.  She has had previous hip injections that have been somewhat helpful.  She states her low back pain increases with walking and prolonged standing.  She has increased pain with bending as well.  She is more comfortable while seated.  She  denies pain radiating into the legs.  She denies foot pain, however, she has issues with her ankles.  She denies changes to bowel or bladder.  She denies recent falls.  She has had PT and injections with some benefit.  She is also taking Oxycodone and states this does provide her with pain relief, and she takes it 2-3x/day.  We reviewed the patient's MRI results from last year, discussing findings of central stenosis likely contributing to her low back pain.  We discussed risks associated with surgery, especially at advanced age.  The patient states she would like to avoid surgery.  She discussed continuing with current recommendations per pain management as she feels like injections periodically as well as pain medication have been helpful.  She would like to proceed with an updated MRI since she feels the pain has somewhat increased the past year to view for any changes or progression of her spinal degeneration.  We will contact her with results.      Patient Instructions   Xrays today  Schedule lumbar MRI  We will call you with results  Please contact the clinic if pain persists at [phone].        Marion Flores [facility]  [facility]  [address] [address]    Tel [phone]  Pager [phone]

## 2018-03-22 NOTE — LETTER
3/22/2018         RE: Teresa Lopez  [address]  [facility] [address]        Dear Colleague,    Thank you for referring your patient, Teresa Lopez, to the [facility]. Please see a copy of my visit note below.    Dr. Landry Rogers  [facility]  Neurosurgery Clinic Visit        CC: Low back pain    Primary care Provider: Demetrice Gutierrez      Reason For Visit:   I was asked by Marion Munoz CNP to consult on the patient for lumbar spinal stenosis.      HPI: Teresa Lopez is a 82 year old female with a history of low back pain.  She states the pain is a constant dull pain that increases to a more severe pain intermittently.  Her pain radiates along the buttocks and into the hips bilaterally, mainly the right hip.  She has had previous hip injections that have been somewhat helpful.  She states her low back pain increases with walking and prolonged standing.  She has increased pain with bending as well.  She is more comfortable while seated.  She denies pain radiating into the legs.  She denies foot pain, however, she has issues with her ankles.  She denies changes to bowel or bladder.  She denies recent falls.  She has had PT and injections with some benefit.  She is also taking Oxycodone and states this does provide her with pain relief, and she takes it 2-3x/day.    Pain right now:  6    Past Medical History:   Diagnosis Date     Cataract 3/7/2012     CVA (cerebral infarction)      Diabetes mellitus (H)      DJD (degenerative joint disease)      HTN        Past Medical History reviewed with patient during visit.    Past Surgical History:   Procedure Laterality Date     HYSTERECTOMY, CERVIX STATUS UNKNOWN  age 31     Past Surgical History reviewed with patient during visit.    Current Outpatient Prescriptions   Medication     losartan-hydrochlorothiazide (HYZAAR) 100-25 MG per tablet     oxyCODONE-acetaminophen (PERCOCET) 5-325 MG per tablet      gabapentin (NEURONTIN) 300 MG capsule     amLODIPine (NORVASC) 2.5 MG tablet     senna-docusate (SENOKOT-S;PERICOLACE) 8.6-50 MG per tablet     aspirin 162 MG EC tablet     Glucosamine-Chondroit-Vit C-Mn (GLUCOSAMINE 1500 COMPLEX PO)     Loratadine (CLARITIN) 10 MG capsule     Omega-3 Fatty Acids (SALMON OIL-1000 PO)     ADVIL 200 MG PO CAPS     MULTI-VITAMIN OR TABS     CALCIUM 1200 OR     VITAMIN C OR     VITAMIN D OR     VITAMIN E 400 UNIT OR CAPS     PRILOSEC OR     No current facility-administered medications for this visit.        Allergies   Allergen Reactions     Sulfa Drugs        Social History     Social History     Marital status: Single     Spouse name: N/A     Number of children: N/A     Years of education: N/A     Social History Main Topics     Smoking status: Never Smoker     Smokeless tobacco: Never Used     Alcohol use 0.0 oz/week     0 Standard drinks or equivalent per week      Comment: 1 drink weekly     Drug use: No     Sexual activity: Not Currently     Partners: Male     Other Topics Concern     Parent/Sibling W/ Cabg, Mi Or Angioplasty Before 65f 55m? No     Social History Narrative       Family History   Problem Relation Age of Onset     Arthritis Mother      Unknown/Adopted Father      adopted     DIABETES Brother      CANCER Brother      Hypertension No family hx of      CEREBROVASCULAR DISEASE No family hx of      Thyroid Disease No family hx of      Glaucoma No family hx of      Macular Degeneration No family hx of          ROS: 10 point ROS neg other than the symptoms noted above in the HPI.    Vital Signs: /64 (BP Location: Right arm, Cuff Size: Adult Regular)  Pulse 93  Temp 97  F (36.1  C) (Oral)  SpO2 97%  Breastfeeding? No    Examination:  Constitutional:  Alert, well nourished, NAD.  HEENT: Normocephalic, atraumatic.   Pulm:  Without shortness of breath   CV:  No pitting edema of BLE.    Neurological:  Awake  Alert  Oriented x 3  Speech clear  Cranial nerves II - XII  intact    Motor exam   Shoulder Abduction:  Right:  5/5   Left:  5/5  Biceps:                      Right:  5/5   Left:  5/5  Triceps:                     Right:  5/5   Left:  5/5  Wrist Extensors:       Right:  5/5   Left:  5/5  Wrist Flexors:           Right:  5/5   Left:  5/5  Intrinsics:                   Right:  5/5   Left:  5/5  Hip Flexor:                Right: 5/5  Left:  5/5  Hip Adductor:             Right:  5/5  Left:  5/5  Hip Abductor:             Right:  5/5  Left:  5/5  Gastroc Soleus:        Right:  5/5  Left:  5/5  Tib/Ant:                      Right:  5/5  Left:  5/5  EHL:                          Right:  5/5  Left:  5/5   Sensation normal to bilateral upper and lower extremities  DTRs 0 and symmetric  Clonus negative  Gait: Able to stand from a seated position. Antalgic gait, ambulates slowly.  Non-myelopathic gait.  Cervical examination reveals good range of motion.  No tenderness to palpation of the cervical spine or paraspinous muscles bilaterally.    Lumbar examination reveals mild tenderness of the spine midline and bilateral paraspinous muscles.  Negative SI joint, sciatic notch or greater trochanteric tenderness to palpation bilaterally.  Straight leg raise is negative bilaterally.      Imaging: Lumbar MRI 3/17/17:  IMPRESSION:  1. Advanced apophyseal joint degenerative arthrosis in the mid andlower lumbar spine with degenerative spondylolisthesis at L3-L4 and L4-L5.  2. Advanced multilevel degenerative disc disease, worst from L1-L2  through L3-L4.  3. Multilevel central stenosis, greatest at L4-L5, L3-L4, and L2-L3.  4. Multilevel foraminal stenosis, greatest on the left at L2-L3 and  L3-L4.    Assessment/Plan:   Lumbar DDD  Lumbar Radiculopathy    Teresa Lopez is a 82 year old female with a history of low back pain.  She states the pain is a constant dull pain that increases to a more severe pain intermittently.  Her pain radiates along the buttocks and into the hips bilaterally, mainly  the right hip.  She has had previous hip injections that have been somewhat helpful.  She states her low back pain increases with walking and prolonged standing.  She has increased pain with bending as well.  She is more comfortable while seated.  She denies pain radiating into the legs.  She denies foot pain, however, she has issues with her ankles.  She denies changes to bowel or bladder.  She denies recent falls.  She has had PT and injections with some benefit.  She is also taking Oxycodone and states this does provide her with pain relief, and she takes it 2-3x/day.  We reviewed the patient's MRI results from last year, discussing findings of central stenosis likely contributing to her low back pain.  We discussed risks associated with surgery, especially at advanced age.  The patient states she would like to avoid surgery.  She discussed continuing with current recommendations per pain management as she feels like injections periodically as well as pain medication have been helpful.  She would like to proceed with an updated MRI since she feels the pain has somewhat increased the past year to view for any changes or progression of her spinal degeneration.  We will contact her with results.      Patient Instructions   Xrays today  Schedule lumbar MRI  We will call you with results  Please contact the clinic if pain persists at [phone].        Marion Flores [facility]  [facility]  [address] [address]    Tel [phone]  Pager [phone]      Again, thank you for allowing me to participate in the care of your patient.        Sincerely,        Marion Flores, ALEXIA

## 2018-03-22 NOTE — MR AVS SNAPSHOT
After Visit Summary   3/22/2018    Teresa Lopez    MRN: [id number]           Patient Information     Date Of Birth          1935        Visit Information        Provider Department      3/22/2018 10:00 AM Marion Flores NP [facility]        Today's Diagnoses     Lumbar degenerative disc disease    -  1      Care Instructions    Xrays today  Schedule lumbar MRI  We will call you with results  Please contact the clinic if pain persists at [phone].            Follow-ups after your visit        Future tests that were ordered for you today     Open Future Orders        Priority Expected Expires Ordered    XR Lumbar Spine 2/3 Views Routine 3/22/2018 3/22/2019 3/22/2018    MR Lumbar Spine w/o Contrast Routine  3/22/2019 3/22/2018            Who to contact     If you have questions or need follow up information about today's clinic visit or your schedule please contact [facility] directly at [phone].  Normal or non-critical lab and imaging results will be communicated to you by Flipkarthart, letter or phone within 4 business days after the clinic has received the results. If you do not hear from us within 7 days, please contact the clinic through [employer] or phone. If you have a critical or abnormal lab result, we will notify you by phone as soon as possible.  Submit refill requests through [employer] or call your pharmacy and they will forward the refill request to us. Please allow 3 business days for your refill to be completed.          Additional Information About Your Visit        MyChart Information     [employer] gives you secure access to your electronic health record. If you see a primary care provider, you can also send messages to your care team and make appointments. If you have questions, please call your primary care clinic.  If you do not have a primary care provider, please call [phone] and they will assist you.        Care EveryWhere ID     This is  your Care EveryWhere ID. This could be used by other organizations to access your [facility] medical records  [id number]        Your Vitals Were     Pulse Temperature Pulse Oximetry Breastfeeding?          93 97  F (36.1  C) (Oral) 97% No         Blood Pressure from Last 3 Encounters:   03/22/18 106/64   02/23/18 147/85   01/05/18 118/76    Weight from Last 3 Encounters:   02/23/18 145 lb (65.8 kg)   01/12/18 150 lb (68 kg)   01/05/18 151 lb (68.5 kg)               Primary Care Provider Office Phone # Fax #    Demetrice Gutierrez, APRN [facility] [phone]       [address] [facility] [address]        Equal Access to Services     ALTHEA MAYS : Hadii wilfrid june hadasho Soomaali, waaxda luqadaha, qaybta kaalmada adeegyada, lori victoriain hayolegario mccracken . So [facility] [phone].    ATENCIÓN: Si habla español, tiene a brambila disposición servicios gratuitos de asistencia lingüística. Llame al [phone].    We comply with applicable federal civil rights laws and [place] laws. We do not discriminate on the basis of race, color, national origin, age, disability, sex, sexual orientation, or gender identity.            Thank you!     Thank you for choosing [facility]  for your care. Our goal is always to provide you with excellent care. Hearing back from our patients is one way we can continue to improve our services. Please take a few minutes to complete the written survey that you may receive in the mail after your visit with us. Thank you!             Your Updated Medication List - Protect others around you: Learn how to safely use, store and throw away your medicines at www.disposemymeds.org.          This list is accurate as of 3/22/18 10:49 AM.  Always use your most recent med list.                   Brand Name Dispense Instructions for use Diagnosis    ADVIL 200 MG capsule   Generic drug:  ibuprofen      Reported on 4/20/2017        amLODIPine 2.5 MG tablet    NORVASC    90  tablet    Take 1 tablet (2.5 mg) by mouth daily 1 po at bedtime    Hypertension goal BP (blood pressure) < 140/90       aspirin 162 MG EC tablet      Take 162 mg by mouth daily Reported on 4/20/2017        CALCIUM 1200 PO      Reported on 4/20/2017        CLARITIN 10 MG capsule   Generic drug:  loratadine      Take 10 mg by mouth as needed Reported on 4/20/2017        gabapentin 300 MG capsule    NEURONTIN    270 capsule    Take 1 capsule (300 mg) by mouth 3 times daily    DJD (degenerative joint disease), lumbosacral, Spinal stenosis of lumbar region with neurogenic claudication       GLUCOSAMINE 1500 COMPLEX PO      Take 1,500 mg by mouth daily Reported on 4/20/2017        losartan-hydrochlorothiazide 100-25 MG per tablet    HYZAAR    90 tablet    TAKE 1 TABLET BY MOUTH EVERY DAY    Hypertension goal BP (blood pressure) < 140/90       Multi-vitamin Tabs tablet   Generic drug:  multivitamin, therapeutic with minerals      1 TABLET DAILY        oxyCODONE-acetaminophen 5-325 MG per tablet    PERCOCET    120 tablet    Take 1-2 tablets by mouth every 8 hours as needed for pain maximum 4 tablet(s) per day    DJD (degenerative joint disease), lumbosacral, Spinal stenosis of lumbar region with neurogenic claudication       PRILOSEC PO      None Entered        SALMON OIL-1000 PO      take 1,000 Caps by mouth daily.        senna-docusate 8.6-50 MG per tablet    SENOKOT-S;PERICOLACE    120 tablet    Take 1 tablet by mouth 2 times daily    Slow transit constipation       VITAMIN C PO      None Entered        VITAMIN D PO      Take 1,200 mg by mouth daily.        vitamin E 400 UNIT capsule      None Entered

## 2018-03-30 ENCOUNTER — RADIANT APPOINTMENT (OUTPATIENT)
Dept: MRI IMAGING | Facility: CLINIC | Age: 83
End: 2018-03-30
Attending: NURSE PRACTITIONER
Payer: COMMERCIAL

## 2018-03-30 DIAGNOSIS — M51.369 LUMBAR DEGENERATIVE DISC DISEASE: ICD-10-CM

## 2018-03-30 PROCEDURE — 72148 MRI LUMBAR SPINE W/O DYE: CPT | Mod: TC

## 2018-04-05 ENCOUNTER — TELEPHONE (OUTPATIENT)
Dept: NEUROSURGERY | Facility: CLINIC | Age: 83
End: 2018-04-05

## 2018-04-05 NOTE — TELEPHONE ENCOUNTER
TC to patient to review MRI results of lumbar spine, not significantly changed from previously with multilevel DDD with severe canal stenosis.  She is managed at the pain clinic and plans to f/u with them for consideration of changes of medication and repeat injections.  We discussed risks associated with spine surgery with advanced age and her history of previous stroke and she is not interested in it.  She plans to proceed with less invasive options.  She will contact us if any changes or further questions.

## 2018-04-16 DIAGNOSIS — I10 HYPERTENSION GOAL BP (BLOOD PRESSURE) < 140/90: ICD-10-CM

## 2018-04-16 RX ORDER — AMLODIPINE BESYLATE 2.5 MG/1
TABLET ORAL
Qty: 90 TABLET | Refills: 2 | Status: SHIPPED | OUTPATIENT
Start: 2018-04-16 | End: 2019-01-02

## 2018-04-16 NOTE — TELEPHONE ENCOUNTER
Routing refill request to provider for review/approval because:  Labs out of range  Labs not current.  Tara Marlow RN [facility] Triage.

## 2018-04-16 NOTE — TELEPHONE ENCOUNTER
"Requested Prescriptions   Pending Prescriptions Disp Refills     amLODIPine (NORVASC) 2.5 MG tablet [Pharmacy Med Name: AMLODIPINE BESYLATE 2.5 MG TAB] 90 tablet 2    Last Written Prescription Date:  2/17/17  Last Fill Quantity: 90,  # refills: 3   Last office visit: 9/18/2017 with prescribing provider:     Future Office Visit:     Sig: TAKE 1 TABLET BY MOUTH AT BEDTIME    Calcium Channel Blockers Protocol  Failed    4/16/2018  3:12 PM       Failed - Normal serum creatinine on file in past 12 months    Recent Labs   Lab Test  02/17/17   0939   CR  1.17*            Passed - Blood pressure under 140/90 in past 12 months    BP Readings from Last 3 Encounters:   03/22/18 106/64   02/23/18 147/85   01/05/18 118/76                Passed - Recent (12 mo) or future (30 days) visit within the authorizing provider's specialty    Patient had office visit in the last 12 months or has a visit in the next 30 days with authorizing provider or within the authorizing provider's specialty.  See \"Patient Info\" tab in inbasket, or \"Choose Columns\" in Meds & Orders section of the refill encounter.           Passed - Patient is age 18 or older       Passed - No active pregnancy on record       Passed - No positive pregnancy test in past 12 months          "

## 2018-04-20 DIAGNOSIS — M48.062 SPINAL STENOSIS OF LUMBAR REGION WITH NEUROGENIC CLAUDICATION: ICD-10-CM

## 2018-04-20 DIAGNOSIS — M47.817 DJD (DEGENERATIVE JOINT DISEASE), LUMBOSACRAL: ICD-10-CM

## 2018-04-20 RX ORDER — OXYCODONE AND ACETAMINOPHEN 5; 325 MG/1; MG/1
1-2 TABLET ORAL EVERY 8 HOURS PRN
Qty: 120 TABLET | Refills: 0 | Status: SHIPPED | OUTPATIENT
Start: 2018-04-20 | End: 2018-06-05

## 2018-04-20 NOTE — TELEPHONE ENCOUNTER
Reason for Call:  Medication or medication refill:    Do you use a [facility]?  Name of the pharmacy and phone number for the current request:  [facility], pended    Name of the medication requested: oxyCODONE-acetaminophen (PERCOCET) 5-325 MG per tablet    Other request: patient calling for refill.  Please inform when ready for .    Can we leave a detailed message on this number? YES    Phone number patient can be reached at: Home number on file [phone] (home)    Best Time: any    Call taken on 4/20/2018 at 9:35 AM by Pat Mayo    Requested Prescriptions   Pending Prescriptions Disp Refills     oxyCODONE-acetaminophen (PERCOCET) 5-325 MG per tablet 120 tablet 0     Sig: Take 1-2 tablets by mouth every 8 hours as needed for pain maximum 4 tablet(s) per day    There is no refill protocol information for this order

## 2018-05-04 ENCOUNTER — OFFICE VISIT (OUTPATIENT)
Dept: INTERNAL MEDICINE | Facility: CLINIC | Age: 83
End: 2018-05-04
Payer: COMMERCIAL

## 2018-05-04 VITALS
RESPIRATION RATE: 16 BRPM | SYSTOLIC BLOOD PRESSURE: 132 MMHG | HEART RATE: 94 BPM | OXYGEN SATURATION: 98 % | TEMPERATURE: 98 F | BODY MASS INDEX: 26.13 KG/M2 | WEIGHT: 142 LBS | HEIGHT: 62 IN | DIASTOLIC BLOOD PRESSURE: 72 MMHG

## 2018-05-04 DIAGNOSIS — G89.29 OTHER CHRONIC PAIN: Primary | ICD-10-CM

## 2018-05-04 DIAGNOSIS — T38.0X5A STEROID-INDUCED DIABETES MELLITUS (H): ICD-10-CM

## 2018-05-04 DIAGNOSIS — N18.30 CKD (CHRONIC KIDNEY DISEASE) STAGE 3, GFR 30-59 ML/MIN (H): ICD-10-CM

## 2018-05-04 DIAGNOSIS — G31.84 MILD COGNITIVE IMPAIRMENT: ICD-10-CM

## 2018-05-04 DIAGNOSIS — E09.9 STEROID-INDUCED DIABETES MELLITUS (H): ICD-10-CM

## 2018-05-04 DIAGNOSIS — I63.9 CEREBRAL INFARCTION, UNSPECIFIED MECHANISM (H): ICD-10-CM

## 2018-05-04 LAB
ALBUMIN SERPL-MCNC: 3.6 G/DL (ref 3.4–5)
ALP SERPL-CCNC: 99 U/L (ref 40–150)
ALT SERPL W P-5'-P-CCNC: 13 U/L (ref 0–50)
ANION GAP SERPL CALCULATED.3IONS-SCNC: 8 MMOL/L (ref 3–14)
AST SERPL W P-5'-P-CCNC: 22 U/L (ref 0–45)
BASOPHILS # BLD AUTO: 0.1 10E9/L (ref 0–0.2)
BASOPHILS NFR BLD AUTO: 0.6 %
BILIRUB SERPL-MCNC: 0.3 MG/DL (ref 0.2–1.3)
BUN SERPL-MCNC: 32 MG/DL (ref 7–30)
CALCIUM SERPL-MCNC: 9.6 MG/DL (ref 8.5–10.1)
CHLORIDE SERPL-SCNC: 102 MMOL/L (ref 94–109)
CO2 SERPL-SCNC: 29 MMOL/L (ref 20–32)
CREAT SERPL-MCNC: 1.33 MG/DL (ref 0.52–1.04)
DIFFERENTIAL METHOD BLD: ABNORMAL
EOSINOPHIL # BLD AUTO: 0.2 10E9/L (ref 0–0.7)
EOSINOPHIL NFR BLD AUTO: 2.2 %
ERYTHROCYTE [DISTWIDTH] IN BLOOD BY AUTOMATED COUNT: 11.9 % (ref 10–15)
GFR SERPL CREATININE-BSD FRML MDRD: 38 ML/MIN/1.7M2
GLUCOSE SERPL-MCNC: 108 MG/DL (ref 70–99)
HBA1C MFR BLD: 5.7 % (ref 0–5.6)
HCT VFR BLD AUTO: 36.1 % (ref 35–47)
HGB BLD-MCNC: 12.4 G/DL (ref 11.7–15.7)
LYMPHOCYTES # BLD AUTO: 2.1 10E9/L (ref 0.8–5.3)
LYMPHOCYTES NFR BLD AUTO: 25.3 %
MCH RBC QN AUTO: 34.8 PG (ref 26.5–33)
MCHC RBC AUTO-ENTMCNC: 34.3 G/DL (ref 31.5–36.5)
MCV RBC AUTO: 101 FL (ref 78–100)
MONOCYTES # BLD AUTO: 0.7 10E9/L (ref 0–1.3)
MONOCYTES NFR BLD AUTO: 8.9 %
NEUTROPHILS # BLD AUTO: 5.1 10E9/L (ref 1.6–8.3)
NEUTROPHILS NFR BLD AUTO: 63 %
PLATELET # BLD AUTO: 336 10E9/L (ref 150–450)
POTASSIUM SERPL-SCNC: 3.7 MMOL/L (ref 3.4–5.3)
PROT SERPL-MCNC: 8.1 G/DL (ref 6.8–8.8)
RBC # BLD AUTO: 3.56 10E12/L (ref 3.8–5.2)
SODIUM SERPL-SCNC: 139 MMOL/L (ref 133–144)
TSH SERPL DL<=0.005 MIU/L-ACNC: 2.95 MU/L (ref 0.4–4)
VIT B12 SERPL-MCNC: 1042 PG/ML (ref 193–986)
WBC # BLD AUTO: 8.1 10E9/L (ref 4–11)

## 2018-05-04 PROCEDURE — 80053 COMPREHEN METABOLIC PANEL: CPT | Performed by: INTERNAL MEDICINE

## 2018-05-04 PROCEDURE — 85025 COMPLETE CBC W/AUTO DIFF WBC: CPT | Performed by: INTERNAL MEDICINE

## 2018-05-04 PROCEDURE — 83036 HEMOGLOBIN GLYCOSYLATED A1C: CPT | Performed by: INTERNAL MEDICINE

## 2018-05-04 PROCEDURE — 36415 COLL VENOUS BLD VENIPUNCTURE: CPT | Performed by: INTERNAL MEDICINE

## 2018-05-04 PROCEDURE — 99214 OFFICE O/P EST MOD 30 MIN: CPT | Performed by: INTERNAL MEDICINE

## 2018-05-04 PROCEDURE — 82607 VITAMIN B-12: CPT | Performed by: INTERNAL MEDICINE

## 2018-05-04 PROCEDURE — 84443 ASSAY THYROID STIM HORMONE: CPT | Performed by: INTERNAL MEDICINE

## 2018-05-04 NOTE — MR AVS SNAPSHOT
After Visit Summary   5/4/2018    Teresa Lopez    MRN: [id number]           Patient Information     Date Of Birth          1935        Visit Information        Provider Department      5/4/2018 1:00 PM Tej Engle MD [facility]        Today's Diagnoses     Other chronic pain    -  1    Cerebral infarction, unspecified mechanism (H)        Mild cognitive impairment        CKD (chronic kidney disease) stage 3, GFR 30-59 ml/min        Steroid-induced diabetes mellitus (H)           Follow-ups after your visit        Additional Services     PAIN MANAGEMENT REFERRAL       Your provider has referred you to: G: [facility] -    Reason for Referral: Evaluation for comprehensive services- patients will be evaluated if appropriate for comprehensive service including medication changes, procedures, pain psychology, and pain physical therapy.  While involved with comprehensive services, pain providers will work with referring provider/PCP to stabilize appropriate medication management, with long-term plan of transition of prescribing back to referring provider/PCP upon completion of comprehensive services.      Please complete the following questions:    Do you have any specific questions for the pain specialist? No    Are there any red flags that may impact the assessment or management of the patient? Patient has already been evaluated/treated at a pain clinic: Where: [facility]  When: 1365-7339      What is your diagnosis for the patient's pain? osteoarthritis various joints      For any questions, contact the [facility] at [phone].     **ANY DIAGNOSTIC TESTS THAT ARE NOT IN EPIC SHOULD BE SENT TO THE PAIN CENTER**    REGARDING OPIOID MEDICATIONS:  The discussion of opioids management, appropriateness of therapy, and dosing will be discussed in patients being seen for evaluation.  The pain management clinics are not long-term  prescribing clinics, with transition of prescribing of medications ultimately going back to the referring provider/PCP.  If prescribing is taken over at the pain clinic, it is in actively involved patients whom are appropriate for opioids, urine drug screening is completed, and long-term prescribing plan has been determined.  Therefore, we will not be automatically taking over prescribing at the patient's first visit.  Is this agreeable to you? agrees.     Please be aware that coverage of these services is subject to the terms and limitations of your health insurance plan.  Call member services at your health plan with any benefit or coverage questions.      Please bring the following with you to your appointment:    (1) Any X-Rays, CTs or MRIs which have been performed.  Contact the facility where they were done to arrange for  prior to your scheduled appointment.    (2) List of current medications   (3) This referral request   (4) Any documents/labs given to you for this referral                  Follow-up notes from your care team     Return in about 6 months (around 11/4/2018).      Who to contact     Normal or non-critical lab and imaging results will be communicated to you by [employer], letter or phone within 4 business days after the clinic has received the results. If you do not hear from us within 7 days, please contact the clinic through [employer] or phone. If you have a critical or abnormal lab result, we will notify you by phone as soon as possible.  Submit refill requests through [employer] or call your pharmacy and they will forward the refill request to us. Please allow 3 business days for your refill to be completed.          If you need to speak with a  for additional information , please call: [phone]             Additional Information About Your Visit        [employer] Information     [employer] gives you secure access to your electronic health record. If you see a primary care  "provider, you can also send messages to your care team and make appointments. If you have questions, please call your primary care clinic.  If you do not have a primary care provider, please call [phone] and they will assist you.        Care EveryWhere ID     This is your Care EveryWhere ID. This could be used by other organizations to access your [facility] medical records  [id number]        Your Vitals Were     Pulse Temperature Respirations Height Pulse Oximetry BMI (Body Mass Index)    94 98  F (36.7  C) (Tympanic) 16 5' 1.5\" (1.562 m) 98% 26.4 kg/m2       Blood Pressure from Last 3 Encounters:   05/04/18 132/72   03/22/18 106/64   02/23/18 147/85    Weight from Last 3 Encounters:   05/04/18 142 lb (64.4 kg)   02/23/18 145 lb (65.8 kg)   01/12/18 150 lb (68 kg)              We Performed the Following     CBC with platelets differential     Comprehensive metabolic panel     Hemoglobin A1c     PAIN MANAGEMENT REFERRAL     TSH with free T4 reflex     Vitamin B12        Primary Care Provider Office Phone # Fax #    Demetrice Richardson Matt, MYRON [phone]       [address] [facility] [address]        Equal Access to Services     ALTHEA MAYS : Hadii aad ku hadasho Soomaali, waaxda luqadaha, qaybta kaalmada adeegyada, waxay idiin hayjoeyn adenuvia potts laroger ah. So [facility] [phone].    ATENCIÓN: Si habla español, tiene a brambila disposición servicios gratuitos de asistencia lingüística. Llame al [phone].    We comply with applicable federal civil rights laws and [place] laws. We do not discriminate on the basis of race, color, national origin, age, disability, sex, sexual orientation, or gender identity.            Thank you!     Thank you for choosing [facility]  for your care. Our goal is always to provide you with excellent care. Hearing back from our patients is one way we can continue to improve our services. Please take a few minutes to complete the written " survey that you may receive in the mail after your visit with us. Thank you!             Your Updated Medication List - Protect others around you: Learn how to safely use, store and throw away your medicines at www.disposemymeds.org.          This list is accurate as of 5/4/18  1:52 PM.  Always use your most recent med list.                   Brand Name Dispense Instructions for use Diagnosis    ADVIL 200 MG capsule   Generic drug:  ibuprofen      Reported on 4/20/2017        amLODIPine 2.5 MG tablet    NORVASC    90 tablet    TAKE 1 TABLET BY MOUTH AT BEDTIME    Hypertension goal BP (blood pressure) < 140/90       aspirin 162 MG EC tablet      Take 162 mg by mouth daily Reported on 4/20/2017        CALCIUM 1200 PO      Reported on 4/20/2017        CLARITIN 10 MG capsule   Generic drug:  loratadine      Take 10 mg by mouth as needed Reported on 4/20/2017        gabapentin 300 MG capsule    NEURONTIN    270 capsule    Take 1 capsule (300 mg) by mouth 3 times daily    DJD (degenerative joint disease), lumbosacral, Spinal stenosis of lumbar region with neurogenic claudication       GLUCOSAMINE 1500 COMPLEX PO      Take 1,500 mg by mouth daily Reported on 4/20/2017        losartan-hydrochlorothiazide 100-25 MG per tablet    HYZAAR    90 tablet    TAKE 1 TABLET BY MOUTH EVERY DAY    Hypertension goal BP (blood pressure) < 140/90       Multi-vitamin Tabs tablet   Generic drug:  multivitamin, therapeutic with minerals      1 TABLET DAILY        NONFORMULARY      daily Osteo kim (joints)        NONFORMULARY      100 mg daily Magnesium Glycinate        oxyCODONE-acetaminophen 5-325 MG per tablet    PERCOCET    120 tablet    Take 1-2 tablets by mouth every 8 hours as needed for pain maximum 4 tablet(s) per day    DJD (degenerative joint disease), lumbosacral, Spinal stenosis of lumbar region with neurogenic claudication       PRILOSEC PO      None Entered        SALMON OIL-1000 PO      take 1,000 Caps by mouth daily.         senna-docusate 8.6-50 MG per tablet    SENOKOT-S;PERICOLACE    120 tablet    Take 1 tablet by mouth 2 times daily    Slow transit constipation       VITAMIN C PO      None Entered        VITAMIN D PO      Take 1,200 mg by mouth daily.        vitamin E 400 UNIT capsule      None Entered

## 2018-05-04 NOTE — PROGRESS NOTES
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  Had an episode of opioid withdrawal and doesn't want to depend on medications but is fairly miserable without.  Has followed with [facility] and Dr. Valverde upstairs for alternative/adjunts including injections as needed.    Patient here with daughter, Radha.  Together we reviewed her neuropsychometric testing and CVA history.    Social History     Social History Narrative    Moving to senior assisted living facility    History of polka, schottisch, other dancing    2 daughters in area                1. Other chronic pain    2. Cerebral infarction, unspecified mechanism (H)    3. Mild cognitive impairment    4. CKD (chronic kidney disease) stage 3, GFR 30-59 ml/min    5. Steroid-induced diabetes mellitus (H)        PMH: Updated and/or reviewed in chart.    PSH: Updated and/or reviewed in chart.    Family History: Updated and/or reviewed in chart.     ROS:  Constitutional, neuro, gastrointestinal, musculoskeletal, and psychiatric systems are otherwise negative.    OBJECTIVE:                                                    /72  Pulse 94  Temp 98  F (36.7  C) (Tympanic)  Resp 16  Ht 5' 1.5\" (1.562 m)  Wt 142 lb (64.4 kg)  SpO2 98%  BMI 26.4 kg/m2    GEN: No acute distress, somewhat younger disposition than age  EYES: No conjunctival injection or " icterus, EOMI grossly intact  RESP: Unlabored, regular  NEURO: ambulates with cane, MAEx4, light touch sensation grossly intact  PSYCH: Normal mood and bright affect      Results for orders placed or performed in visit on 05/04/18   CBC with platelets differential   Result Value Ref Range    WBC 8.1 4.0 - 11.0 10e9/L    RBC Count 3.56 (L) 3.8 - 5.2 10e12/L    Hemoglobin 12.4 11.7 - 15.7 g/dL    Hematocrit 36.1 35.0 - 47.0 %     (H) 78 - 100 fl    MCH 34.8 (H) 26.5 - 33.0 pg    MCHC 34.3 31.5 - 36.5 g/dL    RDW 11.9 10.0 - 15.0 %    Platelet Count 336 150 - 450 10e9/L    Diff Method Automated Method     % Neutrophils 63.0 %    % Lymphocytes 25.3 %    % Monocytes 8.9 %    % Eosinophils 2.2 %    % Basophils 0.6 %    Absolute Neutrophil 5.1 1.6 - 8.3 10e9/L    Absolute Lymphocytes 2.1 0.8 - 5.3 10e9/L    Absolute Monocytes 0.7 0.0 - 1.3 10e9/L    Absolute Eosinophils 0.2 0.0 - 0.7 10e9/L    Absolute Basophils 0.1 0.0 - 0.2 10e9/L   Hemoglobin A1c   Result Value Ref Range    Hemoglobin A1C 5.7 (H) 0 - 5.6 %      ASSESSMENT/PLAN:                                                    1. Other chronic pain  OK to continue with [facility] -- appreciate their assistance.  I'm OK continuing her opioid chronically or having specialist co-manage.  We discussed risks, benefits, and alternatives of opioid analgesia.  Patient demonstrated adequate understanding.  - PAIN MANAGEMENT REFERRAL    2. Cerebral infarction, unspecified mechanism (H)  3. Mild cognitive impairment  Mild cognitive residual from CVA presumed and testing not consistent with dementia.  No report of previous west dementia work-up but may not be fully warranted at this time.  - TSH with free T4 reflex  - CBC with platelets differential  - Vitamin B12    4. CKD (chronic kidney disease) stage 3, GFR 30-59 ml/min  - Comprehensive metabolic panel    5. Steroid-induced diabetes mellitus (H)  History of such.  Off steroids now except occasional joint  injection.  Hemoglobin A1c close to within normal limits.  Would not add treatment.  - Hemoglobin A1c       Orders Placed This Encounter     TSH with free T4 reflex     Comprehensive metabolic panel     CBC with platelets differential     Vitamin B12     Hemoglobin A1c     PAIN MANAGEMENT REFERRAL     NONFORMULARY     NONFORMULARY              Tej Engle MD

## 2018-05-15 ASSESSMENT — ACTIVITIES OF DAILY LIVING (ADL)
CURRENT_FUNCTION: HOUSEWORK REQUIRES ASSISTANCE
I_NEED_ASSISTANCE_FOR_THE_FOLLOWING_DAILY_ACTIVITIES:: PREPARING MEALS
I_NEED_ASSISTANCE_FOR_THE_FOLLOWING_DAILY_ACTIVITIES:: HOUSEWORK
CURRENT_FUNCTION: PREPARING MEALS REQUIRES ASSISTANCE

## 2018-05-16 ENCOUNTER — OFFICE VISIT (OUTPATIENT)
Dept: INTERNAL MEDICINE | Facility: CLINIC | Age: 83
End: 2018-05-16
Payer: COMMERCIAL

## 2018-05-16 VITALS
WEIGHT: 142 LBS | SYSTOLIC BLOOD PRESSURE: 151 MMHG | HEART RATE: 85 BPM | TEMPERATURE: 97.7 F | BODY MASS INDEX: 26.4 KG/M2 | RESPIRATION RATE: 16 BRPM | DIASTOLIC BLOOD PRESSURE: 68 MMHG

## 2018-05-16 DIAGNOSIS — Z13.820 SCREENING FOR OSTEOPOROSIS: ICD-10-CM

## 2018-05-16 DIAGNOSIS — M48.061 SPINAL STENOSIS OF LUMBAR REGION WITHOUT NEUROGENIC CLAUDICATION: ICD-10-CM

## 2018-05-16 DIAGNOSIS — N18.30 CKD (CHRONIC KIDNEY DISEASE) STAGE 3, GFR 30-59 ML/MIN (H): ICD-10-CM

## 2018-05-16 DIAGNOSIS — M16.11 PRIMARY OSTEOARTHRITIS OF RIGHT HIP: ICD-10-CM

## 2018-05-16 DIAGNOSIS — E78.5 HYPERLIPIDEMIA, UNSPECIFIED HYPERLIPIDEMIA TYPE: ICD-10-CM

## 2018-05-16 DIAGNOSIS — G89.4 CHRONIC PAIN DISORDER: ICD-10-CM

## 2018-05-16 DIAGNOSIS — Z71.89 ADVANCED DIRECTIVES, COUNSELING/DISCUSSION: ICD-10-CM

## 2018-05-16 DIAGNOSIS — Z00.00 ROUTINE GENERAL MEDICAL EXAMINATION AT A HEALTH CARE FACILITY: Primary | ICD-10-CM

## 2018-05-16 LAB
CHOLEST SERPL-MCNC: 216 MG/DL
HDLC SERPL-MCNC: 56 MG/DL
LDLC SERPL CALC-MCNC: 133 MG/DL
NONHDLC SERPL-MCNC: 160 MG/DL
TRIGL SERPL-MCNC: 136 MG/DL

## 2018-05-16 PROCEDURE — 99397 PER PM REEVAL EST PAT 65+ YR: CPT | Performed by: INTERNAL MEDICINE

## 2018-05-16 PROCEDURE — 99214 OFFICE O/P EST MOD 30 MIN: CPT | Mod: 25 | Performed by: INTERNAL MEDICINE

## 2018-05-16 PROCEDURE — 36415 COLL VENOUS BLD VENIPUNCTURE: CPT | Performed by: INTERNAL MEDICINE

## 2018-05-16 PROCEDURE — 80061 LIPID PANEL: CPT | Performed by: INTERNAL MEDICINE

## 2018-05-16 NOTE — MR AVS SNAPSHOT
After Visit Summary   5/16/2018    Teresa Lopez    MRN: [id number]           Patient Information     Date Of Birth          1935        Visit Information        Provider Department      5/16/2018 10:00 AM Tej Engle MD [facility]        Today's Diagnoses     Routine general medical examination at a health care facility    -  1    Spinal stenosis of lumbar region without neurogenic claudication        Primary osteoarthritis of right hip        CKD (chronic kidney disease) stage 3, GFR 30-59 ml/min        Advanced directives, counseling/discussion        Chronic pain disorder        Hyperlipidemia, unspecified hyperlipidemia type        Screening for osteoporosis           Follow-ups after your visit        Your next 10 appointments already scheduled     May 29, 2018  2:00 PM CDT   Return Visit with MYRON Ahumada CNP   [facility])    [address] [facility] [address] [phone]              Future tests that were ordered for you today     Open Future Orders        Priority Expected Expires Ordered    DX Hip/Pelvis/Spine Routine  5/16/2019 5/16/2018            Who to contact     Normal or non-critical lab and imaging results will be communicated to you by [employer], letter or phone within 4 business days after the clinic has received the results. If you do not hear from us within 7 days, please contact the clinic through [employer] or phone. If you have a critical or abnormal lab result, we will notify you by phone as soon as possible.  Submit refill requests through [employer] or call your pharmacy and they will forward the refill request to us. Please allow 3 business days for your refill to be completed.          If you need to speak with a  for additional information , please call: [phone]             Additional Information About Your Visit        [employer] Information     [employer] gives you  secure access to your electronic health record. If you see a primary care provider, you can also send messages to your care team and make appointments. If you have questions, please call your primary care clinic.  If you do not have a primary care provider, please call [phone] and they will assist you.        Care EveryWhere ID     This is your Care EveryWhere ID. This could be used by other organizations to access your [facility] medical records  [id number]        Your Vitals Were     Pulse Temperature Respirations BMI (Body Mass Index)          85 97.7  F (36.5  C) (Tympanic) 16 26.4 kg/m2         Blood Pressure from Last 3 Encounters:   05/16/18 151/68   05/04/18 132/72   03/22/18 106/64    Weight from Last 3 Encounters:   05/16/18 142 lb (64.4 kg)   05/04/18 142 lb (64.4 kg)   02/23/18 145 lb (65.8 kg)              We Performed the Following     Lipid panel reflex to direct LDL Fasting          Today's Medication Changes          These changes are accurate as of 5/16/18 11:34 AM.  If you have any questions, ask your nurse or doctor.               These medicines have changed or have updated prescriptions.        Dose/Directions    gabapentin 300 MG capsule   Commonly known as:  NEURONTIN   This may have changed:  when to take this   Used for:  DJD (degenerative joint disease), lumbosacral, Spinal stenosis of lumbar region with neurogenic claudication        Dose:  300 mg   Take 1 capsule (300 mg) by mouth 3 times daily   Quantity:  270 capsule   Refills:  3                Primary Care Provider Office Phone # Fax #    Demetrice WildMYRON Smiley [facility] [phone]       [address] [address]        Equal Access to Services     [facility]: Hadii wilfrid Gallegos, waaxda luqadaha, qaybta kaalmada lori cueto. So [facility] [phone].    ATENCIÓN: Si habla español, tiene a brambila disposición servicios gratuitos de asistencia  lingüística. Danuta al [phone].    We comply with applicable federal civil rights laws and [place] laws. We do not discriminate on the basis of race, color, national origin, age, disability, sex, sexual orientation, or gender identity.            Thank you!     Thank you for choosing [facility]  for your care. Our goal is always to provide you with excellent care. Hearing back from our patients is one way we can continue to improve our services. Please take a few minutes to complete the written survey that you may receive in the mail after your visit with us. Thank you!             Your Updated Medication List - Protect others around you: Learn how to safely use, store and throw away your medicines at www.disposemymeds.org.          This list is accurate as of 5/16/18 11:34 AM.  Always use your most recent med list.                   Brand Name Dispense Instructions for use Diagnosis    ADVIL 200 MG capsule   Generic drug:  ibuprofen      Reported on 4/20/2017        amLODIPine 2.5 MG tablet    NORVASC    90 tablet    TAKE 1 TABLET BY MOUTH AT BEDTIME    Hypertension goal BP (blood pressure) < 140/90       aspirin 162 MG EC tablet      Take 162 mg by mouth daily Reported on 4/20/2017        CALCIUM 1200 PO      Reported on 4/20/2017        CLARITIN 10 MG capsule   Generic drug:  loratadine      Take 10 mg by mouth as needed Reported on 4/20/2017        gabapentin 300 MG capsule    NEURONTIN    270 capsule    Take 1 capsule (300 mg) by mouth 3 times daily    DJD (degenerative joint disease), lumbosacral, Spinal stenosis of lumbar region with neurogenic claudication       GLUCOSAMINE 1500 COMPLEX PO      Take 1,500 mg by mouth daily Reported on 4/20/2017        losartan-hydrochlorothiazide 100-25 MG per tablet    HYZAAR    90 tablet    TAKE 1 TABLET BY MOUTH EVERY DAY    Hypertension goal BP (blood pressure) < 140/90       Multi-vitamin Tabs tablet   Generic drug:  multivitamin, therapeutic with  minerals      1 TABLET DAILY        NONFORMULARY      daily Osteo kim (joints)        NONFORMULARY      100 mg daily Magnesium Glycinate        oxyCODONE-acetaminophen 5-325 MG per tablet    PERCOCET    120 tablet    Take 1-2 tablets by mouth every 8 hours as needed for pain maximum 4 tablet(s) per day    DJD (degenerative joint disease), lumbosacral, Spinal stenosis of lumbar region with neurogenic claudication       PRILOSEC PO      None Entered        SALMON OIL-1000 PO      take 1,000 Caps by mouth daily.        senna-docusate 8.6-50 MG per tablet    SENOKOT-S;PERICOLACE    120 tablet    Take 1 tablet by mouth 2 times daily    Slow transit constipation       VITAMIN C PO      None Entered        VITAMIN D PO      Take 1,200 mg by mouth daily.        vitamin E 400 UNIT capsule      None Entered

## 2018-05-16 NOTE — PROGRESS NOTES
CC: back and hip pain    Recheck on hip, back and leg pain      Duration: 3 years    Description (location/character/radiation): f/u on chronic pain.  Increased difficulty with walking and performing daily activities    Intensity:  severe    Accompanying signs and symptoms: none    History (similar episodes/previous evaluation): weight loss due pain     Precipitating or alleviating factors: None    Therapies tried and outcome: pain shot in the past helped     Significant lumbar and right hip pain.  Feels fairly limited in activity by pain.  Tried stopping oxycodone last summer and dealt with mild withdrawal and required family support.      S: patient reports severe limitations by osteoarthritis changes on lumbar spine (reviewed recent MRI) and right hip (radiologist notes left hip but clearly her right on review of images) with moderate-severe osteoarthritis.  She has had significant ongoing pain and related functional limitations in many ADLs.  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  She takes a chocolate Boost once daily to help with nutrition.    ROS negative for cons, GI/, neuro, psych.    A/P:  2. Spinal stenosis of lumbar region without neurogenic claudication  3. Primary osteoarthritis of right hip  6. Chronic pain disorder  She is set up for consult with Pain management clinic later this month.  Today we discussed pain control options in some length, including whether she should expect significant increase in both pain and function from SANAZ given the status of her lumbar contribution to pain and debility.  I doubt whether undergoing such a procedure would provide dramatic improvement but welcome recommendations from pain service on this -- and on intraarticular corticosteroid injection(s) which seems reasonable to me.  We also discussed medical  cannabinoids for pain adjunct which patient agreed to trial for comfort.  She was certified via [facility] in office today.    4. CKD (chronic kidney disease) stage 3b, GFR 30-59 ml/min  Stable GFR around 40    I spent a total of 35 minutes with the patient of which greater than 50% were devoted to counseling and coordination of care: osteoarthritis, chronic pain, medical cannabis discussion.  This time is in addition to time spent on preventive visit.

## 2018-05-16 NOTE — PROGRESS NOTES
SUBJECTIVE:   Teresa Lopez is a 82 year old female who presents to clinic today for the following health issues:    Has moved into new apartment and still unboxing.  Daughter, Radha, with her again today.    Patient believes she was taking hyperlipidemia treatment in the past but isn't now.    Otherwise tolerating medications.    Answers for HPI/ROS submitted by the patient on 5/15/2018   Annual Exam:  Getting at least 3 servings of Calcium per day:: NO  Bi-annual eye exam:: Yes  Dental care twice a year:: Yes  Sleep apnea or symptoms of sleep apnea:: Daytime drowsiness  Diet:: Regular (no restrictions)  Frequency of exercise:: None  Taking medications regularly:: Yes  Medication side effects:: Muscle aches  Additional concerns today:: YES  Activities of Daily Living: preparing meals requires assistance, housework requires assistance  Home safety: no safety concerns identified  Hearing Impairment:: no hearing concerns  PHQ-2 Score: 0    PMH: Updated and/or reviewed in chart.    PSH: Updated and/or reviewed in chart.    Family History: Updated and/or reviewed in chart.     ROS:  Constitutional, neuro, EMT, endocrine, pulmonary, cardiac, gastrointestinal, genitourinary, musculoskeletal, integument and psychiatric systems are otherwise negative.    OBJECTIVE:                                                    /68  Pulse 85  Temp 97.7  F (36.5  C) (Tympanic)  Resp 16  Wt 142 lb (64.4 kg)  BMI 26.4 kg/m2    GEN: No acute distress   EYES: EOMI grossly, pupils equal, no conjunctival injection, icterus, or edema  EARS/NOSE: External auditory meati intact, visualized portions of TMs normal, nares clear without drainage  MOUTH/THROAT: Posterior oropharynx clear, tongue normal, no parotid tenderness/enlargement  NECK: Trachea midline, no thyromegaly, cervical kyphosis  LYMPH: No anterior/posterior cervical, occipital, or clavicular lymphadenopathy  CARDIAC: Regular rate and rhythm, no murmurs, rubs, or gallops  appreciated  RESP: Unlabored breathing, clear to auscultation bilaterally without rales, rhonchi, or wheezes  BACK: significant right-sided PSIC and right quadratus lumborum tension and tenderness to palpation   ABDOMEN: Soft, non-tender, positive bowel sounds, no hepatosplenomegaly or masses  EXTREMITIES: Normal radial and posterior tibial pulses, no lower extremity edema  SKIN/NAILS: No rashes or lesions of concern identified   NEURO: Moves all extremities, CN II-XII grossly intact, ambulates with assist device  PSYCH: Normal mood and affect        Results for orders placed or performed in visit on 05/04/18   TSH with free T4 reflex   Result Value Ref Range    TSH 2.95 0.40 - 4.00 mU/L   Comprehensive metabolic panel   Result Value Ref Range    Sodium 139 133 - 144 mmol/L    Potassium 3.7 3.4 - 5.3 mmol/L    Chloride 102 94 - 109 mmol/L    Carbon Dioxide 29 20 - 32 mmol/L    Anion Gap 8 3 - 14 mmol/L    Glucose 108 (H) 70 - 99 mg/dL    Urea Nitrogen 32 (H) 7 - 30 mg/dL    Creatinine 1.33 (H) 0.52 - 1.04 mg/dL    GFR Estimate 38 (L) >60 mL/min/1.7m2    GFR Estimate If Black 46 (L) >60 mL/min/1.7m2    Calcium 9.6 8.5 - 10.1 mg/dL    Bilirubin Total 0.3 0.2 - 1.3 mg/dL    Albumin 3.6 3.4 - 5.0 g/dL    Protein Total 8.1 6.8 - 8.8 g/dL    Alkaline Phosphatase 99 40 - 150 U/L    ALT 13 0 - 50 U/L    AST 22 0 - 45 U/L   CBC with platelets differential   Result Value Ref Range    WBC 8.1 4.0 - 11.0 10e9/L    RBC Count 3.56 (L) 3.8 - 5.2 10e12/L    Hemoglobin 12.4 11.7 - 15.7 g/dL    Hematocrit 36.1 35.0 - 47.0 %     (H) 78 - 100 fl    MCH 34.8 (H) 26.5 - 33.0 pg    MCHC 34.3 31.5 - 36.5 g/dL    RDW 11.9 10.0 - 15.0 %    Platelet Count 336 150 - 450 10e9/L    Diff Method Automated Method     % Neutrophils 63.0 %    % Lymphocytes 25.3 %    % Monocytes 8.9 %    % Eosinophils 2.2 %    % Basophils 0.6 %    Absolute Neutrophil 5.1 1.6 - 8.3 10e9/L    Absolute Lymphocytes 2.1 0.8 - 5.3 10e9/L    Absolute Monocytes 0.7 0.0  - 1.3 10e9/L    Absolute Eosinophils 0.2 0.0 - 0.7 10e9/L    Absolute Basophils 0.1 0.0 - 0.2 10e9/L   Vitamin B12   Result Value Ref Range    Vitamin B12 1042 (H) 193 - 986 pg/mL   Hemoglobin A1c   Result Value Ref Range    Hemoglobin A1C 5.7 (H) 0 - 5.6 %      ASSESSMENT/PLAN:                                                    1. Routine general medical examination at a health care facility  Limited by pain otherwise tolerating medications OK -     1.5 osteop screen does need dexa scan for osteoporosis screening as it's been 5 years and her activity and nutrition levels are lower now    2. Hyperlipidemia - patient believes she was on statin therapy in the past but has not been recently by her own medication lists and also cholesterol panel reviews.  We discussed uncertainty in whether benefit outweighs burden but given CVA history very reasonable to resume pending review of lipids.    3.  Elevated systolic BP in elderly    5. Advanced directives, counseling/discussion  Will review your ACP and let us have a copy            Tej Engle MD

## 2018-05-23 ENCOUNTER — RESULT FOLLOW UP (OUTPATIENT)
Dept: INTERNAL MEDICINE | Facility: CLINIC | Age: 83
End: 2018-05-23

## 2018-05-23 DIAGNOSIS — E78.49 OTHER HYPERLIPIDEMIA: Primary | ICD-10-CM

## 2018-05-23 RX ORDER — SIMVASTATIN 40 MG
40 TABLET ORAL AT BEDTIME
Qty: 90 TABLET | Refills: 1
Start: 2018-05-23 | End: 2018-05-23

## 2018-05-23 RX ORDER — SIMVASTATIN 40 MG
40 TABLET ORAL AT BEDTIME
Qty: 90 TABLET | Refills: 1 | Status: SHIPPED | OUTPATIENT
Start: 2018-05-23 | End: 2018-12-31

## 2018-05-29 ENCOUNTER — OFFICE VISIT (OUTPATIENT)
Dept: PALLIATIVE MEDICINE | Facility: CLINIC | Age: 83
End: 2018-05-29
Payer: COMMERCIAL

## 2018-05-29 VITALS
SYSTOLIC BLOOD PRESSURE: 123 MMHG | WEIGHT: 140 LBS | BODY MASS INDEX: 26.02 KG/M2 | HEART RATE: 79 BPM | DIASTOLIC BLOOD PRESSURE: 76 MMHG

## 2018-05-29 DIAGNOSIS — G89.29 CHRONIC BILATERAL LOW BACK PAIN WITH BILATERAL SCIATICA: ICD-10-CM

## 2018-05-29 DIAGNOSIS — M53.3 SACROILIAC JOINT DYSFUNCTION OF BOTH SIDES: ICD-10-CM

## 2018-05-29 DIAGNOSIS — M54.41 CHRONIC BILATERAL LOW BACK PAIN WITH BILATERAL SCIATICA: ICD-10-CM

## 2018-05-29 DIAGNOSIS — G57.03 PIRIFORMIS SYNDROME OF BOTH SIDES: ICD-10-CM

## 2018-05-29 DIAGNOSIS — M51.369 DDD (DEGENERATIVE DISC DISEASE), LUMBAR: ICD-10-CM

## 2018-05-29 DIAGNOSIS — M54.42 CHRONIC BILATERAL LOW BACK PAIN WITH BILATERAL SCIATICA: ICD-10-CM

## 2018-05-29 DIAGNOSIS — M48.062 SPINAL STENOSIS OF LUMBAR REGION WITH NEUROGENIC CLAUDICATION: Primary | ICD-10-CM

## 2018-05-29 PROCEDURE — 99214 OFFICE O/P EST MOD 30 MIN: CPT | Performed by: NURSE PRACTITIONER

## 2018-05-29 ASSESSMENT — PAIN SCALES - GENERAL: PAINLEVEL: WORST PAIN (10)

## 2018-05-29 NOTE — PATIENT INSTRUCTIONS
PLAN  1. Medications:   1. Continue Percocet as managed by primary care provider--Dr. Engle  2. Continue Gabapentin 300mg 3 times per day.  3. Continue to use Biofreeze.   4. You were certified for medical marijuana by your doctor, you received an e-mail with further instructions. I am in agreement with the certification. Ask the dispensary about the use of medical marijuana balm.  2. Procedures: Schedule a caudal epidural steroid injection, the office will contact you with further instructions.   3. Only wear your brace while you're out and about.   4. Follow-up with me in 8-12 weeks.

## 2018-05-29 NOTE — MR AVS SNAPSHOT
After Visit Summary   5/29/2018    Teresa Lopez    MRN: [id number]           Patient Information     Date Of Birth          1935        Visit Information        Provider Department      5/29/2018 2:00 PM Marion Munoz APRN CNP [facility]        Today's Diagnoses     Spinal stenosis of lumbar region with neurogenic claudication    -  1    DDD (degenerative disc disease), lumbar        Chronic bilateral low back pain with bilateral sciatica          Care Instructions    PLAN  1. Medications:   1. Continue Percocet as managed by primary care provider--Dr. Engle  2. Continue Gabapentin 300mg 3 times per day.  3. Continue to use Biofreeze.   4. You were certified for medical marijuana by your doctor, you received an e-mail with further instructions. I am in agreement with the certification. Ask the dispensary about the use of medical marijuana balm.  2. Procedures: Schedule a caudal epidural steroid injection, the office will contact you with further instructions.   3. Only wear your brace while you're out and about.   4. Follow-up with me in 8-12 weeks.              Follow-ups after your visit        Additional Services     PAIN INJECTION EVAL/TREAT/FOLLOW UP                 Who to contact     If you have questions or need follow up information about today's clinic visit or your schedule please contact [facility] directly at [phone].  Normal or non-critical lab and imaging results will be communicated to you by MyChart, letter or phone within 4 business days after the clinic has received the results. If you do not hear from us within 7 days, please contact the clinic through MyChart or phone. If you have a critical or abnormal lab result, we will notify you by phone as soon as possible.  Submit refill requests through [employer] or call your pharmacy and they will forward the refill request to us. Please allow 3 business days for your refill to be completed.           Additional Information About Your Visit        [employer] Information     [employer] gives you secure access to your electronic health record. If you see a primary care provider, you can also send messages to your care team and make appointments. If you have questions, please call your primary care clinic.  If you do not have a primary care provider, please call [phone] and they will assist you.        Care EveryWhere ID     This is your Care EveryWhere ID. This could be used by other organizations to access your [facility] medical records  [id number]        Your Vitals Were     Pulse BMI (Body Mass Index)                79 26.02 kg/m2           Blood Pressure from Last 3 Encounters:   05/29/18 123/76   05/16/18 151/68   05/04/18 132/72    Weight from Last 3 Encounters:   05/29/18 63.5 kg (140 lb)   05/16/18 64.4 kg (142 lb)   05/04/18 64.4 kg (142 lb)              We Performed the Following     PAIN INJECTION EVAL/TREAT/FOLLOW UP          Today's Medication Changes          These changes are accurate as of 5/29/18  2:51 PM.  If you have any questions, ask your nurse or doctor.               These medicines have changed or have updated prescriptions.        Dose/Directions    gabapentin 300 MG capsule   Commonly known as:  NEURONTIN   This may have changed:  when to take this   Used for:  DJD (degenerative joint disease), lumbosacral, Spinal stenosis of lumbar region with neurogenic claudication        Dose:  300 mg   Take 1 capsule (300 mg) by mouth 3 times daily   Quantity:  270 capsule   Refills:  3                Primary Care Provider Office Phone # Fax #    MYRON Bose [facility] [phone]       [address] [place] [id number]        Equal Access to Services     [facility] : Nani Gallegos, gualberto ugarte, qalori garcias. So [facility] [phone].    ATENCIÓN: Si habla español, tiene a brambila disposición  servicios gratuitos de asistencia lingüística. Danuta amos [phone].    We comply with applicable federal civil rights laws and [place] laws. We do not discriminate on the basis of race, color, national origin, age, disability, sex, sexual orientation, or gender identity.            Thank you!     Thank you for choosing [facility]  for your care. Our goal is always to provide you with excellent care. Hearing back from our patients is one way we can continue to improve our services. Please take a few minutes to complete the written survey that you may receive in the mail after your visit with us. Thank you!             Your Updated Medication List - Protect others around you: Learn how to safely use, store and throw away your medicines at www.disposemymeds.org.          This list is accurate as of 5/29/18  2:51 PM.  Always use your most recent med list.                   Brand Name Dispense Instructions for use Diagnosis    ADVIL 200 MG capsule   Generic drug:  ibuprofen      Reported on 4/20/2017        amLODIPine 2.5 MG tablet    NORVASC    90 tablet    TAKE 1 TABLET BY MOUTH AT BEDTIME    Hypertension goal BP (blood pressure) < 140/90       aspirin 162 MG EC tablet      Take 162 mg by mouth daily Reported on 4/20/2017        CALCIUM 1200 PO      Reported on 4/20/2017        CLARITIN 10 MG capsule   Generic drug:  loratadine      Take 10 mg by mouth as needed Reported on 4/20/2017        gabapentin 300 MG capsule    NEURONTIN    270 capsule    Take 1 capsule (300 mg) by mouth 3 times daily    DJD (degenerative joint disease), lumbosacral, Spinal stenosis of lumbar region with neurogenic claudication       GLUCOSAMINE 1500 COMPLEX PO      Take 1,500 mg by mouth daily Reported on 4/20/2017        losartan-hydrochlorothiazide 100-25 MG per tablet    HYZAAR    90 tablet    TAKE 1 TABLET BY MOUTH EVERY DAY    Hypertension goal BP (blood pressure) < 140/90       Multi-vitamin Tabs tablet   Generic drug:   multivitamin, therapeutic with minerals      1 TABLET DAILY        NONFORMULARY      daily Osteo kim (joints)        NONFORMULARY      100 mg daily Magnesium Glycinate        oxyCODONE-acetaminophen 5-325 MG per tablet    PERCOCET    120 tablet    Take 1-2 tablets by mouth every 8 hours as needed for pain maximum 4 tablet(s) per day    DJD (degenerative joint disease), lumbosacral, Spinal stenosis of lumbar region with neurogenic claudication       PRILOSEC PO      None Entered        SALMON OIL-1000 PO      take 1,000 Caps by mouth daily.        senna-docusate 8.6-50 MG per tablet    SENOKOT-S;PERICOLACE    120 tablet    Take 1 tablet by mouth 2 times daily    Slow transit constipation       simvastatin 40 MG tablet    ZOCOR    90 tablet    Take 1 tablet (40 mg) by mouth At Bedtime    Other hyperlipidemia       VITAMIN C PO      None Entered        VITAMIN D PO      Take 1,200 mg by mouth daily.        vitamin E 400 UNIT capsule      None Entered

## 2018-05-29 NOTE — PROGRESS NOTES
[facility]    5/29/2018    Chief complaint:  Low back pain, neck pain, shoulder pain, bilateral knee pain and bilateral ankle pain and right hip pain. The right hip pain is the worst, points to groin area as most painful    Interval history:  Teresa Lopez is a 82 year old female is known to me for .   Primary osteoarthritis    Chronic right hip pain   Chronic bilateral low back without sciatica   Spinal stenosis of lumbar region with neurogenic claudication   Piriformis syndrome of both sides   PMHx DJD, CVA, HTN, diabetes mellitus, and cataract   PSHx Hysterectomy    Recommendations/plan at the last visit included:2/23/2018  1. Physical Therapy:  The patient will follow up with outside physical therapist as scheduled.  2. Clinical Health Psychologist: None needed at present  3. Diagnostic Studies:  None  4. Medication Management:    1. Continue Percocet per Primary Care Provider   2. Continue Gabapentin  3. Start to use Aspercreme with 4% Lidocaine  5. Further procedures recommended: She will wait at least 6 weeks for further injections. Possible injections are bilateral SI joint or bilateral piriformis injections.  6. Patient is referred to  neurosurgery to see if she is a surgical candidate to treat her spinal stenosis with neurogenic claudication   7. The patient will contact [facility] to check to see how much acupuncture would be without insurance coverage.   8. Recommendations to PCP: See above  9. Follow up: 8 weeks.    Since her last visit, Teresa Lopez reports:  -She experiences pain when walking. The patient wears a knee brace and uses Biofreeze to alleviate painful symptoms. Her previous injection on 1/12/2018 was not helpful.   -The patient was certified for medical marijuana.       At this point, the patient's participation with our multidisciplinary team includes:  The patient has been compliant with the program.  Pain Group None  PT - Continue physical  therapy  Health Psych  None      Pain scores:  Pain intensity on average is 10 on a scale of 0-10.    Range is 10/10.   Pain is described as aching.    Pain is continuous in nature and worst in the morning.    Current pain-related medication treatments include:  -Percocet 5.325mg may take 1-2 tabs Q 8 hours PRN pain (she uses 4 tabs per day, somewhat helpful)  -gabapentin 300mg TID (she is not sure)        Other pertinent medications:  -Senna-docusate PRN      Previous medication treatments included:  OPIATES:hydrocodone (not helpful), oxycodone (somewhat helpful)  NSAIDS: ibuprofen (not helpful), Aleve (not helpful)  MUSCLE RELAXANTS: none  ANTI-MIGRAINE MEDS: none  ANTI-DEPRESSANTS: none  SLEEP AIDS: none  ANTI-CONVULSANTS: gabapentin (unsure if helpful)  TOPICALS: none  Other meds: Tylenol (not helpful)    Injections:  11/10/2017 Caudal epidural steroid injection with Dr. Reymundo Bajwa (helpful for a very short period of time)  1/5/2018 Ultrasound guided right intra-articular hip injection with Dr. Jean Meade  1/12/2018 Right ankle injection with Dr. Lewis of podiatry.     THE 4 A's OF OPIOID MAINTENANCE ANALGESIA    Analgesia: somewhat helpful    Activity: ADLs    Adverse effects: none    Adherence to Rx protocol: yes      Side Effects: no side effect  Patient is using the medication as prescribed: YES    Medications:  Current Outpatient Prescriptions   Medication Sig Dispense Refill     ADVIL 200 MG PO CAPS Reported on 4/20/2017       amLODIPine (NORVASC) 2.5 MG tablet TAKE 1 TABLET BY MOUTH AT BEDTIME 90 tablet 2     aspirin 162 MG EC tablet Take 162 mg by mouth daily Reported on 4/20/2017       gabapentin (NEURONTIN) 300 MG capsule Take 1 capsule (300 mg) by mouth 3 times daily (Patient taking differently: Take 300 mg by mouth 2 times daily ) 270 capsule 3     losartan-hydrochlorothiazide (HYZAAR) 100-25 MG per tablet TAKE 1 TABLET BY MOUTH EVERY DAY 90 tablet 2     oxyCODONE-acetaminophen (PERCOCET)  5-325 MG per tablet Take 1-2 tablets by mouth every 8 hours as needed for pain maximum 4 tablet(s) per day 120 tablet 0     simvastatin (ZOCOR) 40 MG tablet Take 1 tablet (40 mg) by mouth At Bedtime 90 tablet 1     CALCIUM 1200 OR Reported on 4/20/2017       Glucosamine-Chondroit-Vit C-Mn (GLUCOSAMINE 1500 COMPLEX PO) Take 1,500 mg by mouth daily Reported on 4/20/2017       Loratadine (CLARITIN) 10 MG capsule Take 10 mg by mouth as needed Reported on 4/20/2017       MULTI-VITAMIN OR TABS 1 TABLET DAILY       NONFORMULARY daily Osteo kim (joints)       NONFORMULARY 100 mg daily Magnesium Glycinate       Omega-3 Fatty Acids (SALMON OIL-1000 PO) take 1,000 Caps by mouth daily.       PRILOSEC OR None Entered       senna-docusate (SENOKOT-S;PERICOLACE) 8.6-50 MG per tablet Take 1 tablet by mouth 2 times daily (Patient not taking: Reported on 5/29/2018) 120 tablet 12     VITAMIN C OR None Entered       VITAMIN D OR Take 1,200 mg by mouth daily.       VITAMIN E 400 UNIT OR CAPS None Entered         Medical History: any changes in medical history since they were last seen? No    Social History:  Home situation: . 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  Tobacco use: none  Alcohol use: very little, about 2 drinks per month  Drug use: none  History of chemical dependency treatment: none    Is patient a current smoker or tobacco user?  no  If yes, was cessation counseling offered?  no        Review of Systems:  ROS is positive as per HPI as well as for weight loss, itching, joint pain, arthritis, stiffness, back pain, neck pain, and is negative for fevers, chills, sweats, or constipation, diarrhea.    This document serves as a record of the services and decisions personally performed and made by Marion SANCHES. It was created on her behalf by Ankit Izquierdo, a trained medical scribe. The creation of this document is based on the provider's  statements to the medical scribe.  Ankit Izquierdo 2:25 PM May 29, 2018      Physical Exam:  Vital signs: /76  Pulse 79  Wt 63.5 kg (140 lb)  BMI 26.02 kg/m2    Behavioral observations:  Awake and alert.accompanied today by her daughter.     Gait:  Slow and antalgic on right.    Musculoskeletal exam:    Lumbar flexion to 110 degrees  Lumbar extension to 10 degrees  SI joints are tender bilaterally  Piriformis are tender bilaterally  Strength grossly equal throughout    Neuro exam:  SILT extremities     Skin/vascular/autonomic:  No suspicious legions    Other:  N/A    Is the patient hypertensive today? no  Hypertensive on recheck of BP?   n/a  If yes, was patient recommended to see Primary Care Provider in follow up for management of HTN?  n/a      Imaging  MRI LUMBAR SPINE WITHOUT CONTRAST   3/30/2018 1:22 PM      HISTORY:  Lumbar degenerative disc disease.     TECHNIQUE: Multiplanar multisequence MRI of the lumbar spine without  contrast.     COMPARISON: Lumbar spine x-ray 3/22/2018. Lumbar spine MR 3/17/2017.      FINDINGS: There are 5 lumbar-type vertebral bodies assumed for the  purposes of this dictation. The tip of the conus terminates at the  L1-L2 level.     There is convex right scoliotic curvature of the lumbar spine centered  at the L2-L3 level. There is mild retrolisthesis of L2 on L3 and mild  anterolisthesis of L3 on L4. There is grade 1 anterolisthesis of L4 on  L5. There is right lateral listhesis of L3 on L4. There is slight loss  of left-sided vertebral body height at L3, probably due to  degenerative changes. Severe loss of intervertebral disc height seen  at L3-L4 with associated degenerative endplate changes. Severe loss of  disc height also seen on the left at L1-L2 and L2-L3 with severe  degenerative endplate changes. Mild loss of disc height at T11-12 and  T12-L1 with disc desiccation at L4-5 and L5-S1. There is mild STIR  hyperintense marrow edema at the opposing L2-L3  endplates.  Degenerative subchondral cysts seen anteriorly at L3.     Level by level as follows:      T12-L1:  Only seen on the lateral view, but there is a posterior disc  bulge and bilateral facet hypertrophy resulting in moderate right and  mild left neural foraminal narrowing. No significant spinal canal  narrowing.      L1-L2:  Convex right curvature with retrolisthesis and circumferential  disc bulge with endplate osteophytic spurring as well as bilateral  facet hypertrophy. Findings result in mild central spinal canal  narrowing and moderate bilateral neural foraminal narrowing.      L2-L3:  Convex right scoliotic curvature with left-sided disc bulge  and endplate osteophytic spurring as well as, left greater than right,  facet hypertrophy and ligamentum flavum infolding. Findings result in  moderate central spinal canal narrowing as well as moderate to severe  left neural foraminal narrowing. There is mild right neural foraminal  narrowing.      L3-L4:  Mild anterolisthesis along with circumferential disc bulge,  severe bilateral facet hypertrophy, and ligamentum flavum infolding.  Findings result in severe central spinal canal narrowing. There is  also severe left and moderate to severe right neural foraminal  narrowing.      L4-L5:  Anterolisthesis with uncovering of the disc and small  posterior disc bulge along with severe bilateral facet hypertrophy and  ligamentum flavum infolding. Findings result in moderate to severe  central spinal canal narrowing. There is also moderate to severe  bilateral neural foraminal narrowing. Small bilateral facet joint  effusions.      L5-S1:  Mild posterior disc bulge and marked bilateral facet  hypertrophy results in moderate bilateral neural foraminal narrowing,  right greater than left. No significant central spinal canal  narrowing.      Paraspinous soft tissues:  Normal.          IMPRESSION:    1. Severe multilevel degenerative changes throughout the lumbar  spine  as described above. Overall, there is no significant change since  previous MR 3/17/2017.  2. Severe central spinal canal narrowing at L3-L4 and moderate to  severe spinal canal narrowing at L4-L5.  3. Convex right scoliotic curvature of the spine with multiple levels  of spondylolisthesis and right lateral listhesis of L3 on L4.     DIANA HERNANDEZ MD    [place] Prescription Monitoring Program:    Reviewed regular fills of pain medication from PCP    DIRE Score for selecting candidates for long term opioid analgesia for chronic pain:  Diagnosis  2-3  Intractablility  2  Risk    Psychological health  3    Chemical health  2    Reliability  2    Social support  2  Efficacy  2    Total DIRE Score = 15-16. Note that  7-13 predicts poor outcome (compliance and efficacy) from opioid prescribing; 14-21 predicts good outcome (compliance and efficacy)  from opioid prescribing.      Assessment:   1. Spinal stenosis of lumbar region with neurogenic claudication  2. DDD lumbar  3. Chronic bilateral low back pain with bilateral sciatica.   4. SI joint dysfunction of both sides  5. Piriformis syndrome bilaterally  6. Multiple joint pain      Plan:  1. Physical Therapy:  The patient will follow up with outside physical therapist as scheduled.  2. Clinical Health Psychologist: None needed at present  3. Diagnostic Studies:  None  4. Medication Management:    1. Continue Percocet per Primary Care Provider   2. Continue Gabapentin 300mg TID  3. Continue to use Biofreeze  4. The patient was certified for medical marijuana by her doctor. I agree with certification for medical marijuana. Discussed she may ask the dispensary re: the use of topical marijuana balm which has been beneficial for some of my other clients.   5. Further procedures recommended: The patient will schedule a caudal epidural steroid injection, office to contact patient.  6. The patient will only wear her brace when she is out and about  7. Recommendations to  PCP: See above  8. Follow up: 8-12 weeks.    Total time spent face to face was 30 minutes and more than 50% of face to face time was spent in counseling and/or coordination of care regarding the diagnosis and recommendations above    The information in this document, created by the medical scribe for me, accurately reflects the services I personally performed and the decisions made by me. I have reviewed and approved this document for accuracy.    Marion SANCHES, RN CNP, FNP  [facility] [place]

## 2018-05-30 ENCOUNTER — TELEPHONE (OUTPATIENT)
Dept: PALLIATIVE MEDICINE | Facility: CLINIC | Age: 83
End: 2018-05-30

## 2018-05-30 ENCOUNTER — TELEPHONE (OUTPATIENT)
Dept: INTERNAL MEDICINE | Facility: CLINIC | Age: 83
End: 2018-05-30

## 2018-05-30 DIAGNOSIS — Z78.0 HISTORY OF MENOPAUSE: Primary | ICD-10-CM

## 2018-05-30 NOTE — TELEPHONE ENCOUNTER
ANI for patient to schedule Lumbar DELFINA.      Liliya Cain    [facility]

## 2018-05-30 NOTE — TELEPHONE ENCOUNTER
Imaging calling regarding the order for Dexa scan. Medicare will not cover it for the diagnosis provided. They need it revised to be covered.

## 2018-06-01 NOTE — TELEPHONE ENCOUNTER
Pre-screening Questions for Radiology Injections:    Injection to be done at which interventional clinic site? [facility] [place] [facility]   [facility], instruct patient to arrive 30 minutes before the scheduled appointment time.     Procedure ordered by Marion ORTIZ    Procedure ordered? Lumbar Epidural Steroid Injection    What insurance would patient like us to bill for this procedure? MEDICA AND MEDICARE      Worker's comp or MVA (motor vehicle accident) -Any injection DO NOT SCHEDULE and route to Zenaida Ron.      [employer] - For SI joint injections, DO NOT SCHEDULE and route Stacy Phoenix. [employer] FREEDOM NO PA REQUIRED EFFECTIVE 11/1/2017      [employer]- MBB's must be scheduled at LEAST two weeks apart      [employer] - Any injection besides hip/shoulder/knee joint DO NOT SCHEDULE and route to Stacy Phoenix. She will obtain PA and call pt back to schedule procedure or notify pt of denial.       [employer]-Route to Stacy for review    Any chance of pregnancy? NO   If YES, do NOT schedule and route to RN pool    Is an  needed? No     Patient has a drive home? (mandatory) YES:     Is patient taking any blood thinners (plavix, coumadin, jantoven, warfarin, heparin, pradaxa or dabigatran )? No   If hold needed, do NOT schedule, route to RN pool     Is patient taking any aspirin products? Yes - Pt takes 500 mg daily; instructed to hold -- day(s) prior to procedure.      If more than 325mg/day do NOT schedule; route to RN pool     For CERVICAL procedures, hold all aspirin products for 6 days.      Does the patient have a bleeding or clotting disorder? No     If YES, okay to schedule AND route to RN nurse pool    **For any patients with platelet count <100, must be forwarded to provider**    Is patient diabetic?  No  If YES, have them bring their glucometer.    Does patient have an active infection or treated for one within the past week? No     Is patient currently taking any  antibiotics?  No     For patients on chronic, preventative, or prophylactic antibiotics, procedures may be scheduled.     For patients on antibiotics for active or recent infection:    Maral Jacobson Burton, Snitzer-antibiotic course must have been completed for 4 days    Is patient currently taking any steroid medications? (i.e. Prednisone, Medrol)  No     For patients on steroid medications:    Maral Jacobson Burton, Snitzer-steroid course must have been completed for 4 days    Reviewed with patient:  If you are started on any steroids or antibiotics between now and your appointment, you must contact us because it may affect our ability to perform your procedure.  Yes    Is patient actively being treated for cancer or immunocompromised? No  If YES, do NOT schedule and route to RN pool     Are you able to get on and off an exam table with minimal or no assistance? Yes  If NO, do NOT schedule and route to RN pool    Are you able to roll over and lay on your stomach with minimal or no assistance? Yes  If NO, do NOT schedule and route to RN pool     Any allergies to contrast dye, iodine, shellfish, or numbing and steroid medications? No  If YES, route to RN pool AND add allergy information to appointment notes    Allergies: Sulfa drugs      Has the patient had a flu shot or any other vaccinations within 7 days before or after the procedure.  No     Does patient have an MRI/CT?  YES: 3/2018  (SI joint, hip injections, lumbar sympathetic blocks, and stellate ganglion blocks do not require an MRI)    Was the MRI done w/in the last 3 years?  Yes    Was MRI done at [place]? Yes      If not, where was it done? N/A       If MRI was not done at [facility] [place], [facility] or [facility] do NOT schedule and route to nursing.  If pt has an imaging disc, the injection may be scheduled but pt has to bring disc to appt. If they show up w/out disc the injection cannot be done    Reminders (please tell patient if  applicable):       Instructed pt to arrive 30 minutes early for IV start if this is for a cervical procedure, ALL sympathetic (stellate ganglion, hypogastric, or lumbar sympathetic block) and all sedation procedures (RFA, spinal cord stimulation trials).  Not Applicable   -IVs are not routinely placed for Dr. Laura cervical cases   -Dr. Eid: IVs for cervical ESIs and cervical TBDs (not CMBBs/facet inj)      If NPO for sedation, informed patient that it is okay to take medications with sips of water (except if they are to hold blood thinners).  Not Applicable   *DO take blood pressure medication if it is prescribed*      If this is for a cervical DELFINA, informed patient that aspirin needs to be held for 6 days.   Not Applicable      For all patients not having spinal cord stimulator (SCS) trials or radiofrequency ablations (RFAs), informed patient:    IV sedation is not provided for this procedure.  If you feel that an oral anti-anxiety medication is needed, you can discuss this further with your referring provider or primary care provider.  The Pain Clinic provider will discuss specifics of what the procedure includes at your appointment.  Most procedures last 10-20 minutes.  We use numbing medications to help with any discomfort during the procedure.  NO      Do not schedule procedures requiring IV placement in the first appointment of the day or first appointment after lunch. Do NOT schedule at 0745, 0815 or 1245.       For patients 85 or older we recommend having an adult stay w/ them for the remainder of the day.       Does the patient have any questions?  NO  Zenaida Ron  [facility]

## 2018-06-05 DIAGNOSIS — M48.062 SPINAL STENOSIS OF LUMBAR REGION WITH NEUROGENIC CLAUDICATION: ICD-10-CM

## 2018-06-05 DIAGNOSIS — M47.817 DJD (DEGENERATIVE JOINT DISEASE), LUMBOSACRAL: ICD-10-CM

## 2018-06-05 RX ORDER — OXYCODONE AND ACETAMINOPHEN 5; 325 MG/1; MG/1
1-2 TABLET ORAL EVERY 8 HOURS PRN
Qty: 120 TABLET | Refills: 0 | Status: SHIPPED | OUTPATIENT
Start: 2018-06-05 | End: 2018-07-13

## 2018-06-05 NOTE — TELEPHONE ENCOUNTER
Reason for Call:  Medication or medication refill:    Do you use a [facility]?  Name of the pharmacy and phone number for the current request:  written prescription    Name of the medication requested: oxycodone    Other request: She will be out of medication tomorrow    Can we leave a detailed message on this number? YES    Phone number patient can be reached at: Home number on file [phone] (home)    Best Time: any    Call taken on 6/5/2018 at 10:05 AM by Dot Gamez

## 2018-06-08 NOTE — TELEPHONE ENCOUNTER
Pt is calling to schedule Lumbar DELFINA.   She states someone called her yesterday and LVM to get scheduled for appt.   Pt is currently taking 500 mg aspirin. Encounter routed per procedure grid guidelines.         Zenaida WYATT    [facility] [place]

## 2018-06-08 NOTE — TELEPHONE ENCOUNTER
Per chart review, patient taking 162mg ASA daily. Nursing to call patient to follow up.    Caro TAN-RN Care Coordinator  [facility]

## 2018-06-13 ENCOUNTER — TELEPHONE (OUTPATIENT)
Dept: PALLIATIVE MEDICINE | Facility: CLINIC | Age: 83
End: 2018-06-13

## 2018-06-13 DIAGNOSIS — M48.062 SPINAL STENOSIS OF LUMBAR REGION WITH NEUROGENIC CLAUDICATION: Primary | ICD-10-CM

## 2018-06-13 DIAGNOSIS — M53.3 SI (SACROILIAC) JOINT DYSFUNCTION: ICD-10-CM

## 2018-06-13 DIAGNOSIS — G89.29 CHRONIC RIGHT HIP PAIN: ICD-10-CM

## 2018-06-13 DIAGNOSIS — G57.03 PIRIFORMIS SYNDROME OF BOTH SIDES: ICD-10-CM

## 2018-06-13 DIAGNOSIS — R53.81 PHYSICAL DECONDITIONING: ICD-10-CM

## 2018-06-13 DIAGNOSIS — R26.9 ALTERED GAIT: ICD-10-CM

## 2018-06-13 DIAGNOSIS — M25.551 CHRONIC RIGHT HIP PAIN: ICD-10-CM

## 2018-06-13 NOTE — TELEPHONE ENCOUNTER
Called pt and LM letting her know that we need clarification about the amount of aspirin she is taking each day. LM that her med list states one amount and that she reported a much higher amount when the pre-screening questions were asked. LM to call the clinic as soon as possible to clarify so we can schedule the injection appropriately.     KEYON ValienteN, RN-BC  Patient Care Supervisor/Care Coordinator  [facility] [place]

## 2018-06-13 NOTE — TELEPHONE ENCOUNTER
Received call from patient who states she takes 2 regular Chan ASA (650 mg?) a day for pain. She stated that she had tried to stop taking the ASA, but was in too much pain and started taking the higher dosage again. Patient also reports that she can not function without the ASA. Phone #[phone]      Liliya Cain    [facility]

## 2018-06-13 NOTE — TELEPHONE ENCOUNTER
Pt called the  and transferred to this nurse. She is taking 2 excedrin each day; each tablet contains 250 mg of aspirin. States that it is very effective for her pain.     Pt reports that she had an epidural a few months ago and pain has returned so she needs another injection. Pt recalls that it was a caudal injection. Reviewed chart and Dr. Julianna Bajwa did a caudal injection on 11/10/17; order had been placed as a lumbar DELFINA which is what Marion Munoz CNP order again this time.     Scheduled patient for the first available appointment on 7/3 with Dr. Eid at 1345. Pt wanted an afternoon appointment. Instructed the patient to decrease to only 1 tablet a day of Excedrin 1 week before the procedure to minimize the chance of increased bleeding. Pt wrote it down on her calendar. She will contact her daughter about giving her a ride to the appointment. Will add a note that the pt would like to get in sooner but would need to coordinate appropriate dosing of ASA if appointment changed.     KEYON ValienteN, RN-BC  Patient Care Supervisor/Care Coordinator  [facility]

## 2018-06-13 NOTE — TELEPHONE ENCOUNTER
Received call from patient who is stating that she is having difficulty walking around and is wondering if she could get a walker. She thinks that this would help her move around. Please call. Phone #[phone]      Liliya Cain    [facility]

## 2018-06-15 NOTE — TELEPHONE ENCOUNTER
Will route to Marion to review patients request for a walker. If order is approved will fax to [employer] of patients choice.    SHERLY Winston, RN  Care Coordinator  [facility] [place]

## 2018-06-22 NOTE — TELEPHONE ENCOUNTER
Called pt and left message that once MYRON Simon CNP reviews she will be called back.  Natalie Garcia RN-BSN  [facility]

## 2018-06-22 NOTE — TELEPHONE ENCOUNTER
Received call from patient who is following up on this request for a walker. She states she is having a difficult time walking around. Please call. Phone #[phone]      Liliya Cain    [facility]

## 2018-06-29 NOTE — TELEPHONE ENCOUNTER
Left voicemail for patient. Asked her to call us back and let us know if she wants this mailed to her home address or if she has a number for the medical supply company she wants us to send this to. Will keep in TPI [facility] srivastava, until we have a response.

## 2018-06-29 NOTE — TELEPHONE ENCOUNTER
Signed order for DME for front wheeled walker.     Marion SANCHES RN CNP, FNP  [facility] [place]

## 2018-07-02 NOTE — TELEPHONE ENCOUNTER
VM left 7/1 at: 4:37 pm      Pt returning phone call.   She has an appt on 7/3 for an injection and will p/u then.         Zenaida WYATT    [facility] [place]

## 2018-07-03 ENCOUNTER — RADIOLOGY INJECTION OFFICE VISIT (OUTPATIENT)
Dept: PALLIATIVE MEDICINE | Facility: CLINIC | Age: 83
End: 2018-07-03
Payer: COMMERCIAL

## 2018-07-03 ENCOUNTER — RADIANT APPOINTMENT (OUTPATIENT)
Dept: RADIOLOGY | Facility: CLINIC | Age: 83
End: 2018-07-03
Attending: PAIN MEDICINE
Payer: COMMERCIAL

## 2018-07-03 VITALS — HEART RATE: 85 BPM | DIASTOLIC BLOOD PRESSURE: 81 MMHG | SYSTOLIC BLOOD PRESSURE: 134 MMHG

## 2018-07-03 DIAGNOSIS — M54.16 LUMBAR RADICULOPATHY: Primary | ICD-10-CM

## 2018-07-03 DIAGNOSIS — M51.369 DDD (DEGENERATIVE DISC DISEASE), LUMBAR: ICD-10-CM

## 2018-07-03 PROCEDURE — 62323 NJX INTERLAMINAR LMBR/SAC: CPT | Performed by: PAIN MEDICINE

## 2018-07-03 ASSESSMENT — PAIN SCALES - GENERAL: PAINLEVEL: WORST PAIN (10)

## 2018-07-03 NOTE — MR AVS SNAPSHOT
After Visit Summary   7/3/2018    Teresa Lopez    MRN: [id number]           Patient Information     Date Of Birth          1935        Visit Information        Provider Department      7/3/2018 1:45 PM Jamaal Eid MD [facility]        Care Instructions    [facility]   Procedure Discharge Instructions    Today you saw:  Dr. Jamaal Eid     You had an:  Lumbar Epidural steroid injection       Medications used:  Lidocaine   Bupivacaine  Omnipaque   Kenalog  Normal saline            Be cautious when walking. Numbness and/or weakness in the lower extremities may occur for up to 6-8 hours after the procedure due to effect of the local anesthetic    Do not drive for 6 hours. The effect of the local anesthetic could slow your reflexes.     You may resume your regular activities after 24 hours    Avoid strenuous activity for the first 24 hours    You may shower, however avoid swimming, tub baths or hot tubs for 24 hours following your procedure    You may have a mild to moderate increase in pain for several days following the injection.    It may take up to 14 days for the steroid medication to start working although you may feel the effect as early as a few days after the procedure.       You may use ice packs for 10-15 minutes, 3 to 4 times a day at the injection site for comfort    Do not use heat to painful areas for 6 to 8 hours. This will give the local anesthetic time to wear off and prevent you from accidentally burning your skin.     You may use anti-inflammatory medications (such as Ibuprofen or Aleve or Advil) or Tylenol for pain control if necessary    If you have diabetes, check your blood sugar more frequently than usual as your blood sugar may be higher than normal for 10-14 days following a steroid injection. Contact your doctor who manages your diabetes if your blood sugar is higher than usual    If you experience any of the following,  call the Pain Clinic during work hours at [phone] or the Provider Line after hours at [phone]:  -Fever over 100 degree F  -Swelling, bleeding, redness, drainage, warmth at the injection site  -Progressive weakness or numbness in your legs  -Loss of bowel or bladder function  -Unusual headache that is not relieved by Tylenol or other pain reliever  -Unusual new onset of pain that is not improving                Follow-ups after your visit        Who to contact     If you have questions or need follow up information about today's clinic visit or your schedule please contact [facility] directly at [phone].  Normal or non-critical lab and imaging results will be communicated to you by Attributorhart, letter or phone within 4 business days after the clinic has received the results. If you do not hear from us within 7 days, please contact the clinic through Attributorhart or phone. If you have a critical or abnormal lab result, we will notify you by phone as soon as possible.  Submit refill requests through [employer] or call your pharmacy and they will forward the refill request to us. Please allow 3 business days for your refill to be completed.          Additional Information About Your Visit        Att[employer]harClip Interactive Information     [employer] gives you secure access to your electronic health record. If you see a primary care provider, you can also send messages to your care team and make appointments. If you have questions, please call your primary care clinic.  If you do not have a primary care provider, please call [phone] and they will assist you.        Care EveryWhere ID     This is your Care EveryWhere ID. This could be used by other organizations to access your [place] medical records  [id number]        Your Vitals Were     Pulse                   85            Blood Pressure from Last 3 Encounters:   07/03/18 (P) 141/68   05/29/18 123/76   05/16/18 151/68    Weight from Last 3 Encounters:   05/29/18 63.5  kg (140 lb)   05/16/18 64.4 kg (142 lb)   05/04/18 64.4 kg (142 lb)              Today, you had the following     No orders found for display         Today's Medication Changes          These changes are accurate as of 7/3/18  2:21 PM.  If you have any questions, ask your nurse or doctor.               These medicines have changed or have updated prescriptions.        Dose/Directions    gabapentin 300 MG capsule   Commonly known as:  NEURONTIN   This may have changed:  when to take this   Used for:  DJD (degenerative joint disease), lumbosacral, Spinal stenosis of lumbar region with neurogenic claudication        Dose:  300 mg   Take 1 capsule (300 mg) by mouth 3 times daily   Quantity:  270 capsule   Refills:  3                Primary Care Provider Office Phone # Fax #    Demetrice Richardson Matt APRLARISSA [facility] [phone]       [address] [facility] [address]        Equal Access to Services     [facility] : Hadii aad ku hadasho Somadelin, waaxda luqadaha, qaybta kaalmada adeegyada, lori mccracken . So [facility] [phone].    ATENCIÓN: Si habla español, tiene a brambila disposición servicios gratuitos de asistencia lingüística. Llame al [phone].    We comply with applicable federal civil rights laws and [place] laws. We do not discriminate on the basis of race, color, national origin, age, disability, sex, sexual orientation, or gender identity.            Thank you!     Thank you for choosing [facility]  for your care. Our goal is always to provide you with excellent care. Hearing back from our patients is one way we can continue to improve our services. Please take a few minutes to complete the written survey that you may receive in the mail after your visit with us. Thank you!             Your Updated Medication List - Protect others around you: Learn how to safely use, store and throw away your medicines at www.disposemymeds.org.          This list is  accurate as of 7/3/18  2:21 PM.  Always use your most recent med list.                   Brand Name Dispense Instructions for use Diagnosis    ADVIL 200 MG capsule   Generic drug:  ibuprofen      Reported on 4/20/2017        amLODIPine 2.5 MG tablet    NORVASC    90 tablet    TAKE 1 TABLET BY MOUTH AT BEDTIME    Hypertension goal BP (blood pressure) < 140/90       aspirin 162 MG EC tablet      Take 162 mg by mouth daily Reported on 4/20/2017        CALCIUM 1200 PO      Reported on 4/20/2017        CLARITIN 10 MG capsule   Generic drug:  loratadine      Take 10 mg by mouth as needed Reported on 4/20/2017        gabapentin 300 MG capsule    NEURONTIN    270 capsule    Take 1 capsule (300 mg) by mouth 3 times daily    DJD (degenerative joint disease), lumbosacral, Spinal stenosis of lumbar region with neurogenic claudication       GLUCOSAMINE 1500 COMPLEX PO      Take 1,500 mg by mouth daily Reported on 4/20/2017        losartan-hydrochlorothiazide 100-25 MG per tablet    HYZAAR    90 tablet    TAKE 1 TABLET BY MOUTH EVERY DAY    Hypertension goal BP (blood pressure) < 140/90       Multi-vitamin Tabs tablet   Generic drug:  multivitamin, therapeutic with minerals      1 TABLET DAILY        NONFORMULARY      daily Osteo kim (joints)        NONFORMULARY      100 mg daily Magnesium Glycinate        order for DME     1 Device    Equipment being ordered: front wheeled walker    Altered gait, Physical deconditioning, Chronic right hip pain, Spinal stenosis of lumbar region with neurogenic claudication, SI (sacroiliac) joint dysfunction, Piriformis syndrome of both sides       oxyCODONE-acetaminophen 5-325 MG per tablet    PERCOCET    120 tablet    Take 1-2 tablets by mouth every 8 hours as needed for pain maximum 4 tablet(s) per day    DJD (degenerative joint disease), lumbosacral, Spinal stenosis of lumbar region with neurogenic claudication       PRILOSEC PO      None Entered        SALMON OIL-1000 PO      take 1,000 Caps  by mouth daily.        senna-docusate 8.6-50 MG per tablet    SENOKOT-S;PERICOLACE    120 tablet    Take 1 tablet by mouth 2 times daily    Slow transit constipation       simvastatin 40 MG tablet    ZOCOR    90 tablet    Take 1 tablet (40 mg) by mouth At Bedtime    Other hyperlipidemia       VITAMIN C PO      None Entered        VITAMIN D PO      Take 1,200 mg by mouth daily.        vitamin E 400 UNIT capsule      None Entered

## 2018-07-03 NOTE — NURSING NOTE
.Discharge Information    IV Discontiued Time:  NA    Amount of Fluid Infused:  NA    Discharge Criteria = When patient returns to baseline or as per MD order    Consciousness:  Pt is fully awake    Circulation:  BP +/- 20% of pre-procedure level    Respiration:  Patient is able to breathe deeply    O2 Sat:  Patient is able to maintain O2 Sat >92% on room air    Activity:  Moves 4 extremities on command    Ambulation:  Patient is able to stand and walk or stand and pivot into wheelchair    Dressing:  Clean/dry or No Dressing    Notes:   Discharge instructions and AVS given to patient    Patient meets criteria for discharge?  YES    Admitted to PCU?  No    Responsible adult present to accompany patient home?  Yes    Signature/Title:    Jean Caceres  RN Care Coordinator  [facility] [place]

## 2018-07-03 NOTE — PROGRESS NOTES
Pre procedure Diagnosis: Lumbar radiculopathy   Post procedure Diagnosis: Same  Procedure performed: L2-3 interlaminar epidural steroid injection   Anesthesia: none  Complications: none  Operators: Jamaal Eid MD     Indications:   Teresa Lopez is a 82 year old female was sent by MYRON Simon for lumbar epidural steroid injection.  They have a history of chronic lower back pain with intermittent lower extremity pain and pain radiating into the left groin.  Exam shows  antalgic gait, straight leg raise was not performed and they have tried conservative treatment including activity modifications, medications, and previous interventional procedures.  Of note patient had a caudal epidural injection on 11/17 with limited relief.       MRI   There is convex right scoliotic curvature of the lumbar spine centered  at the L2-L3 level. There is mild retrolisthesis of L2 on L3 and mild  anterolisthesis of L3 on L4. There is grade 1 anterolisthesis of L4 on  L5. There is right lateral listhesis of L3 on L4. There is slight loss  of left-sided vertebral body height at L3, probably due to  degenerative changes. Severe loss of intervertebral disc height seen  at L3-L4 with associated degenerative endplate changes. Severe loss of  disc height also seen on the left at L1-L2 and L2-L3 with severe  degenerative endplate changes. Mild loss of disc height at T11-12 and  T12-L1 with disc desiccation at L4-5 and L5-S1. There is mild STIR  hyperintense marrow edema at the opposing L2-L3 endplates.  Degenerative subchondral cysts seen anteriorly at L3.      Level by level as follows:       T12-L1:  Only seen on the lateral view, but there is a posterior disc  bulge and bilateral facet hypertrophy resulting in moderate right and  mild left neural foraminal narrowing. No significant spinal canal  narrowing.       L1-L2:  Convex right curvature with retrolisthesis and circumferential  disc bulge with endplate osteophytic spurring  as well as bilateral  facet hypertrophy. Findings result in mild central spinal canal  narrowing and moderate bilateral neural foraminal narrowing.       L2-L3:  Convex right scoliotic curvature with left-sided disc bulge  and endplate osteophytic spurring as well as, left greater than right,  facet hypertrophy and ligamentum flavum infolding. Findings result in  moderate central spinal canal narrowing as well as moderate to severe  left neural foraminal narrowing. There is mild right neural foraminal  narrowing.       L3-L4:  Mild anterolisthesis along with circumferential disc bulge,  severe bilateral facet hypertrophy, and ligamentum flavum infolding.  Findings result in severe central spinal canal narrowing. There is  also severe left and moderate to severe right neural foraminal  narrowing.       L4-L5:  Anterolisthesis with uncovering of the disc and small  posterior disc bulge along with severe bilateral facet hypertrophy and  ligamentum flavum infolding. Findings result in moderate to severe  central spinal canal narrowing. There is also moderate to severe  bilateral neural foraminal narrowing. Small bilateral facet joint  effusions.       L5-S1:  Mild posterior disc bulge and marked bilateral facet  hypertrophy results in moderate bilateral neural foraminal narrowing,  right greater than left. No significant central spinal canal  narrowing.       Paraspinous soft tissues:  Normal.           IMPRESSION:    1. Severe multilevel degenerative changes throughout the lumbar spine  as described above. Overall, there is no significant change since  previous MR 3/17/2017.  2. Severe central spinal canal narrowing at L3-L4 and moderate to  severe spinal canal narrowing at L4-L5.  3. Convex right scoliotic curvature of the spine with multiple levels  of spondylolisthesis and right lateral listhesis of L3 on L4.    Options/alternatives, benefits and risks were discussed with the patient including but not limited to  bleeding, infection, no pain relief, tissue trauma, exposure to radiation, reaction to medications, spinal cord injury, dural puncture, weakness, numbness and headache.  Questions were answered to her satisfaction and she wishes to proceed. Voluntary informed consent was obtained and signed.     Vitals were reviewed: Yes  Allergies were reviewed:  Yes   Medications were reviewed:  Yes   Pre-procedure pain score: 10/10    Procedure:  The patient's medical history, medications, and allergies were reviewed and reconciled.  After obtaining signed informed consent, the patient was brought into the procedure suite and was placed in a prone position on the procedure table.   A Pause for the Cause was performed.  Patient was prepped and draped in the usual sterile fashion.     The L2-3 interspace was identified with AP fluoroscopy.  A total of 5ml of 1% lidocaine was used to anesthetize the skin and subcutaneous tissues for a  midline approach.    A 20gauge 3.5inch Touhy needle was advanced utilizing intermittent AP and Lateral fluoroscopy and air for loss of resistance.  The epidural space was encountered on the first pass without difficulties.  Aspiration for blood and CSF was negative.  Needle position was verified by injecting 1 ml of Omnipaque utilizing real-time fluoroscopy that showed good needle placement and epidural spread without signs of intravascular or intrathecal uptake.  Omnipaque wasted:  9 ml.    Then, after repeated negative aspiration for blood or CSF, a combination of Kenalog 40 mg, Bupivicaine 0.25% 2 ml, diluted with 3 ml of normal saline to a total injectate volume of 6 ml was injected into the epidural space in a slow and incremental fashion and the needle was restyletted and withdrawn.  All injected medications were preservative free.    The injection site was cleaned and a sterile dressing was applied.    The patient tolerated the procedure well without complications and was taken to the recovery  room for continued observation.    Images were saved to PACS.    Post-procedure pain score: 7/10  Follow-up includes:   -f/u phone call in one week  -f/u with referring provider     Jamaal Eid MD  [facility] [place]

## 2018-07-03 NOTE — PATIENT INSTRUCTIONS
[facility]   Procedure Discharge Instructions    Today you saw:  Dr. Jamaal Eid     You had an:  Lumbar Epidural steroid injection       Medications used:  Lidocaine   Bupivacaine  Omnipaque   Kenalog  Normal saline            Be cautious when walking. Numbness and/or weakness in the lower extremities may occur for up to 6-8 hours after the procedure due to effect of the local anesthetic    Do not drive for 6 hours. The effect of the local anesthetic could slow your reflexes.     You may resume your regular activities after 24 hours    Avoid strenuous activity for the first 24 hours    You may shower, however avoid swimming, tub baths or hot tubs for 24 hours following your procedure    You may have a mild to moderate increase in pain for several days following the injection.    It may take up to 14 days for the steroid medication to start working although you may feel the effect as early as a few days after the procedure.       You may use ice packs for 10-15 minutes, 3 to 4 times a day at the injection site for comfort    Do not use heat to painful areas for 6 to 8 hours. This will give the local anesthetic time to wear off and prevent you from accidentally burning your skin.     You may use anti-inflammatory medications (such as Ibuprofen or Aleve or Advil) or Tylenol for pain control if necessary    If you have diabetes, check your blood sugar more frequently than usual as your blood sugar may be higher than normal for 10-14 days following a steroid injection. Contact your doctor who manages your diabetes if your blood sugar is higher than usual    If you experience any of the following, call the Pain Clinic during work hours at [phone] or the Provider Line after hours at [phone]:  -Fever over 100 degree F  -Swelling, bleeding, redness, drainage, warmth at the injection site  -Progressive weakness or numbness in your legs  -Loss of bowel or bladder function  -Unusual headache  that is not relieved by Tylenol or other pain reliever  -Unusual new onset of pain that is not improving

## 2018-07-03 NOTE — NURSING NOTE
Pre-procedure Intake    Have you been fasting? NA    If yes, for how long? No     Are you taking a prescribed blood thinner such as coumadin, Plavix, Xarelto?    No    If yes, when did you take your last dose? No     Do you take aspirin? Yes     If cervical procedure, have you held aspirin for 6 days?   Yes     Do you have any allergies to contrast dye, iodine, steroid and/or numbing medications?  NO    Are you currently taking antibiotics or have an active infection?  NO    Have you had a fever/elevated temperature within the past week? NO    Are you currently taking oral steroids? NO    Do you have a ? Yes       Are you pregnant or breastfeeding?  NO    Are the vital signs normal?  Yes    An Menjivar MA

## 2018-07-10 ENCOUNTER — TELEPHONE (OUTPATIENT)
Dept: PALLIATIVE MEDICINE | Facility: CLINIC | Age: 83
End: 2018-07-10

## 2018-07-10 NOTE — TELEPHONE ENCOUNTER
Patient calling, she states that she had an injection done last week and she woke up today and can barely stand straight. She would like to know if therapy can help relax this or what she can do.          Stacy THORNTON    [facility]

## 2018-07-11 DIAGNOSIS — M47.817 DJD (DEGENERATIVE JOINT DISEASE), LUMBOSACRAL: ICD-10-CM

## 2018-07-11 DIAGNOSIS — M48.062 SPINAL STENOSIS OF LUMBAR REGION WITH NEUROGENIC CLAUDICATION: ICD-10-CM

## 2018-07-11 RX ORDER — GABAPENTIN 300 MG/1
CAPSULE ORAL
Qty: 270 CAPSULE | Refills: 3 | Status: SHIPPED | OUTPATIENT
Start: 2018-07-11 | End: 2019-02-15

## 2018-07-11 NOTE — TELEPHONE ENCOUNTER
Requested Prescriptions   Pending Prescriptions Disp Refills     gabapentin (NEURONTIN) 300 MG capsule [Pharmacy Med Name: GABAPENTIN 300 MG CAPSULE] 270 capsule 3     Sig: TAKE 1 CAPSULE (300 MG) BY MOUTH 3 TIMES DAILY    There is no refill protocol information for this order         Last Written Prescription Date:  5-25-17  Last Fill Quantity: 270,   # refills: 3  Last Office Visit: 9-18-17  Future Office visit:       Routing refill request to provider for review/approval because:  Drug not on the G, P or [facility] refill protocol or controlled substance

## 2018-07-11 NOTE — TELEPHONE ENCOUNTER
Pt had a L2-3 interlaminar DELFINA on 7/3/18. There were no complications. She was referred by MYRON Simon CNP.     Called pt    Any new numbness/tingling?: No  Any weakness?  No  Any bowel or bladder issues?: No  Any New pain areas?: No  Signs of infection? No  Fever/chills:  No  Pain relief from the injection? No  Informed pt that there do not appear to be any complications from the injection. Informed her that the steroid can increase her baseline pain for a few days. Pt is aware that it can take up to 14 days for the steroid medication to take full effect. He/She will f/u with MYRON Simon CNP after 14 days and give an update.     Natalie Garcia RN-BSN  [facility]

## 2018-07-13 ENCOUNTER — TELEPHONE (OUTPATIENT)
Dept: FAMILY MEDICINE | Facility: CLINIC | Age: 83
End: 2018-07-13

## 2018-07-13 DIAGNOSIS — M47.817 DJD (DEGENERATIVE JOINT DISEASE), LUMBOSACRAL: ICD-10-CM

## 2018-07-13 DIAGNOSIS — M48.062 SPINAL STENOSIS OF LUMBAR REGION WITH NEUROGENIC CLAUDICATION: ICD-10-CM

## 2018-07-13 RX ORDER — OXYCODONE AND ACETAMINOPHEN 5; 325 MG/1; MG/1
1-2 TABLET ORAL EVERY 8 HOURS PRN
Qty: 120 TABLET | Refills: 0 | Status: SHIPPED | OUTPATIENT
Start: 2018-07-13 | End: 2018-08-19

## 2018-07-13 NOTE — TELEPHONE ENCOUNTER
Patient is calling for refill RX: oxyCODONE-acetaminophen (PERCOCET) 5-325 MG per tablet, please call when script is ready for . Thank you.

## 2018-07-17 ENCOUNTER — TELEPHONE (OUTPATIENT)
Dept: PALLIATIVE MEDICINE | Facility: CLINIC | Age: 83
End: 2018-07-17

## 2018-07-17 DIAGNOSIS — M48.062 SPINAL STENOSIS OF LUMBAR REGION WITH NEUROGENIC CLAUDICATION: Primary | ICD-10-CM

## 2018-07-17 DIAGNOSIS — M54.41 CHRONIC LOW BACK PAIN WITH BILATERAL SCIATICA: ICD-10-CM

## 2018-07-17 DIAGNOSIS — M54.42 CHRONIC LOW BACK PAIN WITH BILATERAL SCIATICA: ICD-10-CM

## 2018-07-17 DIAGNOSIS — G89.29 CHRONIC LOW BACK PAIN WITH BILATERAL SCIATICA: ICD-10-CM

## 2018-07-17 DIAGNOSIS — M51.369 DDD (DEGENERATIVE DISC DISEASE), LUMBAR: ICD-10-CM

## 2018-07-17 NOTE — TELEPHONE ENCOUNTER
Pt is asking for advice on her current pain levels.   She has a f/u appt scheduled with Marion Munoz on 9/19.   [phone] (home)       Zenaida WYATT    [facility] [place]

## 2018-07-18 NOTE — TELEPHONE ENCOUNTER
Outreach X1. Left call back number.    KEYON WinstonN, RN  Care Coordinator  [facility] [place]

## 2018-07-18 NOTE — TELEPHONE ENCOUNTER
Patient returning phone call. Please advise.  Pt is wondering if she can have PT orders to an outside location.    [phone] (home)         Zenaida WYATT    [facility] [place]

## 2018-07-19 NOTE — TELEPHONE ENCOUNTER
Call back to Pt.   Pt stated that she had surgery on 7/3/18 and no therapy was ordered.  Writer explained that she actually had an LESI on 7/3/18 and PT is not usually ordered after an injection.      Procedure performed: L2-3 interlaminar epidural steroid injection on 7/3/18.  By Dr. Eid     Pt reports zero relief from injection, She reports her symptoms are actually worse and she is unable to walk now.      Pt stated that she is still having pain on her right side.  Her pain is going from right hip to right knee.      Pt stated that she can't wait until September to be seen.  Pt stated that she is unable to walk now.  Pt stated that she is worse than what she was previous to her injection.      Pt's was previously able to get out of her Apartment and shop and bring garbage out Ect, but now she need help doing even the littlest of things.  Pt stated that she is moving around her Apartment only by holding onto furniture.      Pt is wondering what she should do?    Will forward to Marion to review and advise.    Jean Caceres, RN  Care Coordinator   [facility]

## 2018-07-20 NOTE — TELEPHONE ENCOUNTER
Writer called [facility] [place].      [facility]     Phone- [phone]  Fax- [phone]  Address- [address]    [place] [id number]      Will Forward to Marion to review, complete and sign.    Jean Caceres, RN  Care Coordinator   [facility]

## 2018-07-20 NOTE — TELEPHONE ENCOUNTER
I she having issues with walking around due to pain or due to weakness?   Where is the pain located now vs where it was before?     Where does she wish to attend physical therapy?     She can be added on to my schedule in an urgent slot or next Wednesday during my admin time.    Thanks.  Marion SANCHES, RN CNP, FNP  [facility]

## 2018-07-20 NOTE — TELEPHONE ENCOUNTER
Please prep order for PHYSICAL THERAPY at [facility] in [place] and I can sign it.     Thanks.  Marion SANCHES, RN CNP, FNP  [facility]

## 2018-07-20 NOTE — TELEPHONE ENCOUNTER
Pt called back.  Discussed the    I she having issues with walking around due to pain or due to weakness? Pt stated that she is having trouble do to  both pain and weakness in the right leg.   Where is the pain located now vs where it was before? Pt stated that this is the same pain that she had previous to the injection     Where does she wish to attend physical therapy? Pt lives in [facility] in [place], she would like PT at [facility] in [facility]      She can be added on to my schedule in an urgent slot or next Wednesday during my admin time.  Pt placed in admin slo Wednesday at 10:30     Jean Caceres, RN  Care Coordinator   [facility] [place]

## 2018-07-20 NOTE — TELEPHONE ENCOUNTER
Writer attempted to call pt, No answer.  M for Pt to call writer back at [phone] for answer questions and schedule Appt.    Marion Munoz APRN CNPNurse PractitionerSigned Yesterday           I she having issues with walking around due to pain or due to weakness?   Where is the pain located now vs where it was before?      Where does she wish to attend physical therapy?      She can be added on to my schedule in an urgent slot or next Wednesday during my admin time.     Thanks.  Marion SANCHES, RN CNP, FNP  [facility]                Jean Caceres RN  Care Coordinator   [facility]

## 2018-07-24 NOTE — TELEPHONE ENCOUNTER
Order not signed yet.    Note pt has Appt with Marion on 7/25/18.    Reminder for PT order placed on Appt notes.      Jean Caceres, RN  Care Coordinator   [facility] [place]

## 2018-07-25 ENCOUNTER — OFFICE VISIT (OUTPATIENT)
Dept: PALLIATIVE MEDICINE | Facility: CLINIC | Age: 83
End: 2018-07-25
Payer: COMMERCIAL

## 2018-07-25 VITALS — SYSTOLIC BLOOD PRESSURE: 135 MMHG | DIASTOLIC BLOOD PRESSURE: 74 MMHG | HEART RATE: 93 BPM

## 2018-07-25 DIAGNOSIS — M25.561 CHRONIC PAIN OF RIGHT KNEE: ICD-10-CM

## 2018-07-25 DIAGNOSIS — M51.369 DDD (DEGENERATIVE DISC DISEASE), LUMBAR: ICD-10-CM

## 2018-07-25 DIAGNOSIS — R26.9 ALTERED GAIT: Primary | ICD-10-CM

## 2018-07-25 DIAGNOSIS — Z91.81 AT RISK FOR FALLING: ICD-10-CM

## 2018-07-25 DIAGNOSIS — M17.11 PRIMARY OSTEOARTHRITIS OF RIGHT KNEE: ICD-10-CM

## 2018-07-25 DIAGNOSIS — M70.61 GREATER TROCHANTERIC BURSITIS OF BOTH HIPS: ICD-10-CM

## 2018-07-25 DIAGNOSIS — G89.29 CHRONIC BILATERAL LOW BACK PAIN WITH RIGHT-SIDED SCIATICA: ICD-10-CM

## 2018-07-25 DIAGNOSIS — M70.62 GREATER TROCHANTERIC BURSITIS OF BOTH HIPS: ICD-10-CM

## 2018-07-25 DIAGNOSIS — M48.062 SPINAL STENOSIS OF LUMBAR REGION WITH NEUROGENIC CLAUDICATION: ICD-10-CM

## 2018-07-25 DIAGNOSIS — M54.41 CHRONIC BILATERAL LOW BACK PAIN WITH RIGHT-SIDED SCIATICA: ICD-10-CM

## 2018-07-25 DIAGNOSIS — G89.29 CHRONIC PAIN OF RIGHT KNEE: ICD-10-CM

## 2018-07-25 PROCEDURE — 99214 OFFICE O/P EST MOD 30 MIN: CPT | Performed by: NURSE PRACTITIONER

## 2018-07-25 ASSESSMENT — PAIN SCALES - GENERAL: PAINLEVEL: WORST PAIN (10)

## 2018-07-25 NOTE — PROGRESS NOTES
[facility]    7/25/2018     Chief complaint:  low back and right leg pain, right knee pain, right ankle pain    Interval history:  Teresa Lopez is a 82 year old female is known to me for .   Primary osteoarthritis    Chronic right hip pain   Chronic bilateral low back without sciatica   Spinal stenosis of lumbar region with neurogenic claudication   Piriformis syndrome of both sides   PMHx DJD, CVA, HTN, diabetes mellitus, and cataract   PSHx Hysterectomy    Recommendations/plan at the last visit included: 5/29/2018  1. Physical Therapy:  The patient will follow up with outside physical therapist as scheduled.  2. Clinical Health Psychologist: None needed at present  3. Diagnostic Studies:  None  4. Medication Management:    1. Continue Percocet per Primary Care Provider   2. Continue Gabapentin 300mg TID  3. Continue to use Biofreeze  4. The patient was certified for medical marijuana by her doctor. I agree with certification for medical marijuana. Discussed she may ask the dispensary re: the use of topical marijuana balm which has been beneficial for some of my other clients.   5. Further procedures recommended: The patient will schedule a caudal epidural steroid injection, office to contact patient.  6. The patient will only wear her brace when she is out and about  7. Recommendations to PCP: See above  8. Follow up: 8-12 weeks.    Since her last visit, Teresa Lopez reports:  -Right hip pain radiating to right leg extending to the ankle and right knee pain causing increased difficulty walking.   -20 pound weight loss as she has been eating less due to increased pain per patient report. Review of graphs show a loss of about 15# since January 2018. No precipitous drop in weight.   -Not a good candidate for back surgery per surgeon   -She was certified for medical cannabis by Primary Care Provider--has not yet started this treatment  -Teresa relates her mood has been adversely affected by  "increased pain which has made her more sedentary.  -she feels she needs to be \"in a home for rehab for 2 weeks for good nutrition.\"   -discussed I think she would benefit from a Rollator walker and light housekeeping and meals on wheels. I further related that I do not provide orders for nursing home care or rehab. This is something that would need to come from her Primary Care Provider.   -patient is interested in physical therapy located next to her [facility] apartment, she moved in May 2018 after selling her home.     At this point, the patient's participation with our multidisciplinary team includes:  The patient has been compliant with the program.  Pain Group None  PT - patient is interested  Health Psych  None        Pain scores:  Pain intensity on average is 10 on a scale of 0-10.    Range is 10/10.   Pain is described as \"aching\".    Pain is continuous in nature and worst in the morning.    Current pain-related medication treatments include:  -Percocet 5.325mg may take 1-2 tabs Q 8 hours PRN pain (she uses 4 tabs per day, somewhat helpful)  -gabapentin 300mg TID (she is not sure if this is helpful or not)        Other pertinent medications:  -Senna-docusate PRN      Previous medication treatments included:  OPIATES:hydrocodone (not helpful), oxycodone (somewhat helpful)  NSAIDS: ibuprofen (not helpful), Aleve (not helpful)  MUSCLE RELAXANTS: none  ANTI-MIGRAINE MEDS: none  ANTI-DEPRESSANTS: none  SLEEP AIDS: none  ANTI-CONVULSANTS: gabapentin (unsure if helpful)  TOPICALS: none  Other meds: Tylenol (not helpful)    Injections:  -11/10/2017 Caudal epidural steroid injection with Dr. Reymundo Bajwa (helpful for a very short period of time)  -1/5/2018 Ultrasound guided right intra-articular hip injection with Dr. Jean Meade  -1/12/2018 Right ankle injection with Dr. Lewis of podiatry.   -7/3/2018 L2-3 interlaminar epidural steroid injection with Dr Jamaal Eid (not at all " helpful)        Side Effects: no side effect  Patient is using the medication as prescribed: YES    Medications:  Current Outpatient Prescriptions   Medication Sig Dispense Refill     ADVIL 200 MG PO CAPS Reported on 4/20/2017       amLODIPine (NORVASC) 2.5 MG tablet TAKE 1 TABLET BY MOUTH AT BEDTIME 90 tablet 2     aspirin 162 MG EC tablet Take 162 mg by mouth daily Reported on 4/20/2017       CALCIUM 1200 OR Reported on 4/20/2017       diclofenac (VOLTAREN) 1 % GEL topical gel Apply 4 grams to right knee pain and bilateral lateral hip pain up to  four times daily as needed using enclosed dosing card. 100 g 1     gabapentin (NEURONTIN) 300 MG capsule TAKE 1 CAPSULE (300 MG) BY MOUTH 3 TIMES DAILY 270 capsule 3     Glucosamine-Chondroit-Vit C-Mn (GLUCOSAMINE 1500 COMPLEX PO) Take 1,500 mg by mouth daily Reported on 4/20/2017       Loratadine (CLARITIN) 10 MG capsule Take 10 mg by mouth as needed Reported on 4/20/2017       losartan-hydrochlorothiazide (HYZAAR) 100-25 MG per tablet TAKE 1 TABLET BY MOUTH EVERY DAY 90 tablet 2     MULTI-VITAMIN OR TABS 1 TABLET DAILY       NONFORMULARY daily Osteo kim (joints)       NONFORMULARY 100 mg daily Magnesium Glycinate       Omega-3 Fatty Acids (SALMON OIL-1000 PO) take 1,000 Caps by mouth daily.       order for DME Equipment being ordered: 4 wheeled Rollator walker with seat. Patient would like to have a red one if possible. 1 Device 0     order for DME Equipment being ordered: front wheeled walker 1 Device 0     oxyCODONE-acetaminophen (PERCOCET) 5-325 MG per tablet Take 1-2 tablets by mouth every 8 hours as needed for pain maximum 4 tablet(s) per day 120 tablet 0     PRILOSEC OR None Entered       simvastatin (ZOCOR) 40 MG tablet Take 1 tablet (40 mg) by mouth At Bedtime 90 tablet 1     VITAMIN C OR None Entered       VITAMIN D OR Take 1,200 mg by mouth daily.       VITAMIN E 400 UNIT OR CAPS None Entered       senna-docusate (SENOKOT-S;PERICOLACE) 8.6-50 MG per tablet  Take 1 tablet by mouth 2 times daily (Patient not taking: Reported on 5/29/2018) 120 tablet 12       Medical History: any changes in medical history since they were last seen? No    Social History:  Home situation: . 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  Tobacco use: none  Alcohol use: very little, about 2 drinks per month  Drug use: none  History of chemical dependency treatment: none    Is patient a current smoker or tobacco user?  no  If yes, was cessation counseling offered?  no        Review of Systems:  ROS is positive as per HPI as well as for headache, hearing loss, peripheral edema, osteoporosis, joint pain, arthritis, stiffness, back pain, urinary urgency, weakness, numbness/tingling and is negative for fevers, chills, sweats, or constipation, diarrhea.    This document serves as a record of the services and decisions personally performed and made by Marion SANCHES. It was created on her behalf by Tata Avina, a trained medical scribe. The creation of this document is based on the provider's statements to the medical scribe.  Tata Avina 12:04 PM July 25, 2018      Physical Exam:  Vital signs: /74  Pulse 93    Behavioral observations:  Awake and alert.accompanied today by her daughter.     Gait:  Slow and antalgic on right. Shuffling gait. Uses a walking stick    Musculoskeletal exam:    Moves very slowly in the exam room  Strength grossly equal throughout  Right lateral knee joint tenderness on palpation.     Neuro exam:  SILT extremities     Skin/vascular/autonomic:  No suspicious lesions on exposed skin    Other:  N/A    Is the patient hypertensive today? no  Hypertensive on recheck of BP?   n/a  If yes, was patient recommended to see Primary Care Provider in follow up for management of HTN?  n/a      Imaging  MRI LUMBAR SPINE WITHOUT CONTRAST   3/30/2018 1:22 PM      HISTORY:  Lumbar  degenerative disc disease.     TECHNIQUE: Multiplanar multisequence MRI of the lumbar spine without  contrast.     COMPARISON: Lumbar spine x-ray 3/22/2018. Lumbar spine MR 3/17/2017.      FINDINGS: There are 5 lumbar-type vertebral bodies assumed for the  purposes of this dictation. The tip of the conus terminates at the  L1-L2 level.     There is convex right scoliotic curvature of the lumbar spine centered  at the L2-L3 level. There is mild retrolisthesis of L2 on L3 and mild  anterolisthesis of L3 on L4. There is grade 1 anterolisthesis of L4 on  L5. There is right lateral listhesis of L3 on L4. There is slight loss  of left-sided vertebral body height at L3, probably due to  degenerative changes. Severe loss of intervertebral disc height seen  at L3-L4 with associated degenerative endplate changes. Severe loss of  disc height also seen on the left at L1-L2 and L2-L3 with severe  degenerative endplate changes. Mild loss of disc height at T11-12 and  T12-L1 with disc desiccation at L4-5 and L5-S1. There is mild STIR  hyperintense marrow edema at the opposing L2-L3 endplates.  Degenerative subchondral cysts seen anteriorly at L3.     Level by level as follows:      T12-L1:  Only seen on the lateral view, but there is a posterior disc  bulge and bilateral facet hypertrophy resulting in moderate right and  mild left neural foraminal narrowing. No significant spinal canal  narrowing.      L1-L2:  Convex right curvature with retrolisthesis and circumferential  disc bulge with endplate osteophytic spurring as well as bilateral  facet hypertrophy. Findings result in mild central spinal canal  narrowing and moderate bilateral neural foraminal narrowing.      L2-L3:  Convex right scoliotic curvature with left-sided disc bulge  and endplate osteophytic spurring as well as, left greater than right,  facet hypertrophy and ligamentum flavum infolding. Findings result in  moderate central spinal canal narrowing as well as  moderate to severe  left neural foraminal narrowing. There is mild right neural foraminal  narrowing.      L3-L4:  Mild anterolisthesis along with circumferential disc bulge,  severe bilateral facet hypertrophy, and ligamentum flavum infolding.  Findings result in severe central spinal canal narrowing. There is  also severe left and moderate to severe right neural foraminal  narrowing.      L4-L5:  Anterolisthesis with uncovering of the disc and small  posterior disc bulge along with severe bilateral facet hypertrophy and  ligamentum flavum infolding. Findings result in moderate to severe  central spinal canal narrowing. There is also moderate to severe  bilateral neural foraminal narrowing. Small bilateral facet joint  effusions.      L5-S1:  Mild posterior disc bulge and marked bilateral facet  hypertrophy results in moderate bilateral neural foraminal narrowing,  right greater than left. No significant central spinal canal  narrowing.      Paraspinous soft tissues:  Normal.          IMPRESSION:    1. Severe multilevel degenerative changes throughout the lumbar spine  as described above. Overall, there is no significant change since  previous MR 3/17/2017.  2. Severe central spinal canal narrowing at L3-L4 and moderate to  severe spinal canal narrowing at L4-L5.  3. Convex right scoliotic curvature of the spine with multiple levels  of spondylolisthesis and right lateral listhesis of L3 on L4.     DIANA HERNANDEZ MD    [place] Prescription Monitoring Program:    Reviewed regular fills of pain medication from PCP      Assessment:   1. Altered gait  2. At risk for falls  3. Chronic low back pain with right sided radicular symptoms  4. Spinal stenosis of lumbar region with neurogenic claudication  5. DDD lumbar  6. Bilateral greater trochanteric bursitis  7. Chronic right knee pain, particularly on the lateral joint line  8. Right knee primary osteoarthritis    Plan:   1. Physical Therapy:  The patient will schedule  an appointment with outside physical therapist, referral provided today to [facility]  2. Clinical Health Psychologist: None needed at present  3. Diagnostic Studies:  None  4. Medication Management:    1. Start Voltaren gel PRN for pain (bilateral lateral hip pain and right knee)  2. Continue Percocet per Primary Care Provider   3. Continue Gabapentin 300mg TID  4. DME order for Rollator walker   5. Further procedures recommended: The patient will schedule a synvisc right knee injection with Dr Valverde at the    6. Advised to follow up with PCP about alternate living options as patient brings up nursing home for some more assistance.   7. I agree with use of Boost, looking into Meals on Wheels or meals at [facility] to increase food intake. Discussed with patient that she may benefit from light housekeeping help  8. Recommendations to PCP: See above  9. Follow up: 6-8 weeks.    Total time spent face to face was 30 minutes and more than 50% of face to face time was spent in counseling and/or coordination of care regarding the diagnosis and recommendations above    The information in this document, created by the medical scribe for me, accurately reflects the services I personally performed and the decisions made by me. I have reviewed and approved this document for accuracy.    Marion SANCHES, RN CNP, FNP  [facility]

## 2018-07-25 NOTE — TELEPHONE ENCOUNTER
I signed an order for PHYSICAL THERAPY during her office visit.     Marion SANCHES RN CNP, FNP  [facility] [place]

## 2018-07-25 NOTE — TELEPHONE ENCOUNTER
Marion, Pt was seen in clinic today,  Plan is to have pt have PT with outside physical therapist.      I don't believe this has been signed yet.  Please review order Pended and sign for [facility].    Jean Caceres, МАРИЯ  Care Coordinator   [facility] [place]

## 2018-07-25 NOTE — MR AVS SNAPSHOT
After Visit Summary   7/25/2018    Teresa Lopez    MRN: [id number]           Patient Information     Date Of Birth          1935        Visit Information        Provider Department      7/25/2018 10:30 AM Marion Munoz APRN [facility] [place]        Today's Diagnoses     Altered gait    -  1    At risk for falling        Chronic bilateral low back pain with right-sided sciatica        Greater trochanteric bursitis of both hips        Chronic pain of right knee        Primary osteoarthritis of right knee          Care Instructions    PLAN:  1. Schedule an appointment for Physical Therapy at [facility], order sent to them   [facility] [employer], [facility] [place]      Phone- [phone]  Fax- [phone]  Address- [address]   [facility] [address]  2. Medications:   1. Start Voltaren gel, apply 4 g (one strip on finger) on both hips and your right knee up to four times per day  As needed for pain  2. Continue Percocet 5/325 mg, take 1-2 tabs every 8 hours as needed for pain per Primary Care Provider   3. Continue gabapentin 300 mg three times daily   4. DME order placed for Rollator walker --take to medical supply store  3. Procedures: schedule an appointment for Synvisc right knee injection with Dr Valverde at the  for 40 minutes   4. Follow up with your Primary Provider about alternate living options   5. I agree with use of Boost. Look into meals on wheels or meals at [facility] next door to your appt.   6. You may also benefit from light housekeeping help, may be available at your appt.   7. Follow-up with me in 6-8 weeks        ----------------------------------------------------------------  Nurse Triage line:  [phone]   Call this number with any questions or concerns. You may leave a detailed message anytime. Calls are typically returned Monday through Friday between 8 AM and 4:30 PM. We usually get back to you within 2 business days depending on the issue/request.        Medication refills:    For non-narcotic medications, call your pharmacy directly to request a refill. The pharmacy will contact the [facility] for authorization. Please allow 3-4 days for these refills to be processed.     For narcotic refills, call the nurse triage line or send a [employer] message. Please contact us 7-10 days before your refill is due. The message MUST include the name of the specific medication(s) requested and how you would like to receive the prescription(s). The options are as follows:    Pain Clinic staff can mail the prescription to your pharmacy. Please tell us the name of the pharmacy.    You may pick the prescription up at the Pain Clinic (tell us the location) or during a clinic visit with your pain provider    Pain Clinic staff can deliver the prescription to the [place] pharmacy in the clinic building. Please tell us the location.      Scheduling number: [phone].  Call this number to schedule or change appointments.    We believe regular attendance is key to your success in our program.    Any time you are unable to keep your appointment we ask that you call us at least 24 hours in advance to let us know. This will allow us to offer the appointment time to another patient.               Follow-ups after your visit        Additional Services     ORTHO  REFERRAL       [facility] is referring you to the Orthopedic  Services at [facility].       The  Representative will assist you in the coordination of your Orthopedic and Musculoskeletal Care as prescribed by your physician.    The  Representative will call you within 1 business day to help schedule your appointment, or you may contact the  Representative at:    All areas ~ [phone]     Type of Referral : Non Surgical     PLEASE SCHEDULE A 40 MINUTE APPT WITH DR ELLINGTON FOR LIKELY SYNVISC INJECTION, PATIENT LAST HAD SYNVISC DONE IN  2011 WITH DR PETERSON. THIS WAS QUITE EFFECTIVE  Timeframe requested: 3 - 5 days    Coverage of these services is subject to the terms and limitations of your health insurance plan.  Please call member services at your health plan with any benefit or coverage questions.      If X-rays, CT or MRI's have been performed, please contact the facility where they were done to arrange for , prior to your scheduled appointment.  Please bring this referral request to your appointment and present it to your specialist.            PHYSICAL THERAPY REFERRAL       *This therapy referral will be filtered to a centralized scheduling office at [facility] and the patient will receive a call to schedule an appointment at a [place] location most convenient for them. *     [facility]      Phone- [phone]  Fax- [phone]  Address- [address] [address]        [facility] provides Physical Therapy evaluation and treatment and many specialty services across the [place] system.  If requesting a specialty program, please choose from the list below.    If you have not heard from the scheduling office within 2 business days, please call [phone] for all locations, with the exception of [place], please call [phone] and [place], please call [phone]  Treatment: Evaluation & Treatment  Special Instructions/Modalities: none  Special Programs: chronic low back pain with right sided radicular pain, bilateral greater trochanteric bursitis, right knee osteoarthritis pain, altered gait and at risk for falling. I have ordered a Rollator walker for her to reduce fall risk.     Please be aware that coverage of these services is subject to the terms and limitations of your health insurance plan.  Call member services at your health plan with any benefit or coverage questions.      **Note to Provider:  If you are referring outside of  "[place] for the therapy appointment, please list the name of the location in the \"special instructions\" above, print the referral and give to the patient to schedule the appointment.                  Your next 10 appointments already scheduled     Sep 19, 2018  2:30 PM CDT   Return Visit with MYRON Ahumada CNP   [facility] ([facility] [facility])    [address]  [place] [address] [phone]              Who to contact     If you have questions or need follow up information about today's clinic visit or your schedule please contact [facility] directly at [phone].  Normal or non-critical lab and imaging results will be communicated to you by MyChart, letter or phone within 4 business days after the clinic has received the results. If you do not hear from us within 7 days, please contact the clinic through [email or web address] or phone. If you have a critical or abnormal lab result, we will notify you by phone as soon as possible.  Submit refill requests through [employer] or call your pharmacy and they will forward the refill request to us. Please allow 3 business days for your refill to be completed.          Additional Information About Your Visit        MyChart Information     [employer] gives you secure access to your electronic health record. If you see a primary care provider, you can also send messages to your care team and make appointments. If you have questions, please call your primary care clinic.  If you do not have a primary care provider, please call [phone] and they will assist you.        Care EveryWhere ID     This is your Care EveryWhere ID. This could be used by other organizations to access your [place] medical records  [id number]        Your Vitals Were     Pulse                   93            Blood Pressure from Last 3 Encounters:   07/25/18 135/74   07/03/18 (P) 141/68   05/29/18 123/76    Weight from Last 3 Encounters: "   05/29/18 63.5 kg (140 lb)   05/16/18 64.4 kg (142 lb)   05/04/18 64.4 kg (142 lb)              We Performed the Following     ORTHO  REFERRAL     PHYSICAL THERAPY REFERRAL          Today's Medication Changes          These changes are accurate as of 7/25/18 11:50 AM.  If you have any questions, ask your nurse or doctor.               Start taking these medicines.        Dose/Directions    diclofenac 1 % Gel topical gel   Commonly known as:  VOLTAREN   Used for:  Greater trochanteric bursitis of both hips, Chronic pain of right knee, Primary osteoarthritis of right knee        Apply 4 grams to right knee pain and bilateral lateral hip pain up to  four times daily as needed using enclosed dosing card.   Quantity:  100 g   Refills:  1       order for DME   Used for:  Altered gait, At risk for falling, Chronic bilateral low back pain with right-sided sciatica, Greater trochanteric bursitis of both hips, Chronic pain of right knee, Primary osteoarthritis of right knee        Equipment being ordered: 4 wheeled Rollator walker with seat. Patient would like to have a red one if possible.   Quantity:  1 Device   Refills:  0            Where to get your medicines      These [id number] - [place], [place] - [address] OF [address] [place] [address]     Phone:  [phone]     diclofenac 1 % Gel topical gel         Some of these will need a paper prescription and others can be bought over the counter.  Ask your nurse if you have questions.     Bring a paper prescription for each of these medications     order for DME                Primary Care Provider Office Phone # Fax #    MYRON Bose [facility] [phone]       [address] [facility] [address]        Equal Access to Services     ALTHEA MAYS AH: Nani Gallegos, gualberto ugarte, lori phillips.  So [facility] [phone].    ATENCIÓN: Si addis matthew, tiene a brambila disposición servicios gratuitos de asistencia lingüística. Danuta amos [phone].    We comply with applicable federal civil rights laws and [place] laws. We do not discriminate on the basis of race, color, national origin, age, disability, sex, sexual orientation, or gender identity.            Thank you!     Thank you for choosing [facility]  for your care. Our goal is always to provide you with excellent care. Hearing back from our patients is one way we can continue to improve our services. Please take a few minutes to complete the written survey that you may receive in the mail after your visit with us. Thank you!             Your Updated Medication List - Protect others around you: Learn how to safely use, store and throw away your medicines at www.disposemymeds.org.          This list is accurate as of 7/25/18 11:50 AM.  Always use your most recent med list.                   Brand Name Dispense Instructions for use Diagnosis    ADVIL 200 MG capsule   Generic drug:  ibuprofen      Reported on 4/20/2017        amLODIPine 2.5 MG tablet    NORVASC    90 tablet    TAKE 1 TABLET BY MOUTH AT BEDTIME    Hypertension goal BP (blood pressure) < 140/90       aspirin 162 MG EC tablet      Take 162 mg by mouth daily Reported on 4/20/2017        CALCIUM 1200 PO      Reported on 4/20/2017        CLARITIN 10 MG capsule   Generic drug:  loratadine      Take 10 mg by mouth as needed Reported on 4/20/2017        diclofenac 1 % Gel topical gel    VOLTAREN    100 g    Apply 4 grams to right knee pain and bilateral lateral hip pain up to  four times daily as needed using enclosed dosing card.    Greater trochanteric bursitis of both hips, Chronic pain of right knee, Primary osteoarthritis of right knee       gabapentin 300 MG capsule    NEURONTIN    270 capsule    TAKE 1 CAPSULE (300 MG) BY MOUTH 3 TIMES DAILY    DJD (degenerative joint disease),  lumbosacral, Spinal stenosis of lumbar region with neurogenic claudication       GLUCOSAMINE 1500 COMPLEX PO      Take 1,500 mg by mouth daily Reported on 4/20/2017        losartan-hydrochlorothiazide 100-25 MG per tablet    HYZAAR    90 tablet    TAKE 1 TABLET BY MOUTH EVERY DAY    Hypertension goal BP (blood pressure) < 140/90       Multi-vitamin Tabs tablet   Generic drug:  multivitamin, therapeutic with minerals      1 TABLET DAILY        NONFORMULARY      daily Osteo kim (joints)        NONFORMULARY      100 mg daily Magnesium Glycinate        order for DME     1 Device    Equipment being ordered: front wheeled walker    Altered gait, Physical deconditioning, Chronic right hip pain, Spinal stenosis of lumbar region with neurogenic claudication, SI (sacroiliac) joint dysfunction, Piriformis syndrome of both sides       order for DME     1 Device    Equipment being ordered: 4 wheeled Rollator walker with seat. Patient would like to have a red one if possible.    Altered gait, At risk for falling, Chronic bilateral low back pain with right-sided sciatica, Greater trochanteric bursitis of both hips, Chronic pain of right knee, Primary osteoarthritis of right knee       oxyCODONE-acetaminophen 5-325 MG per tablet    PERCOCET    120 tablet    Take 1-2 tablets by mouth every 8 hours as needed for pain maximum 4 tablet(s) per day    DJD (degenerative joint disease), lumbosacral, Spinal stenosis of lumbar region with neurogenic claudication       PRILOSEC PO      None Entered        SALMON OIL-1000 PO      take 1,000 Caps by mouth daily.        senna-docusate 8.6-50 MG per tablet    SENOKOT-S;PERICOLACE    120 tablet    Take 1 tablet by mouth 2 times daily    Slow transit constipation       simvastatin 40 MG tablet    ZOCOR    90 tablet    Take 1 tablet (40 mg) by mouth At Bedtime    Other hyperlipidemia       VITAMIN C PO      None Entered        VITAMIN D PO      Take 1,200 mg by mouth daily.        vitamin E 400 UNIT  capsule      None Entered

## 2018-07-25 NOTE — PATIENT INSTRUCTIONS
PLAN:  1. Schedule an appointment for Physical Therapy at [facility], order sent to them   [facility]      Phone- [phone]  Fax- [phone]  Address- [address] [address]  2. Medications:   1. Start Voltaren gel, apply 4 g (one strip on finger) on both hips and your right knee up to four times per day  As needed for pain  2. Continue Percocet 5/325 mg, take 1-2 tabs every 8 hours as needed for pain per Primary Care Provider   3. Continue gabapentin 300 mg three times daily   4. DME order placed for Rollator walker --take to medical supply store  3. Procedures: schedule an appointment for Synvisc right knee injection with Dr Valverde at the  for 40 minutes   4. Follow up with your Primary Provider about alternate living options--patient brought up nursing home support today  5. I agree with use of Boost. Look into meals on wheels or meals at [facility] next door to your appt.   6. You may also benefit from light housekeeping help, may be available at your apartment  7. Follow-up with me in 6-8 weeks        ----------------------------------------------------------------  Nurse Triage line:  [phone]   Call this number with any questions or concerns. You may leave a detailed message anytime. Calls are typically returned Monday through Friday between 8 AM and 4:30 PM. We usually get back to you within 2 business days depending on the issue/request.       Medication refills:    For non-narcotic medications, call your pharmacy directly to request a refill. The pharmacy will contact the [facility] for authorization. Please allow 3-4 days for these refills to be processed.     For narcotic refills, call the nurse triage line or send a [employer] message. Please contact us 7-10 days before your refill is due. The message MUST include the name of the specific medication(s) requested and how you would like to receive the prescription(s). The options are as  follows:    Pain Clinic staff can mail the prescription to your pharmacy. Please tell us the name of the pharmacy.    You may pick the prescription up at the Pain Clinic (tell us the location) or during a clinic visit with your pain provider    Pain Clinic staff can deliver the prescription to the [place] pharmacy in the clinic building. Please tell us the location.      Scheduling number: [phone].  Call this number to schedule or change appointments.    We believe regular attendance is key to your success in our program.    Any time you are unable to keep your appointment we ask that you call us at least 24 hours in advance to let us know. This will allow us to offer the appointment time to another patient.

## 2018-07-26 ENCOUNTER — TELEPHONE (OUTPATIENT)
Dept: PALLIATIVE MEDICINE | Facility: CLINIC | Age: 83
End: 2018-07-26

## 2018-07-26 NOTE — TELEPHONE ENCOUNTER
Prior Authorization Specialty Medication Request    Medication/Dose: diclofenac (VOLTAREN) 1 % GEL topical gel  ICD code (if different than what is on RX):   Greater trochanteric bursitis of both hips [M70.61, M70.62]       Chronic pain of right knee [M25.561, G89.29]       Primary osteoarthritis of right knee [M17.11]           Previously Tried and Failed:.     Important Lab Values: .  Rationale: .    Insurance Name: MEDICA  Insurance ID: [id number]   Insurance Phone Number: [phone]

## 2018-07-26 NOTE — TELEPHONE ENCOUNTER
Signed order faxed to [facility]. RightFax confirmed. Patient was informed.

## 2018-07-27 NOTE — TELEPHONE ENCOUNTER
Prior Authorization Approval    Authorization Effective Date: 6/27/2018  Authorization Expiration Date: 7/27/2019  Medication: diclofenac (VOLTAREN) 1 % GEL topical gel-APPROVED  Approved Dose/Quantity:   Reference #:     Insurance Company: [employer] - Phone [phone] Fax [phone]  Expected CoPay:       CoPay Card Available:      Foundation Assistance Needed:    Which Pharmacy is filling the prescription (Not needed for infusion/clinic administered): [id number] - [place], [place] - [address]  Pharmacy Notified: Yes  Patient Notified: No    Received verbal approval, will update with approval fax once received.  Pharmacy will notify patient when medication is ready.

## 2018-07-27 NOTE — TELEPHONE ENCOUNTER
Central Prior Authorization Team   Phone: [phone]      PA Initiation    Medication: diclofenac (VOLTAREN) 1 % GEL topical gel-Initiated  Insurance Company: [employer] - Phone [phone] Fax [phone]  Pharmacy Filling the Rx: [id number] - [place], [place] - [address]  Filling Pharmacy Phone: [phone]  Filling Pharmacy Fax:    Start Date: 7/27/2018    Called and spoke with Silvina at [employer] and initiated PA request via phone.  Case ID [id number].

## 2018-08-03 ENCOUNTER — RADIANT APPOINTMENT (OUTPATIENT)
Dept: GENERAL RADIOLOGY | Facility: CLINIC | Age: 83
End: 2018-08-03
Attending: FAMILY MEDICINE
Payer: COMMERCIAL

## 2018-08-03 ENCOUNTER — OFFICE VISIT (OUTPATIENT)
Dept: ORTHOPEDICS | Facility: CLINIC | Age: 83
End: 2018-08-03
Payer: COMMERCIAL

## 2018-08-03 VITALS
WEIGHT: 141 LBS | BODY MASS INDEX: 25.95 KG/M2 | HEIGHT: 62 IN | DIASTOLIC BLOOD PRESSURE: 87 MMHG | SYSTOLIC BLOOD PRESSURE: 148 MMHG

## 2018-08-03 DIAGNOSIS — M25.561 CHRONIC PAIN OF RIGHT KNEE: Primary | ICD-10-CM

## 2018-08-03 DIAGNOSIS — M25.561 CHRONIC PAIN OF RIGHT KNEE: ICD-10-CM

## 2018-08-03 DIAGNOSIS — M25.551 RIGHT HIP PAIN: ICD-10-CM

## 2018-08-03 DIAGNOSIS — G89.29 CHRONIC PAIN OF RIGHT KNEE: Primary | ICD-10-CM

## 2018-08-03 DIAGNOSIS — M16.11 PRIMARY OSTEOARTHRITIS OF RIGHT HIP: ICD-10-CM

## 2018-08-03 DIAGNOSIS — M17.0 BILATERAL PRIMARY OSTEOARTHRITIS OF KNEE: ICD-10-CM

## 2018-08-03 DIAGNOSIS — G89.29 CHRONIC PAIN OF RIGHT KNEE: ICD-10-CM

## 2018-08-03 PROCEDURE — 20611 DRAIN/INJ JOINT/BURSA W/US: CPT | Mod: RT | Performed by: FAMILY MEDICINE

## 2018-08-03 PROCEDURE — 73562 X-RAY EXAM OF KNEE 3: CPT

## 2018-08-03 PROCEDURE — 99214 OFFICE O/P EST MOD 30 MIN: CPT | Mod: 25 | Performed by: FAMILY MEDICINE

## 2018-08-03 RX ADMIN — TRIAMCINOLONE ACETONIDE 40 MG: 40 INJECTION, SUSPENSION INTRA-ARTICULAR; INTRAMUSCULAR at 15:00

## 2018-08-03 RX ADMIN — ROPIVACAINE HYDROCHLORIDE 3 ML: 5 INJECTION, SOLUTION EPIDURAL; INFILTRATION; PERINEURAL at 15:00

## 2018-08-03 NOTE — PROGRESS NOTES
Teresa Lopez  :  1935  DOS: 18  MRN: [id number]    Sports Medicine Clinic Visit    PCP: Demetrice Gutierrez    Teresa Lopez is a 81 year old female who is seen in consultation at the request of  Marion Munoz CNP presenting with chronic right hip pain.    Injury: Gradual onset of chronic mild-moderate right hip pain over last 6 - 7 months.  Pain located in deep lateral, anterior hip, radiating to right thigh.  Reports intermittent radiating, pain to right anterior, lateral thigh.  Additional Features:  Positive: grinding and weakness.  Symptoms are better with Rest.  Symptoms are worse with: prolonged walking, tying shoes, going from sit to stand position.  Other evaluation and/or treatments so far consists of: Rest and pain management consult, pain medication.  Recent imaging completed: X-rays completed 3/17/17.  Prior History of related problems: Patient is currently being treated for chronic low back pain issues by pain management.  Difficult to keep her focused on particular issue today.    Social History: retired  Presents with her daughter today    Interim History - 2018  Since last visit on 17 patient has moderate severe radiating right hip pain.  Pain radiates down anterior thigh to her knee.  They desire to discuss hip injection options today.  No interim injury.       Interim History - August 3, 2018  Since last visit on 18 patient has moderate severe right hip and right knee pain.  Right hip intra-articular injection completed on  provided modeate relief.  Right knee pain located over deep medial and lateral joint lines.  She had right knee steroid injection in the past that have good relief.  No new injury in the interim.      Review of Systems  Musculoskeletal: as above  Remainder of review of systems is negative including constitutional, CV, pulmonary, GI, Skin and Neurologic except as noted in HPI or medical history.    Past Medical History:   Diagnosis Date  "    Cataract 3/7/2012     CVA (cerebral infarction)      Diabetes mellitus (H)      DJD (degenerative joint disease)      HTN      Past Surgical History:   Procedure Laterality Date     HYSTERECTOMY, CERVIX STATUS UNKNOWN  age 31       Objective  /87  Ht 5' 1.5\" (1.562 m)  Wt 141 lb (64 kg)  BMI 26.21 kg/m2    General: healthy, alert and in no distress    HEENT: no scleral icterus or conjunctival erythema   Skin: no suspicious lesions or rash. No jaundice.   CV: regular rhythm by palpation, 2+ distal pulses, no pedal edema    Resp: normal respiratory effort without conversational dyspnea   Psych: normal mood and affect    Gait: antalgic, appropriate coordination and balance, trendelenburg  Neuro: normal light touch sensory exam of the extremities. Motor strength as noted below     Right hip exam    Inspection:        no edema or ecchymosis in hip area    ROM:       Flexion 110       internal rotation 15      external rotation 30      Range of motion limited by pain    Strength:        flexion 4/5       extension 4/5       abduction 3/5       adduction 4/5    Tender:        greater trochanter mild       Anterior hip joint       Mild ipsilateral SI joint TTP    Non Tender:        remainder of hip area       illiac crest       ASIS       pubis    Sensation:        grossly intact in hip and thigh    Skin:       well perfused       capillary refill brisk    Special Tests:        neg (-) LUDY       positive (+) FADIR       positive (+) scour       neg (-) Ankit    Neg slump and SLR, knee stable on exam    Right Knee exam    ROM:        Flexion 110 degrees       Extension -2 degrees       Range of motion limited by pain, mechanical    Inspection:       no visible ecchymosis        effusion noted trace    Skin:       no visible deformities       well perfused       capillary refill brisk    Patellar Motion:        Crepitus noted in the patellofemoral joint    Tender:        medial patellar border       lateral " patellar border       medial joint line       lateral joint line    Non Tender:         remainder of knee area        along MCL        distal IT Band        infrapatellar tendon        tibial tubercle       pes anserine bursa    Special Tests:        neg (-) varus at 0 deg and 30 deg       neg (-) valgus at 0 deg and 30 deg    Evaluation of ipsilateral kinetic chain       decreased strength with resisted abduction of the right hip       decreased strength with resisted extension of the right hip       B/l decreased quad tone and core deconditioning      Large Joint Injection/Arthocentesis  Date/Time: 8/3/2018 3:00 PM  Performed by: POWER ELLINGTON  Authorized by: PWOER ELLINGTON     Indications:  Pain and diagnostic evaluation  Needle Size:  22 G  Guidance: ultrasound    Approach:  Anterior  Location:  Hip  Site:  R hip joint  Medications:  40 mg triamcinolone acetonide 40 MG/ML; 3 mL ropivacaine 5 MG/ML  Aspirate amount (mL) comment:  Effusion noted, unable to aspirate fluid  Outcome:  Tolerated well, no immediate complications  Procedure discussed: discussed risks, benefits, and alternatives    Consent Given by:  Patient  Prep: patient was prepped and draped in usual sterile fashion     4 ml's of 1% lidocaine was used as local anesthetic prior to injection        Radiology  XR HIP RIGHT 2-3 VIEWS 3/17/2017 2:49 PM     COMPARISON: None.     HISTORY: Right hip pain, no trauma history.         IMPRESSION: Moderate to severe left hip osteoarthritis with  significant joint space loss. No fractures are seen.    Recent Results (from the past 744 hour(s))   XR Knee Standing AP Bilat [place] Bilat Lat Right    Narrative    KNEE STANDING AP BILATERAL, [place] BILATERAL, LATERAL RIGHT  8/3/2018  2:25 PM     HISTORY:  Chronic pain of right knee.      Impression    IMPRESSION: Bilateral patellofemoral medial compartment joint space  narrowing. Right knee medial compartment moderate to severe joint  space  narrowing. Faint arterial calcifications are noted posterior to  the right knee.    CATRACHITO CORONA MD         Assessment:  1. Chronic pain of right knee    2. Right hip pain    3. Primary osteoarthritis of right hip    4. Bilateral primary osteoarthritis of knee        Plan:  Discussed the assessment with the patient.  Follow up: prn  Offered injection again today to the right hip joint, which was repeated  Lumbar issue mgmt per pain mgmt team following her  PT options reviewed, as well as HEP  Reviewed activity modification and progressive increase in activity as tolerated and guided by pain  Reviewed options for potential steroid vs viscosupplementation injections and the possibility for future orthopedic referral prn  Reviewed safe and appropriate OTC medication choices, try tylenol first  Up to 3000mg daily of tylenol is generally safe, NSAID dosing and duration limitations reviewed  Discussed nature of degenerative arthrosis of the knee.   Discussed symptom treatment with ice or heat, topical treatments, and rest if needed.   Expectations and goals of CSI reviewed  Often 2-3 days for steroid effect, and can take up to two weeks for maximum effect  We discussed modified progressive pain-free activity as tolerated  Do not overuse in first two weeks if feeling better due to concern for vulnerability while steroid is working  Supportive care reviewed  All questions were answered today  Contact us with additional questions or concerns  Signs and sx of concern reviewed      Jean Valverde DO, CADINESH  Primary Care Sports Medicine  [facility]       Time spent in one-on-one evaluation and discussion with patient regarding nature of problem, course, prior treatments, and therapeutic options, at least 50% of which was spent in counseling and coordination of care: 25 minutes      Disclaimer: This note consists of symbols derived from keyboarding, dictation and/or voice recognition software. As a result,  there may be errors in the script that have gone undetected. Please consider this when interpreting information found in this chart.

## 2018-08-03 NOTE — LETTER
8/3/2018         RE: Teresa Lopez  [address]  [facility] [address]        Dear Colleague,    Thank you for referring your patient, Teresa Lopez, to the [facility]. Please see a copy of my visit note below.    Teresa Lopez  :  1935  DOS: 18  MRN: [id number]    Sports Medicine Clinic Visit    PCP: Demetrice Gutierrez    Teresa Lopez is a 81 year old female who is seen in consultation at the request of  Marion Munoz CNP presenting with chronic right hip pain.    Injury: Gradual onset of chronic mild-moderate right hip pain over last 6 - 7 months.  Pain located in deep lateral, anterior hip, radiating to right thigh.  Reports intermittent radiating, pain to right anterior, lateral thigh.  Additional Features:  Positive: grinding and weakness.  Symptoms are better with Rest.  Symptoms are worse with: prolonged walking, tying shoes, going from sit to stand position.  Other evaluation and/or treatments so far consists of: Rest and pain management consult, pain medication.  Recent imaging completed: X-rays completed 3/17/17.  Prior History of related problems: Patient is currently being treated for chronic low back pain issues by pain management.  Difficult to keep her focused on particular issue today.    Social History: retired  Presents with her daughter today    Interim History - 2018  Since last visit on 17 patient has moderate severe radiating right hip pain.  Pain radiates down anterior thigh to her knee.  They desire to discuss hip injection options today.  No interim injury.       Interim History - August 3, 2018  Since last visit on 18 patient has moderate severe right hip and right knee pain.  Right hip intra-articular injection completed on  provided modeate relief.  Right knee pain located over deep medial and lateral joint lines.  She had right knee steroid injection in the past that have good relief.  No new  "injury in the interim.      Review of Systems  Musculoskeletal: as above  Remainder of review of systems is negative including constitutional, CV, pulmonary, GI, Skin and Neurologic except as noted in HPI or medical history.    Past Medical History:   Diagnosis Date     Cataract 3/7/2012     CVA (cerebral infarction)      Diabetes mellitus (H)      DJD (degenerative joint disease)      HTN      Past Surgical History:   Procedure Laterality Date     HYSTERECTOMY, CERVIX STATUS UNKNOWN  age 31       Objective  /87  Ht 5' 1.5\" (1.562 m)  Wt 141 lb (64 kg)  BMI 26.21 kg/m2    General: healthy, alert and in no distress    HEENT: no scleral icterus or conjunctival erythema   Skin: no suspicious lesions or rash. No jaundice.   CV: regular rhythm by palpation, 2+ distal pulses, no pedal edema    Resp: normal respiratory effort without conversational dyspnea   Psych: normal mood and affect    Gait: antalgic, appropriate coordination and balance, trendelenburg  Neuro: normal light touch sensory exam of the extremities. Motor strength as noted below     Right hip exam    Inspection:        no edema or ecchymosis in hip area    ROM:       Flexion 110       internal rotation 15      external rotation 30      Range of motion limited by pain    Strength:        flexion 4/5       extension 4/5       abduction 3/5       adduction 4/5    Tender:        greater trochanter mild       Anterior hip joint       Mild ipsilateral SI joint TTP    Non Tender:        remainder of hip area       illiac crest       ASIS       pubis    Sensation:        grossly intact in hip and thigh    Skin:       well perfused       capillary refill brisk    Special Tests:        neg (-) LUDY       positive (+) FADIR       positive (+) scour       neg (-) Ankit    Neg slump and SLR, knee stable on exam    Right Knee exam    ROM:        Flexion 110 degrees       Extension -2 degrees       Range of motion limited by pain, mechanical    Inspection:       " no visible ecchymosis        effusion noted trace    Skin:       no visible deformities       well perfused       capillary refill brisk    Patellar Motion:        Crepitus noted in the patellofemoral joint    Tender:        medial patellar border       lateral patellar border       medial joint line       lateral joint line    Non Tender:         remainder of knee area        along MCL        distal IT Band        infrapatellar tendon        tibial tubercle       pes anserine bursa    Special Tests:        neg (-) varus at 0 deg and 30 deg       neg (-) valgus at 0 deg and 30 deg    Evaluation of ipsilateral kinetic chain       decreased strength with resisted abduction of the right hip       decreased strength with resisted extension of the right hip       B/l decreased quad tone and core deconditioning      Large Joint Injection/Arthocentesis  Date/Time: 8/3/2018 3:00 PM  Performed by: POWER ELLINGTON  Authorized by: POWER ELLINGTON     Indications:  Pain and diagnostic evaluation  Needle Size:  22 G  Guidance: ultrasound    Approach:  Anterior  Location:  Hip  Site:  R hip joint  Medications:  40 mg triamcinolone acetonide 40 MG/ML; 3 mL ropivacaine 5 MG/ML  Aspirate amount (mL) comment:  Effusion noted, unable to aspirate fluid  Outcome:  Tolerated well, no immediate complications  Procedure discussed: discussed risks, benefits, and alternatives    Consent Given by:  Patient  Prep: patient was prepped and draped in usual sterile fashion     4 ml's of 1% lidocaine was used as local anesthetic prior to injection        Radiology  XR HIP RIGHT 2-3 VIEWS 3/17/2017 2:49 PM     COMPARISON: None.     HISTORY: Right hip pain, no trauma history.         IMPRESSION: Moderate to severe left hip osteoarthritis with  significant joint space loss. No fractures are seen.    Recent Results (from the past 744 hour(s))   XR Knee Standing AP Bilat [place] Bilat Lat Right    Narrative    KNEE STANDING AP BILATERAL,  [place] BILATERAL, LATERAL RIGHT  8/3/2018  2:25 PM     HISTORY:  Chronic pain of right knee.      Impression    IMPRESSION: Bilateral patellofemoral medial compartment joint space  narrowing. Right knee medial compartment moderate to severe joint  space narrowing. Faint arterial calcifications are noted posterior to  the right knee.    CATRACHITO CORONA MD         Assessment:  1. Chronic pain of right knee    2. Right hip pain    3. Primary osteoarthritis of right hip    4. Bilateral primary osteoarthritis of knee        Plan:  Discussed the assessment with the patient.  Follow up: prn  Offered injection again today to the right hip joint, which was repeated  Lumbar issue mgmt per pain mgmt team following her  PT options reviewed, as well as HEP  Reviewed activity modification and progressive increase in activity as tolerated and guided by pain  Reviewed options for potential steroid vs viscosupplementation injections and the possibility for future orthopedic referral prn  Reviewed safe and appropriate OTC medication choices, try tylenol first  Up to 3000mg daily of tylenol is generally safe, NSAID dosing and duration limitations reviewed  Discussed nature of degenerative arthrosis of the knee.   Discussed symptom treatment with ice or heat, topical treatments, and rest if needed.   Expectations and goals of CSI reviewed  Often 2-3 days for steroid effect, and can take up to two weeks for maximum effect  We discussed modified progressive pain-free activity as tolerated  Do not overuse in first two weeks if feeling better due to concern for vulnerability while steroid is working  Supportive care reviewed  All questions were answered today  Contact us with additional questions or concerns  Signs and sx of concern reviewed      Jean Valverde DO, CADINESH  Primary Care Sports Medicine  [facility]       Time spent in one-on-one evaluation and discussion with patient regarding nature of problem, course,  prior treatments, and therapeutic options, at least 50% of which was spent in counseling and coordination of care: 25 minutes      Disclaimer: This note consists of symbols derived from keyboarding, dictation and/or voice recognition software. As a result, there may be errors in the script that have gone undetected. Please consider this when interpreting information found in this chart.    Again, thank you for allowing me to participate in the care of your patient.        Sincerely,        Jean Valverde, DO

## 2018-08-06 RX ORDER — ROPIVACAINE HYDROCHLORIDE 5 MG/ML
3 INJECTION, SOLUTION EPIDURAL; INFILTRATION; PERINEURAL
Status: DISCONTINUED | OUTPATIENT
Start: 2018-08-03 | End: 2018-09-21

## 2018-08-06 RX ORDER — TRIAMCINOLONE ACETONIDE 40 MG/ML
40 INJECTION, SUSPENSION INTRA-ARTICULAR; INTRAMUSCULAR
Status: DISCONTINUED | OUTPATIENT
Start: 2018-08-03 | End: 2019-02-15

## 2018-08-19 ENCOUNTER — MYC REFILL (OUTPATIENT)
Dept: LAB | Facility: CLINIC | Age: 83
End: 2018-08-19

## 2018-08-19 ENCOUNTER — MYC REFILL (OUTPATIENT)
Dept: PALLIATIVE MEDICINE | Facility: CLINIC | Age: 83
End: 2018-08-19

## 2018-08-19 ENCOUNTER — MYC MEDICAL ADVICE (OUTPATIENT)
Dept: PALLIATIVE MEDICINE | Facility: CLINIC | Age: 83
End: 2018-08-19

## 2018-08-19 DIAGNOSIS — M70.61 GREATER TROCHANTERIC BURSITIS OF BOTH HIPS: ICD-10-CM

## 2018-08-19 DIAGNOSIS — M70.62 GREATER TROCHANTERIC BURSITIS OF BOTH HIPS: ICD-10-CM

## 2018-08-19 DIAGNOSIS — M25.561 CHRONIC PAIN OF RIGHT KNEE: ICD-10-CM

## 2018-08-19 DIAGNOSIS — M48.062 SPINAL STENOSIS OF LUMBAR REGION WITH NEUROGENIC CLAUDICATION: ICD-10-CM

## 2018-08-19 DIAGNOSIS — M17.11 PRIMARY OSTEOARTHRITIS OF RIGHT KNEE: ICD-10-CM

## 2018-08-19 DIAGNOSIS — M47.817 DJD (DEGENERATIVE JOINT DISEASE), LUMBOSACRAL: ICD-10-CM

## 2018-08-19 DIAGNOSIS — G89.29 CHRONIC PAIN OF RIGHT KNEE: ICD-10-CM

## 2018-08-20 NOTE — TELEPHONE ENCOUNTER
Message from [employer]:  Original authorizing provider: MD Teresa Sarah would like a refill of the following medications:  oxyCODONE-acetaminophen (PERCOCET) 5-325 MG per tablet [Tej Engle MD]    Preferred pharmacy: [facility]ITTEN [facility] REQUESTED    Comment:

## 2018-08-20 NOTE — TELEPHONE ENCOUNTER
Message from [employer]:  Original authorizing provider: MYRON Ahumada CNP would like a refill of the following medications:  diclofenac (VOLTAREN) 1 % GEL topical gel [MYRON Ahumada CNP]    Preferred pharmacy: [id number] - [place], [place] - [address]    Comment:

## 2018-08-20 NOTE — TELEPHONE ENCOUNTER
Received MyChart request from patient requesting refill(s) for diclofenac (VOLTAREN) 1 % GEL topical gel      Pt last seen on 07/25/18  Next appt scheduled for 09/19/18    Will facilitate refill.

## 2018-08-21 RX ORDER — OXYCODONE AND ACETAMINOPHEN 5; 325 MG/1; MG/1
1-2 TABLET ORAL EVERY 8 HOURS PRN
Qty: 120 TABLET | Refills: 0 | Status: SHIPPED | OUTPATIENT
Start: 2018-08-21 | End: 2018-09-21

## 2018-08-21 NOTE — TELEPHONE ENCOUNTER
Signed Prescriptions:                        Disp   Refills    diclofenac (VOLTAREN) 1 % GEL topical gel  100 g  11       Sig: Apply 4 grams to right knee pain and bilateral           lateral hip pain up to  four times daily as           needed using enclosed dosing card.  Authorizing Provider: RENA CELAYA, RN CNP, FNP  [facility] [place]

## 2018-08-22 NOTE — TELEPHONE ENCOUNTER
Hard copy of script for Percocet 5-325 mg tablet placed in locked file drawer at  for . Patient informed

## 2018-08-27 ENCOUNTER — TELEPHONE (OUTPATIENT)
Dept: PALLIATIVE MEDICINE | Facility: CLINIC | Age: 83
End: 2018-08-27

## 2018-08-27 NOTE — TELEPHONE ENCOUNTER
Patient is no longer on Norco, but is on Percocet. Percocet Rx was filled by Dr. Hernandes and is waiting for patient to  from [facility] in [place]. Patient was informed.

## 2018-08-27 NOTE — TELEPHONE ENCOUNTER
Reason for call:  Medication   If this is a refill request, has the caller requested the refill from the pharmacy already? No  Will the patient be using a [facility]? No  Name of the pharmacy and phone number for the current request:     Pt plans to p/u at clinic    Name of the medication requested: NORCO    Other request: Pt will  at  in [place]    Phone number to reach patient:  Home number on file [phone] (home)    Best Time:  anytime    Can we leave a detailed message on this number?  YES       Zenaida YWATT    [facility] [place]

## 2018-09-19 ENCOUNTER — OFFICE VISIT (OUTPATIENT)
Dept: PALLIATIVE MEDICINE | Facility: CLINIC | Age: 83
End: 2018-09-19
Payer: COMMERCIAL

## 2018-09-19 VITALS
WEIGHT: 134 LBS | DIASTOLIC BLOOD PRESSURE: 90 MMHG | HEART RATE: 114 BPM | BODY MASS INDEX: 24.91 KG/M2 | SYSTOLIC BLOOD PRESSURE: 134 MMHG

## 2018-09-19 DIAGNOSIS — M48.062 SPINAL STENOSIS OF LUMBAR REGION WITH NEUROGENIC CLAUDICATION: Primary | ICD-10-CM

## 2018-09-19 DIAGNOSIS — M54.50 CHRONIC BILATERAL LOW BACK PAIN WITHOUT SCIATICA: ICD-10-CM

## 2018-09-19 DIAGNOSIS — G89.29 CHRONIC BILATERAL LOW BACK PAIN WITHOUT SCIATICA: ICD-10-CM

## 2018-09-19 DIAGNOSIS — M51.369 DDD (DEGENERATIVE DISC DISEASE), LUMBAR: ICD-10-CM

## 2018-09-19 PROCEDURE — 99214 OFFICE O/P EST MOD 30 MIN: CPT | Performed by: NURSE PRACTITIONER

## 2018-09-19 ASSESSMENT — PAIN SCALES - GENERAL: PAINLEVEL: WORST PAIN (10)

## 2018-09-19 NOTE — PATIENT INSTRUCTIONS
PLAN:  1. Medications:   1. Continue Percocet 5/325 mg, take 1-2 tabs every 8 hours as needed for pain per Primary Provider  2. Continue gabapentin 300 mg three times daily   3. Agree with medical cannabis per dispensary instructions  2. Procedures: none at present   3. Referral to neurosurgery to see Dr. Erin Valencia at the [facility], that office will contact you  4. Follow-up with me in 10 weeks        ----------------------------------------------------------------  Clinic Number:  [phone]   Call this number with any questions about your care and for scheduling assistance. Calls are returned Monday through Friday between 8 AM and 4:30 PM. We usually get back to you within 2 business days depending on the issue/request.       Medication refills:    For non-narcotic medications, call your pharmacy directly to request a refill. The pharmacy will contact the [facility] for authorization. Please allow 3-4 days for these refills to be processed.     For narcotic refills, call the clinic number or send a [employer] message. Please contact us 7-10 days before your refill is due. The message MUST include the name of the specific medication(s) requested and how you would like to receive the prescription(s). The options are as follows:    Pain Clinic staff can mail the prescription to your pharmacy. Please tell us the name of the pharmacy.    You may pick the prescription up at the Pain Clinic (tell us the location) or during a clinic visit with your pain provider    Pain Clinic staff can deliver the prescription to the [place] pharmacy in the clinic building. Please tell us the location.      We believe regular attendance is key to your success in our program.    Any time you are unable to keep your appointment we ask that you call us at least 24 hours in advance to let us know. This will allow us to offer the appointment time to another patient.

## 2018-09-19 NOTE — PROGRESS NOTES
[facility]    9/19/2018      Chief complaint:  chronic low back pain and right leg pain  Interval history:  Teresa Lopez is a 82 year old female is known to me for .   Primary osteoarthritis    Chronic right hip pain   Chronic bilateral low back without sciatica   Spinal stenosis of lumbar region with neurogenic claudication   Piriformis syndrome of both sides   PMHx DJD, CVA, HTN, diabetes mellitus, and cataract   PSHx Hysterectomy    Recommendations/plan at the last visit on 7/25/2018 included:  1. Physical Therapy:  The patient will schedule an appointment with outside physical therapist, referral provided today to [facility]  2. Clinical Health Psychologist: None needed at present  3. Diagnostic Studies:  None  4. Medication Management:    1. Start Voltaren gel PRN for pain (bilateral lateral hip pain and right knee)  2. Continue Percocet per Primary Care Provider   3. Continue Gabapentin 300mg TID  4. DME order for Rollator walker   5. Further procedures recommended: The patient will schedule a synvisc right knee injection with Dr Valverde at the    6. Advised to follow up with PCP about alternate living options as patient brings up nursing home for some more assistance.   7. I agree with use of Boost, looking into Meals on Wheels or meals at [facility] to increase food intake. Discussed with patient that she may benefit from light housekeeping help  8. Recommendations to PCP: See above  9. Follow up: 6-8 weeks.    Since her last visit, Teresa Lopez reports:  Continued low back and right leg pain present when standing or walking, has + grocery-cart sign.  Pain is gone when she sits down or lies down, indicative for neurogenic claudication.     At this point, the patient's participation with our multidisciplinary team includes:  The patient has been compliant with the program.  Pain Group None  PT - patient is interested  Health Psych  None        Pain  "scores:  Pain intensity on average is 4 on a scale of 0-10.    Range is 3-7/10/10.   Pain right now is 4/10  Pain is described as \"aching\".    Pain is continuous in nature and worst in the morning.    Current pain-related medication treatments include:  -Percocet 5.325mg may take 1-2 tabs Q 8 hours PRN pain (she uses 4 tabs per day, somewhat helpful)  -gabapentin 300mg TID (she is not sure if this is helpful or not)  -Voltaren gel (somewhat helpful)        Other pertinent medications:  -Senna-docusate PRN      Previous medication treatments included:  OPIATES:hydrocodone (not helpful), oxycodone (somewhat helpful)  NSAIDS: ibuprofen (not helpful), Aleve (not helpful)  MUSCLE RELAXANTS: none  ANTI-MIGRAINE MEDS: none  ANTI-DEPRESSANTS: none  SLEEP AIDS: none  ANTI-CONVULSANTS: gabapentin (unsure if helpful)  TOPICALS: none  Other meds: Tylenol (not helpful)    Injections:  -11/10/2017 Caudal epidural steroid injection with Dr. Reymundo Bajwa (helpful for a very short period of time)  -1/5/2018 Ultrasound guided right intra-articular hip injection with Dr. Jean Meade  -1/12/2018 Right ankle injection with Dr. Lewis of podiatry.   -7/3/2018 L2-3 interlaminar epidural steroid injection with Dr Jamaal Eid (not at all helpful)  -8/3/2018 right hip steroid injection with Dr. Valverde (helped some)        Side Effects: no side effect  Patient is using the medication as prescribed: YES    Medications:  Current Outpatient Prescriptions   Medication Sig Dispense Refill     ADVIL 200 MG PO CAPS Reported on 4/20/2017       amLODIPine (NORVASC) 2.5 MG tablet TAKE 1 TABLET BY MOUTH AT BEDTIME 90 tablet 2     aspirin 162 MG EC tablet Take 162 mg by mouth daily Reported on 4/20/2017       CALCIUM 1200 OR Reported on 4/20/2017       diclofenac (VOLTAREN) 1 % GEL topical gel Apply 4 grams to right knee pain and bilateral lateral hip pain up to  four times daily as needed using enclosed dosing card. 100 g 11     gabapentin " (NEURONTIN) 300 MG capsule TAKE 1 CAPSULE (300 MG) BY MOUTH 3 TIMES DAILY 270 capsule 3     Glucosamine-Chondroit-Vit C-Mn (GLUCOSAMINE 1500 COMPLEX PO) Take 1,500 mg by mouth daily Reported on 4/20/2017       Loratadine (CLARITIN) 10 MG capsule Take 10 mg by mouth as needed Reported on 4/20/2017       losartan-hydrochlorothiazide (HYZAAR) 100-25 MG per tablet TAKE 1 TABLET BY MOUTH EVERY DAY 90 tablet 2     MULTI-VITAMIN OR TABS 1 TABLET DAILY       NONFORMULARY daily Osteo kim (joints)       NONFORMULARY 100 mg daily Magnesium Glycinate       Omega-3 Fatty Acids (SALMON OIL-1000 PO) take 1,000 Caps by mouth daily.       order for DME Equipment being ordered: 4 wheeled Rollator walker with seat. Patient would like to have a red one if possible. 1 Device 0     order for DME Equipment being ordered: front wheeled walker 1 Device 0     oxyCODONE-acetaminophen (PERCOCET) 5-325 MG per tablet Take 1-2 tablets by mouth every 8 hours as needed for pain maximum 4 tablet(s) per day 120 tablet 0     PRILOSEC OR None Entered       senna-docusate (SENOKOT-S;PERICOLACE) 8.6-50 MG per tablet Take 1 tablet by mouth 2 times daily (Patient not taking: Reported on 5/29/2018) 120 tablet 12     simvastatin (ZOCOR) 40 MG tablet Take 1 tablet (40 mg) by mouth At Bedtime 90 tablet 1     VITAMIN C OR None Entered       VITAMIN D OR Take 1,200 mg by mouth daily.       VITAMIN E 400 UNIT OR CAPS None Entered         Medical History: any changes in medical history since they were last seen? No    Social History:  Home situation: . 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  Tobacco use: none  Alcohol use: very little, about 2 drinks per month  Drug use: none  History of chemical dependency treatment: none    Is patient a current smoker or tobacco user?  no  If yes, was cessation counseling offered?  no        Review of Systems:  ROS is positive as per HPI  and is  negative for fevers, chills, sweats, or constipation, diarrhea.        Physical Exam:  Vital signs: There were no vitals taken for this visit.    Behavioral observations:  Awake and alert.accompanied today by her daughter.     Gait:  Slow and antalgic on right. Shuffling gait. Uses a walking stick    Musculoskeletal exam:    Moves very slowly in the exam room  Strength grossly equal throughout  Right lateral knee joint tenderness on palpation.     Neuro exam:  SILT extremities     Skin/vascular/autonomic:  No suspicious lesions on exposed skin    Other:  N/A    Is the patient hypertensive today? no  Hypertensive on recheck of BP?   n/a  If yes, was patient recommended to see Primary Care Provider in follow up for management of HTN?  n/a      Imaging  MRI LUMBAR SPINE WITHOUT CONTRAST   3/30/2018 1:22 PM      HISTORY:  Lumbar degenerative disc disease.     TECHNIQUE: Multiplanar multisequence MRI of the lumbar spine without  contrast.     COMPARISON: Lumbar spine x-ray 3/22/2018. Lumbar spine MR 3/17/2017.      FINDINGS: There are 5 lumbar-type vertebral bodies assumed for the  purposes of this dictation. The tip of the conus terminates at the  L1-L2 level.     There is convex right scoliotic curvature of the lumbar spine centered  at the L2-L3 level. There is mild retrolisthesis of L2 on L3 and mild  anterolisthesis of L3 on L4. There is grade 1 anterolisthesis of L4 on  L5. There is right lateral listhesis of L3 on L4. There is slight loss  of left-sided vertebral body height at L3, probably due to  degenerative changes. Severe loss of intervertebral disc height seen  at L3-L4 with associated degenerative endplate changes. Severe loss of  disc height also seen on the left at L1-L2 and L2-L3 with severe  degenerative endplate changes. Mild loss of disc height at T11-12 and  T12-L1 with disc desiccation at L4-5 and L5-S1. There is mild STIR  hyperintense marrow edema at the opposing L2-L3 endplates.  Degenerative  subchondral cysts seen anteriorly at L3.     Level by level as follows:      T12-L1:  Only seen on the lateral view, but there is a posterior disc  bulge and bilateral facet hypertrophy resulting in moderate right and  mild left neural foraminal narrowing. No significant spinal canal  narrowing.      L1-L2:  Convex right curvature with retrolisthesis and circumferential  disc bulge with endplate osteophytic spurring as well as bilateral  facet hypertrophy. Findings result in mild central spinal canal  narrowing and moderate bilateral neural foraminal narrowing.      L2-L3:  Convex right scoliotic curvature with left-sided disc bulge  and endplate osteophytic spurring as well as, left greater than right,  facet hypertrophy and ligamentum flavum infolding. Findings result in  moderate central spinal canal narrowing as well as moderate to severe  left neural foraminal narrowing. There is mild right neural foraminal  narrowing.      L3-L4:  Mild anterolisthesis along with circumferential disc bulge,  severe bilateral facet hypertrophy, and ligamentum flavum infolding.  Findings result in severe central spinal canal narrowing. There is  also severe left and moderate to severe right neural foraminal  narrowing.      L4-L5:  Anterolisthesis with uncovering of the disc and small  posterior disc bulge along with severe bilateral facet hypertrophy and  ligamentum flavum infolding. Findings result in moderate to severe  central spinal canal narrowing. There is also moderate to severe  bilateral neural foraminal narrowing. Small bilateral facet joint  effusions.      L5-S1:  Mild posterior disc bulge and marked bilateral facet  hypertrophy results in moderate bilateral neural foraminal narrowing,  right greater than left. No significant central spinal canal  narrowing.      Paraspinous soft tissues:  Normal.          IMPRESSION:    1. Severe multilevel degenerative changes throughout the lumbar spine  as described above.  Overall, there is no significant change since  previous MR 3/17/2017.  2. Severe central spinal canal narrowing at L3-L4 and moderate to  severe spinal canal narrowing at L4-L5.  3. Convex right scoliotic curvature of the spine with multiple levels  of spondylolisthesis and right lateral listhesis of L3 on L4.     DIANA HERNANDEZ MD    [place] Prescription Monitoring Program:    Reviewed regular fills of pain medication from PCP      Assessment:   1. Spinal stenosis of lumbar region with neurogenic claudication  2. DDD lumbar  3. Chronic low back pain, axial  4. Chronic right knee pain, particularly on the lateral joint line  5. Right knee primary osteoarthritis    Plan:   1. Physical Therapy:  The patient will schedule an appointment with outside physical therapist, [facility]  2. Clinical Health Psychologist: None needed at present  3. Diagnostic Studies:  None  4. Medication Management:    1. Continue Percocet per Primary Care Provider   2. Continue Gabapentin 300mg TID  3. Agree with medical cannabis per dispensary instructions  5. Further procedures recommended: none  6. Referral to see Dr. Erin Valencia, neurosurgeon at [facility] at the  of  for possible surgery  7. Recommendations to PCP: See above  8. Follow up: 10 weeks.    Total time spent face to face was 30 minutes and more than 50% of face to face time was spent in counseling and/or coordination of care regarding the diagnosis and recommendations above      Marion SANCHES, RN CNP, FNP  [facility]

## 2018-09-21 ENCOUNTER — OFFICE VISIT (OUTPATIENT)
Dept: INTERNAL MEDICINE | Facility: CLINIC | Age: 83
End: 2018-09-21
Payer: COMMERCIAL

## 2018-09-21 VITALS
RESPIRATION RATE: 16 BRPM | BODY MASS INDEX: 24.91 KG/M2 | WEIGHT: 134 LBS | SYSTOLIC BLOOD PRESSURE: 124 MMHG | DIASTOLIC BLOOD PRESSURE: 69 MMHG | HEART RATE: 106 BPM | TEMPERATURE: 97.5 F

## 2018-09-21 DIAGNOSIS — H61.21 IMPACTED CERUMEN OF RIGHT EAR: ICD-10-CM

## 2018-09-21 DIAGNOSIS — E09.9 STEROID-INDUCED DIABETES MELLITUS (H): ICD-10-CM

## 2018-09-21 DIAGNOSIS — I10 HYPERTENSION GOAL BP (BLOOD PRESSURE) < 140/90: ICD-10-CM

## 2018-09-21 DIAGNOSIS — M48.062 SPINAL STENOSIS OF LUMBAR REGION WITH NEUROGENIC CLAUDICATION: ICD-10-CM

## 2018-09-21 DIAGNOSIS — G89.4 CHRONIC PAIN DISORDER: Primary | ICD-10-CM

## 2018-09-21 DIAGNOSIS — T38.0X5A STEROID-INDUCED DIABETES MELLITUS (H): ICD-10-CM

## 2018-09-21 DIAGNOSIS — M47.817 DJD (DEGENERATIVE JOINT DISEASE), LUMBOSACRAL: ICD-10-CM

## 2018-09-21 DIAGNOSIS — M47.816 ARTHRITIS, LUMBAR SPINE: ICD-10-CM

## 2018-09-21 LAB
ANION GAP SERPL CALCULATED.3IONS-SCNC: 10 MMOL/L (ref 3–14)
BUN SERPL-MCNC: 29 MG/DL (ref 7–30)
CALCIUM SERPL-MCNC: 9.9 MG/DL (ref 8.5–10.1)
CHLORIDE SERPL-SCNC: 104 MMOL/L (ref 94–109)
CO2 SERPL-SCNC: 25 MMOL/L (ref 20–32)
CREAT SERPL-MCNC: 1.05 MG/DL (ref 0.52–1.04)
GFR SERPL CREATININE-BSD FRML MDRD: 50 ML/MIN/1.7M2
GLUCOSE SERPL-MCNC: 124 MG/DL (ref 70–99)
HBA1C MFR BLD: 5.8 % (ref 0–5.6)
POTASSIUM SERPL-SCNC: 3.7 MMOL/L (ref 3.4–5.3)
SODIUM SERPL-SCNC: 139 MMOL/L (ref 133–144)

## 2018-09-21 PROCEDURE — 99214 OFFICE O/P EST MOD 30 MIN: CPT | Performed by: INTERNAL MEDICINE

## 2018-09-21 PROCEDURE — 36415 COLL VENOUS BLD VENIPUNCTURE: CPT | Performed by: INTERNAL MEDICINE

## 2018-09-21 PROCEDURE — 83036 HEMOGLOBIN GLYCOSYLATED A1C: CPT | Performed by: INTERNAL MEDICINE

## 2018-09-21 PROCEDURE — 80048 BASIC METABOLIC PNL TOTAL CA: CPT | Performed by: INTERNAL MEDICINE

## 2018-09-21 RX ORDER — OXYCODONE AND ACETAMINOPHEN 5; 325 MG/1; MG/1
1 TABLET ORAL EVERY 6 HOURS PRN
Qty: 120 TABLET | Refills: 0 | Status: SHIPPED | OUTPATIENT
Start: 2018-09-21 | End: 2019-02-15

## 2018-09-21 NOTE — MR AVS SNAPSHOT
After Visit Summary   9/21/2018    Teresa Lopez    MRN: [id number]           Patient Information     Date Of Birth          1935        Visit Information        Provider Department      9/21/2018 10:00 AM Tej Engle MD [facility]        Today's Diagnoses     Chronic pain disorder    -  1    DJD (degenerative joint disease), lumbosacral        Spinal stenosis of lumbar region with neurogenic claudication        Hypertension goal BP (blood pressure) < 140/90        Steroid-induced diabetes mellitus (H)        Impacted cerumen of right ear           Follow-ups after your visit        Your next 10 appointments already scheduled     Sep 24, 2018 11:30 AM CDT   (Arrive by 11:15 AM)   [facility]    [address] [place] [id number] [phone]           How do I prepare for my exam? (Food and drink instructions) No Food and Drink Restrictions.  How do I prepare for my exam? (Other instructions) Please do not take any of the following 24 hours prior to the day of your exam: vitamins, calcium tablets, antacids.  What should I wear: If possible, please wear clothes without metal (snaps, zippers). A sweat suit works well.  How long does the exam take: The exam takes about 20 minutes.  What should I bring: Bring a list of your current medicines to your exam (including vitamins, minerals and over-the-counter drugs).  Do I need a :  No  is needed.  What should I do after the exam: No restrictions, You may resume normal activities.  How do I prepare for my exam? (Food and drink instructions) A DEXA scan is a bone-density scan. It uses a low level of radiation to check the strength of your bones. As you lie on a padded table, a machine will take X-rays. We most often scan the hips and lower spine.  Who should I call with questions: If you have any questions, please call the Imaging Department  where you will have your exam. Directions, parking instructions, and other information is available on our website, [email or web address].              Who to contact     Normal or non-critical lab and imaging results will be communicated to you by Tanslerhart, letter or phone within 4 business days after the clinic has received the results. If you do not hear from us within 7 days, please contact the clinic through RiparAutOnlinet or phone. If you have a critical or abnormal lab result, we will notify you by phone as soon as possible.  Submit refill requests through [employer]erian or call your pharmacy and they will forward the refill request to us. Please allow 3 business days for your refill to be completed.          If you need to speak with a  for additional information , please call: [phone]             Additional Information About Your Visit        Sumerian Information     S[employer]ian gives you secure access to your electronic health record. If you see a primary care provider, you can also send messages to your care team and make appointments. If you have questions, please call your primary care clinic.  If you do not have a primary care provider, please call [phone] and they will assist you.        Care EveryWhere ID     This is your Care EveryWhere ID. This could be used by other organizations to access your [place] medical records  [id number]        Your Vitals Were     Pulse Temperature Respirations BMI (Body Mass Index)          106 97.5  F (36.4  C) (Tympanic) 16 24.91 kg/m2         Blood Pressure from Last 3 Encounters:   09/21/18 124/69   09/19/18 134/90   08/03/18 148/87    Weight from Last 3 Encounters:   09/21/18 134 lb (60.8 kg)   09/19/18 134 lb (60.8 kg)   08/03/18 141 lb (64 kg)              We Performed the Following     Basic metabolic panel     Hemoglobin A1c     REMOVE IMPACTED CERUMEN          Today's Medication Changes          These changes are accurate as of 9/21/18 11:02 AM.  If  you have any questions, ask your nurse or doctor.               These medicines have changed or have updated prescriptions.        Dose/Directions    oxyCODONE-acetaminophen 5-325 MG per tablet   Commonly known as:  PERCOCET   This may have changed:    - how much to take  - when to take this   Used for:  DJD (degenerative joint disease), lumbosacral, Spinal stenosis of lumbar region with neurogenic claudication   Changed by:  Tej Engle MD        Dose:  1 tablet   Take 1 tablet by mouth every 6 hours as needed for pain maximum 4 tablet(s) per day   Quantity:  120 tablet   Refills:  0         Stop taking these medicines if you haven't already. Please contact your care team if you have questions.     diclofenac 1 % Gel topical gel   Commonly known as:  VOLTAREN   Stopped by:  Tej Engle MD                Where to get your medicines      Some of these will need a paper prescription and others can be bought over the counter.  Ask your nurse if you have questions.     Bring a paper prescription for each of these medications     oxyCODONE-acetaminophen 5-325 MG per tablet               Information about OPIOIDS     PRESCRIPTION OPIOIDS: WHAT YOU NEED TO KNOW   We gave you an opioid (narcotic) pain medicine. It is important to manage your pain, but opioids are not always the best choice. You should first try all the other options your care team gave you. Take this medicine for as short a time (and as few doses) as possible.    Some activities can increase your pain, such as bandage changes or therapy sessions. It may help to take your pain medicine 30 to 60 minutes before these activities. Reduce your stress by getting enough sleep, working on hobbies you enjoy and practicing relaxation or meditation. Talk to your care team about ways to manage your pain beyond prescription opioids.    These medicines have risks:    DO NOT drive when on new or higher doses of pain medicine. These medicines can affect your alertness  and reaction times, and you could be arrested for driving under the influence (DUI). If you need to use opioids long-term, talk to your care team about driving.    DO NOT operate heavy machinery    DO NOT do any other dangerous activities while taking these medicines.    DO NOT drink any alcohol while taking these medicines.     If the opioid prescribed includes acetaminophen, DO NOT take with any other medicines that contain acetaminophen. Read all labels carefully. Look for the word  acetaminophen  or  Tylenol.  Ask your pharmacist if you have questions or are unsure.    You can get addicted to pain medicines, especially if you have a history of addiction (chemical, alcohol or substance dependence). Talk to your care team about ways to reduce this risk.    All opioids tend to cause constipation. Drink plenty of water and eat foods that have a lot of fiber, such as fruits, vegetables, prune juice, apple juice and high-fiber cereal. Take a laxative (Miralax, milk of magnesia, Colace, Senna) if you don t move your bowels at least every other day. Other side effects include upset stomach, sleepiness, dizziness, throwing up, tolerance (needing more of the medicine to have the same effect), physical dependence and slowed breathing.    Store your pills in a secure place, locked if possible. We will not replace any lost or stolen medicine. If you don t finish your medicine, please throw away (dispose) as directed by your pharmacist. The [place] Pollution Control Agency has more information about safe disposal: https://www.pca.[email or web address]/living-[email or web address]/[email or web address]-unwanted-medications         Primary Care Provider Office Phone # Fax #    MYRON Bsoe [facility] [phone]       [address] [place] [id number]        Equal Access to Services     ALTHEA MAYS AH: Nani Gallegos, gualberto ugarte, lori phillips. So [facility]  [phone].    ATENCIÓN: Si addis matthew, tiene a brambila disposición servicios gratuitos de asistencia lingüística. Danuta amos [phone].    We comply with applicable federal civil rights laws and [place] laws. We do not discriminate on the basis of race, color, national origin, age, disability, sex, sexual orientation, or gender identity.            Thank you!     Thank you for choosing [facility]  for your care. Our goal is always to provide you with excellent care. Hearing back from our patients is one way we can continue to improve our services. Please take a few minutes to complete the written survey that you may receive in the mail after your visit with us. Thank you!             Your Updated Medication List - Protect others around you: Learn how to safely use, store and throw away your medicines at www.disposemymeds.org.          This list is accurate as of 9/21/18 11:02 AM.  Always use your most recent med list.                   Brand Name Dispense Instructions for use Diagnosis    ADVIL 200 MG capsule   Generic drug:  ibuprofen      Reported on 4/20/2017        amLODIPine 2.5 MG tablet    NORVASC    90 tablet    TAKE 1 TABLET BY MOUTH AT BEDTIME    Hypertension goal BP (blood pressure) < 140/90       aspirin 162 MG EC tablet      Take 162 mg by mouth daily Reported on 4/20/2017        CALCIUM 1200 PO      Reported on 4/20/2017        CLARITIN 10 MG capsule   Generic drug:  loratadine      Take 10 mg by mouth as needed Reported on 4/20/2017        gabapentin 300 MG capsule    NEURONTIN    270 capsule    TAKE 1 CAPSULE (300 MG) BY MOUTH 3 TIMES DAILY    DJD (degenerative joint disease), lumbosacral, Spinal stenosis of lumbar region with neurogenic claudication       GLUCOSAMINE 1500 COMPLEX PO      Take 1,500 mg by mouth daily Reported on 4/20/2017        losartan-hydrochlorothiazide 100-25 MG per tablet    HYZAAR    90 tablet    TAKE 1 TABLET BY MOUTH EVERY DAY    Hypertension goal BP (blood  pressure) < 140/90       medical cannabis (Patient's own supply.  Not a prescription)      See Admin Instructions (This is NOT a prescription, and does not certify that the patient has a qualifying medical condition for medical cannabis.  The purpose of this order is  to document that the patient reports taking medical cannabis.)        Multi-vitamin Tabs tablet   Generic drug:  multivitamin, therapeutic with minerals      1 TABLET DAILY        NONFORMULARY      daily Osteo kim (joints)        NONFORMULARY      100 mg daily Magnesium Glycinate        order for DME     1 Device    Equipment being ordered: front wheeled walker    Altered gait, Physical deconditioning, Chronic right hip pain, Spinal stenosis of lumbar region with neurogenic claudication, SI (sacroiliac) joint dysfunction, Piriformis syndrome of both sides       order for DME     1 Device    Equipment being ordered: 4 wheeled Rollator walker with seat. Patient would like to have a red one if possible.    Altered gait, At risk for falling, Chronic bilateral low back pain with right-sided sciatica, Greater trochanteric bursitis of both hips, Chronic pain of right knee, Primary osteoarthritis of right knee       oxyCODONE-acetaminophen 5-325 MG per tablet    PERCOCET    120 tablet    Take 1 tablet by mouth every 6 hours as needed for pain maximum 4 tablet(s) per day    DJD (degenerative joint disease), lumbosacral, Spinal stenosis of lumbar region with neurogenic claudication       PRILOSEC PO      None Entered        SALMON OIL-1000 PO      take 1,000 Caps by mouth daily.        senna-docusate 8.6-50 MG per tablet    SENOKOT-S;PERICOLACE    120 tablet    Take 1 tablet by mouth 2 times daily    Slow transit constipation       simvastatin 40 MG tablet    ZOCOR    90 tablet    Take 1 tablet (40 mg) by mouth At Bedtime    Other hyperlipidemia       VITAMIN C PO      None Entered        VITAMIN D PO      Take 1,200 mg by mouth daily.        vitamin E 400 UNIT  capsule      None Entered

## 2018-09-21 NOTE — PROGRESS NOTES
SUBJECTIVE:   Teresa Lopez is a 83 year old female who presents to clinic today for the following health issues:      Chronic pain  Back pain  Hip pain  Right knee pain here for a f/u  Mood down, frustrated with pain and not going out to move and enjoy life anymore  Finds Biofreeze more helpful than Voltaren now  Using 3-4 Percocet daily - bowels are moving  Tolerating medical cannabis and gabapentin thus far as well also though neither clearly and reliably associated with symptomatic improvement     Ear   Right ear check - pressure and mildly diminished hearing  Otherwise feeling OK      1. Chronic pain disorder    2. DJD (degenerative joint disease), lumbosacral    3. Spinal stenosis of lumbar region with neurogenic claudication    4. Hypertension goal BP (blood pressure) < 140/90    5. Steroid-induced diabetes mellitus (H)    6. Impacted cerumen of right ear    7. Arthritis, lumbar spine (H)        PMH: Updated and/or reviewed in chart.    PSH: Updated and/or reviewed in chart.    Family History: Updated and/or reviewed in chart.     ROS:  Constitutional, neuro, gastrointestinal, genitourinary, musculoskeletal, integument and psychiatric systems are otherwise negative.    OBJECTIVE:                                                    /69  Pulse 106  Temp 97.5  F (36.4  C) (Tympanic)  Resp 16  Wt 134 lb (60.8 kg)  BMI 24.91 kg/m2    GEN: No acute distress, older woman in mild pain otherwise no acute distress   EYES: No conjunctival injection or icterus, EOMI grossly intact  RESP: Unlabored, regular  NEURO: gait not visualized, walker in room, MAEx4, light touch sensation grossly intact  PSYCH: mildly depressed mood and congruous affect      Results for orders placed or performed in visit on 09/21/18   Hemoglobin A1c   Result Value Ref Range    Hemoglobin A1C 5.8 (H) 0 - 5.6 %      ASSESSMENT/PLAN:                                                    1. Chronic pain disorder  2. DJD (degenerative joint  disease), lumbosacral  3. Spinal stenosis of lumbar region with neurogenic claudication  7. Arthritis, lumbar spine (H)  We discussed pain control strategy.  I support her excellent pain clinic plan and evaluation by neurosurgery.  She is overall tolerating medication regimen adequately and if only a very invasive and risky surgery may provide relief, she will consider risks, benefits, and alternatives if offered surgery.  There appears to be plenty of room for up-titration of cannabis products and gabapentin in no clear benefit from surgery.  OK to continue narcotic, tolerating well.  Patient agreed with plan and demonstrated understanding to contact us for help if not improving or sooner if worsening or if other questions or concerns arise.  Appreciate treatment team assistance on this very nice older lady.  - oxyCODONE-acetaminophen (PERCOCET) 5-325 MG per tablet; Take 1 tablet by mouth every 6 hours as needed for pain maximum 4 tablet(s) per day  Dispense: 120 tablet; Refill: 0    4. Hypertension goal BP (blood pressure) < 140/90  At goal  - Basic metabolic panel    5. Steroid-induced diabetes mellitus (H)  Very mild - monitor casually  - Hemoglobin A1c    6. Impacted cerumen of right ear  - REMOVE IMPACTED CERUMEN         Orders Placed This Encounter     REMOVE IMPACTED CERUMEN     Basic metabolic panel     Hemoglobin A1c     oxyCODONE-acetaminophen (PERCOCET) 5-325 MG per tablet              Tej Engle MD

## 2018-11-16 ENCOUNTER — OFFICE VISIT (OUTPATIENT)
Dept: INTERNAL MEDICINE | Facility: CLINIC | Age: 83
End: 2018-11-16
Payer: COMMERCIAL

## 2018-11-16 VITALS
TEMPERATURE: 98.3 F | WEIGHT: 129 LBS | OXYGEN SATURATION: 95 % | HEART RATE: 96 BPM | DIASTOLIC BLOOD PRESSURE: 69 MMHG | SYSTOLIC BLOOD PRESSURE: 113 MMHG | RESPIRATION RATE: 16 BRPM | BODY MASS INDEX: 23.98 KG/M2

## 2018-11-16 DIAGNOSIS — F11.20 CONTINUOUS OPIOID DEPENDENCE (H): ICD-10-CM

## 2018-11-16 DIAGNOSIS — F32.1 MODERATE MAJOR DEPRESSION (H): ICD-10-CM

## 2018-11-16 DIAGNOSIS — Z79.899 MEDICAL CANNABIS USE: ICD-10-CM

## 2018-11-16 DIAGNOSIS — M48.062 SPINAL STENOSIS OF LUMBAR REGION WITH NEUROGENIC CLAUDICATION: Primary | ICD-10-CM

## 2018-11-16 PROCEDURE — 99215 OFFICE O/P EST HI 40 MIN: CPT | Performed by: INTERNAL MEDICINE

## 2018-11-16 RX ORDER — ESCITALOPRAM OXALATE 20 MG/1
TABLET ORAL
Qty: 30 TABLET | Refills: 0 | Status: SHIPPED | OUTPATIENT
Start: 2018-11-16 | End: 2018-11-28

## 2018-11-16 ASSESSMENT — PATIENT HEALTH QUESTIONNAIRE - PHQ9
SUM OF ALL RESPONSES TO PHQ QUESTIONS 1-9: 15
5. POOR APPETITE OR OVEREATING: NOT AT ALL

## 2018-11-16 ASSESSMENT — ANXIETY QUESTIONNAIRES
5. BEING SO RESTLESS THAT IT IS HARD TO SIT STILL: NOT AT ALL
6. BECOMING EASILY ANNOYED OR IRRITABLE: MORE THAN HALF THE DAYS
IF YOU CHECKED OFF ANY PROBLEMS ON THIS QUESTIONNAIRE, HOW DIFFICULT HAVE THESE PROBLEMS MADE IT FOR YOU TO DO YOUR WORK, TAKE CARE OF THINGS AT HOME, OR GET ALONG WITH OTHER PEOPLE: NOT DIFFICULT AT ALL
2. NOT BEING ABLE TO STOP OR CONTROL WORRYING: SEVERAL DAYS
GAD7 TOTAL SCORE: 6
7. FEELING AFRAID AS IF SOMETHING AWFUL MIGHT HAPPEN: NOT AT ALL
1. FEELING NERVOUS, ANXIOUS, OR ON EDGE: SEVERAL DAYS
3. WORRYING TOO MUCH ABOUT DIFFERENT THINGS: MORE THAN HALF THE DAYS

## 2018-11-16 NOTE — PROGRESS NOTES
SUBJECTIVE:   Teresa Lopez is a 83 year old female who presents to clinic today for the following health issues:      Discuss pain options  Significant back pain   Using medical cannabis  Doesn't like having to use oxycodone but notes withdrawal and feeling awful if she misses a dose  Would like to be more active  Tolerating medications well otherwise and well supported by both daughters with her today    Social History     Social History Narrative    Teresa (pronounced JOE-elizabeth)    In [facility] assisted [facility] next door    History of polka, schottisch, other dancing    2 daughters in area     1. Spinal stenosis of lumbar region with neurogenic claudication    2. Medical cannabis use    3. Moderate major depression (H)    4. Continuous opioid dependence (H)        PMH: Updated and/or reviewed in chart.    ROS:  Constitutional, neuro, EMT, endocrine, pulmonary, cardiac, gastrointestinal, genitourinary, musculoskeletal, integument and psychiatric systems are otherwise negative.    OBJECTIVE:                                                    /69  Pulse 96  Temp 98.3  F (36.8  C) (Tympanic)  Resp 16  Wt 129 lb (58.5 kg)  SpO2 95%  BMI 23.98 kg/m2    GEN: No acute distress  EYES: No conjunctival injection or icterus, EOMI grossly intact  RESP: Unlabored, regular  NEURO: Normal gait, MAEx4, light touch sensation grossly intact  PSYCH: Normal-to-depressed mood and normal-to-flat affect    Results for orders placed or performed in visit on 09/21/18   Basic metabolic panel   Result Value Ref Range    Sodium 139 133 - 144 mmol/L    Potassium 3.7 3.4 - 5.3 mmol/L    Chloride 104 94 - 109 mmol/L    Carbon Dioxide 25 20 - 32 mmol/L    Anion Gap 10 3 - 14 mmol/L    Glucose 124 (H) 70 - 99 mg/dL    Urea Nitrogen 29 7 - 30 mg/dL    Creatinine 1.05 (H) 0.52 - 1.04 mg/dL    GFR Estimate 50 (L) >60 mL/min/1.7m2    GFR Estimate If Black 61 >60 mL/min/1.7m2    Calcium 9.9 8.5 - 10.1 mg/dL    Hemoglobin A1c   Result Value Ref Range    Hemoglobin A1C 5.8 (H) 0 - 5.6 %      ASSESSMENT/PLAN:                                                    3. Moderate major depression (H)  Discussed risks, benefits, and alternatives of depression treatment with chronic pain.  Patient agreed to trial serotonin specific reuptake inhibitor.  Follow-up 6 weeks.  Consider switch to duloxetine given pain history if mixed response.  Daughters supportive.  - escitalopram (LEXAPRO) 20 MG tablet; Take 1/2 tablet (10 mg) for 1-2 weeks, then increase to 1 tablet orally daily  Dispense: 30 tablet; Refill: 0    1. Spinal stenosis of lumbar region with neurogenic claudication  2. Medical cannabis use  4. Continuous opioid dependence (H)  Continue with pain management including oxycodone and medical cannabis as well as gabapentin and improved social engagement.       Orders Placed This Encounter     DEPRESSION ACTION PLAN (DAP)     escitalopram (LEXAPRO) 20 MG tablet        I spent a total of 40 minutes with the patient of which greater than 50% were devoted to counseling and coordination of care: depression in chronic pain.        Tej Engle MD

## 2018-11-16 NOTE — LETTER
My Depression Action Plan  Name: Teresa Lopez   Date of Birth 1935  Date: 11/16/2018    My doctor: Demetrice Gutierrez   [facility]  [address]  [place] [id number] [phone]          GREEN    ZONE   Good Control    What it looks like:     Things are going generally well. You have normal up s and down s. You may even feel depressed from time to time, but bad moods usually last less than a day.   What you need to do:  1. Continue to care for yourself (see self care plan)  2. Check your depression survival kit and update it as needed  3. Follow your physician s recommendations including any medication.  4. Do not stop taking medication unless you consult with your physician first.           YELLOW         ZONE Getting Worse    What it looks like:     Depression is starting to interfere with your life.     It may be hard to get out of bed; you may be starting to isolate yourself from others.    Symptoms of depression are starting to last most all day and this has happened for several days.     You may have suicidal thoughts but they are not constant.   What you need to do:     1. Call your care team, your response to treatment will improve if you keep your care team informed of your progress. Yellow periods are signs an adjustment may need to be made.     2. Continue your self-care, even if you have to fake it!    3. Talk to someone in your support network    4. Open up your depression survival kit           RED    ZONE Medical Alert - Get Help    What it looks like:     Depression is seriously interfering with your life.     You may experience these or other symptoms: You can t get out of bed most days, can t work or engage in other necessary activities, you have trouble taking care of basic hygiene, or basic responsibilities, thoughts of suicide or death that will not go away, self-injurious behavior.     What you need to do:  1. Call your care team  and request a same-day appointment. If they are not available (weekends or after hours) call your local crisis line, emergency room or 911.            Depression Self Care Plan / Survival Kit    Self-Care for Depression  Here s the deal. Your body and mind are really not as separate as most people think.  What you do and think affects how you feel and how you feel influences what you do and think. This means if you do things that people who feel good do, it will help you feel better.  Sometimes this is all it takes.  There is also a place for medication and therapy depending on how severe your depression is, so be sure to consult with your medical provider and/ or Behavioral Health Consultant if your symptoms are worsening or not improving.     In order to better manage my stress, I will:    Exercise  Get some form of exercise, every day. This will help reduce pain and release endorphins, the  feel good  chemicals in your brain. This is almost as good as taking antidepressants!  This is not the same as joining a gym and then never going! (they count on that by the way ) It can be as simple as just going for a walk or doing some gardening, anything that will get you moving.      Hygiene   Maintain good hygiene (Get out of bed in the morning, Make your bed, Brush your teeth, Take a shower, and Get dressed like you were going to work, even if you are unemployed).  If your clothes don't fit try to get ones that do.    Diet  I will strive to eat foods that are good for me, drink plenty of water, and avoid excessive sugar, caffeine, alcohol, and other mood-altering substances.  Some foods that are helpful in depression are: complex carbohydrates, B vitamins, flaxseed, fish or fish oil, fresh fruits and vegetables.    Psychotherapy  I agree to participate in Individual Therapy (if recommended).    Medication  If prescribed medications, I agree to take them.  Missing doses can result in serious side effects.  I understand  that drinking alcohol, or other illicit drug use, may cause potential side effects.  I will not stop my medication abruptly without first discussing it with my provider.    Staying Connected With Others  I will stay in touch with my friends, family members, and my primary care provider/team.    Use your imagination  Be creative.  We all have a creative side; it doesn t matter if it s oil painting, sand castles, or mud pies! This will also kick up the endorphins.    Witness Beauty  (AKA stop and smell the roses) Take a look outside, even in mid-winter. Notice colors, textures. Watch the squirrels and birds.     Service to others  Be of service to others.  There is always someone else in need.  By helping others we can  get out of ourselves  and remember the really important things.  This also provides opportunities for practicing all the other parts of the program.    Humor  Laugh and be silly!  Adjust your TV habits for less news and crime-drama and more comedy.    Control your stress  Try breathing deep, massage therapy, biofeedback, and meditation. Find time to relax each day.     My support system    Clinic Contact:  Phone number:    Contact 1:  Phone number:    Contact 2:  Phone number:    Mandaen/:  Phone number:    Therapist:  Phone number:    Local crisis center:    Phone number:    Other community support:  Phone number:

## 2018-11-16 NOTE — MR AVS SNAPSHOT
After Visit Summary   11/16/2018    Teresa Lopez    MRN: [id number]           Patient Information     Date Of Birth          1935        Visit Information        Provider Department      11/16/2018 2:30 PM Tej Engle MD [facility]        Today's Diagnoses     Spinal stenosis of lumbar region with neurogenic claudication    -  1    Medical cannabis use        Moderate major depression (H)           Follow-ups after your visit        Follow-up notes from your care team     Return in about 6 weeks (around 12/28/2018) for Depression follow-up.      Your next 10 appointments already scheduled     Nov 29, 2018 11:00 AM CST   (Arrive by 10:45 AM)   New Patient Visit with Erin Valencia MD   [facility] ([facility])    [address]  3rd [address]  [place] [address] [phone]            Jan 02, 2019 11:30 AM CST   Office Visit with Tej Engle MD   [facility] ([facility]    [id number] [place] [id number] [phone]           Bring a current list of meds and any records pertaining to this visit. For Physicals, please bring immunization records and any forms needing to be filled out. Please arrive 10 minutes early to complete paperwork.              Who to contact     Normal or non-critical lab and imaging results will be communicated to you by MyChart, letter or phone within 4 business days after the clinic has received the results. If you do not hear from us within 7 days, please contact the clinic through MyChart or phone. If you have a critical or abnormal lab result, we will notify you by phone as soon as possible.  Submit refill requests through [employer] or call your pharmacy and they will forward the refill request to us. Please allow 3 business days for your refill to be completed.          If you need to speak with a  for additional information , please call:  [phone]             Additional Information About Your Visit        [employer] Information     [employer] gives you secure access to your electronic health record. If you see a primary care provider, you can also send messages to your care team and make appointments. If you have questions, please call your primary care clinic.  If you do not have a primary care provider, please call [phone] and they will assist you.        Care EveryWhere ID     This is your Care EveryWhere ID. This could be used by other organizations to access your [facility] medical records  [id number]        Your Vitals Were     Pulse Temperature Respirations Pulse Oximetry BMI (Body Mass Index)       96 98.3  F (36.8  C) (Tympanic) 16 95% 23.98 kg/m2        Blood Pressure from Last 3 Encounters:   11/16/18 113/69   09/21/18 124/69   09/19/18 134/90    Weight from Last 3 Encounters:   11/16/18 129 lb (58.5 kg)   09/21/18 134 lb (60.8 kg)   09/19/18 134 lb (60.8 kg)              We Performed the Following     DEPRESSION ACTION PLAN (DAP)          Today's Medication Changes          These changes are accurate as of 11/16/18  3:47 PM.  If you have any questions, ask your nurse or doctor.               Start taking these medicines.        Dose/Directions    escitalopram 20 MG tablet   Commonly known as:  LEXAPRO   Used for:  Moderate major depression (H)   Started by:  Tej Engle MD        Take 1/2 tablet (10 mg) for 1-2 weeks, then increase to 1 tablet orally daily   Quantity:  30 tablet   Refills:  0         These medicines have changed or have updated prescriptions.        Dose/Directions    oxyCODONE-acetaminophen 5-325 MG per tablet   Commonly known as:  PERCOCET   This may have changed:    - when to take this  - additional instructions   Used for:  DJD (degenerative joint disease), lumbosacral, Spinal stenosis of lumbar region with neurogenic claudication        Dose:  1 tablet   Take 1 tablet by mouth every 6 hours as needed for pain  maximum 4 tablet(s) per day   Quantity:  120 tablet   Refills:  0            Where to get your medicines      These medications were sent to [facility]/pharmacy #[id number] - [place], [place] - [address] [facility] [address]     Phone:  [phone]     escitalopram 20 MG tablet                Primary Care Provider Office Phone # Fax #    Demetrice Gutierrez, APRN -[phone] [address] [facility] [address]        Equal Access to Services     [facility]: Hadii aad ku hadasho Soomaali, waaxda luqadaha, qaybta kaalmada adeegyada, waxay esmein hayolegario mccracken . So [facility] [phone].    ATENCIÓN: Si habla español, tiene a brambila disposición servicios gratuitos de asistencia lingüística. Danuta al [phone].    We comply with applicable federal civil rights laws and [place] laws. We do not discriminate on the basis of race, color, national origin, age, disability, sex, sexual orientation, or gender identity.            Thank you!     Thank you for choosing [facility]  for your care. Our goal is always to provide you with excellent care. Hearing back from our patients is one way we can continue to improve our services. Please take a few minutes to complete the written survey that you may receive in the mail after your visit with us. Thank you!             Your Updated Medication List - Protect others around you: Learn how to safely use, store and throw away your medicines at www.disposemymeds.org.          This list is accurate as of 11/16/18  3:47 PM.  Always use your most recent med list.                   Brand Name Dispense Instructions for use Diagnosis    ADVIL 200 MG capsule   Generic drug:  ibuprofen      Reported on 4/20/2017        amLODIPine 2.5 MG tablet    NORVASC    90 tablet    TAKE 1 TABLET BY MOUTH AT BEDTIME    Hypertension goal BP (blood pressure) < 140/90       aspirin 162 MG EC tablet      Take 162 mg by  mouth daily Reported on 4/20/2017        CALCIUM 1200 PO      Reported on 4/20/2017        CLARITIN 10 MG capsule   Generic drug:  loratadine      Take 10 mg by mouth as needed Reported on 4/20/2017        escitalopram 20 MG tablet    LEXAPRO    30 tablet    Take 1/2 tablet (10 mg) for 1-2 weeks, then increase to 1 tablet orally daily    Moderate major depression (H)       gabapentin 300 MG capsule    NEURONTIN    270 capsule    TAKE 1 CAPSULE (300 MG) BY MOUTH 3 TIMES DAILY    DJD (degenerative joint disease), lumbosacral, Spinal stenosis of lumbar region with neurogenic claudication       GLUCOSAMINE 1500 COMPLEX PO      Take 1,500 mg by mouth daily Reported on 4/20/2017        losartan-hydrochlorothiazide 100-25 MG per tablet    HYZAAR    90 tablet    TAKE 1 TABLET BY MOUTH EVERY DAY    Hypertension goal BP (blood pressure) < 140/90       medical cannabis (Patient's own supply.  Not a prescription)      See Admin Instructions (This is NOT a prescription, and does not certify that the patient has a qualifying medical condition for medical cannabis.  The purpose of this order is  to document that the patient reports taking medical cannabis.)        Multi-vitamin Tabs tablet   Generic drug:  multivitamin, therapeutic with minerals      1 TABLET DAILY        NONFORMULARY      daily Osteo kim (joints)        NONFORMULARY      100 mg daily Magnesium Glycinate        order for DME     1 Device    Equipment being ordered: front wheeled walker    Altered gait, Physical deconditioning, Chronic right hip pain, Spinal stenosis of lumbar region with neurogenic claudication, SI (sacroiliac) joint dysfunction, Piriformis syndrome of both sides       order for DME     1 Device    Equipment being ordered: 4 wheeled Rollator walker with seat. Patient would like to have a red one if possible.    Altered gait, At risk for falling, Chronic bilateral low back pain with right-sided sciatica, Greater trochanteric bursitis of both hips,  Chronic pain of right knee, Primary osteoarthritis of right knee       oxyCODONE-acetaminophen 5-325 MG per tablet    PERCOCET    120 tablet    Take 1 tablet by mouth every 6 hours as needed for pain maximum 4 tablet(s) per day    DJD (degenerative joint disease), lumbosacral, Spinal stenosis of lumbar region with neurogenic claudication       PRILOSEC PO      None Entered        SALMON OIL-1000 PO      take 1,000 Caps by mouth daily.        senna-docusate 8.6-50 MG per tablet    SENOKOT-S;PERICOLACE    120 tablet    Take 1 tablet by mouth 2 times daily    Slow transit constipation       simvastatin 40 MG tablet    ZOCOR    90 tablet    Take 1 tablet (40 mg) by mouth At Bedtime    Other hyperlipidemia       VITAMIN C PO      None Entered        VITAMIN D PO      Take 1,200 mg by mouth daily.        vitamin E 400 UNIT capsule      None Entered

## 2018-11-17 ASSESSMENT — ANXIETY QUESTIONNAIRES: GAD7 TOTAL SCORE: 6

## 2018-11-27 ENCOUNTER — TELEPHONE (OUTPATIENT)
Dept: INTERNAL MEDICINE | Facility: CLINIC | Age: 83
End: 2018-11-27

## 2018-11-27 DIAGNOSIS — G89.4 CHRONIC PAIN DISORDER: Primary | ICD-10-CM

## 2018-11-27 NOTE — TELEPHONE ENCOUNTER
Patient states she was put on a new medication escitalopram, states since taking this she has been feeling warm and sweating also has nausea last two days. Would like nurse to call to discuss.

## 2018-11-27 NOTE — TELEPHONE ENCOUNTER
Spoke with patient and she reports she has been feeling sick since starting this new medication, she will wake in morning feeling warm, sweaty and nauseated.  She takes the med in the morning and is only taking 1/2 tab daily. She does not want to continue the lexapro.  Switch to Duloxetine,as per note?  Ena Gil RN

## 2018-11-28 RX ORDER — DULOXETIN HYDROCHLORIDE 30 MG/1
30 CAPSULE, DELAYED RELEASE ORAL DAILY
Qty: 30 CAPSULE | Refills: 1 | Status: SHIPPED | OUTPATIENT
Start: 2018-11-28 | End: 2019-01-02

## 2018-11-28 NOTE — TELEPHONE ENCOUNTER
Left message on voice mail for patient to call clinic. [phone].  Ena Gil RN

## 2018-11-29 ENCOUNTER — OFFICE VISIT (OUTPATIENT)
Dept: NEUROSURGERY | Facility: CLINIC | Age: 83
End: 2018-11-29

## 2018-11-29 VITALS
WEIGHT: 124.3 LBS | SYSTOLIC BLOOD PRESSURE: 104 MMHG | BODY MASS INDEX: 23.11 KG/M2 | HEART RATE: 77 BPM | OXYGEN SATURATION: 94 % | DIASTOLIC BLOOD PRESSURE: 63 MMHG

## 2018-11-29 DIAGNOSIS — M48.061 SPINAL STENOSIS, LUMBAR REGION, WITHOUT NEUROGENIC CLAUDICATION: Primary | ICD-10-CM

## 2018-11-29 ASSESSMENT — ENCOUNTER SYMPTOMS
RECTAL PAIN: 0
JOINT SWELLING: 1
ABDOMINAL PAIN: 0
NECK PAIN: 1
DOUBLE VISION: 0
EYE PAIN: 0
DEPRESSION: 1
NAIL CHANGES: 0
DIARRHEA: 0
STIFFNESS: 1
SORE THROAT: 0
NIGHT SWEATS: 0
JAUNDICE: 0
BACK PAIN: 1
NUMBNESS: 1
SPEECH CHANGE: 0
DIZZINESS: 1
BLOATING: 0
SINUS PAIN: 0
TASTE DISTURBANCE: 1
SKIN CHANGES: 0
CHILLS: 1
CONSTIPATION: 1
HEMATURIA: 0
HOARSE VOICE: 1
WEAKNESS: 1
SINUS CONGESTION: 1
TREMORS: 0
POLYDIPSIA: 1
BOWEL INCONTINENCE: 0
DIFFICULTY URINATING: 0
NERVOUS/ANXIOUS: 1
TINGLING: 1
HEADACHES: 1
BLOOD IN STOOL: 0
TROUBLE SWALLOWING: 0
NECK MASS: 0
WEIGHT LOSS: 1
NAUSEA: 1
PANIC: 0
MYALGIAS: 1
DISTURBANCES IN COORDINATION: 0
MUSCLE WEAKNESS: 1
PARALYSIS: 0
ARTHRALGIAS: 1
HALLUCINATIONS: 0
INSOMNIA: 0
LOSS OF CONSCIOUSNESS: 0
MUSCLE CRAMPS: 0
DECREASED APPETITE: 1
DECREASED CONCENTRATION: 0
HEARTBURN: 1
SEIZURES: 0
SMELL DISTURBANCE: 0
EYE IRRITATION: 1
FEVER: 1
POOR WOUND HEALING: 0
WEIGHT GAIN: 0
POLYPHAGIA: 0
FLANK PAIN: 0
DYSURIA: 0
FATIGUE: 1
EYE REDNESS: 0
EYE WATERING: 1
VOMITING: 0
MEMORY LOSS: 0
ALTERED TEMPERATURE REGULATION: 1
INCREASED ENERGY: 1

## 2018-11-29 ASSESSMENT — PAIN SCALES - GENERAL: PAINLEVEL: MODERATE PAIN (5)

## 2018-11-29 NOTE — TELEPHONE ENCOUNTER
EDUAR ONLY  Spoke with patient and discussed below, she reports she did stop the escitalopram and feels much better.  She declined to start the Duloxetine at this time.   She reports she wants to get her strength back and does not want to try a new medication too soon.  She has a follow up appt on 1/2/19 and RN instructed patient to call or come in sooner if any concerns or problems.  Patient voiced understanding and agreement.  Ena Gil RN

## 2018-11-29 NOTE — LETTER
11/29/2018       RE: Teresa Lopez  [address]  [place] [address]     Dear Colleague,    Thank you for referring your patient, Teresa Lopez, to the [facility] NEUROSURGERY at [facility]. Please see a copy of my visit note below.    Service Date: 11/29/2018      HISTORY OF PRESENT ILLNESS:  It was my pleasure to meet Ms. Lopez during today's clinic visit.      Ms. Lopez is an 83-year-old woman with a long history of low back pain that is a constant dull ache at best and a severe sharp pain intermittently at worst.  She states that her pain is band like in nature across her low back and extends into the buttocks and hips bilaterally, predominantly on the right.  With regard to the palliative measures, she has previously utilized physical therapy and injections in her back with some relief and injections into her hips, also with mild relief.  She currently utilizes oxycodone 2-3 times per day with moderate symptomatic improvement.      Ms. Lopez denies any shooting pains, numbness or tingling in her extremities, and she denies any new weakness in her bilateral lower extremities.      Ms. Lopez states that she has suffered a significant weight loss as she has been eating less due to increased pain.  She has previously required hospitalization for nutrition and rehabilitation.      PHYSICAL EXAMINATION:   GENERAL:  Elderly appearing woman appearing stated age, seated comfortably in examination room chair, pleasantly conversant.   HEENT:  Normocephalic, no gross abnormalities.     NEUROLOGIC:   MENTAL STATUS:  Alert and oriented to name, location and time with no overt speech abnormalities.   CRANIAL NERVES:  Cranial nerves II-XII intact.     MOTOR:  No weakness to shoulder shrug, equal bilaterally.  5/5 strength throughout the bilateral upper and lower extremities.   SENSORY:  Sensation intact to light touch in all 3 distributions of the trigeminal nerve bilaterally.   Sensation intact to light touch throughout the bilateral upper and lower extremities.     REFLEXES:  1+ bilaterally in the biceps.  Triceps and brachioradialis muted.  Muted patellar and Achilles bilaterally.  Jada sign negative.  No ankle clonus.        ASSESSMENT:  Chronic nonradicular and non-myelopathic lower back pain.        PLAN:  Although Ms. Lopez has previously undergone MRI imaging of her lumbar spine demonstrative of severe central canal stenosis at lumbar 3-4 and lumbar 4-5 with scoliotic curvature at these levels, the patient states that she wishes to avoid surgery if possible after today's visit.  Hence, Dr. Valencia recommended that Ms. Lopez return to our clinic on an as-needed basis.  She recommended that Ms. Lopez undergo evaluation by our nutrition specialist and an EOS scoliosis x-ray film for evaluation of sagittal and coronal balance.  Ms. Lopez expressed understanding and was amenable to proceed with this plan.      Again, it was my pleasure to meet Ms. Lopez during today's clinic visit.  She may feel free to call our clinic at any time in the future with further issues, questions or concerns.      Dr. Valencia spent approximately 20 minutes in conversation with the patient during this encounter.  The majority of that time was spent in counseling and care coordination.        D: 2018   T: 2018   MT: anuradha      Name:     TORY LOPEZ   MRN:      [id number]        Account:      [id number]   :      1935           Service Date: 2018      Document: [id number]         Erin Valencia MD

## 2018-11-29 NOTE — NURSING NOTE
Chief Complaint   Patient presents with     Consult     [facility] NEW - SPINAL STENOSIS OF LUMBAR REGION WITH NEUROGENIC CLAUDICATION/ DEGENERATIVE DISC DISEASE       Santiago Mayo, EMT

## 2018-11-29 NOTE — MR AVS SNAPSHOT
After Visit Summary   11/29/2018    Teresa Lopez    MRN: [id number]           Patient Information     Date Of Birth          1935        Visit Information        Provider Department      11/29/2018 11:00 AM Erin Valencia MD [facility] Neurosurgery        Today's Diagnoses     Spinal stenosis, lumbar region, without neurogenic claudication    -  1       Follow-ups after your visit        Additional Services     NUTRITION REFERRAL       Your provider has referred you to: [facility]: [facility] (on call location)  - [place] [phone]   [email or web address]    Please be aware that coverage of these services is subject to the terms and limitations of your health insurance plan.  Call member services at your health plan with any benefit or coverage questions.      Please bring the following with you to your appointment:    (1) This referral request  (2) Any documents given to you regarding this referral  (3) Any specific questions you have about diet and/or food choices                  Your next 10 appointments already scheduled     Dec 07, 2018  1:00 PM CST   XR SPINE COMPLETE 2 VIEWS with BEXR1   [facility]    [address] [address] [phone]           How do I prepare for my exam? (Food and drink instructions) No Food and Drink Restrictions.  How do I prepare for my exam? (Other instructions) You do not need to do anything special for this exam.  What should I wear: Wear comfortable clothes.  How long does the exam take: Most scans take less than 5 minutes.  What should I bring: Bring a list of your medicines, including vitamins, minerals and over-the-counter drugs. It is safest to leave personal items at home.  Do I need a :  No  is needed.  What do I need to tell my doctor: Tell your doctor if there s any chance you are pregnant.  What should I do after the exam: No  restrictions, You may resume normal activities.  What is this test: An image of a specific body part shown in shades of black and white.  Who should I call with questions: If you have any questions, please call the Imaging Department where you will have your exam. Directions, parking instructions, and other information is available on our website, [email or web address].            Jan 02, 2019 11:30 AM CST   Office Visit with Tej Engle MD   [facility] ([facility]    [address] [address]   [phone]           Bring a current list of meds and any records pertaining to this visit. For Physicals, please bring immunization records and any forms needing to be filled out. Please arrive 10 minutes early to complete paperwork.            Jan 11, 2019  2:00 PM CST   Office Visit with [facility]   [facility] ([facility])    [address] [address]   [phone]           Bring a current list of meds and any records pertaining to this visit. For Physicals, please bring immunization records and any forms needing to be filled out. Please arrive 10 minutes early to complete paperwork.              Future tests that were ordered for you today     Open Future Orders        Priority Expected Expires Ordered    X-ray Spine complete (Cervicothoracolumbar AP and lateral - standing views preferred) [[id number]] Routine 11/29/2018 11/29/2019 11/29/2018    NUTRITION REFERRAL Routine 12/28/2018 11/29/2019 11/29/2018            Who to contact     Please call your clinic at [phone] to:    Ask questions about your health    Make or cancel appointments    Discuss your medicines    Learn about your test results    Speak to your doctor            Additional Information About Your Visit        [employer] Information     [employer] gives you secure access to your electronic health record. If you see a primary care provider, you can  also send messages to your care team and make appointments. If you have questions, please call your primary care clinic.  If you do not have a primary care provider, please call [phone] and they will assist you.      [facility] is an electronic gateway that provides easy, online access to your medical records. With [facility], you can request a clinic appointment, read your test results, renew a prescription or communicate with your care team.     To access your existing account, please contact your [facility] or call [phone] for assistance.        Care EveryWhere ID     This is your Care EveryWhere ID. This could be used by other organizations to access your [place] medical records  [id number]        Your Vitals Were     Pulse Pulse Oximetry BMI (Body Mass Index)             77 94% 23.11 kg/m2          Blood Pressure from Last 3 Encounters:   11/29/18 104/63   11/16/18 113/69   09/21/18 124/69    Weight from Last 3 Encounters:   11/29/18 56.4 kg (124 lb 4.8 oz)   11/16/18 58.5 kg (129 lb)   09/21/18 60.8 kg (134 lb)                 Today's Medication Changes          These changes are accurate as of 11/29/18  2:28 PM.  If you have any questions, ask your nurse or doctor.               These medicines have changed or have updated prescriptions.        Dose/Directions    oxyCODONE-acetaminophen 5-325 MG tablet   Commonly known as:  PERCOCET   This may have changed:    - when to take this  - additional instructions   Used for:  DJD (degenerative joint disease), lumbosacral, Spinal stenosis of lumbar region with neurogenic claudication        Dose:  1 tablet   Take 1 tablet by mouth every 6 hours as needed for pain maximum 4 tablet(s) per day   Quantity:  120 tablet   Refills:  0                Primary Care Provider Office Phone # Fax #    MYRON Bose [facility] [phone]       [address] [address]        Equal Access to Services      ALTHEA [facility]: Hadii aad ku jose Gallegos, waaxda luqadaha, qaybta kaalmada adeconnie, lori nicolle batooljohn nunezmaria lakin mccracken . So [facility] [phone].    ATENCIÓN: Si habla español, tiene a brambila disposición servicios gratuitos de asistencia lingüística. Llame al [phone].    We comply with applicable federal civil rights laws and [place] laws. We do not discriminate on the basis of race, color, national origin, age, disability, sex, sexual orientation, or gender identity.            Thank you!     Thank you for choosing [facility] NEUROSURGERY  for your care. Our goal is always to provide you with excellent care. Hearing back from our patients is one way we can continue to improve our services. Please take a few minutes to complete the written survey that you may receive in the mail after your visit with us. Thank you!             Your Updated Medication List - Protect others around you: Learn how to safely use, store and throw away your medicines at www.disposemymeds.org.          This list is accurate as of 11/29/18  2:28 PM.  Always use your most recent med list.                   Brand Name Dispense Instructions for use Diagnosis    ADVIL 200 MG capsule   Generic drug:  ibuprofen      Reported on 4/20/2017        amLODIPine 2.5 MG tablet    NORVASC    90 tablet    TAKE 1 TABLET BY MOUTH AT BEDTIME    Hypertension goal BP (blood pressure) < 140/90       aspirin 162 MG EC tablet      Take 162 mg by mouth daily Reported on 4/20/2017        CALCIUM 1200 PO      Reported on 4/20/2017        CLARITIN 10 MG capsule   Generic drug:  loratadine      Take 10 mg by mouth as needed Reported on 4/20/2017        DULoxetine 30 MG capsule    CYMBALTA    30 capsule    Take 1 capsule (30 mg) by mouth daily    Chronic pain disorder       gabapentin 300 MG capsule    NEURONTIN    270 capsule    TAKE 1 CAPSULE (300 MG) BY MOUTH 3 TIMES DAILY    DJD (degenerative joint disease), lumbosacral, Spinal stenosis of lumbar region  with neurogenic claudication       GLUCOSAMINE 1500 COMPLEX PO      Take 1,500 mg by mouth daily Reported on 4/20/2017        losartan-hydrochlorothiazide 100-25 MG tablet    HYZAAR    90 tablet    TAKE 1 TABLET BY MOUTH EVERY DAY    Hypertension goal BP (blood pressure) < 140/90       medical cannabis (Patient's own supply.  Not a prescription)      See Admin Instructions (This is NOT a prescription, and does not certify that the patient has a qualifying medical condition for medical cannabis.  The purpose of this order is  to document that the patient reports taking medical cannabis.)        Multi-vitamin tablet   Generic drug:  multivitamin w/minerals      1 TABLET DAILY        NONFORMULARY      daily Osteo kim (joints)        NONFORMULARY      100 mg daily Magnesium Glycinate        order for DME     1 Device    Equipment being ordered: front wheeled walker    Altered gait, Physical deconditioning, Chronic right hip pain, Spinal stenosis of lumbar region with neurogenic claudication, SI (sacroiliac) joint dysfunction, Piriformis syndrome of both sides       order for DME     1 Device    Equipment being ordered: 4 wheeled Rollator walker with seat. Patient would like to have a red one if possible.    Altered gait, At risk for falling, Chronic bilateral low back pain with right-sided sciatica, Greater trochanteric bursitis of both hips, Chronic pain of right knee, Primary osteoarthritis of right knee       oxyCODONE-acetaminophen 5-325 MG tablet    PERCOCET    120 tablet    Take 1 tablet by mouth every 6 hours as needed for pain maximum 4 tablet(s) per day    DJD (degenerative joint disease), lumbosacral, Spinal stenosis of lumbar region with neurogenic claudication       PRILOSEC PO      None Entered        SALMON OIL-1000 PO      take 1,000 Caps by mouth daily.        senna-docusate 8.6-50 MG tablet    SENOKOT-S/PERICOLACE    120 tablet    Take 1 tablet by mouth 2 times daily    Slow transit constipation        simvastatin 40 MG tablet    ZOCOR    90 tablet    Take 1 tablet (40 mg) by mouth At Bedtime    Other hyperlipidemia       VITAMIN C PO      None Entered        VITAMIN D PO      Take 1,200 mg by mouth daily.        vitamin E 400 units (180 mg) capsule    TOCOPHEROL     None Entered

## 2018-12-14 ENCOUNTER — ANCILLARY PROCEDURE (OUTPATIENT)
Dept: BONE DENSITY | Facility: CLINIC | Age: 83
End: 2018-12-14
Attending: INTERNAL MEDICINE
Payer: COMMERCIAL

## 2018-12-14 DIAGNOSIS — Z78.0 HISTORY OF MENOPAUSE: ICD-10-CM

## 2018-12-14 PROCEDURE — 77080 DXA BONE DENSITY AXIAL: CPT | Performed by: INTERNAL MEDICINE

## 2018-12-19 ENCOUNTER — OFFICE VISIT (OUTPATIENT)
Dept: INTERNAL MEDICINE | Facility: CLINIC | Age: 83
End: 2018-12-19
Payer: COMMERCIAL

## 2018-12-19 ENCOUNTER — TELEPHONE (OUTPATIENT)
Dept: INTERNAL MEDICINE | Facility: CLINIC | Age: 83
End: 2018-12-19

## 2018-12-19 VITALS
DIASTOLIC BLOOD PRESSURE: 67 MMHG | SYSTOLIC BLOOD PRESSURE: 112 MMHG | HEART RATE: 89 BPM | RESPIRATION RATE: 16 BRPM | BODY MASS INDEX: 23.05 KG/M2 | WEIGHT: 124 LBS | TEMPERATURE: 98.2 F

## 2018-12-19 DIAGNOSIS — E87.6 HYPOKALEMIA: ICD-10-CM

## 2018-12-19 DIAGNOSIS — G89.4 CHRONIC PAIN SYNDROME: Primary | ICD-10-CM

## 2018-12-19 LAB
ANION GAP SERPL CALCULATED.3IONS-SCNC: 10 MMOL/L (ref 3–14)
BUN SERPL-MCNC: 40 MG/DL (ref 7–30)
CALCIUM SERPL-MCNC: 9.5 MG/DL (ref 8.5–10.1)
CHLORIDE SERPL-SCNC: 100 MMOL/L (ref 94–109)
CO2 SERPL-SCNC: 28 MMOL/L (ref 20–32)
CREAT SERPL-MCNC: 1.31 MG/DL (ref 0.52–1.04)
GFR SERPL CREATININE-BSD FRML MDRD: 38 ML/MIN/{1.73_M2}
GLUCOSE SERPL-MCNC: 111 MG/DL (ref 70–99)
POTASSIUM SERPL-SCNC: 3.6 MMOL/L (ref 3.4–5.3)
SODIUM SERPL-SCNC: 138 MMOL/L (ref 133–144)

## 2018-12-19 PROCEDURE — 99215 OFFICE O/P EST HI 40 MIN: CPT | Performed by: INTERNAL MEDICINE

## 2018-12-19 PROCEDURE — 36415 COLL VENOUS BLD VENIPUNCTURE: CPT | Performed by: INTERNAL MEDICINE

## 2018-12-19 PROCEDURE — 80048 BASIC METABOLIC PNL TOTAL CA: CPT | Performed by: INTERNAL MEDICINE

## 2018-12-19 RX ORDER — LIDOCAINE 50 MG/G
1 PATCH TOPICAL EVERY 24 HOURS
Qty: 30 PATCH | Refills: 1 | Status: SHIPPED | OUTPATIENT
Start: 2018-12-19 | End: 2019-01-02

## 2018-12-19 NOTE — TELEPHONE ENCOUNTER
Prior Authorization Retail Medication Request    Medication/Dose: Lidocaine 5% patch  ICD code (if different than what is on RX):  Chronic pain syndrome [G89.4]  Previously Tried and Failed:   Rationale:      Insurance Name:  [employer]  Insurance ID:  [id number]      Pharmacy Information (if different than what is on RX)  [facility] [address]  Phone:  [phone]

## 2018-12-19 NOTE — PROGRESS NOTES
SUBJECTIVE:   Teresa Lopez is a 83 year old female who presents to clinic today for the following health issues:      ED/UC Followup:    Facility:  [facility]   Date of visit: 12/7  Reason for visit: generalized weakness  Current Status:      Was seen in emergency department with negative work-up overall, given fluids, recovered.  Significant concern about chronic back pain  Went for surgical consult patient has declined surgery  Looking for other pain control options  Has started using red capsules of medical cannabis with some reliable relief, she is unable to quantify  Using one Percocet in the morning  Better and worse days  Denies constipation, sedation  Thinks that increasing her social contacts for meals and games will help    1. Chronic pain syndrome    2. Hypokalemia        PMH: Updated and/or reviewed in chart.    PSH: Updated and/or reviewed in chart.    Family History: Updated and/or reviewed in chart.     ROS:  Constitutional, neuro, pulmonary, cardiac, gastrointestinal, genitourinary, musculoskeletal, integument and psychiatric systems are otherwise negative.    OBJECTIVE:                                                    /67   Pulse 89   Temp 98.2  F (36.8  C) (Tympanic)   Resp 16   Wt 56.2 kg (124 lb)   BMI 23.05 kg/m      GEN: No acute distress, frailer older woman in   EYES: No conjunctival injection or icterus, EOMI grossly intact  RESP: Unlabored, regular  NEURO: Normal gait, MAEx4, light touch sensation grossly intact  PSYCH: Normal mood and affect    Results for orders placed or performed in visit on 12/19/18   Basic metabolic panel   Result Value Ref Range    Sodium 138 133 - 144 mmol/L    Potassium 3.6 3.4 - 5.3 mmol/L    Chloride 100 94 - 109 mmol/L    Carbon Dioxide 28 20 - 32 mmol/L    Anion Gap 10 3 - 14 mmol/L    Glucose 111 (H) 70 - 99 mg/dL    Urea Nitrogen 40 (H) 7 - 30 mg/dL    Creatinine 1.31 (H) 0.52 - 1.04 mg/dL    GFR Estimate 38 (L) >60 mL/min/[1.73_m2]    GFR  Estimate If Black 44 (L) >60 mL/min/[1.73_m2]    Calcium 9.5 8.5 - 10.1 mg/dL      ASSESSMENT/PLAN:                                                    1. Chronic pain syndrome  Longer discussion regarding chronic pain management.  We hadn't trialed topical lidocaine yet.  Pretty clear benefit from medical cannabis and the Percocet, but she agreed to keep a pain journal for the next two weeks and follow-up to discuss.  Higher utilization of the oxycodone or subsequent cautious introduction of methadone were discussed.  - lidocaine (LIDODERM) 5 % patch; Place 1 patch onto the skin every 24 hours  Dispense: 30 patch; Refill: 1    2. Hypokalemia  Resolved  -Basic metabolic panel    Orders Placed This Encounter     Basic metabolic panel     lidocaine (LIDODERM) 5 % patch        I spent a total of 40 minutes with the patient of which greater than 50% were devoted to counseling and coordination of care: chronic pain, hypokalemia.      Tej Engle MD

## 2018-12-20 NOTE — TELEPHONE ENCOUNTER
PA Initiation    Medication: Lidocaine 5% patch  Insurance Company: [employer] - Phone [phone] Fax [phone]  Pharmacy Filling the Rx: [id number] [place], [place] - [address]  Filling Pharmacy Phone: [phone]  Filling Pharmacy Fax:    Start Date: 12/20/2018

## 2018-12-20 NOTE — TELEPHONE ENCOUNTER
Prior Authorization Approval    Authorization Effective Date: 11/20/2018  Authorization Expiration Date: 12/20/2021  Medication: Lidocaine 5% patch APPROVED   Approved Dose/Quantity:   Reference #:     Insurance Company: [employer] - Phone [phone] Fax [phone]  Expected CoPay:       CoPay Card Available:      Foundation Assistance Needed:    Which Pharmacy is filling the prescription (Not needed for infusion/clinic administered): [id number] [place], [place] - [address] [facility]  Pharmacy Notified: Yes  Patient Notified: Yes

## 2018-12-21 NOTE — PROGRESS NOTES
Service Date: 11/29/2018      HISTORY OF PRESENT ILLNESS:  It was my pleasure to meet Ms. Lopez during today's clinic visit.      Ms. Lopez is an 83-year-old woman with a long history of low back pain that is a constant dull ache at best and a severe sharp pain intermittently at worst.  She states that her pain is band like in nature across her low back and extends into the buttocks and hips bilaterally, predominantly on the right.  With regard to the palliative measures, she has previously utilized physical therapy and injections in her back with some relief and injections into her hips, also with mild relief.  She currently utilizes oxycodone 2-3 times per day with moderate symptomatic improvement.      Ms. Lopez denies any shooting pains, numbness or tingling in her extremities, and she denies any new weakness in her bilateral lower extremities.      Ms. Lopez states that she has suffered a significant weight loss as she has been eating less due to increased pain.  She has previously required hospitalization for nutrition and rehabilitation.      PHYSICAL EXAMINATION:   GENERAL:  Elderly appearing woman appearing stated age, seated comfortably in examination room chair, pleasantly conversant.   HEENT:  Normocephalic, no gross abnormalities.     NEUROLOGIC:   MENTAL STATUS:  Alert and oriented to name, location and time with no overt speech abnormalities.   CRANIAL NERVES:  Cranial nerves II-XII intact.     MOTOR:  No weakness to shoulder shrug, equal bilaterally.  5/5 strength throughout the bilateral upper and lower extremities.   SENSORY:  Sensation intact to light touch in all 3 distributions of the trigeminal nerve bilaterally.  Sensation intact to light touch throughout the bilateral upper and lower extremities.     REFLEXES:  1+ bilaterally in the biceps.  Triceps and brachioradialis muted.  Muted patellar and Achilles bilaterally.  Jada sign negative.  No ankle clonus.        ASSESSMENT:  Chronic  nonradicular and non-myelopathic lower back pain.        PLAN:  Although Ms. Lopez has previously undergone MRI imaging of her lumbar spine demonstrative of severe central canal stenosis at lumbar 3-4 and lumbar 4-5 with scoliotic curvature at these levels, the patient states that she wishes to avoid surgery if possible after today's visit.  Hence, Dr. Lin recommended that Ms. Lopez return to our clinic on an as-needed basis.  She recommended that Ms. Lopez undergo evaluation by our nutrition specialist and an EOS scoliosis x-ray film for evaluation of sagittal and coronal balance.  Ms. Lopez expressed understanding and was amenable to proceed with this plan.      Again, it was my pleasure to meet Ms. Lopez during today's clinic visit.  She may feel free to call our clinic at any time in the future with further issues, questions or concerns.      Dr. Lin spent approximately 20 minutes in conversation with the patient during this encounter.  The majority of that time was spent in counseling and care coordination.      Dictated by Michelle Taylor MD   Resident       I have seen this patient with the resident and formulated a plan and agree with this note.  GUY LIN MD       As dictated by MICHELLE TAYLOR MD            D: 2018   T: 2018   MT: anuradha      Name:     TORY LOPEZ   MRN:      [id number]        Account:      [id number]   :      1935           Service Date: 2018      Document: [id number]

## 2018-12-31 DIAGNOSIS — E78.49 OTHER HYPERLIPIDEMIA: ICD-10-CM

## 2018-12-31 RX ORDER — SIMVASTATIN 40 MG
40 TABLET ORAL AT BEDTIME
Qty: 90 TABLET | Refills: 1 | Status: SHIPPED | OUTPATIENT
Start: 2018-12-31 | End: 2019-08-28

## 2018-12-31 NOTE — TELEPHONE ENCOUNTER
90 day supply     Requested Prescriptions   Pending Prescriptions Disp Refills     simvastatin (ZOCOR) 40 MG tablet  Last Written Prescription Date:  05/23/18  Last Fill Quantity: 90,  # refills: 1   Last office visit: 12/19/2018 with prescribing provider:  ENEDINA Engle   Future Office Visit:   Next 5 appointments (look out 90 days)    Jan 02, 2019 11:30 AM CST  Office Visit with Tej Engle MD  [facility] [address] [address]  [phone]   Jan 11, 2019  2:00 PM CST  Office Visit with  [facility]  [facility] ([facility] [address] [address]  [phone]          90 tablet 1     Sig: Take 1 tablet (40 mg) by mouth At Bedtime    There is no refill protocol information for this order

## 2019-01-02 ENCOUNTER — OFFICE VISIT (OUTPATIENT)
Dept: INTERNAL MEDICINE | Facility: CLINIC | Age: 84
End: 2019-01-02
Payer: COMMERCIAL

## 2019-01-02 VITALS
BODY MASS INDEX: 22.82 KG/M2 | WEIGHT: 124 LBS | RESPIRATION RATE: 16 BRPM | DIASTOLIC BLOOD PRESSURE: 68 MMHG | TEMPERATURE: 97 F | SYSTOLIC BLOOD PRESSURE: 121 MMHG | HEART RATE: 80 BPM | HEIGHT: 62 IN

## 2019-01-02 DIAGNOSIS — I10 HYPERTENSION GOAL BP (BLOOD PRESSURE) < 140/90: ICD-10-CM

## 2019-01-02 DIAGNOSIS — Z79.899 MEDICAL CANNABIS USE: ICD-10-CM

## 2019-01-02 DIAGNOSIS — E09.9 STEROID-INDUCED DIABETES MELLITUS (H): ICD-10-CM

## 2019-01-02 DIAGNOSIS — M48.062 SPINAL STENOSIS OF LUMBAR REGION WITH NEUROGENIC CLAUDICATION: ICD-10-CM

## 2019-01-02 DIAGNOSIS — F11.20 CONTINUOUS OPIOID DEPENDENCE (H): ICD-10-CM

## 2019-01-02 DIAGNOSIS — E44.1 MILD MALNUTRITION (H): ICD-10-CM

## 2019-01-02 DIAGNOSIS — M46.96 INFLAMMATORY SPONDYLOPATHY OF LUMBAR REGION (H): ICD-10-CM

## 2019-01-02 DIAGNOSIS — T38.0X5A STEROID-INDUCED DIABETES MELLITUS (H): ICD-10-CM

## 2019-01-02 DIAGNOSIS — F32.1 MODERATE MAJOR DEPRESSION (H): ICD-10-CM

## 2019-01-02 DIAGNOSIS — G89.4 CHRONIC PAIN SYNDROME: Primary | ICD-10-CM

## 2019-01-02 PROCEDURE — 99214 OFFICE O/P EST MOD 30 MIN: CPT | Performed by: INTERNAL MEDICINE

## 2019-01-02 RX ORDER — LIDOCAINE 50 MG/G
3 PATCH TOPICAL EVERY 24 HOURS
Qty: 90 PATCH | Refills: 11 | Status: SHIPPED | OUTPATIENT
Start: 2019-01-02 | End: 2020-02-03

## 2019-01-02 RX ORDER — LOSARTAN POTASSIUM AND HYDROCHLOROTHIAZIDE 25; 100 MG/1; MG/1
1 TABLET ORAL DAILY
Qty: 90 TABLET | Refills: 3 | Status: SHIPPED | OUTPATIENT
Start: 2019-01-02 | End: 2019-01-14

## 2019-01-02 RX ORDER — AMLODIPINE BESYLATE 2.5 MG/1
2.5 TABLET ORAL AT BEDTIME
Qty: 90 TABLET | Refills: 3 | Status: SHIPPED | OUTPATIENT
Start: 2019-01-02 | End: 2019-08-06

## 2019-01-02 ASSESSMENT — ANXIETY QUESTIONNAIRES
6. BECOMING EASILY ANNOYED OR IRRITABLE: NOT AT ALL
7. FEELING AFRAID AS IF SOMETHING AWFUL MIGHT HAPPEN: NOT AT ALL
3. WORRYING TOO MUCH ABOUT DIFFERENT THINGS: NOT AT ALL
GAD7 TOTAL SCORE: 3
IF YOU CHECKED OFF ANY PROBLEMS ON THIS QUESTIONNAIRE, HOW DIFFICULT HAVE THESE PROBLEMS MADE IT FOR YOU TO DO YOUR WORK, TAKE CARE OF THINGS AT HOME, OR GET ALONG WITH OTHER PEOPLE: VERY DIFFICULT
1. FEELING NERVOUS, ANXIOUS, OR ON EDGE: SEVERAL DAYS
5. BEING SO RESTLESS THAT IT IS HARD TO SIT STILL: NOT AT ALL
2. NOT BEING ABLE TO STOP OR CONTROL WORRYING: NOT AT ALL

## 2019-01-02 ASSESSMENT — MIFFLIN-ST. JEOR: SCORE: 967.77

## 2019-01-02 ASSESSMENT — PATIENT HEALTH QUESTIONNAIRE - PHQ9
SUM OF ALL RESPONSES TO PHQ QUESTIONS 1-9: 10
5. POOR APPETITE OR OVEREATING: MORE THAN HALF THE DAYS

## 2019-01-02 NOTE — PROGRESS NOTES
"  SUBJECTIVE:   Teresa Lopez is a 83 year old female who presents to clinic today for the following health issues:      Medication Followup of Lidocaine patches    Taking Medication as prescribed: yes    Side Effects:  None    Medication Helping Symptoms:  Yes    Continues to have right sided sciatica pain     Wearing a knee brace when she has an increase in her pain     She kept pain journal for 1 weeks - showed some improvement with lidocaine patches a few days after 12/19 on bilateral lumbar areas.  Bad left-sided shoulder pain.  Using 9 red capsules of medical cannabis daily.  Using medical cannabis, Excedrin, gabapentin, lidocaine patches.    1. Medical cannabis use    2. Chronic pain syndrome    3. Hypertension goal BP (blood pressure) < 140/90    4. Spinal stenosis of lumbar region with neurogenic claudication    5. Moderate major depression (H)        PMH: Updated and/or reviewed in chart.    ROS:  Constitutional, HEENT, cardiovascular, pulmonary, gi and gu systems are otherwise negative.    OBJECTIVE:                                                    /68   Pulse 80   Temp 97  F (36.1  C) (Tympanic)   Resp 16   Ht 1.562 m (5' 1.5\")   Wt 56.2 kg (124 lb)   BMI 23.05 kg/m      GEN: No acute distress  EYES: No conjunctival injection or icterus, EOMI grossly intact  RESP: Unlabored, regular  NEURO: Normal gait, MAEx4, light touch sensation grossly intact  PSYCH: Normal mood and affect  MUSCULOSKELETAL: bilateral shoulders very thin with significant atrophy, no bony tenderness to palpation    Results for orders placed or performed in visit on 12/19/18   Basic metabolic panel   Result Value Ref Range    Sodium 138 133 - 144 mmol/L    Potassium 3.6 3.4 - 5.3 mmol/L    Chloride 100 94 - 109 mmol/L    Carbon Dioxide 28 20 - 32 mmol/L    Anion Gap 10 3 - 14 mmol/L    Glucose 111 (H) 70 - 99 mg/dL    Urea Nitrogen 40 (H) 7 - 30 mg/dL    Creatinine 1.31 (H) 0.52 - 1.04 mg/dL    GFR Estimate 38 (L) >60 " "mL/min/[1.73_m2]    GFR Estimate If Black 44 (L) >60 mL/min/[1.73_m2]    Calcium 9.5 8.5 - 10.1 mg/dL      ASSESSMENT/PLAN:                                                    1. Chronic pain syndrome  3. Medical cannabis use  4. Spinal stenosis of lumbar region with neurogenic claudication  8. Inflammatory spondylopathy of lumbar region (H)  Getting excellent benefit from lidocaine patches - continue in addition to rest of pain regimen.  We discussed patches are unlikely to help with the \"deeper\" sciatica pain related to lumbar pathology.  No red flag symptoms otherwise and patient wishes to avoid surgery.  - lidocaine (LIDODERM) 5 % patch; Place 3 patches onto the skin every 24 hours  Dispense: 90 patch; Refill: 11    2. Hypertension goal BP (blood pressure) < 140/90  At goal - continue   - amLODIPine (NORVASC) 2.5 MG tablet; Take 1 tablet (2.5 mg) by mouth At Bedtime  Dispense: 90 tablet; Refill: 3  - losartan-hydrochlorothiazide (HYZAAR) 100-25 MG tablet; Take 1 tablet by mouth daily  Dispense: 90 tablet; Refill: 3    5. Moderate major depression (H)  Very related to pain and social isolation - improved off SNRI.  Continue to monitor.    6. Steroid-induced diabetes mellitus (H)  Last hemoglobin A1c 5.8%, now off steroids.  Consider add-on hemoglobin A1c to next laboratory evaluation.    7. Continuous opioid dependence (H)  Low TDD oxycodone - encouraged her to use additional 0.5-1 tab daily as needed for additional pain not controlled by above.    8. Mild malnutrition -- to meet with nutrition for education/counseling/support    Tej Engle MD      "

## 2019-01-03 ASSESSMENT — ANXIETY QUESTIONNAIRES: GAD7 TOTAL SCORE: 3

## 2019-01-04 DIAGNOSIS — I10 HYPERTENSION GOAL BP (BLOOD PRESSURE) < 140/90: ICD-10-CM

## 2019-01-04 DIAGNOSIS — E78.49 OTHER HYPERLIPIDEMIA: ICD-10-CM

## 2019-01-11 ENCOUNTER — OFFICE VISIT (OUTPATIENT)
Dept: NUTRITION | Facility: CLINIC | Age: 84
End: 2019-01-11
Payer: COMMERCIAL

## 2019-01-11 DIAGNOSIS — E44.0 MODERATE MALNUTRITION (H): Primary | ICD-10-CM

## 2019-01-11 DIAGNOSIS — M48.061 SPINAL STENOSIS, LUMBAR REGION, WITHOUT NEUROGENIC CLAUDICATION: ICD-10-CM

## 2019-01-11 PROCEDURE — 97802 MEDICAL NUTRITION INDIV IN: CPT

## 2019-01-11 NOTE — PROGRESS NOTES
Medical Nutrition Therapy  Visit Type:Initial assessment and intervention    Teresa Lopez presents today for MNT and education related to abnormal weight loss.   She is accompanied by self and daughter (Zohra).     ASSESSMENT:   Patient comments/concerns relating to nutrition: I got out of bed to go to the bathroom and that's it (fir activity).  Walks to fridge to get boost and that's it.  I can't stand to cook due to arthritis.  I've had a bad appetite for the past 3 months. Patient points to hips and states my internal tissues hurt, but it seems like the lidocane patches seem to be helping.    May- moved to a new apartment, before that was living alone and doing all house work and cooking.     Current daily intakes include on average: 690-820 kcal and 38-44g protein per day.  This is 62-74% of her recommended daily energy intake for weight maintenance.    Based on a weight loss of 20% in 1 year, and an oral intake of 62-74% of estimated daily needs, patient meets the criteria for a diagnosis of Nonsevere (moderate) malnutrition.    NUTRITION HISTORY:  Tried meals on wheels- patient didn't like the food (taste), daughter reports she frequently ate the cold meal first, then wouldn't reheat the hot meal.    Daughter will bring food over to freeze for patient to reheat    Patient just started ordering food and small meals from [employer].  On Wednesday and Thursday this past week she ate spaghetti and meatballs.      Improved appetitie now, but not always hungry for lunch.  She will usually snack on cake OR crackers and cheese.  Would like to eat more tuna and salmon from packets       Beverages: Boost high protein (240 kcal, 20g protein) 1-2/day OR Boost Plus (360 kcal, 14g protein) 1-2/day depending on which product daughters purchase for her.  Milk 0-1 cup/day, water.     Misses meals? Usually lunch if not hungry.  Eats out:  Seldom.     Previous diet education:  No     Food allergies/intolerances: none    Diet is  high in: carbs, fat (saturated), fat (unsaturated), fruits and sodium  Diet is low in: calories, protein and vegetables    EXERCISE: no regular exercise program    SOCIO/ECONOMIC:   Lives with: self  Patient lives in senior apartments that do not have a meal program or service.  There is a senior dining site at a nearby apartment building, but patient would need to walk down a slight hill to get there, which would place her at risk for falling.     MEDICATIONS:  Current Outpatient Medications   Medication     ADVIL 200 MG PO CAPS     amLODIPine (NORVASC) 2.5 MG tablet     aspirin 162 MG EC tablet     CALCIUM 1200 OR     gabapentin (NEURONTIN) 300 MG capsule     Glucosamine-Chondroit-Vit C-Mn (GLUCOSAMINE 1500 COMPLEX PO)     lidocaine (LIDODERM) 5 % patch     Loratadine (CLARITIN) 10 MG capsule     losartan-hydrochlorothiazide (HYZAAR) 100-25 MG tablet     medical cannabis (Patient's own supply.  Not a prescription)     MULTI-VITAMIN OR TABS     NONFORMULARY     NONFORMULARY     Omega-3 Fatty Acids (SALMON OIL-1000 PO)     order for DME     order for DME     oxyCODONE-acetaminophen (PERCOCET) 5-325 MG per tablet     PRILOSEC OR     senna-docusate (SENOKOT-S;PERICOLACE) 8.6-50 MG per tablet     simvastatin (ZOCOR) 40 MG tablet     VITAMIN C OR     VITAMIN D OR     VITAMIN E 400 UNIT OR CAPS     Current Facility-Administered Medications   Medication     triamcinolone acetonide (KENALOG-40) injection 40 mg       LABS:  Last Basic Metabolic Panel:  Lab Results   Component Value Date     12/19/2018      Lab Results   Component Value Date    POTASSIUM 3.6 12/19/2018     Lab Results   Component Value Date    CHLORIDE 100 12/19/2018     Lab Results   Component Value Date    AYLIN 9.5 12/19/2018     Lab Results   Component Value Date    CO2 28 12/19/2018     Lab Results   Component Value Date    BUN 40 12/19/2018     Lab Results   Component Value Date    CR 1.31 12/19/2018     Lab Results   Component Value Date    GLC  111 12/19/2018       ANTHROPOMETRICS:  Vitals: There were no vitals taken for this visit.  Body mass index is 23.24 kg/m .      Wt Readings from Last 20 Encounters:   01/11/19 56.7 kg (125 lb)   01/02/19 56.2 kg (124 lb)   12/19/18 56.2 kg (124 lb)   11/29/18 56.4 kg (124 lb 4.8 oz)   11/16/18 58.5 kg (129 lb)   09/21/18 60.8 kg (134 lb)   09/19/18 60.8 kg (134 lb)   08/03/18 64 kg (141 lb)   05/29/18 63.5 kg (140 lb)   05/16/18 64.4 kg (142 lb)   05/04/18 64.4 kg (142 lb)   02/23/18 65.8 kg (145 lb)   01/12/18 68 kg (150 lb)   01/05/18 68.5 kg (151 lb)   10/27/17 68 kg (150 lb)   10/12/17 70.3 kg (155 lb)   09/18/17 70.3 kg (155 lb)   07/31/17 70.8 kg (156 lb)   05/25/17 69.4 kg (153 lb)   05/19/17 66.2 kg (146 lb)     Patients most recent episode of weightloss started 10/2017    Weight Change: 31 lbs in 15 months, which is a 20% unplanned weight loss.     ESTIMATED KCAL REQUIREMENTS:    At current body weight patient would need 1000 kcal and 44-55g protein per day to maintain current body weight.    At usually body weight of 150 lbs, patient would need 1100 kcal and 55-65g protein per day to maintain body weight.    To gain weight would recommend patient intake 9822-9408 kcal and 55-65g protein per day.     NUTRITION DIAGNOSIS: Inadequate protein-energy intake related to decreased oral intake as evidenced by 20% weight loss in 15 months and protein and energy intakes below estimated daily needs for weight maintenance.     NUTRITION INTERVENTION:  Nutrition Prescription: Energy Intake: 1500 kcal/day for weight gain and 55-65g protein intake.  Recommend drinking 2 high protein boost per day for 480 kcal, and 40g protein OR 2 boost plus for 720 kcal and 28g protein, in addition to 2 other meals and snacks.    Education given to support: Weight gain.  Discussed other easy foods patient likes and is able to prepare such as mashed potatoes, tuna/salmon packets, crackers, and fruit.  Recommended having foods sitting out  on table so patient sees the food and it is easy to snack on throughout the day. Discussed importance of social connection with meals.  Recommended trying to eat meals with neighbors if possible.     Education Materials Provided: Tips for gaining weight, High Calorie/Protein meal and snack ideas.     Referral to community agencies/programs: Discussed trying [employer] as an alternative to meals on wheels for variety, and may be covered by insurance.     PATIENT'S BEHAVIOR CHANGE GOALS:   See Patient Instructions for patient stated behavior change goals. AVS was printed and given to patient at today's appointment.    MONITOR / EVALUATE:  RD will monitor/evaluate:  Food / Beverage / Nutrient intake   Weight change    FOLLOW-UP:  Follow-up appointment scheduled on 3/8/2019.     Cynthia Hanson MS, RD, LD, CDE    Time spent in minutes: 61  Encounter: Individual

## 2019-01-11 NOTE — PATIENT INSTRUCTIONS
Add mashed potatoes to daily intake    Have 2 boost per day-    Aim for total:  1500 kcal per day for weight gain-     1100 kcal per day to maintain weight      [email or web address]      [facility] [facility]  [address] [address]    Phone: [phone]      Consider trying to have meals or snacks with another person in the building.

## 2019-01-11 NOTE — Clinical Note
Looks like she's really under eating, but things have improved in the past month.  Sounds like the lidocane patches are helping.  I'd recommend adding in a diagnosis of nonsevere malnutrition (E44.0-1). I'll follow up with her in 2 months to see how things are going.Thanks!Cynthia

## 2019-01-14 ENCOUNTER — TELEPHONE (OUTPATIENT)
Dept: INTERNAL MEDICINE | Facility: CLINIC | Age: 84
End: 2019-01-14

## 2019-01-14 DIAGNOSIS — I10 HYPERTENSION GOAL BP (BLOOD PRESSURE) < 140/90: ICD-10-CM

## 2019-01-14 NOTE — TELEPHONE ENCOUNTER
Spoke with patient.   Patient has misplaced her bottle of HYZAAR.   RN called [facility] and confirmed medication had been delivered.   [facility] can not fill until 30 days after delivery- advised medication would need to be sent to local pharmacy.     RN spoke with patient, T'd up medication for 90 days and 0 Refills.     Please advise on refill per patient lost 90 day supply.   Brigida Sierra RN, BSN, PHN

## 2019-01-15 RX ORDER — LOSARTAN POTASSIUM AND HYDROCHLOROTHIAZIDE 25; 100 MG/1; MG/1
1 TABLET ORAL DAILY
Qty: 90 TABLET | Refills: 0 | Status: SHIPPED | OUTPATIENT
Start: 2019-01-15 | End: 2019-02-15

## 2019-01-16 VITALS — WEIGHT: 125 LBS | BODY MASS INDEX: 23.24 KG/M2

## 2019-01-17 DIAGNOSIS — I10 HYPERTENSION GOAL BP (BLOOD PRESSURE) < 140/90: ICD-10-CM

## 2019-01-17 RX ORDER — LOSARTAN POTASSIUM AND HYDROCHLOROTHIAZIDE 25; 100 MG/1; MG/1
1 TABLET ORAL DAILY
Qty: 90 TABLET | Refills: 0 | Status: CANCELLED | OUTPATIENT
Start: 2019-01-17

## 2019-01-17 NOTE — TELEPHONE ENCOUNTER
Refused refill as patient has refills available. Refilled on 1/5/19 for 90 day supply with 0 refills.    Called to discuss with patient. She has the prescription. Advised her to call clinic when this refill is out and her next RX will be sent to the mail order pharmacy.    Patient verbalized understanding and agrees with plan.     Bushra Gillespie, RN, BSN

## 2019-02-11 ENCOUNTER — TELEPHONE (OUTPATIENT)
Dept: PALLIATIVE MEDICINE | Facility: CLINIC | Age: 84
End: 2019-02-11

## 2019-02-11 NOTE — TELEPHONE ENCOUNTER
Pt calling with Medication questions regarding a new per scription. [phone]      Taran MCMILLAN    [facility]

## 2019-02-12 NOTE — TELEPHONE ENCOUNTER
Called pt and LM that we were returning her call and that she should call the main clinic number for questions.     Pt has not been seen since 9/19/18. It was recommended that she follow up in 10 weeks (early December) but has not done so. Marion Munoz CNP is not prescribing any medications for this patient.     KEYON ValienteN, RN-BC  Patient Care Supervisor/Care Coordinator  [facility]

## 2019-02-15 ENCOUNTER — OFFICE VISIT (OUTPATIENT)
Dept: INTERNAL MEDICINE | Facility: CLINIC | Age: 84
End: 2019-02-15
Payer: COMMERCIAL

## 2019-02-15 ENCOUNTER — ANCILLARY PROCEDURE (OUTPATIENT)
Dept: GENERAL RADIOLOGY | Facility: CLINIC | Age: 84
End: 2019-02-15
Attending: INTERNAL MEDICINE
Payer: COMMERCIAL

## 2019-02-15 VITALS
HEART RATE: 96 BPM | SYSTOLIC BLOOD PRESSURE: 110 MMHG | HEIGHT: 62 IN | BODY MASS INDEX: 21.94 KG/M2 | DIASTOLIC BLOOD PRESSURE: 66 MMHG | TEMPERATURE: 97.4 F | WEIGHT: 119.2 LBS | OXYGEN SATURATION: 96 % | RESPIRATION RATE: 18 BRPM

## 2019-02-15 DIAGNOSIS — G89.4 CHRONIC PAIN DISORDER: ICD-10-CM

## 2019-02-15 DIAGNOSIS — G89.29 CHRONIC LEFT SHOULDER PAIN: ICD-10-CM

## 2019-02-15 DIAGNOSIS — R63.4 ABNORMAL WEIGHT LOSS: ICD-10-CM

## 2019-02-15 DIAGNOSIS — M25.512 CHRONIC LEFT SHOULDER PAIN: ICD-10-CM

## 2019-02-15 DIAGNOSIS — M16.11 PRIMARY OSTEOARTHRITIS OF RIGHT HIP: ICD-10-CM

## 2019-02-15 DIAGNOSIS — R93.89 ABNORMAL FINDINGS ON DIAGNOSTIC IMAGING OF OTHER SPECIFIED BODY STRUCTURES: ICD-10-CM

## 2019-02-15 DIAGNOSIS — M47.817 DJD (DEGENERATIVE JOINT DISEASE), LUMBOSACRAL: ICD-10-CM

## 2019-02-15 DIAGNOSIS — R79.9 ABNORMAL FINDING OF BLOOD CHEMISTRY: ICD-10-CM

## 2019-02-15 DIAGNOSIS — I10 HYPERTENSION GOAL BP (BLOOD PRESSURE) < 140/90: ICD-10-CM

## 2019-02-15 DIAGNOSIS — M48.062 SPINAL STENOSIS OF LUMBAR REGION WITH NEUROGENIC CLAUDICATION: Primary | ICD-10-CM

## 2019-02-15 DIAGNOSIS — E87.6 HYPOKALEMIA: ICD-10-CM

## 2019-02-15 DIAGNOSIS — G31.84 MILD COGNITIVE IMPAIRMENT: ICD-10-CM

## 2019-02-15 LAB
ALBUMIN SERPL-MCNC: 3.9 G/DL (ref 3.4–5)
ALP SERPL-CCNC: 89 U/L (ref 40–150)
ALT SERPL W P-5'-P-CCNC: 18 U/L (ref 0–50)
ANION GAP SERPL CALCULATED.3IONS-SCNC: 8 MMOL/L (ref 3–14)
AST SERPL W P-5'-P-CCNC: 23 U/L (ref 0–45)
BASOPHILS # BLD AUTO: 0.1 10E9/L (ref 0–0.2)
BASOPHILS NFR BLD AUTO: 0.6 %
BILIRUB SERPL-MCNC: 0.4 MG/DL (ref 0.2–1.3)
BUN SERPL-MCNC: 28 MG/DL (ref 7–30)
CALCIUM SERPL-MCNC: 10 MG/DL (ref 8.5–10.1)
CHLORIDE SERPL-SCNC: 103 MMOL/L (ref 94–109)
CO2 SERPL-SCNC: 28 MMOL/L (ref 20–32)
CREAT SERPL-MCNC: 1.09 MG/DL (ref 0.52–1.04)
CRP SERPL-MCNC: 3.3 MG/L (ref 0–8)
DIFFERENTIAL METHOD BLD: ABNORMAL
EOSINOPHIL # BLD AUTO: 0 10E9/L (ref 0–0.7)
EOSINOPHIL NFR BLD AUTO: 0.3 %
ERYTHROCYTE [DISTWIDTH] IN BLOOD BY AUTOMATED COUNT: 12.6 % (ref 10–15)
ERYTHROCYTE [SEDIMENTATION RATE] IN BLOOD BY WESTERGREN METHOD: 34 MM/H (ref 0–30)
GFR SERPL CREATININE-BSD FRML MDRD: 47 ML/MIN/{1.73_M2}
GLUCOSE SERPL-MCNC: 134 MG/DL (ref 70–99)
HCT VFR BLD AUTO: 37.5 % (ref 35–47)
HGB BLD-MCNC: 12.7 G/DL (ref 11.7–15.7)
LYMPHOCYTES # BLD AUTO: 1.6 10E9/L (ref 0.8–5.3)
LYMPHOCYTES NFR BLD AUTO: 20.1 %
MAGNESIUM SERPL-MCNC: 2.1 MG/DL (ref 1.6–2.3)
MCH RBC QN AUTO: 34 PG (ref 26.5–33)
MCHC RBC AUTO-ENTMCNC: 33.9 G/DL (ref 31.5–36.5)
MCV RBC AUTO: 100 FL (ref 78–100)
MONOCYTES # BLD AUTO: 0.8 10E9/L (ref 0–1.3)
MONOCYTES NFR BLD AUTO: 9.7 %
NEUTROPHILS # BLD AUTO: 5.5 10E9/L (ref 1.6–8.3)
NEUTROPHILS NFR BLD AUTO: 69.3 %
PLATELET # BLD AUTO: 350 10E9/L (ref 150–450)
POTASSIUM SERPL-SCNC: 3.8 MMOL/L (ref 3.4–5.3)
PROT SERPL-MCNC: 8.3 G/DL (ref 6.8–8.8)
RBC # BLD AUTO: 3.74 10E12/L (ref 3.8–5.2)
SODIUM SERPL-SCNC: 139 MMOL/L (ref 133–144)
T4 FREE SERPL-MCNC: 0.98 NG/DL (ref 0.76–1.46)
TSH SERPL DL<=0.005 MIU/L-ACNC: 2.74 MU/L (ref 0.4–4)
WBC # BLD AUTO: 7.9 10E9/L (ref 4–11)

## 2019-02-15 PROCEDURE — 84443 ASSAY THYROID STIM HORMONE: CPT | Performed by: INTERNAL MEDICINE

## 2019-02-15 PROCEDURE — 86140 C-REACTIVE PROTEIN: CPT | Performed by: INTERNAL MEDICINE

## 2019-02-15 PROCEDURE — 80053 COMPREHEN METABOLIC PANEL: CPT | Performed by: INTERNAL MEDICINE

## 2019-02-15 PROCEDURE — 83735 ASSAY OF MAGNESIUM: CPT | Performed by: INTERNAL MEDICINE

## 2019-02-15 PROCEDURE — 36415 COLL VENOUS BLD VENIPUNCTURE: CPT | Performed by: INTERNAL MEDICINE

## 2019-02-15 PROCEDURE — 85652 RBC SED RATE AUTOMATED: CPT | Performed by: INTERNAL MEDICINE

## 2019-02-15 PROCEDURE — 73502 X-RAY EXAM HIP UNI 2-3 VIEWS: CPT

## 2019-02-15 PROCEDURE — 85025 COMPLETE CBC W/AUTO DIFF WBC: CPT | Performed by: INTERNAL MEDICINE

## 2019-02-15 PROCEDURE — 99214 OFFICE O/P EST MOD 30 MIN: CPT | Performed by: INTERNAL MEDICINE

## 2019-02-15 PROCEDURE — 84439 ASSAY OF FREE THYROXINE: CPT | Performed by: INTERNAL MEDICINE

## 2019-02-15 PROCEDURE — 73030 X-RAY EXAM OF SHOULDER: CPT | Mod: LT

## 2019-02-15 RX ORDER — GABAPENTIN 300 MG/1
CAPSULE ORAL
Qty: 270 CAPSULE | Refills: 3 | Status: SHIPPED | OUTPATIENT
Start: 2019-02-15 | End: 2019-03-06

## 2019-02-15 RX ORDER — OXYCODONE AND ACETAMINOPHEN 5; 325 MG/1; MG/1
1 TABLET ORAL EVERY 6 HOURS PRN
Qty: 120 TABLET | Refills: 0 | Status: SHIPPED | OUTPATIENT
Start: 2019-02-15 | End: 2019-03-01

## 2019-02-15 RX ORDER — LOSARTAN POTASSIUM AND HYDROCHLOROTHIAZIDE 25; 100 MG/1; MG/1
1 TABLET ORAL DAILY
Qty: 90 TABLET | Refills: 0 | Status: SHIPPED | OUTPATIENT
Start: 2019-02-15 | End: 2019-04-03

## 2019-02-15 ASSESSMENT — PAIN SCALES - GENERAL: PAINLEVEL: MODERATE PAIN (5)

## 2019-02-15 ASSESSMENT — MIFFLIN-ST. JEOR: SCORE: 946

## 2019-02-15 NOTE — PROGRESS NOTES
"  SUBJECTIVE:   Teresa Lopez is a 83 year old female who presents to clinic today for the following health issues:      ED/UC Followup:    Facility:  [facility]  Date of visit: 2/3/19  Reason for visit: Malaise  Current Status: Patient is not eating and low potassium      Possible viral syndrome or mild GEitis prior to hypokalemia episode and emergency department visit.    Daily oxycodone in AM once daily only.  Feels shaky and sweaty in AM for about an hour typically.    Newer left shoulder pain.  Ongoing significant right hip pain.  Interested in injection or other therapy.    Ongoing right lumbosacral radiculopathy symptoms with some lateral thigh numbness.  She's not sure how recent this is.  No new saddle anæsthesia, incontinence of bladder or bowel.      Taking supplements and drinks shakes nightly.  Not taking calcium/D, ran out.  Not always eating well per her daughter and losing more weight.    1. Spinal stenosis of lumbar region with neurogenic claudication    2. Chronic pain disorder    3. Mild cognitive impairment    4. Chronic left shoulder pain    5. Hypokalemia    6. DJD (degenerative joint disease), lumbosacral    7. Hypertension goal BP (blood pressure) < 140/90    8. Abnormal weight loss     9. Primary osteoarthritis of right hip    10. Abnormal findings on diagnostic imaging of other specified body structures     11. Abnormal finding of blood chemistry         PMH: Updated and/or reviewed in chart.    PSH: Updated and/or reviewed in chart.    Family History: Updated and/or reviewed in chart.     ROS:  Constitutional, neuro, EMT, endocrine, pulmonary, cardiac, gastrointestinal, genitourinary, musculoskeletal, integument and psychiatric systems are otherwise negative.    OBJECTIVE:                                                    /66   Pulse 96   Temp 97.4  F (36.3  C) (Tympanic)   Resp 18   Ht 1.562 m (5' 1.5\")   Wt 54.1 kg (119 lb 3.2 oz)   SpO2 96%   BMI 22.16 kg/m  "     GEN: No acute distress, elderly woman in wheelchair   EYES: No conjunctival injection or icterus, EOMI grossly intact  RESP: Unlabored, regular  NEURO: MAEx4, light touch sensation grossly intact  PSYCH: Normal mood and affect      Results for orders placed or performed in visit on 02/15/19   Comprehensive metabolic panel   Result Value Ref Range    Sodium 139 133 - 144 mmol/L    Potassium 3.8 3.4 - 5.3 mmol/L    Chloride 103 94 - 109 mmol/L    Carbon Dioxide 28 20 - 32 mmol/L    Anion Gap 8 3 - 14 mmol/L    Glucose 134 (H) 70 - 99 mg/dL    Urea Nitrogen 28 7 - 30 mg/dL    Creatinine 1.09 (H) 0.52 - 1.04 mg/dL    GFR Estimate 47 (L) >60 mL/min/[1.73_m2]    GFR Estimate If Black 55 (L) >60 mL/min/[1.73_m2]    Calcium 10.0 8.5 - 10.1 mg/dL    Bilirubin Total 0.4 0.2 - 1.3 mg/dL    Albumin 3.9 3.4 - 5.0 g/dL    Protein Total 8.3 6.8 - 8.8 g/dL    Alkaline Phosphatase 89 40 - 150 U/L    ALT 18 0 - 50 U/L    AST 23 0 - 45 U/L   Magnesium   Result Value Ref Range    Magnesium 2.1 1.6 - 2.3 mg/dL   T4 free   Result Value Ref Range    T4 Free 0.98 0.76 - 1.46 ng/dL   TSH   Result Value Ref Range    TSH 2.74 0.40 - 4.00 mU/L   CBC with platelets differential   Result Value Ref Range    WBC 7.9 4.0 - 11.0 10e9/L    RBC Count 3.74 (L) 3.8 - 5.2 10e12/L    Hemoglobin 12.7 11.7 - 15.7 g/dL    Hematocrit 37.5 35.0 - 47.0 %     78 - 100 fl    MCH 34.0 (H) 26.5 - 33.0 pg    MCHC 33.9 31.5 - 36.5 g/dL    RDW 12.6 10.0 - 15.0 %    Platelet Count 350 150 - 450 10e9/L    % Neutrophils 69.3 %    % Lymphocytes 20.1 %    % Monocytes 9.7 %    % Eosinophils 0.3 %    % Basophils 0.6 %    Absolute Neutrophil 5.5 1.6 - 8.3 10e9/L    Absolute Lymphocytes 1.6 0.8 - 5.3 10e9/L    Absolute Monocytes 0.8 0.0 - 1.3 10e9/L    Absolute Eosinophils 0.0 0.0 - 0.7 10e9/L    Absolute Basophils 0.1 0.0 - 0.2 10e9/L    Diff Method Automated Method    Erythrocyte sedimentation rate auto   Result Value Ref Range    Sed Rate 34 (H) 0 - 30 mm/h   CRP  inflammation   Result Value Ref Range    CRP Inflammation 3.3 0.0 - 8.0 mg/L      ASSESSMENT/PLAN:                                                    1. Spinal stenosis of lumbar region with neurogenic claudication  6. DJD (degenerative joint disease), lumbosacral  2. Chronic pain disorder  4. Chronic left shoulder pain  9. Primary osteoarthritis of right hip  Wanted to again rule-out PMR or wider metabolic dysregulation.  She has clear reason for the right hip symptoms and it's not clear to me the benefits of corticosteroid injection given no joint space and she's not a good surgical candidate, so reasonable to continue and liberalize opioids for the hip and back.  Other therapies may be benefit for the back and shoulder, so worth pursuing.  - ORTHOPEDICS ADULT REFERRAL  - XR Shoulder Left G/E 3 Views; Future  - XR Pelvis and Hip Right 1 View; Future  - PHYSICAL THERAPY REFERRAL; Future  - CHIROPRACTIC REFERRAL  - gabapentin (NEURONTIN) 300 MG capsule; TAKE 1 CAPSULE (300 MG) BY MOUTH 3 TIMES DAILY  Dispense: 270 capsule; Refill: 3  - oxyCODONE-acetaminophen (PERCOCET) 5-325 MG tablet; Take 1 tablet by mouth every 6 hours as needed for pain maximum 4 tablet(s) per day  Dispense: 120 tablet; Refill: 0  - T4 free  - TSH  - CBC with platelets differential  - Erythrocyte sedimentation rate auto  - CRP inflammation    3. Mild cognitive impairment  Well attuned overall -- expect with further pain and opioids this may be challenged, especially given ongoing medical cannabis use.      7. Hypertension goal BP (blood pressure) < 140/90  Excellent control - may be able to reduce anti-hypertensive load but tolerating very well at this time.  - losartan-hydrochlorothiazide (HYZAAR) 100-25 MG tablet; Take 1 tablet by mouth daily  Dispense: 90 tablet; Refill: 0    5. Hypokalemia  Malaise likely related to acute (GI?) illness from which she is recovering.  Hypokalemia has normalized - continue to monitor.  - Comprehensive  metabolic panel  - Magnesium    8. Abnormal weight loss   Some may be due to pain - nutrition supplementation ongoing.  - T4 free    10. Abnormal findings on diagnostic imaging of other specified body structures   11. Abnormal finding of blood chemistry   - CBC with platelets differential       Orders Placed This Encounter     XR Shoulder Left G/E 3 Views     XR Pelvis and Hip Right 1 View     Comprehensive metabolic panel     Magnesium     T4 free     TSH     CBC with platelets differential     Erythrocyte sedimentation rate auto     CRP inflammation     ORTHOPEDICS ADULT REFERRAL     PHYSICAL THERAPY REFERRAL     CHIROPRACTIC REFERRAL     gabapentin (NEURONTIN) 300 MG capsule     losartan-hydrochlorothiazide (HYZAAR) 100-25 MG tablet     oxyCODONE-acetaminophen (PERCOCET) 5-325 MG tablet          Tej Engle MD

## 2019-02-18 ENCOUNTER — TELEPHONE (OUTPATIENT)
Dept: INTERNAL MEDICINE | Facility: CLINIC | Age: 84
End: 2019-02-18

## 2019-02-18 DIAGNOSIS — M47.817 DJD (DEGENERATIVE JOINT DISEASE), LUMBOSACRAL: ICD-10-CM

## 2019-02-18 DIAGNOSIS — M48.062 SPINAL STENOSIS OF LUMBAR REGION WITH NEUROGENIC CLAUDICATION: ICD-10-CM

## 2019-02-18 NOTE — TELEPHONE ENCOUNTER
Prior Authorization Retail Medication Request    Medication/Dose: oxyCODONE-acetaminophen (PERCOCET) 5-325 MG tablet  ICD code (if different than what is on RX):  M48.062 & M51.37  Prevously Tried and Failed:    Rationale:  Insurance only allows 7 day fill at a time    Insurance Name: [place]/Medicare    Insurance ID:  [id number]      Pharmacy Information (if different than what is on RX)  Name:  [facility]  Phone:  [phone]

## 2019-02-18 NOTE — TELEPHONE ENCOUNTER
Patient requests new Rx:percocet Rx only allows for a 7 day supply at a time without a prior authorization. Please send to insurance company. Thanks Umthunzi!

## 2019-02-19 NOTE — TELEPHONE ENCOUNTER
Called pt and left message that this is our last call.  Advised her to call back w/ more information if needed.    Will keep encounter open for a couple of days in anticipation of patient call back..    Natalie Garcia RN-BSN  [facility]

## 2019-02-22 NOTE — TELEPHONE ENCOUNTER
Central Prior Authorization Team   Phone: [phone]      PA Initiation    Medication: oxyCODONE-acetaminophen (PERCOCET) 5-325 MG tablet-Initiated  Insurance Company: [employer] ([facility]) - Phone [phone] Fax [phone]  Pharmacy Filling the Rx: [id number] - [place], [place] - [address]  Filling Pharmacy Phone: [phone]  Filling Pharmacy Fax:    Start Date: 2/22/2019

## 2019-02-23 ENCOUNTER — MEDICAL CORRESPONDENCE (OUTPATIENT)
Dept: HEALTH INFORMATION MANAGEMENT | Facility: CLINIC | Age: 84
End: 2019-02-23

## 2019-02-25 ENCOUNTER — OFFICE VISIT (OUTPATIENT)
Dept: ORTHOPEDICS | Facility: CLINIC | Age: 84
End: 2019-02-25
Payer: COMMERCIAL

## 2019-02-25 VITALS
WEIGHT: 120 LBS | BODY MASS INDEX: 22.08 KG/M2 | HEIGHT: 62 IN | DIASTOLIC BLOOD PRESSURE: 86 MMHG | SYSTOLIC BLOOD PRESSURE: 138 MMHG

## 2019-02-25 DIAGNOSIS — M19.012 ARTHRITIS OF LEFT SHOULDER REGION: Primary | ICD-10-CM

## 2019-02-25 PROCEDURE — 99203 OFFICE O/P NEW LOW 30 MIN: CPT | Performed by: PEDIATRICS

## 2019-02-25 ASSESSMENT — MIFFLIN-ST. JEOR: SCORE: 949.63

## 2019-02-25 NOTE — PROGRESS NOTES
Sports Medicine Clinic Visit    PCP: Tej Engle JEANETTE Lopez is a 83 year old female who is seen  in consultation at the request of  Tej Engle M.D. presenting with left shoulder pain    Injury: She reports chronic shoulder pain for 4 years. She reports a history of arthritis in her shoulder. She reports the pain can radiate from the shoulder to her lateral upper arm. Her pain is improved with heat. Her pain increases with lifting her left arm overhead and behind her back. She is right hand dominate.    Location of Pain: left shoulder  Duration of Pain: 4 year(s)  Rating of Pain at worst: 10/10  Rating of Pain Currently: 0/10  Symptoms are better with: Heat  Symptoms are worse with: overhead motions and reaching behind her back  Additional Features:   Positive: paresthesias in fingers and weakness   Negative: swelling, bruising, popping, grinding, catching, locking, instability and numbness  Other evaluation and/or treatments so far consists of: Heat  Prior History of related problems: Osteoarthritis    Social History: retired and lives alone    Review of Systems  Skin: no bruising, no swelling  Musculoskeletal: as above  Neurologic: no numbness, paresthesias  Remainder of review of systems is negative including constitutional, CV, pulmonary, GI, except as noted in HPI or medical history.    Patient's current problem list, past medical and surgical history, and family history were reviewed.    Patient Active Problem List   Diagnosis     Cerebral infarction (H)     Jaw Pain     Arthritis of knee     CARDIOVASCULAR SCREENING; LDL GOAL LESS THAN 100     Hypertension goal BP (blood pressure) < 140/90     Advanced directives, counseling/discussion     Hyperlipidemia LDL goal <130     OA (OSTEOARTHRITIS) OF KNEE - right     CKD (chronic kidney disease) stage 3, GFR 30-59 ml/min (H)     Chronic pain disorder     DJD (degenerative joint disease), lumbosacral     Spinal stenosis of lumbar region with neurogenic  "claudication     Slow transit constipation     Steroid-induced diabetes mellitus (H)     Mild cognitive impairment     Combined form of age-related cataract, mod, both eyes     Posterior vitreous detachment of both eyes     Lumbago     Arthritis, lumbar spine     Medical cannabis use     Moderate major depression (H)     Inflammatory spondylopathy of lumbar region (H)     Continuous opioid dependence (H)     Past Medical History:   Diagnosis Date     Cataract 3/7/2012     CVA (cerebral infarction)      Diabetes mellitus (H)      DJD (degenerative joint disease)      HTN      Past Surgical History:   Procedure Laterality Date     HYSTERECTOMY, CERVIX STATUS UNKNOWN  age 31     Family History   Problem Relation Age of Onset     Arthritis Mother      Unknown/Adopted Father         adopted     Diabetes Brother      Cancer Brother      Hypertension No family hx of      Cerebrovascular Disease No family hx of      Thyroid Disease No family hx of      Glaucoma No family hx of      Macular Degeneration No family hx of          Objective  /86 (BP Location: Left arm, Patient Position: Chair, Cuff Size: Adult Regular)   Ht 1.562 m (5' 1.5\")   Wt 54.4 kg (120 lb)   BMI 22.31 kg/m      GENERAL APPEARANCE: healthy, alert and no distress   GAIT: NORMAL  SKIN: no suspicious lesions or rashes  HEENT: Sclera clear, anicteric  CV: good peripheral pulses  RESP: Breathing not labored  NEURO: Normal strength and tone, mentation intact and speech normal  PSYCH:  mentation appears normal and affect normal/bright    Bilateral Shoulder exam  Inspection and Posture:       rounded shoulders and upper back    Skin:        no visible deformities    Tender:        subacromial space left    Non Tender:       remainder of shoulder bilateral    ROM:        forward flexion 150  bilateral       abduction 150 bilateral       internal rotation 30 bilateral       external rotation minimal bilateral  - Asymmetric motion  - Painful arc    Painful " motions:       flexion left       elevation left    Strength:        abduction 4/5 bilateral       flexion 4/5 bilateral       internal rotation 3/5 bilateral       external rotation 3/5 bilateral    Impingement testing:       positive (+) Neer left       positive (+) Lombardo left    Sensation:        normal sensation over shoulder and upper extremity     Radiology  I ordered, visualized and reviewed these images with the patient  XR SHOULDER LT G/E 3 VW 2/15/2019 1:47 PM  COMPARISON: None.  HISTORY: Chronic LEFT shoulder pain.                                                           IMPRESSION: No fracture or dislocation seen in the LEFT shoulder. Mild  glenohumeral osteoarthritis. No significant soft tissue swelling.    Assessment:  1. Arthritis of left shoulder region      Discussed nature of shoulder osteoarthritis. Discussed symptom treatment with over-the-counter medications, ice or heat and rest if needed. Discussed physical therapy for strength. Discussed injection therapy including subacromial or glenohumeral corticosteroid injections. Also briefly discussed future consideration of referral to orthopedic surgery for further evaluation and discussion of arthroplasty.    Plan:  - Today's Plan of Care:  Start physical therapy    -We also discussed other future treatment options:  US guided steroid injection of left shoulder    Follow Up: as needed    Concerning signs and symptoms were reviewed.  The patient expressed understanding of this management plan and all questions were answered at this time.    Liliya Ricketts MD [facility]  Primary Care Sports Medicine  [facility]

## 2019-02-25 NOTE — LETTER
2/25/2019         RE: Teresa Lopez  [address]  [facility] [address]        Dear Colleague,    Thank you for referring your patient, Teresa Lopez, to the [facility]. Please see a copy of my visit note below.    Sports Medicine Clinic Visit    PCP: Tej Engle    Teresa Lopez is a 83 year old female who is seen  in consultation at the request of  Tej Engle M.D. presenting with left shoulder pain    Injury: She reports chronic shoulder pain for 4 years. She reports a history of arthritis in her shoulder. She reports the pain can radiate from the shoulder to her lateral upper arm. Her pain is improved with heat. Her pain increases with lifting her left arm overhead and behind her back. She is right hand dominate.    Location of Pain: left shoulder  Duration of Pain: 4 year(s)  Rating of Pain at worst: 10/10  Rating of Pain Currently: 0/10  Symptoms are better with: Heat  Symptoms are worse with: overhead motions and reaching behind her back  Additional Features:   Positive: paresthesias in fingers and weakness   Negative: swelling, bruising, popping, grinding, catching, locking, instability and numbness  Other evaluation and/or treatments so far consists of: Heat  Prior History of related problems: Osteoarthritis    Social History: retired and lives alone    Review of Systems  Skin: no bruising, no swelling  Musculoskeletal: as above  Neurologic: no numbness, paresthesias  Remainder of review of systems is negative including constitutional, CV, pulmonary, GI, except as noted in HPI or medical history.    Patient's current problem list, past medical and surgical history, and family history were reviewed.    Patient Active Problem List   Diagnosis     Cerebral infarction (H)     Jaw Pain     Arthritis of knee     CARDIOVASCULAR SCREENING; LDL GOAL LESS THAN 100     Hypertension goal BP (blood pressure) < 140/90     Advanced directives, counseling/discussion      "Hyperlipidemia LDL goal <130     OA (OSTEOARTHRITIS) OF KNEE - right     CKD (chronic kidney disease) stage 3, GFR 30-59 ml/min (H)     Chronic pain disorder     DJD (degenerative joint disease), lumbosacral     Spinal stenosis of lumbar region with neurogenic claudication     Slow transit constipation     Steroid-induced diabetes mellitus (H)     Mild cognitive impairment     Combined form of age-related cataract, mod, both eyes     Posterior vitreous detachment of both eyes     Lumbago     Arthritis, lumbar spine     Medical cannabis use     Moderate major depression (H)     Inflammatory spondylopathy of lumbar region (H)     Continuous opioid dependence (H)     Past Medical History:   Diagnosis Date     Cataract 3/7/2012     CVA (cerebral infarction)      Diabetes mellitus (H)      DJD (degenerative joint disease)      HTN      Past Surgical History:   Procedure Laterality Date     HYSTERECTOMY, CERVIX STATUS UNKNOWN  age 31     Family History   Problem Relation Age of Onset     Arthritis Mother      Unknown/Adopted Father         adopted     Diabetes Brother      Cancer Brother      Hypertension No family hx of      Cerebrovascular Disease No family hx of      Thyroid Disease No family hx of      Glaucoma No family hx of      Macular Degeneration No family hx of          Objective  /86 (BP Location: Left arm, Patient Position: Chair, Cuff Size: Adult Regular)   Ht 1.562 m (5' 1.5\")   Wt 54.4 kg (120 lb)   BMI 22.31 kg/m       GENERAL APPEARANCE: healthy, alert and no distress   GAIT: NORMAL  SKIN: no suspicious lesions or rashes  HEENT: Sclera clear, anicteric  CV: good peripheral pulses  RESP: Breathing not labored  NEURO: Normal strength and tone, mentation intact and speech normal  PSYCH:  mentation appears normal and affect normal/bright    Bilateral Shoulder exam  Inspection and Posture:       rounded shoulders and upper back    Skin:        no visible deformities    Tender:        subacromial " space left    Non Tender:       remainder of shoulder bilateral    ROM:        forward flexion 150  bilateral       abduction 150 bilateral       internal rotation 30 bilateral       external rotation minimal bilateral  - Asymmetric motion  - Painful arc    Painful motions:       flexion left       elevation left    Strength:        abduction 4/5 bilateral       flexion 4/5 bilateral       internal rotation 3/5 bilateral       external rotation 3/5 bilateral    Impingement testing:       positive (+) Neer left       positive (+) Lombardo left    Sensation:        normal sensation over shoulder and upper extremity     Radiology  I ordered, visualized and reviewed these images with the patient  XR SHOULDER LT G/E 3 VW 2/15/2019 1:47 PM  COMPARISON: None.  HISTORY: Chronic LEFT shoulder pain.                                                           IMPRESSION: No fracture or dislocation seen in the LEFT shoulder. Mild  glenohumeral osteoarthritis. No significant soft tissue swelling.    Assessment:  1. Arthritis of left shoulder region      Discussed nature of shoulder osteoarthritis. Discussed symptom treatment with over-the-counter medications, ice or heat and rest if needed. Discussed physical therapy for strength. Discussed injection therapy including subacromial or glenohumeral corticosteroid injections. Also briefly discussed future consideration of referral to orthopedic surgery for further evaluation and discussion of arthroplasty.    Plan:  - Today's Plan of Care:  Start physical therapy    -We also discussed other future treatment options:  US guided steroid injection of left shoulder    Follow Up: as needed    Concerning signs and symptoms were reviewed.  The patient expressed understanding of this management plan and all questions were answered at this time.    Liliya Ricketts MD [facility]  Primary Care Sports Medicine  [facility]    Again, thank you for allowing me to participate in the  care of your patient.        Sincerely,        Liliya Ricketts MD

## 2019-02-25 NOTE — PATIENT INSTRUCTIONS
Plan:  - Today's Plan of Care:  Start physical therapy    -We also discussed other future treatment options:  US guided steroid injection of left shoulder    Follow Up: as needed    If you have any further questions for your physician or physician s care team you can call [phone] and use option 3 to leave a voice message. Calls received during business hours will be returned same day.

## 2019-02-26 ENCOUNTER — OFFICE VISIT (OUTPATIENT)
Dept: FAMILY MEDICINE | Facility: CLINIC | Age: 84
End: 2019-02-26
Payer: COMMERCIAL

## 2019-02-26 VITALS
DIASTOLIC BLOOD PRESSURE: 70 MMHG | BODY MASS INDEX: 20.87 KG/M2 | SYSTOLIC BLOOD PRESSURE: 130 MMHG | WEIGHT: 113.4 LBS | RESPIRATION RATE: 14 BRPM | HEIGHT: 62 IN | HEART RATE: 86 BPM | OXYGEN SATURATION: 98 % | TEMPERATURE: 98.2 F

## 2019-02-26 DIAGNOSIS — E87.6 HYPOKALEMIA: ICD-10-CM

## 2019-02-26 DIAGNOSIS — F32.0 MILD MAJOR DEPRESSION (H): ICD-10-CM

## 2019-02-26 DIAGNOSIS — N28.9 RENAL INSUFFICIENCY: ICD-10-CM

## 2019-02-26 DIAGNOSIS — R23.2 HOT FLASHES: ICD-10-CM

## 2019-02-26 DIAGNOSIS — R53.1 GENERALIZED WEAKNESS: Primary | ICD-10-CM

## 2019-02-26 LAB
ANION GAP SERPL CALCULATED.3IONS-SCNC: 9 MMOL/L (ref 3–14)
BUN SERPL-MCNC: 26 MG/DL (ref 7–30)
CALCIUM SERPL-MCNC: 10.3 MG/DL (ref 8.5–10.1)
CHLORIDE SERPL-SCNC: 98 MMOL/L (ref 94–109)
CO2 SERPL-SCNC: 29 MMOL/L (ref 20–32)
CREAT SERPL-MCNC: 0.95 MG/DL (ref 0.52–1.04)
GFR SERPL CREATININE-BSD FRML MDRD: 55 ML/MIN/{1.73_M2}
GLUCOSE SERPL-MCNC: 136 MG/DL (ref 70–99)
POTASSIUM SERPL-SCNC: 3.3 MMOL/L (ref 3.4–5.3)
SODIUM SERPL-SCNC: 136 MMOL/L (ref 133–144)

## 2019-02-26 PROCEDURE — 99214 OFFICE O/P EST MOD 30 MIN: CPT | Performed by: FAMILY MEDICINE

## 2019-02-26 PROCEDURE — 80048 BASIC METABOLIC PNL TOTAL CA: CPT | Performed by: FAMILY MEDICINE

## 2019-02-26 PROCEDURE — 36415 COLL VENOUS BLD VENIPUNCTURE: CPT | Performed by: FAMILY MEDICINE

## 2019-02-26 RX ORDER — CITALOPRAM HYDROBROMIDE 10 MG/1
10 TABLET ORAL DAILY
Qty: 30 TABLET | Refills: 1 | Status: SHIPPED | OUTPATIENT
Start: 2019-02-26 | End: 2019-04-03

## 2019-02-26 ASSESSMENT — ANXIETY QUESTIONNAIRES
IF YOU CHECKED OFF ANY PROBLEMS ON THIS QUESTIONNAIRE, HOW DIFFICULT HAVE THESE PROBLEMS MADE IT FOR YOU TO DO YOUR WORK, TAKE CARE OF THINGS AT HOME, OR GET ALONG WITH OTHER PEOPLE: NOT DIFFICULT AT ALL
2. NOT BEING ABLE TO STOP OR CONTROL WORRYING: MORE THAN HALF THE DAYS
GAD7 TOTAL SCORE: 5
7. FEELING AFRAID AS IF SOMETHING AWFUL MIGHT HAPPEN: MORE THAN HALF THE DAYS
3. WORRYING TOO MUCH ABOUT DIFFERENT THINGS: NOT AT ALL
6. BECOMING EASILY ANNOYED OR IRRITABLE: SEVERAL DAYS
5. BEING SO RESTLESS THAT IT IS HARD TO SIT STILL: NOT AT ALL
1. FEELING NERVOUS, ANXIOUS, OR ON EDGE: NOT AT ALL

## 2019-02-26 ASSESSMENT — PATIENT HEALTH QUESTIONNAIRE - PHQ9
SUM OF ALL RESPONSES TO PHQ QUESTIONS 1-9: 12
5. POOR APPETITE OR OVEREATING: NOT AT ALL

## 2019-02-26 ASSESSMENT — PAIN SCALES - GENERAL: PAINLEVEL: NO PAIN (0)

## 2019-02-26 ASSESSMENT — MIFFLIN-ST. JEOR: SCORE: 919.69

## 2019-02-26 NOTE — PATIENT INSTRUCTIONS
Patient Education     Depression  Depression is one of the most common mental health problems today. It is not just a state of unhappiness or sadness. It is a true disease. The cause seems to be related to a decrease in chemicals that transmit signals in the brain. Having a family history of depression, alcoholism, or suicide increases the risk. Chronic illness, chronic pain, migraine headaches, and high emotional stress also increase the risk.  Depression is something we tend to recognize in others, but may have a hard time seeing in ourselves. It can show in many physical and emotional ways:    Loss of appetite    Overeating    Not being able to sleep    Sleeping too much    Tiredness not related to physical exertion    Restlessness or irritability    Slowness of movement or speech    Feeling depressed or withdrawn    Loss of interest in things you once enjoyed    Trouble concentrating, poor memory, trouble making decisions    Thoughts of harming or killing oneself, or thoughts that life is not worth living    Low self-esteem  The treatment for depression may include both medicine and psychotherapy. Antidepressants can reduce suffering and can improve the ability to function during the depressed period. Therapy can offer emotional support and help you understand emotional factors that may be causing the depression.  Home care    Ongoing care and support help people manage this disease. Find a healthcare provider and therapist who meet your needs. Seek help when you feel like you may be getting ill.    Be kind to yourself. Make it a point to do things that you enjoy (gardening, walking in nature, going to a movie). Reward yourself for small successes.    Take care of your physical body. Eat a balanced diet (low in saturated fat and high in fruits and vegetables). Exercise at least 3 times a week for 30 minutes. Even mild-moderate exercise (like brisk walking) can make you feel better.    Don't drink alcohol,  which can make depression worse.    Take medicine as prescribed.    Tell each of your healthcare providers about all of the prescription and over-the-counter medicines, vitamins, and supplements you take. Certain supplements interact with medicines and can result in dangerous side effects. Ask your pharmacist when you have questions about medicine interactions.    Talk with your family and trusted friends about your feelings and thoughts. Ask them to help you recognize behavior changes early so you can get help and, if needed, medicine can be adjusted.  Follow-up care  Follow up with your healthcare provider, or as advised.  Call 911  Call 911 if you:    Have suicidal thoughts, a suicide plan, and the means to carry out the plan; or serious thoughts of hurting someone else     Have trouble breathing    Are very confused    Feel very drowsy or have trouble awakening    Faint or lose consciousness    Have new chest pain that becomes more severe, lasts longer, or spreads into your shoulder, arm, neck, jaw, or back  When to seek medical advice  Call your healthcare provider right away if any of these happen:    Feeling extreme depression, fear, anxiety, or anger toward yourself or others    Feeling out of control    Feeling that you may try to harm yourself or another    Hearing voices that others do not hear    Seeing things that others do not see    Can t sleep or eat for 3 days in a row    Friends or family express concern over your behavior and ask you to seek help  Date Last Reviewed: 10/1/2017    4487-5953 [employer] [address] [place] [address], [place] [address]. All rights reserved. This information is not intended as a substitute for professional medical care. Always follow your healthcare professional's instructions.

## 2019-02-26 NOTE — PROGRESS NOTES
SUBJECTIVE:   Teresa Lopez is a 83 year old female who presents to clinic today for the following health issues:    Chief Complaint   Patient presents with     Weight Problem     Patient has no appitite this has been ongoing for the past 6 months, started having emesis today, not keeping medication down right now. Blood pressure is up due to not taking her medication. Has been in the hospital twice for severe hot flashes and then extremley cold- patient was found to be dehydrated and low on potassium. Patient is weak, fatigued, headaches, when she is getting the hot flashes she is noticing it more in the back of her neck, with excessive sweating- the hot feeling is a constant.      Daughter is present in the room.    Recent ER records reviewed.     Patient has been seen in he ER twice for these same symptoms- extensive work up done to rule out malignancy done.  Work up has been unremarkable to include Labs, EKG, Chest X ray and CT abdo/pelvis  Recent ER records revealed hypokalemia with K+ of 3.0; mild renal insufficiency- Cr 1.04; eGFR 51  She was given IV fluids for hydration and potassium supplementation.  Due for repeat labs.      Reports a history of depression/anxiety- off medications.   States that she currently lives in an Assisted Living Facility and has not felt like doing things she previously enjoyed.   Has been unable to tolerate Lexapro- felt it made her hot flashes worse.    Weight loss- about 5 lb weight loss over the past month.     Wt Readings from Last 4 Encounters:   02/26/19 51.4 kg (113 lb 6.4 oz)   02/25/19 54.4 kg (120 lb)   02/15/19 54.1 kg (119 lb 3.2 oz)   01/11/19 56.7 kg (125 lb)         PHQ-9 ([employer]) 2/26/2019   1.  Little interest or pleasure in doing things 3   2.  Feeling down, depressed, or hopeless 0   3.  Trouble falling or staying asleep, or sleeping too much 0   4.  Feeling tired or having little energy 3   5.  Poor appetite or overeating 3   6.  Feeling bad about yourself 0    7.  Trouble concentrating 3   8.  Moving slowly or restless 0   9.  Suicidal or self-harm thoughts 0   PHQ-9 Total Score 12     GOLDEN-7   [employer], 2002; Used with Permission) 2/26/2019   1. Feeling nervous, anxious, or on edge 0   2. Not being able to stop or control worrying 2   3. Worrying too much about different things 0   4. Trouble relaxing 0   5. Being so restless that it is hard to sit still 0   6. Becoming easily annoyed or irritable 1   7. Feeling afraid, as if something awful might happen 2   GOLDEN-7 Total Score 5   If you checked any problems, how difficult have they made it for you to do your work, take care of things at home, or get along with other people? Not difficult at all     Problem list and histories reviewed & adjusted, as indicated.  Additional history: as documented    Patient Active Problem List   Diagnosis     Cerebral infarction (H)     Jaw Pain     Arthritis of knee     CARDIOVASCULAR SCREENING; LDL GOAL LESS THAN 100     Hypertension goal BP (blood pressure) < 140/90     Advanced directives, counseling/discussion     Hyperlipidemia LDL goal <130     OA (OSTEOARTHRITIS) OF KNEE - right     CKD (chronic kidney disease) stage 3, GFR 30-59 ml/min (H)     Chronic pain disorder     DJD (degenerative joint disease), lumbosacral     Spinal stenosis of lumbar region with neurogenic claudication     Slow transit constipation     Steroid-induced diabetes mellitus (H)     Mild cognitive impairment     Combined form of age-related cataract, mod, both eyes     Posterior vitreous detachment of both eyes     Lumbago     Arthritis, lumbar spine     Medical cannabis use     Moderate major depression (H)     Inflammatory spondylopathy of lumbar region (H)     Continuous opioid dependence (H)     Past Surgical History:   Procedure Laterality Date     HYSTERECTOMY, CERVIX STATUS UNKNOWN  age 31       Social History     Tobacco Use     Smoking status: Never Smoker     Smokeless tobacco: Never Used    Substance Use Topics     Alcohol use: Yes     Alcohol/week: 0.0 oz     Comment: 0-1 drink weekly     Family History   Problem Relation Age of Onset     Arthritis Mother      Cataracts Mother      Unknown/Adopted Father         adopted     Diabetes Brother      Cancer Brother      Cataracts Maternal Grandmother      Hypertension No family hx of      Cerebrovascular Disease No family hx of      Thyroid Disease No family hx of      Glaucoma No family hx of      Macular Degeneration No family hx of          Current Outpatient Medications   Medication Sig Dispense Refill     amLODIPine (NORVASC) 2.5 MG tablet Take 1 tablet (2.5 mg) by mouth At Bedtime 90 tablet 3     CALCIUM 1200 OR Reported on 4/20/2017       citalopram (CELEXA) 10 MG tablet Take 1 tablet (10 mg) by mouth daily 30 tablet 1     gabapentin (NEURONTIN) 300 MG capsule TAKE 1 CAPSULE (300 MG) BY MOUTH 3 TIMES DAILY 270 capsule 3     Glucosamine-Chondroit-Vit C-Mn (GLUCOSAMINE 1500 COMPLEX PO) Take 1,500 mg by mouth daily Reported on 4/20/2017       lidocaine (LIDODERM) 5 % patch Place 3 patches onto the skin every 24 hours 90 patch 11     Loratadine (CLARITIN) 10 MG capsule Take 10 mg by mouth as needed Reported on 4/20/2017       losartan-hydrochlorothiazide (HYZAAR) 100-25 MG tablet Take 1 tablet by mouth daily 90 tablet 0     medical cannabis (Patient's own supply.  Not a prescription) See Admin Instructions (This is NOT a prescription, and does not certify that the patient has a qualifying medical condition for medical cannabis.  The purpose of this order is  to document that the patient reports taking medical cannabis.)   Taking 6-9 red capsules daily       MULTI-VITAMIN OR TABS 1 TABLET DAILY       NONFORMULARY 120 mg daily Magnesium Glycinate        Omega-3 Fatty Acids (SALMON OIL-1000 PO) take 1,000 Caps by mouth daily.       PRILOSEC OR None Entered       senna-docusate (SENOKOT-S;PERICOLACE) 8.6-50 MG per tablet Take 1 tablet by mouth 2 times  "daily 120 tablet 12     simvastatin (ZOCOR) 40 MG tablet Take 1 tablet (40 mg) by mouth At Bedtime 90 tablet 1     VITAMIN C OR None Entered       VITAMIN D OR Take 1,200 mg by mouth daily.       VITAMIN E 400 UNIT OR CAPS None Entered       ADVIL 200 MG PO CAPS Reported on 4/20/2017       aspirin 162 MG EC tablet Take 162 mg by mouth daily Reported on 4/20/2017       order for DME Equipment being ordered: 4 wheeled Rollator walker with seat. Patient would like to have a red one if possible. 1 Device 0     order for DME Equipment being ordered: front wheeled walker 1 Device 0     oxyCODONE-acetaminophen (PERCOCET) 5-325 MG tablet Take 0.5-1 tablets by mouth every 6 hours as needed for pain 60 tablet 0     Allergies   Allergen Reactions     Lexapro [Escitalopram] Other (See Comments)     Hot flashes     Sulfa Drugs Hives       Reviewed and updated as needed this visit by clinical staff       Reviewed and updated as needed this visit by Provider         ROS:  HEENT, cardiovascular, pulmonary, gi and gu systems are negative, except as otherwise noted.    OBJECTIVE:     /70   Pulse 86   Temp 98.2  F (36.8  C) (Tympanic)   Resp 14   Ht 1.562 m (5' 1.5\")   Wt 51.4 kg (113 lb 6.4 oz)   SpO2 98%   BMI 21.08 kg/m    Body mass index is 21.08 kg/m .  GENERAL: healthy, alert and no distress  EYES: Eyes grossly normal to inspection, PERRL and conjunctivae and sclerae normal  HENT: ear canals and TM's normal, nose and mouth without ulcers or lesions  NECK: no adenopathy, no asymmetry, masses, or scars and thyroid normal to palpation  RESP: lungs clear to auscultation - no rales, rhonchi or wheezes  CV: regular rate and rhythm, normal S1 S2, no S3 or S4, no murmur, click or rub, no peripheral edema and peripheral pulses strong  ABDOMEN: soft, nontender, no hepatosplenomegaly, no masses and bowel sounds normal  PSYCH: mentation appears normal, affect flat, judgement and insight intact and appearance well " groomed    Diagnostic Test Results:  Recent ER records reviewed  IMPRESSION:     1.  Study is negative for acute intra-abdominal inflammatory process or findings to suggest metastatic disease or malignancy.  2.  Hepatic cysts.  3.  Disc degeneration with degenerative convex right lumbar scoliosis.  4.  End-stage right hip degenerative arthrosis with subcortical cyst formation and flattening of the femoral head.        EXAM: XR CHEST PA & LAT 2/3/2019 10:51 AM.    COMPARISON: December 7, 2018.    VIEWS: PA and lateral views of the chest were obtained.    FINDINGS: Cardiomediastinal contours are normal.  Central vasculature is normal. Lungs are clear.  No pleural fluid or pleural gas. Left hemidiaphragm is elevated, unchanged.    ASSESSMENT/PLAN:     Teresa was seen today for weight problem.    Diagnoses and all orders for this visit:    Generalized weakness, most likely multifactorial  Repeat Basic metabolic panel.   Address depression symptoms    Hypokalemia, on oral K+ replacement  -     Basic metabolic panel    Hot flashes  -     Trial: citalopram (CELEXA) 10 MG tablet; Take 1 tablet (10 mg) by mouth daily    Mild major depression (H)  - PHQ-9/GOLDEN 7 completed, see above/Epic for details       - Start: citalopram (CELEXA) 10 MG tablet; Take 1 tablet (10 mg) by mouth daily    Renal insufficiency, mild  -     Basic metabolic panel      Follow up in a month, sooner if needed.      Brit Landers MD  [facility]

## 2019-02-27 ASSESSMENT — ANXIETY QUESTIONNAIRES: GAD7 TOTAL SCORE: 5

## 2019-02-28 ENCOUNTER — OFFICE VISIT (OUTPATIENT)
Dept: OPHTHALMOLOGY | Facility: CLINIC | Age: 84
End: 2019-02-28
Payer: COMMERCIAL

## 2019-02-28 DIAGNOSIS — H43.813 POSTERIOR VITREOUS DETACHMENT OF BOTH EYES: ICD-10-CM

## 2019-02-28 DIAGNOSIS — H52.4 PRESBYOPIA: ICD-10-CM

## 2019-02-28 DIAGNOSIS — H25.813 COMBINED FORM OF AGE-RELATED CATARACT, BOTH EYES: Primary | ICD-10-CM

## 2019-02-28 PROCEDURE — 92015 DETERMINE REFRACTIVE STATE: CPT | Performed by: STUDENT IN AN ORGANIZED HEALTH CARE EDUCATION/TRAINING PROGRAM

## 2019-02-28 PROCEDURE — 92014 COMPRE OPH EXAM EST PT 1/>: CPT | Performed by: STUDENT IN AN ORGANIZED HEALTH CARE EDUCATION/TRAINING PROGRAM

## 2019-02-28 ASSESSMENT — REFRACTION_MANIFEST
OS_SPHERE: +1.50
OS_AXIS: 016
OD_ADD: +3.00
OS_CYLINDER: +1.00
OS_ADD: +3.00
OD_AXIS: 160
OD_SPHERE: +1.50
OD_CYLINDER: +1.00

## 2019-02-28 ASSESSMENT — CONF VISUAL FIELD
OD_INFERIOR_TEMPORAL_RESTRICTION: 2
OD_INFERIOR_NASAL_RESTRICTION: 2
OD_SUPERIOR_TEMPORAL_RESTRICTION: 2
OD_SUPERIOR_NASAL_RESTRICTION: 2
OS_NORMAL: 1

## 2019-02-28 ASSESSMENT — TONOMETRY
OS_IOP_MMHG: 16
IOP_METHOD: APPLANATION
OD_IOP_MMHG: 16

## 2019-02-28 ASSESSMENT — VISUAL ACUITY
OS_CC+: -2
OS_PH_CC: 20/40
OS_CC: 20/50
OD_PH_CC: 20/50
METHOD: SNELLEN - LINEAR
OD_PH_CC+: -1+2
CORRECTION_TYPE: GLASSES
OS_PH_CC+: +1
OD_CC: 20/60
OD_CC+: -2

## 2019-02-28 ASSESSMENT — REFRACTION_WEARINGRX
SPECS_TYPE: PAL
OS_AXIS: 045
OS_CYLINDER: +0.50
OD_CYLINDER: +0.25
OD_AXIS: 135
OD_ADD: +3.00
OS_SPHERE: +1.00
OS_ADD: +3.00
OD_SPHERE: +2.00

## 2019-02-28 ASSESSMENT — CUP TO DISC RATIO
OS_RATIO: 0.3
OD_RATIO: 0.3

## 2019-02-28 ASSESSMENT — EXTERNAL EXAM - RIGHT EYE: OD_EXAM: MILD TO MOD BROW, 2+ BROW PTOSIS

## 2019-02-28 ASSESSMENT — EXTERNAL EXAM - LEFT EYE: OS_EXAM: MILD TO MOD BROW, 2+ BROW PTOSIS

## 2019-02-28 NOTE — PROGRESS NOTES
Current Eye Medications:  None     Subjective:  Here for complete today.  Having some arthritis symptoms lately, trying to get that under control. Dry mouth, sometimes dry eyes as well. Has Gabapentin that she thinks is causing issues.  Feels she needs new glasses.     Best corrected visual acuity when Dr. Fisher saw her was 20/40 right, 20/30 left.      Objective:  See Ophthalmology Exam.       Assessment:  Teresa Lopez is a 83 year old female who presents with:   Encounter Diagnoses   Name Primary?     Combined form of age-related cataract, both eyes Again offered cataract surgery for the right eye, but is reluctant.      Posterior vitreous detachment of both eyes        Plan:  Offered cataract surgery right eye if you wish    Or update glasses and return in 1 year for complete eye exam    Heidi Galvez MD  [phone]

## 2019-02-28 NOTE — LETTER
2/28/2019       RE: Teresa Lopez  [address]  [place] [id number]      Dear Dr. Engle,    Thank you for referring your patient, Teresa Lopez, to the [facility].     Her eye exam is stable.  Please see a copy of my visit note below.     Current Eye Medications:  None     Subjective:  Here for complete today.  Having some arthritis symptoms lately, trying to get that under control. Dry mouth, sometimes dry eyes as well. Has Gabapentin that she thinks is causing issues.  Feels she needs new glasses.     Best corrected visual acuity when Dr. Fisher saw her was 20/40 right, 20/30 left.      Objective:  See Ophthalmology Exam.       Assessment:  Teresa Lopez is a 83 year old female who presents with:   Encounter Diagnoses   Name Primary?     Combined form of age-related cataract, both eyes Again offered cataract surgery for the right eye, but is reluctant.      Posterior vitreous detachment of both eyes        Plan:  Offered cataract surgery right eye if you wish    Or update glasses and return in 1 year for complete eye exam    Heidi Galvez MD  [phone]        Again, thank you for allowing me to participate in the care of your patient.        Sincerely,        Heidi Galvez MD

## 2019-02-28 NOTE — TELEPHONE ENCOUNTER
Patient has different insurance, resubmitted to [employer]  Per [facility] is covered by current benefit plan. No further PA activity needed.  Called and spoke with Pattie at [employer]  The patient had to fill a 7-day supply first and then she can get a month supply at a time.  She has gotten the 7-day so she can get a month supply, insurance ran it through and it paid with today's date.

## 2019-02-28 NOTE — TELEPHONE ENCOUNTER
Prior Authorization Not Needed per Insurance    Medication: oxyCODONE-acetaminophen (PERCOCET) 5-325 MG tablet-PA NOT NEEDED  Insurance Company: [employer] - Phone [phone] Fax [phone]  Expected CoPay:      Pharmacy Filling the Rx: [id number] - [place], [place] - [address] AT [address] [facility]  Pharmacy Notified: Yes  Patient Notified: No    Called and spoke with Pattie at [facility].  The patient had to fill a 7-day supply first and then she can get a month supply at a time.  She has gotten the 7-day so she can get a month supply, insurance ran it through and it paid with today's date.                 Called pharmacy and informed, they do not have any more prescriptions on file, they will need another one so the patient can get a month's supply

## 2019-02-28 NOTE — PATIENT INSTRUCTIONS
Offered cataract surgery right eye if you wish    Or update glasses and return in 1 year for complete eye exam    Heidi Galvez MD  [phone]

## 2019-02-28 NOTE — TELEPHONE ENCOUNTER
"Please see reasoning for new Rx.   \"Called pharmacy and informed, they do not have any more prescriptions on file, they will need another one so the patient can get a month's supply \"  "

## 2019-03-01 RX ORDER — OXYCODONE AND ACETAMINOPHEN 5; 325 MG/1; MG/1
.5-1 TABLET ORAL EVERY 6 HOURS PRN
Qty: 60 TABLET | Refills: 0 | Status: SHIPPED | OUTPATIENT
Start: 2019-03-01 | End: 2019-03-06

## 2019-03-03 RX ORDER — POTASSIUM CHLORIDE 750 MG/1
40 TABLET, EXTENDED RELEASE ORAL DAILY
COMMUNITY
Start: 2019-02-03 | End: 2019-06-05

## 2019-03-04 NOTE — TELEPHONE ENCOUNTER
Per daughter Brooke, patient is no longer taking Percocet (and Gabapentin) and is feeling better. Hard copy of oxyCODONE-acetaminophen (PERCOCET) 5-325 MG tablet shredded. Can this be removed from chart?

## 2019-03-20 ENCOUNTER — THERAPY VISIT (OUTPATIENT)
Dept: PHYSICAL THERAPY | Facility: CLINIC | Age: 84
End: 2019-03-20
Payer: COMMERCIAL

## 2019-03-20 DIAGNOSIS — M25.512 CHRONIC LEFT SHOULDER PAIN: ICD-10-CM

## 2019-03-20 DIAGNOSIS — G89.29 CHRONIC LEFT SHOULDER PAIN: ICD-10-CM

## 2019-03-20 DIAGNOSIS — M54.50 CHRONIC BILATERAL LOW BACK PAIN WITHOUT SCIATICA: ICD-10-CM

## 2019-03-20 DIAGNOSIS — G89.29 CHRONIC BILATERAL LOW BACK PAIN WITHOUT SCIATICA: ICD-10-CM

## 2019-03-20 PROCEDURE — 97112 NEUROMUSCULAR REEDUCATION: CPT | Mod: GP | Performed by: PHYSICAL THERAPIST

## 2019-03-20 PROCEDURE — 97110 THERAPEUTIC EXERCISES: CPT | Mod: GP | Performed by: PHYSICAL THERAPIST

## 2019-03-20 PROCEDURE — 97162 PT EVAL MOD COMPLEX 30 MIN: CPT | Mod: GP | Performed by: PHYSICAL THERAPIST

## 2019-03-20 NOTE — PROGRESS NOTES
[facility] Initial Evaluation -- Lumbar    Date: March 20, 2019  Teresa Lopez is a 83 year old female with a lumbar spine and Lt Shoulder condition.   Referral: GP  Work mechanical stresses:  NA  Employment status:  Retired  Leisure mechanical stresses: No formal exercise  Functional disability score (CORKY/STarT Back):  See flowsheets  VAS score (0-10): 8-9/10  Patient goals/expectations:  Reduce pain, walk without walker    HISTORY:    Present symptoms: (B) low back R > L; Lt neck, Lt lat shoulder down arm to wrist   Pain quality (sharp/shooting/stabbing/aching/burning/cramping):     Paresthesia (yes/no):  Tingling Fingers and toes but medication related    Present since (onset date): 5 years.  MD referral date 2.15.19.     Symptoms (improving/unchanging/worsening):  unchanging.     Symptoms commenced as a result of: No apparent reason   Condition occurred in the following environment:   Unknown     Symptoms at onset (back/thigh/leg): As above  Constant symptoms (back/thigh/leg):   Intermittent symptoms (back/thigh/leg): As above    Symptoms are made worse with the following: Always Sitting, Always Standing, Always Walking and Time of day - No effect, always reaching overhead, always lifting, always dressing,    Symptoms are made better with the following: Other - pain meds, pain patch    Disturbed sleep (yes/no):  No Sleeping postures (prone/sup/side R/L):     Previous episodes (0/1-5/6-10/11+):  Year of first episode:     Previous history: Yes  Previous treatments: Epidural injections with relief      Specific Questions:  Cough/Sneeze/Strain (pos/neg): Neg  Bowel/Bladder (normal/abnormal): Normal  Gait (normal/abnormal): Abnormal, more unsteady  Medications (nil/NSAIDS/analg/steroids/anticoag/other):  Other - High blood pressure and Cannabis  Medical allergies:  None  General health (excellent/good/fair/poor):  Fair  Pertinent medical history:  Osteoarthritis and Stroke  Imaging  (None/Xray/MRI/Other):  X-ray/MRI  Recent or major surgery (yes/no):  No  Night pain (yes/no): No  Accidents (yes/no): No  Unexplained weight loss (yes/no): No  Barriers at home: No  Other red flags: No    EXAMINATION    Posture:   Sitting (good/fair/poor): Poor  Standing (good/fair/poor):Poor  Lordosis (red/acc/normal): Reduced  Correction of posture (better/worse/no effect): No effect    Lateral Shift (right/left/nil): Nil  Relevant (yes/no):    Other Observations: Increased kyphosis.  Static posture - Occiput to wall ~ 10 inches    Neurological:    Motor deficit:  NT  Reflexes:  NT  Sensory deficit:  NT  Dural signs:  NT    Movement Loss:   Tr Mod Min Nil Pain   Flexion     NT   Extension X    NT   Side Gliding R     NT   Side Gliding L     NT   Cervical:        Retraction X       Extension X       Rotation Rt  X      Rotation Lt  X      Shoulder:        Flexion X    Rt 110 deg; Lt 100 deg   Flex/ER X    Rt C6; Lt C2     Test Movements:   During: produces, abolishes, increases, decreases, no effect, centralizing, peripheralizing   After: better, worse, no better, no worse, no effect, centralized, peripheralized    Pretest symptoms sittin/10 pain shoulder   Symptoms During Symptoms After ROM increased ROM decreased No Effect   C/S Ext No Effect    No Effect         Rep C/S Ext No Effect    No Effect    Sh Flex:  Rt 122 deg  Lt 102 deg     EIS        Rep EIS          Static Tests:  Sitting slouched:    Sitting erect:    Standing slouched   Standing erect:    Lying prone in extension:   Long sitting:      Other Tests:     Provisional Classification:  Inconclusive/Other - Mechanically Inconclusive.  Further mechanical assessment of lumbar spine at next follow up visit.    Principle of Management:  Education:  Posture - use of rolled towel placed vertically when sitting, spine as source of shoulder sx's, upright posture when walking   Equipment provided:  None  Mechanical therapy (Y/N):  Y   Extension principle:   Cervical Ext x 10-15 reps 5 x daily    ASSESSMENT/PLAN:    Patient is a 83 year old female with lumbar and left side shoulder complaints.    Patient has the following significant findings with corresponding treatment plan.                Diagnosis 1:  LBP  Diagnosis 2:  Lt Shoulder pain with cervical component    Pain -  self management, education, directional preference exercise and home program  Decreased ROM/flexibility - manual therapy, therapeutic exercise and home program  Decreased joint mobility - manual therapy, therapeutic exercise and home program  Decreased strength - therapeutic exercise, therapeutic activities and home program  Impaired balance - neuro re-education, therapeutic activities and home program  Impaired gait - gait training and home program  Impaired muscle performance - neuro re-education and home program  Decreased function - therapeutic activities and home program  Impaired posture - neuro re-education and home program    Therapy Evaluation Codes:   1) History comprised of:   Personal factors that impact the plan of care:      None.    Comorbidity factors that impact the plan of care are:      Osteoarthritis and Stroke.     Medications impacting care: High blood pressure and Cannibus.  2) Examination of Body Systems comprised of:   Body structures and functions that impact the plan of care:      Cervical spine, Lumbar spine and Shoulder.   Activity limitations that impact the plan of care are:      Bending, Dressing, Lifting, Sitting, Stairs, Standing and Walking.  3) Clinical presentation characteristics are:   Evolving/Changing.  Pain and impaired balance requiring use of assistive device for functional mobility the past 4 months  4) Decision-Making    Moderate complexity using standardized patient assessment instrument and/or measureable assessment of functional outcome.  Cumulative Therapy Evaluation is: Moderate complexity.    Previous and current functional limitations:  (See Goal  Flow Sheet for this information)    Short term and Long term goals: (See Goal Flow Sheet for this information)     Communication ability:  Patient appears to be able to clearly communicate and understand verbal and written communication and follow directions correctly.  Treatment Explanation - The following has been discussed with the patient:   RX ordered/plan of care  Anticipated outcomes  Possible risks and side effects  This patient would benefit from PT intervention to resume normal activities.   Rehab potential is good.    Frequency:  1 X week, once daily  Duration:  for 6 weeks  Discharge Plan:  Achieve all LTG.  Independent in home treatment program.  Reach maximal therapeutic benefit.    Please refer to the daily flowsheet for treatment today, total treatment time and time spent performing 1:1 timed codes.

## 2019-03-21 PROBLEM — G89.29 CHRONIC BILATERAL LOW BACK PAIN WITHOUT SCIATICA: Status: ACTIVE | Noted: 2019-03-21

## 2019-03-21 PROBLEM — M25.512 CHRONIC LEFT SHOULDER PAIN: Status: ACTIVE | Noted: 2019-03-21

## 2019-03-21 PROBLEM — M54.50 CHRONIC BILATERAL LOW BACK PAIN WITHOUT SCIATICA: Status: ACTIVE | Noted: 2019-03-21

## 2019-03-21 PROBLEM — G89.29 CHRONIC LEFT SHOULDER PAIN: Status: ACTIVE | Noted: 2019-03-21

## 2019-03-27 ENCOUNTER — THERAPY VISIT (OUTPATIENT)
Dept: PHYSICAL THERAPY | Facility: CLINIC | Age: 84
End: 2019-03-27
Payer: COMMERCIAL

## 2019-03-27 DIAGNOSIS — G89.29 CHRONIC BILATERAL LOW BACK PAIN WITHOUT SCIATICA: ICD-10-CM

## 2019-03-27 DIAGNOSIS — M25.512 CHRONIC LEFT SHOULDER PAIN: ICD-10-CM

## 2019-03-27 DIAGNOSIS — G89.29 CHRONIC LEFT SHOULDER PAIN: ICD-10-CM

## 2019-03-27 DIAGNOSIS — M54.50 CHRONIC BILATERAL LOW BACK PAIN WITHOUT SCIATICA: ICD-10-CM

## 2019-03-27 PROCEDURE — 97112 NEUROMUSCULAR REEDUCATION: CPT | Mod: GP | Performed by: PHYSICAL THERAPIST

## 2019-03-27 PROCEDURE — 97110 THERAPEUTIC EXERCISES: CPT | Mod: GP | Performed by: PHYSICAL THERAPIST

## 2019-03-27 PROCEDURE — 97530 THERAPEUTIC ACTIVITIES: CPT | Mod: GP | Performed by: PHYSICAL THERAPIST

## 2019-04-02 ENCOUNTER — THERAPY VISIT (OUTPATIENT)
Dept: PHYSICAL THERAPY | Facility: CLINIC | Age: 84
End: 2019-04-02
Payer: COMMERCIAL

## 2019-04-02 DIAGNOSIS — M25.512 CHRONIC LEFT SHOULDER PAIN: ICD-10-CM

## 2019-04-02 DIAGNOSIS — M54.50 CHRONIC BILATERAL LOW BACK PAIN WITHOUT SCIATICA: ICD-10-CM

## 2019-04-02 DIAGNOSIS — G89.29 CHRONIC BILATERAL LOW BACK PAIN WITHOUT SCIATICA: ICD-10-CM

## 2019-04-02 DIAGNOSIS — G89.29 CHRONIC LEFT SHOULDER PAIN: ICD-10-CM

## 2019-04-02 PROCEDURE — 97110 THERAPEUTIC EXERCISES: CPT | Mod: GP | Performed by: PHYSICAL THERAPIST

## 2019-04-02 PROCEDURE — 97530 THERAPEUTIC ACTIVITIES: CPT | Mod: GP | Performed by: PHYSICAL THERAPIST

## 2019-04-03 ENCOUNTER — MEDICAL CORRESPONDENCE (OUTPATIENT)
Dept: HEALTH INFORMATION MANAGEMENT | Facility: CLINIC | Age: 84
End: 2019-04-03

## 2019-04-03 ENCOUNTER — OFFICE VISIT (OUTPATIENT)
Dept: INTERNAL MEDICINE | Facility: CLINIC | Age: 84
End: 2019-04-03
Payer: COMMERCIAL

## 2019-04-03 VITALS
SYSTOLIC BLOOD PRESSURE: 130 MMHG | BODY MASS INDEX: 21.38 KG/M2 | OXYGEN SATURATION: 97 % | DIASTOLIC BLOOD PRESSURE: 62 MMHG | TEMPERATURE: 97.3 F | RESPIRATION RATE: 16 BRPM | WEIGHT: 115 LBS | HEART RATE: 108 BPM

## 2019-04-03 DIAGNOSIS — M47.816 ARTHRITIS, LUMBAR SPINE: ICD-10-CM

## 2019-04-03 DIAGNOSIS — I10 HYPERTENSION GOAL BP (BLOOD PRESSURE) < 140/90: ICD-10-CM

## 2019-04-03 DIAGNOSIS — F32.1 MODERATE MAJOR DEPRESSION (H): ICD-10-CM

## 2019-04-03 DIAGNOSIS — Z91.89 DRIVING SAFETY ISSUE: ICD-10-CM

## 2019-04-03 DIAGNOSIS — N18.30 CKD (CHRONIC KIDNEY DISEASE) STAGE 3, GFR 30-59 ML/MIN (H): ICD-10-CM

## 2019-04-03 DIAGNOSIS — R79.9 ABNORMAL FINDING OF BLOOD CHEMISTRY: ICD-10-CM

## 2019-04-03 DIAGNOSIS — B35.1 ONYCHOMYCOSIS DUE TO DERMATOPHYTE: ICD-10-CM

## 2019-04-03 DIAGNOSIS — E87.6 HYPOKALEMIA: Primary | ICD-10-CM

## 2019-04-03 PROBLEM — T38.0X5A STEROID-INDUCED DIABETES MELLITUS (H): Status: RESOLVED | Noted: 2017-02-21 | Resolved: 2019-04-03

## 2019-04-03 PROBLEM — E09.9 STEROID-INDUCED DIABETES MELLITUS (H): Status: RESOLVED | Noted: 2017-02-21 | Resolved: 2019-04-03

## 2019-04-03 PROBLEM — F11.20 CONTINUOUS OPIOID DEPENDENCE (H): Status: RESOLVED | Noted: 2019-01-02 | Resolved: 2019-04-03

## 2019-04-03 LAB
ANION GAP SERPL CALCULATED.3IONS-SCNC: 9 MMOL/L (ref 3–14)
BUN SERPL-MCNC: 45 MG/DL (ref 7–30)
CALCIUM SERPL-MCNC: 10.2 MG/DL (ref 8.5–10.1)
CHLORIDE SERPL-SCNC: 100 MMOL/L (ref 94–109)
CO2 SERPL-SCNC: 28 MMOL/L (ref 20–32)
CREAT SERPL-MCNC: 1.08 MG/DL (ref 0.52–1.04)
CREAT UR-MCNC: 100 MG/DL
GFR SERPL CREATININE-BSD FRML MDRD: 48 ML/MIN/{1.73_M2}
GLUCOSE SERPL-MCNC: 128 MG/DL (ref 70–99)
HBA1C MFR BLD: 5.8 % (ref 0–5.6)
MAGNESIUM SERPL-MCNC: 2.2 MG/DL (ref 1.6–2.3)
MICROALBUMIN UR-MCNC: 20 MG/L
MICROALBUMIN/CREAT UR: 19.56 MG/G CR (ref 0–25)
POTASSIUM SERPL-SCNC: 3.6 MMOL/L (ref 3.4–5.3)
SODIUM SERPL-SCNC: 137 MMOL/L (ref 133–144)

## 2019-04-03 PROCEDURE — 80048 BASIC METABOLIC PNL TOTAL CA: CPT | Performed by: INTERNAL MEDICINE

## 2019-04-03 PROCEDURE — 36415 COLL VENOUS BLD VENIPUNCTURE: CPT | Performed by: INTERNAL MEDICINE

## 2019-04-03 PROCEDURE — 99207 C FOOT EXAM  NO CHARGE: CPT | Performed by: INTERNAL MEDICINE

## 2019-04-03 PROCEDURE — 99214 OFFICE O/P EST MOD 30 MIN: CPT | Performed by: INTERNAL MEDICINE

## 2019-04-03 PROCEDURE — 83036 HEMOGLOBIN GLYCOSYLATED A1C: CPT | Performed by: INTERNAL MEDICINE

## 2019-04-03 PROCEDURE — 83735 ASSAY OF MAGNESIUM: CPT | Performed by: INTERNAL MEDICINE

## 2019-04-03 PROCEDURE — 82043 UR ALBUMIN QUANTITATIVE: CPT | Performed by: INTERNAL MEDICINE

## 2019-04-03 RX ORDER — LOSARTAN POTASSIUM AND HYDROCHLOROTHIAZIDE 25; 100 MG/1; MG/1
1 TABLET ORAL DAILY
Qty: 90 TABLET | Refills: 3 | Status: SHIPPED | OUTPATIENT
Start: 2019-04-03 | End: 2020-02-03

## 2019-04-03 NOTE — PROGRESS NOTES
SUBJECTIVE:   Teresa Lopez is a 83 year old female who presents to clinic today for the following health issues:      Back pain      Duration: ongoing     Description (location/character/radiation): back pain continues    Intensity:  severe    Therapies tried and outcome: epidural injection in the past    lidoderm patch    Heating pad    Discuss injection     Comes in today with her daughters and an extremely well documented symptom course with their assistance.  This was sent to scanning but shows an improvement in her functionality with a mild increase in her pain off of essentially all pain medications including the gabapentin now.  She feels relatively well overall currently.    Using an Excedrin and heating pad in AMs to start.  Wants to be more active in community activities.    Toes concerned  Starting to feel toes again since stopping Neurontin--not painful or bothersome yet.  Concerned about toenail discoloration and hardening    Discuss potassium  No refills     Does also want to address driving recommendations, fearful patient would continue to insist on going to drive as she recovers.    1. Hypokalemia    2. CKD (chronic kidney disease) stage 3, GFR 30-59 ml/min (H)    3. Onychomycosis due to dermatophyte    4. Moderate major depression (H)    5. Hypertension goal BP (blood pressure) < 140/90    6. Driving safety issue    7. Arthritis, lumbar spine    8. Abnormal finding of blood chemistry         PMH: Updated and/or reviewed in chart.    ROS:  Constitutional, neuro, EMT, endocrine, pulmonary, cardiac, gastrointestinal, genitourinary, musculoskeletal, integument and psychiatric systems are otherwise negative.    OBJECTIVE:                                                    /62   Pulse 108   Temp 97.3  F (36.3  C) (Tympanic)   Resp 16   Wt 52.2 kg (115 lb)   SpO2 97%   BMI 21.38 kg/m      GEN: No acute distress  EYES: No conjunctival injection or icterus, EOMI grossly intact  RESP:  Unlabored, regular  NEURO: Normal gait, MAEx4, light touch sensation grossly intact  PSYCH: Normal mood and affect      Results for orders placed or performed in visit on 04/03/19   Magnesium   Result Value Ref Range    Magnesium 2.2 1.6 - 2.3 mg/dL   Basic metabolic panel   Result Value Ref Range    Sodium 137 133 - 144 mmol/L    Potassium 3.6 3.4 - 5.3 mmol/L    Chloride 100 94 - 109 mmol/L    Carbon Dioxide 28 20 - 32 mmol/L    Anion Gap 9 3 - 14 mmol/L    Glucose 128 (H) 70 - 99 mg/dL    Urea Nitrogen 45 (H) 7 - 30 mg/dL    Creatinine 1.08 (H) 0.52 - 1.04 mg/dL    GFR Estimate 48 (L) >60 mL/min/[1.73_m2]    GFR Estimate If Black 55 (L) >60 mL/min/[1.73_m2]    Calcium 10.2 (H) 8.5 - 10.1 mg/dL   Hemoglobin A1c   Result Value Ref Range    Hemoglobin A1C 5.8 (H) 0 - 5.6 %   Albumin Random Urine Quantitative with Creat Ratio   Result Value Ref Range    Creatinine Urine 100 mg/dL    Albumin Urine mg/L 20 mg/L    Albumin Urine mg/g Cr 19.56 0 - 25 mg/g Cr      ASSESSMENT/PLAN:                                                    7. Arthritis, lumbar spine  Doing well with lidocaine patches and medical cannabis.    1. Hypokalemia  Potassium toward the lower end of the normal range.  I believe the patient noted she had not been taking potassium for some number of days at least and would attempt to continue potassium supplementation with potassium rich foods.  I agree this is an acceptable plan and we would follow-up in a few months to recheck potassium levels routinely.    2. CKD (chronic kidney disease) stage 3, GFR 30-59 ml/min (H)  Creatinine stable around baseline of around 1-1.1 on ARB and HCTZ.    3. Onychomycosis due to dermatophyte  We discussed and agreed to defer treatment    4. Moderate major depression (H)  She is doing adequately well off the citalopram and needs to continue to engage with her family and residential community for best mood support.    5. Hypertension goal BP (blood pressure) < 140/90  Good  blood pressure control on current regimen-continue.  - Magnesium  - Basic metabolic panel  - losartan-hydrochlorothiazide (HYZAAR) 100-25 MG tablet; Take 1 tablet by mouth daily  Dispense: 90 tablet; Refill: 3  - Hemoglobin A1c  - FOOT EXAM  - Albumin Random Urine Quantitative with Creat Ratio    6. Driving safety issue  We had a discussion about using her mobility services offered for field trips, grocery store runs, etc.  She agreed that she would have to pass a [place] driving screen before getting behind the wheel.  She further agreed that it was a good goal to have but perhaps not entirely realistic or appropriate.  Daughters were in favor of this approach overall.    8. Abnormal finding of blood chemistry   - Hemoglobin A1c         Orders Placed This Encounter     FOOT EXAM     Magnesium     Basic metabolic panel     Hemoglobin A1c     Albumin Random Urine Quantitative with Creat Ratio     losartan-hydrochlorothiazide (HYZAAR) 100-25 MG tablet              Tej Engle MD

## 2019-04-23 ENCOUNTER — THERAPY VISIT (OUTPATIENT)
Dept: PHYSICAL THERAPY | Facility: CLINIC | Age: 84
End: 2019-04-23
Payer: COMMERCIAL

## 2019-04-23 DIAGNOSIS — M54.50 CHRONIC BILATERAL LOW BACK PAIN WITHOUT SCIATICA: ICD-10-CM

## 2019-04-23 DIAGNOSIS — M25.512 CHRONIC LEFT SHOULDER PAIN: ICD-10-CM

## 2019-04-23 DIAGNOSIS — G89.29 CHRONIC BILATERAL LOW BACK PAIN WITHOUT SCIATICA: ICD-10-CM

## 2019-04-23 DIAGNOSIS — G89.29 CHRONIC LEFT SHOULDER PAIN: ICD-10-CM

## 2019-04-23 PROCEDURE — 97530 THERAPEUTIC ACTIVITIES: CPT | Mod: GP | Performed by: PHYSICAL THERAPIST

## 2019-04-23 PROCEDURE — 97110 THERAPEUTIC EXERCISES: CPT | Mod: GP | Performed by: PHYSICAL THERAPIST

## 2019-04-30 ENCOUNTER — THERAPY VISIT (OUTPATIENT)
Dept: PHYSICAL THERAPY | Facility: CLINIC | Age: 84
End: 2019-04-30
Payer: COMMERCIAL

## 2019-04-30 DIAGNOSIS — G89.29 CHRONIC LEFT SHOULDER PAIN: ICD-10-CM

## 2019-04-30 DIAGNOSIS — M25.512 CHRONIC LEFT SHOULDER PAIN: ICD-10-CM

## 2019-04-30 DIAGNOSIS — G89.29 CHRONIC BILATERAL LOW BACK PAIN WITHOUT SCIATICA: ICD-10-CM

## 2019-04-30 DIAGNOSIS — M54.50 CHRONIC BILATERAL LOW BACK PAIN WITHOUT SCIATICA: ICD-10-CM

## 2019-04-30 PROCEDURE — 97530 THERAPEUTIC ACTIVITIES: CPT | Mod: GP | Performed by: PHYSICAL THERAPIST

## 2019-04-30 PROCEDURE — 97110 THERAPEUTIC EXERCISES: CPT | Mod: GP | Performed by: PHYSICAL THERAPIST

## 2019-05-07 ENCOUNTER — THERAPY VISIT (OUTPATIENT)
Dept: PHYSICAL THERAPY | Facility: CLINIC | Age: 84
End: 2019-05-07
Payer: COMMERCIAL

## 2019-05-07 DIAGNOSIS — G89.29 CHRONIC BILATERAL LOW BACK PAIN WITHOUT SCIATICA: ICD-10-CM

## 2019-05-07 DIAGNOSIS — M54.50 CHRONIC BILATERAL LOW BACK PAIN WITHOUT SCIATICA: ICD-10-CM

## 2019-05-07 DIAGNOSIS — M25.512 CHRONIC LEFT SHOULDER PAIN: ICD-10-CM

## 2019-05-07 DIAGNOSIS — G89.29 CHRONIC LEFT SHOULDER PAIN: ICD-10-CM

## 2019-05-07 PROCEDURE — 97110 THERAPEUTIC EXERCISES: CPT | Mod: GP | Performed by: PHYSICAL THERAPIST

## 2019-05-21 ENCOUNTER — THERAPY VISIT (OUTPATIENT)
Dept: PHYSICAL THERAPY | Facility: CLINIC | Age: 84
End: 2019-05-21
Payer: COMMERCIAL

## 2019-05-21 DIAGNOSIS — G89.29 CHRONIC BILATERAL LOW BACK PAIN WITHOUT SCIATICA: ICD-10-CM

## 2019-05-21 DIAGNOSIS — G89.29 CHRONIC LEFT SHOULDER PAIN: ICD-10-CM

## 2019-05-21 DIAGNOSIS — M54.50 CHRONIC BILATERAL LOW BACK PAIN WITHOUT SCIATICA: ICD-10-CM

## 2019-05-21 DIAGNOSIS — M25.512 CHRONIC LEFT SHOULDER PAIN: ICD-10-CM

## 2019-05-21 PROCEDURE — 97530 THERAPEUTIC ACTIVITIES: CPT | Mod: GP | Performed by: PHYSICAL THERAPIST

## 2019-05-21 PROCEDURE — 97110 THERAPEUTIC EXERCISES: CPT | Mod: GP | Performed by: PHYSICAL THERAPIST

## 2019-05-21 NOTE — LETTER
[facility]  [address]  [place] [address]  [phone]    May 22, 2019    Re: Teresa Lopez   :   1935  MRN:  [id number]   REFERRING PHYSICIAN:   Tej Engle    [facility]  Date of Initial Evaluation:  3/20/19  Visits:  Rxs Used: 7  Reason for Referral:     Chronic bilateral low back pain without sciatica  Chronic left shoulder pain    EVALUATION SUMMARY  PROGRESS  REPORT  Progress reporting period is from 3.20.19 to 19.       SUBJECTIVE  Subjective changes noted by patient:  Pt reports overall back and shoulder pain are better.  Pain still fluctuates depending on day.  Feels like her posture has improved and being more aware of standing more upright when walking which she thinks has helped her back.  Still has pain with reaching overhead.  Reports compliance with HEP.    Current Pain level: 5/10.     Initial Pain level: 10/10.   Changes in function:  Yes (See Goal flowsheet attached for changes in current functional level)  Adverse reaction to treatment or activity: None    OBJECTIVE  Objective: Posture:  Wall to occiput 3 inches (Was 12 inches initially).  Sh AROM:  Flex 130 deg (B).  Cerv ROM:  Ext Mod loss, Rotation Mod loss.  L/S AROM:  Flex WNL; Ext Tr loss.    ASSESSMENT/PLAN  Updated problem list and treatment plan:   Diagnosis 1:  LBP  Diagnosis 2:  Shoulder Pain    Pain -  self management, education, directional preference exercise and home program  Decreased ROM/flexibility - therapeutic exercise and home program  Decreased joint mobility - therapeutic exercise and home program  Decreased strength - therapeutic exercise, therapeutic activities and home program  Impaired balance - neuro re-education, therapeutic activities and home program  Impaired muscle performance - neuro re-education and home program  Decreased function - therapeutic activities and home program  Impaired posture  - neuro re-education and home program  STG/LTGs have been met or progress has been made towards goals:  Yes (See Goal flow sheet completed today.)  Assessment of Progress: The patient's condition is improving.  Patient is meeting short term goals and is progressing towards long term goals.  Self Management Plans:  Patient has been instructed in a home treatment program.  Patient  has been instructed in self management of symptoms.  I have re-evaluated this patient and find that the nature, scope, duration and intensity of the therapy is appropriate for the medical condition of the patient.  Teresa continues to require the following intervention to meet STG and LTG's:  PT    Re: Teresa Lopez   :   1935    Recommendations:  This patient would benefit from continued therapy.     Frequency:  1 X a month, once daily  Duration:  for 1 month    Thank you for your referral.    INQUIRIES  Therapist: DIAMOND SutherlandT, Cert MDT  [facility]  [address]   [place] [address]  Phone: [phone]  Fax: [phone]

## 2019-05-22 NOTE — PROGRESS NOTES
PROGRESS  REPORT    Progress reporting period is from 3.20.19 to 5.21.19.       SUBJECTIVE  Subjective changes noted by patient:  Pt reports overall back and shoulder pain are better.  Pain still fluctuates depending on day.  Feels like her posture has improved and being more aware of standing more upright when walking which she thinks has helped her back.  Still has pain with reaching overhead.  Reports compliance with HEP.    Current Pain level: 5/10.     Initial Pain level: 10/10.   Changes in function:  Yes (See Goal flowsheet attached for changes in current functional level)  Adverse reaction to treatment or activity: None    OBJECTIVE  Objective: Posture:  Wall to occiput 3 inches (Was 12 inches initially).  Sh AROM:  Flex 130 deg (B).  Cerv ROM:  Ext Mod loss, Rotation Mod loss.  L/S AROM:  Flex WNL; Ext Tr loss.      ASSESSMENT/PLAN  Updated problem list and treatment plan:   Diagnosis 1:  LBP  Diagnosis 2:  Shoulder Pain    Pain -  self management, education, directional preference exercise and home program  Decreased ROM/flexibility - therapeutic exercise and home program  Decreased joint mobility - therapeutic exercise and home program  Decreased strength - therapeutic exercise, therapeutic activities and home program  Impaired balance - neuro re-education, therapeutic activities and home program  Impaired muscle performance - neuro re-education and home program  Decreased function - therapeutic activities and home program  Impaired posture - neuro re-education and home program  STG/LTGs have been met or progress has been made towards goals:  Yes (See Goal flow sheet completed today.)  Assessment of Progress: The patient's condition is improving.  Patient is meeting short term goals and is progressing towards long term goals.  Self Management Plans:  Patient has been instructed in a home treatment program.  Patient  has been instructed in self management of symptoms.  I have re-evaluated this patient and  find that the nature, scope, duration and intensity of the therapy is appropriate for the medical condition of the patient.  Teresa continues to require the following intervention to meet STG and LTG's:  PT    Recommendations:  This patient would benefit from continued therapy.     Frequency:  1 X a month, once daily  Duration:  for 1 month

## 2019-06-05 ENCOUNTER — OFFICE VISIT (OUTPATIENT)
Dept: INTERNAL MEDICINE | Facility: CLINIC | Age: 84
End: 2019-06-05
Payer: COMMERCIAL

## 2019-06-05 VITALS
OXYGEN SATURATION: 99 % | WEIGHT: 114 LBS | BODY MASS INDEX: 20.98 KG/M2 | RESPIRATION RATE: 16 BRPM | HEIGHT: 62 IN | TEMPERATURE: 98.6 F | DIASTOLIC BLOOD PRESSURE: 67 MMHG | SYSTOLIC BLOOD PRESSURE: 123 MMHG | HEART RATE: 80 BPM

## 2019-06-05 DIAGNOSIS — M20.42 HAMMER TOE OF LEFT FOOT: ICD-10-CM

## 2019-06-05 DIAGNOSIS — R63.4 ABNORMAL WEIGHT LOSS: ICD-10-CM

## 2019-06-05 DIAGNOSIS — M54.50 CHRONIC BILATERAL LOW BACK PAIN WITHOUT SCIATICA: Primary | ICD-10-CM

## 2019-06-05 DIAGNOSIS — Z79.899 MEDICAL CANNABIS USE: ICD-10-CM

## 2019-06-05 DIAGNOSIS — G89.29 CHRONIC BILATERAL LOW BACK PAIN WITHOUT SCIATICA: Primary | ICD-10-CM

## 2019-06-05 LAB
AMPHETAMINES UR QL: NOT DETECTED NG/ML
BARBITURATES UR QL SCN: NOT DETECTED NG/ML
BENZODIAZ UR QL SCN: NOT DETECTED NG/ML
BUPRENORPHINE UR QL: NOT DETECTED NG/ML
CANNABINOIDS UR QL: ABNORMAL NG/ML
COCAINE UR QL SCN: NOT DETECTED NG/ML
D-METHAMPHET UR QL: NOT DETECTED NG/ML
METHADONE UR QL SCN: NOT DETECTED NG/ML
OPIATES UR QL SCN: NOT DETECTED NG/ML
OXYCODONE UR QL SCN: NOT DETECTED NG/ML
PCP UR QL SCN: NOT DETECTED NG/ML
PROPOXYPH UR QL: NOT DETECTED NG/ML
TRICYCLICS UR QL SCN: NOT DETECTED NG/ML

## 2019-06-05 PROCEDURE — 99214 OFFICE O/P EST MOD 30 MIN: CPT | Performed by: INTERNAL MEDICINE

## 2019-06-05 PROCEDURE — 80306 DRUG TEST PRSMV INSTRMNT: CPT | Performed by: INTERNAL MEDICINE

## 2019-06-05 RX ORDER — MIRTAZAPINE 7.5 MG/1
7.5 TABLET, FILM COATED ORAL AT BEDTIME
Qty: 30 TABLET | Refills: 2 | Status: SHIPPED | OUTPATIENT
Start: 2019-06-05 | End: 2019-06-12

## 2019-06-05 ASSESSMENT — PATIENT HEALTH QUESTIONNAIRE - PHQ9
5. POOR APPETITE OR OVEREATING: NOT AT ALL
SUM OF ALL RESPONSES TO PHQ QUESTIONS 1-9: 3

## 2019-06-05 ASSESSMENT — MIFFLIN-ST. JEOR: SCORE: 922.41

## 2019-06-05 ASSESSMENT — ANXIETY QUESTIONNAIRES
2. NOT BEING ABLE TO STOP OR CONTROL WORRYING: NOT AT ALL
6. BECOMING EASILY ANNOYED OR IRRITABLE: NOT AT ALL
GAD7 TOTAL SCORE: 0
7. FEELING AFRAID AS IF SOMETHING AWFUL MIGHT HAPPEN: NOT AT ALL
IF YOU CHECKED OFF ANY PROBLEMS ON THIS QUESTIONNAIRE, HOW DIFFICULT HAVE THESE PROBLEMS MADE IT FOR YOU TO DO YOUR WORK, TAKE CARE OF THINGS AT HOME, OR GET ALONG WITH OTHER PEOPLE: NOT DIFFICULT AT ALL
1. FEELING NERVOUS, ANXIOUS, OR ON EDGE: NOT AT ALL
5. BEING SO RESTLESS THAT IT IS HARD TO SIT STILL: NOT AT ALL
3. WORRYING TOO MUCH ABOUT DIFFERENT THINGS: NOT AT ALL

## 2019-06-05 NOTE — PROGRESS NOTES
Subjective     Teresa Lopez is a 83 year old female who presents to clinic today for the following health issues:    HPI   Musculoskeletal problem/pain      Duration: 8 years    Description  Location: right hip    Intensity:  severe    Accompanying signs and symptoms: radiation of pain to right buttocks and groin    History  Previous similar problem: YES  Previous evaluation:  Epidural injection was effective    Precipitating or alleviating factors:  Trauma or overuse: no   Aggravating factors include: standing and walking    Therapies tried and outcome: exercises, chiropractor and physical therapy      Left foot second toe pain  Has a small rough spot where a growth was between the toes    Drinking 3 Boost shakes daily  Continues to feel better and be more active (but denies being as well as she would prefer)  Significant ongoing back pain responding to lidocaine patches and medical cannabis   Appetite pretty low until later afternoons    Interested in DELFINA and possible evaluation of SI joint arthritis / injection.  Did have neurosurg evaluation but declined surgical options and would like to return to pain clinic for procedures given past relief.  Also reports history of right ankle injection with significant pain relief in the past more distantly.    Complains of lip swelling / tingling without tongue swelling or difficulty breathing.  Denies rashes, fever, chills, or night sweats.  Happened before she started estoven (sp?) supplementation.  She attributes it to after-effects of gabapentin.    1. Chronic bilateral low back pain without sciatica    2. Medical cannabis use    3. Abnormal weight loss     4. Hammer toe of left foot        PMH: Updated and/or reviewed in chart.    ROS:  Constitutional, neuro, EMT, endocrine, pulmonary, cardiac, gastrointestinal, genitourinary, musculoskeletal, integument and psychiatric systems are otherwise negative.    Objective   OBJECTIVE:                                            "         /67   Pulse 80   Temp 98.6  F (37  C) (Tympanic)   Resp 16   Ht 1.562 m (5' 1.5\")   Wt 51.7 kg (114 lb)   SpO2 99%   BMI 21.19 kg/m      GEN: No acute distress  EYES: No conjunctival injection or icterus, EOMI grossly intact  RESP: Unlabored, regular  NEURO: Normal gait, MAEx4, light touch sensation grossly intact  PSYCH: Normal mood and affect  MUSCULOSKELETAL/CV: no lower extremity edema, warm extremities  HEENT: no clear lip or periorbital edema; normal speech    Results for orders placed or performed in visit on 06/05/19   Drug Abuse Screen Panel 13, Urine (Pain Care Package)   Result Value Ref Range    Cannabinoids (43-[id number]-9-THC) Detected, Abnormal Result (A) NDET^Not Detected ng/mL    Phencyclidine (Phencyclidine) Not Detected NDET^Not Detected ng/mL    Cocaine (Benzoylecgonine) Not Detected NDET^Not Detected ng/mL    Methamphetamine (d-Methamphetamine) Not Detected NDET^Not Detected ng/mL    Opiates (Morphine) Not Detected NDET^Not Detected ng/mL    Amphetamine (d-Amphetamine) Not Detected NDET^Not Detected ng/mL    Benzodiazepines (Nordiazepam) Not Detected NDET^Not Detected ng/mL    Tricyclic Antidepressants (Desipramine) Not Detected NDET^Not Detected ng/mL    Methadone (Methadone) Not Detected NDET^Not Detected ng/mL    Barbiturates (Butalbital) Not Detected NDET^Not Detected ng/mL    Oxycodone (Oxycodone) Not Detected NDET^Not Detected ng/mL    Propoxyphene (Norpropoxyphene) Not Detected NDET^Not Detected ng/mL    Buprenorphine (Buprenorphine) Not Detected NDET^Not Detected ng/mL      Assessment & Plan   ASSESSMENT/PLAN:                                                    1. Chronic bilateral low back pain without sciatica  2. Medical cannabis use  Benefiting from medical cannabis and lidocaine patches.  Continue.  OK to have pain clinic assess her for repeat injections for pain relief.  (If desperate, consider limited trial pregabalin, but given poor response overall to " gabapentin, would avoid as able.)  - Drug Abuse Screen Panel 13, Urine (Pain Care Package)    3. Abnormal weight loss   Mood symptoms really well controlled, OK to trial as appetite stimulant.  Has had limited to poor reactions to other serotonergics in the past, so approach cautiously with low threshold for discontinuation.  - mirtazapine (REMERON) 7.5 MG tablet; Take 1 tablet (7.5 mg) by mouth At Bedtime  Dispense: 30 tablet; Refill: 2    4. Hammer toe of left foot  Continue gel padding, no clear need for surgical intervention at this time.       Orders Placed This Encounter     Drug Abuse Screen Panel 13, Urine (Pain Care Package)     mirtazapine (REMERON) 7.5 MG tablet        Return in about 6 weeks (around 7/17/2019) for Follow-up.    Tej Engle MD

## 2019-06-06 ASSESSMENT — ANXIETY QUESTIONNAIRES: GAD7 TOTAL SCORE: 0

## 2019-06-12 ENCOUNTER — OFFICE VISIT (OUTPATIENT)
Dept: PALLIATIVE MEDICINE | Facility: CLINIC | Age: 84
End: 2019-06-12
Payer: COMMERCIAL

## 2019-06-12 VITALS
WEIGHT: 114 LBS | BODY MASS INDEX: 21.19 KG/M2 | HEART RATE: 94 BPM | DIASTOLIC BLOOD PRESSURE: 69 MMHG | SYSTOLIC BLOOD PRESSURE: 112 MMHG

## 2019-06-12 DIAGNOSIS — M70.62 GREATER TROCHANTERIC BURSITIS OF BOTH HIPS: Primary | ICD-10-CM

## 2019-06-12 DIAGNOSIS — M48.062 SPINAL STENOSIS OF LUMBAR REGION WITH NEUROGENIC CLAUDICATION: ICD-10-CM

## 2019-06-12 DIAGNOSIS — M25.551 BILATERAL HIP PAIN: ICD-10-CM

## 2019-06-12 DIAGNOSIS — M70.61 GREATER TROCHANTERIC BURSITIS OF BOTH HIPS: Primary | ICD-10-CM

## 2019-06-12 DIAGNOSIS — M25.552 BILATERAL HIP PAIN: ICD-10-CM

## 2019-06-12 PROCEDURE — 99213 OFFICE O/P EST LOW 20 MIN: CPT | Performed by: NURSE PRACTITIONER

## 2019-06-12 NOTE — PROGRESS NOTES
[facility]    6/12/2019     Chief complaint:  chronic low back pain and right leg pain  Interval history:  Teresa Lopez is a 83 year old female is known to me for .   Primary osteoarthritis    Chronic right hip pain   Chronic bilateral low back without sciatica   Spinal stenosis of lumbar region with neurogenic claudication   Piriformis syndrome of both sides   PMHx DJD, CVA, HTN, diabetes mellitus, and cataract   PSHx Hysterectomy    Recommendations/plan at the last visit on 9/19/2018 included:  1. Physical Therapy:  The patient will schedule an appointment with outside physical therapist, referral provided today to [facility] at the [facility]  2. Clinical Health Psychologist: None needed at present  3. Diagnostic Studies:  None  4. Medication Management:    1. Continue Percocet per Primary Care Provider   2. Continue Gabapentin 300mg TID  3. Agree with medical cannabis per dispensary instructions  5. Further procedures recommended: none  6. Referral to see Dr. Erin Valencia, neurosurgeon at [facility] at the [facility] for possible surgery  7. Recommendations to PCP: See above  8. Follow up: 10 weeks.    Since her last visit, Teresa Lopez reports:  -Patient has discontinued gabapentin due to side effects including severe hot flashes and chills and fogginess. She relates she was seen in the hospital twice (December/January) to be evaluated but were not able to determine cause of symptoms. At her last visit to the hospital in January 2019, she was unable to keep anything down including her medications so at that time discontinued both Percocet and gabapentin. Since being off of the medications, she has progressively improved and only experiences mild hot flashes. Her functionality has improved significantly with completing daily tasks. She is able to stand up straighter and ambulate with walker more easily.   -Her low back pain did not worsen significantly without gabapentin and Percocet.  "  -She is still not taking Percocet.   -Ongoing lateral right hip and groin pain. Her hip pain is the most bothersome and prevents her for reaching full functionality.       At this point, the patient's participation with our multidisciplinary team includes:  The patient has been compliant with the program  PT - patient is interested  Health Psych  None    Pain scores:  Pain intensity on average is 8 on a scale of 0-10.    Range is 7-10/10.   Pain right now is 8/10  Pain is described as \"unbearable, gnawing\".    Pain is continuous in nature and worst in the morning.    Current pain-related medication treatments include:  -Lidocaine patch        Other pertinent medications:  -Senna-docusate PRN      Previous medication treatments included:  OPIATES:hydrocodone (not helpful), oxycodone (somewhat helpful)  NSAIDS: ibuprofen (not helpful), Aleve (not helpful)  MUSCLE RELAXANTS: none  ANTI-MIGRAINE MEDS: none  ANTI-DEPRESSANTS: none  SLEEP AIDS: none  ANTI-CONVULSANTS: gabapentin (unsure if helpful, side effects: hot flashes, fogginess)  TOPICALS: none  Other meds: Tylenol (not helpful)    Injections:  -11/10/2017 Caudal epidural steroid injection with Dr. Reymundo Bajwa (helpful for a very short period of time)  -1/5/2018 Ultrasound guided right intra-articular hip injection with Dr. Jean Meade  -1/12/2018 Right ankle injection with Dr. Lewis of podiatry.   -7/3/2018 L2-3 interlaminar epidural steroid injection with Dr Jamaal Eid (not at all helpful)  -8/3/2018 right hip steroid injection with Dr. Valverde (helped some)        Side Effects: no side effect  Patient is using the medication as prescribed: YES    Medications:  Current Outpatient Medications   Medication Sig Dispense Refill     ADVIL 200 MG PO CAPS Reported on 4/20/2017       amLODIPine (NORVASC) 2.5 MG tablet Take 1 tablet (2.5 mg) by mouth At Bedtime 90 tablet 3     aspirin 162 MG EC tablet Take 162 mg by mouth daily Reported on 4/20/2017       " CALCIUM 1200 OR Reported on 4/20/2017       Glucosamine-Chondroit-Vit C-Mn (GLUCOSAMINE 1500 COMPLEX PO) Take 1,500 mg by mouth daily Reported on 4/20/2017       lidocaine (LIDODERM) 5 % patch Place 3 patches onto the skin every 24 hours 90 patch 11     Loratadine (CLARITIN) 10 MG capsule Take 10 mg by mouth as needed Reported on 4/20/2017       losartan-hydrochlorothiazide (HYZAAR) 100-25 MG tablet Take 1 tablet by mouth daily 90 tablet 3     medical cannabis (Patient's own supply.  Not a prescription) See Admin Instructions (This is NOT a prescription, and does not certify that the patient has a qualifying medical condition for medical cannabis.  The purpose of this order is  to document that the patient reports taking medical cannabis.)   Taking 6-9 red capsules daily       MULTI-VITAMIN OR TABS 1 TABLET DAILY       Omega-3 Fatty Acids (SALMON OIL-1000 PO) take 1,000 Caps by mouth daily.       order for DME Equipment being ordered: 4 wheeled Rollator walker with seat. Patient would like to have a red one if possible. 1 Device 0     order for DME Equipment being ordered: front wheeled walker 1 Device 0     PRILOSEC OR None Entered       senna-docusate (SENOKOT-S;PERICOLACE) 8.6-50 MG per tablet Take 1 tablet by mouth 2 times daily 120 tablet 12     simvastatin (ZOCOR) 40 MG tablet Take 1 tablet (40 mg) by mouth At Bedtime 90 tablet 1     VITAMIN C OR None Entered       VITAMIN D OR Take 1,200 mg by mouth daily.       VITAMIN E 400 UNIT OR CAPS None Entered         Medical History: any changes in medical history since they were last seen? No    Social History:  Home situation: . 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  Tobacco use: none  Alcohol use: very little, about 2 drinks per month  Drug use: none  History of chemical dependency treatment: none    Is patient a current smoker or tobacco user?  no  If yes, was cessation  counseling offered?  no        Review of Systems:  ROS is positive as per HPI and is negative for fevers, chills, sweats, or constipation, diarrhea.    This document serves as a record of the services and decisions personally performed and made by Marion SANCHES. It was created on her behalf by Tata Avina, a trained medical scribe. The creation of this document is based on the provider's statements to the medical scribe.  Tata Avina 3:03 PM June 12, 2019      Physical Exam:  Vital signs: /69   Pulse 94   Wt 51.7 kg (114 lb)   BMI 21.19 kg/m      Behavioral observations:  Awake and alert.accompanied today by her daughter.     Gait:  Slow and antalgic on right. Shuffling gait. Uses a walking stick    Musculoskeletal exam:    Moves very slowly in the exam room  Strength grossly equal throughout  Bilateral greater trochanteric tenderness     Neuro exam:  SILT extremities     Skin/vascular/autonomic:  No suspicious lesions on exposed skin    Other:  N/A    Is the patient hypertensive today? no  Hypertensive on recheck of BP?   n/a  If yes, was patient recommended to see Primary Care Provider in follow up for management of HTN?  n/a      Imaging  MRI LUMBAR SPINE WITHOUT CONTRAST   3/30/2018 1:22 PM      HISTORY:  Lumbar degenerative disc disease.     TECHNIQUE: Multiplanar multisequence MRI of the lumbar spine without  contrast.     COMPARISON: Lumbar spine x-ray 3/22/2018. Lumbar spine MR 3/17/2017.      FINDINGS: There are 5 lumbar-type vertebral bodies assumed for the  purposes of this dictation. The tip of the conus terminates at the  L1-L2 level.     There is convex right scoliotic curvature of the lumbar spine centered  at the L2-L3 level. There is mild retrolisthesis of L2 on L3 and mild  anterolisthesis of L3 on L4. There is grade 1 anterolisthesis of L4 on  L5. There is right lateral listhesis of L3 on L4. There is slight loss  of left-sided vertebral body height at L3, probably due  to  degenerative changes. Severe loss of intervertebral disc height seen  at L3-L4 with associated degenerative endplate changes. Severe loss of  disc height also seen on the left at L1-L2 and L2-L3 with severe  degenerative endplate changes. Mild loss of disc height at T11-12 and  T12-L1 with disc desiccation at L4-5 and L5-S1. There is mild STIR  hyperintense marrow edema at the opposing L2-L3 endplates.  Degenerative subchondral cysts seen anteriorly at L3.     Level by level as follows:      T12-L1:  Only seen on the lateral view, but there is a posterior disc  bulge and bilateral facet hypertrophy resulting in moderate right and  mild left neural foraminal narrowing. No significant spinal canal  narrowing.      L1-L2:  Convex right curvature with retrolisthesis and circumferential  disc bulge with endplate osteophytic spurring as well as bilateral  facet hypertrophy. Findings result in mild central spinal canal  narrowing and moderate bilateral neural foraminal narrowing.      L2-L3:  Convex right scoliotic curvature with left-sided disc bulge  and endplate osteophytic spurring as well as, left greater than right,  facet hypertrophy and ligamentum flavum infolding. Findings result in  moderate central spinal canal narrowing as well as moderate to severe  left neural foraminal narrowing. There is mild right neural foraminal  narrowing.      L3-L4:  Mild anterolisthesis along with circumferential disc bulge,  severe bilateral facet hypertrophy, and ligamentum flavum infolding.  Findings result in severe central spinal canal narrowing. There is  also severe left and moderate to severe right neural foraminal  narrowing.      L4-L5:  Anterolisthesis with uncovering of the disc and small  posterior disc bulge along with severe bilateral facet hypertrophy and  ligamentum flavum infolding. Findings result in moderate to severe  central spinal canal narrowing. There is also moderate to severe  bilateral neural foraminal  narrowing. Small bilateral facet joint  effusions.      L5-S1:  Mild posterior disc bulge and marked bilateral facet  hypertrophy results in moderate bilateral neural foraminal narrowing,  right greater than left. No significant central spinal canal  narrowing.      Paraspinous soft tissues:  Normal.          IMPRESSION:    1. Severe multilevel degenerative changes throughout the lumbar spine  as described above. Overall, there is no significant change since  previous MR 3/17/2017.  2. Severe central spinal canal narrowing at L3-L4 and moderate to  severe spinal canal narrowing at L4-L5.  3. Convex right scoliotic curvature of the spine with multiple levels  of spondylolisthesis and right lateral listhesis of L3 on L4.     DIANA HERNANDEZ MD    [place] Prescription Monitoring [place]:  Reviewed [place]  June 19, 2019- no concerning fills.  Marion SANCHES RN CNP, FNP  [facility]      Assessment:   1. Greater trochanteric bursitis of both hips   2. Bilateral hip pain   3. Spinal stenosis of lumbar region with neurogenic claudication      Plan:   1. Physical Therapy:  None at present  2. Clinical Health Psychologist: None needed at present  3. Diagnostic Studies:  None  4. Medication Management:    1. Advised to remain off of gabapentin due to side effects   2. Agree with Lidocaine patches   5. Further procedures recommended: bilateral greater trochanteric injection, our office to contact patient  6. Recommendations to PCP: See above  7. Follow up: 8-12 weeks.    Total time spent face to face was 20 minutes and more than 50% of face to face time was spent in counseling and/or coordination of care regarding the diagnosis and recommendations above    The information in this document, created by the medical scribe for me, accurately reflects the services I personally performed and the decisions made by me. I have reviewed and approved this document for accuracy prior to leaving the patient  care area.  June 12, 2019     Marion SANCHES, RN CNP, FNP  [facility] [place]

## 2019-06-12 NOTE — PATIENT INSTRUCTIONS
PLAN:  1. Medications:   1. Stay off of gabapentin due to side effects  2. I agree with Lidocaine patches!  2. Procedures: schedule an appointment for greater trochanteric injection, our office will contact you   3. Follow-up with me in 8-12 weeks    ----------------------------------------------------------------  Clinic Number:  [phone]   Call this number with any questions about your care and for scheduling assistance. Calls are returned Monday through Friday between 8 AM and 4:30 PM. We usually get back to you within 2 business days depending on the issue/request.       Medication refills:    For non-opioid medications, call your pharmacy directly to request a refill. The pharmacy will contact the [facility] Management Center for authorization. Please allow 3-4 days for these refills to be processed.     For opioid refills, call the clinic number or send a [employer] message. Please contact us 7-10 days before your refill is due. The message MUST include the name of the specific medication(s) requested and how you would like to receive the prescription(s). The options are as follows:    Pain Clinic staff can mail the prescription to your pharmacy. Please tell us the name of the pharmacy.    You may pick the prescription up at the Pain Clinic (tell us the location) or during a clinic visit with your pain provider    Pain Clinic staff can deliver the prescription to the [place] pharmacy in the clinic building. Please tell us the location.      We believe regular attendance is key to your success in our program.    Any time you are unable to keep your appointment we ask that you call us at least 24 hours in advance to let us know. This will allow us to offer the appointment time to another patient.

## 2019-06-14 ENCOUNTER — OFFICE VISIT (OUTPATIENT)
Dept: PALLIATIVE MEDICINE | Facility: CLINIC | Age: 84
End: 2019-06-14
Payer: COMMERCIAL

## 2019-06-14 VITALS — HEART RATE: 88 BPM | SYSTOLIC BLOOD PRESSURE: 110 MMHG | DIASTOLIC BLOOD PRESSURE: 55 MMHG

## 2019-06-14 DIAGNOSIS — M70.62 GREATER TROCHANTERIC BURSITIS OF BOTH HIPS: Primary | ICD-10-CM

## 2019-06-14 DIAGNOSIS — M70.61 GREATER TROCHANTERIC BURSITIS OF BOTH HIPS: Primary | ICD-10-CM

## 2019-06-14 PROCEDURE — 20610 DRAIN/INJ JOINT/BURSA W/O US: CPT | Mod: 50 | Performed by: PAIN MEDICINE

## 2019-06-14 ASSESSMENT — PAIN SCALES - GENERAL: PAINLEVEL: WORST PAIN (10)

## 2019-06-14 NOTE — PATIENT INSTRUCTIONS
[facility]   Post Procedure Instructions    Today you had:    bursa injection      Medications used:  lidocaine   bupivicaine   kenolog          Go to the emergency room if you develop any shortness of breath    Monitor the injection sites for signs and symptoms of infection-fever, chills, redness, swelling, warmth, or drainage to areas.    You may have soreness at injection sites for up to 24 hours.    You may apply ice to the painful areas to help minimize the discomfort of the needle pokes.    Do not apply heat to sites for at least 12 hours.    You may use anti-inflammatory medications or Tylenol for pain control if necessary    Pain Clinic phone number during work hours Monday-Friday:  [phone]    After hours provider line: [phone]

## 2019-06-17 NOTE — PROGRESS NOTES
Pre procedure Diagnosis: bilateral hip pain, trochanteric bursitis   Post procedure Diagnosis: Same  Procedure performed: bilateral hip greater trochanteric bursa injection  Indication: Therapeutic for pain  Anesthesia: none  Complications: none  Operators: Jamaal Eid MD      Indications:   he patient has a history of chronic bilateral hip pain. Exam shows tenderness to palpation at the bilateral greater trochanter. Other conservative treatments prior to injection include physical therapy, medications, and prior injections.     Options/alternatives, benefits and risks were discussed with the patient including bleeding, infection, tissue trauma, exposure to radiation, reaction to medications including seizure, nerve injury, weakness, and numbness. Questions were answered to their satisfaction and they agreed to proceed. Voluntary informed consent was obtained and signed.      Vitals were reviewed: Yes  There were no vitals taken for this visit.  Allergies were reviewed: Yes   Medications were reviewed: Yes   Pre-procedure pain score: 10/10     Procedure:  After obtaining signed informed consent, the patient was positioned in a ** lateral decubitus position.  A Pause for the Cause was performed.      After identifying the point of maximal tenderness at the left greater trochanter, the area was prepped in the usual sterile fashion. Then, a 27 gauge 3.5 inch spinal needle was advanced to contact bone at the left greater trochanter with positive pain reproduction and withdrawn 1-2 mm. Aspiration was negative. Then, 5 ml of a combination of kenalog 20 mg and Bupivicaine 0.5% 3 ml was injected into the bursa and the needle was withdrawn. The injection site was cleaned and a bandaid was placed.     The patient was then positioned in a left lateral decubitus position. The point of maximal tenderness was palpated and marked at the right greater trochanter. The area was prepped in the usual sterile fashion. Then, a 27  gauge 3.5 inch spinal needle was advanced to contact bone at the right greater trochanter with positive pain reproduction and withdrawn 1-2 mm. Aspiration was negative. Then, 5 ml of a combination of kenalog 20 mg and Bupivicaine 0.5% 3ml was injected into the bursa and the needle was withdrawn. The injection site was cleaned and a bandaid was placed.        The patient tolerated the procedure well, and was taken to the recovery room.      Post-procedure pain score: 3/10  Follow-up includes:   -f/u phone call in one week  -f/u with PCP and in the pain clinic as scheduled     Jamaal Eid MD  [facility] [place]

## 2019-06-18 ENCOUNTER — THERAPY VISIT (OUTPATIENT)
Dept: PHYSICAL THERAPY | Facility: CLINIC | Age: 84
End: 2019-06-18
Payer: COMMERCIAL

## 2019-06-18 DIAGNOSIS — G89.29 CHRONIC LEFT SHOULDER PAIN: ICD-10-CM

## 2019-06-18 DIAGNOSIS — M54.50 CHRONIC BILATERAL LOW BACK PAIN WITHOUT SCIATICA: ICD-10-CM

## 2019-06-18 DIAGNOSIS — M25.512 CHRONIC LEFT SHOULDER PAIN: ICD-10-CM

## 2019-06-18 DIAGNOSIS — G89.29 CHRONIC BILATERAL LOW BACK PAIN WITHOUT SCIATICA: ICD-10-CM

## 2019-06-18 PROCEDURE — 97110 THERAPEUTIC EXERCISES: CPT | Mod: GP | Performed by: PHYSICAL THERAPIST

## 2019-06-19 PROBLEM — M25.512 CHRONIC LEFT SHOULDER PAIN: Status: RESOLVED | Noted: 2019-03-21 | Resolved: 2019-06-19

## 2019-06-19 PROBLEM — M54.50 CHRONIC BILATERAL LOW BACK PAIN WITHOUT SCIATICA: Status: RESOLVED | Noted: 2019-03-21 | Resolved: 2019-06-19

## 2019-06-19 PROBLEM — G89.29 CHRONIC LEFT SHOULDER PAIN: Status: RESOLVED | Noted: 2019-03-21 | Resolved: 2019-06-19

## 2019-06-19 PROBLEM — G89.29 CHRONIC BILATERAL LOW BACK PAIN WITHOUT SCIATICA: Status: RESOLVED | Noted: 2019-03-21 | Resolved: 2019-06-19

## 2019-06-19 NOTE — PROGRESS NOTES
"DISCHARGE REPORT    Progress reporting period is from 3.20.19 to 6.18.19.       SUBJECTIVE  Subjective changes noted by patient:  Pt reports she had cortisone injections in both hips last week which she reports has helped.  Still feels some pain in low back but is manageable now.  Has been doing HEP but not as much since the injection to allow time to work but will get back on track again.    Current Pain level: 4/10.     Initial Pain level: 10/10.   Changes in function:  Yes (See Goal flowsheet attached for changes in current functional level)  Adverse reaction to treatment or activity: None    OBJECTIVE  Objective: Posture:  wall to occiput 3\" (Was 12\" initially)  Shelf reach:  1# to overhead shelf x 10 reps (B).  C/S AROM:  Flex WNL; Ext Mod loss; Rotation Mod loss (B).  L/S AROM:  Flex WNL; Ext Tr loss      ASSESSMENT/PLAN  Updated problem list and treatment plan:   Diagnosis 1:  LBP  Diagnosis 2:  Shoulder Pain    Pain -  self management, education, directional preference exercise and home program  Decreased ROM/flexibility - therapeutic exercise and home program  Decreased joint mobility - therapeutic exercise and home program  Decreased strength - therapeutic exercise and home program  Impaired muscle performance - home program  Decreased function - home program  Impaired posture - home program  STG/LTGs have been met or progress has been made towards goals:  Yes (See Goal flow sheet completed today.)  Assessment of Progress: The patient's condition is improving.  Self Management Plans:  Patient is independent in a home treatment program.  Patient is independent in self management of symptoms.  I have re-evaluated this patient and find that the nature, scope, duration and intensity of the therapy is appropriate for the medical condition of the patient.  Teresa continues to require the following intervention to meet STG and LTG's:  PT intervention is no longer required to meet " STG/LTG.    Recommendations:  This patient is ready to be discharged from therapy and continue their home treatment program.

## 2019-07-17 ENCOUNTER — MYC MEDICAL ADVICE (OUTPATIENT)
Dept: INTERNAL MEDICINE | Facility: CLINIC | Age: 84
End: 2019-07-17

## 2019-08-06 ENCOUNTER — OFFICE VISIT (OUTPATIENT)
Dept: INTERNAL MEDICINE | Facility: CLINIC | Age: 84
End: 2019-08-06
Payer: COMMERCIAL

## 2019-08-06 VITALS
WEIGHT: 114 LBS | OXYGEN SATURATION: 97 % | TEMPERATURE: 97.8 F | HEART RATE: 93 BPM | SYSTOLIC BLOOD PRESSURE: 107 MMHG | HEIGHT: 62 IN | RESPIRATION RATE: 16 BRPM | DIASTOLIC BLOOD PRESSURE: 68 MMHG | BODY MASS INDEX: 20.98 KG/M2

## 2019-08-06 DIAGNOSIS — I10 HYPERTENSION GOAL BP (BLOOD PRESSURE) < 140/90: ICD-10-CM

## 2019-08-06 DIAGNOSIS — M48.062 SPINAL STENOSIS OF LUMBAR REGION WITH NEUROGENIC CLAUDICATION: Primary | ICD-10-CM

## 2019-08-06 LAB
ANION GAP SERPL CALCULATED.3IONS-SCNC: 6 MMOL/L (ref 3–14)
BUN SERPL-MCNC: 53 MG/DL (ref 7–30)
CALCIUM SERPL-MCNC: 10 MG/DL (ref 8.5–10.1)
CHLORIDE SERPL-SCNC: 101 MMOL/L (ref 94–109)
CO2 SERPL-SCNC: 30 MMOL/L (ref 20–32)
CREAT SERPL-MCNC: 1.19 MG/DL (ref 0.52–1.04)
GFR SERPL CREATININE-BSD FRML MDRD: 42 ML/MIN/{1.73_M2}
GLUCOSE SERPL-MCNC: 99 MG/DL (ref 70–99)
POTASSIUM SERPL-SCNC: 4.1 MMOL/L (ref 3.4–5.3)
SODIUM SERPL-SCNC: 137 MMOL/L (ref 133–144)

## 2019-08-06 PROCEDURE — 36415 COLL VENOUS BLD VENIPUNCTURE: CPT | Performed by: INTERNAL MEDICINE

## 2019-08-06 PROCEDURE — 80048 BASIC METABOLIC PNL TOTAL CA: CPT | Performed by: INTERNAL MEDICINE

## 2019-08-06 PROCEDURE — 99214 OFFICE O/P EST MOD 30 MIN: CPT | Performed by: INTERNAL MEDICINE

## 2019-08-06 RX ORDER — SALSALATE 750 MG
750 TABLET ORAL 2 TIMES DAILY PRN
Qty: 30 TABLET | Refills: 0 | Status: SHIPPED | OUTPATIENT
Start: 2019-08-06 | End: 2019-08-09

## 2019-08-06 ASSESSMENT — MIFFLIN-ST. JEOR: SCORE: 922.41

## 2019-08-06 NOTE — PATIENT INSTRUCTIONS
Ensure you're taking a total of approximately 1,000 mg acetaminophen per dose, especially in the morning.  Do not exceed 3,000 mg daily on a regular basis (maximum daily 4,000 mg).

## 2019-08-06 NOTE — PROGRESS NOTES
"Subjective     Teresa Lopez is a 83 year old female who presents to clinic today for the following health issues:    HPI   Chronic/Recurring Back Pain Follow Up      Where is your back pain located? (Select all that apply) low back right    How would you describe your back pain?  dull ache    Where does your back pain spread?right hip and right leg    Since your last clinic visit for back pain, how has your pain changed? gradually improving    Does your back pain interfere with your job? Not applicable    Since your last visit, have you tried any new treatment? Yes -  right and left bursa pain injection      Amount of exercise or physical activity: walks around her complexion    Problems taking medications regularly: No    Medication side effects: none    Diet: regular (no restrictions)      Joint Pain    Onset: 5 months    Description:   Location: right ankle and right knee  Character: Dull ache    Intensity: severe    Progression of Symptoms: worse    Accompanying Signs & Symptoms:  Other symptoms: swelling in ankle    History:   Previous similar pain: YES      Precipitating factors:   Trauma or overuse: no     Alleviating factors:  Improved by: heat    Therapies Tried and outcome: elevates when sitting      Medication reaction  Lips are still swollen from her Gabapentin reaction  Ongoing aches and pains, especially bad in back.  Weekly chiro visits helping.  Feels her appetite is lower than it should be -- denies weight loss    PMH: Updated and/or reviewed in chart.    ROS:  Constitutional, HEENT, cardiovascular, pulmonary, gi and gu systems are otherwise negative.    Objective   OBJECTIVE:                                                    /68   Pulse 93   Temp 97.8  F (36.6  C) (Tympanic)   Resp 16   Ht 1.562 m (5' 1.5\")   Wt 51.7 kg (114 lb)   SpO2 97%   BMI 21.19 kg/m      GEN: No acute distress, kyphotic cervical spine elderly woman  EYES: No conjunctival injection or icterus, EOMI grossly " intact  RESP: Unlabored, regular  NEURO: Normal gait, MAEx4, light touch sensation grossly intact  PSYCH: Normal mood and affect       Assessment & Plan   ASSESSMENT/PLAN:                                                    1. Spinal stenosis of lumbar region with neurogenic claudication  Maximize acetaminophen in AMs to improvement quality of life -- OK to trial non-steroidal anti-inflammatory drug (salsalate) PRN in addition to lidocaine patches.    2. Hypertension goal BP (blood pressure) < 140/90  Unclear etiology mild hypercalemia  Consider cardiac monitoring to rule-out arrhythmia related to hot flashes.  Discontinue amlodipine given softer blood pressures.      Return in about 3 months (around 11/6/2019).    Tej Engle MD

## 2019-08-08 ENCOUNTER — TELEPHONE (OUTPATIENT)
Dept: INTERNAL MEDICINE | Facility: CLINIC | Age: 84
End: 2019-08-08

## 2019-08-08 NOTE — TELEPHONE ENCOUNTER
DISALCID TAB 750MG IS DISCONTINUED.  A POSSIBLE ALTERNATIVE IS SALSALATE TABS 750MG.  IF APPROPRIATE TO SUBSTITUTE PLEASE FAX.    [facility] [phone]

## 2019-08-09 ENCOUNTER — MEDICAL CORRESPONDENCE (OUTPATIENT)
Dept: HEALTH INFORMATION MANAGEMENT | Facility: CLINIC | Age: 84
End: 2019-08-09

## 2019-08-09 NOTE — TELEPHONE ENCOUNTER
RN spoke with pharmacy ([facility]) and confirmed OK for generic (salsalate) to be sent to patient as this is covered by insurance and Disalacid is not covered by insurance.     Per pharmacist:  If Disalacid is written anywhere in the script (how Epic pulls up med to be ordered) they will be unable to fill it - must be called to pharmacy that OK to fill generic.       RN noted this is a 15 day supply as patient is taking 2 tablet daily- and that this is a trial.   1. Spinal stenosis of lumbar region with neurogenic claudication  Maximize acetaminophen in AMs to improvement quality of life -- OK to trial non-steroidal anti-inflammatory drug (salsalate) PRN in addition to lidocaine patches.       Brigida Sierra, RN, BSN, PHN

## 2019-08-09 NOTE — TELEPHONE ENCOUNTER
Please call to pharmacy.  This is the second time I have attempted prescription and they are reflexing generic salsalate to Disalcid.

## 2019-08-26 DIAGNOSIS — E78.49 OTHER HYPERLIPIDEMIA: ICD-10-CM

## 2019-08-26 NOTE — TELEPHONE ENCOUNTER
Requested Prescriptions   Pending Prescriptions Disp Refills     simvastatin (ZOCOR) 40 MG tablet 90 tablet 1     Sig: Take 1 tablet (40 mg) by mouth At Bedtime  Last Written Prescription Date:  ?  Last Fill Quantity: 90,  # refills: 4   Last office visit: 8/6/2019 with prescribing provider:  Oniel   Future Office Visit:   Next 5 appointments (look out 90 days)    Sep 04, 2019  3:00 PM CDT  Return Visit with MYRON Ahumada CNP  [facility] ([facility]) [address] [facility] [address]  [phone]   Nov 12, 2019  1:00 PM CST  Office Visit with Tej Engle MD  [facility] ([facility] [address] [facility] [address]  [phone]              There is no refill protocol information for this order

## 2019-08-27 NOTE — TELEPHONE ENCOUNTER
"Requested Prescriptions   Pending Prescriptions Disp Refills     simvastatin (ZOCOR) 40 MG tablet 90 tablet 1     Sig: Take 1 tablet (40 mg) by mouth At Bedtime   Last Written Prescription Date:  12/31/2018  Last Fill Quantity: 90,  # refills: 1   Last office visit: 8/6/2019 with prescribing provider:  Oniel  Future Office Visit:   Next 5 appointments (look out 90 days)    Sep 04, 2019  3:00 PM CDT  Return Visit with MYRON Ahumada CNP  [facility] ([facility]) [address] [facility] [address]  [phone]   Nov 12, 2019  1:00 PM CST  Office Visit with Tej Engle MD  [facility] ([facility] [address] [facility] [address]  [phone]             Statins Protocol Failed - 8/26/2019  2:20 PM        Failed - LDL on file in past 12 months     Recent Labs   Lab Test 05/16/18  1137   *             Passed - No abnormal creatine kinase in past 12 months     Recent Labs   Lab Test 01/03/12  1047                   Passed - Recent (12 mo) or future (30 days) visit within the authorizing provider's specialty     Patient had office visit in the last 12 months or has a visit in the next 30 days with authorizing provider or within the authorizing provider's specialty.  See \"Patient Info\" tab in inbasket, or \"Choose Columns\" in Meds & Orders section of the refill encounter.              Passed - Medication is active on med list        Passed - Patient is age 18 or older        Passed - No active pregnancy on record        Passed - No positive pregnancy test in past 12 months        Routing refill request to provider for review/approval because:  Labs not current:  LDL    KEYON LamN, RN  [employer] [facility]          "

## 2019-08-28 RX ORDER — SIMVASTATIN 40 MG
40 TABLET ORAL AT BEDTIME
Qty: 90 TABLET | Refills: 1 | Status: SHIPPED | OUTPATIENT
Start: 2019-08-28 | End: 2020-02-03

## 2019-09-04 ENCOUNTER — OFFICE VISIT (OUTPATIENT)
Dept: PALLIATIVE MEDICINE | Facility: CLINIC | Age: 84
End: 2019-09-04
Payer: COMMERCIAL

## 2019-09-04 VITALS
DIASTOLIC BLOOD PRESSURE: 72 MMHG | BODY MASS INDEX: 21.19 KG/M2 | SYSTOLIC BLOOD PRESSURE: 125 MMHG | WEIGHT: 114 LBS | HEART RATE: 82 BPM

## 2019-09-04 DIAGNOSIS — M48.062 SPINAL STENOSIS OF LUMBAR REGION WITH NEUROGENIC CLAUDICATION: Primary | ICD-10-CM

## 2019-09-04 PROCEDURE — 99214 OFFICE O/P EST MOD 30 MIN: CPT | Performed by: NURSE PRACTITIONER

## 2019-09-04 ASSESSMENT — PAIN SCALES - GENERAL: PAINLEVEL: MODERATE PAIN (5)

## 2019-09-04 NOTE — Clinical Note
Mahin Torres may benefit from having her vitamin D level checked with her next labs and supplement as appropriate as this may improve her overall pain experience as well. Thanks. Marion

## 2019-09-04 NOTE — PROGRESS NOTES
[facility]    9/4/2019      Chief complaint:  chronic low back pain and right leg pain  Interval history:  Teresa Lopez is a 84 year old female is known to me for .   Primary osteoarthritis    Chronic right hip pain   Chronic bilateral low back without sciatica   Spinal stenosis of lumbar region with neurogenic claudication   Piriformis syndrome of both sides   PMHx DJD, CVA, HTN, diabetes mellitus, and cataract   PSHx Hysterectomy    Recommendations/plan at the last visit on 6/12/2019 included:  1. Physical Therapy:  None at present  2. Clinical Health Psychologist: None needed at present  3. Diagnostic Studies:  None  4. Medication Management:    1. Advised to remain off of gabapentin due to side effects   2. Agree with Lidocaine patches   5. Further procedures recommended: bilateral greater trochanteric injection, our office to contact patient  6. Recommendations to PCP: See above  7. Follow up: 8-12 weeks.    Since her last visit, Teresa Lopez reports:  -Overall symptoms and mood have improved over the last several weeks.  -Bilateral hip pain improved with GT bursal injections.   -She continues to experience low back pain which is exacerbated with walking and improved with rest. This pain radiates to buttocks and wraps around into groin.   -She has been walking straighter recently with use of Rolator and completing activities. She attends chiropractic care once per week which has been helpful in managing her pain.   -Her daughter reports she and the rest of the family have noticed Teresa has had more good days than bad days recently.       At this point, the patient's participation with our multidisciplinary team includes:  The patient has been compliant with the program  PT - patient is interested  Health Psych  None    Pain scores:  Pain intensity on average is 8 on a scale of 0-10.    Range is 5-10/10.   Pain right now is 5/10 when sitting and 10/10 when walking  Pain is described as  aching, unbearable, throbbing when walking, and gnawing  Pain is continuous in nature and worst in the morning.    Current pain-related medication treatments include:  -Lidocaine patch   -Medical cannabis      Other pertinent medications:  -Senna-docusate PRN      Previous medication treatments included:  OPIATES:hydrocodone (not helpful), oxycodone (somewhat helpful)  NSAIDS: ibuprofen (not helpful), Aleve (not helpful)  MUSCLE RELAXANTS: none  ANTI-MIGRAINE MEDS: none  ANTI-DEPRESSANTS: none  SLEEP AIDS: none  ANTI-CONVULSANTS: gabapentin (unsure if helpful, side effects: hot flashes, fogginess)  TOPICALS: none  Other meds: Tylenol (not helpful)    Injections:  -11/10/2017 Caudal epidural steroid injection with Dr. Reymundo Bajwa (helpful for a very short period of time)  -1/5/2018 Ultrasound guided right intra-articular hip injection with Dr. Jean Meade  -1/12/2018 Right ankle injection with Dr. Lewis of podiatry.   -7/3/2018 L2-3 interlaminar epidural steroid injection with Dr Jamaal Eid (not at all helpful)  -8/3/2018 right hip steroid injection with Dr. Valverde (helped some)  -6/14/2019 bilateral hip greater trochanteric bursa injection with Dr. Jamaal Eid (helpful)     Side Effects: no side effect  Patient is using the medication as prescribed: YES    Medications:  Current Outpatient Medications   Medication Sig Dispense Refill     Acetaminophen (TYLENOL ARTHRITIS PAIN PO)        ADVIL 200 MG PO CAPS Reported on 4/20/2017       aspirin 162 MG EC tablet Take 162 mg by mouth daily Reported on 4/20/2017       CALCIUM 1200 OR Reported on 4/20/2017       Glucosamine-Chondroit-Vit C-Mn (GLUCOSAMINE 1500 COMPLEX PO) Take 1,500 mg by mouth daily Reported on 4/20/2017       lidocaine (LIDODERM) 5 % patch Place 3 patches onto the skin every 24 hours 90 patch 11     Loratadine (CLARITIN) 10 MG capsule Take 10 mg by mouth as needed Reported on 4/20/2017       losartan-hydrochlorothiazide (HYZAAR) 100-25 MG  tablet Take 1 tablet by mouth daily 90 tablet 3     medical cannabis (Patient's own supply.  Not a prescription) See Admin Instructions (This is NOT a prescription, and does not certify that the patient has a qualifying medical condition for medical cannabis.  The purpose of this order is  to document that the patient reports taking medical cannabis.)   Taking 6-9 red capsules daily       MULTI-VITAMIN OR TABS 1 TABLET DAILY       Omega-3 Fatty Acids (SALMON OIL-1000 PO) take 1,000 Caps by mouth daily.       order for DME Equipment being ordered: 4 wheeled Rollator walker with seat. Patient would like to have a red one if possible. 1 Device 0     order for DME Equipment being ordered: front wheeled walker 1 Device 0     PRILOSEC OR None Entered       salsalate (DISALCID) 750 MG tablet Take 1 tablet (750 mg) by mouth 2 times daily as needed for moderate pain 30 tablet 0     senna-docusate (SENOKOT-S;PERICOLACE) 8.6-50 MG per tablet Take 1 tablet by mouth 2 times daily 120 tablet 12     simvastatin (ZOCOR) 40 MG tablet Take 1 tablet (40 mg) by mouth At Bedtime 90 tablet 1     VITAMIN C OR None Entered       VITAMIN D OR Take 1,200 mg by mouth daily.       VITAMIN E 400 UNIT OR CAPS None Entered         Medical History: any changes in medical history since they were last seen? No    Social History:  Home situation: . 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  Tobacco use: none  Alcohol use: very little, about 2 drinks per month  Drug use: none  History of chemical dependency treatment: none    Is patient a current smoker or tobacco user?  no  If yes, was cessation counseling offered?  no        Review of Systems:  ROS is positive as per HPI and is negative for fevers, chills, sweats, or constipation, diarrhea.    This document serves as a record of the services and decisions personally performed and made by Marion SANCHES. It was  created on her behalf by Tata Avina, a trained medical scribe. The creation of this document is based on the provider's statements to the medical scribe.  Tata Avina September 4, 2019    Physical Exam:  Vital signs: /72   Pulse 82   Wt 51.7 kg (114 lb)   BMI 21.19 kg/m      Behavioral observations:  Awake and alert.accompanied today by her daughter.     Gait:  Slow and antalgic on right. Shuffling gait. Uses a walking stick    Musculoskeletal exam:    Moves very slowly in the exam room  Strength grossly equal throughout  Bilateral greater trochanteric are nontender      Neuro exam:  SILT extremities     Skin/vascular/autonomic:  No suspicious lesions on exposed skin    Other:  N/A    Is the patient hypertensive today? no  Hypertensive on recheck of BP?   n/a  If yes, was patient recommended to see Primary Care Provider in follow up for management of HTN?  n/a      Imaging  MRI LUMBAR SPINE WITHOUT CONTRAST   3/30/2018 1:22 PM      HISTORY:  Lumbar degenerative disc disease.     TECHNIQUE: Multiplanar multisequence MRI of the lumbar spine without  contrast.     COMPARISON: Lumbar spine x-ray 3/22/2018. Lumbar spine MR 3/17/2017.      FINDINGS: There are 5 lumbar-type vertebral bodies assumed for the  purposes of this dictation. The tip of the conus terminates at the  L1-L2 level.     There is convex right scoliotic curvature of the lumbar spine centered  at the L2-L3 level. There is mild retrolisthesis of L2 on L3 and mild  anterolisthesis of L3 on L4. There is grade 1 anterolisthesis of L4 on  L5. There is right lateral listhesis of L3 on L4. There is slight loss  of left-sided vertebral body height at L3, probably due to  degenerative changes. Severe loss of intervertebral disc height seen  at L3-L4 with associated degenerative endplate changes. Severe loss of  disc height also seen on the left at L1-L2 and L2-L3 with severe  degenerative endplate changes. Mild loss of disc height at T11-12  and  T12-L1 with disc desiccation at L4-5 and L5-S1. There is mild STIR  hyperintense marrow edema at the opposing L2-L3 endplates.  Degenerative subchondral cysts seen anteriorly at L3.     Level by level as follows:      T12-L1:  Only seen on the lateral view, but there is a posterior disc  bulge and bilateral facet hypertrophy resulting in moderate right and  mild left neural foraminal narrowing. No significant spinal canal  narrowing.      L1-L2:  Convex right curvature with retrolisthesis and circumferential  disc bulge with endplate osteophytic spurring as well as bilateral  facet hypertrophy. Findings result in mild central spinal canal  narrowing and moderate bilateral neural foraminal narrowing.      L2-L3:  Convex right scoliotic curvature with left-sided disc bulge  and endplate osteophytic spurring as well as, left greater than right,  facet hypertrophy and ligamentum flavum infolding. Findings result in  moderate central spinal canal narrowing as well as moderate to severe  left neural foraminal narrowing. There is mild right neural foraminal  narrowing.      L3-L4:  Mild anterolisthesis along with circumferential disc bulge,  severe bilateral facet hypertrophy, and ligamentum flavum infolding.  Findings result in severe central spinal canal narrowing. There is  also severe left and moderate to severe right neural foraminal  narrowing.      L4-L5:  Anterolisthesis with uncovering of the disc and small  posterior disc bulge along with severe bilateral facet hypertrophy and  ligamentum flavum infolding. Findings result in moderate to severe  central spinal canal narrowing. There is also moderate to severe  bilateral neural foraminal narrowing. Small bilateral facet joint  effusions.      L5-S1:  Mild posterior disc bulge and marked bilateral facet  hypertrophy results in moderate bilateral neural foraminal narrowing,  right greater than left. No significant central spinal canal  narrowing.       Paraspinous soft tissues:  Normal.          IMPRESSION:    1. Severe multilevel degenerative changes throughout the lumbar spine  as described above. Overall, there is no significant change since  previous MR 3/17/2017.  2. Severe central spinal canal narrowing at L3-L4 and moderate to  severe spinal canal narrowing at L4-L5.  3. Convex right scoliotic curvature of the spine with multiple levels  of spondylolisthesis and right lateral listhesis of L3 on L4.     DIANA HERNANDEZ MD    [place] Prescription Monitoring Program:  Reviewed [facility] September 4, 2019- no concerning fills.  Marion SANCHES RN CNP, SUGEY  [facility]      Assessment:   1. Spinal stenosis of lumbar region with neurogenic claudication      Plan:   1. Physical Therapy:  None at present  2. Clinical Health Psychologist: None needed at present  3. Diagnostic Studies:  None  4. Medication Management:    1. Agree with Lidocaine patches   2. Continue medical cannabis. Re-certified by Dr Engle on 7/17/2019  5. Further procedures recommended: none at present  6. Recommendations to PCP: Consider checking Vitamin D level, if low, supplementation can potentially improve her pain experience  7. Follow up: 8-12 weeks.    Total time spent face to face was 35 minutes and more than 50% of face to face time was spent in counseling and/or coordination of care regarding the diagnosis and recommendations above    The information in this document, created by the medical scribe for me, accurately reflects the services I personally performed and the decisions made by me. I have reviewed and approved this document for accuracy prior to leaving the patient care area.  September 4, 2019     Marion SANCHES RN CNP, SUGEY  [facility]

## 2019-09-04 NOTE — PATIENT INSTRUCTIONS
PLAN:  1. Medications:   1. Continue Lidocaine patches as needed  2. Continue medical cannabis -- recently re-certified by Dr. Engle   3. I will check with Dr. Engle re: vitamin D (checking levels and supplementation)  2. Procedures: none at present   3. Follow-up with me in 10-12 weeks  ----------------------------------------------------------------  Clinic Number:  [phone]     Call with any questions about your care and for scheduling assistance.     Calls are returned Monday through Friday between 8 AM and 4:30 PM. We usually get back to you within 2 business days depending on the issue/request.    If we are prescribing your medications:    For opioid medication refills, call the clinic or send a [facility] message 7 days in advance.  Please include:    Name of requested medication    Name of the pharmacy.    For non-opioid medications, call your pharmacy directly to request a refill. Please allow 3-4 days to be processed.     Per [place] State Law:    All controlled substance prescriptions must be filled within 30 days of being written.      For those controlled substances allowing refills, pickup must occur within 30 days of last fill.      We believe regular attendance is key to your success in our program!      Any time you are unable to keep your appointment we ask that you call us at least 24 hours in advance to cancel.This will allow us to offer the appointment time to another patient.     Multiple missed appointments may lead to dismissal from the clinic.

## 2019-09-06 ENCOUNTER — TELEPHONE (OUTPATIENT)
Dept: INTERNAL MEDICINE | Facility: CLINIC | Age: 84
End: 2019-09-06

## 2019-09-06 NOTE — TELEPHONE ENCOUNTER
Disalcid tab 750mg id discontinued. A possible alternative is Salsalate tabs 75mg (non AB rated generic). If appropriate to substitute, please send new Rx. Thanks!

## 2019-09-14 PROBLEM — M54.50 LUMBAGO: Status: RESOLVED | Noted: 2017-12-04 | Resolved: 2019-09-14

## 2019-09-14 NOTE — PROGRESS NOTES
Subjective:  HPI                    Objective:  System    Physical Exam    General     ROS    Assessment/Plan:    DISCHARGE REPORT    Progress reporting period is from 12/1/17 to 1/2/18.     SUBJECTIVE  Subjective: Sore all over today.  WIll have an injection into hip this Friday.     Current Pain level: No change   Initial Pain level: 10/10(at worst)   Changes in function: No changes noted in function since last SOAP note   Adverse reactions: None;   ,     Patient has failed to return to therapy so current objective findings are unknown.  The subjective and objective information are from the last SOAP note on this patient.    OBJECTIVE  Objective: slow guarded transfers.  Tender R lat hip      ASSESSMENT/PLAN  Updated problem list and treatment plan:  Diagnosis 1:  LBP  Pain -  manual therapy, self management, education and home program  Decreased ROM/flexibility - manual therapy, therapeutic exercise and home program  Decreased strength - therapeutic exercise, therapeutic activities and home program  Impaired balance - neuro re-education, gait training, therapeutic activities, adaptive equipment/assistive device and home program  Impaired gait - gait training, assistive devices and home program  STG/LTGs have been met or progress has been made towards goals:  Yes (See Goal flow sheet completed today.)  Assessment of Progress: The patient has not returned to therapy. Current status is unknown.  Self Management Plans:  Patient has been instructed in a home treatment program.  Patient  has been instructed in self management of symptoms.  Teresa continues to require the following intervention to meet STG and LTG's: PT intervention is no longer required to meet STG/LTG.  The patient failed to complete scheduled/ordered appointments so current information is unknown.  We will discharge this patient from PT.    Recommendations:  This patient is ready to be discharged from therapy and continue their home treatment  program.    Please refer to the daily flowsheet for treatment today, total treatment time and time spent performing 1:1 timed codes.

## 2019-10-25 DIAGNOSIS — M41.9 SCOLIOSIS: ICD-10-CM

## 2019-10-25 DIAGNOSIS — M54.16 LUMBAR RADICULOPATHY: Primary | ICD-10-CM

## 2019-11-11 DIAGNOSIS — R63.4 ABNORMAL WEIGHT LOSS: ICD-10-CM

## 2019-11-11 NOTE — PROGRESS NOTES
Subjective     Teresa Lopez is a 84 year old female who presents to clinic today for the following health issues:      Chief Complaint   Patient presents with     Pain     follow up     hot flashes     Hearing Problem       HPI   Chronic/Recurring Back Pain Follow Up      Where is your back pain located? (Select all that apply) low back right    How would you describe your back pain?  Unable to describe    Where does your back pain spread? the right buttock and groin area    Since your last clinic visit for back pain, how has your pain changed? gradually worsening    Does your back pain interfere with your job? Not applicable    Since your last visit, have you tried any new treatment? No      How many servings of fruits and vegetables do you eat daily?  2-3    On average, how many sweetened beverages do you drink each day (soda, juice, sweet tea, etc)?   0    How many days per week do you miss taking your medication? 0    Reports some benefit from the salsalate and medical cannabis.    Saw Pain clinic upstairs and had vitamin D recommended.     Hot flashes  Feels like this is an ongoing effect of her Gabapentin.  Has been off the medication since February.  Seems to have progressed over time starting inside all over her body and now more in her face area.      Hearing loss has worsened acutely on right.  Thinks it may be wax.    Appetite, depression, weight loss -- has been drinking a nutritional supplement at least daily.  Daughter encouraging her to have a beer occasionally as well.  Not hungry at times but eats when food is in front of her.  Down another 3 lbs over the past 2 months.    1. Spinal stenosis of lumbar region with neurogenic claudication    2. Sensorineural hearing loss, unilateral    3. Vitamin D deficiency, unspecified         PMH: Updated and/or reviewed in chart.    ROS:  Constitutional, neuro systems are otherwise negative.    Objective   OBJECTIVE:                                                "     /71   Pulse 93   Temp 98.5  F (36.9  C) (Oral)   Resp 16   Ht 1.562 m (5' 1.5\")   Wt 50.3 kg (111 lb)   SpO2 97%   BMI 20.63 kg/m      GEN: No acute distress, thin elderly woman  ENT: ears without ceruminous impaction, small wax on right EAM  RESP: Unlabored, regular  NEURO: Normal gait, MAEx4, light touch sensation grossly intact  PSYCH: Normal mood and affect    Results for orders placed or performed in visit on 11/12/19   Vitamin D Deficiency     Status: None   Result Value Ref Range    Vitamin D Deficiency screening 50 20 - 75 ug/L      Assessment & Plan   ASSESSMENT/PLAN:                                                    1. Spinal stenosis of lumbar region with neurogenic claudication  Continue non-steroidal anti-inflammatory drug cautiously with palliative approach prioritized; may be having some dyspepsia related to use contributing to appetite, but unclear given no overt abdominal symptoms; continue to monitor closely.  - salsalate (DISALCID) 750 MG tablet; Take 1 tablet (750 mg) by mouth 2 times daily as needed for moderate pain  Dispense: 60 tablet; Refill: 5  - Vitamin D Deficiency    2. Sensorineural hearing loss, unilateral  Audiology evaluation     3. Vitamin D deficiency, unspecified   Excellent current supplementation - continue     4.  Weight loss -- encouraged doubling Boost to twice daily or three times daily as tolerated and more social gatherings and physical activity to stimulate appetite.      Orders Placed This Encounter     Vitamin D Deficiency     salsalate (DISALCID) 750 MG tablet        Return in about 3 months (around 2/12/2020) for Follow-up.    Tej Engle MD      "

## 2019-11-11 NOTE — TELEPHONE ENCOUNTER
Requested Prescriptions   Pending Prescriptions Disp Refills     mirtazapine (REMERON) 7.5 MG tablet 30 tablet 2     Sig: Take 1 tablet (7.5 mg) by mouth At Bedtime  Last Written Prescription Date:  ?  Last Fill Quantity: 30,  # refills: 12   Last office visit: 8/6/2019 with prescribing provider:  Oniel   Future Office Visit:   Next 5 appointments (look out 90 days)    Nov 12, 2019  1:00 PM CST  [facility] [address] [facility] [address]  [phone]              There is no refill protocol information for this order

## 2019-11-12 ENCOUNTER — MEDICAL CORRESPONDENCE (OUTPATIENT)
Dept: HEALTH INFORMATION MANAGEMENT | Facility: CLINIC | Age: 84
End: 2019-11-12

## 2019-11-12 ENCOUNTER — OFFICE VISIT (OUTPATIENT)
Dept: INTERNAL MEDICINE | Facility: CLINIC | Age: 84
End: 2019-11-12
Payer: COMMERCIAL

## 2019-11-12 VITALS
BODY MASS INDEX: 20.43 KG/M2 | RESPIRATION RATE: 16 BRPM | OXYGEN SATURATION: 97 % | TEMPERATURE: 98.5 F | SYSTOLIC BLOOD PRESSURE: 129 MMHG | HEIGHT: 62 IN | HEART RATE: 93 BPM | DIASTOLIC BLOOD PRESSURE: 71 MMHG | WEIGHT: 111 LBS

## 2019-11-12 DIAGNOSIS — E55.9 VITAMIN D DEFICIENCY, UNSPECIFIED: ICD-10-CM

## 2019-11-12 DIAGNOSIS — H90.5 SENSORINEURAL HEARING LOSS, UNILATERAL: ICD-10-CM

## 2019-11-12 DIAGNOSIS — M48.062 SPINAL STENOSIS OF LUMBAR REGION WITH NEUROGENIC CLAUDICATION: Primary | ICD-10-CM

## 2019-11-12 DIAGNOSIS — R63.4 LOSS OF WEIGHT: ICD-10-CM

## 2019-11-12 PROCEDURE — 36415 COLL VENOUS BLD VENIPUNCTURE: CPT | Performed by: INTERNAL MEDICINE

## 2019-11-12 PROCEDURE — 99214 OFFICE O/P EST MOD 30 MIN: CPT | Performed by: INTERNAL MEDICINE

## 2019-11-12 PROCEDURE — 82306 VITAMIN D 25 HYDROXY: CPT | Performed by: INTERNAL MEDICINE

## 2019-11-12 RX ORDER — SALSALATE 750 MG
750 TABLET ORAL 2 TIMES DAILY PRN
Qty: 60 TABLET | Refills: 5 | Status: SHIPPED | OUTPATIENT
Start: 2019-11-12 | End: 2020-02-03

## 2019-11-12 ASSESSMENT — ANXIETY QUESTIONNAIRES
6. BECOMING EASILY ANNOYED OR IRRITABLE: NOT AT ALL
IF YOU CHECKED OFF ANY PROBLEMS ON THIS QUESTIONNAIRE, HOW DIFFICULT HAVE THESE PROBLEMS MADE IT FOR YOU TO DO YOUR WORK, TAKE CARE OF THINGS AT HOME, OR GET ALONG WITH OTHER PEOPLE: NOT DIFFICULT AT ALL
GAD7 TOTAL SCORE: 1
7. FEELING AFRAID AS IF SOMETHING AWFUL MIGHT HAPPEN: NOT AT ALL
3. WORRYING TOO MUCH ABOUT DIFFERENT THINGS: NOT AT ALL
1. FEELING NERVOUS, ANXIOUS, OR ON EDGE: NOT AT ALL
2. NOT BEING ABLE TO STOP OR CONTROL WORRYING: SEVERAL DAYS
5. BEING SO RESTLESS THAT IT IS HARD TO SIT STILL: NOT AT ALL

## 2019-11-12 ASSESSMENT — MIFFLIN-ST. JEOR: SCORE: 903.8

## 2019-11-12 ASSESSMENT — PATIENT HEALTH QUESTIONNAIRE - PHQ9: 5. POOR APPETITE OR OVEREATING: NOT AT ALL

## 2019-11-13 LAB — DEPRECATED CALCIDIOL+CALCIFEROL SERPL-MC: 50 UG/L (ref 20–75)

## 2019-11-13 RX ORDER — MIRTAZAPINE 7.5 MG/1
7.5 TABLET, FILM COATED ORAL AT BEDTIME
Qty: 30 TABLET | Refills: 2 | Status: CANCELLED | OUTPATIENT
Start: 2019-11-13

## 2019-11-13 ASSESSMENT — ANXIETY QUESTIONNAIRES: GAD7 TOTAL SCORE: 1

## 2019-11-13 NOTE — TELEPHONE ENCOUNTER
Routing refill request to provider for review/approval because:  Drug not active on patient's medication list  Last OV yesterday (11/12/19).   Last written 6/5/19.   Katherine Geronimo RN BSN

## 2019-11-14 ENCOUNTER — TELEPHONE (OUTPATIENT)
Dept: INTERNAL MEDICINE | Facility: CLINIC | Age: 84
End: 2019-11-14

## 2019-11-15 NOTE — TELEPHONE ENCOUNTER
[facility].   First time was on hold for 17 minutes and was hung up on.   Second time RN called back and was on hold for another 14 minutes with no answer.     RN to call again.     - per medication history, RN had to call pharmacy last time and OK for SALSALATE 750mg tablets be dispensed and NOT DISALCID tabs. [facility] system was not able to send one or the other script - this had to be a verbal call in from RN to pharmacist.     Brigida Bunch, RN, BSN, PHN

## 2019-11-15 NOTE — TELEPHONE ENCOUNTER
Please review prior issue with same prescription in the prior months and resolve with pharmacy.  I do not recall needing to change prescription from salsalate 750 mg tabs.

## 2019-11-15 NOTE — TELEPHONE ENCOUNTER
RN was on hold for 30 minutes before talking to [facility] representative. RN was then transferred to pharmacy tech and then pharmacist to give verbal OK to fill for SALSALATE 750mg tablets and NOT DISALCID.     Pharmacist verbalized understanding - SALSALATE tabs dispensed for 30 day supply with 5 refills.    Brigida Bunch RN, BSN, PHN

## 2019-11-18 ENCOUNTER — ANCILLARY PROCEDURE (OUTPATIENT)
Dept: MRI IMAGING | Facility: CLINIC | Age: 84
End: 2019-11-18
Attending: NEUROLOGICAL SURGERY
Payer: COMMERCIAL

## 2019-11-18 ENCOUNTER — ANCILLARY PROCEDURE (OUTPATIENT)
Dept: GENERAL RADIOLOGY | Facility: CLINIC | Age: 84
End: 2019-11-18
Attending: NEUROLOGICAL SURGERY
Payer: COMMERCIAL

## 2019-11-18 DIAGNOSIS — M54.16 LUMBAR RADICULOPATHY: ICD-10-CM

## 2019-11-18 DIAGNOSIS — M41.9 SCOLIOSIS: ICD-10-CM

## 2019-11-18 PROBLEM — R63.4 LOSS OF WEIGHT: Status: ACTIVE | Noted: 2019-11-18

## 2019-11-21 ENCOUNTER — OFFICE VISIT (OUTPATIENT)
Dept: NEUROSURGERY | Facility: CLINIC | Age: 84
End: 2019-11-21
Payer: COMMERCIAL

## 2019-11-21 VITALS
DIASTOLIC BLOOD PRESSURE: 74 MMHG | WEIGHT: 112.2 LBS | BODY MASS INDEX: 20.65 KG/M2 | SYSTOLIC BLOOD PRESSURE: 123 MMHG | HEIGHT: 62 IN | HEART RATE: 81 BPM | OXYGEN SATURATION: 97 %

## 2019-11-21 DIAGNOSIS — M54.16 LUMBAR RADICULOPATHY: Primary | ICD-10-CM

## 2019-11-21 DIAGNOSIS — R63.4 ABNORMAL WEIGHT LOSS: ICD-10-CM

## 2019-11-21 ASSESSMENT — ENCOUNTER SYMPTOMS
NUMBNESS: 1
TINGLING: 1
DISTURBANCES IN COORDINATION: 0
MUSCLE CRAMPS: 0
DYSURIA: 0
MYALGIAS: 1
WEIGHT GAIN: 0
JOINT SWELLING: 1
TREMORS: 0
SMELL DISTURBANCE: 0
INCREASED ENERGY: 0
SINUS PAIN: 0
DEPRESSION: 1
EYE PAIN: 0
NIGHT SWEATS: 1
HEADACHES: 1
PARALYSIS: 0
TROUBLE SWALLOWING: 0
FATIGUE: 0
FEVER: 0
POLYDIPSIA: 0
WEIGHT LOSS: 1
FLANK PAIN: 0
SEIZURES: 0
WEAKNESS: 1
LOSS OF CONSCIOUSNESS: 0
PANIC: 0
SPEECH CHANGE: 0
EYE REDNESS: 0
NECK MASS: 0
INSOMNIA: 0
SORE THROAT: 0
CHILLS: 1
MUSCLE WEAKNESS: 1
HALLUCINATIONS: 0
EYE WATERING: 0
DECREASED APPETITE: 1
DECREASED CONCENTRATION: 0
ARTHRALGIAS: 1
SINUS CONGESTION: 1
TASTE DISTURBANCE: 0
MEMORY LOSS: 0
HOARSE VOICE: 0
ALTERED TEMPERATURE REGULATION: 1
BACK PAIN: 1
DIFFICULTY URINATING: 0
EYE IRRITATION: 0
POLYPHAGIA: 0
HEMATURIA: 0
STIFFNESS: 1
DIZZINESS: 0
NERVOUS/ANXIOUS: 1
NECK PAIN: 1
DOUBLE VISION: 0

## 2019-11-21 ASSESSMENT — MIFFLIN-ST. JEOR: SCORE: 911.24

## 2019-11-21 ASSESSMENT — PAIN SCALES - GENERAL: PAINLEVEL: NO PAIN (0)

## 2019-11-21 NOTE — NURSING NOTE
Chief Complaint   Patient presents with     RECHECK     [facility] RETURN - LUMBAR RADICULOPATHY       Santiago Mayo, EMT

## 2019-11-21 NOTE — TELEPHONE ENCOUNTER
Mirtazapine       Last Written Prescription Date:  06/12/19  Last Fill Quantity: 30,   # refills: 12  Last Office Visit: 11/12/19  ENEDINA Engle  Future Office visit:    Next 5 appointments (look out 90 days)    Feb 11, 2020  1:30 PM CST  Office Visit with Tej Engle MD  [facility] [address] [facility] [address]  [phone]           Routing refill request to provider for review/approval because:  Drug not active on patient's medication list  Medication is reported/historical      90 day supply requested

## 2019-11-21 NOTE — PROGRESS NOTES
11/21/2019     Clinic Note    Reason for visit: Evaluation for lumbar radiculopathy    History of present illness:  84-year-old female with a history of diabetes, hypertension, stroke presents to clinic for evaluation of lumbar radiculopathy.  The patient reports that she has localized lumbar back pain, primarily on the right.  She characterized the pain is burning in nature that seems to extend to her right gluteal region.  She also feels that pressure on her right thigh triggers the pain.  She denies shooting pain down her legs, numbness or tingling.  She also denies any urinary stool incontinence.  She is unsure if activity such as walking leads to pain or tiredness in her legs that improves with rest.  She also is unable to report if her gait has changed recently or is unsteady on her feet.  However, she endorses that she is leaning forward when sitting or walking.    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  Not a prescription)     MULTI-VITAMIN OR TABS     order for DME     salsalate (DISALCID) 750 MG tablet     senna-docusate (SENOKOT-S;PERICOLACE) 8.6-50 MG per tablet     simvastatin  "(ZOCOR) 40 MG tablet     No current facility-administered medications for this visit.      Physical exam:   /74 (BP Location: Left arm, Patient Position: Sitting, Cuff Size: Adult Regular)   Pulse 81   Ht 1.565 m (5' 1.63\")   Wt 50.9 kg (112 lb 3.2 oz)   SpO2 97%   BMI 20.77 kg/m      General: Awake and alert and in no acute distress.  Pulm: Breathing comfortably on room air  CN: Grossly intact  Motor: Good strength in lower extremities  Gait: Uses a walker  Reflexes: 2+ reflexes bilateral biceps, brachioradialis, and patellar tendons. Martínez's reflex absent. Bilateral clonus absent.     Imaging:  MRI lumber 11/18/2019  Impression:   1. Extensive multilevel spondylosis superimposed on convex right  scoliosis.  2. Severe spinal canal stenosis at L3-4 and L4-5.  3. impingement on the left L3 nerve root in the L2-3 lateral recess.  Also impingement on the left L3 and bilateral L4 nerve roots in their  respective neural foramina.  4. Active inflammatory arthropathy of the bilateral L4-5 facet joints.       Assessment:  84-year-old female with a long-standing history of back pain and kyphosis presents with chronic back pain likely due to degenerative changes and arthritis of the sacroiliac joint.  We discussed that a surgical approach would require a major fusion surgery that would most likely extend from her pelvis to her thoracic spine.  We offered a two-level MIS procedure, to decompress her L3-L4 and L4-L5 spinal canal stenosis and discussed that this is most likely not relieving the patient's symptoms.  Overall, we feel that the patient's symptoms are related to chronic changes of her spine rather than her spinal canal stenosis.  We had an in-depth discussion about symptoms related to impinged peripheral nerves versus spinal canal stenosis and degenerative changes.  The patient, however, emphasized her right-sided localized low back pain is a major problem, which is not amendable to spine " surgery.    Plan:  - Referral to pain clinic    Patient seen and discussed with Dr. Erik MD    I have seen this patient with the resident and formulated a plan and agree with this note.  GUY Shah MD  Neurosurgery, PGY-1    Answers for HPI/ROS submitted by the patient on 11/21/2019   General Symptoms: Yes  Skin Symptoms: No  HENT Symptoms: Yes  EYE SYMPTOMS: Yes  HEART SYMPTOMS: No  LUNG SYMPTOMS: No  INTESTINAL SYMPTOMS: No  URINARY SYMPTOMS: Yes  GYNECOLOGIC SYMPTOMS: No  BREAST SYMPTOMS: No  SKELETAL SYMPTOMS: Yes  BLOOD SYMPTOMS: No  NERVOUS SYSTEM SYMPTOMS: Yes  MENTAL HEALTH SYMPTOMS: Yes  Fever: No  Loss of appetite: Yes  Weight loss: Yes  Weight gain: No  Fatigue: No  Night sweats: Yes  Chills: Yes  Increased stress: No  Excessive hunger: No  Excessive thirst: No  Feeling hot or cold when others believe the temperature is normal: Yes  Loss of height: Yes  Post-operative complications: No  Surgical site pain: No  Hallucinations: No  Change in or Loss of Energy: No  Hyperactivity: No  Confusion: No  Ear pain: No  Ear discharge: No  Hearing loss: No  Tinnitus: No  Nosebleeds: No  Congestion: Yes  Sinus pain: No  Trouble swallowing: No   Voice hoarseness: No  Mouth sores: No  Sore throat: No  Tooth pain: No  Gum tenderness: No  Bleeding gums: No  Change in taste: No  Change in sense of smell: No  Dry mouth: No  Hearing aid used: No  Neck lump: No  Eye pain: No  Vision loss: No  Dry eyes: Yes  Watery eyes: No  Eye bulging: No  Double vision: No  Flashing of lights: No  Spots: No  Floaters: No  Redness: No  Crossed eyes: No  Tunnel Vision: No  Yellowing of eyes: No  Eye irritation: No  Trouble holding urine or incontinence: Yes  Pain or burning: No  Trouble starting or stopping: Yes  Increased frequency of urination: No  Blood in urine: No  Decreased frequency of urination: No  Frequent nighttime urination: No  Flank pain: No  Difficulty emptying bladder: No  Back pain: Yes  Muscle aches: Yes  Neck  pain: Yes  Swollen joints: Yes  Joint pain: Yes  Bone pain: Yes  Muscle cramps: No  Muscle weakness: Yes  Joint stiffness: Yes  Bone fracture: No  Trouble with coordination: No  Dizziness or trouble with balance: No  Fainting or black-out spells: No  Memory loss: No  Headache: Yes  Seizures: No  Speech problems: No  Tingling: Yes  Tremor: No  Weakness: Yes  Difficulty walking: Yes  Paralysis: No  Numbness: Yes  Nervous or Anxious: Yes  Depression: Yes  Trouble sleeping: No  Trouble thinking or concentrating: No  Mood changes: No  Panic attacks: No

## 2019-11-21 NOTE — LETTER
11/21/2019       RE: Teresa Lopez  [address]  [place] [facility] [address]     Dear Colleague,    Thank you for referring your patient, Teresa Lopez, to the [facility]. Please see a copy of my visit note below.    11/21/2019     Clinic Note    Reason for visit: Evaluation for lumbar radiculopathy    History of present illness:  84-year-old female with a history of diabetes, hypertension, stroke presents to clinic for evaluation of lumbar radiculopathy.  The patient reports that she has localized lumbar back pain, primarily on the right.  She characterized the pain is burning in nature that seems to extend to her right gluteal region.  She also feels that pressure on her right thigh triggers the pain.  She denies shooting pain down her legs, numbness or tingling.  She also denies any urinary stool incontinence.  She is unsure if activity such as walking leads to pain or tiredness in her legs that improves with rest.  She also is unable to report if her gait has changed recently or is unsteady on her feet.  However, she endorses that she is leaning forward when sitting or walking.    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  Not a prescription)     MULTI-VITAMIN OR TABS     order for DME     salsalate (DISALCID) 750 MG tablet     senna-docusate (SENOKOT-S;PERICOLACE) 8.6-50 MG per tablet     simvastatin (ZOCOR) 40 MG tablet     No current facility-administered medications for this visit.      Physical exam:   /74 (BP Location: Left arm, Patient Position: Sitting, Cuff Size: Adult Regular)   Pulse 81   Ht 1.565 m (5' 1.63\")   Wt 50.9 kg (112 lb 3.2 oz)   SpO2 97%   BMI 20.77 kg/m       General: Awake and alert and in no acute distress.  Pulm: Breathing comfortably on room air  CN: Grossly intact  Motor: Good strength in lower extremities  Gait: Uses a walker  Reflexes: 2+ reflexes bilateral biceps, brachioradialis, and patellar tendons. Martínez's reflex absent. Bilateral clonus absent.     Imaging:  MRI lumber 11/18/2019  Impression:   1. Extensive multilevel spondylosis superimposed on convex right  scoliosis.  2. Severe spinal canal stenosis at L3-4 and L4-5.  3. impingement on the left L3 nerve root in the L2-3 lateral recess.  Also impingement on the left L3 and bilateral L4 nerve roots in their  respective neural foramina.  4. Active inflammatory arthropathy of the bilateral L4-5 facet joints.       Assessment:  84-year-old female with a long-standing history of back pain and kyphosis presents with chronic back pain likely due to degenerative changes and arthritis of the sacroiliac joint.  We discussed that a surgical approach would require a major fusion surgery that would most likely extend from her pelvis to her thoracic spine.  We offered a two-level MIS procedure, to decompress her L3-L4 and L4-L5 spinal canal stenosis and discussed that this is most likely not relieving the patient's symptoms.  Overall, we feel that the patient's symptoms are related to chronic changes of her spine rather than her spinal canal " stenosis.  We had an in-depth discussion about symptoms related to impinged peripheral nerves versus spinal canal stenosis and degenerative changes.  The patient, however, emphasized her right-sided localized low back pain is a major problem, which is not amendable to spine surgery.    Plan:  - Referral to pain clinic    Patient seen and discussed with Dr. Erik MD    I have seen this patient with the resident and formulated a plan and agree with this note.     Shemar Shah MD  Neurosurgery, PGY-1    Answers for HPI/ROS submitted by the patient on 11/21/2019   General Symptoms: Yes  Skin Symptoms: No  HENT Symptoms: Yes  EYE SYMPTOMS: Yes  HEART SYMPTOMS: No  LUNG SYMPTOMS: No  INTESTINAL SYMPTOMS: No  URINARY SYMPTOMS: Yes  GYNECOLOGIC SYMPTOMS: No  BREAST SYMPTOMS: No  SKELETAL SYMPTOMS: Yes  BLOOD SYMPTOMS: No  NERVOUS SYSTEM SYMPTOMS: Yes  MENTAL HEALTH SYMPTOMS: Yes  Fever: No  Loss of appetite: Yes  Weight loss: Yes  Weight gain: No  Fatigue: No  Night sweats: Yes  Chills: Yes  Increased stress: No  Excessive hunger: No  Excessive thirst: No  Feeling hot or cold when others believe the temperature is normal: Yes  Loss of height: Yes  Post-operative complications: No  Surgical site pain: No  Hallucinations: No  Change in or Loss of Energy: No  Hyperactivity: No  Confusion: No  Ear pain: No  Ear discharge: No  Hearing loss: No  Tinnitus: No  Nosebleeds: No  Congestion: Yes  Sinus pain: No  Trouble swallowing: No   Voice hoarseness: No  Mouth sores: No  Sore throat: No  Tooth pain: No  Gum tenderness: No  Bleeding gums: No  Change in taste: No  Change in sense of smell: No  Dry mouth: No  Hearing aid used: No  Neck lump: No  Eye pain: No  Vision loss: No  Dry eyes: Yes  Watery eyes: No  Eye bulging: No  Double vision: No  Flashing of lights: No  Spots: No  Floaters: No  Redness: No  Crossed eyes: No  Tunnel Vision: No  Yellowing of eyes: No  Eye irritation: No  Trouble holding urine or incontinence: Yes  Pain  or burning: No  Trouble starting or stopping: Yes  Increased frequency of urination: No  Blood in urine: No  Decreased frequency of urination: No  Frequent nighttime urination: No  Flank pain: No  Difficulty emptying bladder: No  Back pain: Yes  Muscle aches: Yes  Neck pain: Yes  Swollen joints: Yes  Joint pain: Yes  Bone pain: Yes  Muscle cramps: No  Muscle weakness: Yes  Joint stiffness: Yes  Bone fracture: No  Trouble with coordination: No  Dizziness or trouble with balance: No  Fainting or black-out spells: No  Memory loss: No  Headache: Yes  Seizures: No  Speech problems: No  Tingling: Yes  Tremor: No  Weakness: Yes  Difficulty walking: Yes  Paralysis: No  Numbness: Yes  Nervous or Anxious: Yes  Depression: Yes  Trouble sleeping: No  Trouble thinking or concentrating: No  Mood changes: No  Panic attacks: No      Erin Valencia MD

## 2019-11-22 NOTE — TELEPHONE ENCOUNTER
"Routing refill request to provider for review/approval because:  Drug not active on patient's medication list  Med was removed from list on 6/12/19 by pain clinic stating med rec clean up.   Pt saw pcp on 11/12/19, due for follow up 2/12/20    Med pended for 90 day supply if appropriate.       Requested Prescriptions   Pending Prescriptions Disp Refills     mirtazapine (REMERON) 7.5 MG tablet 90 tablet 0     Sig: Take 1 tablet (7.5 mg) by mouth At Bedtime       Atypical Antidepressants Protocol Failed - 11/21/2019  8:58 AM        Failed - Medication active on med list        Passed - Recent (12 mo) or future (30 days) visit within the authorizing provider's specialty     Patient has had an office visit with the authorizing provider or a provider within the authorizing providers department within the previous 12 mos or has a future within next 30 days. See \"Patient Info\" tab in inbasket, or \"Choose Columns\" in Meds & Orders section of the refill encounter.              Passed - Patient is age 18 or older        Passed - No active pregnancy on record        Passed - No positive pregnancy test in past 12 mos          "

## 2019-11-22 NOTE — TELEPHONE ENCOUNTER
Left message on voice mail for patient to call clinic. [phone]  Per Dr Engle check with patient to see if she is requesting med?

## 2019-11-27 NOTE — TELEPHONE ENCOUNTER
SECOND ATTEMPT to contact patient.    Called patient, no answer.   Left message on voice mail for patient to call clinic. [phone]    RN also sent [employer] message to patient.     Brigida Bunch RN, BSN, PHN

## 2019-11-29 ENCOUNTER — TELEPHONE (OUTPATIENT)
Dept: INTERNAL MEDICINE | Facility: CLINIC | Age: 84
End: 2019-11-29

## 2019-11-29 RX ORDER — MIRTAZAPINE 7.5 MG/1
7.5 TABLET, FILM COATED ORAL AT BEDTIME
Qty: 90 TABLET | Refills: 0 | OUTPATIENT
Start: 2019-11-29

## 2019-11-29 NOTE — TELEPHONE ENCOUNTER
Panel Management Review    Summary:    Patient is due/failing the following:   Medicare Annual Wellness Visit, [place]    Type of outreach:    Sent [employer] message.                                                                                                                                  [facility]     Chart routed to Care Team .

## 2019-11-29 NOTE — TELEPHONE ENCOUNTER
"Patient sent My chart message: \"I did not request this. Dr. Engle has eliminated this.\"     Refill refused with message to the pharmacy.     Current Med list also shows the Mirtazapine was stopped on 6/12/19.     Katherine Geronimo RN BSN    "

## 2020-01-03 ENCOUNTER — OFFICE VISIT (OUTPATIENT)
Dept: PALLIATIVE MEDICINE | Facility: CLINIC | Age: 85
End: 2020-01-03
Payer: COMMERCIAL

## 2020-01-03 VITALS
DIASTOLIC BLOOD PRESSURE: 72 MMHG | SYSTOLIC BLOOD PRESSURE: 143 MMHG | BODY MASS INDEX: 20.55 KG/M2 | HEART RATE: 78 BPM | WEIGHT: 111 LBS

## 2020-01-03 DIAGNOSIS — M70.61 GREATER TROCHANTERIC BURSITIS OF BOTH HIPS: ICD-10-CM

## 2020-01-03 DIAGNOSIS — G89.29 CHRONIC RIGHT SI JOINT PAIN: Primary | ICD-10-CM

## 2020-01-03 DIAGNOSIS — M70.62 GREATER TROCHANTERIC BURSITIS OF BOTH HIPS: ICD-10-CM

## 2020-01-03 DIAGNOSIS — M51.369 DDD (DEGENERATIVE DISC DISEASE), LUMBAR: ICD-10-CM

## 2020-01-03 DIAGNOSIS — M53.3 CHRONIC RIGHT SI JOINT PAIN: Primary | ICD-10-CM

## 2020-01-03 DIAGNOSIS — M53.3 SACROILIAC JOINT DYSFUNCTION OF RIGHT SIDE: ICD-10-CM

## 2020-01-03 PROCEDURE — 99214 OFFICE O/P EST MOD 30 MIN: CPT | Performed by: NURSE PRACTITIONER

## 2020-01-03 ASSESSMENT — PAIN SCALES - GENERAL: PAINLEVEL: WORST PAIN (10)

## 2020-01-03 NOTE — PATIENT INSTRUCTIONS
1. Medications:  1.  Continue lidocaine patches   2. Continue medical cannabis.    2. Procedures: Schedule SI joint injection on the right side, Our office will contact patient to schedule.  3. Follow-up with me in 8-10 weeks.       ----------------------------------------------------------------  Clinic Number:  [phone]     Call with any questions about your care and for scheduling assistance.     Calls are returned Monday through Friday between 8 AM and 4:30 PM. We usually get back to you within 2 business days depending on the issue/request.    If we are prescribing your medications:    For opioid medication refills, call the clinic or send a [employer] message 7 days in advance.  Please include:    Name of requested medication    Name of the pharmacy.    For non-opioid medications, call your pharmacy directly to request a refill. Please allow 3-4 days to be processed.     Per [place] State Law:    All controlled substance prescriptions must be filled within 30 days of being written.      For those controlled substances allowing refills, pickup must occur within 30 days of last fill.      We believe regular attendance is key to your success in our program!      Any time you are unable to keep your appointment we ask that you call us at least 24 hours in advance to cancel.This will allow us to offer the appointment time to another patient.     Multiple missed appointments may lead to dismissal from the clinic.

## 2020-01-03 NOTE — PROGRESS NOTES
"[facility]    1/3/2020      Chief complaint:  chronic low back pain and right leg pain      Interval history:  Teresa Lopez is a 84 year old female is known to me for .   Primary osteoarthritis    Chronic right hip pain   Chronic bilateral low back without sciatica   Spinal stenosis of lumbar region with neurogenic claudication   Piriformis syndrome of both sides   PMHx DJD, CVA, HTN, diabetes mellitus, and cataract   PSHx Hysterectomy      Recommendations/plan at the last visit on 9/4/2019 included:  1. Physical Therapy:  None at present  2. Clinical Health Psychologist: None needed at present  3. Diagnostic Studies:  None  4. Medication Management:    1. Agree with Lidocaine patches   2. Continue medical cannabis. Re-certified by Dr Engle on 7/17/2019  5. Further procedures recommended: none at present  6. Recommendations to PCP: Consider checking Vitamin D level, if low, supplementation can potentially improve her pain experience  7. Follow up: 8-12 weeks.      Since her last visit, Teresa Lopez reports:  -she has increased chronic pain in her right hip and low back pain.  -Her mood overall she says is worse.   -She continues to experience right-sided low back pain which is exacerbated with walking and improved with rest. Pain radiates down into the right side causing fatigue across the back. We discussed scheduling SI joint injections.       At this point, the patient's participation with our multidisciplinary team includes:  The patient has been compliant with the program  PT - patient is interested  Health Psych  None    Pain scores:  Pain intensity on average is 9 on a scale of 0-10.    Range is 7-10/10.   Pain right now is 9/10   Pain is described as \" unbearable, tiring, exhausting, miserable, nagging, and gnawing.\"  Pain is continuous in nature and worse in the morning.    Current pain-related medication treatments include:  -Lidocaine patch   -Medical cannabis      Other pertinent " medications:  -Senna-docusate PRN      Previous medication treatments included:  OPIATES:hydrocodone (not helpful), oxycodone (somewhat helpful)  NSAIDS: ibuprofen (not helpful), Aleve (not helpful)  MUSCLE RELAXANTS: none  ANTI-MIGRAINE MEDS: none  ANTI-DEPRESSANTS: none  SLEEP AIDS: none  ANTI-CONVULSANTS: gabapentin (unsure if helpful, side effects: hot flashes, fogginess)  TOPICALS: none  Other meds: Tylenol (not helpful)    Injections:  -11/10/2017 Caudal epidural steroid injection with Dr. Reymundo Bajwa (helpful for a very short period of time)  -1/5/2018 Ultrasound guided right intra-articular hip injection with Dr. Jean Meade  -1/12/2018 Right ankle injection with Dr. Lewis of podiatry.   -7/3/2018 L2-3 interlaminar epidural steroid injection with Dr Jamaal Eid (not at all helpful)  -8/3/2018 right hip steroid injection with Dr. Valverde (helped some)  -6/14/2019 bilateral hip greater trochanteric bursa injection with Dr. Jamaal Eid (helpful)     Side Effects: no side effect  Patient is using the medication as prescribed: YES    Medications:  Current Outpatient Medications   Medication Sig Dispense Refill     Acetaminophen (TYLENOL ARTHRITIS PAIN PO) As needed not taking daily       ADVIL 200 MG PO CAPS Reported on 4/20/2017       CALCIUM 1200 OR Reported on 4/20/2017       Glucosamine-Chondroit-Vit C-Mn (GLUCOSAMINE 1500 COMPLEX PO) Take 1,500 mg by mouth daily Reported on 4/20/2017       lidocaine (LIDODERM) 5 % patch Place 3 patches onto the skin every 24 hours 90 patch 11     Loratadine (CLARITIN) 10 MG capsule Take 10 mg by mouth as needed Reported on 4/20/2017       losartan-hydrochlorothiazide (HYZAAR) 100-25 MG tablet Take 1 tablet by mouth daily 90 tablet 3     medical cannabis (Patient's own supply.  Not a prescription) See Admin Instructions (This is NOT a prescription, and does not certify that the patient has a qualifying medical condition for medical cannabis.  The purpose of this  order is  to document that the patient reports taking medical cannabis.)   Taking 6-9 red capsules daily       MULTI-VITAMIN OR TABS 1 TABLET DAILY       order for DME Equipment being ordered: 4 wheeled Rollator walker with seat. Patient would like to have a red one if possible. 1 Device 0     salsalate (DISALCID) 750 MG tablet Take 1 tablet (750 mg) by mouth 2 times daily as needed for moderate pain 60 tablet 5     senna-docusate (SENOKOT-S;PERICOLACE) 8.6-50 MG per tablet Take 1 tablet by mouth 2 times daily 120 tablet 12     simvastatin (ZOCOR) 40 MG tablet Take 1 tablet (40 mg) by mouth At Bedtime 90 tablet 1       Medical History: any changes in medical history since they were last seen? No    Social History:  Home situation: . 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  Tobacco use: none  Alcohol use: very little, about 2 drinks per month  Drug use: none  History of chemical dependency treatment: none    Is patient a current smoker or tobacco user?  no  If yes, was cessation counseling offered?  no        Review of Systems:  ROS is positive as per HPI for sinus infection, hearing loss, joint pain, arthritis, and is negative for fevers, chills, sweats, or constipation, diarrhea.    This document serves as a record of the services and decisions personally performed and made by Marion SANCHES. It was created on her behalf by Lucero Bey, a trained medical scribe. The creation of this document is based on the provider's statements to the medical scribe.Lucero Bey 1:13 PM January 3, 2020         Physical Exam:  Vital signs: BP (!) 143/72   Pulse 78   Wt 50.3 kg (111 lb)   BMI 20.55 kg/m      Behavioral observations:  Awake and alert.accompanied today by her daughter.     Gait:  Slow and antalgic on right. Shuffling gait. Uses a walking stick    Musculoskeletal exam:    Moves very slowly in the exam room  Strength grossly  equal throughout  Bilateral greater trochanteric are nontender     SI joint: right tenderness  Piriformis: right tenderness  Greater trochanters: right tenderness     Neuro exam:  SILT extremities     Skin/vascular/autonomic:  No suspicious lesions on exposed skin    Other:  N/A    Is the patient hypertensive today? no  Hypertensive on recheck of BP?   n/a  If yes, was patient recommended to see Primary Care Provider in follow up for management of HTN?  n/a      Imaging  MRI LUMBAR SPINE WITHOUT CONTRAST   3/30/2018 1:22 PM      HISTORY:  Lumbar degenerative disc disease.     TECHNIQUE: Multiplanar multisequence MRI of the lumbar spine without  contrast.     COMPARISON: Lumbar spine x-ray 3/22/2018. Lumbar spine MR 3/17/2017.      FINDINGS: There are 5 lumbar-type vertebral bodies assumed for the  purposes of this dictation. The tip of the conus terminates at the  L1-L2 level.     There is convex right scoliotic curvature of the lumbar spine centered  at the L2-L3 level. There is mild retrolisthesis of L2 on L3 and mild  anterolisthesis of L3 on L4. There is grade 1 anterolisthesis of L4 on  L5. There is right lateral listhesis of L3 on L4. There is slight loss  of left-sided vertebral body height at L3, probably due to  degenerative changes. Severe loss of intervertebral disc height seen  at L3-L4 with associated degenerative endplate changes. Severe loss of  disc height also seen on the left at L1-L2 and L2-L3 with severe  degenerative endplate changes. Mild loss of disc height at T11-12 and  T12-L1 with disc desiccation at L4-5 and L5-S1. There is mild STIR  hyperintense marrow edema at the opposing L2-L3 endplates.  Degenerative subchondral cysts seen anteriorly at L3.     Level by level as follows:      T12-L1:  Only seen on the lateral view, but there is a posterior disc  bulge and bilateral facet hypertrophy resulting in moderate right and  mild left neural foraminal narrowing. No significant spinal  canal  narrowing.      L1-L2:  Convex right curvature with retrolisthesis and circumferential  disc bulge with endplate osteophytic spurring as well as bilateral  facet hypertrophy. Findings result in mild central spinal canal  narrowing and moderate bilateral neural foraminal narrowing.      L2-L3:  Convex right scoliotic curvature with left-sided disc bulge  and endplate osteophytic spurring as well as, left greater than right,  facet hypertrophy and ligamentum flavum infolding. Findings result in  moderate central spinal canal narrowing as well as moderate to severe  left neural foraminal narrowing. There is mild right neural foraminal  narrowing.      L3-L4:  Mild anterolisthesis along with circumferential disc bulge,  severe bilateral facet hypertrophy, and ligamentum flavum infolding.  Findings result in severe central spinal canal narrowing. There is  also severe left and moderate to severe right neural foraminal  narrowing.      L4-L5:  Anterolisthesis with uncovering of the disc and small  posterior disc bulge along with severe bilateral facet hypertrophy and  ligamentum flavum infolding. Findings result in moderate to severe  central spinal canal narrowing. There is also moderate to severe  bilateral neural foraminal narrowing. Small bilateral facet joint  effusions.      L5-S1:  Mild posterior disc bulge and marked bilateral facet  hypertrophy results in moderate bilateral neural foraminal narrowing,  right greater than left. No significant central spinal canal  narrowing.      Paraspinous soft tissues:  Normal.          IMPRESSION:    1. Severe multilevel degenerative changes throughout the lumbar spine  as described above. Overall, there is no significant change since  previous MR 3/17/2017.  2. Severe central spinal canal narrowing at L3-L4 and moderate to  severe spinal canal narrowing at L4-L5.  3. Convex right scoliotic curvature of the spine with multiple levels  of spondylolisthesis and right  lateral listhesis of L3 on L4.     DIANA HERNANDEZ MD    [place] Prescription Monitoring [place]:  Reviewed [place]  January 3, 2020- no concerning fills.  Marion SANCHES RN CNP, [facility]  [facility]      Assessment:   1. Chronic right SI joint pain  2. Sacroiliac joint dysfunction of right side  3. DDD (degenerative disc disease), lumbar  4. Greater trochanteric bursitis of both hips  5. Spinal stenosis of lumbar region with neurogenic claudication      Plan:   1. Physical Therapy:  None at present  2. Clinical Health Psychologist: None needed at present  3. Diagnostic Studies:  None  4. Medication Management:    1. Continue Lidocaine patches   2. Continue medical cannabis. Re-certified by Dr Engle on 7/17/2019  5. Further procedures recommended: Pt to Schedule SI joint injection on the right side   6. Recommendations to PCP: none  7. Follow up: 8-10 weeks.    Total time spent face to face was 30 minutes and more than 50% of face to face time was spent in counseling and/or coordination of care regarding the diagnosis and recommendations above    The information in this document, created by the medical scribe for me, accurately reflects the services I personally performed and the decisions made by me. I have reviewed and approved this document for accuracy prior to leaving the patient care area.  January 3, 2020      Marion SANCHES RN CNP, FNP  [facility]

## 2020-01-20 NOTE — PROGRESS NOTES
[facility] - Procedure Note    Date of Service: 1/23/2020  Procedure performed: RIGHT sacroiliac joint injection  Diagnosis: Sacroiliac joint pain  : Chasidy Laura MD & Anita Nuñez MD (pain fellow)   Anesthesia: none    Indications: Teresa Lopez is a 84 year old female who is seen at the request of Marion Munoz CNP for a sacroiliac joint injection. The patient describes right low back pain that radiates into right buttock and laterally down thigh to the knee but not past. Exam shows full strength and sensation in both lower extremities, tenderness of the sacroiliac (SI) joint, and positive FABERE on the right. The patient reports minimal improvement with conservative treatment, including medications and therapy.    Imaging:  MRI lumbar spine completed on 11/18/2019 showed:  Impression:   1. Extensive multilevel spondylosis superimposed on convex right  scoliosis.  2. Severe spinal canal stenosis at L3-4 and L4-5.  3. impingement on the left L3 nerve root in the L2-3 lateral recess.  Also impingement on the left L3 and bilateral L4 nerve roots in their  respective neural foramina.  4. Active inflammatory arthropathy of the bilateral L4-5 facet joints.    Allergies:      Allergies   Allergen Reactions     Citalopram Other (See Comments)     Sweating profusely and nausea     Gabapentin      Felt off, dizzy, hot and cold symptoms     Lexapro [Escitalopram] Other (See Comments)     Hot flashes     Sulfa Drugs Hives        Vitals:  /65   Pulse 76     Options/alternatives, benefits and risks were discussed with the patient including bleeding, infection, tissue trauma, exposure to radiation, reaction to medications including seizure, nerve injury, weakness, and numbness.  Questions were answered to her satisfaction and she agrees to proceed. Voluntary informed consent was obtained and signed.     Procedure:  After getting informed consent, patient was brought into the  procedure suite and was placed in a prone position on the procedure table.   A Pause for the Cause was performed.  Patient was prepped and draped in sterile fashion.     After identifying the right SI joint, the C-arm was rotated to a obliquely to obtain the best view of the inferior angle of the joint.  A total of 2 ml of Lidocaine 1%  was used to anesthetize the skin at a skin entry site coaxial with the fluoroscopy beam at this location.  A 22 gauge 3.5 inch needle was advanced under intermittent fluoroscopy until it was felt to enter the SI joint.    A total of 1 ml of Omnipaque-300 was injected, confirming appropriate position, with spread into the intraarticular space, with no intravascular uptake noted.  9 ml of Omnipaque-300 was wasted. Location was verified in lateral view.    1 ml of 1% lidocaine, 1 ml of 0.5% bupivacaine with 40 mg of kenalog was injected.  The needle was removed. Hemostasis was achieved, the area was cleaned, and bandaids were placed when appropriate.  The patient tolerated the procedure well, and was taken to the recovery room.    Images were saved to PACS.    Pre-procedure pain score: 10/10  Post-procedure pain score: 8/10  Following today's procedure, the patient was advised to contact the Pain Management Center for any of the following:   Fever, chills, or night sweats   New onset of pain, numbness, or weakness   Any questions/concerns regarding the procedure    -f/u with the referring provider      MANFRED BARAJAS MD   Pain Management & Addiction Medicine

## 2020-01-23 ENCOUNTER — RADIOLOGY INJECTION OFFICE VISIT (OUTPATIENT)
Dept: PALLIATIVE MEDICINE | Facility: CLINIC | Age: 85
End: 2020-01-23
Payer: COMMERCIAL

## 2020-01-23 ENCOUNTER — ANCILLARY PROCEDURE (OUTPATIENT)
Dept: RADIOLOGY | Facility: CLINIC | Age: 85
End: 2020-01-23
Attending: ANESTHESIOLOGY
Payer: COMMERCIAL

## 2020-01-23 VITALS
HEART RATE: 73 BPM | DIASTOLIC BLOOD PRESSURE: 66 MMHG | SYSTOLIC BLOOD PRESSURE: 136 MMHG | RESPIRATION RATE: 16 BRPM | OXYGEN SATURATION: 97 %

## 2020-01-23 DIAGNOSIS — M46.1 SACROILIITIS (H): Primary | ICD-10-CM

## 2020-01-23 DIAGNOSIS — M53.3 SACROILIAC JOINT DYSFUNCTION: ICD-10-CM

## 2020-01-23 PROCEDURE — 27096 INJECT SACROILIAC JOINT: CPT | Mod: RT | Performed by: ANESTHESIOLOGY

## 2020-01-23 ASSESSMENT — PAIN SCALES - GENERAL: PAINLEVEL: WORST PAIN (10)

## 2020-01-23 NOTE — NURSING NOTE
Pre-procedure Intake    Have you been fasting? NA    If yes, for how long? NA    Are you taking a prescribed blood thinner such as coumadin, Plavix, Xarelto?    No    If yes, when did you take your last dose? NA    Do you take aspirin?  No    If cervical procedure, have you held aspirin for 6 days?   NA    Do you have any allergies to contrast dye, iodine, steroid and/or numbing medications?  NO    Are you currently taking antibiotics or have an active infection?  NO    Have you had a fever/elevated temperature within the past week? NO    Are you currently taking oral steroids? NO    Do you have a ? Yes       Are you pregnant or breastfeeding?  Not Applicable    Are the vital signs normal?  Yes      More Ojeda CMA ([facility])

## 2020-01-23 NOTE — NURSING NOTE
Discharge Information    IV Discontiued Time:  NA    Amount of Fluid Infused:  NA    Discharge Criteria = When patient returns to baseline or as per MD order    Consciousness:  Pt is fully awake    Circulation:  BP +/- 20% of pre-procedure level    Respiration:  Patient is able to breathe deeply    O2 Sat:  Patient is able to maintain O2 Sat >92% on room air    Activity:  Moves 4 extremities on command    Ambulation:  Patient is able to stand and walk or stand and pivot into wheelchair    Dressing:  Clean/dry or No Dressing    Notes:   Discharge instructions and AVS given to patient    Patient meets criteria for discharge?  YES    Admitted to PCU?  No    Responsible adult present to accompany patient home?  Yes    Signature/Title:    Jean Caceres RN  RN Care Coordinator  [facility] [place]

## 2020-01-23 NOTE — PATIENT INSTRUCTIONS
[facility]   Procedure Discharge Instructions    Today you saw:     Dr. Chasidy Laura     You had an:   sacroiliac joint injection      Medications used:  Lidocaine  Omnipaque  Ropivicaine   Kenalog            Be cautious when walking. Numbness and/or weakness in the lower extremities may occur for up to 6-8 hours after the procedure due to effect of the local anesthetic    Do not drive for 6 hours. The effect of the local anesthetic could slow your reflexes.     You may resume your regular activities after 24 hours    Avoid strenuous activity for the first 24 hours    You may shower, however avoid swimming, tub baths or hot tubs for 24 hours following your procedure    You may have a mild to moderate increase in pain for several days following the injection.    It may take up to 14 days for the steroid medication to start working although you may feel the effect as early as a few days after the procedure.       You may use ice packs for 10-15 minutes, 3 to 4 times a day at the injection site for comfort    Do not use heat to painful areas for 6 to 8 hours. This will give the local anesthetic time to wear off and prevent you from accidentally burning your skin.     Unless you have been directed to avoid the use of anti-inflammatory medications (NSAIDS), you may use medications such as ibuprofen, Aleve or Tylenol for pain control if needed.     Possible side effects of steroids that you may experience include flushing, elevated blood pressure, increased appetite, mild headaches and restlessness.  All of these symptoms will get better with time.    If you experience any of the following, call the Pain Clinic during work hours (Mon-Friday 8-4:30 pm) at [phone] or the Provider Line after hours at [phone]:  -Fever over 100 degree F  -Swelling, bleeding, redness, drainage, warmth at the injection site  -Progressive weakness or numbness in your legs   -Unusual new onset of pain that  is not improving

## 2020-01-27 DIAGNOSIS — G89.4 CHRONIC PAIN SYNDROME: ICD-10-CM

## 2020-01-27 DIAGNOSIS — I10 HYPERTENSION GOAL BP (BLOOD PRESSURE) < 140/90: ICD-10-CM

## 2020-01-27 DIAGNOSIS — E78.49 OTHER HYPERLIPIDEMIA: ICD-10-CM

## 2020-01-27 DIAGNOSIS — M48.062 SPINAL STENOSIS OF LUMBAR REGION WITH NEUROGENIC CLAUDICATION: ICD-10-CM

## 2020-01-27 NOTE — TELEPHONE ENCOUNTER
Last Written Prescription Date:  AMLODIPINE NOT ON MED LIST  Last Fill Quantity: ?,  # refills: ?   Last office visit: 11/12/2019 with prescribing provider:  11-12-19   Future Office Visit:   Next 5 appointments (look out 90 days)    Feb 11, 2020  1:30 PM CST  Office Visit with Tej Engle MD  [facility] [place] ([facility]) [address] [address]  [phone]         Requested Prescriptions   Pending Prescriptions Disp Refills     losartan-hydrochlorothiazide (HYZAAR) 100-25 MG tablet 90 tablet 3     Sig: Take 1 tablet by mouth daily   Last Written Prescription Date:  ?  Last Fill Quantity: ?,  # refills: 3   Last office visit: 11/12/2019 with prescribing provider:  11-12-19   Future Office Visit:   Next 5 appointments (look out 90 days)    Feb 11, 2020  1:30 PM CST  Office Visit with Tej [facility]le MD  [facility] [address] [address]  [phone]           There is no refill protocol information for this order        simvastatin (ZOCOR) 40 MG tablet 90 tablet 1     Sig: Take 1 tablet (40 mg) by mouth At Bedtime   Last Written Prescription Date:  ?  Last Fill Quantity: ?,  # refills: 1   Last office visit: 11/12/2019 with prescribing provider:  11-12-19   Future Office Visit:   Next 5 appointments (look out 90 days)    Feb 11, 2020  1:30 PM CST  Office Visit with Tej [facility]le MD  [facility] [address] [address]  [phone]           There is no refill protocol information for this order        salsalate (DISALCID) 750 MG tablet 60 tablet 5     Sig: Take 1 tablet (750 mg) by mouth 2 times daily as needed for moderate pain   Last Written Prescription Date:  ?  Last Fill Quantity: ?,  # refills: 5   Last office visit: 11/12/2019 with prescribing provider:  11-12-19   Future Office Visit:   Next 5 appointments (look out 90 days)    Feb 11,  2020  1:30 PM CST  Office Visit with Tej Engle MD  [facility] [place] ([facility]) [address] [address]  [phone]           There is no refill protocol information for this order        lidocaine (LIDODERM) 5 % patch 90 patch 11     Sig: Place 3 patches onto the skin every 24 hours   Last Written Prescription Date:  ?  Last Fill Quantity: ?,  # refills: 11   Last office visit: 11/12/2019 with prescribing provider:  11-12-19   Future Office Visit:   Next 5 appointments (look out 90 days)    Feb 11, 2020  1:30 PM CST  Office Visit with Tej Engle MD  [facility] [place] ([facility]) [address]  [place] [address]  [phone]           There is no refill protocol information for this order

## 2020-01-28 NOTE — TELEPHONE ENCOUNTER
Routing refill request to provider for review/approval because:  Labs not current:  Creatinine, LDL, age    Patient has upcoming appointment with PCP on 2/11/20    Brigida Bunch, RN, BSN, PHN

## 2020-02-02 ENCOUNTER — MYC REFILL (OUTPATIENT)
Dept: INTERNAL MEDICINE | Facility: CLINIC | Age: 85
End: 2020-02-02

## 2020-02-02 DIAGNOSIS — M48.062 SPINAL STENOSIS OF LUMBAR REGION WITH NEUROGENIC CLAUDICATION: ICD-10-CM

## 2020-02-03 DIAGNOSIS — G89.4 CHRONIC PAIN SYNDROME: ICD-10-CM

## 2020-02-03 RX ORDER — SALSALATE 750 MG
750 TABLET ORAL 2 TIMES DAILY PRN
Qty: 60 TABLET | Refills: 5 | Status: SHIPPED | OUTPATIENT
Start: 2020-02-03 | End: 2020-02-11

## 2020-02-03 RX ORDER — LOSARTAN POTASSIUM AND HYDROCHLOROTHIAZIDE 25; 100 MG/1; MG/1
1 TABLET ORAL DAILY
Qty: 90 TABLET | Refills: 3 | Status: SHIPPED | OUTPATIENT
Start: 2020-02-03 | End: 2020-11-20

## 2020-02-03 RX ORDER — SIMVASTATIN 40 MG
40 TABLET ORAL AT BEDTIME
Qty: 90 TABLET | Refills: 1 | Status: SHIPPED | OUTPATIENT
Start: 2020-02-03 | End: 2020-06-28

## 2020-02-03 RX ORDER — LIDOCAINE 50 MG/G
3 PATCH TOPICAL EVERY 24 HOURS
Qty: 90 PATCH | Refills: 11 | Status: SHIPPED | OUTPATIENT
Start: 2020-02-03 | End: 2020-02-11

## 2020-02-03 NOTE — TELEPHONE ENCOUNTER
Requested Prescriptions   Pending Prescriptions Disp Refills     lidocaine (LIDODERM) 5 % patch  Last Written Prescription Date:    Last Fill Quantity: 90,  # refills: 11   Last office visit: 11/12/2019 with prescribing provider:  ENEDINA Engle   Future Office Visit:   Next 5 appointments (look out 90 days)    Feb 11, 2020  1:30 PM CST  Office Visit with Tej Engle MD  [facility] [address] [address]  [phone]          90 patch 11     Sig: Place 3 patches onto the skin every 24 hours       There is no refill protocol information for this order

## 2020-02-03 NOTE — TELEPHONE ENCOUNTER
"Duplicate request, please see refill request from 1/27/20      Requested Prescriptions   Pending Prescriptions Disp Refills     salsalate (DISALCID) 750 MG tablet 60 tablet 5     Sig: Take 1 tablet (750 mg) by mouth 2 times daily as needed for moderate pain       NSAID Medications Failed - 2/2/2020  1:53 PM        Failed - Patient is age 6-64 years        Failed - Normal CBC on file in past 12 months     Recent Labs   Lab Test 02/15/19  1342   WBC 7.9   RBC 3.74*   HGB 12.7   HCT 37.5                    Failed - Normal serum creatinine on file in past 12 months     Recent Labs   Lab Test 08/06/19  1408   CR 1.19*             Passed - Blood pressure under 140/90 in past 12 months     BP Readings from Last 3 Encounters:   01/23/20 136/66   01/03/20 (!) 143/72   11/21/19 123/74                 Passed - Normal ALT on file in past 12 months     Recent Labs   Lab Test 02/15/19  1342   ALT 18             Passed - Normal AST on file in past 12 months     Recent Labs   Lab Test 02/15/19  1342   AST 23             Passed - Recent (12 mo) or future (30 days) visit within the authorizing provider's specialty     Patient has had an office visit with the authorizing provider or a provider within the authorizing providers department within the previous 12 mos or has a future within next 30 days. See \"Patient Info\" tab in inbasket, or \"Choose Columns\" in Meds & Orders section of the refill encounter.              Passed - Medication is active on med list        Passed - No active pregnancy on record        Passed - No positive pregnancy test in past 12 months          "

## 2020-02-04 RX ORDER — LIDOCAINE 50 MG/G
3 PATCH TOPICAL EVERY 24 HOURS
Qty: 90 PATCH | Refills: 11 | OUTPATIENT
Start: 2020-02-04

## 2020-02-06 ENCOUNTER — TELEPHONE (OUTPATIENT)
Dept: INTERNAL MEDICINE | Facility: CLINIC | Age: 85
End: 2020-02-06

## 2020-02-06 NOTE — TELEPHONE ENCOUNTER
Pharmacy calling need clarification on lidocaine patch,, please call back and give ref # [id number] . Unable to give further data caller states.

## 2020-02-06 NOTE — TELEPHONE ENCOUNTER
Spoke with pharmacist Madalyn and she asked that sig is clarified with provider.  Generally patches are 12 hours on and 12 hours off.          lidocaine (LIDODERM) 5 % patch 90 patch 11 2/3/2020  No   Sig - Route: Place 3 patches onto the skin every 24 hours - Transdermal   Sent to pharmacy as: lidocaine (LIDODERM) 5 % patch   Class: E-Prescribe   Order: [id number]   E-Prescribing Status: Receipt confirmed by pharmacy (2/3/2020  8:48 AM CST)

## 2020-02-11 ENCOUNTER — OFFICE VISIT (OUTPATIENT)
Dept: INTERNAL MEDICINE | Facility: CLINIC | Age: 85
End: 2020-02-11
Payer: COMMERCIAL

## 2020-02-11 VITALS
DIASTOLIC BLOOD PRESSURE: 66 MMHG | WEIGHT: 109 LBS | BODY MASS INDEX: 20.18 KG/M2 | OXYGEN SATURATION: 97 % | SYSTOLIC BLOOD PRESSURE: 110 MMHG | RESPIRATION RATE: 16 BRPM | HEART RATE: 83 BPM | TEMPERATURE: 97.9 F

## 2020-02-11 DIAGNOSIS — E44.1 MILD MALNUTRITION (H): ICD-10-CM

## 2020-02-11 DIAGNOSIS — N18.30 CKD (CHRONIC KIDNEY DISEASE) STAGE 3, GFR 30-59 ML/MIN (H): ICD-10-CM

## 2020-02-11 DIAGNOSIS — F32.1 MODERATE MAJOR DEPRESSION (H): ICD-10-CM

## 2020-02-11 DIAGNOSIS — G89.4 CHRONIC PAIN SYNDROME: ICD-10-CM

## 2020-02-11 DIAGNOSIS — M48.062 SPINAL STENOSIS OF LUMBAR REGION WITH NEUROGENIC CLAUDICATION: Primary | ICD-10-CM

## 2020-02-11 LAB
ANION GAP SERPL CALCULATED.3IONS-SCNC: 8 MMOL/L (ref 3–14)
BUN SERPL-MCNC: 34 MG/DL (ref 7–30)
CALCIUM SERPL-MCNC: 10.1 MG/DL (ref 8.5–10.1)
CHLORIDE SERPL-SCNC: 101 MMOL/L (ref 94–109)
CO2 SERPL-SCNC: 28 MMOL/L (ref 20–32)
CREAT SERPL-MCNC: 0.99 MG/DL (ref 0.52–1.04)
GFR SERPL CREATININE-BSD FRML MDRD: 52 ML/MIN/{1.73_M2}
GLUCOSE SERPL-MCNC: 102 MG/DL (ref 70–99)
POTASSIUM SERPL-SCNC: 3.8 MMOL/L (ref 3.4–5.3)
SODIUM SERPL-SCNC: 137 MMOL/L (ref 133–144)

## 2020-02-11 PROCEDURE — 99214 OFFICE O/P EST MOD 30 MIN: CPT | Performed by: INTERNAL MEDICINE

## 2020-02-11 PROCEDURE — 36415 COLL VENOUS BLD VENIPUNCTURE: CPT | Performed by: INTERNAL MEDICINE

## 2020-02-11 PROCEDURE — 80048 BASIC METABOLIC PNL TOTAL CA: CPT | Performed by: INTERNAL MEDICINE

## 2020-02-11 RX ORDER — SALSALATE 750 MG
750-1500 TABLET ORAL DAILY PRN
Qty: 60 TABLET | Refills: 5 | COMMUNITY
Start: 2020-02-11 | End: 2020-06-24

## 2020-02-11 RX ORDER — LIDOCAINE 50 MG/G
3 PATCH TOPICAL EVERY 24 HOURS
Qty: 90 PATCH | Refills: 11 | Status: SHIPPED | OUTPATIENT
Start: 2020-02-11 | End: 2021-01-20

## 2020-02-11 NOTE — TELEPHONE ENCOUNTER
I just sent in prescription again today but did not add additional clarification.  Yes,  patches are 12 hours on and 12 hours off.

## 2020-02-11 NOTE — PROGRESS NOTES
Subjective     Teresa Lopez is a 84 year old female who presents to clinic today for the following health issues:    HPI   Chronic Pain Follow-Up    Where in your body do you have pain? Right hip and leg  How has your pain affected your ability to work? Not applicable  Which of these pain treatments have you tried since your last clinic visit? 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 Good  How has your mood been since your last visit? About the same  Have you had a significant life event? No  Other aggravating factors: prolonged sitting and poor posture  Taking medication as directed? Yes    PHQ-9 SCORE 1/2/2019 2/26/2019 6/5/2019   PHQ-9 Total Score - - -   PHQ-9 Total Score 10 12 3     GOLDEN-7 SCORE 2/26/2019 6/5/2019 11/12/2019   Total Score 5 0 1     No flowsheet data found.  Encounter-Level CSA:    There are no encounter-level csa.     Patient-Level CSA:    There are no patient-level csa.         How many servings of fruits and vegetables do you eat daily?  2-3    On average, how many sweetened beverages do you drink each day (Examples: soda, juice, sweet tea, etc.  Do NOT count diet or artificially sweetened beverages)?   0    How many days per week do you exercise enough to make your heart beat faster? 3 or less    How many minutes a day do you exercise enough to make your heart beat faster? 9 or less    How many days per week do you miss taking your medication? 0    Teresa is accompanied by her daughter today.  She complains of ongoing chronic pain, most notably in her right lower back.  She notes this is severe enough where it limits her activity, including her ability to stand without the assistance of her wheeled walker.  She is attempting to do some gentle range of motion exercises while sitting or supine but does not tolerate much activity otherwise.  She attempts to do some minor walking between buildings and her senior facility.    She does note that her SI joint injection  does seem to have helped some of her pain but is unsure if she should have expected a more significant response.  She continues to perseverate on the effects of gabapentin and her previous opioid therapy, to which she attributes some lasting dysesthesia, especially around her lips.    She is trying to eat throughout the day, including a boost nutritional supplement.  Her daughters are coming by to help her with meal preparation regularly.    She has been using 1 tablet of salicylate with food in the midmorning and has a significant improvement in her pain and functional status until later in the afternoon.  She is also adherent to her medical cannabis regimen.  She is also been taking 1 or 2 Excedrin tablets from time to time throughout the day.    Her mood is down, consistent with her reduction in physical function.  She has no other immediate complaints.    1. Spinal stenosis of lumbar region with neurogenic claudication    2. Chronic pain syndrome    3. Mild malnutrition (H)    4. Moderate major depression (H)    5. CKD (chronic kidney disease) stage 3, GFR 30-59 ml/min (H)        PMH: Updated and/or reviewed in chart.    ROS:  Constitutional, HEENT, cardiovascular, pulmonary, gi and gu systems are otherwise negative.    Objective   OBJECTIVE:                                                    /66   Pulse 83   Temp 97.9  F (36.6  C) (Tympanic)   Resp 16   Wt 49.4 kg (109 lb)   SpO2 97%   BMI 20.18 kg/m      GEN: thin, older lady in no acute distress  EYES: No conjunctival injection or icterus, EOMI grossly intact  RESP: Unlabored, regular  NEURO: gait not visualized, MAEx4, light touch sensation grossly intact  PSYCH: depressed mood and affable affect    Results for orders placed or performed in visit on 02/11/20   Basic metabolic panel     Status: Abnormal   Result Value Ref Range    Sodium 137 133 - 144 mmol/L    Potassium 3.8 3.4 - 5.3 mmol/L    Chloride 101 94 - 109 mmol/L    Carbon Dioxide 28 20  - 32 mmol/L    Anion Gap 8 3 - 14 mmol/L    Glucose 102 (H) 70 - 99 mg/dL    Urea Nitrogen 34 (H) 7 - 30 mg/dL    Creatinine 0.99 0.52 - 1.04 mg/dL    GFR Estimate 52 (L) >60 mL/min/[1.73_m2]    GFR Estimate If Black 61 >60 mL/min/[1.73_m2]    Calcium 10.1 8.5 - 10.1 mg/dL      Assessment & Plan   ASSESSMENT/PLAN:                                                    1. Spinal stenosis of lumbar region with neurogenic claudication  2. Chronic pain syndrome  physical therapy and increase in non-steroidal anti-inflammatory drug cautiously with prompt PPI if any dyspepsia evident to reduce peptic ulcer risk.  - PHYSICAL THERAPY REFERRAL; Future  - salsalate (DISALCID) 750 MG tablet; Take 1-2 tablets (750-1,500 mg) by mouth daily as needed for moderate pain  Dispense: 60 tablet; Refill: 5  - lidocaine (LIDODERM) 5 % patch; Place 3 patches onto the skin every 24 hours  Dispense: 90 patch; Refill: 11    3. Mild malnutrition (H)  4. Moderate major depression (H)  Noted - continue therapies and consider reevaluation from the medical cannabis pharmacist regarding dosing.    5. CKD (chronic kidney disease) stage 3, GFR 30-59 ml/min (H)  She has added over-the-counter supplemental potassium with her daughter's assistance for 'energy' - continue to monitor   - Basic metabolic panel      Orders Placed This Encounter     Basic metabolic panel     PHYSICAL THERAPY REFERRAL     lidocaine (LIDODERM) 5 % patch     salsalate (DISALCID) 750 MG tablet        Return in about 3 months (around 5/11/2020) for Follow-up.    Tej Engle MD

## 2020-02-12 NOTE — TELEPHONE ENCOUNTER
Spoke with [facility] mail order.  Gave below clarification directions, she voiced understanding and agreement.  Epic will not allow RN to up date sig to add below directions  Ena Gil RN

## 2020-02-23 ENCOUNTER — HEALTH MAINTENANCE LETTER (OUTPATIENT)
Age: 85
End: 2020-02-23

## 2020-03-02 ENCOUNTER — THERAPY VISIT (OUTPATIENT)
Dept: PHYSICAL THERAPY | Facility: CLINIC | Age: 85
End: 2020-03-02
Payer: COMMERCIAL

## 2020-03-02 DIAGNOSIS — M48.062 SPINAL STENOSIS OF LUMBAR REGION WITH NEUROGENIC CLAUDICATION: Primary | ICD-10-CM

## 2020-03-02 PROCEDURE — 97162 PT EVAL MOD COMPLEX 30 MIN: CPT | Mod: GP | Performed by: PHYSICAL THERAPIST

## 2020-03-02 PROCEDURE — 97112 NEUROMUSCULAR REEDUCATION: CPT | Mod: GP | Performed by: PHYSICAL THERAPIST

## 2020-03-02 PROCEDURE — 97110 THERAPEUTIC EXERCISES: CPT | Mod: GP | Performed by: PHYSICAL THERAPIST

## 2020-03-02 NOTE — PROGRESS NOTES
[facility] Initial Evaluation -- Lumbar    Date: March 2, 2020  Teresa Lopez is a 84 year old female with a LB/Leg condition.   Referral: GP  Work mechanical stresses:  retired  Employment status:    Leisure mechanical stresses: walks to things w/ing the apartment complex but does not walk for length of time  Functional disability score (CORKY/STarT Back):  See flowsheet  VAS score (0-10): 10/10, ranges 0-10/10  Patient goals/expectations:  Able to walk more w/o the rollator    HISTORY:    Present symptoms: R LB/hip, down lat leg to knee, occas ant hip/groin,  Pain quality (sharp/shooting/stabbing/aching/burning/cramping):  Aching - intense   Paresthesia (yes/no):  Numbness lat hip R    Present since (onset date): Long hx of pain - at least 10 yrs.  MD referral 2-.     Symptoms (improving/unchanging/worsening):  Gradually worsening.     Symptoms commenced as a result of: Unknown   Condition occurred in the following environment:   unknown     Symptoms at onset (back/thigh/leg): Back pain  Constant symptoms (back/thigh/leg):   Intermittent symptoms (back/thigh/leg): back, hip and thigh    Symptoms are made worse with the following: Always Rising, Always Standing, Always Walking and Time of day - Always AM, afternoons are better w/ pain meds  Symptoms are made better with the following: Always Sitting and Always Lying if SL L    Disturbed sleep (yes/no):  no Sleeping postures (prone/sup/side R/L): Supine and L >> R side    Previous episodes (0/1-5/6-10/11+): 6+ Year of first episode: >10 yrs    Previous history: 2002 stroke, pain on and off many yrs  Previous treatments: PT - exer      Specific Questions:  Cough/Sneeze/Strain (pos/neg): neg  Bowel/Bladder (normal/abnormal): normal  Gait (normal/abnormal): normal for pt  Medications (nil/NSAIDS/analg/steroids/anticoag/other):  NSAIDS and Narcotics/Opiods, pain patches,   Medical allergies:  NKA  General health (excellent/good/fair/poor):   fair  Pertinent medical history:  Osteoporosis, Stroke  Imaging (None/Xray/MRI/Other):  MRI Nov 2019 - See epic chart  Recent or major surgery (yes/no):  Hysterectomy  Night pain (yes/no): no  Accidents (yes/no): no  Unexplained weight loss (yes/no): yes - working on w/ MD  Barriers at home: Lives alone in apt   Other red flags: unexplained Wt Loss - see above, Weakness    EXAMINATION    Posture:   Sitting (good/fair/poor): poor   Standing (good/fair/poor):poor  Lordosis (red/acc/normal): reduced  Correction of posture (better/worse/no effect): NE    Lateral Shift (right/left/nil): nil  Relevant (yes/no):    Other Observations: Very Kyphotic posture, scoliosis convex R, WB L w/ stdg d/t pain, ambulation w/ rollator - mod trendelenburg R, R foot placement is midline, ambulation w/o rollator - pt is unable to WB on R d/t pain.    Neurological:    Motor deficit:  NA d/t time -will assess next visit.  Reflexes:  NA  Sensory deficit:  NA  Dural signs:  NA    Movement Loss:   Tr Mod Min Nil Pain   Flexion    X Mod knee flex - to toes, pain w/ return from flex   Extension X    Pain across LB   Side Gliding R        Side Gliding L          Test Movements:   During: produces, abolishes, increases, decreases, no effect, centralizing, peripheralizing   After: better, worse, no better, no worse, no effect, centralized, peripheralized    Pretest symptoms standing: Pain L post hip and lat thigh to knee   Symptoms During Symptoms After ROM increased ROM decreased No Effect   FIS        Rep FIS        EIS Increases  LB    No Worse         Rep EIS Increases  LB  No Worse      X     Static Tests:  Sitting slouched:  Min-no pain  Sitting erect:  painful  Standing slouched NA  Standing erect:  NA  Lying prone in extension:  NA Long sitting:  NA    Other Tests:    FISit:  x10 Incr hip and thigh, Worse, NE ROM, Incr pain w/ walking    Provisional Classification:  Inconclusive/Other - needs further assessment - hip vs LB, derangement vs  stenosis    Principle of Management:  Education:  Discussed sitting posture, sitting upright avoiding slouching, limit length of time sitting, and HEP.   Equipment provided:  none  Mechanical therapy (Y/N):  ????   Extension principle:  EIS (stdg facing counter) x10 5-6 x/day.  Lateral Principle:    Flexion principle:    Other:      ASSESSMENT/PLAN:    Patient is a 84 year old female with lumbar complaints.    Patient has the following significant findings with corresponding treatment plan.                Diagnosis 1:  LBP R/Spinal Stenosis Lumbar w/ neurogenic cladication  Pain -  manual therapy, self management, education, directional preference exercise and home program  Decreased ROM/flexibility - manual therapy, therapeutic exercise and home program  Decreased strength - therapeutic exercise, therapeutic activities and home program  Impaired gait - gait training and home program  Decreased function - therapeutic activities and home program  Impaired posture - neuro re-education and home program    Therapy Evaluation Codes:   1) History comprised of:   Personal factors that impact the plan of care:      Age and Past/current experiences.    Comorbidity factors that impact the plan of care are:      Stroke and Osteoporosis.     Medications impacting care: Anti-inflammatory, Pain and pain meds and osteoporosis.  2) Examination of Body Systems comprised of:   Body structures and functions that impact the plan of care:      Hip and Lumbar spine.   Activity limitations that impact the plan of care are:      Dressing, Standing, Walking and rising from sitting.  3) Clinical presentation characteristics are:   Evolving/Changing.  4) Decision-Making    Moderate complexity using standardized patient assessment instrument and/or measureable assessment of functional outcome.  Cumulative Therapy Evaluation is: Moderate complexity.    Previous and current functional limitations:  (See Goal Flow Sheet for this information)     Short term and Long term goals: (See Goal Flow Sheet for this information)     Communication ability:  Patient appears to be able to clearly communicate and understand verbal and written communication and follow directions correctly.  Treatment Explanation - The following has been discussed with the patient:   RX ordered/plan of care  Anticipated outcomes  Possible risks and side effects  This patient would benefit from PT intervention to resume normal activities.   Rehab potential is fair.    Frequency:  2 X week, once daily  Duration:  for 8 weeks  Discharge Plan:  Achieve all LTG.  Independent in home treatment program.  Reach maximal therapeutic benefit.    Please refer to the daily flowsheet for treatment today, total treatment time and time spent performing 1:1 timed codes.

## 2020-03-05 DIAGNOSIS — E78.49 OTHER HYPERLIPIDEMIA: ICD-10-CM

## 2020-03-05 NOTE — TELEPHONE ENCOUNTER
"Requested Prescriptions   Pending Prescriptions Disp Refills     simvastatin (ZOCOR) 40 MG tablet 90 tablet 1     Sig: Take 1 tablet (40 mg) by mouth At Bedtime   Last Written Prescription Date:  02/03/20  Last Fill Quantity: 90,  # refills: 1   Last office visit: 2/11/2020 with prescribing provider:  ENEDINA Engle   Future Office Visit:   Next 5 appointments (look out 90 days)    Mar 17, 2020  1:00 PM CDT  Return Visit with MYRON Ahumada CNP  [facility] ([facility]) [address] [address]  [phone]   May 19, 2020 12:30 PM CDT  Office Visit with Tej Engle MD  [facility] [address] [facility] [address]  [phone]             Statins Protocol Failed - 3/5/2020  8:12 AM        Failed - LDL on file in past 12 months     Recent Labs   Lab Test 05/16/18  1137   *             Passed - No abnormal creatine kinase in past 12 months     Recent Labs   Lab Test 01/03/12  1047                   Passed - Recent (12 mo) or future (30 days) visit within the authorizing provider's specialty     Patient has had an office visit with the authorizing provider or a provider within the authorizing providers department within the previous 12 mos or has a future within next 30 days. See \"Patient Info\" tab in inbasket, or \"Choose Columns\" in Meds & Orders section of the refill encounter.              Passed - Medication is active on med list        Passed - Patient is age 18 or older        Passed - No active pregnancy on record        Passed - No positive pregnancy test in past 12 months          "

## 2020-03-06 RX ORDER — SIMVASTATIN 40 MG
40 TABLET ORAL AT BEDTIME
Qty: 90 TABLET | Refills: 1 | OUTPATIENT
Start: 2020-03-06

## 2020-03-10 ENCOUNTER — THERAPY VISIT (OUTPATIENT)
Dept: PHYSICAL THERAPY | Facility: CLINIC | Age: 85
End: 2020-03-10
Payer: COMMERCIAL

## 2020-03-10 DIAGNOSIS — M48.062 SPINAL STENOSIS OF LUMBAR REGION WITH NEUROGENIC CLAUDICATION: Primary | ICD-10-CM

## 2020-03-10 PROCEDURE — 97530 THERAPEUTIC ACTIVITIES: CPT | Mod: GP | Performed by: PHYSICAL THERAPIST

## 2020-03-10 PROCEDURE — 97110 THERAPEUTIC EXERCISES: CPT | Mod: GP | Performed by: PHYSICAL THERAPIST

## 2020-04-18 ENCOUNTER — MYC MEDICAL ADVICE (OUTPATIENT)
Dept: INTERNAL MEDICINE | Facility: CLINIC | Age: 85
End: 2020-04-18

## 2020-04-20 NOTE — TELEPHONE ENCOUNTER
vitalclip message sent to patient and daughter Brooke re: moving up visit with Dr. Engle or getting in for a face to face with a covering provider.    TAMIKA Reaves on 4/20/2020 at 10:27 AM

## 2020-05-06 ENCOUNTER — VIRTUAL VISIT (OUTPATIENT)
Dept: PALLIATIVE MEDICINE | Facility: CLINIC | Age: 85
End: 2020-05-06
Payer: COMMERCIAL

## 2020-05-06 ENCOUNTER — MYC MEDICAL ADVICE (OUTPATIENT)
Dept: PALLIATIVE MEDICINE | Facility: CLINIC | Age: 85
End: 2020-05-06

## 2020-05-06 DIAGNOSIS — M70.61 GREATER TROCHANTERIC BURSITIS OF RIGHT HIP: ICD-10-CM

## 2020-05-06 DIAGNOSIS — G89.29 CHRONIC RIGHT SI JOINT PAIN: ICD-10-CM

## 2020-05-06 DIAGNOSIS — M16.51 POST-TRAUMATIC OSTEOARTHRITIS OF RIGHT HIP: ICD-10-CM

## 2020-05-06 DIAGNOSIS — M25.551 PAIN IN JOINT OF RIGHT HIP: Primary | ICD-10-CM

## 2020-05-06 DIAGNOSIS — M48.062 SPINAL STENOSIS OF LUMBAR REGION WITH NEUROGENIC CLAUDICATION: ICD-10-CM

## 2020-05-06 DIAGNOSIS — M51.369 DDD (DEGENERATIVE DISC DISEASE), LUMBAR: ICD-10-CM

## 2020-05-06 DIAGNOSIS — M53.3 SI (SACROILIAC) JOINT DYSFUNCTION: ICD-10-CM

## 2020-05-06 DIAGNOSIS — M25.551 PAIN OF RIGHT HIP JOINT: Primary | ICD-10-CM

## 2020-05-06 DIAGNOSIS — M53.3 CHRONIC RIGHT SI JOINT PAIN: ICD-10-CM

## 2020-05-06 DIAGNOSIS — M16.11 PRIMARY OSTEOARTHRITIS OF RIGHT HIP: ICD-10-CM

## 2020-05-06 PROCEDURE — 99214 OFFICE O/P EST MOD 30 MIN: CPT | Mod: 95 | Performed by: NURSE PRACTITIONER

## 2020-05-06 ASSESSMENT — PAIN SCALES - GENERAL: PAINLEVEL: WORST PAIN (10)

## 2020-05-06 NOTE — PROGRESS NOTES
The patient has been notified of following:     This telephone visit will be conducted via a call between you and your provider. We have found that certain health care needs can be provided without the need for a physical exam.  This service lets us provide the care you need with a phone conversation.  If a prescription is necessary we can send it directly to your pharmacy.  If lab work is needed we can place an order for that and you can then stop by our lab to have the test done at a later time. This is a billable service but we do not know the cost at this time.     Patient has given verbal consent for Telephone visit?  Yes  More Ojeda CMA ([facility]    5/6/2020      Teresa Lopez is a 84 year old female who is being evaluated via a billable telephone visit.        Chief complaint:  chronic low back pain and right leg pain      Interval history:  Teresa Lopez is a 84 year old female is known to me for .   Primary osteoarthritis    Chronic right hip pain   Chronic bilateral low back without sciatica   Spinal stenosis of lumbar region with neurogenic claudication   Piriformis syndrome of both sides   PMHx DJD, CVA, HTN, diabetes mellitus, and cataract   PSHx Hysterectomy      Recommendations/plan at the last visit on 1/3/2020 included:  1. Physical Therapy:  None at present  2. Clinical Health Psychologist: None needed at present  3. Diagnostic Studies:  None  4. Medication Management:    1. Continue Lidocaine patches   2. Continue medical cannabis. Re-certified by Dr Engle on 7/17/2019  5. Further procedures recommended: Pt to Schedule SI joint injection on the right side   6. Recommendations to PCP: none  7. Follow up: 8-10 weeks.    Since her last visit, Teresa Lopez reports:  -Teresa is having some more hip pain on the right side between the top of the hip and up and the tailbone to the hip  -SI joint injection on the right side was beneficial and this has made her  "pain more focused on the right hip.   -she has pain if she lays on her right side  -she is having more pain in the deep right groin when she walks is markedly worse.       ***-she has increased chronic pain in her right hip and low back pain.  -Her mood overall she says is worse.   -She continues to experience right-sided low back pain which is exacerbated with walking and improved with rest. Pain radiates down into the right side causing fatigue across the back. We discussed scheduling SI joint injections.       At this point, the patient's participation with our multidisciplinary team includes:  The patient has been compliant with the program  PT - patient is interested  Health Psych  None    Pain scores:  Pain intensity on average is 9*** on a scale of 0-10.    Range is 7-10***/10.   Pain right now is 9***/10   Pain is described as \" unbearable, tiring, exhausting, miserable, nagging, and gnawing***.\"  Pain is continuous in nature and worse in the morning.    Current pain-related medication treatments include:  -Lidocaine patch   -Medical cannabis   -     Other pertinent medications:  -Senna-docusate PRN      Previous medication treatments included:  OPIATES:hydrocodone (not helpful), oxycodone (somewhat helpful)  NSAIDS: ibuprofen (not helpful), Aleve (not helpful)  MUSCLE RELAXANTS: none  ANTI-MIGRAINE MEDS: none  ANTI-DEPRESSANTS: none  SLEEP AIDS: none  ANTI-CONVULSANTS: gabapentin (unsure if helpful, side effects: hot flashes, fogginess)  TOPICALS: none  Other meds: Tylenol (not helpful)    Injections:  -11/10/2017 Caudal epidural steroid injection with Dr. Reymundo Bajwa (helpful for a very short period of time)  -1/5/2018 Ultrasound guided right intra-articular hip injection with Dr. Jean Meade  -1/12/2018 Right ankle injection with Dr. Lewis of podiatry.   -7/3/2018 L2-3 interlaminar epidural steroid injection with Dr Jamaal Eid (not at all helpful)  -8/3/2018 right hip steroid injection with " Dr. Valverde (helped some)  -6/14/2019 bilateral hip greater trochanteric bursa injection with Dr. Jamaal Eid (helpful)   -1/23/2020 right SI joint injection with Dr. Chasidy Laura (quite helpful)    Side Effects: no side effect  Patient is using the medication as prescribed: YES    Medications:  Current Outpatient Medications   Medication Sig Dispense Refill     Acetaminophen (TYLENOL ARTHRITIS PAIN PO) As needed not taking daily       ADVIL 200 MG PO CAPS Reported on 4/20/2017       CALCIUM 1200 OR Reported on 4/20/2017       Glucosamine-Chondroit-Vit C-Mn (GLUCOSAMINE 1500 COMPLEX PO) Take 1,500 mg by mouth daily Reported on 4/20/2017       lidocaine (LIDODERM) 5 % patch Place 3 patches onto the skin every 24 hours 90 patch 11     Loratadine (CLARITIN) 10 MG capsule Take 10 mg by mouth as needed Reported on 4/20/2017       losartan-hydrochlorothiazide (HYZAAR) 100-25 MG tablet Take 1 tablet by mouth daily 90 tablet 3     medical cannabis (Patient's own supply.  Not a prescription) See Admin Instructions (This is NOT a prescription, and does not certify that the patient has a qualifying medical condition for medical cannabis.  The purpose of this order is  to document that the patient reports taking medical cannabis.)   Taking 6-9 red capsules daily       MULTI-VITAMIN OR TABS 1 TABLET DAILY       salsalate (DISALCID) 750 MG tablet Take 1-2 tablets (750-1,500 mg) by mouth daily as needed for moderate pain 60 tablet 5     senna-docusate (SENOKOT-S;PERICOLACE) 8.6-50 MG per tablet Take 1 tablet by mouth 2 times daily 120 tablet 12     simvastatin (ZOCOR) 40 MG tablet Take 1 tablet (40 mg) by mouth At Bedtime 90 tablet 1     order for DME Equipment being ordered: 4 wheeled Rollator walker with seat. Patient would like to have a red one if possible. 1 Device 0       Medical History: any changes in medical history since they were last seen? No    Social History:  Home situation: . 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  Tobacco use: none  Alcohol use: very little, about 2 drinks per month  Drug use: none  History of chemical dependency treatment: none    Is patient a current smoker or tobacco user?  no  If yes, was cessation counseling offered?  no        Review of Systems: ***  The 14 system ROS was reviewed from the intake questionnaire, and is positive for:  Constitutional: fever/chills, fatigue, weight gain, weight loss  Eyes/Head: headache, dizziness  ENT: ringing in ears  Allergy/Immune: allergies  Skin: itching, rash, hives  Hematologic: easy bruising  Respiratory: cough, wheezing, shortness of breath  Cardiovascular: swelling in feet, fainting, palpitations, chest pain  GI: abdominal pain, nausea, vomiting, diarrhea, constipation  Endocrine: steroid use  Musculoskeletal:  joint pain, arthritis, stiffness, gout, back pain, neck pain  Urinary: frequency, urgency, incontinence, hesitancy  Neurologic: weakness, numbness/tingling, seizure, stroke, memory loss  Mental health: depression, anxiety, stress, suicidal ideation           Physical Exam:  Vital signs: There were no vitals taken for this visit.    Behavioral observations:  Awake and alert, cooperative          Imaging  MRI LUMBAR SPINE WITHOUT CONTRAST   3/30/2018 1:22 PM      HISTORY:  Lumbar degenerative disc disease.     TECHNIQUE: Multiplanar multisequence MRI of the lumbar spine without  contrast.     COMPARISON: Lumbar spine x-ray 3/22/2018. Lumbar spine MR 3/17/2017.      FINDINGS: There are 5 lumbar-type vertebral bodies assumed for the  purposes of this dictation. The tip of the conus terminates at the  L1-L2 level.     There is convex right scoliotic curvature of the lumbar spine centered  at the L2-L3 level. There is mild retrolisthesis of L2 on L3 and mild  anterolisthesis of L3 on L4. There is grade 1 anterolisthesis of L4 on  L5. There is right lateral listhesis of L3 on  L4. There is slight loss  of left-sided vertebral body height at L3, probably due to  degenerative changes. Severe loss of intervertebral disc height seen  at L3-L4 with associated degenerative endplate changes. Severe loss of  disc height also seen on the left at L1-L2 and L2-L3 with severe  degenerative endplate changes. Mild loss of disc height at T11-12 and  T12-L1 with disc desiccation at L4-5 and L5-S1. There is mild STIR  hyperintense marrow edema at the opposing L2-L3 endplates.  Degenerative subchondral cysts seen anteriorly at L3.     Level by level as follows:      T12-L1:  Only seen on the lateral view, but there is a posterior disc  bulge and bilateral facet hypertrophy resulting in moderate right and  mild left neural foraminal narrowing. No significant spinal canal  narrowing.      L1-L2:  Convex right curvature with retrolisthesis and circumferential  disc bulge with endplate osteophytic spurring as well as bilateral  facet hypertrophy. Findings result in mild central spinal canal  narrowing and moderate bilateral neural foraminal narrowing.      L2-L3:  Convex right scoliotic curvature with left-sided disc bulge  and endplate osteophytic spurring as well as, left greater than right,  facet hypertrophy and ligamentum flavum infolding. Findings result in  moderate central spinal canal narrowing as well as moderate to severe  left neural foraminal narrowing. There is mild right neural foraminal  narrowing.      L3-L4:  Mild anterolisthesis along with circumferential disc bulge,  severe bilateral facet hypertrophy, and ligamentum flavum infolding.  Findings result in severe central spinal canal narrowing. There is  also severe left and moderate to severe right neural foraminal  narrowing.      L4-L5:  Anterolisthesis with uncovering of the disc and small  posterior disc bulge along with severe bilateral facet hypertrophy and  ligamentum flavum infolding. Findings result in moderate to severe  central  spinal canal narrowing. There is also moderate to severe  bilateral neural foraminal narrowing. Small bilateral facet joint  effusions.      L5-S1:  Mild posterior disc bulge and marked bilateral facet  hypertrophy results in moderate bilateral neural foraminal narrowing,  right greater than left. No significant central spinal canal  narrowing.      Paraspinous soft tissues:  Normal.          IMPRESSION:    1. Severe multilevel degenerative changes throughout the lumbar spine  as described above. Overall, there is no significant change since  previous MR 3/17/2017.  2. Severe central spinal canal narrowing at L3-L4 and moderate to  severe spinal canal narrowing at L4-L5.  3. Convex right scoliotic curvature of the spine with multiple levels  of spondylolisthesis and right lateral listhesis of L3 on L4.     DIANA HERNANDEZ MD    [place] Prescription Monitoring Program:  Reviewed [place]  ***2020- no concerning fills.  Marion SANCHES RN CNP, [facility]  [facility]      Assessment: ***  1. Chronic right SI joint pain  2. Sacroiliac joint dysfunction of right side  3. DDD (degenerative disc disease), lumbar  4. Greater trochanteric bursitis of both hips  5. Spinal stenosis of lumbar region with neurogenic claudication      Plan: ***  8. Physical Therapy:  None at present  9. Clinical Health Psychologist: None needed at present  10. Diagnostic Studies:  None  11. Medication Management:    3. Continue Lidocaine patches   4. Continue medical cannabis. Re-certified by Dr Engle on 7/17/2019  12. Further procedures recommended: Pt to Schedule SI joint injection on the right side   13. Recommendations to PCP: none  14. Follow up: 8-10 weeks.    Phone call duration: *** minutes        Marion SANCHES RN CNP, FNP  [facility]

## 2020-05-06 NOTE — PROGRESS NOTES
The patient has been notified of following:     This telephone visit will be conducted via a call between you and your provider. We have found that certain health care needs can be provided without the need for a physical exam.  This service lets us provide the care you need with a phone conversation.  If a prescription is necessary we can send it directly to your pharmacy.  If lab work is needed we can place an order for that and you can then stop by our lab to have the test done at a later time. This is a billable service but we do not know the cost at this time.     Patient has given verbal consent for Telephone visit?  Yes  More Ojeda CMA ([facility])

## 2020-05-06 NOTE — PROGRESS NOTES
The patient has been notified of following:     This telephone visit will be conducted via a call between you and your provider. We have found that certain health care needs can be provided without the need for a physical exam.  This service lets us provide the care you need with a phone conversation.  If a prescription is necessary we can send it directly to your pharmacy.  If lab work is needed we can place an order for that and you can then stop by our lab to have the test done at a later time. This is a billable service but we do not know the cost at this time.     Patient has given verbal consent for Telephone visit?  Yes  More Ojeda CMA ([facility]    5/6/2020      Teresa Lopez is a 84 year old female who is being evaluated via a billable telephone visit.        Chief complaint:  chronic low back pain and right leg pain      Interval history:  Teresa Lopez is a 84 year old female is known to me for .   Primary osteoarthritis    Chronic right hip pain   Chronic bilateral low back without sciatica   Spinal stenosis of lumbar region with neurogenic claudication   Piriformis syndrome of both sides   PMHx DJD, CVA, HTN, diabetes mellitus, and cataract   PSHx Hysterectomy      Recommendations/plan at the last visit on 1/3/2020 included:  1. Physical Therapy:  None at present  2. Clinical Health Psychologist: None needed at present  3. Diagnostic Studies:  None  4. Medication Management:    1. Continue Lidocaine patches   2. Continue medical cannabis. Re-certified by Dr Engle on 7/17/2019  5. Further procedures recommended: Pt to Schedule SI joint injection on the right side   6. Recommendations to PCP: none  7. Follow up: 8-10 weeks.    Since her last visit, Teresa Lopez reports:  -Teresa is having some more hip pain on the right side between the top of the hip and up and the tailbone to the hip  -SI joint injection on the right side was beneficial and this has made her  "pain more focused on the right hip.   -she has pain if she lays on her right side  -she is having more pain in the deep right groin. She notes when she walks this pain is markedly worse.             At this point, the patient's participation with our multidisciplinary team includes:  The patient has been compliant with the program  PT - patient is interested  Health Psych  None    Pain scores:  Pain intensity on average is 10 on a scale of 0-10.    Range is 8-10/10.   Pain right now is 10/10   Pain is described as \" unbearable, tiring, exhausting, miserable, nagging, and gnawing.\"  Pain is continuous in nature and worse in the morning.    Current pain-related medication treatments include:  -Lidocaine patch   -Medical cannabis        Other pertinent medications:  -Senna-docusate PRN      Previous medication treatments included:  OPIATES:hydrocodone (not helpful), oxycodone (somewhat helpful)  NSAIDS: ibuprofen (not helpful), Aleve (not helpful)  MUSCLE RELAXANTS: none  ANTI-MIGRAINE MEDS: none  ANTI-DEPRESSANTS: none  SLEEP AIDS: none  ANTI-CONVULSANTS: gabapentin (unsure if helpful, side effects: hot flashes, fogginess)  TOPICALS: none  Other meds: Tylenol (not helpful)    Injections:  -11/10/2017 Caudal epidural steroid injection with Dr. Reymundo Bajwa (helpful for a very short period of time)  -1/5/2018 Ultrasound guided right intra-articular hip injection with Dr. Jean Meade  -1/12/2018 Right ankle injection with Dr. Lewis of podiatry.   -7/3/2018 L2-3 interlaminar epidural steroid injection with Dr Jamaal Eid (not at all helpful)  -8/3/2018 right hip steroid injection with Dr. Valverde (helped some)  -6/14/2019 bilateral hip greater trochanteric bursa injection with Dr. Jamaal Eid (helpful)   -1/23/2020 right SI joint injection with Dr. Chasidy Laura (quite helpful)    Side Effects: no side effect  Patient is using the medication as prescribed: YES    Medications:  Current Outpatient Medications "   Medication Sig Dispense Refill     Acetaminophen (TYLENOL ARTHRITIS PAIN PO) As needed not taking daily       ADVIL 200 MG PO CAPS Reported on 4/20/2017       CALCIUM 1200 OR Reported on 4/20/2017       Glucosamine-Chondroit-Vit C-Mn (GLUCOSAMINE 1500 COMPLEX PO) Take 1,500 mg by mouth daily Reported on 4/20/2017       lidocaine (LIDODERM) 5 % patch Place 3 patches onto the skin every 24 hours 90 patch 11     Loratadine (CLARITIN) 10 MG capsule Take 10 mg by mouth as needed Reported on 4/20/2017       losartan-hydrochlorothiazide (HYZAAR) 100-25 MG tablet Take 1 tablet by mouth daily 90 tablet 3     medical cannabis (Patient's own supply.  Not a prescription) See Admin Instructions (This is NOT a prescription, and does not certify that the patient has a qualifying medical condition for medical cannabis.  The purpose of this order is  to document that the patient reports taking medical cannabis.)   Taking 6-9 red capsules daily       MULTI-VITAMIN OR TABS 1 TABLET DAILY       salsalate (DISALCID) 750 MG tablet Take 1-2 tablets (750-1,500 mg) by mouth daily as needed for moderate pain 60 tablet 5     senna-docusate (SENOKOT-S;PERICOLACE) 8.6-50 MG per tablet Take 1 tablet by mouth 2 times daily 120 tablet 12     simvastatin (ZOCOR) 40 MG tablet Take 1 tablet (40 mg) by mouth At Bedtime 90 tablet 1     order for DME Equipment being ordered: 4 wheeled Rollator walker with seat. Patient would like to have a red one if possible. 1 Device 0       Medical History: any changes in medical history since they were last seen? No    Social History:  Home situation: . 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  Tobacco use: none  Alcohol use: very little, about 2 drinks per month  Drug use: none  History of chemical dependency treatment: none    Is patient a current smoker or tobacco user?  no  If yes, was cessation counseling offered?   no        Review of Systems:   The 14 system ROS was reviewed from the intake questionnaire, and is positive for:  Constitutional: fever/chills, fatigue, weight gain, weight loss  Eyes/Head: headache, dizziness  ENT: ringing in ears  Allergy/Immune: allergies  Skin: itching, rash, hives  Hematologic: easy bruising  Respiratory: cough, wheezing, shortness of breath  Cardiovascular: swelling in feet, fainting, palpitations, chest pain  GI: abdominal pain, nausea, vomiting, diarrhea, constipation  Endocrine: steroid use  Musculoskeletal:  joint pain, arthritis, stiffness, gout, back pain, neck pain  Urinary: frequency, urgency, incontinence, hesitancy  Neurologic: weakness, numbness/tingling, seizure, stroke, memory loss  Mental health: depression, anxiety, stress, suicidal ideation           Physical Exam:  Vital signs: There were no vitals taken for this visit.    Behavioral observations:  Awake and alert, cooperative          Imaging  MRI LUMBAR SPINE WITHOUT CONTRAST   3/30/2018 1:22 PM      HISTORY:  Lumbar degenerative disc disease.     TECHNIQUE: Multiplanar multisequence MRI of the lumbar spine without  contrast.     COMPARISON: Lumbar spine x-ray 3/22/2018. Lumbar spine MR 3/17/2017.      FINDINGS: There are 5 lumbar-type vertebral bodies assumed for the  purposes of this dictation. The tip of the conus terminates at the  L1-L2 level.     There is convex right scoliotic curvature of the lumbar spine centered  at the L2-L3 level. There is mild retrolisthesis of L2 on L3 and mild  anterolisthesis of L3 on L4. There is grade 1 anterolisthesis of L4 on  L5. There is right lateral listhesis of L3 on L4. There is slight loss  of left-sided vertebral body height at L3, probably due to  degenerative changes. Severe loss of intervertebral disc height seen  at L3-L4 with associated degenerative endplate changes. Severe loss of  disc height also seen on the left at L1-L2 and L2-L3 with severe  degenerative endplate  changes. Mild loss of disc height at T11-12 and  T12-L1 with disc desiccation at L4-5 and L5-S1. There is mild STIR  hyperintense marrow edema at the opposing L2-L3 endplates.  Degenerative subchondral cysts seen anteriorly at L3.     Level by level as follows:      T12-L1:  Only seen on the lateral view, but there is a posterior disc  bulge and bilateral facet hypertrophy resulting in moderate right and  mild left neural foraminal narrowing. No significant spinal canal  narrowing.      L1-L2:  Convex right curvature with retrolisthesis and circumferential  disc bulge with endplate osteophytic spurring as well as bilateral  facet hypertrophy. Findings result in mild central spinal canal  narrowing and moderate bilateral neural foraminal narrowing.      L2-L3:  Convex right scoliotic curvature with left-sided disc bulge  and endplate osteophytic spurring as well as, left greater than right,  facet hypertrophy and ligamentum flavum infolding. Findings result in  moderate central spinal canal narrowing as well as moderate to severe  left neural foraminal narrowing. There is mild right neural foraminal  narrowing.      L3-L4:  Mild anterolisthesis along with circumferential disc bulge,  severe bilateral facet hypertrophy, and ligamentum flavum infolding.  Findings result in severe central spinal canal narrowing. There is  also severe left and moderate to severe right neural foraminal  narrowing.      L4-L5:  Anterolisthesis with uncovering of the disc and small  posterior disc bulge along with severe bilateral facet hypertrophy and  ligamentum flavum infolding. Findings result in moderate to severe  central spinal canal narrowing. There is also moderate to severe  bilateral neural foraminal narrowing. Small bilateral facet joint  effusions.      L5-S1:  Mild posterior disc bulge and marked bilateral facet  hypertrophy results in moderate bilateral neural foraminal narrowing,  right greater than left. No significant  central spinal canal  narrowing.      Paraspinous soft tissues:  Normal.          IMPRESSION:    1. Severe multilevel degenerative changes throughout the lumbar spine  as described above. Overall, there is no significant change since  previous MR 3/17/2017.  2. Severe central spinal canal narrowing at L3-L4 and moderate to  severe spinal canal narrowing at L4-L5.  3. Convex right scoliotic curvature of the spine with multiple levels  of spondylolisthesis and right lateral listhesis of L3 on L4.     DIANA HERNANDEZ MD    [place] Prescription Monitoring Program:  Reviewed [facility] 5/6/2020- no concerning fills.  Marion SANCHES, RN CNP, FNP  [facility]      Assessment:   1. Right hip joint pain  2. Right hip osteoarthritis  3. Greater trochanteric bursitis of right hip  4. Chronic right SI joint pain  5. Sacroiliac joint dysfunction of right side  6. DDD (degenerative disc disease), lumbar  7. Spinal stenosis of lumbar region with neurogenic claudication      Plan:   1. Physical Therapy:  None at present  2. Clinical Health Psychologist: None needed at present  3. Diagnostic Studies:  None  4. Medication Management:    1. Continue Lidocaine patches   2. Continue medical cannabis. Re-certified by Dr Engle on 7/17/2019  3. Try Flonase sensimist nasal spray found over the counter for seasonal allergies  5. Further procedures recommended: I will check with Dr. Valverde re: possible right hip joint injection and get back to you with his response  6. Recommendations to PCP: none  7. Follow up: 8 weeks.    Phone call duration: 38 minutes        Marion SANCHES RN CNP, FNP  [facility]  [facility] [place]

## 2020-05-06 NOTE — PATIENT INSTRUCTIONS
Plan:   1. Physical Therapy:  None at present  2. Clinical Health Psychologist: None needed at present  3. Diagnostic Studies:  None  4. Medication Management:    1. Continue Lidocaine patches   2. Continue medical cannabis. Re-certified by Dr Engle on 7/17/2019  3. Try Flonase sensimist nasal spray found over the counter for seasonal allergies  5. Further procedures recommended: I will check with Dr. Valverde re: possible right hip joint injection and get back to you with his response  6. Recommendations to PCP: none  7. Follow up: 8 weeks.

## 2020-05-06 NOTE — TELEPHONE ENCOUNTER
Mahin Jordan     I have been in contact with Dr. Valverde. He said he can absolutely get you in for a hip joint injection as soon as this afternoon or he has openings on Friday. I will place an order and then you are able to call the [facility]  office to schedule at [phone]. He said he is fine with it as long as you are comfortable coming in. He noted that he does have a detailed discussion re: risks/benefits prior to the injection as well.     Thanks!  Marion SANCHES RN CNP, [facility]    I have sent the above [employer] message to the patient.     Marion SANCHES RN CNP, [facility]      I also signed order for patient to see Dr. Valverde for possible hip joint injection.    Marion SANCHES RN CNP, [facility]

## 2020-05-13 ENCOUNTER — OFFICE VISIT (OUTPATIENT)
Dept: ORTHOPEDICS | Facility: CLINIC | Age: 85
End: 2020-05-13
Payer: COMMERCIAL

## 2020-05-13 VITALS
HEIGHT: 62 IN | DIASTOLIC BLOOD PRESSURE: 76 MMHG | WEIGHT: 110 LBS | SYSTOLIC BLOOD PRESSURE: 122 MMHG | BODY MASS INDEX: 20.24 KG/M2

## 2020-05-13 DIAGNOSIS — M25.551 PAIN IN JOINT OF RIGHT HIP: ICD-10-CM

## 2020-05-13 DIAGNOSIS — M16.11 PRIMARY OSTEOARTHRITIS OF RIGHT HIP: ICD-10-CM

## 2020-05-13 PROCEDURE — 20611 DRAIN/INJ JOINT/BURSA W/US: CPT | Mod: RT | Performed by: FAMILY MEDICINE

## 2020-05-13 PROCEDURE — 99214 OFFICE O/P EST MOD 30 MIN: CPT | Mod: 25 | Performed by: FAMILY MEDICINE

## 2020-05-13 RX ORDER — TRIAMCINOLONE ACETONIDE 40 MG/ML
40 INJECTION, SUSPENSION INTRA-ARTICULAR; INTRAMUSCULAR
Status: DISCONTINUED | OUTPATIENT
Start: 2020-05-13 | End: 2020-06-24

## 2020-05-13 RX ORDER — ROPIVACAINE HYDROCHLORIDE 5 MG/ML
3 INJECTION, SOLUTION EPIDURAL; INFILTRATION; PERINEURAL
Status: DISCONTINUED | OUTPATIENT
Start: 2020-05-13 | End: 2020-06-24

## 2020-05-13 RX ADMIN — ROPIVACAINE HYDROCHLORIDE 3 ML: 5 INJECTION, SOLUTION EPIDURAL; INFILTRATION; PERINEURAL at 10:50

## 2020-05-13 RX ADMIN — TRIAMCINOLONE ACETONIDE 40 MG: 40 INJECTION, SUSPENSION INTRA-ARTICULAR; INTRAMUSCULAR at 10:50

## 2020-05-13 ASSESSMENT — MIFFLIN-ST. JEOR: SCORE: 899.27

## 2020-05-13 NOTE — LETTER
2020         RE: Teresa Lopez  [address]  [place] [address]        Dear Colleague,    Thank you for referring your patient, Teresa Lopez, to the [facility]. Please see a copy of my visit note below.    Teresa Lopez  :  1935  DOS: 20  MRN: [id number]    Sports Medicine Clinic Visit    PCP: Demetrice Gutierrez    Teresa Lopez is a 81 year old female who is seen in consultation at the request of  Marion Munoz CNP presenting with chronic right hip pain.    Injury: Gradual onset of chronic mild-moderate right hip pain over last 6 - 7 months.  Pain located in deep lateral, anterior hip, radiating to right thigh.  Reports intermittent radiating, pain to right anterior, lateral thigh.  Additional Features:  Positive: grinding and weakness.  Symptoms are better with Rest.  Symptoms are worse with: prolonged walking, tying shoes, going from sit to stand position.  Other evaluation and/or treatments so far consists of: Rest and pain management consult, pain medication.  Recent imaging completed: X-rays completed 3/17/17.  Prior History of related problems: Patient is currently being treated for chronic low back pain issues by pain management.  Difficult to keep her focused on particular issue today.    Social History: retired  Presents with her daughter today    Interim History - 2018  Since last visit on 17 patient has moderate severe radiating right hip pain.  Pain radiates down anterior thigh to her knee.  They desire to discuss hip injection options today.  No interim injury.       Interim History - August 3, 2018  Since last visit on 18 patient has moderate severe right hip and right knee pain.  Right hip intra-articular injection completed on  provided modeate relief.  Right knee pain located over deep medial and lateral joint lines.  She had right knee steroid injection in the past that have good relief.  No new  "injury in the interim.    Interim History - May 13, 2020  Since last visit on 8/3/18 patient has moderate-severe right hip and low back pain.  Bilateral hip trochanteric bursa injection completed on 6/14/19 by Dr Eid provided good relief for ~ 6 months.  Patient cannot recall if she received relief from right hip intra-articular injection on 8/3/18.  Pain located over deep posterior, lateral, and anterior hip today.  No new injury in the interim.    Review of Systems  Musculoskeletal: as above  Remainder of review of systems is negative including constitutional, CV, pulmonary, GI, Skin and Neurologic except as noted in HPI or medical history.    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 No jaundice.   CV: regular rhythm by palpation, 2+ distal pulses, no pedal edema    Resp: normal respiratory effort without conversational dyspnea   Psych: normal mood and affect    Gait: antalgic, appropriate coordination and balance, trendelenburg  Neuro: normal light touch sensory exam of the extremities. Motor strength as noted below     Right hip exam    Inspection:        no edema or ecchymosis in hip area    ROM:       Flexion 90       internal rotation 10      external rotation 30      Range of motion limited by pain    Strength:        flexion 4/5       extension 4/5       abduction 4/5       adduction 4/5    Tender:        greater trochanter mild, IT band       Anterior " hip joint mild/moderate       Moderate ipsilateral SI joint TTP    Non Tender:        remainder of hip area       illiac crest       ASIS       pubis    Sensation:        grossly intact in hip and thigh    Skin:       well perfused       capillary refill brisk    Special Tests:        neg (-) LUDY       positive (+) FADIR       positive (+) scour       neg (-) Ankit    Neg slump and SLR, knee stable on exam    Large Joint Injection/Arthocentesis: R hip joint    Date/Time: 5/13/2020 10:50 AM  Performed by: Jean Valverde DO  Authorized by: Jean Valverde DO     Indications:  Pain and diagnostic evaluation  Needle Size:  22 G  Guidance: ultrasound    Approach:  Anterior  Location:  Hip      Site:  R hip joint  Medications:  40 mg triamcinolone 40 MG/ML; 3 mL ropivacaine 5 MG/ML  Outcome:  Tolerated well, no immediate complications  Procedure discussed: discussed risks, benefits, and alternatives    Consent Given by:  Patient  Timeout: timeout called immediately prior to procedure    Prep: patient was prepped and draped in usual sterile fashion     4 ml's of 1% lidocaine was used as local anesthetic prior to injection  Large hip joint effusion was noted - aspiration was attempted, but unable to obtain any fluid.      Radiology  XR HIP RIGHT 2-3 VIEWS 3/17/2017 2:49 PM     COMPARISON: None.     HISTORY: Right hip pain, no trauma history.         IMPRESSION: Moderate to severe left hip osteoarthritis with  significant joint space loss. No fractures are seen.    Recent Results (from the past 744 hour(s))   XR Knee Standing AP Bilat [place] Bilat Lat Right    Narrative    KNEE STANDING AP BILATERAL, SUNRISE BILATERAL, LATERAL RIGHT  8/3/2018  2:25 PM     HISTORY:  Chronic pain of right knee.      Impression    IMPRESSION: Bilateral patellofemoral medial compartment joint space  narrowing. Right knee medial compartment moderate to severe joint  space narrowing. Faint arterial calcifications are noted  posterior to  the right knee.    CATRACHITO CORONA MD         Assessment:  1. Pain in joint of right hip    2. Primary osteoarthritis of right hip        Plan:  Discussed the assessment with the patient.  Follow up: prn  Offered injection again today to the right hip joint, which was repeated  Reviewed option for rep[eat bursa injection for labile pain in that location, but will try to minimize injection as much as possible  Recent SI joint injection was not very helpful, still has pain in that location, could be referred from hip joint, she will keep us updated on progress  Lumbar issue mgmt per pain mgmt team following her, this is clearly a multifactorial issue  PT options reviewed, as well as HEP  Reviewed activity modification and progressive increase in activity as tolerated and guided by pain  Reviewed options for potential steroid vs viscosupplementation injections and the possibility for future orthopedic referral prn  Reviewed safe and appropriate OTC medication choices, try tylenol first  Up to 3000mg daily of tylenol is generally safe, NSAID dosing and duration limitations reviewed  Discussed nature of degenerative arthrosis of the knee.   Discussed symptom treatment with ice or heat, topical treatments, and rest if needed.   Expectations and goals of CSI reviewed  Reviewed risks of corticosteroid use including CSI during current viral pandemic, patient acknowledged and wished to proceed  The patient has greater than 5/10 pain with limitations in daily activity and has exhausted other supportive care measures including OTC medications without relief  Often 2-3 days for steroid effect, and can take up to two weeks for maximum effect  We discussed modified progressive pain-free activity as tolerated  Do not overuse in first two weeks if feeling better due to concern for vulnerability while steroid is working  Supportive care reviewed  All questions were answered today  Contact us with additional questions or  concerns  Signs and sx of concern reviewed      Jean Valverde DO, CAQ  Primary Care Sports Medicine  [facility]       Time spent in one-on-one evaluation and discussion with patient regarding nature of problem, course, prior treatments, and therapeutic options, at least 50% of which was spent in counseling and coordination of care: 30 minutes      Disclaimer: This note consists of symbols derived from keyboarding, dictation and/or voice recognition software. As a result, there may be errors in the script that have gone undetected. Please consider this when interpreting information found in this chart.    Again, thank you for allowing me to participate in the care of your patient.        Sincerely,        Jean Valverde DO

## 2020-05-13 NOTE — PROGRESS NOTES
Teresa Lopez  :  1935  DOS: 20  MRN: [id number]    Sports Medicine Clinic Visit    PCP: Demetrice Gutierrez    Teresa Lopez is a 81 year old female who is seen in consultation at the request of  Marion Munoz CNP presenting with chronic right hip pain.    Injury: Gradual onset of chronic mild-moderate right hip pain over last 6 - 7 months.  Pain located in deep lateral, anterior hip, radiating to right thigh.  Reports intermittent radiating, pain to right anterior, lateral thigh.  Additional Features:  Positive: grinding and weakness.  Symptoms are better with Rest.  Symptoms are worse with: prolonged walking, tying shoes, going from sit to stand position.  Other evaluation and/or treatments so far consists of: Rest and pain management consult, pain medication.  Recent imaging completed: X-rays completed 3/17/17.  Prior History of related problems: Patient is currently being treated for chronic low back pain issues by pain management.  Difficult to keep her focused on particular issue today.    Social History: retired  Presents with her daughter today    Interim History - 2018  Since last visit on 17 patient has moderate severe radiating right hip pain.  Pain radiates down anterior thigh to her knee.  They desire to discuss hip injection options today.  No interim injury.       Interim History - August 3, 2018  Since last visit on 18 patient has moderate severe right hip and right knee pain.  Right hip intra-articular injection completed on  provided modeate relief.  Right knee pain located over deep medial and lateral joint lines.  She had right knee steroid injection in the past that have good relief.  No new injury in the interim.    Interim History - May 13, 2020  Since last visit on 8/3/18 patient has moderate-severe right hip and low back pain.  Bilateral hip trochanteric bursa injection completed on 19 by Dr Eid provided good relief for ~ 6 months.  Patient  "cannot recall if she received relief from right hip intra-articular injection on 8/3/18.  Pain located over deep posterior, lateral, and anterior hip today.  No new injury in the interim.    Review of Systems  Musculoskeletal: as above  Remainder of review of systems is negative including constitutional, CV, pulmonary, GI, Skin and Neurologic except as noted in HPI or medical history.    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 No jaundice.   CV: regular rhythm by palpation, 2+ distal pulses, no pedal edema    Resp: normal respiratory effort without conversational dyspnea   Psych: normal mood and affect    Gait: antalgic, appropriate coordination and balance, trendelenburg  Neuro: normal light touch sensory exam of the extremities. Motor strength as noted below     Right hip exam    Inspection:        no edema or ecchymosis in hip area    ROM:       Flexion 90       internal rotation 10      external rotation 30      Range of motion limited by pain    Strength:        flexion 4/5       extension 4/5       abduction 4/5       adduction 4/5    Tender:        greater trochanter mild, IT band       Anterior hip joint mild/moderate       Moderate ipsilateral SI joint TTP    Non Tender:        remainder of hip area       illiac crest       ASIS       pubis    Sensation:        grossly intact in hip and thigh    Skin:       well perfused       capillary refill " brisk    Special Tests:        neg (-) LUDY       positive (+) FADIR       positive (+) scour       neg (-) Ankit    Neg slump and SLR, knee stable on exam    Large Joint Injection/Arthocentesis: R hip joint    Date/Time: 5/13/2020 10:50 AM  Performed by: Jean Valverde DO  Authorized by: Jean Valverde DO     Indications:  Pain and diagnostic evaluation  Needle Size:  22 G  Guidance: ultrasound    Approach:  Anterior  Location:  Hip      Site:  R hip joint  Medications:  40 mg triamcinolone 40 MG/ML; 3 mL ropivacaine 5 MG/ML  Outcome:  Tolerated well, no immediate complications  Procedure discussed: discussed risks, benefits, and alternatives    Consent Given by:  Patient  Timeout: timeout called immediately prior to procedure    Prep: patient was prepped and draped in usual sterile fashion     4 ml's of 1% lidocaine was used as local anesthetic prior to injection  Large hip joint effusion was noted - aspiration was attempted, but unable to obtain any fluid.      Radiology  XR HIP RIGHT 2-3 VIEWS 3/17/2017 2:49 PM     COMPARISON: None.     HISTORY: Right hip pain, no trauma history.         IMPRESSION: Moderate to severe left hip osteoarthritis with  significant joint space loss. No fractures are seen.    Recent Results (from the past 744 hour(s))   XR Knee Standing AP Bilat [place] Bilat Lat Right    Narrative    KNEE STANDING AP BILATERAL, SUNRISE BILATERAL, LATERAL RIGHT  8/3/2018  2:25 PM     HISTORY:  Chronic pain of right knee.      Impression    IMPRESSION: Bilateral patellofemoral medial compartment joint space  narrowing. Right knee medial compartment moderate to severe joint  space narrowing. Faint arterial calcifications are noted posterior to  the right knee.    CATRACHITO CORONA MD         Assessment:  1. Pain in joint of right hip    2. Primary osteoarthritis of right hip        Plan:  Discussed the assessment with the patient.  Follow up: prn  Offered injection again today to the  right hip joint, which was repeated  Reviewed option for rep[eat bursa injection for labile pain in that location, but will try to minimize injection as much as possible  Recent SI joint injection was not very helpful, still has pain in that location, could be referred from hip joint, she will keep us updated on progress  Lumbar issue mgmt per pain mgmt team following her, this is clearly a multifactorial issue  PT options reviewed, as well as HEP  Reviewed activity modification and progressive increase in activity as tolerated and guided by pain  Reviewed options for potential steroid vs viscosupplementation injections and the possibility for future orthopedic referral prn  Reviewed safe and appropriate OTC medication choices, try tylenol first  Up to 3000mg daily of tylenol is generally safe, NSAID dosing and duration limitations reviewed  Discussed nature of degenerative arthrosis of the knee.   Discussed symptom treatment with ice or heat, topical treatments, and rest if needed.   Expectations and goals of CSI reviewed  Reviewed risks of corticosteroid use including CSI during current viral pandemic, patient acknowledged and wished to proceed  The patient has greater than 5/10 pain with limitations in daily activity and has exhausted other supportive care measures including OTC medications without relief  Often 2-3 days for steroid effect, and can take up to two weeks for maximum effect  We discussed modified progressive pain-free activity as tolerated  Do not overuse in first two weeks if feeling better due to concern for vulnerability while steroid is working  Supportive care reviewed  All questions were answered today  Contact us with additional questions or concerns  Signs and sx of concern reviewed      Jean Valverde DO, AGATA  Primary Care Sports Medicine  [facility]       Time spent in one-on-one evaluation and discussion with patient regarding nature of problem, course, prior  treatments, and therapeutic options, at least 50% of which was spent in counseling and coordination of care: 30 minutes      Disclaimer: This note consists of symbols derived from keyboarding, dictation and/or voice recognition software. As a result, there may be errors in the script that have gone undetected. Please consider this when interpreting information found in this chart.

## 2020-05-19 ENCOUNTER — OFFICE VISIT (OUTPATIENT)
Dept: INTERNAL MEDICINE | Facility: CLINIC | Age: 85
End: 2020-05-19
Payer: COMMERCIAL

## 2020-05-19 VITALS
WEIGHT: 110.2 LBS | OXYGEN SATURATION: 98 % | SYSTOLIC BLOOD PRESSURE: 98 MMHG | RESPIRATION RATE: 20 BRPM | BODY MASS INDEX: 20.48 KG/M2 | TEMPERATURE: 97.6 F | HEART RATE: 67 BPM | DIASTOLIC BLOOD PRESSURE: 60 MMHG

## 2020-05-19 DIAGNOSIS — G31.84 MILD COGNITIVE IMPAIRMENT: ICD-10-CM

## 2020-05-19 DIAGNOSIS — M46.96 INFLAMMATORY SPONDYLOPATHY OF LUMBAR REGION (H): ICD-10-CM

## 2020-05-19 DIAGNOSIS — E44.1 MILD MALNUTRITION (H): ICD-10-CM

## 2020-05-19 DIAGNOSIS — F32.1 MODERATE MAJOR DEPRESSION (H): ICD-10-CM

## 2020-05-19 DIAGNOSIS — G89.4 CHRONIC PAIN DISORDER: Primary | ICD-10-CM

## 2020-05-19 PROCEDURE — 99214 OFFICE O/P EST MOD 30 MIN: CPT | Performed by: INTERNAL MEDICINE

## 2020-05-19 RX ORDER — HYDROCODONE BITARTRATE AND ACETAMINOPHEN 5; 325 MG/1; MG/1
1 TABLET ORAL 2 TIMES DAILY PRN
Qty: 60 TABLET | Refills: 0 | Status: SHIPPED | OUTPATIENT
Start: 2020-05-19 | End: 2020-06-24

## 2020-05-19 NOTE — PROGRESS NOTES
Subjective     Teresa Lopez is a 84 year old female who presents to clinic today for the following health issues:    HPI   Periodic episodes (crashes)      Duration: x3yrs    Description (location/character/radiation): feeling hot inside and all over, no fever, sweating, cries, not sleeping, (has notes)     Intensity:  severe    Accompanying signs and symptoms: has notes      History (similar episodes/previous evaluation): 02/11/2020    Precipitating or alleviating factors: None    Therapies tried and outcome: steroid shot 05/13     Had right hip injection recently and reports some minor benefit, especially in her adjacent abdominal muscle tension.      Weight holding steady, trying to drink 2-3x Boost daily.      Significant ongoing back and diffuse osteoarthritis pain.  We discussed the challenge of COVID-related isolation and the contribution of depression to pain and woe.  She has called daughter in early AM hours saying she's ready to die.  Denies suicidal ideation, intent, or plan.    We reviewed her history of oxycodone and more distant (2014) Norco use.  She had no recollection of being intolerant to the latter, but was moved to oxycodone fall 2014 I presume due to lack of improvement.  We reviewed risks and benefits of opioid pain management, including altered mental status, constipation, falls, sedation, insufficient pain control, misuse and addiction.  We agreed to a trial of up to twice daily Norco 5/325 with daughter to help supervise and set her up to track closely utilization and relief given early memory concerns.  We discussed not taking Norco when lonely/depressed/isolated and the challenge that may be in differentiating physical / osteoarthritis and other pains.    Follow-up 1 month for safety/adjustment.    I spent a total of 35 minutes with the patient of which greater than 50% were devoted to counseling and coordination of care:       ICD-10-CM    1. Chronic pain disorder  G89.4  HYDROcodone-acetaminophen (NORCO) 5-325 MG tablet   2. Inflammatory spondylopathy of lumbar region (H)  M46.96 HYDROcodone-acetaminophen (NORCO) 5-325 MG tablet   3. Moderate major depression (H)  F32.1    4. Mild malnutrition (H)  E44.1    5. Mild cognitive impairment  G31.84

## 2020-06-09 DIAGNOSIS — E78.49 OTHER HYPERLIPIDEMIA: ICD-10-CM

## 2020-06-09 NOTE — TELEPHONE ENCOUNTER
Requested Prescriptions   Pending Prescriptions Disp Refills     simvastatin (ZOCOR) 40 MG tablet 90 tablet 1     Sig: Take 1 tablet (40 mg) by mouth At Bedtime   Last Written Prescription Date:  ?  Last Fill Quantity: ?,  # refills: 0   Last office visit: 5/19/2020 with prescribing provider:  5-19-20   Future Office Visit:   Next 5 appointments (look out 90 days)    Jun 24, 2020 11:30 AM CDT  Telephone Visit with Tej Engle MD  [facility] [address] [facility] [address]  [phone]                 There is no refill protocol information for this order

## 2020-06-11 RX ORDER — SIMVASTATIN 40 MG
40 TABLET ORAL AT BEDTIME
Qty: 90 TABLET | Refills: 1 | OUTPATIENT
Start: 2020-06-11

## 2020-06-24 ENCOUNTER — VIRTUAL VISIT (OUTPATIENT)
Dept: INTERNAL MEDICINE | Facility: CLINIC | Age: 85
End: 2020-06-24
Payer: COMMERCIAL

## 2020-06-24 DIAGNOSIS — G89.4 CHRONIC PAIN DISORDER: ICD-10-CM

## 2020-06-24 DIAGNOSIS — F32.1 MODERATE MAJOR DEPRESSION (H): ICD-10-CM

## 2020-06-24 DIAGNOSIS — M46.96 INFLAMMATORY SPONDYLOPATHY OF LUMBAR REGION (H): Primary | ICD-10-CM

## 2020-06-24 PROCEDURE — 99214 OFFICE O/P EST MOD 30 MIN: CPT | Mod: 95 | Performed by: INTERNAL MEDICINE

## 2020-06-24 RX ORDER — PREDNISONE 2.5 MG/1
2.5 TABLET ORAL DAILY
Qty: 100 TABLET | Refills: 1 | Status: SHIPPED | OUTPATIENT
Start: 2020-06-24 | End: 2020-09-01

## 2020-06-24 NOTE — PROGRESS NOTES
"Teresa Lopez is a 84 year old female who is being evaluated via a billable telephone visit.      The patient has been notified of following:     \"This telephone visit will be conducted via a call between you and your physician/provider. We have found that certain health care needs can be provided without the need for a physical exam.  This service lets us provide the care you need with a short phone conversation.  If a prescription is necessary we can send it directly to your pharmacy.  If lab work is needed we can place an order for that and you can then stop by our lab to have the test done at a later time.    Telephone visits are billed at different rates depending on your insurance coverage. During this emergency period, for some insurers they may be billed the same as an in-person visit.  Please reach out to your insurance provider with any questions.    If during the course of the call the physician/provider feels a telephone visit is not appropriate, you will not be charged for this service.\"    Patient has given verbal consent for Telephone visit?  Yes    What phone number would you like to be contacted at? [phone]    How would you like to obtain your AVS? Tesfaye Silver     Teresa Lopez is a 84 year old female who presents via phone visit today for the following health issues:    HPI  Medication Follow-up:   Patient was started on prednisone, was good for about 2 weeks, 6/17/2020 she started having break through pain, and at times will have depression coming though. Would like to discuss the dosage of prednisone. Would like to address depression, as well. Has been forgetful and rambling in her thoughts. Patient has not been eating, is there anything that can help her appetite.     Taking Medication as prescribed: yes    Side Effects:  Having some break through pain.    Medication Helping Symptoms:  Yes is still helping some.     Not using Norco -- has thrown them away.  Did not tolerate well, " "much like oxycodone history.    Still using lidocaine patches daily with decrease effect.    Has noticed significant improvement with low dose prednisone.  Daughter states that patient called her a couple of times over the past 2 weeks in \"a hot flash\" being more hopeless and painful; she sleuthed and believes patient did not take prednisone those days.  Of note: routine ESR never above 35; CRP normal 2/2019.  Has noticed occasional palpable heart beat but denies palpitations, shortness of breath, chest pain, dizziness, lightheadedness.    Still using acetaminophen.  Looking to get another injection for her back in the upcoming weeks.      Prior medications failed due to intolerances:     Gabapentin & pregabalin     Oxycodone & hydrocodone     Diclofenac, naproxen, & salsalate     Duloxetine, escitalopram, & mirtazapine  I believe she reported prior more distant history of a TCA with cholinergic axis intolerance as well.    Daughters are still keeping track of medication use and pain and functioning levels.  Pleased with recent improvement overall.    Reviewed and updated as needed this visit by Provider         Review of Systems   Constitutional, psychiatric, cardiovascular, pulmonary, gi and gu systems are negative, except as otherwise noted.       Objective   Reported vitals:  There were no vitals taken for this visit.   PSYCH: Alert and oriented times 3; coherent speech, normal   rate and volume, able to articulate logical thoughts, able   to abstract reason, no tangential thoughts, no hallucinations   or delusions  Her affect is normal  RESP: No cough, no audible wheezing, able to talk in full sentences  Remainder of exam unable to be completed due to telephone visits    Diagnostic Test Results:  Labs reviewed in Epic        Assessment/Plan:  1. Inflammatory spondylopathy of lumbar region (H)  2. Chronic pain disorder  3. Moderate major depression   Unclear etiology, but empiric trial of low-dose corticosteroid " clearly beneficial thus far.  Discussed occasional second daily dose as needed.  Opioid discontinued due to lack of net benefit and intolerance.  Question of whether a low dose of methylphenidate or other stimulant may be appropriate in her moderately apathetic depression; I think her symptoms are more secondary to pain and relative isolation.  We agreed to seek second opinion for symptom management from palliative care specialist in addition to ongoing orthopedic procedures (injections).      Return in about 2 months (around 8/24/2020) for Follow-up.      Phone call duration:  30 minutes    Tej Engle MD

## 2020-07-15 ENCOUNTER — VIRTUAL VISIT (OUTPATIENT)
Dept: PALLIATIVE MEDICINE | Facility: CLINIC | Age: 85
End: 2020-07-15
Payer: COMMERCIAL

## 2020-07-15 DIAGNOSIS — M70.61 GREATER TROCHANTERIC BURSITIS OF RIGHT HIP: Primary | ICD-10-CM

## 2020-07-15 PROCEDURE — 99214 OFFICE O/P EST MOD 30 MIN: CPT | Mod: 95 | Performed by: NURSE PRACTITIONER

## 2020-07-15 ASSESSMENT — PAIN SCALES - GENERAL: PAINLEVEL: WORST PAIN (10)

## 2020-07-15 NOTE — PATIENT INSTRUCTIONS
Plan:   1. Physical Therapy:  None at present  2. Clinical Health Psychologist: None needed at present  3. Diagnostic Studies:  None  4. Medication Management:    1. Continue Lidocaine patches   2. Continue medical cannabis. Re-certified by Dr Engle on 7/17/2019  3. Will consider starting Cymbalta at 20mg/day for depression and pain.   4. Trial over-the-counter Voltaren gel per instructions on package.   5. Further procedures recommended:   6. Recommendations to PCP: none  7. Follow up: 6-8 weeks for virtual visit

## 2020-07-15 NOTE — PROGRESS NOTES
The patient has been notified of following:     This telephone visit will be conducted via a call between you and your provider. We have found that certain health care needs can be provided without the need for a physical exam.  This service lets us provide the care you need with a phone conversation.  If a prescription is necessary we can send it directly to your pharmacy.  If lab work is needed we can place an order for that and you can then stop by our lab to have the test done at a later time. This is a billable service but we do not know the cost at this time.     Patient has given verbal consent for Telephone visit?  Yes  More Ojeda CMA ([facility]    7/15/2020     Teresa Lopez is a 84 year old female who is being evaluated via a billable telephone visit.        Chief complaint:  chronic low back pain and right leg pain      Interval history:  Teresa Lopez is a 84 year old female is known to me for .   Primary osteoarthritis    Chronic right hip pain   Chronic bilateral low back without sciatica   Spinal stenosis of lumbar region with neurogenic claudication   Piriformis syndrome of both sides   PMHx DJD, CVA, HTN, diabetes mellitus, and cataract   PSHx Hysterectomy      Recommendations/plan at the last visit on 5/6/2020 included:  1. Physical Therapy:  None at present  2. Clinical Health Psychologist: None needed at present  3. Diagnostic Studies:  None  4. Medication Management:    1. Continue Lidocaine patches   2. Continue medical cannabis. Re-certified by Dr Engle on 7/17/2019  3. Try Flonase sensimist nasal spray found over the counter for seasonal allergies  5. Further procedures recommended: I will check with Dr. Valverde re: possible right hip joint injection and get back to you with his response  6. Recommendations to PCP: none  7. Follow up: 8 weeks.        Since her last visit, Teresa Lopez reports:  Interval history 7/15/2020  -she is not feeling  "well  -she sleeps with a pillow between her legs  -she notes she is having \"bad pain\" on the lateral hip on the right side. She has pain when she lays on her right side in bed. She has had a greater trochanteric bursal injection in June 2019 that was helpful.   -she is not eating well as she is isolated and feels depressed and feels shaky as she doesn't eat as regularly as she should.   -discussed using Voltaren gel, Cymbalta and greater trochanteric injection      Interval history 5/6/2020  -Teresa is having some more hip pain on the right side between the top of the hip and up and the tailbone to the hip  -SI joint injection on the right side was beneficial and this has made her pain more focused on the right hip.   -she has pain if she lays on her right side  -she is having more pain in the deep right groin. She notes when she walks this pain is markedly worse.         At this point, the patient's participation with our multidisciplinary team includes:  The patient has been compliant with the program  PT - patient is interested  Health Psych  None    Pain scores:  Pain intensity on average is 10 on a scale of 0-10.    Range is 10/10.   Pain right now is 10/10   Pain is described as \" unbearable, tiring, exhausting, miserable, nagging, and gnawing.\"  Pain is continuous in nature and worse in the morning.    Current pain-related medication treatments include:  -Lidocaine patch (less   -Medical cannabis        Other pertinent medications:  -Senna-docusate PRN      Previous medication treatments included:  OPIATES:hydrocodone (not helpful), oxycodone (somewhat helpful)  NSAIDS: ibuprofen (not helpful), Aleve (not helpful)  MUSCLE RELAXANTS: none  ANTI-MIGRAINE MEDS: none  ANTI-DEPRESSANTS: none  SLEEP AIDS: none  ANTI-CONVULSANTS: gabapentin (unsure if helpful, side effects: hot flashes, fogginess)  TOPICALS: none  Other meds: Tylenol (not helpful)    Injections:  -11/10/2017 Caudal epidural steroid injection with Dr." Reymundo Bajwa (helpful for a very short period of time)  -1/5/2018 Ultrasound guided right intra-articular hip injection with Dr. Jean Meade  -1/12/2018 Right ankle injection with Dr. Lewis of podiatry.   -7/3/2018 L2-3 interlaminar epidural steroid injection with Dr Jamaal Eid (not at all helpful)  -8/3/2018 right hip steroid injection with Dr. Valverde (helped some)  -6/14/2019 bilateral hip greater trochanteric bursa injection with Dr. Jamaal Eid (helpful)   -1/23/2020 right SI joint injection with Dr. Chasidy Laura (quite helpful)  5/13/2020 right hip joint steroid injection with Dr. Valvrede (quite helpful)    Side Effects: no side effect  Patient is using the medication as prescribed: YES    Medications:  Current Outpatient Medications   Medication Sig Dispense Refill     Acetaminophen (TYLENOL ARTHRITIS PAIN PO) As needed not taking daily       ADVIL 200 MG PO CAPS Reported on 4/20/2017       CALCIUM 1200 OR Reported on 4/20/2017       Glucosamine-Chondroit-Vit C-Mn (GLUCOSAMINE 1500 COMPLEX PO) Take 1,500 mg by mouth daily Reported on 4/20/2017       lidocaine (LIDODERM) 5 % patch Place 3 patches onto the skin every 24 hours 90 patch 11     Loratadine (CLARITIN) 10 MG capsule Take 10 mg by mouth as needed Reported on 4/20/2017       losartan-hydrochlorothiazide (HYZAAR) 100-25 MG tablet Take 1 tablet by mouth daily 90 tablet 3     medical cannabis (Patient's own supply.  Not a prescription) See Admin Instructions (This is NOT a prescription, and does not certify that the patient has a qualifying medical condition for medical cannabis.  The purpose of this order is  to document that the patient reports taking medical cannabis.)   Taking 6-9 red capsules daily       MULTI-VITAMIN OR TABS 1 TABLET DAILY       order for DME Equipment being ordered: 4 wheeled Rollator walker with seat. Patient would like to have a red one if possible. 1 Device 0     predniSONE (DELTASONE) 2.5 MG tablet Take 1 tablet (2.5  mg) by mouth daily May repeat once daily as needed 100 tablet 1     senna-docusate (SENOKOT-S;PERICOLACE) 8.6-50 MG per tablet Take 1 tablet by mouth 2 times daily 120 tablet 12     simvastatin (ZOCOR) 40 MG tablet Take 1 tablet (40 mg) by mouth At Bedtime 90 tablet 3       Medical History: any changes in medical history since they were last seen? No    Social History:  Home situation: . 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  Tobacco use: none  Alcohol use: very little, about 2 drinks per month  Drug use: none  History of chemical dependency treatment: none    Is patient a current smoker or tobacco user?  no  If yes, was cessation counseling offered?  no        Review of Systems:   The 14 system ROS was reviewed with patient and is positive for:  Constitutional: fever/chills, fatigue, weight gain, weight loss, hot flashes  Eyes/Head: headache, dizziness  ENT: ringing in ears  Allergy/Immune: allergies  Skin: itching, rash, hives  Hematologic: easy bruising  Respiratory: cough, wheezing, shortness of breath  Cardiovascular: swelling in feet, fainting, palpitations, chest pain  GI: abdominal pain, nausea, vomiting, diarrhea, constipation  Endocrine: steroid use  Musculoskeletal:  joint pain, arthritis, stiffness, gout, back pain, neck pain  Urinary: frequency, urgency, incontinence, hesitancy  Neurologic: weakness, numbness/tingling, seizure, stroke, memory loss  Mental health: depression, anxiety, stress, suicidal ideation           Physical Exam:  Vital signs: There were no vitals taken for this visit.    Behavioral observations:  Awake and alert, cooperative          Imaging  MRI LUMBAR SPINE WITHOUT CONTRAST   3/30/2018 1:22 PM      HISTORY:  Lumbar degenerative disc disease.     TECHNIQUE: Multiplanar multisequence MRI of the lumbar spine without  contrast.     COMPARISON: Lumbar spine x-ray 3/22/2018. Lumbar spine MR 3/17/2017.       FINDINGS: There are 5 lumbar-type vertebral bodies assumed for the  purposes of this dictation. The tip of the conus terminates at the  L1-L2 level.     There is convex right scoliotic curvature of the lumbar spine centered  at the L2-L3 level. There is mild retrolisthesis of L2 on L3 and mild  anterolisthesis of L3 on L4. There is grade 1 anterolisthesis of L4 on  L5. There is right lateral listhesis of L3 on L4. There is slight loss  of left-sided vertebral body height at L3, probably due to  degenerative changes. Severe loss of intervertebral disc height seen  at L3-L4 with associated degenerative endplate changes. Severe loss of  disc height also seen on the left at L1-L2 and L2-L3 with severe  degenerative endplate changes. Mild loss of disc height at T11-12 and  T12-L1 with disc desiccation at L4-5 and L5-S1. There is mild STIR  hyperintense marrow edema at the opposing L2-L3 endplates.  Degenerative subchondral cysts seen anteriorly at L3.     Level by level as follows:      T12-L1:  Only seen on the lateral view, but there is a posterior disc  bulge and bilateral facet hypertrophy resulting in moderate right and  mild left neural foraminal narrowing. No significant spinal canal  narrowing.      L1-L2:  Convex right curvature with retrolisthesis and circumferential  disc bulge with endplate osteophytic spurring as well as bilateral  facet hypertrophy. Findings result in mild central spinal canal  narrowing and moderate bilateral neural foraminal narrowing.      L2-L3:  Convex right scoliotic curvature with left-sided disc bulge  and endplate osteophytic spurring as well as, left greater than right,  facet hypertrophy and ligamentum flavum infolding. Findings result in  moderate central spinal canal narrowing as well as moderate to severe  left neural foraminal narrowing. There is mild right neural foraminal  narrowing.      L3-L4:  Mild anterolisthesis along with circumferential disc bulge,  severe  bilateral facet hypertrophy, and ligamentum flavum infolding.  Findings result in severe central spinal canal narrowing. There is  also severe left and moderate to severe right neural foraminal  narrowing.      L4-L5:  Anterolisthesis with uncovering of the disc and small  posterior disc bulge along with severe bilateral facet hypertrophy and  ligamentum flavum infolding. Findings result in moderate to severe  central spinal canal narrowing. There is also moderate to severe  bilateral neural foraminal narrowing. Small bilateral facet joint  effusions.      L5-S1:  Mild posterior disc bulge and marked bilateral facet  hypertrophy results in moderate bilateral neural foraminal narrowing,  right greater than left. No significant central spinal canal  narrowing.      Paraspinous soft tissues:  Normal.          IMPRESSION:    1. Severe multilevel degenerative changes throughout the lumbar spine  as described above. Overall, there is no significant change since  previous MR 3/17/2017.  2. Severe central spinal canal narrowing at L3-L4 and moderate to  severe spinal canal narrowing at L4-L5.  3. Convex right scoliotic curvature of the spine with multiple levels  of spondylolisthesis and right lateral listhesis of L3 on L4.     DIANA HERNANDEZ MD    [place] Prescription Monitoring Program:  [place]ed [place]  7/15/2020- no concerning fills. One script for Norco 5/325mg from Dr. Tej Engle on 5/19/2020   Marion SANCHES RN CNP, FNP  [facility]      Assessment:   1. Greater trochanteric bursitis of right hip  2. Right hip joint pain  3. Right hip osteoarthritis  4. Chronic right SI joint pain  5. Sacroiliac joint dysfunction of right side  6. DDD (degenerative disc disease), lumbar  7. Spinal stenosis of lumbar region with neurogenic claudication      Plan:   1. Physical Therapy:  None at present  2. Clinical Health Psychologist: None needed at present  3. Diagnostic Studies:  None  4. Medication  Management:    1. Continue Lidocaine patches   2. Continue medical cannabis. Re-certified by Dr Engle on 7/17/2019  3. Will consider starting Cymbalta at 20mg/day for depression and pain.   4. Trial over-the-counter Voltaren gel per instructions on package.   5. Further procedures recommended:   6. Recommendations to PCP: none  7. Follow up: 6-8 weeks for virtual visit    Phone call duration:35 minutes        Marion SANCHES RN CNP, FNP  [facility]  [facility] [place]

## 2020-07-16 ENCOUNTER — TELEPHONE (OUTPATIENT)
Dept: PALLIATIVE MEDICINE | Facility: CLINIC | Age: 85
End: 2020-07-16

## 2020-07-16 NOTE — TELEPHONE ENCOUNTER
Pt scheduled 6/30 and pre-screened. Daughter will be accompanying pt, both instructed to wear mask. Daughter is okay with waiting outside once she brings pt up to clinic.    Liliya MATTHEW    [facility]

## 2020-07-26 ENCOUNTER — E-VISIT (OUTPATIENT)
Dept: INTERNAL MEDICINE | Facility: CLINIC | Age: 85
End: 2020-07-26
Payer: COMMERCIAL

## 2020-07-26 DIAGNOSIS — F33.1 MAJOR DEPRESSIVE DISORDER, RECURRENT EPISODE, MODERATE WITH ANXIOUS DISTRESS (H): Primary | ICD-10-CM

## 2020-07-26 PROCEDURE — 99421 OL DIG E/M SVC 5-10 MIN: CPT | Performed by: INTERNAL MEDICINE

## 2020-07-26 ASSESSMENT — ANXIETY QUESTIONNAIRES
7. FEELING AFRAID AS IF SOMETHING AWFUL MIGHT HAPPEN: SEVERAL DAYS
2. NOT BEING ABLE TO STOP OR CONTROL WORRYING: NOT AT ALL
6. BECOMING EASILY ANNOYED OR IRRITABLE: MORE THAN HALF THE DAYS
3. WORRYING TOO MUCH ABOUT DIFFERENT THINGS: NOT AT ALL
GAD7 TOTAL SCORE: 4
GAD7 TOTAL SCORE: 4
1. FEELING NERVOUS, ANXIOUS, OR ON EDGE: SEVERAL DAYS
5. BEING SO RESTLESS THAT IT IS HARD TO SIT STILL: NOT AT ALL
4. TROUBLE RELAXING: NOT AT ALL
GAD7 TOTAL SCORE: 4
7. FEELING AFRAID AS IF SOMETHING AWFUL MIGHT HAPPEN: SEVERAL DAYS

## 2020-07-26 ASSESSMENT — PATIENT HEALTH QUESTIONNAIRE - PHQ9
SUM OF ALL RESPONSES TO PHQ QUESTIONS 1-9: 18
SUM OF ALL RESPONSES TO PHQ QUESTIONS 1-9: 18
10. IF YOU CHECKED OFF ANY PROBLEMS, HOW DIFFICULT HAVE THESE PROBLEMS MADE IT FOR YOU TO DO YOUR WORK, TAKE CARE OF THINGS AT HOME, OR GET ALONG WITH OTHER PEOPLE: EXTREMELY DIFFICULT

## 2020-07-27 RX ORDER — NORTRIPTYLINE HCL 10 MG
10 CAPSULE ORAL AT BEDTIME
Qty: 60 CAPSULE | Refills: 0 | Status: SHIPPED | OUTPATIENT
Start: 2020-07-27 | End: 2021-03-23

## 2020-07-27 ASSESSMENT — PATIENT HEALTH QUESTIONNAIRE - PHQ9: SUM OF ALL RESPONSES TO PHQ QUESTIONS 1-9: 18

## 2020-07-27 ASSESSMENT — ANXIETY QUESTIONNAIRES: GAD7 TOTAL SCORE: 4

## 2020-07-30 ENCOUNTER — OFFICE VISIT (OUTPATIENT)
Dept: PALLIATIVE MEDICINE | Facility: CLINIC | Age: 85
End: 2020-07-30
Payer: COMMERCIAL

## 2020-07-30 VITALS — SYSTOLIC BLOOD PRESSURE: 133 MMHG | DIASTOLIC BLOOD PRESSURE: 77 MMHG | HEART RATE: 89 BPM

## 2020-07-30 DIAGNOSIS — M70.61 GREATER TROCHANTERIC BURSITIS OF RIGHT HIP: Primary | ICD-10-CM

## 2020-07-30 PROCEDURE — 20610 DRAIN/INJ JOINT/BURSA W/O US: CPT | Mod: RT | Performed by: PAIN MEDICINE

## 2020-07-30 ASSESSMENT — PAIN SCALES - GENERAL: PAINLEVEL: WORST PAIN (10)

## 2020-07-30 NOTE — PROGRESS NOTES
Pre procedure Diagnosis: Right hip pain, trochanteric bursitis   Post procedure Diagnosis: Same  Procedure performed: Right hip greater trochanteric bursa injection  Indication: Therapeutic for pain  Anesthesia: none  Complications: none  Operators: Jamaal Eid MD      Indications:    Patient was sent for treatment of chronic pain. The patient has a history of chronic bilateral hip pain. Exam shows tenderness to palpation at the Right greater trochanter. Other conservative treatments prior to injection include physical therapy, medications, and prior injections.     Options/alternatives, benefits and risks were discussed with the patient including bleeding, infection, tissue trauma, exposure to radiation, reaction to medications including seizure, nerve injury, weakness, and numbness. Questions were answered to their satisfaction and they agreed to proceed. Voluntary informed consent was obtained and signed.      Vitals were reviewed: Yes  There were no vitals taken for this visit.  Allergies were reviewed: Yes   Medications were reviewed: Yes   Pre-procedure pain score: 8/10     Procedure:  After obtaining signed informed consent, the patient was positioned in a left  lateral decubitus position.  A Pause for the Cause was performed.      After identifying the point of maximal tenderness at the right greater trochanter, the area was prepped in the usual sterile fashion. Then, a 27 gauge 3.5 inch spinal needle was advanced to contact bone at the right greater trochanter with positive pain reproduction and withdrawn 1-2 mm. Aspiration was negative. Then, 5 ml of a combination of [id number] mg and Bupivicaine 0.5% 4 ml was injected into the bursa and the needle was withdrawn. The injection site was cleaned and a bandaid was placed.     The patient tolerated the procedure well, and was taken to the recovery room.      Post-procedure pain score: 4/10  Follow-up includes:   -f/u phone call in one week  -f/u with PCP  and in the pain clinic as scheduled     Jamaal Eid MD  [facility] [place]

## 2020-07-30 NOTE — PATIENT INSTRUCTIONS
[facility]   Post Procedure Instructions    Today you had:  trochanteric  bursa injection      Medications used:    bupivicaine   kenolog            It can take up to 14 days for the steroid to take full effect.    Monitor the injection sites for signs and symptoms of infection-fever, chills, redness, swelling, warmth, or drainage to areas.    You may have soreness at injection sites for up to 24 hours.    You may apply ice to the painful areas to help minimize the discomfort of the needle pokes.    Do not apply heat to sites for at least 12 hours.    You may use anti-inflammatory medications or Tylenol for pain control if necessary    Pain Clinic phone number during work hours Monday-Friday:  [phone]    After hours provider line: [phone]

## 2020-08-26 ENCOUNTER — MYC MEDICAL ADVICE (OUTPATIENT)
Dept: INTERNAL MEDICINE | Facility: CLINIC | Age: 85
End: 2020-08-26

## 2020-08-26 ENCOUNTER — VIRTUAL VISIT (OUTPATIENT)
Dept: PALLIATIVE MEDICINE | Facility: CLINIC | Age: 85
End: 2020-08-26
Payer: COMMERCIAL

## 2020-08-26 ENCOUNTER — TELEPHONE (OUTPATIENT)
Dept: PALLIATIVE MEDICINE | Facility: CLINIC | Age: 85
End: 2020-08-26

## 2020-08-26 DIAGNOSIS — M16.11 PRIMARY OSTEOARTHRITIS OF RIGHT HIP: ICD-10-CM

## 2020-08-26 DIAGNOSIS — M48.062 SPINAL STENOSIS OF LUMBAR REGION WITH NEUROGENIC CLAUDICATION: ICD-10-CM

## 2020-08-26 DIAGNOSIS — M53.3 SACROILIAC JOINT DYSFUNCTION OF RIGHT SIDE: ICD-10-CM

## 2020-08-26 DIAGNOSIS — M53.3 CHRONIC RIGHT SI JOINT PAIN: Primary | ICD-10-CM

## 2020-08-26 DIAGNOSIS — M51.369 DDD (DEGENERATIVE DISC DISEASE), LUMBAR: ICD-10-CM

## 2020-08-26 DIAGNOSIS — M70.61 GREATER TROCHANTERIC BURSITIS OF RIGHT HIP: ICD-10-CM

## 2020-08-26 DIAGNOSIS — G89.29 CHRONIC RIGHT SI JOINT PAIN: Primary | ICD-10-CM

## 2020-08-26 DIAGNOSIS — M25.551 PAIN, JOINT, HIP, RIGHT: ICD-10-CM

## 2020-08-26 PROCEDURE — 99214 OFFICE O/P EST MOD 30 MIN: CPT | Mod: 95 | Performed by: NURSE PRACTITIONER

## 2020-08-26 ASSESSMENT — PAIN SCALES - GENERAL: PAINLEVEL: WORST PAIN (10)

## 2020-08-26 NOTE — PROGRESS NOTES
"Teresa Lopez is a 85 year old female who is being evaluated via a billable telephone visit.      The patient has been notified of following:     \"This telephone visit will be conducted via a call between you and your physician/provider. We have found that certain health care needs can be provided without the need for a physical exam.  This service lets us provide the care you need with a short phone conversation.  If a prescription is necessary we can send it directly to your pharmacy.  If lab work is needed we can place an order for that and you can then stop by our lab to have the test done at a later time.    Telephone visits are billed at different rates depending on your insurance coverage. During this emergency period, for some insurers they may be billed the same as an in-person visit.  Please reach out to your insurance provider with any questions.    If during the course of the call the physician/provider feels a telephone visit is not appropriate, you will not be charged for this service.\"    Patient has given verbal consent for Telephone visit?  Yes    What phone number would you like to be contacted at? [phone]    How would you like to obtain your AVS? Tesfaye Menjivar MA        [facility]    8/26/2020     Teresa Lopez is a 84 year old female who is being evaluated via a billable telephone visit.        Chief complaint:  chronic low back pain and right leg pain      Interval history:  Teresa Lopez is a 85 year old female is known to me for .   Primary osteoarthritis    Chronic right hip pain   Chronic bilateral low back without sciatica   Spinal stenosis of lumbar region with neurogenic claudication   Piriformis syndrome of both sides   PMHx DJD, CVA, HTN, diabetes mellitus, and cataract   PSHx Hysterectomy      Recommendations/plan at the last visit on 7/15/2020 included:  1. Physical Therapy:  None at present  2. Clinical Health Psychologist: None needed " "at present  3. Diagnostic Studies:  None  4. Medication Management:    1. Continue Lidocaine patches   2. Continue medical cannabis. Re-certified by Dr Engle on 7/17/2019  3. Will consider starting Cymbalta at 20mg/day for depression and pain.   4. Trial over-the-counter Voltaren gel per instructions on package.   5. Further procedures recommended: right greater trochanteric bursal injection  6. Recommendations to PCP: none  7. Follow up: 6-8 weeks for virtual visit        Since her last visit, Teresa Lopez reports:  Interval history 8/26/2020  -She states she is not doing very well sometimes.   -she feels her right hip is \"very bad.\"   -She had a 7/30/2020 right greater trochanteric bursal injection, her pain came down from an 8/10 to a 2/10 for her right GT.  -still has some trouble standing due to a spot that is painful, she is describing a spot of pain over the SI joint. This tends to hurt when she stands to do things and feels better when she is sitting down.   -she accompanied today by her daughter Abdi on the phone today  -recertified for medical cannabis today  -she has a hammer toe and this has been really painful for her the past 2 days.       Interval history 7/15/2020  -she is not feeling well  -she sleeps with a pillow between her legs  -she notes she is having \"bad pain\" on the lateral hip on the right side. She has pain when she lays on her right side in bed. She has had a greater trochanteric bursal injection in June 2019 that was helpful.   -she is not eating well as she is isolated and feels depressed and feels shaky as she doesn't eat as regularly as she should.   -discussed using Voltaren gel, Cymbalta and greater trochanteric injection      Interval history 5/6/2020  -Teresa is having some more hip pain on the right side between the top of the hip and up and the tailbone to the hip  -SI joint injection on the right side was beneficial and this has made her pain more focused on the right hip. " "  -she has pain if she lays on her right side  -she is having more pain in the deep right groin. She notes when she walks this pain is markedly worse.         At this point, the patient's participation with our multidisciplinary team includes:  The patient has been compliant with the program  PT - patient is interested  Health Psych  None    Pain scores:  Pain intensity on average is 6-7 on a scale of 0-10.    Range is 2-10/10.   Pain right now is 2/10   Pain is described as \"unbearable, tiring, exhausting, miserable, nagging, and gnawing.\"  Pain is continuous in nature and worse in the morning.    Current pain-related medication treatments include:  -Lidocaine patch (helpful)  -Medical cannabis (helpful)       Other pertinent medications:  -Senna-docusate PRN      Previous medication treatments included:  OPIATES:hydrocodone (not helpful), oxycodone (somewhat helpful)  NSAIDS: ibuprofen (not helpful), Aleve (not helpful)  MUSCLE RELAXANTS: none  ANTI-MIGRAINE MEDS: none  ANTI-DEPRESSANTS: none  SLEEP AIDS: none  ANTI-CONVULSANTS: gabapentin (unsure if helpful, side effects: hot flashes, fogginess)  TOPICALS: none  Other meds: Tylenol (not helpful)    Injections:  -11/10/2017 Caudal epidural steroid injection with Dr. Reymundo Bajwa (helpful for a very short period of time)  -1/5/2018 Ultrasound guided right intra-articular hip injection with Dr. Jean Meade  -1/12/2018 Right ankle injection with Dr. Lewis of podiatry.   -7/3/2018 L2-3 interlaminar epidural steroid injection with Dr Jamaal Eid (not at all helpful)  -8/3/2018 right hip steroid injection with Dr. Valverde (helped some)  -6/14/2019 bilateral hip greater trochanteric bursa injection with Dr. Jamaal Eid (helpful)   -1/23/2020 right SI joint injection with Dr. Chasidy Laura (quite helpful)  5/13/2020 right hip joint steroid injection with Dr. Valverde (quite helpful)  -7/30/2020 right hip greater trochanteric bursal injection with Dr. Hinton " Stanton (quite helpful)    Side Effects: no side effect  Patient is using the medication as prescribed: YES    Medications:  Current Outpatient Medications   Medication Sig Dispense Refill     Acetaminophen (TYLENOL ARTHRITIS PAIN PO) As needed not taking daily       ADVIL 200 MG PO CAPS Reported on 4/20/2017       Aspirin-Acetaminophen-Caffeine (EXCEDRIN PO)        CALCIUM 1200 OR Reported on 4/20/2017       Glucosamine-Chondroit-Vit C-Mn (GLUCOSAMINE 1500 COMPLEX PO) Take 1,500 mg by mouth daily Reported on 4/20/2017       lidocaine (LIDODERM) 5 % patch Place 3 patches onto the skin every 24 hours 90 patch 11     Loratadine (CLARITIN) 10 MG capsule Take 10 mg by mouth as needed Reported on 4/20/2017       losartan-hydrochlorothiazide (HYZAAR) 100-25 MG tablet Take 1 tablet by mouth daily 90 tablet 3     medical cannabis (Patient's own supply.  Not a prescription) See Admin Instructions (This is NOT a prescription, and does not certify that the patient has a qualifying medical condition for medical cannabis.  The purpose of this order is  to document that the patient reports taking medical cannabis.)   Taking 6-9 red capsules daily       MULTI-VITAMIN OR TABS 1 TABLET DAILY       nortriptyline (PAMELOR) 10 MG capsule Take 1 capsule (10 mg) by mouth At Bedtime 60 capsule 0     predniSONE (DELTASONE) 2.5 MG tablet Take 1 tablet (2.5 mg) by mouth daily May repeat once daily as needed 100 tablet 1     senna-docusate (SENOKOT-S;PERICOLACE) 8.6-50 MG per tablet Take 1 tablet by mouth 2 times daily 120 tablet 12     simvastatin (ZOCOR) 40 MG tablet Take 1 tablet (40 mg) by mouth At Bedtime 90 tablet 3       Medical History: any changes in medical history since they were last seen? No    Social History:  Home situation: . 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  Tobacco use: none  Alcohol use: very little, about 2 drinks per month  Drug use:  none  History of chemical dependency treatment: none    Is patient a current smoker or tobacco user?  no  If yes, was cessation counseling offered?  no        Review of Systems:   The 14 system ROS was reviewed with patient and is positive for:  Constitutional: fever/chills, fatigue, weight gain, weight loss, hot flashes (intermittent while on prednisone)  Eyes/Head: headache, dizziness  ENT: ringing in ears  Allergy/Immune: allergies  Skin: itching, rash, hives  Hematologic: easy bruising  Respiratory: cough, wheezing, shortness of breath  Cardiovascular: swelling in feet, fainting, palpitations, chest pain  GI: abdominal pain, nausea, vomiting, diarrhea, constipation  Endocrine: steroid use  Musculoskeletal:  joint pain, arthritis, stiffness, gout, back pain, neck pain  Urinary: frequency, urgency, incontinence, hesitancy  Neurologic: weakness, numbness/tingling, seizure, stroke, memory loss  Mental health: depression, anxiety, stress, suicidal ideation           Physical Exam:  Vital signs: There were no vitals taken for this visit.    Behavioral observations:  Awake and alert, cooperative  Pulm: respirations easy and unlabored. Able to speak in full sentences without SOB or cough noted.            Imaging  MRI LUMBAR SPINE WITHOUT CONTRAST   3/30/2018 1:22 PM      HISTORY:  Lumbar degenerative disc disease.     TECHNIQUE: Multiplanar multisequence MRI of the lumbar spine without  contrast.     COMPARISON: Lumbar spine x-ray 3/22/2018. Lumbar spine MR 3/17/2017.      FINDINGS: There are 5 lumbar-type vertebral bodies assumed for the  purposes of this dictation. The tip of the conus terminates at the  L1-L2 level.     There is convex right scoliotic curvature of the lumbar spine centered  at the L2-L3 level. There is mild retrolisthesis of L2 on L3 and mild  anterolisthesis of L3 on L4. There is grade 1 anterolisthesis of L4 on  L5. There is right lateral listhesis of L3 on L4. There is slight loss  of left-sided  vertebral body height at L3, probably due to  degenerative changes. Severe loss of intervertebral disc height seen  at L3-L4 with associated degenerative endplate changes. Severe loss of  disc height also seen on the left at L1-L2 and L2-L3 with severe  degenerative endplate changes. Mild loss of disc height at T11-12 and  T12-L1 with disc desiccation at L4-5 and L5-S1. There is mild STIR  hyperintense marrow edema at the opposing L2-L3 endplates.  Degenerative subchondral cysts seen anteriorly at L3.     Level by level as follows:      T12-L1:  Only seen on the lateral view, but there is a posterior disc  bulge and bilateral facet hypertrophy resulting in moderate right and  mild left neural foraminal narrowing. No significant spinal canal  narrowing.      L1-L2:  Convex right curvature with retrolisthesis and circumferential  disc bulge with endplate osteophytic spurring as well as bilateral  facet hypertrophy. Findings result in mild central spinal canal  narrowing and moderate bilateral neural foraminal narrowing.      L2-L3:  Convex right scoliotic curvature with left-sided disc bulge  and endplate osteophytic spurring as well as, left greater than right,  facet hypertrophy and ligamentum flavum infolding. Findings result in  moderate central spinal canal narrowing as well as moderate to severe  left neural foraminal narrowing. There is mild right neural foraminal  narrowing.      L3-L4:  Mild anterolisthesis along with circumferential disc bulge,  severe bilateral facet hypertrophy, and ligamentum flavum infolding.  Findings result in severe central spinal canal narrowing. There is  also severe left and moderate to severe right neural foraminal  narrowing.      L4-L5:  Anterolisthesis with uncovering of the disc and small  posterior disc bulge along with severe bilateral facet hypertrophy and  ligamentum flavum infolding. Findings result in moderate to severe  central spinal canal narrowing. There is also  moderate to severe  bilateral neural foraminal narrowing. Small bilateral facet joint  effusions.      L5-S1:  Mild posterior disc bulge and marked bilateral facet  hypertrophy results in moderate bilateral neural foraminal narrowing,  right greater than left. No significant central spinal canal  narrowing.      Paraspinous soft tissues:  Normal.          IMPRESSION:    1. Severe multilevel degenerative changes throughout the lumbar spine  as described above. Overall, there is no significant change since  previous MR 3/17/2017.  2. Severe central spinal canal narrowing at L3-L4 and moderate to  severe spinal canal narrowing at L4-L5.  3. Convex right scoliotic curvature of the spine with multiple levels  of spondylolisthesis and right lateral listhesis of L3 on L4.     DIANA HERNANDEZ MD    [place] Prescription Monitoring Program:  [place]ed [place]  8/26/2020- no concerning fills. One script for Norco 5/325mg from Dr. Tej Engle on 5/19/2020   Marion SANCHES RN CNP, P  [facility]      Assessment:   1. Chronic right SI joint pain  2. Sacroiliac joint dysfunction of right side  3. Greater trochanteric bursitis of right hip  4. Right hip joint pain  5. Right hip osteoarthritis  6. DDD (degenerative disc disease), lumbar  7. Spinal stenosis of lumbar region with neurogenic claudication      Plan:   1. Physical Therapy:  None at present  2. Clinical Health Psychologist: None needed at present  3. Diagnostic Studies:  None  4. Medication Management:    1. Continue Lidocaine patches   2. Continue medical cannabis. Re-certified today (8/26/2020)   5. Further procedures recommended: right SI joint injection, our office to contact patient   6. Recommendations to PCP: none  7. Follow up: 8-10 weeks telephone        Phone call duration: 32 minutes    Marion SANCHES RN CNP, FNP  [facility]

## 2020-08-26 NOTE — TELEPHONE ENCOUNTER
"Screening Questions for Radiology Injections:    Injection to be done at which interventional clinic site? [facility] [place]    Instruct patient to arrive as directed prior to the scheduled appointment time:    [place] minutes before      [place]: 30 minutes before; if IV needed 1 hour before     Dr. Urena-no IV needed for Cervical DELFINA; please instruct to arrive 30\" early    Procedure ordered by [facility]    Procedure ordered? repeat right SI joint injection    As a reminder, receiving steroids can decrease your body's ability to fight infection.   Would you still like to move forward with scheduling the injection?  Yes      Transforaminal Cervical DELFINA - no pain provider currently performing    What insurance would patient like us to bill for this procedure? [facility]      Worker's comp or MVA (motor vehicle accident) -Any injection DO NOT SCHEDULE and route to Stacy Alban.      [employer] insurance - For SI joint injections, DO NOT SCHEDULE and route Stacy Alban.       [employer] - Any injection besides hip/shoulder/knee joint DO NOT SCHEDULE and route to Stacy Alban. She will obtain PA and call pt back to schedule procedure or notify pt of denial.       [employer]-Route to Stacy for review      **BCBS- ALL need to be routed to [place] for review if a PA is needed**      IF SCHEDULING IN [place] AND NEEDS A PA, IT IS OKAY TO SCHEDULE. [place] HANDLES THEIR OWN PA'S AFTER THE PATIENT IS SCHEDULED. PLEASE SCHEDULE AT LEAST 1 WEEK OUT SO A PA CAN BE OBTAINED.    Any chance of pregnancy? NO   If YES, do NOT schedule and route to RN pool    Is an  needed? No     Patient has a drive home? (mandatory) YES: Informed    Is patient taking any blood thinners (i.e. plavix, coumadin, jantoven, warfarin, heparin, pradaxa or dabigatran, etc)? No   If hold needed, do NOT schedule, route to RN pool     Is patient taking any aspirin products (includes Excedrin and Fiorinal)? No     If more " than 325mg/day, OK to schedule; Instruct pt to decrease to less than 325 mg for 7 days AND route to RN pool    For CERVICAL procedures, hold all aspirin products for 6 days.     Tell pt that if aspirin product is not held for 6 days, the procedure WILL BE cancelled.      Does the patient have a bleeding or clotting disorder? No     If YES, okay to schedule AND route to RN nurse pool    For any patients with platelet count <100, must be forwarded to provider    Is patient diabetic?  No  If YES, instruct them to bring their glucometer.    Does patient have an active infection or treated for one within the past week? No     Is patient currently taking any antibiotics?  No     For patients on chronic, preventative, or prophylactic antibiotics, procedures may be scheduled.     For patients on antibiotics for active or recent infection:antibiotic course must have been completed for 4 days    Is patient currently taking any steroid medications? (i.e. Prednisone, Medrol)  No     For patients on steroid medications, course must have been completed for 4 days    Is patient actively being treated for cancer or immunocompromised? No  If YES, do NOT schedule and route to RN pool     Are you able to get on and off an exam table with minimal or no assistance? Yes  If NO, do NOT schedule and route to RN pool    Are you able to roll over and lay on your stomach with minimal or no assistance? Yes  If NO, do NOT schedule and route to RN pool     Any allergies to contrast dye, iodine, shellfish, or numbing and steroid medications? No  If YES, route to RN pool AND add allergy information to appointment notes    Allergies: Citalopram; Gabapentin; Lexapro [escitalopram]; Salsalate; and Sulfa drugs      Has the patient had a flu shot or any other vaccinations within 7 days before or after the procedure.  No     Does patient have an MRI/CT?  Not Applicable  Check Procedure Scheduling Grid to see if required.      Was the MRI done within the  last 3 years?  NA    If yes, where was the MRI done i.e.[facility], [facility], [facility], [facility] etc?       If no, do not schedule and route to RN pool    If MRI was not done at [facility], [facility] or [facility] do NOT schedule and route to RN pool.      If pt has an imaging disc, the injection MAY be scheduled but pt has to bring disc to appt.     If they show up without the disc the injection cannot be done    Procedure Specific Instructions:      If celiac plexus block, informed patient NPO for 6 hours and that it is okay to take medications with sips of water, especially blood pressure medications  Not Applicable         If this is for a cervical procedure, informed patient that aspirin needs to be held for 6 days.   Not Applicable      If IV needed:    Do not schedule procedures requiring IV placement in the first appointment of the day or first appointment after lunch. Do NOT schedule at 0745, 0815 or 1245.     Instructed pt to arrive 30 minutes early for IV start if required. (Check Procedure Scheduling Grid)  Not Applicable    Reminders:      If you are started on any steroids or antibiotics between now and your appointment, you must contact us because the procedure may need to be cancelled.  Yes      For all procedures except radiofrequency ablations (RFAs) and spinal cord stimulator (SCS) trials, informed patient:    IV sedation is not provided for this procedure.  If you feel that an oral anti-anxiety medication is needed, you can discuss this further with your referring provider or primary care provider.  The Pain Clinic provider will discuss specifics of what the procedure includes at your appointment.  Most procedures last 10-20 minutes.  We use numbing medications to help with any discomfort during the procedure.  Not Applicable      For patients 85 or older we recommend having an adult stay w/ them for the remainder of the day.       Does the patient have any questions?  NO  Liliya MCCONNELL  Guru  [facility]

## 2020-08-31 ENCOUNTER — VIRTUAL VISIT (OUTPATIENT)
Dept: BEHAVIORAL HEALTH | Facility: CLINIC | Age: 85
End: 2020-08-31
Attending: INTERNAL MEDICINE
Payer: COMMERCIAL

## 2020-08-31 DIAGNOSIS — F32.1 MODERATE MAJOR DEPRESSION (H): ICD-10-CM

## 2020-08-31 DIAGNOSIS — F41.0 PANIC DISORDER WITHOUT AGORAPHOBIA: Primary | ICD-10-CM

## 2020-08-31 DIAGNOSIS — G89.4 CHRONIC PAIN DISORDER: ICD-10-CM

## 2020-08-31 PROCEDURE — 90791 PSYCH DIAGNOSTIC EVALUATION: CPT | Mod: 95 | Performed by: SOCIAL WORKER

## 2020-08-31 ASSESSMENT — COLUMBIA-SUICIDE SEVERITY RATING SCALE - C-SSRS
1. IN THE PAST MONTH, HAVE YOU WISHED YOU WERE DEAD OR WISHED YOU COULD GO TO SLEEP AND NOT WAKE UP?: YES
1. IN THE PAST MONTH, HAVE YOU WISHED YOU WERE DEAD OR WISHED YOU COULD GO TO SLEEP AND NOT WAKE UP?: YES
TOTAL  NUMBER OF INTERRUPTED ATTEMPTS LIFETIME: NO
TOTAL  NUMBER OF INTERRUPTED ATTEMPTS PAST 3 MONTHS: NO
2. HAVE YOU ACTUALLY HAD ANY THOUGHTS OF KILLING YOURSELF LIFETIME?: NO
5. HAVE YOU STARTED TO WORK OUT OR WORKED OUT THE DETAILS OF HOW TO KILL YOURSELF? DO YOU INTEND TO CARRY OUT THIS PLAN?: NO
ATTEMPT LIFETIME: NO
6. HAVE YOU EVER DONE ANYTHING, STARTED TO DO ANYTHING, OR PREPARED TO DO ANYTHING TO END YOUR LIFE?: NO
2. HAVE YOU ACTUALLY HAD ANY THOUGHTS OF KILLING YOURSELF?: NO
5. HAVE YOU STARTED TO WORK OUT OR WORKED OUT THE DETAILS OF HOW TO KILL YOURSELF? DO YOU INTEND TO CARRY OUT THIS PLAN?: NO
TOTAL  NUMBER OF ABORTED OR SELF INTERRUPTED ATTEMPTS PAST LIFETIME: NO
4. HAVE YOU HAD THESE THOUGHTS AND HAD SOME INTENTION OF ACTING ON THEM?: NO
3. HAVE YOU BEEN THINKING ABOUT HOW YOU MIGHT KILL YOURSELF?: NO
ATTEMPT PAST THREE MONTHS: NO
6. HAVE YOU EVER DONE ANYTHING, STARTED TO DO ANYTHING, OR PREPARED TO DO ANYTHING TO END YOUR LIFE?: NO
4. HAVE YOU HAD THESE THOUGHTS AND HAD SOME INTENTION OF ACTING ON THEM?: NO
TOTAL  NUMBER OF ABORTED OR SELF INTERRUPTED ATTEMPTS PAST 3 MONTHS: NO

## 2020-08-31 NOTE — TELEPHONE ENCOUNTER
Requested Prescriptions   Pending Prescriptions Disp Refills     predniSONE (DELTASONE) 2.5 MG tablet 100 tablet 1     Sig: Take 1 tablet (2.5 mg) by mouth daily May repeat once daily as needed   Last Written Prescription Date:  6-24-20  Last Fill Quantity: 100,  # refills: 1   Last office visit: 5/19/2020 with prescribing provider:  5-19-20   Future Office Visit:            There is no refill protocol information for this order

## 2020-08-31 NOTE — PROGRESS NOTES
"[facility]    PATIENT'S NAME: Teresa Lopez  PREFERRED NAME: Jefferson  PREFERRED PRONOUNS:       MRN:   [id number]  :   1935   ACCT. NUMBER: [id number]  DATE OF SERVICE: 20  START TIME: 12pm  END TIME: 12:45pm  Service Modality:  Video Visit:    Telemedicine Visit: The patient's condition can be safely assessed and treated via synchronous audio and visual telemedicine encounter.      Reason for Telemedicine Visit: Services only offered telehealth    Originating Site (Patient Location): Patient's home    Distant Site (Provider Location): [facility] Clinics: [facility]    Consent:  The patient/guardian has verbally consented to: the potential risks and benefits of telemedicine (video visit) versus in person care; bill my insurance or make self-payment for services provided; and responsibility for payment of non-covered services.     Patient would like the video invitation sent by: [employer]    Mode of Communication:  Video Conference via PDV    As the provider I attest to compliance with applicable laws and regulations related to telemedicine.    STANDARD ADULT DIAGNOSTIC ASSESSMENT      Identifying Information:  Patient is a 85 year old, .  The pronoun use throughout this assessment reflects the patient's chosen pronoun.  Patient was referred for an assessment by primary care provider.  Patient attended the session with her daughter, Haylee.     Chief Complaint:   The reason for seeking services at this time is: \" anxiety and panic symptoms \". Client reports she has a lot of health problems and feels lonely a lot of the time. She reports when she has \"health episodes\" she will feel very afraid, her heartbeat gets fast, her veins pop out, and she will feel some heart palpitations. Client's daughter reports the client will call one of her daughters and tell them to come be with her because she fears it will be her last day alive (she is fearful she will die from the health problem).  " The problem(s) began about 2 or so years ago. Patient has not attempted to resolve these concerns in the past.    Social/Family History:  The patient describes their cultural background as .  Cultural influences and impact on patient's life structure, values, norms, and healthcare: These factors will be addressed in the Preliminary Treatment plan.  Patient identified their preferred language to be English. Patient reported they does not need the assistance of an  or other support involved in therapy.     Patient reported had no significant delays in developmental tasks.  Patient identified the following learning problems: none reported.  Modifications will not be used to assist communication in therapy.  Patient reports they are  able to understand written materials.    Patient reported the following relationship history .  Patient identified their sexual orientation as heterosexual.  Patient reported having two child(daniel). Patient identified her adult children as part of their support system.  Patient identified the quality of these relationships as good.      Patient's current living/housing situation involves staying in own home/apartment.  They live alone in a [place]+ housing community. She reports a lot of the social activities have been canceled due to covid-19. They report that housing is stable.     Patient is currently retired.  Patient reports their finances are obtained through prison.  Patient does not identify finances as a current stressor.      Patient reported that they have not been involved with the legal system.  Patient denies being on probation / parole / under the jurisdiction of the court.        Patient's Strengths and Limitations:  Patient identified the following strengths or resources that will help them succeed in treatment: commitment to health and well being, exercise routine, family support and motivation. Things that may interfere with the patient's success  in treatment include: less socialization than client is used to; less exercise than client is used to.   _______________________________________________  Personal and Family Medical History:   Patient did report a family history of mental health concerns.  Patient reports family history includes Arthritis in her mother; Cancer in her brother; Cataracts in her maternal grandmother and mother; Diabetes in her brother; Unknown/Adopted in her father..     Patient reported the following previous diagnoses which include(s): none reported.  Patient reported symptoms began 2 or so years ago.   Patient has not received mental health services in the past: none.  Psychiatric Hospitalizations: None.  Patient denies a history of civil commitment.  Currently, patient is not receiving other mental health services.  These include none.   Patient has had a physical exam to rule out medical causes for current symptoms.  Date of last physical exam was within the past year. Client was encouraged to follow up with PCP if symptoms were to develop. The patient has a [facility] Primary Care Provider, who is named Tej Engle.  Patient reports the following current medical concerns: arthritis and some chronic pain.  There are not significant appetite / nutritional concerns / weight changes.   Patient does not report a history of head injury / trauma / cognitive impairment.     Patient reports current meds as:   No outpatient medications have been marked as taking for the 8/31/20 encounter (Appointment) with Anna Davis.       Medication Adherence:  Patient reports taking prescribed medications as prescribed.    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         Current Mental Status Exam:   Appearance:  Appropriate    Eye Contact:  Good   Psychomotor:  Normal       Gait / station:  no problem  Attitude / Demeanor: Cooperative   Speech      Rate / Production: Normal/ Responsive      Volume:  Normal  volume      Language:  intact  Mood:   Normal  Affect:   Appropriate    Thought Content: Clear   Thought Process: Coherent       Associations: No loosening of associations  Insight:   Fair   Judgment:  Intact   Orientation:  All  Attention/concentration: Good    Rating Scales:    PHQ9:    PHQ-9 SCORE 6/5/2019 7/26/2020 8/26/2020   PHQ-9 Total Score - - -   PHQ-9 Total Score MyChart - 18 (Moderately severe depression) 12 (Moderate depression)   PHQ-9 Total Score 3 18 12   ;    GAD7:    GOLDEN-7 SCORE 11/12/2019 7/26/2020 8/26/2020   Total Score - 4 (minimal anxiety) 5 (mild anxiety)   Total Score 1 4 5     CGI:     First:Considering your total clinical experience with this particular patient population, how severe are the patient's symptoms at this time?: 4 (8/31/2020  1:00 PM)  ;    Most recentCompared to the patient's condition at the START of treatment, this patient's condition is: 4 (8/31/2020  1:00 PM)      Substance Use:  Patient reported no family history of chemical health issues.  Patient has not received chemical dependency treatment in the past.  Patient has not ever been to detox.  Patient is not currently receiving any chemical dependency treatment.     Patient reported no current substance use.    CAGE- AID:  No flowsheet data found.     Patient reported the following problems as a result of their substance use: none.  Patient is not concerned about substance use.     Significant Losses / Trauma / Abuse /  Neglect Issues:   Patient did not serve in the .  There are indications or report of significant loss, trauma, abuse or neglect issues related to: client growing older and living alone has been a difficult transition.  Concerns for possible neglect are not present.    Safety Assessment:   Current Safety Concerns:  [place] Suicide Severity Rating Scale (Lifetime/Recent)  [place] Suicide Severity Rating (Lifetime/Recent) 8/31/2020   1. Wish to be Dead (Lifetime) Yes   Wish to be Dead Description (Lifetime) stop feeling panic   1. Wish to be Dead (Recent) Yes   Wish to be Dead Description (Recent) stop feeling panic   2. Non-Specific Active Suicidal Thoughts (Lifetime) No   2. Non-Specific Active Suicidal Thoughts (Recent) No   3. Active Suicidal Ideation with any Methods (Not Plan) Without Intent to Act (Lifetime) No   3. Active Sucidal Ideation with any Methods (Not Plan) Without Intent to Act (Recent) No   4. Active Suicidal Ideation with Some Intent to Act, Without Specific Plan (Lifetime) No   4. Active Suicidal Ideation with Some Intent to Act, Without Specific Plan (Recent) No   5. Active Suicidal Ideation with Specific Plan and Intent (Lifetime) No   5. Active Suicidal Ideation with Specific Plan and Intent (Recent) No   Actual Attempt (Lifetime) No   Actual Attempt (Past 3 Months) No   Has subject engaged in non-suicidal self-injurious behavior? (Lifetime) No   Has subject engaged in non-suicidal self-injurious behavior? (Past 3 Months) No   Interrupted Attempts (Lifetime) No   Interrupted Attempts (Past 3 Months) No   Aborted or Self-Interrupted Attempt (Lifetime) No   Aborted or Self-Interrupted Attempt (Past 3 Months) No   Preparatory Acts or Behavior (Lifetime) No   Preparatory Acts or Behavior (Past 3 Months) No     Patient denies current homicidal ideation and behaviors.  Patient denies current self-injurious ideation and behaviors.    Patient denied risk behaviors associated with substance  use.  Patient denies any high risk behaviors associated with mental health symptoms.  Patient reports the following current concerns for their personal safety: None.  Patient reports there are not firearms in the house.     History of Safety Concerns:  Patient denied a history of homicidal ideation.     Patient denied a history of personal safety concerns.    Patient denied a history of assaultive behaviors.    Patient denied a history of sexual assault behaviors.     Patient denied a history of risk behaviors associated with substance use.  Patient denies any history of high risk behaviors associated with mental health symptoms.  Patient reports the following protective factors: forward/future oriented thinking, dedication to family/friends and committment to well-being    Risk Plan:  See Preliminary Treatment Plan for Safety and Risk Management Plan    Review of Symptoms per patient report:  Depression: Change in sleep, Lack of interest, Excessive or inappropriate guilt, Change in energy level, Change in appetite, Feelings of helplessness and Low self-worth  Daniela:  No Symptoms  Psychosis: No Symptoms  Anxiety: Excessive worry, Nervousness, Physical complaints, such as headaches, stomachaches, muscle tension and Sleep disturbance  Panic:  Palpitations, Shortness of breath, Sense of impending doom and Hot or cold flashes  Post Traumatic Stress Disorder:  No Symptoms   Eating Disorder: No Symptoms  ADD / ADHD:  No symptoms  Conduct Disorder: No symptoms  Autism Spectrum Disorder: No symptoms  Obsessive Compulsive Disorder: No Symptoms    Patient reports the following compulsive behaviors and treatment history: none.      Diagnostic Criteria:   1. Recurrent unexpected panic attacks and meets criteria 2, 3, and 4 (below)  2. At least one of the attacks has been followed by 1 month (or more) of one (or more) of the following:     (c) a significant change in behavior related to the attacks  3. Absence of agoraphobia  4.  The panic attacks are not to the the direct physiological effects of a substance or general medical condition  5. The panic attacks are not better accounted for by another mental disorder, such as social phobia, specific phobia, OCD, PTSD, or separation anxiety disorder  A) Recurrent episode(s) - symptoms have been present during the same 2-week period and represent a change from previous functioning 5 or more symptoms (required for diagnosis)   - Diminished interest or pleasure in all, or almost all, activities.    - Significant weight loss when not dieting decrease in appetite.    - Decreased sleep.    - Fatigue or loss of energy.    - Feelings of worthlessness or inappropriate and excessive guilt.    - Recurrent thoughts of death (not just fear of dying), recurrent suicidal ideation without a specific plan, or a suicide attempt or a specific plan for committing suicide.   B) The symptoms cause clinically significant distress or impairment in social, occupational, or other important areas of functioning  C) The episode is not attributable to the physiological effects of a substance or to another medical condition  D) The occurence of major depressive episode is not better explained by other thought / psychotic disorders  E) There has never been a manic episode or hypomanic episode    Functional Status:  Patient reports the following functional impairments: health maintenance, relationship(s) and self-care.     WHODAS: No flowsheet data found.    Clinical Summary:  1. Reason for assessment: panic/anxiety symptoms  .  2. Psychosocial, Cultural and Contextual Factors: living alone in 55+ housing in which social activities have all been canceled.  3. Principal DSM5 Diagnoses  (Sustained by DSM5 Criteria Listed Above):   296.32 (F33.1) Major Depressive Disorder, Recurrent Episode, Moderate With anxious distress  300.01 (F41.0) Panic Disorder.  4. Other Diagnoses that is relevant to services:   none  5. Provisional  Diagnosis:  296.32 (F33.1) Major Depressive Disorder, Recurrent Episode, Moderate With anxious distress  300.01 (F41.0) Panic Disorder as evidenced by client's description of having experienced multiple episodes of symptoms that are consistent with symptoms of panic attacks.  6. Prognosis: Expect Improvement.  7. Likely consequences of symptoms if not treated: likely panic attacks will continue and potentially occur more frequently or with greater intensity.  8. Client strengths include:  caring, motivated, open to learning, open to suggestions / feedback and support of family, friends and providers .     Recommendations:     1. Plan for Safety and Risk Management:Recommended that patient call 911 or go to the local ED should there be a change in any of these risk factors..  Report to child / adult protection services was NA.     2. Patient's identified none identified.     3. Initial Treatment will focus on: panic attacks.     4. Resources/Service Plan:       services are not indicated.     Modifications to assist communication are not indicated.     Additional disability accommodations are not indicated.      5. Collaboration:  Collaboration / coordination of treatment will be initiated with the following support professionals: primary care physician.      6.  Referrals:  The following referral(s) will be initiated: none . Next Scheduled Appointment: next week.  A Release of Information has been obtained for the following: none.    7. EMY: EMY:  Discussed the general effects of drugs and alcohol on health and well-being. Provider gave patient printed information about the effects of chemical use on their health and well being. Recommendations:  none.     8. Records were reviewed at time of assessment.  Information in this assessment was obtained from the medical record and provided by patient and family who is a fair historian.   Patient will have open access to their mental health medical  record.      Eval type:  Mental Health    Staff Name/Credentials:  ALISON Warner, [facility]  August 31, 2020

## 2020-09-01 RX ORDER — PREDNISONE 2.5 MG/1
2.5 TABLET ORAL DAILY
Qty: 100 TABLET | Refills: 1 | Status: SHIPPED | OUTPATIENT
Start: 2020-09-01 | End: 2020-11-09

## 2020-09-08 ENCOUNTER — VIRTUAL VISIT (OUTPATIENT)
Dept: BEHAVIORAL HEALTH | Facility: CLINIC | Age: 85
End: 2020-09-08
Attending: INTERNAL MEDICINE
Payer: COMMERCIAL

## 2020-09-08 DIAGNOSIS — F32.1 MODERATE MAJOR DEPRESSION (H): ICD-10-CM

## 2020-09-08 DIAGNOSIS — F41.0 PANIC DISORDER WITHOUT AGORAPHOBIA: Primary | ICD-10-CM

## 2020-09-08 PROCEDURE — 90834 PSYTX W PT 45 MINUTES: CPT | Mod: 95 | Performed by: SOCIAL WORKER

## 2020-09-08 NOTE — PROGRESS NOTES
Progress Note    Patient Name: Teresa Lopez  Date: 9/8/2020         Service Type: Individual      Session Start Time: 4pm  Session End Time: 4:43pm     Session Length: 43 minutes    Session #: 2    Attendees: Client and and daughter, Haylee    Service Modality:  Video Visit:    Telemedicine Visit: The patient's condition can be safely assessed and treated via synchronous audio and visual telemedicine encounter.      Reason for Telemedicine Visit: Services only offered telehealth    Originating Site (Patient Location): Patient's home    Distant Site (Provider Location): [facility]    Consent:  The patient/guardian has verbally consented to: the potential risks and benefits of telemedicine (video visit) versus in person care; bill my insurance or make self-payment for services provided; and responsibility for payment of non-covered services.     Patient would like the video invitation sent by: [employer]    Mode of Communication:  Video Conference via [employer]    As the provider I attest to compliance with applicable laws and regulations related to telemedicine.     Treatment Plan Last Reviewed: 9/8/2020  PHQ-9 / GOLDEN-7 : See Epic updates    DATA  Interactive Complexity: No  Crisis: No       Progress Since Last Session (Related to Symptoms / Goals / Homework):   Symptoms: Stable- no panic attacks last week but moderate-high anxiety    Homework: Partially completed      Episode of Care Goals: Satisfactory progress - PREPARATION (Decided to change - considering how); Intervened by negotiating a change plan and determining options / strategies for behavior change, identifying triggers, exploring social supports, and working towards setting a date to begin behavior change     Current / Ongoing Stressors and Concerns:   Client reports she has been struggling with sleep and still feels anxious about her health. She didn't have any major panic but did report she  sometimes worries that if she falls asleep she won't wake up, even though she isn't she why she wouldn't wake up other than her age. Therapist and client reviewed sleep hygiene strategies for client to practice as well as cognitive strategies for challenging anxiety.     Treatment Objective(s) Addressed in This Session:   Client will identify stressors that contribute to anxiety.  Client will identify solutions to stressors that contribute to anxiety  Client will identify adaptive ways of coping with stressors that contribute to anxiety.  Client will use thought-stopping strategy daily to reduce intrusive thoughts.  Client will use relaxation strategies 2 times per day to reduce the physical symptoms of anxiety.   Client to identify symptoms/triggers to panic  Client to learn and practice using 3-4 distress tolerance/emotion regulation skills for managing panic     Intervention:   DBT: TIP skills; sleep hygiene strategies; taught progressive muscle relaxation        ASSESSMENT: Current Emotional / Mental Status (status of significant symptoms):   Risk status (Self / Other harm or suicidal ideation)   Patient denies current fears or concerns for personal safety.   Patient denies current or recent suicidal ideation or behaviors.   Patient denies current or recent homicidal ideation or behaviors.   Patient denies current or recent self injurious behavior or ideation.   Patient denies other safety concerns.   Patient reports there has been no change in risk factors since their last session.     Patient reports there has been no change in protective factors since their last session.     Recommended that patient call 911 or go to the local ED should there be a change in any of these risk factors.     Appearance:   Appropriate    Eye Contact:   Good    Psychomotor Behavior: Normal    Attitude:   Cooperative    Orientation:   All   Speech    Rate / Production: Normal     Volume:  Normal    Mood:    Anxious     Affect:    Appropriate    Thought Content:  Clear    Thought Form:  Coherent  Logical    Insight:    Good      Medication Review:   No changes to current psychiatric medication(s)     Medication Compliance:   NA     Changes in Health Issues:   None reported     Chemical Use Review:   Substance Use: Chemical use reviewed, no active concerns identified      Tobacco Use: No current tobacco use.      Diagnosis:  1. Panic disorder without agoraphobia    2. Moderate major depression (H)        Collateral Reports Completed:   Not Applicable    PLAN: (Patient Tasks / Therapist Tasks / Other)  1. Continue in individual therapy  2. Practice sleep hygiene and TIP skills        ALISON Warner, [facility]   9/8/2020                                                         ______________________________________________________________________    Treatment Plan    Patient's Name: Teresa Lopez  YOB: 1935    Date: 9/8/2020    DSM5 Diagnoses: 296.32 (F33.1) Major Depressive Disorder, Recurrent Episode, Moderate With anxious distress or 300.01 (F41.0) Panic Disorder  Psychosocial / Contextual Factors: living alone and more socially isolated due to coronavirus  WHODAS:     Referral / Collaboration:  Referral to another professional/service is not indicated at this time..    Anticipated number of session or this episode of care: 4-6      MeasurableTreatment Goal(s) related to diagnosis / functional impairment(s)  Goal 1: Client will experience decreased anxiety and panic symptoms symptoms evidenced by GAD7 score below 5 and report increased ability to manage anxiety.    Objective #A (Client Action)    Status: New - Date: 9/8/2020    Client will identify stressors that contribute to anxiety.  Client will identify solutions to stressors that contribute to anxiety  Client will identify adaptive ways of coping with stressors that contribute to anxiety.  Client will use thought-stopping strategy daily to reduce intrusive  thoughts.  Client will use relaxation strategies 2 times per day to reduce the physical symptoms of anxiety.   Client to identify symptoms/triggers to panic  Client to learn and practice using 3-4 distress tolerance/emotion regulation skills for managing panic     Intervention(s)  Therapist will teach CBT and DBT skills.        Patient has reviewed and agreed to the above plan.      ALISON Warner, LICSW  September 8, 2020

## 2020-09-11 ENCOUNTER — OFFICE VISIT (OUTPATIENT)
Dept: PODIATRY | Facility: CLINIC | Age: 85
End: 2020-09-11
Payer: COMMERCIAL

## 2020-09-11 VITALS
OXYGEN SATURATION: 96 % | HEIGHT: 61 IN | BODY MASS INDEX: 20.16 KG/M2 | SYSTOLIC BLOOD PRESSURE: 161 MMHG | DIASTOLIC BLOOD PRESSURE: 90 MMHG | HEART RATE: 95 BPM | WEIGHT: 106.8 LBS

## 2020-09-11 DIAGNOSIS — B35.3 TINEA PEDIS OF BOTH FEET: ICD-10-CM

## 2020-09-11 DIAGNOSIS — L08.9 INFECTION OF TOE: ICD-10-CM

## 2020-09-11 DIAGNOSIS — L84 TYLOMA: ICD-10-CM

## 2020-09-11 DIAGNOSIS — M20.42 HAMMER TOE OF LEFT FOOT: Primary | ICD-10-CM

## 2020-09-11 PROCEDURE — 99203 OFFICE O/P NEW LOW 30 MIN: CPT | Performed by: PODIATRIST

## 2020-09-11 RX ORDER — ECONAZOLE NITRATE 10 MG/G
CREAM TOPICAL DAILY
Qty: 85 G | Refills: 3 | Status: SHIPPED | OUTPATIENT
Start: 2020-09-11 | End: 2020-11-09 | Stop reason: ALTCHOICE

## 2020-09-11 RX ORDER — CEPHALEXIN 500 MG/1
500 CAPSULE ORAL 2 TIMES DAILY
Qty: 14 CAPSULE | Refills: 0 | Status: SHIPPED | OUTPATIENT
Start: 2020-09-11 | End: 2020-10-02

## 2020-09-11 ASSESSMENT — MIFFLIN-ST. JEOR: SCORE: 878.43

## 2020-09-11 NOTE — PATIENT INSTRUCTIONS
Thanks for coming today.  [facility]  [address] [place] [address]    To schedule future appointments in Ortho Clinic, you may call [phone].    To schedule ordered imaging by your provider:   Call [facility] [phone]    To schedule an injection ordered by your provider:  Call [facility] Injection scheduling line: [phone]  [facility] available online at:  [email or web address]    Please call if any further questions or concerns ([phone]).  Clinic hours 8 am to 5 pm.    Return to clinic (call) if symptoms worsen or fail to improve.

## 2020-09-11 NOTE — LETTER
9/11/2020         RE: Teresa Lopez  [address]  [facility] [address]        Dear Colleague,    Thank you for referring your patient, Teresa Lopez, to the [facility]. Please see a copy of my visit note below.    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     Patient Active Problem List   Diagnosis     History of CVA (cerebrovascular accident)     Jaw Pain     Arthritis of knee     CARDIOVASCULAR SCREENING; LDL GOAL LESS THAN 100     Hypertension goal BP (blood pressure) < 140/90     Advanced directives, counseling/discussion     Hyperlipidemia LDL goal <130     OA (OSTEOARTHRITIS) OF KNEE - right     CKD (chronic kidney disease) stage 3, GFR 30-59 ml/min (H)     Chronic pain disorder     DJD (degenerative joint disease), lumbosacral     Spinal stenosis of lumbar region with neurogenic claudication     Slow transit constipation     Mild cognitive impairment     Combined form of age-related cataract, mod, both eyes     Posterior vitreous detachment of both eyes     Arthritis, lumbar spine     Medical cannabis use     Moderate major depression (H)     Inflammatory spondylopathy of lumbar region (H)     Hammer toe of left foot     Loss of weight     Mild malnutrition (H)     Past Surgical History:   Procedure Laterality Date     HYSTERECTOMY, CERVIX STATUS UNKNOWN  age 31     Social History     Socioeconomic History     Marital status:      Spouse name: Not on file     Number of children: Not on file     Years of education: Not on file     Highest education level: Not on file   Occupational History     Not on file   Social Needs     Financial resource strain: Not on file     Food insecurity     Worry: Not on file     Inability: Not on file      Transportation needs     Medical: Not on file     Non-medical: Not on file   Tobacco Use     Smoking status: Never Smoker     Smokeless tobacco: Never Used   Substance and Sexual Activity     Alcohol use: Yes     Alcohol/week: 0.0 standard drinks     Comment: 0-1 drink weekly     Drug use: No     Sexual activity: Not Currently     Partners: Male   Lifestyle     Physical activity     Days per week: Not on file     Minutes per session: Not on file     Stress: Not on file   Relationships     Social connections     Talks on phone: Not on file     Gets together: Not on file     Attends Muslim service: Not on file     Active member of club or organization: Not on file     Attends meetings of clubs or organizations: Not on file     Relationship status: Not on file     Intimate partner violence     Fear of current or ex partner: Not on file     Emotionally abused: Not on file     Physically abused: Not on file     Forced sexual activity: Not on file   Other Topics Concern     Parent/sibling w/ CABG, MI or angioplasty before 65F 55M? 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 [employer]    History of polka, schottisch, other dancing    2 daughters in area     Family History   Problem Relation Age of Onset     Arthritis Mother      Cataracts Mother      Unknown/Adopted Father         adopted     Diabetes Brother      Cancer Brother      Cataracts Maternal Grandmother      Hypertension No family hx of      Cerebrovascular Disease No family hx of      Thyroid Disease No family hx of      Glaucoma No family hx of      Macular Degeneration No family hx of      Last Comprehensive Metabolic Panel:  Sodium   Date Value Ref Range Status   02/11/2020 137 133 - 144 mmol/L Final     Potassium   Date Value Ref Range Status   02/11/2020 3.8 3.4 - 5.3 mmol/L Final     Chloride   Date Value Ref  Range Status   02/11/2020 101 94 - 109 mmol/L Final     Carbon Dioxide   Date Value Ref Range Status   02/11/2020 28 20 - 32 mmol/L Final     Anion Gap   Date Value Ref Range Status   02/11/2020 8 3 - 14 mmol/L Final     Glucose   Date Value Ref Range Status   02/11/2020 102 (H) 70 - 99 mg/dL Final     Urea Nitrogen   Date Value Ref Range Status   02/11/2020 34 (H) 7 - 30 mg/dL Final     Creatinine   Date Value Ref Range Status   02/11/2020 0.99 0.52 - 1.04 mg/dL Final     GFR Estimate   Date Value Ref Range Status   02/11/2020 52 (L) >60 mL/min/[1.73_m2] Final     Comment:     Non  GFR Calc  Starting 12/18/2018, serum creatinine based estimated GFR (eGFR) will be   calculated using the Chronic Kidney Disease Epidemiology Collaboration   (CKD-EPI) equation.       Calcium   Date Value Ref Range Status   02/11/2020 10.1 8.5 - 10.1 mg/dL Final       SUBJECTIVE FINDINGS:  An 85-year-old female presents for left second toe that has been painful.  It has been going on for at least 3 months but the last weeks has been really painful to the point where she can't even put pressure on it.  Relates no injuries.  She has been doing icing.  Relates she has kind of been using a home-made padding.  She has a buttress pad taped to her feet.  Relates no injuries.  Relates it kind of throbs.  She is wearing sandals today.        OBJECTIVE FINDINGS:  DP and PT are 2/4 bilaterally.  She has dorsally contracted digits 2 through 5 bilaterally with a laterally deviated hallux bilaterally.  She has dry, scaly skin in a moccasin pattern bilaterally with thickened, dystrophic nails with subungual debris and discoloration, right hallux nail greater than rest.  She has a left second toe with erythema, edema and tenderness of the toe, and there is a medial hyperkeratotic tissue buildup that is nucleated with cracking.  There is no underlying open lesion.  There is pain on palpation of the tyloma.  She has interdigital cracking  on the left foot.  Decreased hair growth bilaterally.  Some cracking on the heels as well.        ASSESSMENT AND PLAN:  Infection, left second toe.  Tyloma, left second toe.  She has tinea pedis bilaterally and onychomycosis bilaterally.  Diagnosis and treatment options were discussed with the patient.  Tyloma on the left second toe was sharply debrided with a #15 blade upon consent.  I am going to have her clean this daily with MicroKlenz, apply Aquacel Ag between the toes.  These were dispensed and use discussed with her.  I discussed with her toe crest pad usage and buttress pad, but I do not want her to start using those until we get the infection cleared.  Prescription for econazole cream and Keflex given and use discussed with her.  She did relate to relief after debridement of the callus.  She will return to clinic and see me in about 1 week.  She is wearing an open-toed sandal.  I want her to keep pressure off this and keep wearing the sandal.  She does have hammertoes with hallux valgus deformity present.         Again, thank you for allowing me to participate in the care of your patient.        Sincerely,        Maulik Zavala DPM

## 2020-09-11 NOTE — NURSING NOTE
"Teresa Lopez's chief complaint for this visit includes:  Chief Complaint   Patient presents with     New Patient     pain/redness in left hammertoe/numbness     PCP: Tej Engle    Referring Provider:  No referring provider defined for this encounter.    BP (!) 161/90 (BP Location: Left arm, Patient Position: Sitting, Cuff Size: Adult Small)   Pulse 95   Ht 1.56 m (5' 1.42\")   Wt 48.4 kg (106 lb 12.8 oz)   SpO2 96%   BMI 19.91 kg/m    Data Unavailable     Do you need any medication refills at today's visit? No      "

## 2020-09-11 NOTE — PROGRESS NOTES
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     Patient Active Problem List   Diagnosis     History of CVA (cerebrovascular accident)     Jaw Pain     Arthritis of knee     CARDIOVASCULAR SCREENING; LDL GOAL LESS THAN 100     Hypertension goal BP (blood pressure) < 140/90     Advanced directives, counseling/discussion     Hyperlipidemia LDL goal <130     OA (OSTEOARTHRITIS) OF KNEE - right     CKD (chronic kidney disease) stage 3, GFR 30-59 ml/min (H)     Chronic pain disorder     DJD (degenerative joint disease), lumbosacral     Spinal stenosis of lumbar region with neurogenic claudication     Slow transit constipation     Mild cognitive impairment     Combined form of age-related cataract, mod, both eyes     Posterior vitreous detachment of both eyes     Arthritis, lumbar spine     Medical cannabis use     Moderate major depression (H)     Inflammatory spondylopathy of lumbar region (H)     Hammer toe of left foot     Loss of weight     Mild malnutrition (H)     Past Surgical History:   Procedure Laterality Date     HYSTERECTOMY, CERVIX STATUS UNKNOWN  age 31     Social History     Socioeconomic History     Marital status:      Spouse name: Not on file     Number of children: Not on file     Years of education: Not on file     Highest education level: Not on file   Occupational History     Not on file   Social Needs     Financial resource strain: Not on file     Food insecurity     Worry: Not on file     Inability: Not on file     Transportation needs     Medical: Not on file     Non-medical: Not on file   Tobacco Use     Smoking status: Never Smoker     Smokeless tobacco: Never Used   Substance and Sexual Activity     Alcohol use: Yes     Alcohol/week: 0.0 standard drinks     Comment: 0-1  drink weekly     Drug use: No     Sexual activity: Not Currently     Partners: Male   Lifestyle     Physical activity     Days per week: Not on file     Minutes per session: Not on file     Stress: Not on file   Relationships     Social connections     Talks on phone: Not on file     Gets together: Not on file     Attends Sabianism service: Not on file     Active member of club or organization: Not on file     Attends meetings of clubs or organizations: Not on file     Relationship status: Not on file     Intimate partner violence     Fear of current or ex partner: Not on file     Emotionally abused: Not on file     Physically abused: Not on file     Forced sexual activity: Not on file   Other Topics Concern     Parent/sibling w/ CABG, MI or angioplasty before 65F 55M? 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 [employer]    History of polka, schottisch, other dancing    2 daughters in area     Family History   Problem Relation Age of Onset     Arthritis Mother      Cataracts Mother      Unknown/Adopted Father         adopted     Diabetes Brother      Cancer Brother      Cataracts Maternal Grandmother      Hypertension No family hx of      Cerebrovascular Disease No family hx of      Thyroid Disease No family hx of      Glaucoma No family hx of      Macular Degeneration No family hx of      Last Comprehensive Metabolic Panel:  Sodium   Date Value Ref Range Status   02/11/2020 137 133 - 144 mmol/L Final     Potassium   Date Value Ref Range Status   02/11/2020 3.8 3.4 - 5.3 mmol/L Final     Chloride   Date Value Ref Range Status   02/11/2020 101 94 - 109 mmol/L Final     Carbon Dioxide   Date Value Ref Range Status   02/11/2020 28 20 - 32 mmol/L Final     Anion Gap   Date Value Ref Range Status   02/11/2020 8 3 - 14 mmol/L Final     Glucose   Date Value Ref Range Status    02/11/2020 102 (H) 70 - 99 mg/dL Final     Urea Nitrogen   Date Value Ref Range Status   02/11/2020 34 (H) 7 - 30 mg/dL Final     Creatinine   Date Value Ref Range Status   02/11/2020 0.99 0.52 - 1.04 mg/dL Final     GFR Estimate   Date Value Ref Range Status   02/11/2020 52 (L) >60 mL/min/[1.73_m2] Final     Comment:     Non  GFR Calc  Starting 12/18/2018, serum creatinine based estimated GFR (eGFR) will be   calculated using the Chronic Kidney Disease Epidemiology Collaboration   (CKD-EPI) equation.       Calcium   Date Value Ref Range Status   02/11/2020 10.1 8.5 - 10.1 mg/dL Final       SUBJECTIVE FINDINGS:  An 85-year-old female presents for left second toe that has been painful.  It has been going on for at least 3 months but the last weeks has been really painful to the point where she can't even put pressure on it.  Relates no injuries.  She has been doing icing.  Relates she has kind of been using a home-made padding.  She has a buttress pad taped to her feet.  Relates no injuries.  Relates it kind of throbs.  She is wearing sandals today.        OBJECTIVE FINDINGS:  DP and PT are 2/4 bilaterally.  She has dorsally contracted digits 2 through 5 bilaterally with a laterally deviated hallux bilaterally.  She has dry, scaly skin in a moccasin pattern bilaterally with thickened, dystrophic nails with subungual debris and discoloration, right hallux nail greater than rest.  She has a left second toe with erythema, edema and tenderness of the toe, and there is a medial hyperkeratotic tissue buildup that is nucleated with cracking.  There is no underlying open lesion.  There is pain on palpation of the tyloma.  She has interdigital cracking on the left foot.  Decreased hair growth bilaterally.  Some cracking on the heels as well.        ASSESSMENT AND PLAN:  Infection, left second toe.  Tyloma, left second toe.  She has tinea pedis bilaterally and onychomycosis bilaterally.  Diagnosis and  treatment options were discussed with the patient.  Tyloma on the left second toe was sharply debrided with a #15 blade upon consent.  I am going to have her clean this daily with MicroKlenz, apply Aquacel Ag between the toes.  These were dispensed and use discussed with her.  I discussed with her toe crest pad usage and buttress pad, but I do not want her to start using those until we get the infection cleared.  Prescription for econazole cream and Keflex given and use discussed with her.  She did relate to relief after debridement of the callus.  She will return to clinic and see me in about 1 week.  She is wearing an open-toed sandal.  I want her to keep pressure off this and keep wearing the sandal.  She does have hammertoes with hallux valgus deformity present.

## 2020-09-15 ENCOUNTER — RADIOLOGY INJECTION OFFICE VISIT (OUTPATIENT)
Dept: PALLIATIVE MEDICINE | Facility: CLINIC | Age: 85
End: 2020-09-15
Payer: COMMERCIAL

## 2020-09-15 ENCOUNTER — ANCILLARY PROCEDURE (OUTPATIENT)
Dept: RADIOLOGY | Facility: CLINIC | Age: 85
End: 2020-09-15
Attending: PAIN MEDICINE
Payer: COMMERCIAL

## 2020-09-15 ENCOUNTER — VIRTUAL VISIT (OUTPATIENT)
Dept: BEHAVIORAL HEALTH | Facility: CLINIC | Age: 85
End: 2020-09-15
Attending: INTERNAL MEDICINE
Payer: COMMERCIAL

## 2020-09-15 VITALS — DIASTOLIC BLOOD PRESSURE: 75 MMHG | HEART RATE: 87 BPM | OXYGEN SATURATION: 98 % | SYSTOLIC BLOOD PRESSURE: 136 MMHG

## 2020-09-15 DIAGNOSIS — F41.0 PANIC DISORDER WITHOUT AGORAPHOBIA: Primary | ICD-10-CM

## 2020-09-15 DIAGNOSIS — M53.3 SACROILIAC JOINT DYSFUNCTION OF RIGHT SIDE: Primary | ICD-10-CM

## 2020-09-15 DIAGNOSIS — M53.3 SACROILIAC JOINT DYSFUNCTION: ICD-10-CM

## 2020-09-15 DIAGNOSIS — F32.1 MODERATE MAJOR DEPRESSION (H): ICD-10-CM

## 2020-09-15 PROCEDURE — 90834 PSYTX W PT 45 MINUTES: CPT | Mod: 95 | Performed by: SOCIAL WORKER

## 2020-09-15 PROCEDURE — 27096 INJECT SACROILIAC JOINT: CPT | Mod: RT | Performed by: PAIN MEDICINE

## 2020-09-15 ASSESSMENT — PAIN SCALES - GENERAL: PAINLEVEL: WORST PAIN (10)

## 2020-09-15 NOTE — PATIENT INSTRUCTIONS
[facility]   Procedure Discharge Instructions    Today you saw: Dr. Jamaal Eid    You had an:    sacroiliac joint injection        Be cautious when walking. Numbness and/or weakness in the lower extremities may occur for up to 6-8 hours after the procedure due to effect of the local anesthetic    Do not drive for 6 hours. The effect of the local anesthetic could slow your reflexes.     You may resume your regular activities after 24 hours    Avoid strenuous activity for the first 24 hours    You may shower, however avoid swimming, tub baths or hot tubs for 24 hours following your procedure    You may have a mild to moderate increase in pain for several days following the injection.    It may take up to 14 days for the steroid medication to start working although you may feel the effect as early as a few days after the procedure.       You may use ice packs for 10-15 minutes, 3 to 4 times a day at the injection site for comfort    Do not use heat to painful areas for 6 to 8 hours. This will give the local anesthetic time to wear off and prevent you from accidentally burning your skin.     Unless you have been directed to avoid the use of anti-inflammatory medications (NSAIDS), you may use medications such as ibuprofen, Aleve or Tylenol for pain control if needed.     If you were fasting, you may resume your normal diet and medications after the procedure    If you have diabetes, check your blood sugar more frequently than usual as your blood sugar may be higher than normal for 10-14 days following a steroid injection. Contact your doctor who manages your diabetes if your blood sugar is higher than usual    Possible side effects of steroids that you may experience include flushing, elevated blood pressure, increased appetite, mild headaches and restlessness.  All of these symptoms will get better with time.    If you experience any of the following, call the Pain Clinic during work  hours (Mon-Friday 8-4:30 pm) at [phone] or the Provider Line after hours at [phone]:  -Fever over 100 degree F  -Swelling, bleeding, redness, drainage, warmth at the injection site  -Progressive weakness or numbness in your legs or arms  -Loss of bowel or bladder function  -Unusual headache that is not relieved by Tylenol or other pain reliever  -Unusual new onset of pain that is not improving

## 2020-09-15 NOTE — NURSING NOTE
Pre-procedure Intake    Have you been fasting? NA    If yes, for how long? NA    Are you taking a prescribed blood thinner such as coumadin, Plavix, Xarelto?    No    If yes, when did you take your last dose? No     Do you take aspirin?  No    If cervical procedure, have you held aspirin for 6 days?   No     Do you have any allergies to contrast dye, iodine, steroid and/or numbing medications?  NO    Are you currently taking antibiotics or have an active infection?  NO    Have you had a fever/elevated temperature within the past week? NO    Are you currently taking oral steroids? NO    Do you have a ? Yes       Are you pregnant or breastfeeding?  Not Applicable    Are the vital signs normal?  Yes    An Menjivar MA

## 2020-09-15 NOTE — PROGRESS NOTES
Pre procedure Diagnosis: SI joint dysfunction    Post procedure Diagnosis: Same  Procedure performed: right SI joint injection  Anesthesia: none  Complications: none  Operators: Jamaal Eid MD     Indications:   Teresa Lopez is a 85 year old female. The patient has a history of buttocks pain on the right. Other conservative treatments prior to injection include meds/pt/prior injection.    Options/alternatives, benefits and risks were discussed with the patient including bleeding, infection, tissue trauma, exposure to radiation, reaction to medications including seizure, nerve injury, weakness, and numbness.  Questions were answered to her satisfaction and she agrees to proceed. Voluntary informed consent was obtained and signed.     Vitals were reviewed: Yes  Allergies were reviewed:  Yes   Medications were reviewed:  Yes   Pre-procedure pain score: 8/10    Procedure:  After obtaining signed informed consent, the patient was brought into the procedure suite and was placed in a prone position on the procedure table.   A Pause for the Cause was performed.  The patient was prepped and draped in the usual sterile fashion.     After identifying the right SI joint, the C-arm was rotated obliquely to obtain the best view of the inferior angle of the joint.  Then 4 ml of Lidocaine 1%  was used to anesthetize the skin at a skin entry site coaxial with the fluoroscopy beam at this location.  A 22 gauge 3.5 inch needle was advanced under intermittent fluoroscopy until it was felt to enter the SI joint.  Aspiration was negative.    A total of 1ml of Omnipaque-300 was injected, confirming appropriate position, with spread into the intraarticular space, with no intravascular uptake noted.  9ml of Omnipaque was wasted. Location was verified in lateral view.    2 ml of 0.5% bupivacaine with 40mg of Kenalog was injected.  The needle was removed.     Hemostasis was achieved, the area was cleaned, and bandaids were placed when  appropriate.  The patient tolerated the procedure well, and was taken to the recovery room.  Images were saved to PACS.    Post-procedure pain score: 4/10  Follow-up includes:   -f/u phone call in one week  -f/u with referring Physician     Jamaal Eid MD  [facility]

## 2020-09-16 RX ORDER — NYSTATIN 100000 U/G
CREAM TOPICAL 2 TIMES DAILY
Qty: 90 G | Refills: 2 | Status: SHIPPED | OUTPATIENT
Start: 2020-09-16 | End: 2020-11-09 | Stop reason: ALTCHOICE

## 2020-09-16 NOTE — PROGRESS NOTES
Progress Note    Patient Name: Teresa Lopez  Date: 9/16/2020         Service Type: Individual      Session Start Time: 11am  Session End Time: 11:45am     Session Length: 45 minutes    Session #: 3    Attendees: Client and client's daughter, Haylee, and MSW intern Toño Marti    Service Modality:  Video Visit:    Telemedicine Visit: The patient's condition can be safely assessed and treated via synchronous audio and visual telemedicine encounter.      Reason for Telemedicine Visit: Services only offered telehealth    Originating Site (Patient Location): Patient's home    Distant Site (Provider Location): [facility]    Consent:  The patient/guardian has verbally consented to: the potential risks and benefits of telemedicine (video visit) versus in person care; bill my insurance or make self-payment for services provided; and responsibility for payment of non-covered services.     Patient would like the video invitation sent by: Kadmon    Mode of Communication:  Video Conference via [employer]    As the provider I attest to compliance with applicable laws and regulations related to telemedicine.     Treatment Plan Last Reviewed: 9/8/2020  PHQ-9 / GOLDEN-7 : See Epic updates    DATA  Interactive Complexity: No  Crisis: No       Progress Since Last Session (Related to Symptoms / Goals / Homework):   Symptoms: Stable- client's daughter reports client did have some panic Sunday morning, client reports she thought she was dying because she wakes up feeling shakey and unwell    Homework: Partially completed      Episode of Care Goals: Satisfactory progress - PREPARATION (Decided to change - considering how); Intervened by negotiating a change plan and determining options / strategies for behavior change, identifying triggers, exploring social supports, and working towards setting a date to begin behavior change     Current / Ongoing Stressors and  "Concerns:   Client reports sleep has continued to be difficulty mostly due to physical pain. Reports she got an injection in her hip and they are hopeful this will help. Therapist reviewed TIP skills with client and client reports \"I've been doing that for years\" and that it is somewhat helpful. Also discussed a coping plan for client to follow when feeling anxious/panicked/or physically unwell. Client's daughter is also aware of the plan and can help  client through it when necessary.      Treatment Objective(s) Addressed in This Session:   Client will identify stressors that contribute to anxiety.  Client will identify solutions to stressors that contribute to anxiety  Client will identify adaptive ways of coping with stressors that contribute to anxiety.  Client will use thought-stopping strategy daily to reduce intrusive thoughts.  Client will use relaxation strategies 2 times per day to reduce the physical symptoms of anxiety.   Client to identify symptoms/triggers to panic  Client to learn and practice using 3-4 distress tolerance/emotion regulation skills for managing panic     Intervention:   DBT: TIP skills; sleep hygiene strategies; Coping plan- shared with daughter        ASSESSMENT: Current Emotional / Mental Status (status of significant symptoms):   Risk status (Self / Other harm or suicidal ideation)   Patient denies current fears or concerns for personal safety.   Patient denies current or recent suicidal ideation or behaviors.   Patient denies current or recent homicidal ideation or behaviors.   Patient denies current or recent self injurious behavior or ideation.   Patient denies other safety concerns.   Patient reports there has been no change in risk factors since their last session.     Patient reports there has been no change in protective factors since their last session.     Recommended that patient call 911 or go to the local ED should there be a change in any of these risk " factors.     Appearance:   Appropriate    Eye Contact:   Good    Psychomotor Behavior: Normal    Attitude:   Cooperative    Orientation:   All   Speech    Rate / Production: Normal     Volume:  Normal    Mood:    Anxious    Affect:    Appropriate    Thought Content:  Clear    Thought Form:  Coherent  Logical    Insight:    Good      Medication Review:   No changes to current psychiatric medication(s)     Medication Compliance:   NA     Changes in Health Issues:   None reported     Chemical Use Review:   Substance Use: Chemical use reviewed, no active concerns identified      Tobacco Use: No current tobacco use.      Diagnosis:  1. Panic disorder without agoraphobia    2. Moderate major depression (H)        Collateral Reports Completed:   Not Applicable    PLAN: (Patient Tasks / Therapist Tasks / Other)  1. Continue in individual therapy  2. Practice using coping plan discussed today, daughter will help walk client through it if necessary         ALISON Warner, LICSW   9/15/2020                                                         ______________________________________________________________________    Treatment Plan    Patient's Name: Teresa Lopez  YOB: 1935    Date: 9/8/2020    DSM5 Diagnoses: 296.32 (F33.1) Major Depressive Disorder, Recurrent Episode, Moderate With anxious distress or 300.01 (F41.0) Panic Disorder  Psychosocial / Contextual Factors: living alone and more socially isolated due to coronavirus  WHODAS:     Referral / Collaboration:  Referral to another professional/service is not indicated at this time..    Anticipated number of session or this episode of care: 4-6      MeasurableTreatment Goal(s) related to diagnosis / functional impairment(s)  Goal 1: Client will experience decreased anxiety and panic symptoms symptoms evidenced by GAD7 score below 5 and report increased ability to manage anxiety.    Objective #A (Client Action)    Status: New - Date: 9/8/2020    Client  will identify stressors that contribute to anxiety.  Client will identify solutions to stressors that contribute to anxiety  Client will identify adaptive ways of coping with stressors that contribute to anxiety.  Client will use thought-stopping strategy daily to reduce intrusive thoughts.  Client will use relaxation strategies 2 times per day to reduce the physical symptoms of anxiety.   Client to identify symptoms/triggers to panic  Client to learn and practice using 3-4 distress tolerance/emotion regulation skills for managing panic     Intervention(s)  Therapist will teach CBT and DBT skills.        Patient has reviewed and agreed to the above plan.      ALISON Warner, LICSW  September 8, 2020

## 2020-09-18 ENCOUNTER — OFFICE VISIT (OUTPATIENT)
Dept: PODIATRY | Facility: CLINIC | Age: 85
End: 2020-09-18
Payer: COMMERCIAL

## 2020-09-18 VITALS — DIASTOLIC BLOOD PRESSURE: 93 MMHG | OXYGEN SATURATION: 98 % | HEART RATE: 85 BPM | SYSTOLIC BLOOD PRESSURE: 160 MMHG

## 2020-09-18 DIAGNOSIS — B35.3 TINEA PEDIS OF BOTH FEET: ICD-10-CM

## 2020-09-18 DIAGNOSIS — M20.42 HAMMER TOE OF LEFT FOOT: Primary | ICD-10-CM

## 2020-09-18 DIAGNOSIS — L08.9 INFECTION OF TOE: ICD-10-CM

## 2020-09-18 PROCEDURE — 99213 OFFICE O/P EST LOW 20 MIN: CPT | Performed by: PODIATRIST

## 2020-09-18 NOTE — PATIENT INSTRUCTIONS
Thanks for coming today.  [facility]  [address] [place] [address]    To schedule future appointments in Ortho Clinic, you may call [phone].    To schedule ordered imaging by your provider:   Call Central [facility] [phone]    To schedule an injection ordered by your provider:  Call [facility] Injection scheduling line: [phone]  [facility] TEAMhart available online at:  [email or web address]    Please call if any further questions or concerns ([phone]).  Clinic hours 8 am to 5 pm.    Return to clinic (call) if symptoms worsen or fail to improve.

## 2020-09-18 NOTE — PROGRESS NOTES
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     Patient Active Problem List   Diagnosis     History of CVA (cerebrovascular accident)     Jaw Pain     Arthritis of knee     CARDIOVASCULAR SCREENING; LDL GOAL LESS THAN 100     Hypertension goal BP (blood pressure) < 140/90     Advanced directives, counseling/discussion     Hyperlipidemia LDL goal <130     OA (OSTEOARTHRITIS) OF KNEE - right     CKD (chronic kidney disease) stage 3, GFR 30-59 ml/min (H)     Chronic pain disorder     DJD (degenerative joint disease), lumbosacral     Spinal stenosis of lumbar region with neurogenic claudication     Slow transit constipation     Mild cognitive impairment     Combined form of age-related cataract, mod, both eyes     Posterior vitreous detachment of both eyes     Arthritis, lumbar spine     Medical cannabis use     Moderate major depression (H)     Inflammatory spondylopathy of lumbar region (H)     Hammer toe of left foot     Loss of weight     Mild malnutrition (H)     Past Surgical History:   Procedure Laterality Date     HYSTERECTOMY, CERVIX STATUS UNKNOWN  age 31     Social History     Socioeconomic History     Marital status:      Spouse name: Not on file     Number of children: Not on file     Years of education: Not on file     Highest education level: Not on file   Occupational History     Not on file   Social Needs     Financial resource strain: Not on file     Food insecurity     Worry: Not on file     Inability: Not on file     Transportation needs     Medical: Not on file     Non-medical: Not on file   Tobacco Use     Smoking status: Never Smoker     Smokeless tobacco: Never Used   Substance and Sexual Activity     Alcohol use: Yes     Alcohol/week: 0.0 standard drinks     Comment: 0-1  drink weekly     Drug use: No     Sexual activity: Not Currently     Partners: Male   Lifestyle     Physical activity     Days per week: Not on file     Minutes per session: Not on file     Stress: Not on file   Relationships     Social connections     Talks on phone: Not on file     Gets together: Not on file     Attends Yazdanism service: Not on file     Active member of club or organization: Not on file     Attends meetings of clubs or organizations: Not on file     Relationship status: Not on file     Intimate partner violence     Fear of current or ex partner: Not on file     Emotionally abused: Not on file     Physically abused: Not on file     Forced sexual activity: Not on file   Other Topics Concern     Parent/sibling w/ CABG, MI or angioplasty before 65F 55M? 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 [email or web address]    History of polka, schottisch, other dancing    2 daughters in area     Family History   Problem Relation Age of Onset     Arthritis Mother      Cataracts Mother      Unknown/Adopted Father         adopted     Diabetes Brother      Cancer Brother      Cataracts Maternal Grandmother      Hypertension No family hx of      Cerebrovascular Disease No family hx of      Thyroid Disease No family hx of      Glaucoma No family hx of      Macular Degeneration No family hx of      SUBJECTIVE FINDINGS:  An 85-year-old female returns to clinic for infection left second toe and tinea pedis bilaterally with onychomycosis.  She relates that she did start the nystatin cream.  She did not get that right away due to the insurance problems.  She has got 1 Keflex left.  She relates it feels much better.  She is using the Aquacel Ag between the toes.      OBJECTIVE FINDINGS:  Vascular status is intact bilaterally.  She still has dry, scaly skin in a moccasin pattern  bilaterally.  Left second toe, there is minimal edema and erythema, no odor, no calor, no drainage, no pain on palpation.       ASSESSMENT AND PLAN:  Infection, left second toe.  Tinea pedis and onychomycosis.  The tinea pedis improved, but it is still present.  Continue with the nystatin cream.  She can continue the Aquacel Ag between the toes.  I dispensed some of this.  She can discontinue the MicroKlenz as tolerated.  Finish the Keflex.  Return to clinic and see me as needed.  This is doing well.

## 2020-09-18 NOTE — NURSING NOTE
Teresa Lopez's chief complaint for this visit includes:  Chief Complaint   Patient presents with     RECHECK     follow up on hammertoe and swelling     PCP: Tej Engle    Referring Provider:  No referring provider defined for this encounter.    BP (!) 160/93 (BP Location: Left arm, Patient Position: Sitting, Cuff Size: Adult Small)   Pulse 85   SpO2 98%   Data Unavailable     Do you need any medication refills at today's visit? N/A

## 2020-09-18 NOTE — LETTER
9/18/2020         RE: Teresa Lopez  [address]  [facility] [address]        Dear Colleague,    Thank you for referring your patient, Teresa Lopez, to the [facility]. Please see a copy of my visit note below.    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     Patient Active Problem List   Diagnosis     History of CVA (cerebrovascular accident)     Jaw Pain     Arthritis of knee     CARDIOVASCULAR SCREENING; LDL GOAL LESS THAN 100     Hypertension goal BP (blood pressure) < 140/90     Advanced directives, counseling/discussion     Hyperlipidemia LDL goal <130     OA (OSTEOARTHRITIS) OF KNEE - right     CKD (chronic kidney disease) stage 3, GFR 30-59 ml/min (H)     Chronic pain disorder     DJD (degenerative joint disease), lumbosacral     Spinal stenosis of lumbar region with neurogenic claudication     Slow transit constipation     Mild cognitive impairment     Combined form of age-related cataract, mod, both eyes     Posterior vitreous detachment of both eyes     Arthritis, lumbar spine     Medical cannabis use     Moderate major depression (H)     Inflammatory spondylopathy of lumbar region (H)     Hammer toe of left foot     Loss of weight     Mild malnutrition (H)     Past Surgical History:   Procedure Laterality Date     HYSTERECTOMY, CERVIX STATUS UNKNOWN  age 31     Social History     Socioeconomic History     Marital status:      Spouse name: Not on file     Number of children: Not on file     Years of education: Not on file     Highest education level: Not on file   Occupational History     Not on file   Social Needs     Financial resource strain: Not on file     Food insecurity     Worry: Not on file     Inability: Not on file      Transportation needs     Medical: Not on file     Non-medical: Not on file   Tobacco Use     Smoking status: Never Smoker     Smokeless tobacco: Never Used   Substance and Sexual Activity     Alcohol use: Yes     Alcohol/week: 0.0 standard drinks     Comment: 0-1 drink weekly     Drug use: No     Sexual activity: Not Currently     Partners: Male   Lifestyle     Physical activity     Days per week: Not on file     Minutes per session: Not on file     Stress: Not on file   Relationships     Social connections     Talks on phone: Not on file     Gets together: Not on file     Attends Congregational service: Not on file     Active member of club or organization: Not on file     Attends meetings of clubs or organizations: Not on file     Relationship status: Not on file     Intimate partner violence     Fear of current or ex partner: Not on file     Emotionally abused: Not on file     Physically abused: Not on file     Forced sexual activity: Not on file   Other Topics Concern     Parent/sibling w/ CABG, MI or angioplasty before 65F 55M? 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[employer]    History of polka, schottisch, other dancing    2 daughters in area     Family History   Problem Relation Age of Onset     Arthritis Mother      Cataracts Mother      Unknown/Adopted Father         adopted     Diabetes Brother      Cancer Brother      Cataracts Maternal Grandmother      Hypertension No family hx of      Cerebrovascular Disease No family hx of      Thyroid Disease No family hx of      Glaucoma No family hx of      Macular Degeneration No family hx of      SUBJECTIVE FINDINGS:  An 85-year-old female returns to clinic for infection left second toe and tinea pedis bilaterally with onychomycosis.  She relates that she did start the nystatin cream.  She did not get that right away  due to the insurance problems.  She has got 1 Keflex left.  She relates it feels much better.  She is using the Aquacel Ag between the toes.      OBJECTIVE FINDINGS:  Vascular status is intact bilaterally.  She still has dry, scaly skin in a moccasin pattern bilaterally.  Left second toe, there is minimal edema and erythema, no odor, no calor, no drainage, no pain on palpation.       ASSESSMENT AND PLAN:  Infection, left second toe.  Tinea pedis and onychomycosis.  The tinea pedis improved, but it is still present.  Continue with the nystatin cream.  She can continue the Aquacel Ag between the toes.  I dispensed some of this.  She can discontinue the MicroKlenz as tolerated.  Finish the Keflex.  Return to clinic and see me as needed.  This is doing well.         Again, thank you for allowing me to participate in the care of your patient.        Sincerely,        Maulik Zavala DPM

## 2020-09-28 ENCOUNTER — TRANSFERRED RECORDS (OUTPATIENT)
Dept: HEALTH INFORMATION MANAGEMENT | Facility: CLINIC | Age: 85
End: 2020-09-28

## 2020-09-28 ENCOUNTER — TELEPHONE (OUTPATIENT)
Dept: INTERNAL MEDICINE | Facility: CLINIC | Age: 85
End: 2020-09-28

## 2020-09-28 NOTE — TELEPHONE ENCOUNTER
Denise states she seen the patient today. Her mini cognitive eval was not normal. The patient is malnourished, her weight was 102.8. She has falls but has not be injured. PHQ9 score was 8 and she is having back and hip pain. Please advise

## 2020-10-02 ENCOUNTER — VIRTUAL VISIT (OUTPATIENT)
Dept: PALLIATIVE MEDICINE | Facility: CLINIC | Age: 85
End: 2020-10-02
Payer: COMMERCIAL

## 2020-10-02 DIAGNOSIS — M70.61 GREATER TROCHANTERIC BURSITIS OF RIGHT HIP: ICD-10-CM

## 2020-10-02 DIAGNOSIS — M21.70 LEG LENGTH DISCREPANCY: Primary | ICD-10-CM

## 2020-10-02 DIAGNOSIS — M53.3 CHRONIC RIGHT SI JOINT PAIN: ICD-10-CM

## 2020-10-02 DIAGNOSIS — G89.4 CHRONIC PAIN SYNDROME: ICD-10-CM

## 2020-10-02 DIAGNOSIS — F32.A DEPRESSION, UNSPECIFIED DEPRESSION TYPE: ICD-10-CM

## 2020-10-02 DIAGNOSIS — G89.29 CHRONIC RIGHT SI JOINT PAIN: ICD-10-CM

## 2020-10-02 DIAGNOSIS — G89.29 CHRONIC BILATERAL LOW BACK PAIN WITHOUT SCIATICA: ICD-10-CM

## 2020-10-02 DIAGNOSIS — M48.062 SPINAL STENOSIS OF LUMBAR REGION WITH NEUROGENIC CLAUDICATION: ICD-10-CM

## 2020-10-02 DIAGNOSIS — M54.50 CHRONIC BILATERAL LOW BACK PAIN WITHOUT SCIATICA: ICD-10-CM

## 2020-10-02 PROCEDURE — 99214 OFFICE O/P EST MOD 30 MIN: CPT | Mod: 95 | Performed by: NURSE PRACTITIONER

## 2020-10-02 RX ORDER — DULOXETIN HYDROCHLORIDE 20 MG/1
20 CAPSULE, DELAYED RELEASE ORAL DAILY
Qty: 30 CAPSULE | Refills: 1 | Status: SHIPPED | OUTPATIENT
Start: 2020-10-02 | End: 2020-12-28

## 2020-10-02 ASSESSMENT — PAIN SCALES - GENERAL: PAINLEVEL: WORST PAIN (10)

## 2020-10-02 NOTE — PATIENT INSTRUCTIONS
"Plan:   1. Physical Therapy:  None at present  2. Clinical Health Psychologist: I agree with outside counseling as you are.  3. Diagnostic Studies:  None  4. Medication Management:    1. START Cymbalta (Duloxetine) 20mg every day in the morning. Take at bedtime if it makes you tired. It is common to experience a little headache, stomach feels \"unsettled\" and looser bowel movements for the first 3-10 days you take this medication. STOP the medication fi your mood gets worse or you feel like hurting or killing yourself  2. Continue Lidocaine patches   3. Continue medical cannabis.   5. Further procedures recommended: I am checking with Dr. Eid re: his thoughts on further injections   6. Referral to [facility] for leg length measurement and treatment if needed   7. Recommendations to PCP: none  8. Follow up: 4 weeks telephone    ----------------------------------------------------------------  Clinic Number:  [phone]     Call with any questions about your care and for scheduling assistance.     Calls are returned Monday through Friday between 8 AM and 4:30 PM. We usually get back to you within 2 business days depending on the issue/request.    If we are prescribing your medications:    For opioid medication refills, call the clinic or send a Moprise message 7 days in advance.  Please include:    Name of requested medication    Name of the pharmacy.    For non-opioid medications, call your pharmacy directly to request a refill. Please allow 3-4 days to be processed.     Per [place] State Law:    All controlled substance prescriptions must be filled within 30 days of being written.      For those controlled substances allowing refills, pickup must occur within 30 days of last fill.      We believe regular attendance is key to your success in our program!      Any time you are unable to keep your appointment we ask that you call us at least 24 hours in advance to cancel.This will allow us to offer the " appointment time to another patient.     Multiple missed appointments may lead to dismissal from the clinic.

## 2020-10-02 NOTE — PROGRESS NOTES
"  Teresa Lopez is a 85 year old female who is being evaluated via a billable telephone visit.      The patient has been notified of following:     \"This telephone visit will be conducted via a call between you and your physician/provider. We have found that certain health care needs can be provided without the need for a physical exam.  This service lets us provide the care you need with a short phone conversation.  If a prescription is necessary we can send it directly to your pharmacy.  If lab work is needed we can place an order for that and you can then stop by our lab to have the test done at a later time.    Telephone visits are billed at different rates depending on your insurance coverage. During this emergency period, for some insurers they may be billed the same as an in-person visit.  Please reach out to your insurance provider with any questions.    If during the course of the call the physician/provider feels a telephone visit is not appropriate, you will not be charged for this service.\"    Patient has given verbal consent for Telephone visit?  Yes    What phone number would you like to be contacted at? [phone]    How would you like to obtain your AVS? Tesfaye Kunz [facility]  [facility]    10/2/2020     Teresa Lopez is a 85 year old female who is being evaluated via a billable telephone visit.        Chief complaint:  chronic low back pain and right leg pain      Interval history:  Teresa Lopez is a 85 year old female is known to me for .   Primary osteoarthritis    Chronic right hip pain   Chronic bilateral low back without sciatica   Spinal stenosis of lumbar region with neurogenic claudication   Piriformis syndrome of both sides   PMHx DJD, CVA, HTN, diabetes mellitus, and cataract   PSHx Hysterectomy      Recommendations/plan at the last visit on 8/26/2020 included:  1. Physical Therapy:  " "None at present  2. Clinical Health Psychologist: None needed at present  3. Diagnostic Studies:  None  4. Medication Management:    1. Continue Lidocaine patches   2. Continue medical cannabis. Re-certified today (8/26/2020)   5. Further procedures recommended: right SI joint injection, our office to contact patient   6. Recommendations to PCP: none  7. Follow up: 8-10 weeks telephone          Since her last visit, Teresa Lopez reports:    Interval history 10/2/2020  -right SI joint injection has been helpful  -She is having pain in the right side of the hip, laterally, not anterior groin pain  -patient feels her left leg is longer than the right leg  -She has been more active  -she is seeing a counselor, has been seen x 3          Interval history 8/26/2020  -She states she is not doing very well sometimes.   -she feels her right hip is \"very bad.\"   -She had a 7/30/2020 right greater trochanteric bursal injection, her pain came down from an 8/10 to a 2/10 for her right GT.  -still has some trouble standing due to a spot that is painful, she is describing a spot of pain over the SI joint. This tends to hurt when she stands to do things and feels better when she is sitting down.   -she accompanied today by her daughter Abdi on the phone today  -recertified for medical cannabis today  -she has a hammer toe and this has been really painful for her the past 2 days.       Interval history 7/15/2020  -she is not feeling well  -she sleeps with a pillow between her legs  -she notes she is having \"bad pain\" on the lateral hip on the right side. She has pain when she lays on her right side in bed. She has had a greater trochanteric bursal injection in June 2019 that was helpful.   -she is not eating well as she is isolated and feels depressed and feels shaky as she doesn't eat as regularly as she should.   -discussed using Voltaren gel, Cymbalta and greater trochanteric injection      Interval history 5/6/2020  -Teresa is " "having some more hip pain on the right side between the top of the hip and up and the tailbone to the hip  -SI joint injection on the right side was beneficial and this has made her pain more focused on the right hip.   -she has pain if she lays on her right side  -she is having more pain in the deep right groin. She notes when she walks this pain is markedly worse.         At this point, the patient's participation with our multidisciplinary team includes:  The patient has been compliant with the program  PT - patient is interested  Health Psych  None    Pain scores:  Pain intensity on average is 6-7 on a scale of 0-10.    Range is 2-10/10.   Pain right now is 7-8/10   Pain is described as \"unbearable, tiring, exhausting, miserable, nagging, and gnawing.\"  Pain is continuous in nature and worse in the morning.    Current pain-related medication treatments include:  -Lidocaine patch (helpful)  -Medical cannabis (helpful)       Other pertinent medications:  -Senna-docusate PRN      Previous medication treatments included:  OPIATES:hydrocodone (not helpful), oxycodone (somewhat helpful)  NSAIDS: ibuprofen (not helpful), Aleve (not helpful)  MUSCLE RELAXANTS: none  ANTI-MIGRAINE MEDS: none  ANTI-DEPRESSANTS: none  SLEEP AIDS: none  ANTI-CONVULSANTS: gabapentin (unsure if helpful, side effects: hot flashes, fogginess)  TOPICALS: none  Other meds: Tylenol (not helpful)    Injections:  -11/10/2017 Caudal epidural steroid injection with Dr. Reymundo Bajwa (helpful for a very short period of time)  -1/5/2018 Ultrasound guided right intra-articular hip injection with Dr. Jean Meade  -1/12/2018 Right ankle injection with Dr. Lewis of podiatry.   -7/3/2018 L2-3 interlaminar epidural steroid injection with Dr Jamaal Eid (not at all helpful)  -8/3/2018 right hip steroid injection with Dr. Valverde (helped some)  -6/14/2019 bilateral hip greater trochanteric bursa injection with Dr. Jamaal Eid (helpful)   -1/23/2020 " right SI joint injection with Dr. Chasidy Laura (quite helpful)  5/13/2020 right hip joint steroid injection with Dr. Valverde (quite helpful)  -7/30/2020 right hip greater trochanteric bursal injection with Dr. Jamaal Eid (quite helpful)  -9/15/2020 right SI joint injection with Dr. Jamaal Eid (pain diminished about 25%, sharpness went away)    Side Effects: no side effect  Patient is using the medication as prescribed: YES    Medications:  Current Outpatient Medications   Medication Sig Dispense Refill     Acetaminophen (TYLENOL ARTHRITIS PAIN PO) As needed not taking daily       ADVIL 200 MG PO CAPS Reported on 4/20/2017       Aspirin-Acetaminophen-Caffeine (EXCEDRIN PO)        CALCIUM 1200 OR Reported on 4/20/2017       cephALEXin (KEFLEX) 500 MG capsule Take 1 capsule (500 mg) by mouth 2 times daily 14 capsule 0     econazole nitrate 1 % external cream Apply topically daily To feet and toenails. 85 g 3     Glucosamine-Chondroit-Vit C-Mn (GLUCOSAMINE 1500 COMPLEX PO) Take 1,500 mg by mouth daily Reported on 4/20/2017       lidocaine (LIDODERM) 5 % patch Place 3 patches onto the skin every 24 hours 90 patch 11     Loratadine (CLARITIN) 10 MG capsule Take 10 mg by mouth as needed Reported on 4/20/2017       losartan-hydrochlorothiazide (HYZAAR) 100-25 MG tablet Take 1 tablet by mouth daily 90 tablet 3     medical cannabis (Patient's own supply.  Not a prescription) See Admin Instructions (This is NOT a prescription, and does not certify that the patient has a qualifying medical condition for medical cannabis.  The purpose of this order is  to document that the patient reports taking medical cannabis.)   Taking 6-9 red capsules daily       MULTI-VITAMIN OR TABS 1 TABLET DAILY       nortriptyline (PAMELOR) 10 MG capsule Take 1 capsule (10 mg) by mouth At Bedtime 60 capsule 0     nystatin (MYCOSTATIN) 122210 UNIT/GM external cream Apply topically 2 times daily To feet and toenails. 90 g 2     predniSONE  (DELTASONE) 2.5 MG tablet Take 1 tablet (2.5 mg) by mouth daily May repeat once daily as needed 100 tablet 1     senna-docusate (SENOKOT-S;PERICOLACE) 8.6-50 MG per tablet Take 1 tablet by mouth 2 times daily 120 tablet 12     simvastatin (ZOCOR) 40 MG tablet Take 1 tablet (40 mg) by mouth At Bedtime 90 tablet 3       Medical History: any changes in medical history since they were last seen? No    Social History:  Home situation: . 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  Tobacco use: none  Alcohol use: very little, about 2 drinks per month  Drug use: none  History of chemical dependency treatment: none    Is patient a current smoker or tobacco user?  no  If yes, was cessation counseling offered?  no        Review of Systems:   The 14 system ROS was reviewed with patient and is positive for:  Constitutional: fever/chills, fatigue, weight gain, weight loss  Eyes/Head: headache, dizziness  ENT: ringing in ears  Allergy/Immune: allergies  Skin: itching, rash, hives  Hematologic: easy bruising  Respiratory: cough, wheezing, shortness of breath  Cardiovascular: swelling in feet, fainting, palpitations, chest pain  GI: abdominal pain, nausea, vomiting, diarrhea, constipation  Endocrine: steroid use  Musculoskeletal:  joint pain, arthritis, stiffness, gout, back pain, neck pain  Urinary: frequency, urgency, incontinence, hesitancy  Neurologic: weakness, numbness/tingling, seizure, stroke, memory loss  Mental health: depression, anxiety, stress, suicidal ideation           Physical Exam:  Vital signs: There were no vitals taken for this visit.    Behavioral observations:  Awake and alert, cooperative  Pulm: respirations easy and unlabored. Able to speak in full sentences without SOB or cough noted.            Imaging  MRI LUMBAR SPINE WITHOUT CONTRAST   3/30/2018 1:22 PM      HISTORY:  Lumbar degenerative disc disease.     TECHNIQUE:  Multiplanar multisequence MRI of the lumbar spine without  contrast.     COMPARISON: Lumbar spine x-ray 3/22/2018. Lumbar spine MR 3/17/2017.      FINDINGS: There are 5 lumbar-type vertebral bodies assumed for the  purposes of this dictation. The tip of the conus terminates at the  L1-L2 level.     There is convex right scoliotic curvature of the lumbar spine centered  at the L2-L3 level. There is mild retrolisthesis of L2 on L3 and mild  anterolisthesis of L3 on L4. There is grade 1 anterolisthesis of L4 on  L5. There is right lateral listhesis of L3 on L4. There is slight loss  of left-sided vertebral body height at L3, probably due to  degenerative changes. Severe loss of intervertebral disc height seen  at L3-L4 with associated degenerative endplate changes. Severe loss of  disc height also seen on the left at L1-L2 and L2-L3 with severe  degenerative endplate changes. Mild loss of disc height at T11-12 and  T12-L1 with disc desiccation at L4-5 and L5-S1. There is mild STIR  hyperintense marrow edema at the opposing L2-L3 endplates.  Degenerative subchondral cysts seen anteriorly at L3.     Level by level as follows:      T12-L1:  Only seen on the lateral view, but there is a posterior disc  bulge and bilateral facet hypertrophy resulting in moderate right and  mild left neural foraminal narrowing. No significant spinal canal  narrowing.      L1-L2:  Convex right curvature with retrolisthesis and circumferential  disc bulge with endplate osteophytic spurring as well as bilateral  facet hypertrophy. Findings result in mild central spinal canal  narrowing and moderate bilateral neural foraminal narrowing.      L2-L3:  Convex right scoliotic curvature with left-sided disc bulge  and endplate osteophytic spurring as well as, left greater than right,  facet hypertrophy and ligamentum flavum infolding. Findings result in  moderate central spinal canal narrowing as well as moderate to severe  left neural foraminal  narrowing. There is mild right neural foraminal  narrowing.      L3-L4:  Mild anterolisthesis along with circumferential disc bulge,  severe bilateral facet hypertrophy, and ligamentum flavum infolding.  Findings result in severe central spinal canal narrowing. There is  also severe left and moderate to severe right neural foraminal  narrowing.      L4-L5:  Anterolisthesis with uncovering of the disc and small  posterior disc bulge along with severe bilateral facet hypertrophy and  ligamentum flavum infolding. Findings result in moderate to severe  central spinal canal narrowing. There is also moderate to severe  bilateral neural foraminal narrowing. Small bilateral facet joint  effusions.      L5-S1:  Mild posterior disc bulge and marked bilateral facet  hypertrophy results in moderate bilateral neural foraminal narrowing,  right greater than left. No significant central spinal canal  narrowing.      Paraspinous soft tissues:  Normal.          IMPRESSION:    1. Severe multilevel degenerative changes throughout the lumbar spine  as described above. Overall, there is no significant change since  previous MR 3/17/2017.  2. Severe central spinal canal narrowing at L3-L4 and moderate to  severe spinal canal narrowing at L4-L5.  3. Convex right scoliotic curvature of the spine with multiple levels  of spondylolisthesis and right lateral listhesis of L3 on L4.     DIANA HERNANDEZ MD    [place] Prescription Monitoring [place]:  [place]ed [place]  10/2/2020- no concerning fills. One script for Norco 5/325mg from Dr. Tej Engle on 5/19/2020   Marion SANCHES RN CNP, FNP  [facility]      Assessment:   1. Chronic right SI joint pain  2. Sacroiliac joint dysfunction of right side  3. Greater trochanteric bursitis of right hip  4. Right hip joint pain  5. Right hip osteoarthritis  6. DDD (degenerative disc disease), lumbar  7. Spinal stenosis of lumbar region with neurogenic claudication      Plan:  "  1. Physical Therapy:  None at present  2. Clinical Health Psychologist: I agree with outside counseling as you are.  3. Diagnostic Studies:  None  4. Medication Management:    1. START Cymbalta (Duloxetine) 20mg every day in the morning. Take at bedtime if it makes you tired. It is common to experience a little headache, stomach feels \"unsettled\" and looser bowel movements for the first 3-10 days you take this medication. STOP the medication fi your mood gets worse or you feel like hurting or killing yourself  2. Continue Lidocaine patches   3. Continue medical cannabis.   5. Further procedures recommended: I am checking with Dr. Eid re: his thoughts on further injections   6. Referral to [facility] for leg length measurement and treatment if needed   7. Recommendations to PCP: none  8. Follow up: 4 weeks telephone        Phone call duration: 42 minutes    Marion SANCHES RN CNP, FNP  [facility]  [facility] [place]        "

## 2020-10-02 NOTE — TELEPHONE ENCOUNTER
Patient's daughter Brooke called back. The weight loss is something the family is aware of, patient doesn't have much of an appetite when in pain due to her hip. She also tends to be more forgetful when in extreme pain. There is a phone appointment scheduled for 10/02/20 and a f/u with  on 10/28/20. Daughter was appreciative of the follow up call, but declined scheduling anything else.

## 2020-10-02 NOTE — TELEPHONE ENCOUNTER
Please offer patient an in person 40 minute appointment to discuss her weight loss and fall history with any of our [facility] providers.

## 2020-10-07 ENCOUNTER — VIRTUAL VISIT (OUTPATIENT)
Dept: BEHAVIORAL HEALTH | Facility: CLINIC | Age: 85
End: 2020-10-07
Payer: COMMERCIAL

## 2020-10-07 DIAGNOSIS — F32.1 MODERATE MAJOR DEPRESSION (H): ICD-10-CM

## 2020-10-07 DIAGNOSIS — F41.0 PANIC DISORDER WITHOUT AGORAPHOBIA: Primary | ICD-10-CM

## 2020-10-07 PROCEDURE — 99207 PR NO BILLABLE SERVICE THIS VISIT: CPT | Performed by: SOCIAL WORKER

## 2020-10-07 NOTE — PROGRESS NOTES
Discharge Summary  Multiple Sessions    Client Name: Teresa Lopez MRN#: [id number] YOB: 1935    Discharge Date:   October 7, 2020    Service Modality: Video Visit:    Telemedicine Visit: The patient's condition can be safely assessed and treated via synchronous audio and visual telemedicine encounter.      Reason for Telemedicine Visit: Services only offered telehealth    Originating Site (Patient Location): Patient's home    Distant Site (Provider Location): Provider Remote Setting Provider's home    Consent:  The patient/guardian has verbally consented to: the potential risks and benefits of telemedicine (video visit) versus in person care; bill my insurance or make self-payment for services provided; and responsibility for payment of non-covered services.     Patient would like the video invitation sent by: [employer]    Mode of Communication:  Video Conference via [employer]    As the provider I attest to compliance with applicable laws and regulations related to telemedicine.    Service Type: Individual      Session Start Time: 11am  Session End Time: 11:35am      Session Length: 35 minutes     Session #: 4     Attendees: Client, client's daughter Kenan, and MSW intern Toño Marti      Focus of Treatment Objective(s):  Client's presenting concerns included: Anxiety - and panic related to isolation and physical health concerns  Stage of Change at time of Discharge: ACTION (Actively working towards change)    Medication Adherence:  NA No anxiety medication at this time    Chemical Use:  NA    Assessment: Current Emotional / Mental Status (status of significant symptoms):    Risk status (Self / Other harm or suicidal ideation)  Client denies current fears or concerns for personal safety.  Client denies current or recent suicidal ideation or behaviors.  Client denies current or recent homicidal ideation or behaviors.  Client denies current or recent self injurious behavior or  "ideation.  Client denies other safety concerns.  A safety and risk management plan has not been developed at this time, however client was given the after-hours number should there be a change in any of these risk factors.    Appearance:   Appropriate   Eye Contact:   Good   Psychomotor Behavior: Normal   Attitude:   Cooperative   Orientation:   All  Speech   Rate / Production: Normal    Volume:  Normal   Mood:    Normal  Affect:    Appropriate   Thought Content:  Rumination   Thought Form:  Tangential  Some difficulty finding logical answers to questions; thought content mostly focused on pain   Insight:   Fair     DSM5 Diagnoses: (Sustained by DSM5 Criteria Listed Above)  Diagnoses: 300.01 (F41.0) Panic Disorder  Psychosocial & Contextual Factors: Increased isolation due to covid and decreased exercise due to physical pain    Reason for Discharge:  Client is managing anxiety symptoms adequately; her daughter reports there have been fewer instances of panic and there is a plan in place to help increase activity and socialization. Client may not benefit further from long term therapy due to limited insight into how anxiety is a factor in her health but is actively working on \"feeling better\" and managing symptoms. Client can return to therapy at any time if it becomes beneficial      Aftercare Plan:  Client may resume counseling services at any time in the future by calling the [facility] Intake Office, [phone].      ALISON Warner, LICSW  October 7, 2020    "

## 2020-10-08 ENCOUNTER — OFFICE VISIT (OUTPATIENT)
Dept: OPHTHALMOLOGY | Facility: CLINIC | Age: 85
End: 2020-10-08
Payer: COMMERCIAL

## 2020-10-08 DIAGNOSIS — H52.03 HYPEROPIA OF BOTH EYES: ICD-10-CM

## 2020-10-08 DIAGNOSIS — H43.813 POSTERIOR VITREOUS DETACHMENT OF BOTH EYES: ICD-10-CM

## 2020-10-08 DIAGNOSIS — H02.834 DERMATOCHALASIS OF BOTH UPPER EYELIDS: ICD-10-CM

## 2020-10-08 DIAGNOSIS — H52.223 REGULAR ASTIGMATISM OF BOTH EYES: ICD-10-CM

## 2020-10-08 DIAGNOSIS — H52.4 PRESBYOPIA: ICD-10-CM

## 2020-10-08 DIAGNOSIS — H25.813 COMBINED FORM OF AGE-RELATED CATARACT, BOTH EYES: ICD-10-CM

## 2020-10-08 DIAGNOSIS — H02.831 DERMATOCHALASIS OF BOTH UPPER EYELIDS: ICD-10-CM

## 2020-10-08 DIAGNOSIS — Z01.00 EXAMINATION OF EYES AND VISION: Primary | ICD-10-CM

## 2020-10-08 PROCEDURE — 92014 COMPRE OPH EXAM EST PT 1/>: CPT | Performed by: STUDENT IN AN ORGANIZED HEALTH CARE EDUCATION/TRAINING PROGRAM

## 2020-10-08 PROCEDURE — 92015 DETERMINE REFRACTIVE STATE: CPT | Performed by: STUDENT IN AN ORGANIZED HEALTH CARE EDUCATION/TRAINING PROGRAM

## 2020-10-08 ASSESSMENT — VISUAL ACUITY
OD_CC+: -2
CORRECTION_TYPE: GLASSES
OD_CC: 20/40
METHOD: SNELLEN - LINEAR
OS_CC: 20/40
OS_CC+: +2

## 2020-10-08 ASSESSMENT — REFRACTION_WEARINGRX
OD_SPHERE: +1.50
OD_CYLINDER: +1.00
OD_AXIS: 165
OS_ADD: +2.90
OS_AXIS: 034
OD_ADD: +2.87
OS_CYLINDER: +1.00
SPECS_TYPE: PAL
OS_SPHERE: +1.75

## 2020-10-08 ASSESSMENT — EXTERNAL EXAM - LEFT EYE: OS_EXAM: MILD TO MOD BROW, 2+ BROW PTOSIS

## 2020-10-08 ASSESSMENT — REFRACTION_MANIFEST
OS_SPHERE: +1.50
OS_ADD: +3.00
OD_AXIS: 165
OS_CYLINDER: +1.25
OD_CYLINDER: +1.50
OD_SPHERE: +1.75
OD_ADD: +3.00
OS_AXIS: 010

## 2020-10-08 ASSESSMENT — TONOMETRY
OS_IOP_MMHG: 15
OD_IOP_MMHG: 15
IOP_METHOD: APPLANATION

## 2020-10-08 ASSESSMENT — CONF VISUAL FIELD
OS_NORMAL: 1
OD_NORMAL: 1

## 2020-10-08 ASSESSMENT — EXTERNAL EXAM - RIGHT EYE: OD_EXAM: MILD TO MOD BROW, 2+ BROW PTOSIS

## 2020-10-08 ASSESSMENT — CUP TO DISC RATIO
OD_RATIO: 0.3
OS_RATIO: 0.3

## 2020-10-08 NOTE — PROGRESS NOTES
Current Eye Medications:  none     Subjective:  Here for complete today. Got new glasses last time. Sits on the couch and hard to read the TV screen.  Gets injections in the back soon for pain and arthritis.  She says she does not want any surgery, says she is too old.      Objective:  See Ophthalmology Exam.       Assessment:  Teresa Lopez is a 85 year old female who presents with:   Encounter Diagnoses   Name Primary?     Examination of eyes and vision      Hyperopia of both eyes      Regular astigmatism of both eyes      Presbyopia        Combined form of age-related cataract, both eyes Best corrected visual acuity 20/30 in each eye. Generally happy with vision.      Posterior vitreous detachment of both eyes      Dermatochalasis of both upper eyelids        Plan:  Glasses prescription given - optional to update    Heidi Galvez MD  [phone]      
none

## 2020-10-08 NOTE — LETTER
10/8/2020         RE: Teresa Lopez  [address]  [place] [address]        Dear Colleague,    Thank you for referring your patient, Teresa Lopez, to the [facility]. Please see a copy of my visit note below.     Current Eye Medications:  none     Subjective:  Here for complete today. Got new glasses last time. Sits on the couch and hard to read the TV screen.  Gets injections in the back soon for pain and arthritis.  She says she does not want any surgery, says she is too old.      Objective:  See Ophthalmology Exam.       Assessment:  Teresa Lopez is a 85 year old female who presents with:   Encounter Diagnoses   Name Primary?     Examination of eyes and vision      Hyperopia of both eyes      Regular astigmatism of both eyes      Presbyopia        Combined form of age-related cataract, both eyes Best corrected visual acuity 20/30 in each eye. Generally happy with vision.      Posterior vitreous detachment of both eyes      Dermatochalasis of both upper eyelids        Plan:  Glasses prescription given - optional to update    Heidi Galvez MD  [phone]          Again, thank you for allowing me to participate in the care of your patient.        Sincerely,        Heidi Galvez MD

## 2020-10-13 ENCOUNTER — MYC MEDICAL ADVICE (OUTPATIENT)
Dept: PALLIATIVE MEDICINE | Facility: CLINIC | Age: 85
End: 2020-10-13

## 2020-10-13 NOTE — TELEPHONE ENCOUNTER
Mahin Paulino-     I have made contact with Dr. Eid. He feels we should hold off for one more month as that will stretch it out a bit. Using ice, heat or Aspercreme with 4% lidocaine cream or Voltaren gel 1% cream, both of these are found over-the-counter and can be helpful for the lateral hip pain.      If it continues to bother as it is now, send me a message in 4 weeks and I will put an order in for a repeat injection at that time per Dr. Eid's recommendations.      Thanks.  Marion SANCHES RN CNP, FNP  [facility]  [facility] [place]    I have sent the above [employer] message to the patient.     Marion SANCHES RN CNP, P  [facility]    
Per patient myChart message:    From: Jefferson Lopez      Created: 10/13/2020 1:05 PM      Good Afternoon.  I was wondering if you received an OK from Dr. Eid for a shot in my hip.       Thank you,  Teresa Lopez      ___________________    Per Marion Munoz, APRN CNP's 10/2/20 AVS:  5. Further procedures recommended: I am checking with Dr. Eid re: his thoughts on further injections     Routed to provider.    Natalie, RN-BSN  [facility]    
car

## 2020-10-26 DIAGNOSIS — G89.4 CHRONIC PAIN DISORDER: ICD-10-CM

## 2020-10-27 NOTE — TELEPHONE ENCOUNTER
Routing refill request to provider for review/approval because:  Drug not on the [facility] refill protocol     Olivia TAN, RN

## 2020-11-04 ENCOUNTER — VIRTUAL VISIT (OUTPATIENT)
Dept: PALLIATIVE MEDICINE | Facility: CLINIC | Age: 85
End: 2020-11-04
Payer: COMMERCIAL

## 2020-11-04 DIAGNOSIS — M25.551 PAIN OF RIGHT HIP JOINT: ICD-10-CM

## 2020-11-04 DIAGNOSIS — M51.369 DDD (DEGENERATIVE DISC DISEASE), LUMBAR: ICD-10-CM

## 2020-11-04 DIAGNOSIS — M16.11 PRIMARY OSTEOARTHRITIS OF RIGHT HIP: ICD-10-CM

## 2020-11-04 DIAGNOSIS — M48.062 SPINAL STENOSIS OF LUMBAR REGION WITH NEUROGENIC CLAUDICATION: ICD-10-CM

## 2020-11-04 DIAGNOSIS — M70.61 GREATER TROCHANTERIC BURSITIS OF RIGHT HIP: Primary | ICD-10-CM

## 2020-11-04 PROCEDURE — 99214 OFFICE O/P EST MOD 30 MIN: CPT | Mod: 95 | Performed by: NURSE PRACTITIONER

## 2020-11-04 RX ORDER — MULTIVITAMIN WITH IRON
1 TABLET ORAL DAILY
COMMUNITY

## 2020-11-04 ASSESSMENT — PAIN SCALES - GENERAL: PAINLEVEL: WORST PAIN (10)

## 2020-11-04 NOTE — PATIENT INSTRUCTIONS
"Plan:   1. Physical Therapy:  None at present  2. Clinical Health Psychologist: I agree with outside counseling as you are.  3. Diagnostic Studies:  None  4. Medication Management:    1. START Cymbalta (Duloxetine) 20mg every day in the morning. Take at bedtime if it makes you tired. It is common to experience a little headache, stomach feels \"unsettled\" and looser bowel movements for the first 3-10 days you take this medication. STOP the medication fi your mood gets worse or you feel like hurting or killing yourself  2. Continue Lidocaine patches   3. Continue medical cannabis.   5. Further procedures recommended: repeat right greater trochanteric bursal injection, our clinic to contact pt  6. Recommendations to PCP: none  7. Follow up: 6-8 weeks telephone  8. Let me know how Jefferson is doing on the Cymbalta in about 3 weeks via [employer]. We can adjust dosage upward if needed as long as it is tolerated well.     ----------------------------------------------------------------  Clinic Number:  [phone]     Call with any questions about your care and for scheduling assistance.     Calls are returned Monday through Friday between 8 AM and 4:30 PM. We usually get back to you within 2 business days depending on the issue/request.    If we are prescribing your medications:    For opioid medication refills, call the clinic or send a Goodpatch message 7 days in advance.  Please include:    Name of requested medication    Name of the pharmacy.    For non-opioid medications, call your pharmacy directly to request a refill. Please allow 3-4 days to be processed.     Per [place] State Law:    All controlled substance prescriptions must be filled within 30 days of being written.      For those controlled substances allowing refills, pickup must occur within 30 days of last fill.      We believe regular attendance is key to your success in our program!      Any time you are unable to keep your appointment we ask that you call us at " least 24 hours in advance to cancel.This will allow us to offer the appointment time to another patient.     Multiple missed appointments may lead to dismissal from the clinic.

## 2020-11-04 NOTE — PROGRESS NOTES
"Teresa Lopez is a 85 year old female who is being evaluated via a billable telephone visit.      The patient has been notified of following:     \"This telephone visit will be conducted via a call between you and your physician/provider. We have found that certain health care needs can be provided without the need for a physical exam.  This service lets us provide the care you need with a short phone conversation.  If a prescription is necessary we can send it directly to your pharmacy.  If lab work is needed we can place an order for that and you can then stop by our lab to have the test done at a later time.    Telephone visits are billed at different rates depending on your insurance coverage. During this emergency period, for some insurers they may be billed the same as an in-person visit.  Please reach out to your insurance provider with any questions.    If during the course of the call the physician/provider feels a telephone visit is not appropriate, you will not be charged for this service.\"    Patient has given verbal consent for Telephone visit?  Yes    What phone number would you like to be contacted at? [phone]    How would you like to obtain your AVS? Tesfaye Ojeda CMA ([facility]    11/4/2020     Teresa Lopez is a 85 year old female who is being evaluated via a billable telephone visit.        Chief complaint:  chronic low back pain and right leg pain      Interval history:  Teresa Lopez is a 85 year old female is known to me for .   Primary osteoarthritis    Chronic right hip pain   Chronic bilateral low back without sciatica   Spinal stenosis of lumbar region with neurogenic claudication   Piriformis syndrome of both sides   PMHx DJD, CVA, HTN, diabetes mellitus, and cataract   PSHx Hysterectomy      Recommendations/plan at the last visit on 10/2/2020 included:  1. Physical Therapy:  None at present  2. Clinical Health Psychologist: I " "agree with outside counseling as you are.  3. Diagnostic Studies:  None  4. Medication Management:    1. START Cymbalta (Duloxetine) 20mg every day in the morning. Take at bedtime if it makes you tired. It is common to experience a little headache, stomach feels \"unsettled\" and looser bowel movements for the first 3-10 days you take this medication. STOP the medication fi your mood gets worse or you feel like hurting or killing yourself  2. Continue Lidocaine patches   3. Continue medical cannabis.   5. Further procedures recommended: I am checking with Dr. Eid re: his thoughts on further injections   6. Referral to [facility] for leg length measurement and treatment if needed   7. Recommendations to PCP: none  8. Follow up: 4 weeks telephone          Since her last visit, Teresa Lopez reports:    Interval history 11/4/2020  -low back pain and lateral hip pain on the right side.    -new shoe insert about a week ago, her daughter Abdi states this has helped her balance.   -Jefferson states she has some \"snapping\" in the right hip.   -Jefferson has not yet begun the Cymbalta, she wanted to try the shoe insert first  -Jefferson has had a tough day especially on Sunday 11/1, she wasn't walking around as much as usual.  -patient is with her daughter Abdi throughout the encounter          Interval history 10/2/2020  -right SI joint injection has been helpful  -She is having pain in the right side of the hip, laterally, not anterior groin pain  -patient feels her left leg is longer than the right leg  -She has been more active  -she is seeing a counselor, has been seen x 3          Interval history 8/26/2020  -She states she is not doing very well sometimes.   -she feels her right hip is \"very bad.\"   -She had a 7/30/2020 right greater trochanteric bursal injection, her pain came down from an 8/10 to a 2/10 for her right GT.  -still has some trouble standing due to a spot that is painful, she is describing a spot of " "pain over the SI joint. This tends to hurt when she stands to do things and feels better when she is sitting down.   -she accompanied today by her daughter Abdi on the phone today  -recertified for medical cannabis today  -she has a hammer toe and this has been really painful for her the past 2 days.       Interval history 7/15/2020  -she is not feeling well  -she sleeps with a pillow between her legs  -she notes she is having \"bad pain\" on the lateral hip on the right side. She has pain when she lays on her right side in bed. She has had a greater trochanteric bursal injection in June 2019 that was helpful.   -she is not eating well as she is isolated and feels depressed and feels shaky as she doesn't eat as regularly as she should.   -discussed using Voltaren gel, Cymbalta and greater trochanteric injection      Interval history 5/6/2020  -Teresa is having some more hip pain on the right side between the top of the hip and up and the tailbone to the hip  -SI joint injection on the right side was beneficial and this has made her pain more focused on the right hip.   -she has pain if she lays on her right side  -she is having more pain in the deep right groin. She notes when she walks this pain is markedly worse.         At this point, the patient's participation with our multidisciplinary team includes:  The patient has been compliant with the program  PT - patient is interested  Health Psych  None    Pain scores:  Pain intensity on average is 7-8 on a scale of 0-10.    Range is 7-10/10.   Pain right now is 10/10   Pain is described as \"unbearable, tiring, exhausting, miserable, nagging, and gnawing.\"  Pain is continuous in nature and worse in the morning.    Current pain-related medication treatments include:  -Lidocaine patch (helpful)  -Medical cannabis (helpful)  -Cymbalta 20mg/day (has not yet started this medication)       Other pertinent medications:  -Senna-docusate PRN      Previous medication treatments " included:  OPIATES:hydrocodone (not helpful), oxycodone (somewhat helpful)  NSAIDS: ibuprofen (not helpful), Aleve (not helpful)  MUSCLE RELAXANTS: none  ANTI-MIGRAINE MEDS: none  ANTI-DEPRESSANTS: none  SLEEP AIDS: none  ANTI-CONVULSANTS: gabapentin (unsure if helpful, side effects: hot flashes, fogginess)  TOPICALS: none  Other meds: Tylenol (not helpful)    Injections:  -11/10/2017 Caudal epidural steroid injection with Dr. Reymundo Bajwa (helpful for a very short period of time)  -1/5/2018 Ultrasound guided right intra-articular hip injection with Dr. Jean Meade  -1/12/2018 Right ankle injection with Dr. Lewis of podiatry.   -7/3/2018 L2-3 interlaminar epidural steroid injection with Dr Jamaal Eid (not at all helpful)  -8/3/2018 right hip steroid injection with Dr. Valverde (helped some)  -6/14/2019 bilateral hip greater trochanteric bursa injection with Dr. Jamaal Eid (helpful)   -1/23/2020 right SI joint injection with Dr. Chasidy Laura (quite helpful)  5/13/2020 right hip joint steroid injection with Dr. Valverde (quite helpful)  -7/30/2020 right hip greater trochanteric bursal injection with Dr. Jamaal Eid (quite helpful)  -9/15/2020 right SI joint injection with Dr. Jamaal Eid (pain diminished about 25%, sharpness went away)    Side Effects: no side effect  Patient is using the medication as prescribed: YES    Medications:  Current Outpatient Medications   Medication Sig Dispense Refill     Acetaminophen (TYLENOL ARTHRITIS PAIN PO) As needed not taking daily       ADVIL 200 MG PO CAPS Reported on 4/20/2017       Aspirin-Acetaminophen-Caffeine (EXCEDRIN PO)        CALCIUM 1200 OR Reported on 4/20/2017       DULoxetine (CYMBALTA) 20 MG capsule Take 1 capsule (20 mg) by mouth daily 30 capsule 1     econazole nitrate 1 % external cream Apply topically daily To feet and toenails. 85 g 3     Glucosamine-Chondroit-Vit C-Mn (GLUCOSAMINE 1500 COMPLEX PO) Take 1,500 mg by mouth daily Reported on  4/20/2017       lidocaine (LIDODERM) 5 % patch Place 3 patches onto the skin every 24 hours 90 patch 11     Loratadine (CLARITIN) 10 MG capsule Take 10 mg by mouth as needed Reported on 4/20/2017       losartan-hydrochlorothiazide (HYZAAR) 100-25 MG tablet Take 1 tablet by mouth daily 90 tablet 3     medical cannabis (Patient's own supply.  Not a prescription) See Admin Instructions (This is NOT a prescription, and does not certify that the patient has a qualifying medical condition for medical cannabis.  The purpose of this order is  to document that the patient reports taking medical cannabis.)   Taking 6-9 red capsules daily       MULTI-VITAMIN OR TABS 1 TABLET DAILY       nortriptyline (PAMELOR) 10 MG capsule Take 1 capsule (10 mg) by mouth At Bedtime 60 capsule 0     nystatin (MYCOSTATIN) 162805 UNIT/GM external cream Apply topically 2 times daily To feet and toenails. 90 g 2     predniSONE (DELTASONE) 2.5 MG tablet Take 1 tablet (2.5 mg) by mouth daily May repeat once daily as needed 100 tablet 1     senna-docusate (SENOKOT-S;PERICOLACE) 8.6-50 MG per tablet Take 1 tablet by mouth 2 times daily 120 tablet 12     simvastatin (ZOCOR) 40 MG tablet Take 1 tablet (40 mg) by mouth At Bedtime 90 tablet 3       Medical History: any changes in medical history since they were last seen? No    Social History:  Home situation: . 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  Tobacco use: none  Alcohol use: very little, about 2 drinks per month  Drug use: none  History of chemical dependency treatment: none    Is patient a current smoker or tobacco user?  no  If yes, was cessation counseling offered?  no        Review of Systems:   The 14 system ROS was reviewed with patient and is positive for:  Constitutional: fever/chills, fatigue, weight gain, weight loss  Eyes/Head: headache, dizziness  ENT: ringing in ears  Allergy/Immune: allergies  Skin:  itching, rash, hives  Hematologic: easy bruising  Respiratory: cough, wheezing, shortness of breath  Cardiovascular: swelling in feet, fainting, palpitations, chest pain  GI: abdominal pain, nausea, vomiting, diarrhea, constipation  Endocrine: steroid use  Musculoskeletal:  joint pain, arthritis, stiffness, gout, back pain, neck pain  Urinary: frequency, urgency, incontinence, hesitancy  Neurologic: weakness, numbness/tingling, seizure, stroke, memory loss (short term memory loss)  Mental health: depression, anxiety, stress, suicidal ideation           Physical Exam:  Vital signs: There were no vitals taken for this visit.    Behavioral observations:  Awake and alert, cooperative  Pulm: respirations easy and unlabored. Able to speak in full sentences without SOB or cough noted.            Imaging  MRI LUMBAR SPINE WITHOUT CONTRAST   3/30/2018 1:22 PM      HISTORY:  Lumbar degenerative disc disease.     TECHNIQUE: Multiplanar multisequence MRI of the lumbar spine without  contrast.     COMPARISON: Lumbar spine x-ray 3/22/2018. Lumbar spine MR 3/17/2017.      FINDINGS: There are 5 lumbar-type vertebral bodies assumed for the  purposes of this dictation. The tip of the conus terminates at the  L1-L2 level.     There is convex right scoliotic curvature of the lumbar spine centered  at the L2-L3 level. There is mild retrolisthesis of L2 on L3 and mild  anterolisthesis of L3 on L4. There is grade 1 anterolisthesis of L4 on  L5. There is right lateral listhesis of L3 on L4. There is slight loss  of left-sided vertebral body height at L3, probably due to  degenerative changes. Severe loss of intervertebral disc height seen  at L3-L4 with associated degenerative endplate changes. Severe loss of  disc height also seen on the left at L1-L2 and L2-L3 with severe  degenerative endplate changes. Mild loss of disc height at T11-12 and  T12-L1 with disc desiccation at L4-5 and L5-S1. There is mild STIR  hyperintense marrow edema at  the opposing L2-L3 endplates.  Degenerative subchondral cysts seen anteriorly at L3.     Level by level as follows:      T12-L1:  Only seen on the lateral view, but there is a posterior disc  bulge and bilateral facet hypertrophy resulting in moderate right and  mild left neural foraminal narrowing. No significant spinal canal  narrowing.      L1-L2:  Convex right curvature with retrolisthesis and circumferential  disc bulge with endplate osteophytic spurring as well as bilateral  facet hypertrophy. Findings result in mild central spinal canal  narrowing and moderate bilateral neural foraminal narrowing.      L2-L3:  Convex right scoliotic curvature with left-sided disc bulge  and endplate osteophytic spurring as well as, left greater than right,  facet hypertrophy and ligamentum flavum infolding. Findings result in  moderate central spinal canal narrowing as well as moderate to severe  left neural foraminal narrowing. There is mild right neural foraminal  narrowing.      L3-L4:  Mild anterolisthesis along with circumferential disc bulge,  severe bilateral facet hypertrophy, and ligamentum flavum infolding.  Findings result in severe central spinal canal narrowing. There is  also severe left and moderate to severe right neural foraminal  narrowing.      L4-L5:  Anterolisthesis with uncovering of the disc and small  posterior disc bulge along with severe bilateral facet hypertrophy and  ligamentum flavum infolding. Findings result in moderate to severe  central spinal canal narrowing. There is also moderate to severe  bilateral neural foraminal narrowing. Small bilateral facet joint  effusions.      L5-S1:  Mild posterior disc bulge and marked bilateral facet  hypertrophy results in moderate bilateral neural foraminal narrowing,  right greater than left. No significant central spinal canal  narrowing.      Paraspinous soft tissues:  Normal.          IMPRESSION:    1. Severe multilevel degenerative changes throughout  "the lumbar spine  as described above. Overall, there is no significant change since  previous MR 3/17/2017.  2. Severe central spinal canal narrowing at L3-L4 and moderate to  severe spinal canal narrowing at L4-L5.  3. Convex right scoliotic curvature of the spine with multiple levels  of spondylolisthesis and right lateral listhesis of L3 on L4.     DIANA HERNANDEZ MD    [place] Prescription Monitoring [place]:  [place]  11/4/2020- no concerning fills. One script for Norco 5/325mg from Dr. Tej Engle on 5/19/2020   Marion SANCHES, RN CNP, FNP  [facility]      Assessment:   1. Greater trochanteric bursitis of right hip  2. Right hip joint pain  3. Right hip osteoarthritis  4. DDD (degenerative disc disease), lumbar  5. Spinal stenosis of lumbar region with neurogenic claudication      Plan:   1. Physical Therapy:  None at present  2. Clinical Health Psychologist: I agree with outside counseling as you are.  3. Diagnostic Studies:  None  4. Medication Management:    1. START Cymbalta (Duloxetine) 20mg every day in the morning. Take at bedtime if it makes you tired. It is common to experience a little headache, stomach feels \"unsettled\" and looser bowel movements for the first 3-10 days you take this medication. STOP the medication fi your mood gets worse or you feel like hurting or killing yourself  2. Continue Lidocaine patches   3. Continue medical cannabis.   5. Further procedures recommended: repeat right greater trochanteric bursal injection, our clinic to contact pt  6. Recommendations to PCP: none  7. Follow up: 6-8 weeks telephone  8. Let me know how Jefferson is doing on the Cymbalta in about 3 weeks via [employer]. We can adjust dosage upward if needed as long as it is tolerated well.         Phone call duration: 30 minutes    Marion SANCHES RN CNP, FNP  [facility]  [facility]"

## 2020-11-05 ENCOUNTER — TELEPHONE (OUTPATIENT)
Dept: PALLIATIVE MEDICINE | Facility: CLINIC | Age: 85
End: 2020-11-05

## 2020-11-09 ENCOUNTER — VIRTUAL VISIT (OUTPATIENT)
Dept: FAMILY MEDICINE | Facility: CLINIC | Age: 85
End: 2020-11-09
Payer: COMMERCIAL

## 2020-11-09 DIAGNOSIS — G89.4 CHRONIC PAIN DISORDER: ICD-10-CM

## 2020-11-09 PROCEDURE — 98967 PH1 ASSMT&MGMT NQHP 11-20: CPT | Performed by: PHYSICIAN ASSISTANT

## 2020-11-09 RX ORDER — PREDNISONE 2.5 MG/1
2.5 TABLET ORAL DAILY
Qty: 120 TABLET | Refills: 0 | Status: SHIPPED | OUTPATIENT
Start: 2020-11-09 | End: 2020-12-30

## 2020-11-09 NOTE — PROGRESS NOTES
"Teresa Lopez is a 85 year old female who is being evaluated via a billable telephone visit.      The patient has been notified of following:     \"This telephone visit will be conducted via a call between you and your physician/provider. We have found that certain health care needs can be provided without the need for a physical exam.  This service lets us provide the care you need with a short phone conversation.  If a prescription is necessary we can send it directly to your pharmacy.  If lab work is needed we can place an order for that and you can then stop by our lab to have the test done at a later time.    Telephone visits are billed at different rates depending on your insurance coverage. During this emergency period, for some insurers they may be billed the same as an in-person visit.  Please reach out to your insurance provider with any questions.    If during the course of the call the physician/provider feels a telephone visit is not appropriate, you will not be charged for this service.\"    Patient has given verbal consent for Telephone visit?  Yes    What phone number would you like to be contacted at? [phone]    How would you like to obtain your AVS? MyChart    Subjective     Teresa Lopez is a 85 year old female who presents via phone visit today for the following health issues:    HPI     Concern - Prednisone refill - Haylee, daughter    Description: Pt has been having hip and back pain X 3 years. Pt is followed by Dr. Engle. Pt has been out of medication and is in a lot of pain.     Refilled on 9/1/20 x100 tabs, 1 refill - wasn't aware of this refill. Never received anything from the mail service    Takes 1 in the am, 1 in the afternoon PRN  Changing weather affects pain, pain is worse       Review of Systems   Constitutional, musculoskeletal, neuro systems are negative, except as otherwise noted.       Objective          Vitals:  No vitals were obtained today due to virtual visit.    healthy, " alert and no distress  PSYCH: Alert and oriented times 3; coherent speech, normal   rate and volume, able to articulate logical thoughts, able   to abstract reason, no tangential thoughts, no hallucinations   or delusions  Her affect is normal and pleasant  RESP: No cough, no audible wheezing, able to talk in full sentences  Remainder of exam unable to be completed due to telephone visits          Assessment/Plan:    Assessment & Plan     1. Chronic pain disorder        Prednisone refilled in Dr. Engle's absence, no changes, her pain seems to be adequately controlled with the low dose prednisone and PRN injections. She has an injection scheduled next week and pain management follow up in the next couple weeks.        Return in about 3 months (around 2/9/2021) for follow up with Dr. Engel.    Eliza Ma PA-C  [facility]    Phone call duration:  14 minutes

## 2020-11-10 ENCOUNTER — TELEPHONE (OUTPATIENT)
Dept: FAMILY MEDICINE | Facility: CLINIC | Age: 85
End: 2020-11-10

## 2020-11-10 NOTE — TELEPHONE ENCOUNTER
Central Prior Authorization Team   Phone: [phone]      PA Initiation    Medication: predniSONE (DELTASONE) 2.5 MG tablet - INITIATED  Insurance Company: [employer] ([facility]) - Phone [phone] Fax [phone]  Pharmacy Filling the Rx: [id number] - [place], [place] - [address] AT [facility] [address]  Filling Pharmacy Phone: [phone]  Filling Pharmacy Fax: [phone]  Start Date: 11/10/2020

## 2020-11-12 NOTE — TELEPHONE ENCOUNTER
Central Prior Authorization Team   Phone: [phone]      INS came back stating ID submitted was through Medical Benefit - resubmitted with pharmacy benefit ID.

## 2020-11-13 NOTE — TELEPHONE ENCOUNTER
Central Prior Authorization Team   Phone: [phone]      Prior Authorization Not Needed per Insurance    11/12/2020  Medication: predniSONE (DELTASONE) 2.5 MG tablet - NOT NEEDED  Insurance Company: [employer] ([facility]) - Phone [phone] Fax [phone]  Expected CoPay:      Pharmacy Filling the Rx: [id number] - [place], [place] - [address] [place] [address] [address] AT [facility] [address] [facility]  Pharmacy Notified: Yes  Patient Notified: Yes (**Instructed pharmacy to notify patient when script is ready to /ship.**)

## 2020-11-19 ENCOUNTER — OFFICE VISIT (OUTPATIENT)
Dept: PALLIATIVE MEDICINE | Facility: CLINIC | Age: 85
End: 2020-11-19
Payer: COMMERCIAL

## 2020-11-19 VITALS — SYSTOLIC BLOOD PRESSURE: 129 MMHG | DIASTOLIC BLOOD PRESSURE: 72 MMHG | HEART RATE: 98 BPM

## 2020-11-19 DIAGNOSIS — M70.61 GREATER TROCHANTERIC BURSITIS OF RIGHT HIP: Primary | ICD-10-CM

## 2020-11-19 DIAGNOSIS — I10 HYPERTENSION GOAL BP (BLOOD PRESSURE) < 140/90: ICD-10-CM

## 2020-11-19 PROCEDURE — 20610 DRAIN/INJ JOINT/BURSA W/O US: CPT | Mod: RT | Performed by: PAIN MEDICINE

## 2020-11-19 RX ORDER — TRIAMCINOLONE ACETONIDE 40 MG/ML
40 INJECTION, SUSPENSION INTRA-ARTICULAR; INTRAMUSCULAR ONCE
Status: COMPLETED | OUTPATIENT
Start: 2020-11-19 | End: 2020-11-19

## 2020-11-19 RX ORDER — BUPIVACAINE HYDROCHLORIDE 5 MG/ML
5 INJECTION, SOLUTION EPIDURAL; INTRACAUDAL ONCE
Status: COMPLETED | OUTPATIENT
Start: 2020-11-19 | End: 2020-11-19

## 2020-11-19 RX ADMIN — BUPIVACAINE HYDROCHLORIDE 25 MG: 5 INJECTION, SOLUTION EPIDURAL; INTRACAUDAL at 13:30

## 2020-11-19 RX ADMIN — TRIAMCINOLONE ACETONIDE 40 MG: 40 INJECTION, SUSPENSION INTRA-ARTICULAR; INTRAMUSCULAR at 13:30

## 2020-11-19 ASSESSMENT — PAIN SCALES - GENERAL: PAINLEVEL: WORST PAIN (10)

## 2020-11-19 NOTE — NURSING NOTE
Pre-procedure Intake    Have you been fasting? NA    If yes, for how long? No     Are you taking a prescribed blood thinner such as coumadin, Plavix, Xarelto?    No    If yes, when did you take your last dose? No     Do you take aspirin?  No    If cervical procedure, have you held aspirin for 6 days?   No     Do you have any allergies to contrast dye, iodine, steroid and/or numbing medications?  NO    Are you currently taking antibiotics or have an active infection?  NO    Have you had a fever/elevated temperature within the past week? NO    Are you currently taking oral steroids? NO    Do you have a ? Yes       Are you pregnant or breastfeeding?  NO    Are the vital signs normal?  Yes  An Menjivar MA

## 2020-11-19 NOTE — PATIENT INSTRUCTIONS
[facility]   Post Procedure Instructions    Today you had:bursa injection      Medications used: bupivicaine   kenolog         Go to the emergency room if you develop any shortness of breath    Monitor the injection sites for signs and symptoms of infection-fever, chills, redness, swelling, warmth, or drainage to areas.    You may have soreness at injection sites for up to 24 hours.    You may apply ice to the painful areas to help minimize the discomfort of the needle pokes.    Do not apply heat to sites for at least 12 hours.    You may use anti-inflammatory medications or Tylenol for pain control if necessary    Pain Clinic phone number during work hours Monday-Friday:  [phone]    After hours provider line: [phone]

## 2020-11-19 NOTE — PROGRESS NOTES
Pre procedure Diagnosis: Right hip pain, trochanteric bursitis   Post procedure Diagnosis: Same  Procedure performed: Right hip greater trochanteric bursa injection  Indication: Therapeutic for pain  Anesthesia: none  Complications: none  Operators: Jamaal Eid MD      Indications:    Patient was sent for treatment of chronic pain. The patient has a history of chronic bilateral hip pain. Exam shows tenderness to palpation at the Right greater trochanter. Other conservative treatments prior to injection include physical therapy, medications, and prior injections.     Options/alternatives, benefits and risks were discussed with the patient including bleeding, infection, tissue trauma, exposure to radiation, reaction to medications including seizure, nerve injury, weakness, and numbness. Questions were answered to their satisfaction and they agreed to proceed. Voluntary informed consent was obtained and signed.      Vitals were reviewed: Yes  There were no vitals taken for this visit.  Allergies were reviewed: Yes   Medications were reviewed: Yes   Pre-procedure pain score: 10/10     Procedure:  After obtaining signed informed consent, the patient was positioned in a left  lateral decubitus position.  A Pause for the Cause was performed.      After identifying the point of maximal tenderness at the right greater trochanter, the area was prepped in the usual sterile fashion. Then, a 27 gauge 3.5 inch spinal needle was advanced to contact bone at the right greater trochanter with positive pain reproduction and withdrawn 1-2 mm. Aspiration was negative. Then, 5 ml of a combination of [id number] mg and Bupivicaine 0.5% 4 ml was injected into the bursa and the needle was withdrawn. The injection site was cleaned and a bandaid was placed.     The patient tolerated the procedure well, and was taken to the recovery room.      Post-procedure pain score: 7/10  Follow-up includes:   -f/u phone call in one week  -f/u with PCP  and in the pain clinic as scheduled     Jamaal Eid MD  [facility]

## 2020-11-20 RX ORDER — LOSARTAN POTASSIUM AND HYDROCHLOROTHIAZIDE 25; 100 MG/1; MG/1
1 TABLET ORAL DAILY
Qty: 90 TABLET | Refills: 0 | Status: SHIPPED | OUTPATIENT
Start: 2020-11-20 | End: 2021-02-09

## 2020-12-03 RX ORDER — PREDNISONE 2.5 MG/1
2.5 TABLET ORAL DAILY
Qty: 180 TABLET | OUTPATIENT
Start: 2020-12-03

## 2020-12-08 ENCOUNTER — OFFICE VISIT (OUTPATIENT)
Dept: PODIATRY | Facility: CLINIC | Age: 85
End: 2020-12-08
Payer: COMMERCIAL

## 2020-12-08 VITALS — DIASTOLIC BLOOD PRESSURE: 78 MMHG | OXYGEN SATURATION: 99 % | HEART RATE: 96 BPM | SYSTOLIC BLOOD PRESSURE: 136 MMHG

## 2020-12-08 DIAGNOSIS — M19.071 PRIMARY OSTEOARTHRITIS OF RIGHT ANKLE: ICD-10-CM

## 2020-12-08 DIAGNOSIS — L84 TYLOMA: ICD-10-CM

## 2020-12-08 DIAGNOSIS — M20.42 HAMMER TOE OF LEFT FOOT: Primary | ICD-10-CM

## 2020-12-08 DIAGNOSIS — M79.672 FOOT PAIN, LEFT: ICD-10-CM

## 2020-12-08 DIAGNOSIS — M25.571 RIGHT ANKLE PAIN, UNSPECIFIED CHRONICITY: ICD-10-CM

## 2020-12-08 PROCEDURE — 99213 OFFICE O/P EST LOW 20 MIN: CPT | Mod: 25 | Performed by: PODIATRIST

## 2020-12-08 NOTE — LETTER
12/8/2020         RE: Teresa Lopez  [address]  [facility] [place] [facility] [address]        Dear Colleague,    Thank you for referring your patient, Teresa Lopez, to the [facility]. Please see a copy of my visit note below.    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     Patient Active Problem List   Diagnosis     History of CVA (cerebrovascular accident)     Jaw Pain     Arthritis of knee     CARDIOVASCULAR SCREENING; LDL GOAL LESS THAN 100     Hypertension goal BP (blood pressure) < 140/90     Advanced directives, counseling/discussion     Hyperlipidemia LDL goal <130     OA (OSTEOARTHRITIS) OF KNEE - right     CKD (chronic kidney disease) stage 3, GFR 30-59 ml/min     Chronic pain disorder     DJD (degenerative joint disease), lumbosacral     Spinal stenosis of lumbar region with neurogenic claudication     Slow transit constipation     Mild cognitive impairment     Combined form of age-related cataract, mod, both eyes     Posterior vitreous detachment of both eyes     Arthritis, lumbar spine     Medical cannabis use     Moderate major depression (H)     Inflammatory spondylopathy of lumbar region (H)     Hammer toe of left foot     Loss of weight     Mild malnutrition (H)     Past Surgical History:   Procedure Laterality Date     HYSTERECTOMY, CERVIX STATUS UNKNOWN  age 31     Social History     Socioeconomic History     Marital status:      Spouse name: Not on file     Number of children: Not on file     Years of education: Not on file     Highest education level: Not on file   Occupational History     Not on file   Social Needs     Financial resource strain: Not on file     Food insecurity     Worry: Not on file     Inability: Not on file      Transportation needs     Medical: Not on file     Non-medical: Not on file   Tobacco Use     Smoking status: Never Smoker     Smokeless tobacco: Never Used   Substance and Sexual Activity     Alcohol use: Yes     Alcohol/week: 0.0 standard drinks     Comment: 0-1 drink weekly     Drug use: No     Sexual activity: Not Currently     Partners: Male   Lifestyle     Physical activity     Days per week: Not on file     Minutes per session: Not on file     Stress: Not on file   Relationships     Social connections     Talks on phone: Not on file     Gets together: Not on file     Attends Advent service: Not on file     Active member of club or organization: Not on file     Attends meetings of clubs or organizations: Not on file     Relationship status: Not on file     Intimate partner violence     Fear of current or ex partner: Not on file     Emotionally abused: Not on file     Physically abused: Not on file     Forced sexual activity: Not on file   Other Topics Concern     Parent/sibling w/ CABG, MI or angioplasty before 65F 55M? 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  She relates callus buildup.  She cannot wear a shoe.  She just wants the toe taken off.  Presents with her daughter.  Also relates she  has some ankle pain on the right.  She has had arthritis in there.  She has had cortisone injections in that in the past.  She wears a heel lift due to limb length discrepancy,  but she has injections in her hip, too, cortisone shots for hip pain.      OBJECTIVE FINDINGS:  DP and PT are 2/4 bilaterally.  She has some varicosities and some edema that is mild bilaterally.  She has pain to palpation of the right ankle.  There is no erythema, no drainage, no odor, no calor bilaterally.  She has dorsally contracted digits 2 through 5 bilaterally with laterally deviated hallux and dorsomedial first MPJ prominence bilaterally.  She has a left medial second toe nucleated tyloma with pain on palpation.      ASSESSMENT AND PLAN:  Hammertoe, left second toe, causing pain with callus.  Ankle pain, right, with osteoarthritis.  Diagnosis and treatment options were discussed with her.  The tyloma on the left second toe was sharply debrided with a #15 blade upon consent.  I advised her on Voltaren gel use, which she has, for the ankle.  She has an ankle brace as well.  Advised her to wear that and stretching exercises.  Diagnosis and treatment options discussed with her.  I gave her a referral to Dr. Lewis for any surgical options for the left second toe.  Previous x-rays show osteoarthritic changes of the ankle as noted.           Again, thank you for allowing me to participate in the care of your patient.        Sincerely,        Maulik Zavala DPM

## 2020-12-08 NOTE — NURSING NOTE
Teresa Lopez's chief complaint for this visit includes:  Chief Complaint   Patient presents with     RECHECK     left foot hammer toes and right ankle pain     PCP: Tej Engle    Referring Provider:  No referring provider defined for this encounter.    /78 (BP Location: Right arm, Patient Position: Sitting, Cuff Size: Adult Regular)   Pulse 96   SpO2 99%   Data Unavailable     Do you need any medication refills at today's visit? Jasmin Blanton CMA

## 2020-12-08 NOTE — PROGRESS NOTES
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     Patient Active Problem List   Diagnosis     History of CVA (cerebrovascular accident)     Jaw Pain     Arthritis of knee     CARDIOVASCULAR SCREENING; LDL GOAL LESS THAN 100     Hypertension goal BP (blood pressure) < 140/90     Advanced directives, counseling/discussion     Hyperlipidemia LDL goal <130     OA (OSTEOARTHRITIS) OF KNEE - right     CKD (chronic kidney disease) stage 3, GFR 30-59 ml/min     Chronic pain disorder     DJD (degenerative joint disease), lumbosacral     Spinal stenosis of lumbar region with neurogenic claudication     Slow transit constipation     Mild cognitive impairment     Combined form of age-related cataract, mod, both eyes     Posterior vitreous detachment of both eyes     Arthritis, lumbar spine     Medical cannabis use     Moderate major depression (H)     Inflammatory spondylopathy of lumbar region (H)     Hammer toe of left foot     Loss of weight     Mild malnutrition (H)     Past Surgical History:   Procedure Laterality Date     HYSTERECTOMY, CERVIX STATUS UNKNOWN  age 31     Social History     Socioeconomic History     Marital status:      Spouse name: Not on file     Number of children: Not on file     Years of education: Not on file     Highest education level: Not on file   Occupational History     Not on file   Social Needs     Financial resource strain: Not on file     Food insecurity     Worry: Not on file     Inability: Not on file     Transportation needs     Medical: Not on file     Non-medical: Not on file   Tobacco Use     Smoking status: Never Smoker     Smokeless tobacco: Never Used   Substance and Sexual Activity     Alcohol use: Yes     Alcohol/week: 0.0 standard drinks     Comment: 0-1  drink weekly     Drug use: No     Sexual activity: Not Currently     Partners: Male   Lifestyle     Physical activity     Days per week: Not on file     Minutes per session: Not on file     Stress: Not on file   Relationships     Social connections     Talks on phone: Not on file     Gets together: Not on file     Attends [employer] service: Not on file     Active member of club or organization: Not on file     Attends meetings of clubs or organizations: Not on file     Relationship status: Not on file     Intimate partner violence     Fear of current or ex partner: Not on file     Emotionally abused: Not on file     Physically abused: Not on file     Forced sexual activity: Not on file   Other Topics Concern     Parent/sibling w/ CABG, MI or angioplasty before 65F 55M? 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  She relates callus buildup.  She cannot wear a shoe.  She just wants the toe taken off.  Presents with her daughter.  Also relates she has some ankle pain on the right.  She has had arthritis in there.  She has had cortisone injections in that in the past.  She wears a heel lift due to limb length discrepancy,  but she has injections in her hip, too, cortisone shots for hip pain.      OBJECTIVE  FINDINGS:  DP and PT are 2/4 bilaterally.  She has some varicosities and some edema that is mild bilaterally.  She has pain to palpation of the right ankle.  There is no erythema, no drainage, no odor, no calor bilaterally.  She has dorsally contracted digits 2 through 5 bilaterally with laterally deviated hallux and dorsomedial first MPJ prominence bilaterally.  She has a left medial second toe nucleated tyloma with pain on palpation.      ASSESSMENT AND PLAN:  Hammertoe, left second toe, causing pain with callus.  Ankle pain, right, with osteoarthritis.  Diagnosis and treatment options were discussed with her.  The tyloma on the left second toe was sharply debrided with a #15 blade upon consent.  I advised her on Voltaren gel use, which she has, for the ankle.  She has an ankle brace as well.  Advised her to wear that and stretching exercises.  Diagnosis and treatment options discussed with her.  I gave her a referral to Dr. Lewis for any surgical options for the left second toe.  Previous x-rays show osteoarthritic changes of the ankle as noted.

## 2020-12-13 ENCOUNTER — HEALTH MAINTENANCE LETTER (OUTPATIENT)
Age: 85
End: 2020-12-13

## 2020-12-16 ENCOUNTER — OFFICE VISIT (OUTPATIENT)
Dept: PODIATRY | Facility: CLINIC | Age: 85
End: 2020-12-16
Payer: COMMERCIAL

## 2020-12-16 ENCOUNTER — ANCILLARY PROCEDURE (OUTPATIENT)
Dept: GENERAL RADIOLOGY | Facility: CLINIC | Age: 85
End: 2020-12-16
Attending: PODIATRIST
Payer: COMMERCIAL

## 2020-12-16 VITALS
HEART RATE: 90 BPM | BODY MASS INDEX: 20.5 KG/M2 | DIASTOLIC BLOOD PRESSURE: 74 MMHG | SYSTOLIC BLOOD PRESSURE: 130 MMHG | WEIGHT: 110 LBS

## 2020-12-16 DIAGNOSIS — M20.42 HAMMER TOE OF LEFT FOOT: ICD-10-CM

## 2020-12-16 DIAGNOSIS — I73.9 PERIPHERAL ARTERIAL DISEASE (H): Primary | ICD-10-CM

## 2020-12-16 DIAGNOSIS — I73.9 PERIPHERAL VASCULAR DISEASE, UNSPECIFIED (H): ICD-10-CM

## 2020-12-16 PROCEDURE — 73630 X-RAY EXAM OF FOOT: CPT | Mod: LT | Performed by: RADIOLOGY

## 2020-12-16 PROCEDURE — 99214 OFFICE O/P EST MOD 30 MIN: CPT | Performed by: PODIATRIST

## 2020-12-16 NOTE — PATIENT INSTRUCTIONS
We wish you continued good healing. If you have any questions or concerns, please do not hesitate to contact us at [phone]    [facility] (secure e-mail communication and access to your chart) to send a message or to make an appointment.    Please remember to call and schedule a follow up appointment if one was recommended at your earliest convenience.     +++OF MARCH 2020+++ LOCATION AND HOURS HAVE CHANGED    PLEASE CALL CLINICS TO VERIFY DAYS AND TIMES  PODIATRY CLINIC HOURS  TELEPHONE NUMBER    Dr. German [facility]        Clinics:  [facility]   Tuesday 1PM-6PM  [place]  Wednesday 745AM-330PM  [place]  Thursday/Friday 745AM-230PM  [place] APPOINTMENTS  [phone]    [place] APPOINTMENTS  [phone]          If you need a medication refill, please contact us you may need lab work and/or a follow up visit prior to your refill (i.e. Antifungal medications).    If MRI needed please call Imaging at [phone] or [phone]    HOW DO I GET MY KNEE SCOOTER? Knee scooters can be picked up at ANY Medical Supply stores with your knee scooter Prescription.  OR    Bring your signed prescription to an [facility] showroom.

## 2020-12-16 NOTE — LETTER
12/16/2020         RE: Teresa Lopez  [address]  [facility] [address]        Dear Colleague,    Thank you for referring your patient, Teresa Lopez, to the [facility]. Please see a copy of my visit note below.     Subjective:    Pt is seen today in consult form Dr. Zavala with the c/c of painful left foot.  This has been symptomatic for the past several year(s).  Points to PIPJ of second toe medial.  Has pain w/ ambulation and shoewear and is relieved by rest and going barefoot.  Denies pain in the contralateral foot.  Describes as burning pain.  Has tried numerous pads on this and still painful.  She has a history of CVA.  She has a history of cognitive impairment.  She has chronic kidney disease stage III.  Her mother had a history of arthritis.      ROS:  A 10-point review of systems was performed and is positive for that noted in the HPI and noted above.  All other areas are negative.        Allergies   Allergen Reactions     Citalopram Other (See Comments)     Sweating profusely and nausea     Gabapentin      Felt off, dizzy, hot and cold symptoms     Lexapro [Escitalopram] Other (See Comments)     Hot flashes     Salsalate      tinnitus     Sulfa Drugs Hives       Current Outpatient Medications   Medication Sig Dispense Refill     Acetaminophen (TYLENOL ARTHRITIS PAIN PO) As needed not taking daily       ADVIL 200 MG PO CAPS Reported on 4/20/2017       Aspirin-Acetaminophen-Caffeine (EXCEDRIN PO)        CALCIUM 1200 OR Reported on 4/20/2017       DULoxetine (CYMBALTA) 20 MG capsule Take 1 capsule (20 mg) by mouth daily (Patient not taking: Reported on 12/8/2020) 30 capsule 1     Glucosamine-Chondroit-Vit C-Mn (GLUCOSAMINE 1500 COMPLEX PO) Take 1,500 mg by mouth daily Reported on 4/20/2017       lidocaine (LIDODERM) 5 % patch Place 3 patches onto the skin every 24 hours 90 patch 11     Loratadine (CLARITIN) 10 MG capsule Take 10 mg by mouth as needed Reported on  4/20/2017       losartan-hydrochlorothiazide (HYZAAR) 100-25 MG tablet Take 1 tablet by mouth daily 90 tablet 0     magnesium 250 MG tablet Take 1 tablet by mouth daily       medical cannabis (Patient's own supply.  Not a prescription) See Admin Instructions (This is NOT a prescription, and does not certify that the patient has a qualifying medical condition for medical cannabis.  The purpose of this order is  to document that the patient reports taking medical cannabis.)   Taking 6-9 red capsules daily       MULTI-VITAMIN OR TABS 1 TABLET DAILY       nortriptyline (PAMELOR) 10 MG capsule Take 1 capsule (10 mg) by mouth At Bedtime (Patient not taking: Reported on 12/8/2020) 60 capsule 0     potassium aminobenzoate 500 MG CAPS capsule        predniSONE (DELTASONE) 2.5 MG tablet Take 1 tablet (2.5 mg) by mouth daily , may repeat once daily as needed 120 tablet 0     senna-docusate (SENOKOT-S;PERICOLACE) 8.6-50 MG per tablet Take 1 tablet by mouth 2 times daily 120 tablet 12     simvastatin (ZOCOR) 40 MG tablet Take 1 tablet (40 mg) by mouth At Bedtime 90 tablet 3       Patient Active Problem List   Diagnosis     History of CVA (cerebrovascular accident)     Jaw Pain     Arthritis of knee     CARDIOVASCULAR SCREENING; LDL GOAL LESS THAN 100     Hypertension goal BP (blood pressure) < 140/90     Advanced directives, counseling/discussion     Hyperlipidemia LDL goal <130     OA (OSTEOARTHRITIS) OF KNEE - right     CKD (chronic kidney disease) stage 3, GFR 30-59 ml/min     Chronic pain disorder     DJD (degenerative joint disease), lumbosacral     Spinal stenosis of lumbar region with neurogenic claudication     Slow transit constipation     Mild cognitive impairment     Combined form of age-related cataract, mod, both eyes     Posterior vitreous detachment of both eyes     Arthritis, lumbar spine     Medical cannabis use     Moderate major depression (H)     Inflammatory spondylopathy of lumbar region (H)     Hammer toe  of left foot     Loss of weight     Mild malnutrition (H)       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       Past Surgical History:   Procedure Laterality Date     HYSTERECTOMY, CERVIX STATUS UNKNOWN  age 31       Family History   Problem Relation Age of Onset     Arthritis Mother      Cataracts Mother      Unknown/Adopted Father         adopted     Diabetes Brother      Cancer Brother      Cataracts Maternal Grandmother      Hypertension No family hx of      Cerebrovascular Disease No family hx of      Thyroid Disease No family hx of      Glaucoma No family hx of      Macular Degeneration No family hx of        Social History     Tobacco Use     Smoking status: Never Smoker     Smokeless tobacco: Never Used   Substance Use Topics     Alcohol use: Yes     Alcohol/week: 0.0 standard drinks     Comment: 0-1 drink weekly       Objective:    O:  /74   Pulse 90   Wt 49.9 kg (110 lb)   BMI 20.50 kg/m  .      Constitutional/ general:  Pt is in no apparent distress, appears well-nourished.  Cooperative with history and physical exam.  Patient seen with daughter today    Psych:    Patient somewhat answers questions appropriately.  Sometimes discusses things not pertaining to today's visit.    Eyes:  Visual scanning/ tracking without deficit.     Ears:  Response to auditory stimuli is normal.  negative hearing aid devices.  Auricles in proper alignment.     Lymphatic:  Popliteal lymph nodes not enlarged.     Lungs:  Non labored breathing, non labored speech. No cough.  No audible wheezing. Even, quiet breathing.       Vascular:  positive pedal pulses bilaterally for both the DP  arteries.  It is difficult to palpate her PT arteries.  CFT < 3 sec.  positive ankle edema.  negative pedal hair  growth.    Neuro:  Alert and oriented x 3. Coordinated gait.  Light touch sensation is intact to the L4, L5, S1 distributions. No obvious deficits.  No evidence of neurological-based weakness, spasticity, or contracture in the lower extremities.      Derm: Skin thin and shiny with no hair growth noted.    Musculoskeletal:    Lower extremity muscle strength is normal.  Patient is ambulatory without an assistive device or brace.  Bilateral hammertoe deformities noted with weightbearing with the left being worse than the right.  I can not reduce this deformity.  Patient has large bunion deformity on left foot which is sitting second toe and causing lateral deviation at PIPJ distal left second toe.   pain with range of motion and palpation.  Irritated callus noted medial PIPJ.  No masses noted.  No pain on the metatarsal heads or dorsally.      X-ray consistent with above findings.  Osteopenia noted.    Assessment:  left second hammertoe    Plan:  Xray taken of foot.  It seems as though patient has exhausted conservative treatments.  Both the patient and her daughter would like to talk about surgery today.  Would perform left second toe amputation.  We will disarticulate this at the MTPJ.  This would be same-day surgery under MAC anesthesia.  Discussed she would have a small incision here.  She would be able to walk afterwards but would have to take it easy for the first several days after surgery.  Typically minimal pain with this procedure.  Discussed that it is difficult to palpate her posterior tibial artery.  Will order arterial ultrasound to ensure adequate circulation for healing.  Once this is done we will contact patient and continue process of setting up a surgical date for this.  Thank you for allowing me participate in the care of this patient.        German Lewis, PAWAN VILLALTA, FACFAS          Again, thank you for allowing me to participate in the care of your patient.        Sincerely,        German GAMEZ  PAWAN Lewis

## 2020-12-17 NOTE — PROGRESS NOTES
Subjective:    Pt is seen today in consult form Dr. Zavala with the c/c of painful left foot.  This has been symptomatic for the past several year(s).  Points to PIPJ of second toe medial.  Has pain w/ ambulation and shoewear and is relieved by rest and going barefoot.  Denies pain in the contralateral foot.  Describes as burning pain.  Has tried numerous pads on this and still painful.  She has a history of CVA.  She has a history of cognitive impairment.  She has chronic kidney disease stage III.  Her mother had a history of arthritis.      ROS:  A 10-point review of systems was performed and is positive for that noted in the HPI and noted above.  All other areas are negative.        Allergies   Allergen Reactions     Citalopram Other (See Comments)     Sweating profusely and nausea     Gabapentin      Felt off, dizzy, hot and cold symptoms     Lexapro [Escitalopram] Other (See Comments)     Hot flashes     Salsalate      tinnitus     Sulfa Drugs Hives       Current Outpatient Medications   Medication Sig Dispense Refill     Acetaminophen (TYLENOL ARTHRITIS PAIN PO) As needed not taking daily       ADVIL 200 MG PO CAPS Reported on 4/20/2017       Aspirin-Acetaminophen-Caffeine (EXCEDRIN PO)        CALCIUM 1200 OR Reported on 4/20/2017       DULoxetine (CYMBALTA) 20 MG capsule Take 1 capsule (20 mg) by mouth daily (Patient not taking: Reported on 12/8/2020) 30 capsule 1     Glucosamine-Chondroit-Vit C-Mn (GLUCOSAMINE 1500 COMPLEX PO) Take 1,500 mg by mouth daily Reported on 4/20/2017       lidocaine (LIDODERM) 5 % patch Place 3 patches onto the skin every 24 hours 90 patch 11     Loratadine (CLARITIN) 10 MG capsule Take 10 mg by mouth as needed Reported on 4/20/2017       losartan-hydrochlorothiazide (HYZAAR) 100-25 MG tablet Take 1 tablet by mouth daily 90 tablet 0     magnesium 250 MG tablet Take 1 tablet by mouth daily       medical cannabis (Patient's own supply.  Not a prescription) See Admin Instructions  (This is NOT a prescription, and does not certify that the patient has a qualifying medical condition for medical cannabis.  The purpose of this order is  to document that the patient reports taking medical cannabis.)   Taking 6-9 red capsules daily       MULTI-VITAMIN OR TABS 1 TABLET DAILY       nortriptyline (PAMELOR) 10 MG capsule Take 1 capsule (10 mg) by mouth At Bedtime (Patient not taking: Reported on 12/8/2020) 60 capsule 0     potassium aminobenzoate 500 MG CAPS capsule        predniSONE (DELTASONE) 2.5 MG tablet Take 1 tablet (2.5 mg) by mouth daily , may repeat once daily as needed 120 tablet 0     senna-docusate (SENOKOT-S;PERICOLACE) 8.6-50 MG per tablet Take 1 tablet by mouth 2 times daily 120 tablet 12     simvastatin (ZOCOR) 40 MG tablet Take 1 tablet (40 mg) by mouth At Bedtime 90 tablet 3       Patient Active Problem List   Diagnosis     History of CVA (cerebrovascular accident)     Jaw Pain     Arthritis of knee     CARDIOVASCULAR SCREENING; LDL GOAL LESS THAN 100     Hypertension goal BP (blood pressure) < 140/90     Advanced directives, counseling/discussion     Hyperlipidemia LDL goal <130     OA (OSTEOARTHRITIS) OF KNEE - right     CKD (chronic kidney disease) stage 3, GFR 30-59 ml/min     Chronic pain disorder     DJD (degenerative joint disease), lumbosacral     Spinal stenosis of lumbar region with neurogenic claudication     Slow transit constipation     Mild cognitive impairment     Combined form of age-related cataract, mod, both eyes     Posterior vitreous detachment of both eyes     Arthritis, lumbar spine     Medical cannabis use     Moderate major depression (H)     Inflammatory spondylopathy of lumbar region (H)     Hammer toe of left foot     Loss of weight     Mild malnutrition (H)       Past Medical History:   Diagnosis Date     Cataract 3/7/2012     Continuous opioid dependence (H) 1/2/2019     CVA (cerebral infarction)      Diabetes mellitus (H)      DJD (degenerative joint  disease)      HTN      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       Past Surgical History:   Procedure Laterality Date     HYSTERECTOMY, CERVIX STATUS UNKNOWN  age 31       Family History   Problem Relation Age of Onset     Arthritis Mother      Cataracts Mother      Unknown/Adopted Father         adopted     Diabetes Brother      Cancer Brother      Cataracts Maternal Grandmother      Hypertension No family hx of      Cerebrovascular Disease No family hx of      Thyroid Disease No family hx of      Glaucoma No family hx of      Macular Degeneration No family hx of        Social History     Tobacco Use     Smoking status: Never Smoker     Smokeless tobacco: Never Used   Substance Use Topics     Alcohol use: Yes     Alcohol/week: 0.0 standard drinks     Comment: 0-1 drink weekly       Objective:    O:  /74   Pulse 90   Wt 49.9 kg (110 lb)   BMI 20.50 kg/m  .      Constitutional/ general:  Pt is in no apparent distress, appears well-nourished.  Cooperative with history and physical exam.  Patient seen with daughter today    Psych:    Patient somewhat answers questions appropriately.  Sometimes discusses things not pertaining to today's visit.    Eyes:  Visual scanning/ tracking without deficit.     Ears:  Response to auditory stimuli is normal.  negative hearing aid devices.  Auricles in proper alignment.     Lymphatic:  Popliteal lymph nodes not enlarged.     Lungs:  Non labored breathing, non labored speech. No cough.  No audible wheezing. Even, quiet breathing.       Vascular:  positive pedal pulses bilaterally for both the DP  arteries.  It is difficult to palpate her PT arteries.  CFT < 3 sec.  positive ankle edema.  negative pedal hair growth.    Neuro:  Alert and oriented x 3. Coordinated gait.  Light touch sensation is intact to the L4, L5, S1 distributions. No obvious deficits.  No evidence of neurological-based weakness, spasticity, or contracture in the lower extremities.      Derm: Skin  thin and shiny with no hair growth noted.    Musculoskeletal:    Lower extremity muscle strength is normal.  Patient is ambulatory without an assistive device or brace.  Bilateral hammertoe deformities noted with weightbearing with the left being worse than the right.  I can not reduce this deformity.  Patient has large bunion deformity on left foot which is sitting second toe and causing lateral deviation at PIPJ distal left second toe.   pain with range of motion and palpation.  Irritated callus noted medial PIPJ.  No masses noted.  No pain on the metatarsal heads or dorsally.      X-ray consistent with above findings.  Osteopenia noted.    Assessment:  left second hammertoe    Plan:  Xray taken of foot.  It seems as though patient has exhausted conservative treatments.  Both the patient and her daughter would like to talk about surgery today.  Would perform left second toe amputation.  We will disarticulate this at the MTP[facility].  This would be same-day surgery under MAC anesthesia.  Discussed she would have a small incision here.  She would be able to walk afterwards but would have to take it easy for the first several days after surgery.  Typically minimal pain with this procedure.  Discussed that it is difficult to palpate her posterior tibial artery.  Will order arterial ultrasound to ensure adequate circulation for healing.  Once this is done we will contact patient and continue process of setting up a surgical date for this.  Thank you for allowing me participate in the care of this patient.        German Lewis DPM DPM, DOMENIC

## 2020-12-27 ENCOUNTER — MYC MEDICAL ADVICE (OUTPATIENT)
Dept: PALLIATIVE MEDICINE | Facility: CLINIC | Age: 85
End: 2020-12-27

## 2020-12-28 ENCOUNTER — ANCILLARY PROCEDURE (OUTPATIENT)
Dept: ULTRASOUND IMAGING | Facility: CLINIC | Age: 85
End: 2020-12-28
Attending: PODIATRIST
Payer: COMMERCIAL

## 2020-12-28 DIAGNOSIS — I73.9 PERIPHERAL VASCULAR DISEASE, UNSPECIFIED (H): ICD-10-CM

## 2020-12-28 DIAGNOSIS — M20.42 HAMMER TOE OF LEFT FOOT: ICD-10-CM

## 2020-12-28 PROCEDURE — 93922 UPR/L XTREMITY ART 2 LEVELS: CPT | Mod: GC | Performed by: RADIOLOGY

## 2020-12-28 NOTE — TELEPHONE ENCOUNTER
Per patient myChart message:  From: Jefferson Lopez      Created: 12/27/2020 3:21 PM      Marion, I wanted to provide you an update on Mom for her upcoming appointment in January. The Cymbalta medication made her sick and she was not eating.  She stopped taking it.  This past week she is in a lot of pain and is crying  a lot.  I am not sure what I can do.  I did give her an extra prednisone, Tylenol.  Her short-term memory is getting worse and is getting difficult to talk to her.  She keeps saying she wants more shots.   The upcoming phone appointment may be a little difficult  and wanted to prep you.       Brooke      _____________    Pt has an appointment with MYRON Simon CNP on 1/6/21.    Natalie RN-BSN  [facility]

## 2020-12-29 NOTE — TELEPHONE ENCOUNTER
Mahin Bishop-     I am so sorry to hear that the Cymbalta was not beneficial. I think that there is a component of your pain that is related to feeling depressed and anxious. Would you be open to me reaching out to Dr. nEgle to see what his thoughts may be? I manage medications for depression/anxiety that have a direct impact on pain, but I don't manage much other psychoactive medications. I wonder if bringing Dr. Engle in on this issue may make this a bit better.      Thanks for the update. This information is very helpful. I am so sorry to hear that you are struggling, Jefferson!     Marion SANCHES RN CNP, [facility]  [facility]    I have sent the above [employer] message to the patient.     Marion SANCHES RN CNP, [facility]

## 2020-12-31 ENCOUNTER — MYC REFILL (OUTPATIENT)
Dept: FAMILY MEDICINE | Facility: CLINIC | Age: 85
End: 2020-12-31

## 2020-12-31 DIAGNOSIS — G89.4 CHRONIC PAIN DISORDER: ICD-10-CM

## 2020-12-31 RX ORDER — PREDNISONE 2.5 MG/1
2.5 TABLET ORAL DAILY
Qty: 120 TABLET | Refills: 0 | Status: CANCELLED | OUTPATIENT
Start: 2020-12-31

## 2021-01-01 ENCOUNTER — OFFICE VISIT (OUTPATIENT)
Dept: VASCULAR SURGERY | Facility: CLINIC | Age: 86
End: 2021-01-01
Payer: COMMERCIAL

## 2021-01-01 ENCOUNTER — MYC MEDICAL ADVICE (OUTPATIENT)
Dept: PALLIATIVE MEDICINE | Facility: CLINIC | Age: 86
End: 2021-01-01

## 2021-01-01 ENCOUNTER — TELEPHONE (OUTPATIENT)
Dept: NEUROLOGY | Facility: CLINIC | Age: 86
End: 2021-01-01

## 2021-01-01 ENCOUNTER — OFFICE VISIT (OUTPATIENT)
Dept: PALLIATIVE MEDICINE | Facility: CLINIC | Age: 86
End: 2021-01-01
Payer: COMMERCIAL

## 2021-01-01 ENCOUNTER — DOCUMENTATION ONLY (OUTPATIENT)
Dept: OTHER | Facility: CLINIC | Age: 86
End: 2021-01-01

## 2021-01-01 ENCOUNTER — TELEPHONE (OUTPATIENT)
Dept: INTERNAL MEDICINE | Facility: CLINIC | Age: 86
End: 2021-01-01

## 2021-01-01 ENCOUNTER — TELEPHONE (OUTPATIENT)
Dept: PALLIATIVE MEDICINE | Facility: CLINIC | Age: 86
End: 2021-01-01

## 2021-01-01 ENCOUNTER — DOCUMENTATION ONLY (OUTPATIENT)
Dept: NEUROLOGY | Facility: CLINIC | Age: 86
End: 2021-01-01

## 2021-01-01 ENCOUNTER — PATIENT OUTREACH (OUTPATIENT)
Dept: CARE COORDINATION | Facility: CLINIC | Age: 86
End: 2021-01-01

## 2021-01-01 ENCOUNTER — PRE VISIT (OUTPATIENT)
Dept: NEUROSURGERY | Facility: CLINIC | Age: 86
End: 2021-01-01

## 2021-01-01 ENCOUNTER — HEALTH MAINTENANCE LETTER (OUTPATIENT)
Age: 86
End: 2021-01-01

## 2021-01-01 ENCOUNTER — TELEPHONE (OUTPATIENT)
Dept: OTHER | Facility: CLINIC | Age: 86
End: 2021-01-01

## 2021-01-01 ENCOUNTER — TELEPHONE (OUTPATIENT)
Dept: FAMILY MEDICINE | Facility: CLINIC | Age: 86
End: 2021-01-01

## 2021-01-01 ENCOUNTER — MEDICAL CORRESPONDENCE (OUTPATIENT)
Dept: HEALTH INFORMATION MANAGEMENT | Facility: CLINIC | Age: 86
End: 2021-01-01

## 2021-01-01 ENCOUNTER — ANCILLARY PROCEDURE (OUTPATIENT)
Dept: NEUROLOGY | Facility: CLINIC | Age: 86
End: 2021-01-01
Attending: PSYCHIATRY & NEUROLOGY
Payer: COMMERCIAL

## 2021-01-01 ENCOUNTER — RADIOLOGY INJECTION OFFICE VISIT (OUTPATIENT)
Dept: PALLIATIVE MEDICINE | Facility: CLINIC | Age: 86
End: 2021-01-01
Payer: COMMERCIAL

## 2021-01-01 ENCOUNTER — RADIOLOGY INJECTION OFFICE VISIT (OUTPATIENT)
Dept: PALLIATIVE MEDICINE | Facility: CLINIC | Age: 86
End: 2021-01-01
Attending: NURSE PRACTITIONER
Payer: COMMERCIAL

## 2021-01-01 ENCOUNTER — VIRTUAL VISIT (OUTPATIENT)
Dept: NEUROSURGERY | Facility: CLINIC | Age: 86
End: 2021-01-01
Payer: COMMERCIAL

## 2021-01-01 ENCOUNTER — PRE VISIT (OUTPATIENT)
Dept: VASCULAR SURGERY | Facility: CLINIC | Age: 86
End: 2021-01-01

## 2021-01-01 ENCOUNTER — PRE VISIT (OUTPATIENT)
Dept: NEUROLOGY | Facility: CLINIC | Age: 86
End: 2021-01-01

## 2021-01-01 ENCOUNTER — VIRTUAL VISIT (OUTPATIENT)
Dept: PALLIATIVE MEDICINE | Facility: CLINIC | Age: 86
End: 2021-01-01
Payer: COMMERCIAL

## 2021-01-01 ENCOUNTER — MYC MEDICAL ADVICE (OUTPATIENT)
Dept: PALLIATIVE MEDICINE | Facility: CLINIC | Age: 86
End: 2021-01-01
Payer: COMMERCIAL

## 2021-01-01 ENCOUNTER — VIRTUAL VISIT (OUTPATIENT)
Dept: NEUROLOGY | Facility: CLINIC | Age: 86
End: 2021-01-01
Payer: COMMERCIAL

## 2021-01-01 ENCOUNTER — MYC REFILL (OUTPATIENT)
Dept: PALLIATIVE MEDICINE | Facility: CLINIC | Age: 86
End: 2021-01-01
Payer: COMMERCIAL

## 2021-01-01 VITALS
HEART RATE: 73 BPM | SYSTOLIC BLOOD PRESSURE: 179 MMHG | BODY MASS INDEX: 20.25 KG/M2 | WEIGHT: 118 LBS | DIASTOLIC BLOOD PRESSURE: 81 MMHG

## 2021-01-01 VITALS — HEART RATE: 98 BPM | OXYGEN SATURATION: 95 % | SYSTOLIC BLOOD PRESSURE: 148 MMHG | DIASTOLIC BLOOD PRESSURE: 94 MMHG

## 2021-01-01 VITALS — HEART RATE: 95 BPM | OXYGEN SATURATION: 97 % | DIASTOLIC BLOOD PRESSURE: 85 MMHG | SYSTOLIC BLOOD PRESSURE: 149 MMHG

## 2021-01-01 VITALS — SYSTOLIC BLOOD PRESSURE: 169 MMHG | HEART RATE: 69 BPM | DIASTOLIC BLOOD PRESSURE: 74 MMHG

## 2021-01-01 DIAGNOSIS — I11.9 HYPERTENSIVE HEART DISEASE WITHOUT HEART FAILURE: ICD-10-CM

## 2021-01-01 DIAGNOSIS — M25.551 HIP PAIN, RIGHT: ICD-10-CM

## 2021-01-01 DIAGNOSIS — M70.61 GREATER TROCHANTERIC BURSITIS OF RIGHT HIP: Primary | ICD-10-CM

## 2021-01-01 DIAGNOSIS — M48.062 SPINAL STENOSIS OF LUMBAR REGION WITH NEUROGENIC CLAUDICATION: ICD-10-CM

## 2021-01-01 DIAGNOSIS — M54.50 CHRONIC BILATERAL LOW BACK PAIN WITHOUT SCIATICA: ICD-10-CM

## 2021-01-01 DIAGNOSIS — M51.369 DDD (DEGENERATIVE DISC DISEASE), LUMBAR: ICD-10-CM

## 2021-01-01 DIAGNOSIS — M16.11 PRIMARY OSTEOARTHRITIS OF RIGHT HIP: ICD-10-CM

## 2021-01-01 DIAGNOSIS — G89.29 CHRONIC RIGHT SI JOINT PAIN: ICD-10-CM

## 2021-01-01 DIAGNOSIS — Q28.2 AVM (ARTERIOVENOUS MALFORMATION) BRAIN: ICD-10-CM

## 2021-01-01 DIAGNOSIS — M53.3 CHRONIC RIGHT SI JOINT PAIN: ICD-10-CM

## 2021-01-01 DIAGNOSIS — M53.3 SI (SACROILIAC) JOINT DYSFUNCTION: Primary | ICD-10-CM

## 2021-01-01 DIAGNOSIS — G40.909 SEIZURE DISORDER (H): ICD-10-CM

## 2021-01-01 DIAGNOSIS — Z79.52 LONG TERM (CURRENT) USE OF SYSTEMIC STEROIDS: ICD-10-CM

## 2021-01-01 DIAGNOSIS — I73.9 PERIPHERAL VASCULAR DISEASE, UNSPECIFIED (H): Primary | ICD-10-CM

## 2021-01-01 DIAGNOSIS — R47.89 WORD FINDING DIFFICULTY: ICD-10-CM

## 2021-01-01 DIAGNOSIS — G89.29 CHRONIC BILATERAL LOW BACK PAIN WITHOUT SCIATICA: ICD-10-CM

## 2021-01-01 DIAGNOSIS — R56.9 SEIZURES (H): ICD-10-CM

## 2021-01-01 DIAGNOSIS — Z53.9 DIAGNOSIS NOT YET DEFINED: Primary | ICD-10-CM

## 2021-01-01 DIAGNOSIS — G89.4 CHRONIC PAIN SYNDROME: ICD-10-CM

## 2021-01-01 DIAGNOSIS — Z79.891 ENCOUNTER FOR LONG-TERM USE OF OPIATE ANALGESIC: ICD-10-CM

## 2021-01-01 DIAGNOSIS — D64.9 ANEMIA, UNSPECIFIED: ICD-10-CM

## 2021-01-01 DIAGNOSIS — R47.89 WORD FINDING DIFFICULTY: Primary | ICD-10-CM

## 2021-01-01 DIAGNOSIS — E78.5 HYPERLIPIDEMIA LDL GOAL <130: ICD-10-CM

## 2021-01-01 DIAGNOSIS — Q27.30 AVM (ARTERIOVENOUS MALFORMATION): ICD-10-CM

## 2021-01-01 DIAGNOSIS — M53.3 SI (SACROILIAC) JOINT DYSFUNCTION: ICD-10-CM

## 2021-01-01 DIAGNOSIS — R23.2 HOT FLASHES: ICD-10-CM

## 2021-01-01 DIAGNOSIS — G93.89 MASS OF LEFT TEMPORAL LOBE: ICD-10-CM

## 2021-01-01 DIAGNOSIS — G89.29 OTHER CHRONIC PAIN: ICD-10-CM

## 2021-01-01 DIAGNOSIS — F11.90 CHRONIC, CONTINUOUS USE OF OPIOIDS: ICD-10-CM

## 2021-01-01 DIAGNOSIS — M62.838 MUSCLE SPASM: ICD-10-CM

## 2021-01-01 DIAGNOSIS — Z91.81 HISTORY OF FALL: ICD-10-CM

## 2021-01-01 DIAGNOSIS — M54.50 LOW BACK PAIN: ICD-10-CM

## 2021-01-01 DIAGNOSIS — G57.01 PIRIFORMIS SYNDROME, RIGHT: ICD-10-CM

## 2021-01-01 DIAGNOSIS — Q28.2 AVM (ARTERIOVENOUS MALFORMATION) BRAIN: Primary | ICD-10-CM

## 2021-01-01 DIAGNOSIS — M70.61 GREATER TROCHANTERIC BURSITIS OF RIGHT HIP: ICD-10-CM

## 2021-01-01 DIAGNOSIS — M53.3 SACROILIAC JOINT PAIN: ICD-10-CM

## 2021-01-01 DIAGNOSIS — G57.01 PIRIFORMIS SYNDROME, RIGHT: Primary | ICD-10-CM

## 2021-01-01 DIAGNOSIS — M53.3 SACROILIAC JOINT PAIN: Primary | ICD-10-CM

## 2021-01-01 DIAGNOSIS — F03.90 UNSPECIFIED DEMENTIA WITHOUT BEHAVIORAL DISTURBANCE: ICD-10-CM

## 2021-01-01 DIAGNOSIS — I69.351 HEMIPLGA FOLLOWING CEREBRAL INFRC AFF RIGHT DOMINANT SIDE (H): ICD-10-CM

## 2021-01-01 DIAGNOSIS — I69.392 FACIAL WEAKNESS FOLLOWING CEREBRAL INFARCTION: ICD-10-CM

## 2021-01-01 DIAGNOSIS — M19.90 UNSPECIFIED OSTEOARTHRITIS, UNSPECIFIED SITE: ICD-10-CM

## 2021-01-01 DIAGNOSIS — R41.3 MEMORY CHANGE: ICD-10-CM

## 2021-01-01 DIAGNOSIS — D49.6 NEOPLASM OF UNSPECIFIED BEHAVIOR OF BRAIN (H): ICD-10-CM

## 2021-01-01 DIAGNOSIS — F32.9 MAJOR DEPRESSIVE DISORDER, SINGLE EPISODE, UNSPECIFIED: ICD-10-CM

## 2021-01-01 DIAGNOSIS — Q28.2 ARTERIOVENOUS MALFORMATION OF BRAIN: Primary | ICD-10-CM

## 2021-01-01 PROCEDURE — 99215 OFFICE O/P EST HI 40 MIN: CPT | Performed by: NURSE PRACTITIONER

## 2021-01-01 PROCEDURE — 99442 PR PHYSICIAN TELEPHONE EVALUATION 11-20 MIN: CPT | Mod: 95 | Performed by: NEUROLOGICAL SURGERY

## 2021-01-01 PROCEDURE — 27096 INJECT SACROILIAC JOINT: CPT | Mod: RT | Performed by: PSYCHIATRY & NEUROLOGY

## 2021-01-01 PROCEDURE — 99204 OFFICE O/P NEW MOD 45 MIN: CPT | Performed by: INTERNAL MEDICINE

## 2021-01-01 PROCEDURE — 20610 DRAIN/INJ JOINT/BURSA W/O US: CPT | Mod: 59 | Performed by: PSYCHIATRY & NEUROLOGY

## 2021-01-01 PROCEDURE — 99215 OFFICE O/P EST HI 40 MIN: CPT | Mod: 95 | Performed by: PSYCHIATRY & NEUROLOGY

## 2021-01-01 PROCEDURE — 99214 OFFICE O/P EST MOD 30 MIN: CPT | Mod: 95 | Performed by: NURSE PRACTITIONER

## 2021-01-01 PROCEDURE — G0180 MD CERTIFICATION HHA PATIENT: HCPCS | Performed by: FAMILY MEDICINE

## 2021-01-01 PROCEDURE — 20610 DRAIN/INJ JOINT/BURSA W/O US: CPT | Mod: RT | Performed by: PSYCHIATRY & NEUROLOGY

## 2021-01-01 RX ORDER — DEXAMETHASONE 0.5 MG/1
0.5 TABLET ORAL 2 TIMES DAILY WITH MEALS
Qty: 60 TABLET | Refills: 3 | Status: SHIPPED | OUTPATIENT
Start: 2021-01-01

## 2021-01-01 RX ORDER — BUPRENORPHINE 15 UG/H
1 PATCH TRANSDERMAL
Qty: 4 PATCH | Refills: 0 | Status: SHIPPED | OUTPATIENT
Start: 2021-01-01 | End: 2021-01-01

## 2021-01-01 RX ORDER — DONEPEZIL HYDROCHLORIDE 5 MG/1
5 TABLET, FILM COATED ORAL AT BEDTIME
Qty: 30 TABLET | Refills: 2 | Status: SHIPPED | OUTPATIENT
Start: 2021-01-01

## 2021-01-01 RX ORDER — TRIAMCINOLONE ACETONIDE 40 MG/ML
20 INJECTION, SUSPENSION INTRA-ARTICULAR; INTRAMUSCULAR ONCE
Status: COMPLETED | OUTPATIENT
Start: 2021-01-01 | End: 2021-01-01

## 2021-01-01 RX ORDER — LIDOCAINE 50 MG/G
PATCH TOPICAL
Qty: 90 PATCH | Refills: 3 | Status: SHIPPED | OUTPATIENT
Start: 2021-01-01 | End: 2021-01-01

## 2021-01-01 RX ORDER — LEVETIRACETAM 750 MG/1
750 TABLET ORAL 2 TIMES DAILY
Qty: 90 TABLET | Refills: 3 | Status: SHIPPED | OUTPATIENT
Start: 2021-01-01 | End: 2021-01-01

## 2021-01-01 RX ORDER — LIDOCAINE 50 MG/G
PATCH TOPICAL
Qty: 90 PATCH | Refills: 11 | Status: SHIPPED | OUTPATIENT
Start: 2021-01-01

## 2021-01-01 RX ORDER — TRIAMCINOLONE ACETONIDE 40 MG/ML
30 INJECTION, SUSPENSION INTRA-ARTICULAR; INTRAMUSCULAR ONCE
Status: COMPLETED | OUTPATIENT
Start: 2021-01-01 | End: 2021-01-01

## 2021-01-01 RX ORDER — BUPRENORPHINE 20 UG/H
1 PATCH TRANSDERMAL
Qty: 4 PATCH | Refills: 0 | Status: SHIPPED | OUTPATIENT
Start: 2021-01-01 | End: 2022-01-01

## 2021-01-01 RX ORDER — DULOXETIN HYDROCHLORIDE 60 MG/1
60 CAPSULE, DELAYED RELEASE ORAL DAILY
Qty: 90 CAPSULE | Refills: 1 | Status: SHIPPED | OUTPATIENT
Start: 2021-01-01 | End: 2022-01-01

## 2021-01-01 RX ORDER — LEVETIRACETAM 500 MG/1
500 TABLET ORAL 2 TIMES DAILY
Qty: 60 TABLET | Refills: 3 | Status: SHIPPED | OUTPATIENT
Start: 2021-01-01

## 2021-01-01 RX ORDER — BUPRENORPHINE 20 UG/H
1 PATCH TRANSDERMAL
Qty: 4 PATCH | Refills: 0 | Status: SHIPPED | OUTPATIENT
Start: 2021-01-01 | End: 2021-01-01

## 2021-01-01 RX ORDER — BUPRENORPHINE 15 UG/H
1 PATCH TRANSDERMAL
Qty: 4 PATCH | Refills: 0 | Status: SHIPPED | OUTPATIENT
Start: 2021-01-01 | End: 2021-01-01 | Stop reason: DRUGHIGH

## 2021-01-01 RX ADMIN — TRIAMCINOLONE ACETONIDE 30 MG: 40 INJECTION, SUSPENSION INTRA-ARTICULAR; INTRAMUSCULAR at 17:14

## 2021-01-01 RX ADMIN — TRIAMCINOLONE ACETONIDE 20 MG: 40 INJECTION, SUSPENSION INTRA-ARTICULAR; INTRAMUSCULAR at 17:14

## 2021-01-01 RX ADMIN — TRIAMCINOLONE ACETONIDE 20 MG: 40 INJECTION, SUSPENSION INTRA-ARTICULAR; INTRAMUSCULAR at 14:29

## 2021-01-01 RX ADMIN — TRIAMCINOLONE ACETONIDE 30 MG: 40 INJECTION, SUSPENSION INTRA-ARTICULAR; INTRAMUSCULAR at 14:29

## 2021-01-01 ASSESSMENT — ENCOUNTER SYMPTOMS
TINGLING: 0
STIFFNESS: 1
JOINT SWELLING: 0
DISTURBANCES IN COORDINATION: 1
PARALYSIS: 0
BACK PAIN: 1
NECK PAIN: 0
HEADACHES: 0
DIZZINESS: 1
LOSS OF CONSCIOUSNESS: 0
TREMORS: 0
SPEECH CHANGE: 0
ARTHRALGIAS: 1
MUSCLE CRAMPS: 0
NUMBNESS: 0
MUSCLE WEAKNESS: 0
MYALGIAS: 0
WEAKNESS: 1
MEMORY LOSS: 1
SEIZURES: 0

## 2021-01-01 ASSESSMENT — PAIN SCALES - GENERAL
PAINLEVEL: EXTREME PAIN (8)
PAINLEVEL: WORST PAIN (10)
PAINLEVEL: WORST PAIN (10)
PAINLEVEL: EXTREME PAIN (8)
PAINLEVEL: WORST PAIN (10)

## 2021-01-05 ENCOUNTER — MYC REFILL (OUTPATIENT)
Dept: FAMILY MEDICINE | Facility: CLINIC | Age: 86
End: 2021-01-05

## 2021-01-05 DIAGNOSIS — G89.4 CHRONIC PAIN DISORDER: ICD-10-CM

## 2021-01-05 RX ORDER — PREDNISONE 2.5 MG/1
2.5 TABLET ORAL DAILY
Qty: 120 TABLET | Refills: 0 | Status: CANCELLED | OUTPATIENT
Start: 2021-01-05

## 2021-01-05 NOTE — TELEPHONE ENCOUNTER
Requested Prescriptions   Pending Prescriptions Disp Refills     predniSONE (DELTASONE) 2.5 MG tablet 120 tablet 0     Sig: Take 1 tablet (2.5 mg) by mouth daily , may repeat once daily as needed       There is no refill protocol information for this order        Refilled today.    Tara TAN-RN  Triage Nurse  [facility]: [facility]

## 2021-01-06 ENCOUNTER — VIRTUAL VISIT (OUTPATIENT)
Dept: PALLIATIVE MEDICINE | Facility: CLINIC | Age: 86
End: 2021-01-06
Payer: COMMERCIAL

## 2021-01-06 DIAGNOSIS — M16.11 PRIMARY OSTEOARTHRITIS OF RIGHT HIP: ICD-10-CM

## 2021-01-06 DIAGNOSIS — M51.369 DDD (DEGENERATIVE DISC DISEASE), LUMBAR: Primary | ICD-10-CM

## 2021-01-06 DIAGNOSIS — M48.062 SPINAL STENOSIS OF LUMBAR REGION WITH NEUROGENIC CLAUDICATION: ICD-10-CM

## 2021-01-06 DIAGNOSIS — M25.551 HIP PAIN, RIGHT: ICD-10-CM

## 2021-01-06 DIAGNOSIS — M70.61 GREATER TROCHANTERIC BURSITIS OF RIGHT HIP: ICD-10-CM

## 2021-01-06 PROCEDURE — 99215 OFFICE O/P EST HI 40 MIN: CPT | Mod: 95 | Performed by: NURSE PRACTITIONER

## 2021-01-06 RX ORDER — DULOXETIN HYDROCHLORIDE 20 MG/1
20 CAPSULE, DELAYED RELEASE ORAL DAILY
Qty: 30 CAPSULE | Refills: 1 | Status: SHIPPED | OUTPATIENT
Start: 2021-01-06 | End: 2021-02-09

## 2021-01-06 ASSESSMENT — PAIN SCALES - GENERAL: PAINLEVEL: WORST PAIN (10)

## 2021-01-06 NOTE — PROGRESS NOTES
"Teresa Lopez is a 85 year old female who is being evaluated via a billable telephone visit.      What phone number would you like to be contacted at? [phone]  How would you like to obtain your AVS? Jesse Menjivar MA             [facility]    1/6/2021    Teresa Lopez is a 85 year old female who is being evaluated via a billable telephone visit.        Chief complaint:  chronic low back pain and right leg pain      Interval history:  Teresa Lopez is a 85 year old female is known to me for .   Primary osteoarthritis    Chronic right hip pain   Chronic bilateral low back without sciatica   Spinal stenosis of lumbar region with neurogenic claudication   Piriformis syndrome of both sides   PMHx DJD, CVA, HTN, diabetes mellitus, and cataract   PSHx Hysterectomy        Recommendations/plan at the last visit on 11/4/2020 included:  1. Physical Therapy:  None at present  2. Clinical Health Psychologist: I agree with outside counseling as you are.  3. Diagnostic Studies:  None  4. Medication Management:    1. START Cymbalta (Duloxetine) 20mg every day in the morning. Take at bedtime if it makes you tired. It is common to experience a little headache, stomach feels \"unsettled\" and looser bowel movements for the first 3-10 days you take this medication. STOP the medication fi your mood gets worse or you feel like hurting or killing yourself  2. Continue Lidocaine patches   3. Continue medical cannabis.   5. Further procedures recommended: repeat right greater trochanteric bursal injection, our clinic to contact pt  6. Recommendations to PCP: none  7. Follow up: 6-8 weeks telephone  8. Let me know how Jefferson is doing on the Cymbalta in about 3 weeks via Jesse. We can adjust dosage upward if needed as long as it is tolerated well.           Since her last visit, Teresa Lopez reports:    Interval history January 6, 2021  -spoke with Jefferson and her daughter Rubi on speakerphone.  -Jefferson " "ran out of her oral steroids for 6 days, her steroid injection  -she just picked the prednisone today.   -Jefferson had a right sided greater trochanteric bursal injection in November 2020.   -Jefferson and Abdi would like to re-trial the Cymbalta. She has stopped taking it because she was not eating and having issues with nausea.   -We talked about trying to use the Cymbalta either with food in the morning OR at bedtime with a snack.   -Jefferson indicates she is having right sided pain over the lateral hip pain.  -She has seen Dr. Lewis with podiatry. She may need to have her left 2nd toe removed.           Interval history 11/4/2020  -low back pain and lateral hip pain on the right side.    -new shoe insert about a week ago, her daughter Abdi states this has helped her balance.   -Jefferson states she has some \"snapping\" in the right hip.   -Jefferson has not yet begun the Cymbalta, she wanted to try the shoe insert first  -Jefferson has had a tough day especially on Sunday 11/1, she wasn't walking around as much as usual.  -patient is with her daughter Abdi throughout the encounter          Interval history 10/2/2020  -right SI joint injection has been helpful  -She is having pain in the right side of the hip, laterally, not anterior groin pain  -patient feels her left leg is longer than the right leg  -She has been more active  -she is seeing a counselor, has been seen x 3          Interval history 8/26/2020  -She states she is not doing very well sometimes.   -she feels her right hip is \"very bad.\"   -She had a 7/30/2020 right greater trochanteric bursal injection, her pain came down from an 8/10 to a 2/10 for her right GT.  -still has some trouble standing due to a spot that is painful, she is describing a spot of pain over the SI joint. This tends to hurt when she stands to do things and feels better when she is sitting down.   -she accompanied today by her daughter Adbi on the phone today  -recertified for medical cannabis " "today  -she has a hammer toe and this has been really painful for her the past 2 days.       Interval history 7/15/2020  -she is not feeling well  -she sleeps with a pillow between her legs  -she notes she is having \"bad pain\" on the lateral hip on the right side. She has pain when she lays on her right side in bed. She has had a greater trochanteric bursal injection in June 2019 that was helpful.   -she is not eating well as she is isolated and feels depressed and feels shaky as she doesn't eat as regularly as she should.   -discussed using Voltaren gel, Cymbalta and greater trochanteric injection      Interval history 5/6/2020  -Teresa is having some more hip pain on the right side between the top of the hip and up and the tailbone to the hip  -SI joint injection on the right side was beneficial and this has made her pain more focused on the right hip.   -she has pain if she lays on her right side  -she is having more pain in the deep right groin. She notes when she walks this pain is markedly worse.         At this point, the patient's participation with our multidisciplinary team includes:  The patient has been compliant with the program  PT - patient is interested  Health Psych  None      Pain scores:  Pain intensity on average is too hard to rate today on a scale of 0-10.    Range is 7-10/10.   Pain right now is 10/10   Pain is described as \"unbearable, tiring, exhausting, miserable, nagging, and gnawing.\"  Pain is continuous in nature and worse in the morning.    Current pain-related medication treatments include:  -Lidocaine patch (helpful)  -Medical cannabis (helpful)         Other pertinent medications:  -Senna-docusate PRN      Previous medication treatments included:  OPIATES:hydrocodone (not helpful), oxycodone (somewhat helpful)  NSAIDS: ibuprofen (not helpful), Aleve (not helpful)  MUSCLE RELAXANTS: none  ANTI-MIGRAINE MEDS: none  ANTI-DEPRESSANTS: none  SLEEP AIDS: none  ANTI-CONVULSANTS: gabapentin " (unsure if helpful, side effects: hot flashes, fogginess)  TOPICALS: none  Other meds: Tylenol (not helpful)    Injections:  -11/10/2017 Caudal epidural steroid injection with Dr. Reymundo Bajwa (helpful for a very short period of time)  -1/5/2018 Ultrasound guided right intra-articular hip injection with Dr. Jean Meade  -1/12/2018 Right ankle injection with Dr. Lewis of podiatry.   -7/3/2018 L2-3 interlaminar epidural steroid injection with Dr Jamaal Eid (not at all helpful)  -8/3/2018 right hip steroid injection with Dr. Valverde (helped some)  -6/14/2019 bilateral hip greater trochanteric bursa injection with Dr. Jamaal Eid (helpful)   -1/23/2020 right SI joint injection with Dr. Chasidy Laura (quite helpful)  5/13/2020 right hip joint steroid injection with Dr. Valverde (quite helpful)  -7/30/2020 right hip greater trochanteric bursal injection with Dr. Jamaal Eid (quite helpful)  -9/15/2020 right SI joint injection with Dr. Jamaal Eid (pain diminished about 25%, sharpness went away)  -11/19/2020 right hip greater trochanteric bursal injection with Dr. Eid (helpful)    Side Effects: no side effect  Patient is using the medication as prescribed: YES    Medications:  Current Outpatient Medications   Medication Sig Dispense Refill     Acetaminophen (TYLENOL ARTHRITIS PAIN PO) As needed not taking daily       ADVIL 200 MG PO CAPS Reported on 4/20/2017       Aspirin-Acetaminophen-Caffeine (EXCEDRIN PO)        CALCIUM 1200 OR Reported on 4/20/2017       Glucosamine-Chondroit-Vit C-Mn (GLUCOSAMINE 1500 COMPLEX PO) Take 1,500 mg by mouth daily Reported on 4/20/2017       lidocaine (LIDODERM) 5 % patch Place 3 patches onto the skin every 24 hours 90 patch 11     Loratadine (CLARITIN) 10 MG capsule Take 10 mg by mouth as needed Reported on 4/20/2017       losartan-hydrochlorothiazide (HYZAAR) 100-25 MG tablet Take 1 tablet by mouth daily 90 tablet 0     magnesium 250 MG tablet Take 1 tablet by mouth  daily       medical cannabis (Patient's own supply.  Not a prescription) See Admin Instructions (This is NOT a prescription, and does not certify that the patient has a qualifying medical condition for medical cannabis.  The purpose of this order is  to document that the patient reports taking medical cannabis.)   Taking 6-9 red capsules daily       MULTI-VITAMIN OR TABS 1 TABLET DAILY       nortriptyline (PAMELOR) 10 MG capsule Take 1 capsule (10 mg) by mouth At Bedtime (Patient not taking: Reported on 12/8/2020) 60 capsule 0     potassium aminobenzoate 500 MG CAPS capsule        predniSONE (DELTASONE) 2.5 MG tablet Take 1 tablet (2.5 mg) by mouth daily , may repeat once daily as needed 120 tablet 0     senna-docusate (SENOKOT-S;PERICOLACE) 8.6-50 MG per tablet Take 1 tablet by mouth 2 times daily 120 tablet 12     simvastatin (ZOCOR) 40 MG tablet Take 1 tablet (40 mg) by mouth At Bedtime 90 tablet 3       Medical History: any changes in medical history since they were last seen? No    Social History:  Home situation: . 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  Tobacco use: none  Alcohol use: very little, about 2 drinks per month  Drug use: none  History of chemical dependency treatment: none    Is patient a current smoker or tobacco user?  no  If yes, was cessation counseling offered?  no        Review of Systems:   The 14 system ROS was reviewed with patient and is positive for:  Constitutional: fever/chills, fatigue, weight gain, weight loss  Eyes/Head: headache, dizziness  ENT: ringing in ears  Allergy/Immune: allergies  Skin: itching, rash, hives  Hematologic: easy bruising  Respiratory: cough, wheezing, shortness of breath  Cardiovascular: swelling in feet, fainting, palpitations, chest pain  GI: abdominal pain, nausea, vomiting, diarrhea, constipation  Endocrine: steroid use  Musculoskeletal:  joint pain, arthritis, stiffness,  gout, back pain, neck pain  Urinary: frequency, urgency, incontinence, hesitancy  Neurologic: weakness, numbness/tingling, seizure, stroke, memory loss (short term memory loss)  Mental health: depression, anxiety, stress, suicidal ideation           Physical Exam:  Vital signs: There were no vitals taken for this visit.    Behavioral observations:  Awake and alert, cooperative  Pulm: respirations easy and unlabored. Able to speak in full sentences without SOB or cough noted.            Imaging  MRI LUMBAR SPINE WITHOUT CONTRAST   3/30/2018 1:22 PM      HISTORY:  Lumbar degenerative disc disease.     TECHNIQUE: Multiplanar multisequence MRI of the lumbar spine without  contrast.     COMPARISON: Lumbar spine x-ray 3/22/2018. Lumbar spine MR 3/17/2017.      FINDINGS: There are 5 lumbar-type vertebral bodies assumed for the  purposes of this dictation. The tip of the conus terminates at the  L1-L2 level.     There is convex right scoliotic curvature of the lumbar spine centered  at the L2-L3 level. There is mild retrolisthesis of L2 on L3 and mild  anterolisthesis of L3 on L4. There is grade 1 anterolisthesis of L4 on  L5. There is right lateral listhesis of L3 on L4. There is slight loss  of left-sided vertebral body height at L3, probably due to  degenerative changes. Severe loss of intervertebral disc height seen  at L3-L4 with associated degenerative endplate changes. Severe loss of  disc height also seen on the left at L1-L2 and L2-L3 with severe  degenerative endplate changes. Mild loss of disc height at T11-12 and  T12-L1 with disc desiccation at L4-5 and L5-S1. There is mild STIR  hyperintense marrow edema at the opposing L2-L3 endplates.  Degenerative subchondral cysts seen anteriorly at L3.     Level by level as follows:      T12-L1:  Only seen on the lateral view, but there is a posterior disc  bulge and bilateral facet hypertrophy resulting in moderate right and  mild left neural foraminal narrowing. No  significant spinal canal  narrowing.      L1-L2:  Convex right curvature with retrolisthesis and circumferential  disc bulge with endplate osteophytic spurring as well as bilateral  facet hypertrophy. Findings result in mild central spinal canal  narrowing and moderate bilateral neural foraminal narrowing.      L2-L3:  Convex right scoliotic curvature with left-sided disc bulge  and endplate osteophytic spurring as well as, left greater than right,  facet hypertrophy and ligamentum flavum infolding. Findings result in  moderate central spinal canal narrowing as well as moderate to severe  left neural foraminal narrowing. There is mild right neural foraminal  narrowing.      L3-L4:  Mild anterolisthesis along with circumferential disc bulge,  severe bilateral facet hypertrophy, and ligamentum flavum infolding.  Findings result in severe central spinal canal narrowing. There is  also severe left and moderate to severe right neural foraminal  narrowing.      L4-L5:  Anterolisthesis with uncovering of the disc and small  posterior disc bulge along with severe bilateral facet hypertrophy and  ligamentum flavum infolding. Findings result in moderate to severe  central spinal canal narrowing. There is also moderate to severe  bilateral neural foraminal narrowing. Small bilateral facet joint  effusions.      L5-S1:  Mild posterior disc bulge and marked bilateral facet  hypertrophy results in moderate bilateral neural foraminal narrowing,  right greater than left. No significant central spinal canal  narrowing.      Paraspinous soft tissues:  Normal.          IMPRESSION:    1. Severe multilevel degenerative changes throughout the lumbar spine  as described above. Overall, there is no significant change since  previous MR 3/17/2017.  2. Severe central spinal canal narrowing at L3-L4 and moderate to  severe spinal canal narrowing at L4-L5.  3. Convex right scoliotic curvature of the spine with multiple levels  of  "spondylolisthesis and right lateral listhesis of L3 on L4.     DIANA HERNANDEZ MD        [place] Prescription Monitoring [place]:  [place]ed [place]  January 6, 2021- no concerning fills.  Marion SANCHES, МАРИЯ CNP, FNP  [facility]  [place] location        Assessment:   1. DDD (degenerative disc disease), lumbar  2. Spinal stenosis of lumbar region with neurogenic claudication  3. Greater trochanteric bursitis of right hip  4. Right hip joint pain  5. Right hip osteoarthritis      Plan:   1. Physical Therapy:  None at present  2. Clinical Health Psychologist: I agree with outside counseling as you are.  3. Diagnostic Studies:  None  4. Medication Management:    1. RE-START Cymbalta (Duloxetine) 20mg every day in the morning. Take at bedtime if it makes you tired. It is common to experience a little headache, stomach feels \"unsettled\" and looser bowel movements for the first 3-10 days you take this medication. STOP the medication fi your mood gets worse or you feel like hurting or killing yourself  2. Continue Lidocaine patches   3. Continue medical cannabis.   4. Try using the Voltaren gel three to four times daily regularly for the right lateral hip pain  5. Further procedures recommended: none at present  6. Recommendations to PCP: none  7. Follow up: 6-8 weeks telephone  8. Let me know how Jefferson is doing on the Cymbalta in about 3 weeks via [employer]. We can adjust dosage upward if needed as long as it is tolerated well.         Phone call duration: 29 minutes    BILLING TIME DOCUMENTATION:   The total TIME spent on this patient on the day of the appointment was 42 minutes.      TOTAL TIME includes:   Time spent preparing to see the patient: 9 minutes (reviewing records and tests)  Time spend face to face with the patient: 29 minutes  Time spent ordering tests, medications, procedures and referrals: 1 minutes  Time spent Referring and communicating with other healthcare professionals: 0 " minutes  Documenting clinical information in Epic: 3 minutes      Marion SANCHES, RN CNP, FNP  [facility]

## 2021-01-06 NOTE — PATIENT INSTRUCTIONS
"Plan:   1. Physical Therapy:  None at present  2. Clinical Health Psychologist: I agree with outside counseling as you are.  3. Diagnostic Studies:  None  4. Medication Management:    1. RE-START Cymbalta (Duloxetine) 20mg every day in the morning. Take at bedtime if it makes you tired. It is common to experience a little headache, stomach feels \"unsettled\" and looser bowel movements for the first 3-10 days you take this medication. STOP the medication fi your mood gets worse or you feel like hurting or killing yourself  2. Continue Lidocaine patches   3. Continue medical cannabis.   4. Try using the Voltaren gel three to four times daily regularly for the right lateral hip pain  5. Further procedures recommended: none at present  6. Recommendations to PCP: none  7. Follow up: 6-8 weeks telephone  8. Let me know how Jefferson is doing on the Cymbalta in about 3 weeks via [employer]. We can adjust dosage upward if needed as long as it is tolerated well.     ----------------------------------------------------------------  Clinic Number:  [phone]     Call with any questions about your care and for scheduling assistance.     Calls are returned Monday through Friday between 8 AM and 4:30 PM. We usually get back to you within 2 business days depending on the issue/request.    If we are prescribing your medications:    For opioid medication refills, call the clinic or send a [employer] message 7 days in advance.  Please include:    Name of requested medication    Name of the pharmacy.    For non-opioid medications, call your pharmacy directly to request a refill. Please allow 3-4 days to be processed.     Per [place] State Law:    All controlled substance prescriptions must be filled within 30 days of being written.      For those controlled substances allowing refills, pickup must occur within 30 days of last fill.      We believe regular attendance is key to your success in our program!      Any time you are unable to keep your " appointment we ask that you call us at least 24 hours in advance to cancel.This will allow us to offer the appointment time to another patient.     Multiple missed appointments may lead to dismissal from the clinic.

## 2021-01-07 DIAGNOSIS — G89.4 CHRONIC PAIN DISORDER: ICD-10-CM

## 2021-01-10 RX ORDER — PREDNISONE 2.5 MG/1
TABLET ORAL
Qty: 120 TABLET | Refills: 0 | OUTPATIENT
Start: 2021-01-10

## 2021-01-13 ENCOUNTER — MYC MEDICAL ADVICE (OUTPATIENT)
Dept: PALLIATIVE MEDICINE | Facility: CLINIC | Age: 86
End: 2021-01-13

## 2021-01-14 NOTE — TELEPHONE ENCOUNTER
Per patient [employer] message:  From: Jefferson Lopez      Created: 1/13/2021 10:00 PM      Hi Marion, I wanted to provide a report about my Mom's acceptance of Cymbalta and a few questions.    The Cymbalta is, as predicted, making her sick to her stomach.  I think she has thrown up twice, but it is getting hard to get information out of her.  We moved the dose to the end of the day, but it has been hard to get her to change (we write it down).    1. Will the nausea subside at some point?  Is there anything other than crackers (she won't eat them) and 7-up that she can take with the other drugs she is taking?  2. I keep asking her if her pain is subsiding and she says is getting worse.  She had a really bad day yesterday.  To confirm, is it about 2 weeks before she will feel any relief?     Any ideas to help make her comfortable are welcome.  Thank you,  Brooke           Routed to provider.    Natalie RN-BSN  [facility]

## 2021-01-20 ENCOUNTER — MYC MEDICAL ADVICE (OUTPATIENT)
Dept: FAMILY MEDICINE | Facility: CLINIC | Age: 86
End: 2021-01-20

## 2021-01-20 DIAGNOSIS — G89.4 CHRONIC PAIN SYNDROME: ICD-10-CM

## 2021-01-20 NOTE — TELEPHONE ENCOUNTER
Routing refill request to provider for review/approval because:  Drug not on the [facility] refill protocol     Last office visit: 11/9/20 VIRTUAL with prescribing provider:  Eliza Ma PA-C      Future Office Visit:  None    Katherine Geronimo RN BSN  [facility]

## 2021-01-21 ENCOUNTER — TELEPHONE (OUTPATIENT)
Dept: FAMILY MEDICINE | Facility: CLINIC | Age: 86
End: 2021-01-21

## 2021-01-21 DIAGNOSIS — M48.062 SPINAL STENOSIS OF LUMBAR REGION WITH NEUROGENIC CLAUDICATION: ICD-10-CM

## 2021-01-21 RX ORDER — LIDOCAINE 50 MG/G
3 PATCH TOPICAL EVERY 24 HOURS
Qty: 90 PATCH | Refills: 0 | Status: SHIPPED | OUTPATIENT
Start: 2021-01-21 | End: 2021-03-03

## 2021-01-21 NOTE — TELEPHONE ENCOUNTER
Prior Authorization Retail Medication Request    Medication/Dose: lidocaine (LIDODERM) 5 % patch    ICD code (if different than what is on RX):  Chronic pain syndrome [G89.4]     Previously Tried and Failed:    Rationale:      Insurance Name: UNITED HE[employer] MEDICARE ADVANTAGE    Insurance ID: [id number]      Pharmacy Information (if different than what is on RX)  Name:    Phone:

## 2021-01-22 ENCOUNTER — MYC REFILL (OUTPATIENT)
Dept: FAMILY MEDICINE | Facility: CLINIC | Age: 86
End: 2021-01-22

## 2021-01-22 DIAGNOSIS — G89.4 CHRONIC PAIN DISORDER: ICD-10-CM

## 2021-01-23 RX ORDER — SALSALATE 750 MG
750-1500 TABLET ORAL DAILY PRN
Qty: 60 TABLET | Refills: 5 | OUTPATIENT
Start: 2021-01-23

## 2021-01-23 NOTE — CONFIDENTIAL NOTE
medication stopped by provider on 6/24/20, message sent to pharmcy to have pt call us if wanting refill.

## 2021-01-23 NOTE — TELEPHONE ENCOUNTER
Central Prior Authorization Team   Phone: [phone]      PA Initiation    Medication: lidocaine (LIDODERM) 5 % patch  Insurance Company: [employer] ([facility]) - Phone [phone] Fax [phone]  Pharmacy Filling the Rx: [id number] - [place], [place] - [address] AT [facility] [address]  Filling Pharmacy Phone: [phone]  Filling Pharmacy Fax:    Start Date: 1/23/2021

## 2021-01-23 NOTE — TELEPHONE ENCOUNTER
Prior Authorization Approval    Authorization Effective Date: 1/23/2021  Authorization Expiration Date: 12/31/2021  Medication: lidocaine (LIDODERM) 5 % patch  Approved Dose/Quantity:    Reference #:     Insurance Company: [employer] ([facility]) - Phone [phone] Fax [phone]  Expected CoPay:       CoPay Card Available:      Foundation Assistance Needed:    Which Pharmacy is filling the prescription (Not needed for infusion/clinic administered): [id number] - [place], [place] - [address] AT [facility] [address] [facility]  Pharmacy Notified: Yes  Patient Notified: Yes  **Instructed pharmacy to notify patient when script is ready to /ship.**

## 2021-01-25 RX ORDER — PREDNISONE 2.5 MG/1
2.5 TABLET ORAL DAILY
Qty: 120 TABLET | Refills: 0 | Status: SHIPPED | OUTPATIENT
Start: 2021-01-25 | End: 2021-03-23

## 2021-01-25 NOTE — TELEPHONE ENCOUNTER
Requested Prescriptions   Pending Prescriptions Disp Refills     predniSONE (DELTASONE) 2.5 MG tablet 120 tablet 0     Sig: Take 1 tablet (2.5 mg) by mouth daily , may repeat once daily as needed       There is no refill protocol information for this order        Routing refill request to provider for review/approval because:  Drug not on the [facility] refill protocol   Tara DUBOIS-RN  Triage Nurse  [facility]: [facility]

## 2021-02-05 ENCOUNTER — IMMUNIZATION (OUTPATIENT)
Dept: NURSING | Facility: CLINIC | Age: 86
End: 2021-02-05
Payer: COMMERCIAL

## 2021-02-05 PROCEDURE — 91300 PR COVID VAC PFIZER DIL RECON 30 MCG/0.3 ML IM: CPT

## 2021-02-05 PROCEDURE — 0001A PR COVID VAC PFIZER DIL RECON 30 MCG/0.3 ML IM: CPT

## 2021-02-08 DIAGNOSIS — I10 HYPERTENSION GOAL BP (BLOOD PRESSURE) < 140/90: ICD-10-CM

## 2021-02-09 ENCOUNTER — MYC MEDICAL ADVICE (OUTPATIENT)
Dept: PALLIATIVE MEDICINE | Facility: CLINIC | Age: 86
End: 2021-02-09

## 2021-02-09 DIAGNOSIS — M25.551 HIP PAIN, RIGHT: ICD-10-CM

## 2021-02-09 DIAGNOSIS — M48.062 SPINAL STENOSIS OF LUMBAR REGION WITH NEUROGENIC CLAUDICATION: ICD-10-CM

## 2021-02-09 DIAGNOSIS — M51.369 DDD (DEGENERATIVE DISC DISEASE), LUMBAR: ICD-10-CM

## 2021-02-09 DIAGNOSIS — M16.11 PRIMARY OSTEOARTHRITIS OF RIGHT HIP: ICD-10-CM

## 2021-02-09 DIAGNOSIS — M70.61 GREATER TROCHANTERIC BURSITIS OF RIGHT HIP: ICD-10-CM

## 2021-02-09 RX ORDER — DULOXETIN HYDROCHLORIDE 20 MG/1
20 CAPSULE, DELAYED RELEASE ORAL DAILY
Qty: 30 CAPSULE | Refills: 1 | Status: SHIPPED | OUTPATIENT
Start: 2021-02-09 | End: 2021-02-09

## 2021-02-09 RX ORDER — DULOXETIN HYDROCHLORIDE 20 MG/1
20 CAPSULE, DELAYED RELEASE ORAL DAILY
Qty: 30 CAPSULE | Refills: 1 | Status: SHIPPED | OUTPATIENT
Start: 2021-02-09 | End: 2021-02-27

## 2021-02-09 RX ORDER — LOSARTAN POTASSIUM AND HYDROCHLOROTHIAZIDE 25; 100 MG/1; MG/1
1 TABLET ORAL DAILY
Qty: 30 TABLET | Refills: 0 | Status: SHIPPED | OUTPATIENT
Start: 2021-02-09 | End: 2021-02-23

## 2021-02-09 NOTE — TELEPHONE ENCOUNTER
Signed Prescriptions:                        Disp   Refills    DULoxetine (CYMBALTA) 20 MG capsule        30 cap*1        Sig: Take 1 capsule (20 mg) by mouth daily  Authorizing Provider: RENA CELAYA, RN CNP, FNP  [facility]

## 2021-02-09 NOTE — TELEPHONE ENCOUNTER
Signed Prescriptions:                        Disp   Refills    DULoxetine (CYMBALTA) 20 MG capsule        30 cap*1        Sig: Take 1 capsule (20 mg) by mouth daily  Authorizing Provider: MARION CELAYA, RN DEMOND, P  [facility]      Mahin Cox and Jefferson-     Do you feel that the Cymbalta is now being tolerated well? Sounds like you are able to get around a bit better, Jefferson! That is awesome. How is the mood doing with the Cymbalta?     Thanks.  Marion SANCHES RN DEMOND, P  [facility]    I have sent the above [employer] message to the patient.     Marion SANCHES RN CNP, [facility]

## 2021-02-09 NOTE — TELEPHONE ENCOUNTER
Received fax request from [facility] pharmacy requesting refill(s) for DULoxetine (CYMBALTA) 20 MG capsule    Last refilled on 02/02/21    Pt last seen on 01/06/21  Next appt scheduled for 02/24/21    Will facilitate refill.

## 2021-02-09 NOTE — CONFIDENTIAL NOTE
"  Prescription approved per [facility] Refill Protocol.  Requested Prescriptions   Pending Prescriptions Disp Refills     losartan-hydrochlorothiazide (HYZAAR) 100-25 MG tablet 90 tablet 0     Sig: Take 1 tablet by mouth daily       Angiotensin-II Receptors Passed - 2/8/2021  1:53 PM        Passed - Last blood pressure under 140/90 in past 12 months     BP Readings from Last 3 Encounters:   12/16/20 130/74   12/08/20 136/78   11/19/20 129/72                 Passed - Recent (12 mo) or future (30 days) visit within the authorizing provider's specialty     Patient has had an office visit with the authorizing provider or a provider within the authorizing providers department within the previous 12 mos or has a future within next 30 days. See \"Patient Info\" tab in inbasket, or \"Choose Columns\" in Meds & Orders section of the refill encounter.              Passed - Medication is active on med list        Passed - Patient is age 18 or older        Passed - No active pregnancy on record        Passed - Normal serum creatinine on file in past 12 months     Recent Labs   Lab Test 02/11/20  1420   CR 0.99       Ok to refill medication if creatinine is low          Passed - Normal serum potassium on file in past 12 months     Recent Labs   Lab Test 02/11/20  1420   POTASSIUM 3.8                    Passed - No positive pregnancy test in past 12 months       Diuretics (Including Combos) Protocol Passed - 2/8/2021  1:53 PM        Passed - Blood pressure under 140/90 in past 12 months     BP Readings from Last 3 Encounters:   12/16/20 130/74   12/08/20 136/78   11/19/20 129/72                 Passed - Recent (12 mo) or future (30 days) visit within the authorizing provider's specialty     Patient has had an office visit with the authorizing provider or a provider within the authorizing providers department within the previous 12 mos or has a future within next 30 days. See \"Patient Info\" tab in inbasket, or \"Choose Columns\" in Meds & " Orders section of the refill encounter.              Passed - Medication is active on med list        Passed - Patient is age 18 or older        Passed - No active pregancy on record        Passed - Normal serum creatinine on file in past 12 months     Recent Labs   Lab Test 02/11/20  1420   CR 0.99              Passed - Normal serum potassium on file in past 12 months     Recent Labs   Lab Test 02/11/20  1420   POTASSIUM 3.8                    Passed - Normal serum sodium on file in past 12 months     Recent Labs   Lab Test 02/11/20  1420                 Passed - No positive pregnancy test in past 12 months

## 2021-02-09 NOTE — TELEPHONE ENCOUNTER
From: Jefferson Lopez      Created: 2/9/2021 11:06 AM        *-*-*This message has not been handled.*-*-*    Marion, Mom was at [facility] attempting to refill DULoxetine 20 MG capsules.  In my chart is says one refill remaining.  However, the pharmasist instructed her to connect her doctor for a refill.  Thus this note to request a refill.       By the way, the first two 1/2 weeks on DUloxetine were very difficult and had to call the paramedics at one point (she could not walk and I was not around).  The past few days seems like we are on the up-swing.  Still painful, but able to get around a bit more.       Thank you,  Brooke        Will forward to Marion to review and order duloxetine.    Jean Caceres, RN  Care Coordinator   [facility]

## 2021-02-10 NOTE — TELEPHONE ENCOUNTER
Thanks for the update.  Jefferson's last trochanteric bursal injections (the one's on the side of her hips) was on 11/19/2020. IF that is where her pain remains the most bothersome, I can place an order for a repeat injection on or after 2/19/2021 if you and Jefferson would like.      Thanks.  Marion SANCHES RN CNP, UMUP  [facility]    I have sent the above [employer] message to the patient.     Marion SANCHES RN CNP, P  [facility]

## 2021-02-10 NOTE — TELEPHONE ENCOUNTER
Mahin Cox     I am so sorry I did not respond earlier. I thought I had sent a response!     So, how is Jefferson doing now? Cymbalta can take 2-6 weeks to show improvement. Any side effects typically will pass in the first 2 weeks for most people.      Anxious to hear how Jefferson is doing!     Marion SANCHES RN CNP, P  [facility]    I have sent the above [employer] message to the patient.     Marion SANCHES RN CNP, P  [facility]

## 2021-02-10 NOTE — TELEPHONE ENCOUNTER
From: Jefferson Lopez      Created: 2/9/2021 3:33 PM        Marion, thank you for your note.  I have been monitoring Mom's reaction to Cymbalta.  For the first week, she was pretty nauseous.  We switched the timing of the dose toward the end of the day and it has helped.  She still gets nauseous but not as frequent and as intense with the exception of this past Sunday.  Not sure if it was Cymbalta or her first vaccine shot on Friday.  Her mood is better and she is more outward focused and doing small projects (cleaning/rearranging a drawer, going grocery shopping with a lot of assistance).  As for the pain, there is still pain, she limps but is getting around a tad bit better.  I am hoping she will be able to receive a shot in her pain points as another tool to provide additional comfort.       Brooke

## 2021-02-10 NOTE — TELEPHONE ENCOUNTER
Hi there.      With Jefferson getting the vaccine on 2/26, we recommend having 7 days in between an injection and a vaccine. I think the vaccine is the most important for her. How about I put in an order for the injections after March 6th? Would that be OK with you?     And I am SO Happy that Jefferson is getting out and doing more around the house.      Thanks.  Marion SANCHES RN CNP, UMUP  [facility]    I have sent the above [employer] message to the patient.     Marion SANCHES RN CNP, P  [facility]

## 2021-02-10 NOTE — TELEPHONE ENCOUNTER
Per patient orderTopiahart message:  From: Jefferson Lopez      Created: 2/9/2021 9:31 PM      Marion, you were pretty spot on with the 2-3 weeks to start taking effect.  Up until the middle of last week it has been not a pleasant journey.  Mom has, for the most part, gotten over her nausea.  She has always had a touch stomach.   She did get sick this past weekend, but it may be due to the vaccine she received on Friday (yeah!)  At about the 2 week sharda, she could barely stand due to the pain in her hip and called the paramedics.  I was not available and she needed help just to go to the bathroom and had not eaten.  I was able to get my sister to attend take over and the paramedics left.    I am seeing a better mood each day and she is tackling small projects around the apartment (rearranging drawers or at least thinking about it) and wanting new shoes.  The pain is still there and she limps, wincing as she goes.  I hope the steroid shot is still something she can get for additional relief.    Thank you,  Brooke        Note: there are 2 encounters about this.    Natalie RN-BSN  [facility]

## 2021-02-10 NOTE — TELEPHONE ENCOUNTER
Per patient myChart messages:  From: Jefferson Lopez      Created: 2/10/2021 12:54 PM      Yes, the pain is in the same spot(s).  In talking with her this morning, she points to the same areas.  She said she would like to receive the injection(s).  I will make sure someone is available to drive her to the appoinment.    Thank you,  Brooke HOWARD.  When I arrived to her apartment today, she was sitting in the lobby by herself.  She is getting out!      and  Jefferson Lopez  to Marion Munoz, MYRON CNP     12:57 PM  Marion, one more item.  Mom has her second vaccine appointment on Friday, February 26 (1:00).  Not sure if the injection has to be spaced X # of days from the vaccine.

## 2021-02-23 ENCOUNTER — OFFICE VISIT (OUTPATIENT)
Dept: FAMILY MEDICINE | Facility: CLINIC | Age: 86
End: 2021-02-23
Payer: COMMERCIAL

## 2021-02-23 VITALS
BODY MASS INDEX: 20.5 KG/M2 | OXYGEN SATURATION: 98 % | TEMPERATURE: 98.1 F | DIASTOLIC BLOOD PRESSURE: 62 MMHG | SYSTOLIC BLOOD PRESSURE: 132 MMHG | HEART RATE: 85 BPM | WEIGHT: 110 LBS

## 2021-02-23 DIAGNOSIS — M46.96 INFLAMMATORY SPONDYLOPATHY OF LUMBAR REGION (H): ICD-10-CM

## 2021-02-23 DIAGNOSIS — E44.1 MILD MALNUTRITION (H): ICD-10-CM

## 2021-02-23 DIAGNOSIS — N18.30 STAGE 3 CHRONIC KIDNEY DISEASE, UNSPECIFIED WHETHER STAGE 3A OR 3B CKD (H): ICD-10-CM

## 2021-02-23 DIAGNOSIS — I10 HYPERTENSION GOAL BP (BLOOD PRESSURE) < 140/90: ICD-10-CM

## 2021-02-23 DIAGNOSIS — I73.9 PERIPHERAL VASCULAR DISEASE, UNSPECIFIED (H): ICD-10-CM

## 2021-02-23 DIAGNOSIS — R41.3 MEMORY DEFICIT: Primary | ICD-10-CM

## 2021-02-23 DIAGNOSIS — F33.1 MAJOR DEPRESSIVE DISORDER, RECURRENT EPISODE, MODERATE WITH ANXIOUS DISTRESS (H): ICD-10-CM

## 2021-02-23 PROCEDURE — 84443 ASSAY THYROID STIM HORMONE: CPT | Performed by: FAMILY MEDICINE

## 2021-02-23 PROCEDURE — 82746 ASSAY OF FOLIC ACID SERUM: CPT | Performed by: FAMILY MEDICINE

## 2021-02-23 PROCEDURE — 36415 COLL VENOUS BLD VENIPUNCTURE: CPT | Performed by: FAMILY MEDICINE

## 2021-02-23 PROCEDURE — 99214 OFFICE O/P EST MOD 30 MIN: CPT | Performed by: FAMILY MEDICINE

## 2021-02-23 PROCEDURE — 80053 COMPREHEN METABOLIC PANEL: CPT | Performed by: FAMILY MEDICINE

## 2021-02-23 PROCEDURE — 99000 SPECIMEN HANDLING OFFICE-LAB: CPT | Performed by: FAMILY MEDICINE

## 2021-02-23 PROCEDURE — 82607 VITAMIN B-12: CPT | Performed by: FAMILY MEDICINE

## 2021-02-23 PROCEDURE — 86780 TREPONEMA PALLIDUM: CPT | Mod: 90 | Performed by: FAMILY MEDICINE

## 2021-02-23 RX ORDER — LOSARTAN POTASSIUM AND HYDROCHLOROTHIAZIDE 25; 100 MG/1; MG/1
1 TABLET ORAL DAILY
Qty: 90 TABLET | Refills: 3 | Status: SHIPPED | OUTPATIENT
Start: 2021-02-23 | End: 2021-03-03

## 2021-02-23 NOTE — PROGRESS NOTES
Assessment & Plan     Memory deficit  Severe.  Will defer to neurology if patient would benefit from baseline MRI.  Consider   - Vitamin B12  - Folate  - Treponema Abs w Reflex to RPR and Titer  - NEUROLOGY ADULT REFERRAL  - CARE COORDINATION REFERRAL  -TSH    Hypertension goal BP (blood pressure) < 140/90  Stable  - losartan-hydrochlorothiazide (HYZAAR) 100-25 MG tablet; Take 1 tablet by mouth daily    Mild malnutrition (H)  Monitor daily weights    Inflammatory spondylopathy of lumbar region (H)  Keep upcoming appointment with pain management. Currently on cymbalta and low dose prednisone    Major depressive disorder, recurrent episode, moderate with anxious distress (H)  Continue with cymbalta.  Very hesitant to add another medication with concern of polypharmacy    Peripheral vascular disease, unspecified (H)  Stable    Stage 3 chronic kidney disease, unspecified whether stage 3a or 3b CKD  - Comprehensive metabolic panel (BMP + Alb, Alk Phos, ALT, AST, Total. Bili, TP)     See Patient Instructions    Return in about 1 month (around 3/23/2021) for with me, in person, memory .    Gigi Martinez DO  [facility] Paulino is a 85 year old who presents for the following health issues  accompanied by her daughter:    Current Outpatient Medications   Medication     Acetaminophen (TYLENOL ARTHRITIS PAIN PO)     ADVIL 200 MG PO CAPS     Aspirin-Acetaminophen-Caffeine (EXCEDRIN PO)     CALCIUM 1200 OR     DULoxetine (CYMBALTA) 20 MG capsule     Glucosamine-Chondroit-Vit C-Mn (GLUCOSAMINE 1500 COMPLEX PO)     lidocaine (LIDODERM) 5 % patch     Loratadine (CLARITIN) 10 MG capsule     losartan-hydrochlorothiazide (HYZAAR) 100-25 MG tablet     magnesium 250 MG tablet     medical cannabis (Patient's own supply.  Not a prescription)     MULTI-VITAMIN OR TABS     nortriptyline (PAMELOR) 10 MG capsule     potassium aminobenzoate 500 MG CAPS capsule     predniSONE (DELTASONE) 2.5 MG tablet      senna-docusate (SENOKOT-S;PERICOLACE) 8.6-50 MG per tablet     simvastatin (ZOCOR) 40 MG tablet     No current facility-administered medications for this visit.          HPI       New Patient/Transfer of Care  Frequent complaints, shakiness, memory decline    1. Establish care: Patient lives in apartment by herself.  Patient is able to shower and use restroom.    2. Chronic back pain:  Was previously on gabapentin.  Did not tolerate.  Patient developed hot flashes in which she was started on low prednisone.  Patient was started on cymbalta and scheduled for upcoming injections.  She also is taking medical cannabis.     3. Hypertension: Currently taking on hyzaar.     4. Memory issues: Ongoing for the past 3 years and has getting worse for the past 1 year.  Patient does not drive.     5. Not feeling well: Ongoing for the past 2 years.  Daughter is concerned about anxiety and depression.  Patient sent through therapy.     Review of Systems   Constitutional: Negative for chills and fever.   HENT: Negative for congestion, ear pain, hearing loss and sore throat.    Eyes: Negative for pain and visual disturbance.   Respiratory: Negative for cough and shortness of breath.    Cardiovascular: Negative for chest pain, palpitations and peripheral edema.   Gastrointestinal: Negative for abdominal pain, constipation, diarrhea, heartburn, hematochezia and nausea.   Breasts:  Negative for tenderness, breast mass and discharge.   Genitourinary: Negative for dysuria, frequency, genital sores, hematuria, pelvic pain, urgency, vaginal bleeding and vaginal discharge.   Musculoskeletal: Positive for arthralgias (Right hip) and back pain. Negative for joint swelling and myalgias.   Skin: Negative for rash.   Neurological: Negative for dizziness, weakness, headaches and paresthesias.   Psychiatric/Behavioral: Positive for confusion. Negative for mood changes. The patient is nervous/anxious.         Forgetfulness          Objective    BP  (!) 145/83 (BP Location: Right arm, Cuff Size: Adult Regular)   Pulse 85   Temp 98.1  F (36.7  C) (Tympanic)   Wt 49.9 kg (110 lb)   SpO2 98%   BMI 20.50 kg/m    Body mass index is 20.5 kg/m .  Physical Exam  Constitutional:       General: She is not in acute distress.     Appearance: She is well-developed.   HENT:      Head: Normocephalic and atraumatic.      Right Ear: Tympanic membrane normal.      Left Ear: Tympanic membrane normal.      Nose: Nose normal.   Eyes:      General:         Right eye: No discharge.         Left eye: No discharge.      Conjunctiva/sclera: Conjunctivae normal.   Neck:      Musculoskeletal: Normal range of motion.      Trachea: No tracheal deviation.   Cardiovascular:      Rate and Rhythm: Normal rate and regular rhythm.      Pulses: Normal pulses.      Heart sounds: Normal heart sounds, S1 normal and S2 normal. No murmur. No friction rub. No S3 or S4 sounds.    Pulmonary:      Effort: Pulmonary effort is normal. No respiratory distress.      Breath sounds: Normal breath sounds. No wheezing or rales.   Musculoskeletal: Normal range of motion.   Skin:     General: Skin is warm and dry.      Findings: No rash.   Neurological:      Mental Status: She is alert and oriented to person, place, and time.      Motor: No abnormal muscle tone.      Deep Tendon Reflexes: Reflexes are normal and symmetric.      Comments: Unable to recall three words, state city, month, year, day of week, and unable to subtract 7s   Psychiatric:         Thought Content: Thought content normal.         Judgment: Judgment normal.            Component      Latest Ref Rng & Units 5/16/2018 9/21/2018 4/3/2019   Cholesterol      <200 mg/dL 216 (H)     Triglycerides      <150 mg/dL 136     HDL Cholesterol      >49 mg/dL 56     LDL Cholesterol Calculated      <100 mg/dL 133 (H)     VLDL-Cholesterol      0 - 30 mg/dL      Cholesterol/HDL Ratio      0.0 - 5.0      Hemoglobin A1C      0 - 5.6 %  5.8 (H) 5.8 (H)   Non HDL  Cholesterol      <130 mg/dL 160 (H)

## 2021-02-23 NOTE — TELEPHONE ENCOUNTER
Mahin Paulino and Carina forrest for my delayed response. We just had updated guidance on this. The new recommendations are that steroidal injections should be done 1 week before OR 2 weeks AFTER the 2nd COVID-19 vaccine.      Thanks.  Marion SANCHES RN CNP, P  [facility]    I have sent the above [employer] message to the patient.     Marion SANCHES RN CNP, [facility]

## 2021-02-23 NOTE — PATIENT INSTRUCTIONS
Francois Paulino,    Thank you for allowing [facility] to manage your care.    I ordered some blood work, please go to the laboratory to get your laboratory studies.    I sent your prescriptions to your pharmacy.    Please discontinue cannabis.     I made a neurology and care coordinator referral, they will be calling in approximately 1 week to set up your appointment.  If you do not hear from them, please call the specialty number on your after visit.     Please allow 1-2 business days for our office to call you in regards to your laboratory/radiological studies.  If not done so, I encourage you to login into [employer] ([email or web address] to review your results as well.     If you have any questions or concerns, please feel free to call us at [phone].    Sincerely,    Dr. Martinez    Did you know?      You can schedule a video visit for follow-up appointments as well as future appointments for certain conditions.  Please see the below link.     [email or web address]    If you have not already done so,  I encourage you to sign up for [employer] ([email or web address]).  This will allow you to review your results, securely communicate with a provider, and schedule virtual visits as well.

## 2021-02-24 ENCOUNTER — VIRTUAL VISIT (OUTPATIENT)
Dept: PALLIATIVE MEDICINE | Facility: CLINIC | Age: 86
End: 2021-02-24
Payer: COMMERCIAL

## 2021-02-24 ENCOUNTER — PATIENT OUTREACH (OUTPATIENT)
Dept: CARE COORDINATION | Facility: CLINIC | Age: 86
End: 2021-02-24

## 2021-02-24 ENCOUNTER — MYC MEDICAL ADVICE (OUTPATIENT)
Dept: PALLIATIVE MEDICINE | Facility: CLINIC | Age: 86
End: 2021-02-24

## 2021-02-24 DIAGNOSIS — M16.11 PRIMARY OSTEOARTHRITIS OF RIGHT HIP: ICD-10-CM

## 2021-02-24 DIAGNOSIS — M48.062 SPINAL STENOSIS OF LUMBAR REGION WITH NEUROGENIC CLAUDICATION: ICD-10-CM

## 2021-02-24 DIAGNOSIS — I10 HYPERTENSION GOAL BP (BLOOD PRESSURE) < 140/90: ICD-10-CM

## 2021-02-24 DIAGNOSIS — M25.551 HIP PAIN, RIGHT: ICD-10-CM

## 2021-02-24 DIAGNOSIS — M51.369 DDD (DEGENERATIVE DISC DISEASE), LUMBAR: ICD-10-CM

## 2021-02-24 DIAGNOSIS — M70.61 GREATER TROCHANTERIC BURSITIS OF RIGHT HIP: ICD-10-CM

## 2021-02-24 PROBLEM — I73.9 PERIPHERAL VASCULAR DISEASE, UNSPECIFIED (H): Status: ACTIVE | Noted: 2021-02-24

## 2021-02-24 LAB
ALBUMIN SERPL-MCNC: 4.4 G/DL (ref 3.4–5)
ALP SERPL-CCNC: 77 U/L (ref 40–150)
ALT SERPL W P-5'-P-CCNC: 24 U/L (ref 0–50)
ANION GAP SERPL CALCULATED.3IONS-SCNC: 6 MMOL/L (ref 3–14)
AST SERPL W P-5'-P-CCNC: 28 U/L (ref 0–45)
BILIRUB SERPL-MCNC: 0.4 MG/DL (ref 0.2–1.3)
BUN SERPL-MCNC: 40 MG/DL (ref 7–30)
CALCIUM SERPL-MCNC: 10.3 MG/DL (ref 8.5–10.1)
CHLORIDE SERPL-SCNC: 97 MMOL/L (ref 94–109)
CO2 SERPL-SCNC: 30 MMOL/L (ref 20–32)
CREAT SERPL-MCNC: 0.99 MG/DL (ref 0.52–1.04)
FOLATE SERPL-MCNC: 28.4 NG/ML
GFR SERPL CREATININE-BSD FRML MDRD: 52 ML/MIN/{1.73_M2}
GLUCOSE SERPL-MCNC: 138 MG/DL (ref 70–99)
POTASSIUM SERPL-SCNC: 4.4 MMOL/L (ref 3.4–5.3)
PROT SERPL-MCNC: 8.3 G/DL (ref 6.8–8.8)
SODIUM SERPL-SCNC: 133 MMOL/L (ref 133–144)
T PALLIDUM AB SER QL: NONREACTIVE
TSH SERPL DL<=0.005 MIU/L-ACNC: 2.32 MU/L (ref 0.4–4)
VIT B12 SERPL-MCNC: 1922 PG/ML (ref 193–986)

## 2021-02-24 PROCEDURE — 99214 OFFICE O/P EST MOD 30 MIN: CPT | Mod: 95 | Performed by: NURSE PRACTITIONER

## 2021-02-24 ASSESSMENT — ENCOUNTER SYMPTOMS
HEARTBURN: 0
HEMATOCHEZIA: 0
SHORTNESS OF BREATH: 0
BACK PAIN: 1
DIARRHEA: 0
CHILLS: 0
NERVOUS/ANXIOUS: 1
SORE THROAT: 0
COUGH: 0
JOINT SWELLING: 0
WEAKNESS: 0
PARESTHESIAS: 0
ARTHRALGIAS: 1
DYSURIA: 0
ABDOMINAL PAIN: 0
FEVER: 0
HEMATURIA: 0
DIZZINESS: 0
FREQUENCY: 0
MYALGIAS: 0
NAUSEA: 0
PALPITATIONS: 0
EYE PAIN: 0
CONFUSION: 1
HEADACHES: 0
CONSTIPATION: 0
BREAST MASS: 0

## 2021-02-24 NOTE — PATIENT INSTRUCTIONS
Plan:   1. Physical Therapy:  None at present  2. Clinical Health Psychologist: I agree with outside counseling as you are.  3. Diagnostic Studies:  None  4. Medication Management:    1. INCREASE Cymbalta (Duloxetine) 30mg every day  2. Continue Lidocaine patches   3. Continue medical cannabis.   4. Try using the Voltaren gel three to four times daily regularly for the right lateral hip pain  5. Trial Aspercreme with 4% lidocaine to right lateral hip  6. Could trial Theracare patches to right lateral hip, these are over-the-counter warming patches  5. Further procedures recommended: repeat right greater trochanteric bursal injection 2 weeks after 2/26/2021 2nd COVID-19 vaccine  6. Recommendations to PCP: none  7. Follow up: 6-8 weeks telephone due to COVID-19 viral threat. Please call [phone] to make your follow-up appointment with me.    ----------------------------------------------------------------  Clinic Number:  [phone]     Call with any questions about your care and for scheduling assistance.     Calls are returned Monday through Friday between 8 AM and 4:30 PM. We usually get back to you within 2 business days depending on the issue/request.    If we are prescribing your medications:    For opioid medication refills, call the clinic or send a [employer] message 7 days in advance.  Please include:    Name of requested medication    Name of the pharmacy.    For non-opioid medications, call your pharmacy directly to request a refill. Please allow 3-4 days to be processed.     Per [place] State Law:    All controlled substance prescriptions must be filled within 30 days of being written.      For those controlled substances allowing refills, pickup must occur within 30 days of last fill.      We believe regular attendance is key to your success in our program!      Any time you are unable to keep your appointment we ask that you call us at least 24 hours in advance to cancel.This will allow us to offer the  appointment time to another patient.     Multiple missed appointments may lead to dismissal from the clinic.

## 2021-02-24 NOTE — PROGRESS NOTES
"Jefferson is a 85 year old who is being evaluated via a billable telephone visit.      What phone number would you like to be contacted at? [phone].  How would you like to obtain your AVS? GENARO [facility]    2/24/2021    Teresa Lopez is a 85 year old female who is being evaluated via a billable telephone visit.        Chief complaint:  chronic low back pain and right lateral hip pain      Interval history:  Teresa Lopez is a 85 year old female is known to me for .   Primary osteoarthritis    Chronic right hip pain   Chronic bilateral low back without sciatica   Spinal stenosis of lumbar region with neurogenic claudication   Piriformis syndrome of both sides   PMHx DJD, CVA, HTN, diabetes mellitus, and cataract   PSHx Hysterectomy        Recommendations/plan at the last visit on 1/6/2021 included:  1. Physical Therapy:  None at present  2. Clinical Health Psychologist: I agree with outside counseling as you are.  3. Diagnostic Studies:  None  4. Medication Management:    1. RE-START Cymbalta (Duloxetine) 20mg every day in the morning. Take at bedtime if it makes you tired. It is common to experience a little headache, stomach feels \"unsettled\" and looser bowel movements for the first 3-10 days you take this medication. STOP the medication fi your mood gets worse or you feel like hurting or killing yourself  2. Continue Lidocaine patches   3. Continue medical cannabis.   4. Try using the Voltaren gel three to four times daily regularly for the right lateral hip pain  5. Further procedures recommended: none at present  6. Recommendations to PCP: none  7. Follow up: 6-8 weeks telephone  8. Let me know how Jefferson is doing on the Cymbalta in about 3 weeks via [employer]. We can adjust dosage upward if needed as long as it is tolerated well.       Since her last visit, Teresa Lopez reports: she is accompanied on speakerphone today by her daughter, " "Radha    Interval history February 24, 2021  -She states she is not certain she can go on like this. She is not crying today. Very talkative. Last telephone call, barely able to speak, crying throughout most of encounter.  -she is frustrated that she hurts all of the time  -she lays on the alexander and does stretching.   -she wants to have a repeat right greater trochanteric bursal injection, but she is having her 2nd COVID vaccine on 2/26/2021. We advise waiting 2 weeks after the 2nd COVID vaccine prior to elective steroid injections.  -Radha states she and her sister Haylee feel that the Cymbalta has been somewhat helpful. Will increase dosage to 30mg/day.         Interval history January 6, 2021  -spoke with Jefferson and her daughter Rubi on speakerphone.  -Jefferson ran out of her oral steroids for 6 days, her steroid injection  -she just picked the prednisone today.   -Jefferson had a right sided greater trochanteric bursal injection in November 2020.   -Jefferson and Rubi would like to re-trial the Cymbalta. She has stopped taking it because she was not eating and having issues with nausea.   -We talked about trying to use the Cymbalta either with food in the morning OR at bedtime with a snack.   -Jefferson indicates she is having right sided pain over the lateral hip pain.  -She has seen Dr. Lewis with podiatry. She may need to have her left 2nd toe removed.           Interval history 11/4/2020  -low back pain and lateral hip pain on the right side.    -new shoe insert about a week ago, her daughter Rubi states this has helped her balance.   -Jefferson states she has some \"snapping\" in the right hip.   -Jefferson has not yet begun the Cymbalta, she wanted to try the shoe insert first  -Jefferson has had a tough day especially on Sunday 11/1, she wasn't walking around as much as usual.  -patient is with her daughter Rubi throughout the encounter          Interval history 10/2/2020  -right SI joint injection has been helpful  -She is having pain " "in the right side of the hip, laterally, not anterior groin pain  -patient feels her left leg is longer than the right leg  -She has been more active  -she is seeing a counselor, has been seen x 3          Interval history 8/26/2020  -She states she is not doing very well sometimes.   -she feels her right hip is \"very bad.\"   -She had a 7/30/2020 right greater trochanteric bursal injection, her pain came down from an 8/10 to a 2/10 for her right GT.  -still has some trouble standing due to a spot that is painful, she is describing a spot of pain over the SI joint. This tends to hurt when she stands to do things and feels better when she is sitting down.   -she accompanied today by her daughter Abdi on the phone today  -recertified for medical cannabis today  -she has a hammer toe and this has been really painful for her the past 2 days.       Interval history 7/15/2020  -she is not feeling well  -she sleeps with a pillow between her legs  -she notes she is having \"bad pain\" on the lateral hip on the right side. She has pain when she lays on her right side in bed. She has had a greater trochanteric bursal injection in June 2019 that was helpful.   -she is not eating well as she is isolated and feels depressed and feels shaky as she doesn't eat as regularly as she should.   -discussed using Voltaren gel, Cymbalta and greater trochanteric injection      Interval history 5/6/2020  -Teresa is having some more hip pain on the right side between the top of the hip and up and the tailbone to the hip  -SI joint injection on the right side was beneficial and this has made her pain more focused on the right hip.   -she has pain if she lays on her right side  -she is having more pain in the deep right groin. She notes when she walks this pain is markedly worse.         At this point, the patient's participation with our multidisciplinary team includes:  The patient has been compliant with the program  PT - patient is " "interested  Health Psych  None      Pain scores:  Pain intensity on average is too hard to rate today on a scale of 0-10.   Range is 7-10/10.   Pain is described as \"unbearable, tiring, exhausting, miserable, nagging, and gnawing.\"  Pain is continuous in nature and worse in the morning.    Current pain-related medication treatments include:  -Lidocaine patch (helpful)  -Medical cannabis (helpful)  -Cymbalta 20mg/day (nausea for the first few weeks, now tolerating well, somewhat helpful for mood if not for pain)         Other pertinent medications:  -Senna-docusate PRN      Previous medication treatments included:  OPIATES:hydrocodone (not helpful), oxycodone (somewhat helpful)  NSAIDS: ibuprofen (not helpful), Aleve (not helpful)  MUSCLE RELAXANTS: none  ANTI-MIGRAINE MEDS: none  ANTI-DEPRESSANTS: none  SLEEP AIDS: none  ANTI-CONVULSANTS: gabapentin (unsure if helpful, side effects: hot flashes, fogginess)  TOPICALS: none  Other meds: Tylenol (not helpful)    Injections:  -11/10/2017 Caudal epidural steroid injection with Dr. Reymundo Bajwa (helpful for a very short period of time)  -1/5/2018 Ultrasound guided right intra-articular hip injection with Dr. Jean Meade  -1/12/2018 Right ankle injection with Dr. Lewis of podiatry.   -7/3/2018 L2-3 interlaminar epidural steroid injection with Dr Jamaal Eid (not at all helpful)  -8/3/2018 right hip steroid injection with Dr. Valverde (helped some)  -6/14/2019 bilateral hip greater trochanteric bursa injection with Dr. Jamaal Eid (helpful)   -1/23/2020 right SI joint injection with Dr. Chasidy Laura (quite helpful)  5/13/2020 right hip joint steroid injection with Dr. Valverde (quite helpful)  -7/30/2020 right hip greater trochanteric bursal injection with Dr. Jamaal Eid (quite helpful)  -9/15/2020 right SI joint injection with Dr. Jamaal Eid (pain diminished about 25%, sharpness went away)  -11/19/2020 right hip greater trochanteric bursal injection with " Dr. Eid (helpful)    Side Effects: no side effect  Patient is using the medication as prescribed: YES    Medications:  Current Outpatient Medications   Medication Sig Dispense Refill     Acetaminophen (TYLENOL ARTHRITIS PAIN PO) As needed not taking daily       ADVIL 200 MG PO CAPS Reported on 4/20/2017       Aspirin-Acetaminophen-Caffeine (EXCEDRIN PO)        CALCIUM 1200 OR Reported on 4/20/2017       DULoxetine (CYMBALTA) 20 MG capsule Take 1 capsule (20 mg) by mouth daily 30 capsule 1     Glucosamine-Chondroit-Vit C-Mn (GLUCOSAMINE 1500 COMPLEX PO) Take 1,500 mg by mouth daily Reported on 4/20/2017       lidocaine (LIDODERM) 5 % patch Place 3 patches onto the skin every 24 hours 90 patch 0     Loratadine (CLARITIN) 10 MG capsule Take 10 mg by mouth as needed Reported on 4/20/2017       losartan-hydrochlorothiazide (HYZAAR) 100-25 MG tablet Take 1 tablet by mouth daily 90 tablet 3     magnesium 250 MG tablet Take 1 tablet by mouth daily       MULTI-VITAMIN OR TABS 1 TABLET DAILY       potassium aminobenzoate 500 MG CAPS capsule        predniSONE (DELTASONE) 2.5 MG tablet Take 1 tablet (2.5 mg) by mouth daily , may repeat once daily as needed 120 tablet 0     senna-docusate (SENOKOT-S;PERICOLACE) 8.6-50 MG per tablet Take 1 tablet by mouth 2 times daily 120 tablet 12     simvastatin (ZOCOR) 40 MG tablet Take 1 tablet (40 mg) by mouth At Bedtime 90 tablet 3     medical cannabis (Patient's own supply.  Not a prescription) See Admin Instructions (This is NOT a prescription, and does not certify that the patient has a qualifying medical condition for medical cannabis.  The purpose of this order is  to document that the patient reports taking medical cannabis.)   Taking 6-9 red capsules daily       nortriptyline (PAMELOR) 10 MG capsule Take 1 capsule (10 mg) by mouth At Bedtime (Patient not taking: Reported on 2/24/2021) 60 capsule 0       Medical History: any changes in medical history since they were last seen?  No    Social History:  Home situation: . 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  Tobacco use: none  Alcohol use: very little, about 2 drinks per month  Drug use: none  History of chemical dependency treatment: none    Is patient a current smoker or tobacco user?  no  If yes, was cessation counseling offered?  no        Review of Systems:   The 14 system ROS was reviewed with patient and is positive for:  Constitutional: fever/chills, fatigue, weight gain, weight loss  Eyes/Head: headache, dizziness  ENT: ringing in ears  Allergy/Immune: allergies  Skin: itching, rash, hives  Hematologic: easy bruising  Respiratory: cough, wheezing, shortness of breath  Cardiovascular: swelling in feet, fainting, palpitations, chest pain  GI: abdominal pain, nausea, vomiting, diarrhea, constipation  Endocrine: steroid use  Musculoskeletal:  joint pain, arthritis, stiffness, gout, back pain, neck pain  Urinary: frequency, urgency, incontinence, hesitancy  Neurologic: weakness, numbness/tingling, seizure, stroke, memory loss (short term memory loss)  Mental health: depression, anxiety, stress, suicidal ideation        Physical Exam:  Vital signs: There were no vitals taken for this visit.    Behavioral observations:  Awake and alert, cooperative  Pulm: respirations easy and unlabored. Able to speak in full sentences without SOB or cough noted.            Imaging  MRI LUMBAR SPINE WITHOUT CONTRAST   3/30/2018 1:22 PM      HISTORY:  Lumbar degenerative disc disease.     TECHNIQUE: Multiplanar multisequence MRI of the lumbar spine without  contrast.     COMPARISON: Lumbar spine x-ray 3/22/2018. Lumbar spine MR 3/17/2017.      FINDINGS: There are 5 lumbar-type vertebral bodies assumed for the  purposes of this dictation. The tip of the conus terminates at the  L1-L2 level.     There is convex right scoliotic curvature of the lumbar spine centered  at the  L2-L3 level. There is mild retrolisthesis of L2 on L3 and mild  anterolisthesis of L3 on L4. There is grade 1 anterolisthesis of L4 on  L5. There is right lateral listhesis of L3 on L4. There is slight loss  of left-sided vertebral body height at L3, probably due to  degenerative changes. Severe loss of intervertebral disc height seen  at L3-L4 with associated degenerative endplate changes. Severe loss of  disc height also seen on the left at L1-L2 and L2-L3 with severe  degenerative endplate changes. Mild loss of disc height at T11-12 and  T12-L1 with disc desiccation at L4-5 and L5-S1. There is mild STIR  hyperintense marrow edema at the opposing L2-L3 endplates.  Degenerative subchondral cysts seen anteriorly at L3.     Level by level as follows:      T12-L1:  Only seen on the lateral view, but there is a posterior disc  bulge and bilateral facet hypertrophy resulting in moderate right and  mild left neural foraminal narrowing. No significant spinal canal  narrowing.      L1-L2:  Convex right curvature with retrolisthesis and circumferential  disc bulge with endplate osteophytic spurring as well as bilateral  facet hypertrophy. Findings result in mild central spinal canal  narrowing and moderate bilateral neural foraminal narrowing.      L2-L3:  Convex right scoliotic curvature with left-sided disc bulge  and endplate osteophytic spurring as well as, left greater than right,  facet hypertrophy and ligamentum flavum infolding. Findings result in  moderate central spinal canal narrowing as well as moderate to severe  left neural foraminal narrowing. There is mild right neural foraminal  narrowing.      L3-L4:  Mild anterolisthesis along with circumferential disc bulge,  severe bilateral facet hypertrophy, and ligamentum flavum infolding.  Findings result in severe central spinal canal narrowing. There is  also severe left and moderate to severe right neural foraminal  narrowing.      L4-L5:  Anterolisthesis with  uncovering of the disc and small  posterior disc bulge along with severe bilateral facet hypertrophy and  ligamentum flavum infolding. Findings result in moderate to severe  central spinal canal narrowing. There is also moderate to severe  bilateral neural foraminal narrowing. Small bilateral facet joint  effusions.      L5-S1:  Mild posterior disc bulge and marked bilateral facet  hypertrophy results in moderate bilateral neural foraminal narrowing,  right greater than left. No significant central spinal canal  narrowing.      Paraspinous soft tissues:  Normal.          IMPRESSION:    1. Severe multilevel degenerative changes throughout the lumbar spine  as described above. Overall, there is no significant change since  previous MR 3/17/2017.  2. Severe central spinal canal narrowing at L3-L4 and moderate to  severe spinal canal narrowing at L4-L5.  3. Convex right scoliotic curvature of the spine with multiple levels  of spondylolisthesis and right lateral listhesis of L3 on L4.     DIANA HERNANDEZ MD        [place] Prescription Monitoring [place]:  [place]ed [place]  2/24/2021- no concerning fills.  Marion SANCHES, RN CNP, FNP  [facility]  [place] location        Assessment:   1. Greater trochanteric bursitis of right hip  2. DDD (degenerative disc disease), lumbar  3. Spinal stenosis of lumbar region with neurogenic claudication  4. Right hip joint pain  5. Right hip osteoarthritis      Plan:   1. Physical Therapy:  None at present  2. Clinical Health Psychologist: I agree with outside counseling as you are.  3. Diagnostic Studies:  None  4. Medication Management:    1. INCREASE Cymbalta (Duloxetine) 30mg every day  2. Continue Lidocaine patches   3. Continue medical cannabis.   4. Try using the Voltaren gel three to four times daily regularly for the right lateral hip pain  5. Trial Aspercreme with 4% lidocaine to right lateral hip  6. Could trial [employer]acare patches to right lateral  hip, these are over-the-counter warming patches  5. Further procedures recommended: repeat right greater trochanteric bursal injection 2 weeks after 2/26/2021 2nd COVID-19 vaccine  6. Recommendations to PCP: none  7. Follow up: 6-8 weeks telephone due to COVID-19 viral threat. Please call [phone] to make your follow-up appointment with me.          Phone call duration 53 minutes        Marion SANCHES, RN CNP, FNP  [facility]  [facility]

## 2021-02-24 NOTE — PROGRESS NOTES
Clinic Care Coordination Contact  Community Health Worker Initial Outreach    CHW Initial Information Gathering:  Referral Source: PCP  Current living arrangement:: I live alone  Type of residence:: Apartment([address] apartment)  Community Resources: None  Supplies used at home:: None  Equipment Currently Used at Home: walker, standard, shower chair, grab bar, toilet, grab bar, tub/shower  Informal Support system:: Family  Transportation means:: Family  CHW Additional Questions  If ED/Hospital discharge, follow-up appointment scheduled as recommended?: N/A  Medication changes made following ED/Hospital discharge?: N/A  MyChart active?: Yes    Patient accepts CC: Yes. Patient scheduled for assessment with SW on 3/2 at 1:00. Patient noted desire to discuss SW will speak with Daughter about find assisted living placement.     Patient daughter stated that her mother currently lives alone and with her dementia and trying to find assisted living placement.

## 2021-02-25 NOTE — TELEPHONE ENCOUNTER
Per patient Lloydhart message:  From: Jefferson Lopez      Created: 2/24/2021 7:45 PM      adriane Schultz I could not be with Mom on the appointment today.  I hope Radha was able to provide you the information and translations needed.  I understand there is a question on what pharmacy to use for prescriptions.  Mom uses [facility] on [address].  Please let me know if there are other questions.       Brooke        Correct pharmacy added to the visit encounter.    Routed to provider.    Natalie RN-BSN  [facility]

## 2021-02-25 NOTE — TELEPHONE ENCOUNTER
Pt saw MYRON Simon CNP yesterday.  See those visit notes for more information.    Natalie RN-BSN  [facility] [place]

## 2021-02-26 ENCOUNTER — IMMUNIZATION (OUTPATIENT)
Dept: NURSING | Facility: CLINIC | Age: 86
End: 2021-02-26
Attending: FAMILY MEDICINE
Payer: COMMERCIAL

## 2021-02-26 PROCEDURE — 91300 PR COVID VAC PFIZER DIL RECON 30 MCG/0.3 ML IM: CPT

## 2021-02-26 PROCEDURE — 0002A PR COVID VAC PFIZER DIL RECON 30 MCG/0.3 ML IM: CPT

## 2021-02-26 NOTE — TELEPHONE ENCOUNTER
Patient just seen by Dr Martinez and had labs done but provider has not reviewed.  Will send to provider to review and advise on refill.    Last Comprehensive Metabolic Panel:  Sodium   Date Value Ref Range Status   02/23/2021 133 133 - 144 mmol/L Final     Potassium   Date Value Ref Range Status   02/23/2021 4.4 3.4 - 5.3 mmol/L Final     Chloride   Date Value Ref Range Status   02/23/2021 97 94 - 109 mmol/L Final     Carbon Dioxide   Date Value Ref Range Status   02/23/2021 30 20 - 32 mmol/L Final     Anion Gap   Date Value Ref Range Status   02/23/2021 6 3 - 14 mmol/L Final     Glucose   Date Value Ref Range Status   02/23/2021 138 (H) 70 - 99 mg/dL Final     Comment:     Non Fasting     Urea Nitrogen   Date Value Ref Range Status   02/23/2021 40 (H) 7 - 30 mg/dL Final     Creatinine   Date Value Ref Range Status   02/23/2021 0.99 0.52 - 1.04 mg/dL Final     GFR Estimate   Date Value Ref Range Status   02/23/2021 52 (L) >60 mL/min/[1.73_m2] Final     Comment:     Non  GFR Calc  Starting 12/18/2018, serum creatinine based estimated GFR (eGFR) will be   calculated using the Chronic Kidney Disease Epidemiology Collaboration   (CKD-EPI) equation.       Calcium   Date Value Ref Range Status   02/23/2021 10.3 (H) 8.5 - 10.1 mg/dL Final     Bilirubin Total   Date Value Ref Range Status   02/23/2021 0.4 0.2 - 1.3 mg/dL Final     Alkaline Phosphatase   Date Value Ref Range Status   02/23/2021 77 40 - 150 U/L Final     ALT   Date Value Ref Range Status   02/23/2021 24 0 - 50 U/L Final     AST   Date Value Ref Range Status   02/23/2021 28 0 - 45 U/L Final

## 2021-02-27 RX ORDER — DULOXETIN HYDROCHLORIDE 30 MG/1
30 CAPSULE, DELAYED RELEASE ORAL DAILY
Qty: 30 CAPSULE | Refills: 1 | Status: SHIPPED | OUTPATIENT
Start: 2021-02-27 | End: 2021-04-13

## 2021-03-02 ENCOUNTER — PATIENT OUTREACH (OUTPATIENT)
Dept: CARE COORDINATION | Facility: CLINIC | Age: 86
End: 2021-03-02
Attending: FAMILY MEDICINE

## 2021-03-02 ASSESSMENT — ACTIVITIES OF DAILY LIVING (ADL): DEPENDENT_IADLS:: CLEANING;COOKING;LAUNDRY;SHOPPING;TRANSPORTATION;MONEY MANAGEMENT

## 2021-03-02 NOTE — LETTER
[facility]  Care Plan  About Me:    Patient Name:  Teresa Lopez    YOB: 1935  Age:         85 year old   Nancy MRN:    [id number] Telephone Information:  Home Phone [phone]   Mobile [phone]       Address:  [address] [place] [id number] Email address:  [email or web address]      Emergency Contact(s)    Name Relationship Lgl Grd Work Phone Home Phone Mobile Phone   1. NICOLLE LOPEZ Daughter    [phone]   2. KEERTHI KONG Daughter  [phone]         Health Maintenance  Health Maintenance Reviewed: Up to date    My Access Plan  Medical Emergency 911   Primary Clinic Line [facility] - [phone]   24 Hour Appointment Line [phone] or  [phone] ([phone]) (toll-free)   24 Hour Nurse Line [phone] (toll-free)   Preferred Urgent Care [facility] [phone]   Preferred Hospital [facility]  [phone]   Preferred Pharmacy [facility]     Behavioral Health Crisis Line The National Suicide Prevention Lifeline at 1-590.486.1673 or 173       My Care Team Members  Patient Care Team       Relationship Specialty Notifications Start End    Tej Engle MD PCP - General Internal Medicine  12/19/18     Phone: [phone] Fax: [phone]         [address] [place] [address]    Tej Engle MD Assigned PCP   8/5/18     Phone: [phone] Fax: [phone]         [address] [place] [address]    Erin Valencia MD Assigned Neuroscience Provider   10/23/20     Phone: [phone] Fax: [phone]         [address] [facility] [id number] [facility] [address]    Heidi Galvez MD Assigned Surgical Provider   10/23/20     Phone: [phone] Fax: [phone]         [address] [facility] [address]    Maulik Zavala DPM Assigned Musculoskeletal Provider   10/23/20     Phone: [phone] Fax:  [phone]         [address] [place] [id number]    Brigida Mccullough LSW Lead Care Coordinator  Admissions 3/2/21     Lola Moore Community Health Worker  Admissions 3/2/21             My Care Plans  Goals and (Comments)  Goals        General    Functional (pt-stated)     Notes - Note created  3/2/2021  1:58 PM by Brigida Mccullough LSW    Goal Statement: My daughters will research assisted living facilities and create timeline for me to move    Date Goal set: 3/2/21  Barriers: need [facility] assistance/ MA-LTC  Strengths: daughters are very supportive  Date to Achieve By: 6 months  Patient expressed understanding of goal: yes    Action steps to achieve this goal:  1. My daughters will look at facilities using [facility] or other resources  2. My daughters will complete necessary [facility] paperwork and submit documentation  3. My daughters will work with admissions staff for tours, wait lists or other considerations                Advance Care Plans/Directives Type:   Type Advanced Care Plans/Directives: Advanced Directive - On File    My Medical and Care Information  Problem List   Patient Active Problem List   Diagnosis     History of CVA (cerebrovascular accident)     Jaw Pain     Arthritis of knee     CARDIOVASCULAR SCREENING; LDL GOAL LESS THAN 100     Hypertension goal BP (blood pressure) < 140/90     Advanced directives, counseling/discussion     Hyperlipidemia LDL goal <130     OA (OSTEOARTHRITIS) OF KNEE - right     CKD (chronic kidney disease) stage 3, GFR 30-59 ml/min     Chronic pain disorder     DJD (degenerative joint disease), lumbosacral     Spinal stenosis of lumbar region with neurogenic claudication     Slow transit constipation     Mild cognitive impairment     Combined form of age-related cataract, mod, both eyes     Posterior vitreous detachment of both eyes     Arthritis, lumbar spine     Medical cannabis use     Moderate major depression (H)     Inflammatory spondylopathy  of lumbar region (H)     Hammer toe of left foot     Loss of weight     Mild malnutrition (H)     Peripheral vascular disease, unspecified (H)      Current Medications and Allergies:  See printed Medication Report.    Care Coordination Start Date: 3/2/2021   Frequency of Care Coordination: monthly   Form Last Updated: 03/02/2021

## 2021-03-02 NOTE — LETTER
[facility] CARE COORDINATION  [address]  [place] [address]    March 2, 2021    Teresa Lopez  [address]    [place] [address]      Dear Teresa,    I am a clinic care coordinator who works with Tej Engle MD. I wanted to thank you for spending the time to talk with me.  Below is a description of clinic care coordination and how I can further assist you.      The clinic care coordination team is made up of a registered nurse,  and community health worker who understand the health care system. The goal of clinic care coordination is to help you manage your health and improve access to the health care system in the most efficient manner. The team can assist you in meeting your health care goals by providing education, coordinating services, strengthening the communication among your providers and supporting you with any resource needs.    Please feel free to contact the Community Health Worker at [phone] with any questions or concerns. We are focused on providing you with the highest-quality healthcare experience possible and that all starts with you.     Sincerely,     Brigida    Enclosed: I have enclosed a copy of the  Care Plan. This has helpful information and goals that we have talked about. Please keep this in an easy to access place to use as needed.

## 2021-03-03 ENCOUNTER — MYC REFILL (OUTPATIENT)
Dept: FAMILY MEDICINE | Facility: CLINIC | Age: 86
End: 2021-03-03

## 2021-03-03 ENCOUNTER — OFFICE VISIT (OUTPATIENT)
Dept: PODIATRY | Facility: CLINIC | Age: 86
End: 2021-03-03
Payer: COMMERCIAL

## 2021-03-03 DIAGNOSIS — I73.9 PERIPHERAL ARTERIAL DISEASE (H): ICD-10-CM

## 2021-03-03 DIAGNOSIS — N18.30 STAGE 3 CHRONIC KIDNEY DISEASE, UNSPECIFIED WHETHER STAGE 3A OR 3B CKD (H): ICD-10-CM

## 2021-03-03 DIAGNOSIS — B35.1 ONYCHOMYCOSIS: ICD-10-CM

## 2021-03-03 DIAGNOSIS — B35.3 TINEA PEDIS OF BOTH FEET: ICD-10-CM

## 2021-03-03 DIAGNOSIS — L84 TYLOMA: ICD-10-CM

## 2021-03-03 DIAGNOSIS — G89.4 CHRONIC PAIN SYNDROME: ICD-10-CM

## 2021-03-03 DIAGNOSIS — M79.672 FOOT PAIN, LEFT: ICD-10-CM

## 2021-03-03 DIAGNOSIS — M20.42 HAMMER TOE OF LEFT FOOT: Primary | ICD-10-CM

## 2021-03-03 PROCEDURE — 99214 OFFICE O/P EST MOD 30 MIN: CPT | Mod: 25 | Performed by: PODIATRIST

## 2021-03-03 PROCEDURE — 11056 PARNG/CUTG B9 HYPRKR LES 2-4: CPT | Mod: Q8 | Performed by: PODIATRIST

## 2021-03-03 RX ORDER — CICLOPIROX OLAMINE 7.7 MG/G
CREAM TOPICAL 2 TIMES DAILY
Qty: 90 G | Refills: 3 | Status: SHIPPED | OUTPATIENT
Start: 2021-03-03

## 2021-03-03 RX ORDER — LOSARTAN POTASSIUM AND HYDROCHLOROTHIAZIDE 25; 100 MG/1; MG/1
1 TABLET ORAL DAILY
Qty: 90 TABLET | Refills: 3 | Status: SHIPPED | OUTPATIENT
Start: 2021-03-03

## 2021-03-03 NOTE — PATIENT INSTRUCTIONS
Thanks for coming today.  [facility]  [address] [place] [address]    To schedule future appointments in Ortho Clinic, you may call [phone].    To schedule ordered imaging by your provider:   Call Central Imaging [phone]    To schedule an injection ordered by your provider:  Call Central Imaging Injection scheduling line: [phone]  [email or web address] available online at:  [email or web address]    Please call if any further questions or concerns ([phone]).  Clinic hours 8 am to 5 pm.    Return to clinic (call) if symptoms worsen or fail to improve.

## 2021-03-03 NOTE — PROGRESS NOTES
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     Patient Active Problem List   Diagnosis     History of CVA (cerebrovascular accident)     Jaw Pain     Arthritis of knee     CARDIOVASCULAR SCREENING; LDL GOAL LESS THAN 100     Hypertension goal BP (blood pressure) < 140/90     Advanced directives, counseling/discussion     Hyperlipidemia LDL goal <130     OA (OSTEOARTHRITIS) OF KNEE - right     CKD (chronic kidney disease) stage 3, GFR 30-59 ml/min     Chronic pain disorder     DJD (degenerative joint disease), lumbosacral     Spinal stenosis of lumbar region with neurogenic claudication     Slow transit constipation     Mild cognitive impairment     Combined form of age-related cataract, mod, both eyes     Posterior vitreous detachment of both eyes     Arthritis, lumbar spine     Medical cannabis use     Moderate major depression (H)     Inflammatory spondylopathy of lumbar region (H)     Hammer toe of left foot     Loss of weight     Mild malnutrition (H)     Peripheral vascular disease, unspecified (H)     Past Surgical History:   Procedure Laterality Date     HYSTERECTOMY, CERVIX STATUS UNKNOWN  age 31     Social History     Socioeconomic History     Marital status:      Spouse name: Not on file     Number of children: Not on file     Years of education: Not on file     Highest education level: Not on file   Occupational History     Not on file   Social Needs     Financial resource strain: Not on file     Food insecurity     Worry: Not on file     Inability: Not on file     Transportation needs     Medical: Not on file     Non-medical: Not on file   Tobacco Use     Smoking status: Never Smoker     Smokeless tobacco: Never Used   Substance and Sexual Activity     Alcohol use: Yes      Alcohol/week: 0.0 standard drinks     Comment: 0-1 drink weekly     Drug use: No     Sexual activity: Not Currently     Partners: Male   Lifestyle     Physical activity     Days per week: Not on file     Minutes per session: Not on file     Stress: Not on file   Relationships     Social connections     Talks on phone: Not on file     Gets together: Not on file     Attends Sikhism service: Not on file     Active member of club or organization: Not on file     Attends meetings of clubs or organizations: Not on file     Relationship status: Not on file     Intimate partner violence     Fear of current or ex partner: Not on file     Emotionally abused: Not on file     Physically abused: Not on file     Forced sexual activity: Not on file   Other Topics Concern     Parent/sibling w/ CABG, MI or angioplasty before 65F 55M? 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 [employer]    History of polka, schottisch, other dancing    2 daughters in area     Family History   Problem Relation Age of Onset     Arthritis Mother      Cataracts Mother      Unknown/Adopted Father         adopted     Diabetes Brother      Cancer Brother      Cataracts Maternal Grandmother      Hypertension No family hx of      Cerebrovascular Disease No family hx of      Thyroid Disease No family hx of      Glaucoma No family hx of      Macular Degeneration No family hx of      Last Comprehensive Metabolic Panel:  Sodium   Date Value Ref Range Status   02/23/2021 133 133 - 144 mmol/L Final     Potassium   Date Value Ref Range Status   02/23/2021 4.4 3.4 - 5.3 mmol/L Final     Chloride   Date Value Ref Range Status   02/23/2021 97 94 - 109 mmol/L Final     Carbon Dioxide   Date Value Ref Range Status   02/23/2021 30 20 - 32 mmol/L Final     Anion Gap   Date Value Ref Range Status   02/23/2021 6 3 - 14 mmol/L  Final     Glucose   Date Value Ref Range Status   02/23/2021 138 (H) 70 - 99 mg/dL Final     Comment:     Non Fasting     Urea Nitrogen   Date Value Ref Range Status   02/23/2021 40 (H) 7 - 30 mg/dL Final     Creatinine   Date Value Ref Range Status   02/23/2021 0.99 0.52 - 1.04 mg/dL Final     GFR Estimate   Date Value Ref Range Status   02/23/2021 52 (L) >60 mL/min/[1.73_m2] Final     Comment:     Non  GFR Calc  Starting 12/18/2018, serum creatinine based estimated GFR (eGFR) will be   calculated using the Chronic Kidney Disease Epidemiology Collaboration   (CKD-EPI) equation.       Calcium   Date Value Ref Range Status   02/23/2021 10.3 (H) 8.5 - 10.1 mg/dL Final     Lab Results   Component Value Date    AST 28 02/23/2021     Lab Results   Component Value Date    ALT 24 02/23/2021     No results found for: BILICONJ   Lab Results   Component Value Date    BILITOTAL 0.4 02/23/2021     Lab Results   Component Value Date    ALBUMIN 4.4 02/23/2021     Lab Results   Component Value Date    PROTTOTAL 8.3 02/23/2021      Lab Results   Component Value Date    ALKPHOS 77 02/23/2021     Lab Results   Component Value Date    A1C 5.8 04/03/2019    A1C 5.8 09/21/2018    A1C 5.7 05/04/2018    A1C 6.5 02/17/2017    A1C 6.2 11/01/2016     SUBJECTIVE FINDINGS:  An 85-year-old female with chronic kidney disease returns to clinic with daughter for painful left second toe, calluses and toenail care.  She relates she needs her toenails trimmed down.  She relates she has seen Dr. Lewis previously.  I reviewed those notes.  She had her vascular studies done.  I reviewed her ABIs from 12/28/2020 and her x-rays from 12/16/2020.  She relates no other specific relieving or aggravating factors.  She relates she cannot cut her nails herself.  She is having difficulty with this due to her arthritis.      OBJECTIVE FINDINGS:  DP and PT are 2/4 bilaterally.  She has laterally deviated hallux with dorsomedial first MPJ  prominence bilaterally, dorsally contracted digits 2-5 bilaterally.  She has hyperkeratotic tissue buildup, medial left second toe, dorsal right second toe, plantar medial left first MPJ.  There is no erythema, no drainage, no odor, no calor bilaterally.  She has some mild varicosities bilaterally.  Minimal peripheral edema bilaterally.  Decreased hair growth bilaterally.  CFT is less than 3 seconds bilaterally.  She has dystrophic, thickened nails with subungual debris, brittleness, dystrophy and discoloration, right hallux nail greater than the rest to differing degrees bilaterally.  There is pain on palpation of the calluses and the right hallux nail.  There is decreased range of motion bilaterally.  No gross tendon voids bilaterally.  X-rays:  She has diffuse osteoarthritic changes and arteriosclerosis noted.  Her ABIs were noncompressible.  She also has some mild dry scaly skin, some cracking bilaterally.  Her deep tendon reflexes are intact bilaterally.  Sharp/dull is intact with 5.07 [place]-Helen monofilament bilaterally.      ASSESSMENT AND PLAN:  Onychomycosis and onychauxis bilaterally.  Tylomas causing pain bilaterally.  She has got peripheral arterial disease.  She has got some neuropathy symptoms.  She has got chronic kidney disease.  Diagnosis and treatment options discussed with the patient.  She also has tinea pedis present.  All the nails were debrided or reduced bilaterally upon consent.  Her right hallux nail bled a bit upon debridement.  Local wound care with a bacitracin and Band-Aid done upon consent and use discussed with her.  Prescription for Loprox cream given and use discussed with her.  Gel toe spacers dispensed and use discussed with her.  The tylomas bilaterally were debrided with a #15 blade upon consent.  I gave her a referral to Vascular for any other treatment options for her peripheral arterial disease.  She will return to clinic and see me in 2 months.  Previous notes  reviewed.

## 2021-03-03 NOTE — LETTER
3/3/2021         RE: Teresa Lopez  [address]  [place] [address]        Dear Colleague,    Thank you for referring your patient, Teresa Lopez, to the [facility]. Please see a copy of my visit note below.    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     Patient Active Problem List   Diagnosis     History of CVA (cerebrovascular accident)     Jaw Pain     Arthritis of knee     CARDIOVASCULAR SCREENING; LDL GOAL LESS THAN 100     Hypertension goal BP (blood pressure) < 140/90     Advanced directives, counseling/discussion     Hyperlipidemia LDL goal <130     OA (OSTEOARTHRITIS) OF KNEE - right     CKD (chronic kidney disease) stage 3, GFR 30-59 ml/min     Chronic pain disorder     DJD (degenerative joint disease), lumbosacral     Spinal stenosis of lumbar region with neurogenic claudication     Slow transit constipation     Mild cognitive impairment     Combined form of age-related cataract, mod, both eyes     Posterior vitreous detachment of both eyes     Arthritis, lumbar spine     Medical cannabis use     Moderate major depression (H)     Inflammatory spondylopathy of lumbar region (H)     Hammer toe of left foot     Loss of weight     Mild malnutrition (H)     Peripheral vascular disease, unspecified (H)     Past Surgical History:   Procedure Laterality Date     HYSTERECTOMY, CERVIX STATUS UNKNOWN  age 31     Social History     Socioeconomic History     Marital status:      Spouse name: Not on file     Number of children: Not on file     Years of education: Not on file     Highest education level: Not on file   Occupational History     Not on file   Social Needs     Financial resource strain: Not on file     Food insecurity     Worry: Not  on file     Inability: Not on file     Transportation needs     Medical: Not on file     Non-medical: Not on file   Tobacco Use     Smoking status: Never Smoker     Smokeless tobacco: Never Used   Substance and Sexual Activity     Alcohol use: Yes     Alcohol/week: 0.0 standard drinks     Comment: 0-1 drink weekly     Drug use: No     Sexual activity: Not Currently     Partners: Male   Lifestyle     Physical activity     Days per week: Not on file     Minutes per session: Not on file     Stress: Not on file   Relationships     Social connections     Talks on phone: Not on file     Gets together: Not on file     Attends Restorationism service: Not on file     Active member of club or organization: Not on file     Attends meetings of clubs or organizations: Not on file     Relationship status: Not on file     Intimate partner violence     Fear of current or ex partner: Not on file     Emotionally abused: Not on file     Physically abused: Not on file     Forced sexual activity: Not on file   Other Topics Concern     Parent/sibling w/ CABG, MI or angioplasty before 65F 55M? 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 [employer]    History of polka, schottisch, other dancing    2 daughters in area     Family History   Problem Relation Age of Onset     Arthritis Mother      Cataracts Mother      Unknown/Adopted Father         adopted     Diabetes Brother      Cancer Brother      Cataracts Maternal Grandmother      Hypertension No family hx of      Cerebrovascular Disease No family hx of      Thyroid Disease No family hx of      Glaucoma No family hx of      Macular Degeneration No family hx of      Last Comprehensive Metabolic Panel:  Sodium   Date Value Ref Range Status   02/23/2021 133 133 - 144 mmol/L Final     Potassium   Date Value Ref Range Status   02/23/2021 4.4 3.4 - 5.3  mmol/L Final     Chloride   Date Value Ref Range Status   02/23/2021 97 94 - 109 mmol/L Final     Carbon Dioxide   Date Value Ref Range Status   02/23/2021 30 20 - 32 mmol/L Final     Anion Gap   Date Value Ref Range Status   02/23/2021 6 3 - 14 mmol/L Final     Glucose   Date Value Ref Range Status   02/23/2021 138 (H) 70 - 99 mg/dL Final     Comment:     Non Fasting     Urea Nitrogen   Date Value Ref Range Status   02/23/2021 40 (H) 7 - 30 mg/dL Final     Creatinine   Date Value Ref Range Status   02/23/2021 0.99 0.52 - 1.04 mg/dL Final     GFR Estimate   Date Value Ref Range Status   02/23/2021 52 (L) >60 mL/min/[1.73_m2] Final     Comment:     Non  GFR Calc  Starting 12/18/2018, serum creatinine based estimated GFR (eGFR) will be   calculated using the Chronic Kidney Disease Epidemiology Collaboration   (CKD-EPI) equation.       Calcium   Date Value Ref Range Status   02/23/2021 10.3 (H) 8.5 - 10.1 mg/dL Final     Lab Results   Component Value Date    AST 28 02/23/2021     Lab Results   Component Value Date    ALT 24 02/23/2021     No results found for: BILICONJ   Lab Results   Component Value Date    BILITOTAL 0.4 02/23/2021     Lab Results   Component Value Date    ALBUMIN 4.4 02/23/2021     Lab Results   Component Value Date    PROTTOTAL 8.3 02/23/2021      Lab Results   Component Value Date    ALKPHOS 77 02/23/2021     Lab Results   Component Value Date    A1C 5.8 04/03/2019    A1C 5.8 09/21/2018    A1C 5.7 05/04/2018    A1C 6.5 02/17/2017    A1C 6.2 11/01/2016     SUBJECTIVE FINDINGS:  An 85-year-old female with chronic kidney disease returns to clinic with daughter for painful left second toe, calluses and toenail care.  She relates she needs her toenails trimmed down.  She relates she has seen Dr. Lewis previously.  I reviewed those notes.  She had her vascular studies done.  I reviewed her ABIs from 12/28/2020 and her x-rays from 12/16/2020.  She relates no other specific relieving or  aggravating factors.  She relates she cannot cut her nails herself.  She is having difficulty with this due to her arthritis.      OBJECTIVE FINDINGS:  DP and PT are 2/4 bilaterally.  She has laterally deviated hallux with dorsomedial first MPJ prominence bilaterally, dorsally contracted digits 2-5 bilaterally.  She has hyperkeratotic tissue buildup, medial left second toe, dorsal right second toe, plantar medial left first MPJ.  There is no erythema, no drainage, no odor, no calor bilaterally.  She has some mild varicosities bilaterally.  Minimal peripheral edema bilaterally.  Decreased hair growth bilaterally.  CFT is less than 3 seconds bilaterally.  She has dystrophic, thickened nails with subungual debris, brittleness, dystrophy and discoloration, right hallux nail greater than the rest to differing degrees bilaterally.  There is pain on palpation of the calluses and the right hallux nail.  There is decreased range of motion bilaterally.  No gross tendon voids bilaterally.  X-rays:  She has diffuse osteoarthritic changes and arteriosclerosis noted.  Her ABIs were noncompressible.  She also has some mild dry scaly skin, some cracking bilaterally.  Her deep tendon reflexes are intact bilaterally.  Sharp/dull is intact with 5.07 [place]-Helen monofilament bilaterally.      ASSESSMENT AND PLAN:  Onychomycosis and onychauxis bilaterally.  Tylomas causing pain bilaterally.  She has got peripheral arterial disease.  She has got some neuropathy symptoms.  She has got chronic kidney disease.  Diagnosis and treatment options discussed with the patient.  She also has tinea pedis present.  All the nails were debrided or reduced bilaterally upon consent.  Her right hallux nail bled a bit upon debridement.  Local wound care with a bacitracin and Band-Aid done upon consent and use discussed with her.  Prescription for Loprox cream given and use discussed with her.  Gel toe spacers dispensed and use discussed with her.  The  tylomas bilaterally were debrided with a #15 blade upon consent.  I gave her a referral to Vascular for any other treatment options for her peripheral arterial disease.  She will return to clinic and see me in 2 months.  Previous notes reviewed.             Again, thank you for allowing me to participate in the care of your patient.        Sincerely,        Maulik Zavala DPM

## 2021-03-03 NOTE — TELEPHONE ENCOUNTER
Yes, Radha was very helpful and Jefferson was quite talkative during our appointment. No tears.  I sent her prescriptions to the appropriate pharmacy.      thanks  Marion SANCHES RN CNP, SPENCER  [facility]    I have sent the above myChart message to the patient.     Marion SANCHES RN CNP, SPENCER  [facility]

## 2021-03-04 RX ORDER — LIDOCAINE 50 MG/G
3 PATCH TOPICAL EVERY 24 HOURS
Qty: 90 PATCH | Refills: 0 | Status: SHIPPED | OUTPATIENT
Start: 2021-03-04 | End: 2021-04-20

## 2021-03-16 ENCOUNTER — TELEPHONE (OUTPATIENT)
Dept: VASCULAR SURGERY | Facility: CLINIC | Age: 86
End: 2021-03-16

## 2021-03-16 NOTE — TELEPHONE ENCOUNTER
Referring providers name, location, phone number and fax:   Maulik Zavala DPM  Podiatry  Phone [phone]  Fax not found      Reason for visit:   Please evaluate and manage for peripheral arterial disease.    Does patient have a referral:  Yes    Referral to specific provider?   No    Medical records or notes if possible:  In Epic    Please return call to Pt's daughter to schedule per Referral.

## 2021-03-17 NOTE — TELEPHONE ENCOUNTER
Referral received from Dr. Reynoso for PAD to vascular medicine. Dr. Denson is booked through April at the [facility]. Message routed to Kelsea Kaplan RN, to see if [facility] has openings to see the patient sooner.

## 2021-03-18 ENCOUNTER — OFFICE VISIT (OUTPATIENT)
Dept: PALLIATIVE MEDICINE | Facility: CLINIC | Age: 86
End: 2021-03-18
Payer: COMMERCIAL

## 2021-03-18 VITALS — SYSTOLIC BLOOD PRESSURE: 111 MMHG | HEART RATE: 84 BPM | DIASTOLIC BLOOD PRESSURE: 67 MMHG

## 2021-03-18 DIAGNOSIS — M70.61 GREATER TROCHANTERIC BURSITIS OF RIGHT HIP: Primary | ICD-10-CM

## 2021-03-18 PROCEDURE — 20610 DRAIN/INJ JOINT/BURSA W/O US: CPT | Mod: RT | Performed by: PAIN MEDICINE

## 2021-03-18 RX ORDER — TRIAMCINOLONE ACETONIDE 40 MG/ML
40 INJECTION, SUSPENSION INTRA-ARTICULAR; INTRAMUSCULAR ONCE
Status: COMPLETED | OUTPATIENT
Start: 2021-03-18 | End: 2021-03-18

## 2021-03-18 RX ORDER — BUPIVACAINE HYDROCHLORIDE 5 MG/ML
10 INJECTION, SOLUTION EPIDURAL; INTRACAUDAL ONCE
Status: COMPLETED | OUTPATIENT
Start: 2021-03-18 | End: 2021-03-18

## 2021-03-18 RX ADMIN — TRIAMCINOLONE ACETONIDE 40 MG: 40 INJECTION, SUSPENSION INTRA-ARTICULAR; INTRAMUSCULAR at 13:56

## 2021-03-18 RX ADMIN — BUPIVACAINE HYDROCHLORIDE 50 MG: 5 INJECTION, SOLUTION EPIDURAL; INTRACAUDAL at 13:56

## 2021-03-18 ASSESSMENT — PAIN SCALES - GENERAL: PAINLEVEL: WORST PAIN (10)

## 2021-03-18 NOTE — PATIENT INSTRUCTIONS
[facility]   Post Procedure Instructions    Today you had:  trigger point injections   occipital nerve block   bursa injection      Medications used:  lidocaine   bupivicaine   kenolog   dexamethasone          Go to the emergency room if you develop any shortness of breath    Monitor the injection sites for signs and symptoms of infection-fever, chills, redness, swelling, warmth, or drainage to areas.    You may have soreness at injection sites for up to 24 hours.    You may apply ice to the painful areas to help minimize the discomfort of the needle pokes.    Do not apply heat to sites for at least 12 hours.    You may use anti-inflammatory medications or Tylenol for pain control if necessary    Pain Clinic phone number during work hours Monday-Friday:  [phone]    After hours provider line: [phone]

## 2021-03-18 NOTE — PROGRESS NOTES
Pre procedure Diagnosis: Right hip pain, trochanteric bursitis   Post procedure Diagnosis: Same  Procedure performed: Right hip greater trochanteric bursa injection  Indication: Therapeutic for pain  Anesthesia: none  Complications: none  Operators: Jamaal Eid MD      Indications:    Patient was sent for treatment of chronic pain. The patient has a history of chronic bilateral hip pain. Exam shows tenderness to palpation at the Right greater trochanter. Other conservative treatments prior to injection include physical therapy, medications, and prior injections.     Options/alternatives, benefits and risks were discussed with the patient including bleeding, infection, tissue trauma, exposure to radiation, reaction to medications including seizure, nerve injury, weakness, and numbness. Questions were answered to their satisfaction and they agreed to proceed. Voluntary informed consent was obtained and signed.      Vitals were reviewed: Yes  There were no vitals taken for this visit.  Allergies were reviewed: Yes   Medications were reviewed: Yes   Pre-procedure pain score: 10/10     Procedure:  After obtaining signed informed consent, the patient was positioned in a left  lateral decubitus position.  A Pause for the Cause was performed.      After identifying the point of maximal tenderness at the right greater trochanter, the area was prepped in the usual sterile fashion. Then, a 27 gauge 3.5 inch spinal needle was advanced to contact bone at the right greater trochanter with positive pain reproduction and withdrawn 1-2 mm. Aspiration was negative. Then, 5 ml of a combination of [id number] mg and Bupivicaine 0.5% 4 ml was injected into the bursa and the needle was withdrawn. The injection site was cleaned and a bandaid was placed.     The patient tolerated the procedure well, and was taken to the recovery room.      Post-procedure pain score: 4/10  Follow-up includes:   -f/u phone call in one week  -f/u with PCP  and in the pain clinic as scheduled     Jamaal Eid MD  [facility]

## 2021-03-22 NOTE — PROGRESS NOTES
Assessment & Plan     1. Memory deficit  Concerning for dementia.  MOCA of 9/30.  Advised to keep upcoming appointment with neurology.  Will defer to neurology if patient would benefit from further testing and radiological studies.  Family was contacted by care coordinators in regards to future memory center placement in the future.  Continue with puzzles and reading.      30 minutes spent on the date of the encounter doing chart review, review of test results, documentation and discussion with family     Return in about 3 months (around 6/23/2021) for with me, in person, memory follow-up (40 minutes).    Gigi Martinez DO  [facility] FINN Paulino is a 85 year old who presents for the following health issues  accompanied by her daughter.      HPI     Memory loss follow up    1. Memory f/u: Patient has an upcoming neurology appointment on 4/2/21.  Patient continues to live in her apartment by herself.  Patient has a hard time falling and maintaining sleep.  Unsure if this is related due to her chronic hip pain.  She is currently being followed by pain management.      Review of Systems   Constitutional: Negative for chills and fever.   HENT: Negative for congestion, ear pain, hearing loss and sore throat.    Eyes: Negative for pain and visual disturbance.   Respiratory: Negative for cough and shortness of breath.    Cardiovascular: Negative for chest pain, palpitations and peripheral edema.   Gastrointestinal: Negative for abdominal pain, constipation, diarrhea, heartburn, hematochezia and nausea.   Breasts:  Negative for tenderness, breast mass and discharge.   Genitourinary: Negative for dysuria, frequency, genital sores, hematuria, pelvic pain, urgency, vaginal bleeding and vaginal discharge.   Musculoskeletal: Negative for arthralgias, joint swelling and myalgias.   Skin: Negative for rash.   Neurological: Negative for dizziness, weakness, headaches and paresthesias.    Psychiatric/Behavioral: Positive for sleep disturbance. Negative for mood changes. The patient is not nervous/anxious.         States of memory deficits            Objective    BP 97/54 (BP Location: Left arm, Cuff Size: Adult Regular)   Pulse 102   Temp 98.1  F (36.7  C) (Tympanic)   Wt 49.4 kg (109 lb)   SpO2 97%   BMI 20.32 kg/m    Body mass index is 20.32 kg/m .  Physical Exam  Constitutional:       General: She is not in acute distress.     Appearance: She is well-developed.   HENT:      Head: Normocephalic and atraumatic.      Nose: Nose normal.   Eyes:      Conjunctiva/sclera: Conjunctivae normal.   Neck:      Musculoskeletal: Normal range of motion.      Trachea: No tracheal deviation.   Cardiovascular:      Rate and Rhythm: Normal rate and regular rhythm.      Heart sounds: Normal heart sounds.   Pulmonary:      Effort: Pulmonary effort is normal. No respiratory distress.      Breath sounds: Normal breath sounds.   Musculoskeletal: Normal range of motion.   Skin:     General: Skin is warm.   Neurological:      Mental Status: She is alert.      Comments: Forgetful with a MOCA score of 9/30

## 2021-03-23 ENCOUNTER — OFFICE VISIT (OUTPATIENT)
Dept: FAMILY MEDICINE | Facility: CLINIC | Age: 86
End: 2021-03-23
Payer: COMMERCIAL

## 2021-03-23 VITALS
TEMPERATURE: 98.1 F | OXYGEN SATURATION: 97 % | BODY MASS INDEX: 20.32 KG/M2 | DIASTOLIC BLOOD PRESSURE: 54 MMHG | SYSTOLIC BLOOD PRESSURE: 97 MMHG | WEIGHT: 109 LBS | HEART RATE: 102 BPM

## 2021-03-23 DIAGNOSIS — R41.3 MEMORY DEFICIT: Primary | ICD-10-CM

## 2021-03-23 DIAGNOSIS — G89.4 CHRONIC PAIN DISORDER: ICD-10-CM

## 2021-03-23 PROCEDURE — 99214 OFFICE O/P EST MOD 30 MIN: CPT | Performed by: FAMILY MEDICINE

## 2021-03-23 RX ORDER — PREDNISONE 2.5 MG/1
TABLET ORAL
Qty: 120 TABLET | Refills: 0 | Status: SHIPPED | OUTPATIENT
Start: 2021-03-23

## 2021-03-23 ASSESSMENT — ENCOUNTER SYMPTOMS
SHORTNESS OF BREATH: 0
SORE THROAT: 0
DIARRHEA: 0
WEAKNESS: 0
JOINT SWELLING: 0
HEADACHES: 0
ABDOMINAL PAIN: 0
COUGH: 0
MYALGIAS: 0
DIZZINESS: 0
HEMATOCHEZIA: 0
FREQUENCY: 0
FEVER: 0
CONSTIPATION: 0
PARESTHESIAS: 0
HEMATURIA: 0
PALPITATIONS: 0
SLEEP DISTURBANCE: 1
EYE PAIN: 0
BREAST MASS: 0
ARTHRALGIAS: 0
CHILLS: 0
NAUSEA: 0
HEARTBURN: 0
DYSURIA: 0
NERVOUS/ANXIOUS: 0

## 2021-03-23 NOTE — PATIENT INSTRUCTIONS
Francois Paulino,    Thank you for allowing [facility] to manage your care.    Please keep upcoming appointment with neurology.    If you have any questions or concerns, please feel free to call us at [phone].    Sincerely,    Dr. Martinez    Did you know?      You can schedule a video visit for follow-up appointments as well as future appointments for certain conditions.  Please see the below link.     [email or web address]    If you have not already done so,  I encourage you to sign up for [facility] ([email or web address]).  This will allow you to review your results, securely communicate with a provider, and schedule virtual visits as well.

## 2021-03-23 NOTE — TELEPHONE ENCOUNTER
Patient's daughter (Brooke) called back to schedule her mom's Consult Appointment. Brooke is not interested in coming to [facility] and would like to schedule with the [facility] where the referral was for. Brooke stated that they would like to stay closer to home. Brooke understands that the appointments at the [facility] are booked out and she is fine with that. Please call Brooke at [phone] to schedule.

## 2021-03-23 NOTE — TELEPHONE ENCOUNTER
Patient daughter Haylee calling to check status of referral. She stated she called last week and was told a  would call her back within 24-48 hours.     Informed Haylee will send message to nurse to review and call back once instructions are given.       Madalyn MA

## 2021-03-23 NOTE — TELEPHONE ENCOUNTER
Patient can be offered next available new consult [place] with Dr. Denson which appears to be April 28, 2021.     Kelsea TAN, RN    [facility]  Office: [phone]  Fax: [phone]

## 2021-03-28 ASSESSMENT — ENCOUNTER SYMPTOMS
BACK PAIN: 1
NECK PAIN: 0
NERVOUS/ANXIOUS: 0
MYALGIAS: 0
ARTHRALGIAS: 1
MUSCLE CRAMPS: 0
DEPRESSION: 1
JOINT SWELLING: 0
PANIC: 0
DECREASED CONCENTRATION: 1
INSOMNIA: 1
STIFFNESS: 1
MUSCLE WEAKNESS: 0

## 2021-03-30 ENCOUNTER — PRE VISIT (OUTPATIENT)
Dept: NEUROLOGY | Facility: CLINIC | Age: 86
End: 2021-03-30

## 2021-03-30 NOTE — TELEPHONE ENCOUNTER
FUTURE VISIT INFORMATION      FUTURE VISIT INFORMATION:    Date: 4/2/2021    Time: 215pm    [place] [facility]  REFERRAL INFORMATION:    Referring provider:  Dr. Martinez    Referring providers clinic:  [place] [facility]     Reason for visit/diagnosis  Memory Deficit     RECORDS REQUESTED FROM:       Clinic name Comments Records Status Imaging Status   Internal Dr. Martinez-3/23/2021, 2/23/2021 Epic N/A

## 2021-04-01 NOTE — PROGRESS NOTES
[facility] Neurology Consultation    Teresa Lopez MRN# [id number]   Age: 85 year old YOB: 1935     Requesting physician: Tej Olvera     Reason for Consultation: memory change      History of Presenting Symptoms:   Teresa Lopez is a 85 year old female who presents today for evaluation of memory change.     Majority of history of gathered from daughter. Patient is very tangential and circumstantial in speech, often answering questions with monologues about unrelated topics.    About 2-3 years ago patient's daughter's noted that she was on an opiate medication. They saw a new doctor and opiates were stopped. Over the last 1-2 years daughter has noticed word finding difficulties, memory problems. Patient will switch words for similar sounding words. She will not be able to remember things that happened earlier in the day.     In the last several years there has been the development of some anxiety as well. Patient was started on Cymbalta for anxiety and pain and this has helped a little.     Patient has had balance problems for several years, which have been attributed to hip pain. She has been using a walker for about the last 2 years. Patient has not had a clear tremor. She doesn't have a known history of acting out her dreams. There was once instance where patient saw a man in her apartment while her daughter was there, otherwise no known hallucinations.     Patient lives by herself in an apartment. Daughter is looking for a place that has access to meals and social activities to participate in.     Patient is no longer driving.     Patient reports that she had a stroke in 2002 with right sided symptoms. There symptoms reportedly completely resolved.       Past Medical History:     Patient Active Problem List   Diagnosis     History of CVA (cerebrovascular accident)     Jaw Pain     Arthritis of knee     CARDIOVASCULAR SCREENING; LDL GOAL LESS THAN 100     Hypertension goal BP (blood  pressure) < 140/90     Advanced directives, counseling/discussion     Hyperlipidemia LDL goal <130     OA (OSTEOARTHRITIS) OF KNEE - right     CKD (chronic kidney disease) stage 3, GFR 30-59 ml/min     Chronic pain disorder     DJD (degenerative joint disease), lumbosacral     Spinal stenosis of lumbar region with neurogenic claudication     Slow transit constipation     Mild cognitive impairment     Combined form of age-related cataract, mod, both eyes     Posterior vitreous detachment of both eyes     Arthritis, lumbar spine     Medical cannabis use     Moderate major depression (H)     Inflammatory spondylopathy of lumbar region (H)     Hammer toe of left foot     Loss of weight     Mild malnutrition (H)     Peripheral vascular disease, unspecified (H)     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        Past Surgical History:     Past Surgical History:   Procedure Laterality Date     HYSTERECTOMY, CERVIX STATUS UNKNOWN  age 31        Social History:     Social History     Tobacco Use     Smoking status: Never Smoker     Smokeless tobacco: Never Used   Substance Use Topics     Alcohol use: Yes     Alcohol/week: 0.0 standard drinks     Comment: 0-1 drink weekly     Drug use: No        Family History:     Family History   Problem Relation Age of Onset     Arthritis Mother      Cataracts Mother      Unknown/Adopted Father         adopted     Diabetes Brother      Cancer Brother      Cataracts Maternal Grandmother      Hypertension No family hx of      Cerebrovascular Disease No family hx of      Thyroid Disease No family hx of      Glaucoma No family hx of      Macular Degeneration No family hx of         Medications:     Current Outpatient Medications   Medication Sig     Acetaminophen (TYLENOL  ARTHRITIS PAIN PO) As needed not taking daily     ADVIL 200 MG PO CAPS Reported on 4/20/2017     Aspirin-Acetaminophen-Caffeine (EXCEDRIN PO)      CALCIUM 1200 OR Reported on 4/20/2017     ciclopirox (LOPROX) 0.77 % cream Apply topically 2 times daily To feet and toenails.     DULoxetine (CYMBALTA) 30 MG capsule Take 1 capsule (30 mg) by mouth daily     Glucosamine-Chondroit-Vit C-Mn (GLUCOSAMINE 1500 COMPLEX PO) Take 1,500 mg by mouth daily Reported on 4/20/2017     lidocaine (LIDODERM) 5 % patch Place 3 patches onto the skin every 24 hours     Loratadine (CLARITIN) 10 MG capsule Take 10 mg by mouth as needed Reported on 4/20/2017     losartan-hydrochlorothiazide (HYZAAR) 100-25 MG tablet Take 1 tablet by mouth daily     magnesium 250 MG tablet Take 1 tablet by mouth daily     medical cannabis (Patient's own supply.  Not a prescription) See Admin Instructions (This is NOT a prescription, and does not certify that the patient has a qualifying medical condition for medical cannabis.  The purpose of this order is  to document that the patient reports taking medical cannabis.)   Taking 6-9 red capsules daily     MULTI-VITAMIN OR TABS 1 TABLET DAILY     potassium aminobenzoate 500 MG CAPS capsule      predniSONE (DELTASONE) 2.5 MG tablet TAKE 1 TABLET BY MOUTH DAILY. MAY REPEAT ONCE DAILY AS NEEDED     senna-docusate (SENOKOT-S;PERICOLACE) 8.6-50 MG per tablet Take 1 tablet by mouth 2 times daily     simvastatin (ZOCOR) 40 MG tablet Take 1 tablet (40 mg) by mouth At Bedtime     No current facility-administered medications for this visit.         Allergies:     Allergies   Allergen Reactions     Citalopram Other (See Comments)     Sweating profusely and nausea     Gabapentin      Felt off, dizzy, hot and cold symptoms     Lexapro [Escitalopram] Other (See Comments)     Hot flashes     Salsalate      tinnitus     Sulfa Drugs Hives        Review of Systems:   A comprehensive 10 point review of systems (constitutional, ENT,  cardiac, peripheral vascular, lymphatic, respiratory, GI, , Musculoskeletal, skin, Neurological) was performed and found to be negative except as described in this note.      Physical Exam:   Vitals: /73   Pulse 99   Resp 16   Wt 49 kg (108 lb)   SpO2 97%   BMI 20.13 kg/m     General: Seated comfortably in no acute distress.  Lungs: breathing comfortably  Neurologic:     Mental Status: MOCA 8/30 (1/5 visuospatial/executive, 2/3 naming, 0/2 digits, 0/1 list of letters, 1/3 serial 7s, 1/1 fluency, 02 abstraction, 0/5 delayed recall, 3/6 orientation). Occasional parasphasic errors in speech. Speech is very tangential and circumstantial needing redirection. Frequently would start talking about unrelated topics unclear interrupted.      Cranial Nerves: Visual fields intact. EOMI with normal smooth pursuit. Facial sensation intact/symmetric. Facial movements symmetric. Hearing not formally tested but intact to conversation. Palate elevation symmetric, uvula midline. No dysarthria. Shoulder shrug strong bilaterally. Tongue protrusion midline.     Motor: No tremors or other abnormal movements observed. Muscle tone normal throughout. Strength 5/5 throughout upper and lower extremities. Significant right hip pain with right hip flexion, but able to briefly get to 5/5.      Deep Tendon Reflexes: 2+/symmetric throughout upper and lower extremities.     Sensory: Mild vibration loss at the ankles, normal in the hands. Intact/symmetric throughout upper and lower extremities.      Coordination: Finger-nose-finger without dysmetria.      Gait: Unsteady gait requiring support which patient attributes to severe hip pain.          Data: Pertinent prior to visit   Imaging:  None    Procedures:  None    Laboratory:  B12 ~ 1900, TSH normal, BMP with elevated BUN 40 and Ca 10.3 otherwise unremarkable (2/2021)         Assessment and Plan:     Teresa Lopez is a 85 year old female who presents today for evaluation of memory  change. Short term memory changes, word finding difficulties, and anxiety have been noted over the last 1-2 years. There is one reported instance of a visual hallucinations. Patient has had balance issues over the last few years which have been attributed to chronic hip pain. No tremor or RBD symptoms. MoCA at primary care provider office was 9/30 when tested last week. MoCA today was similar at 8/30 with deficits across all categories. Symptoms and examination are concerning for dementia, most likely Alzheimer's type. We discussed pursuing MRI brain to evaluate for structural cause of symptoms. Recent B12 and TSH were unremarkable. We also discussed trial of Donepezil 5 mg nightly. We will plan to follow-up in clinic again after MRI testing.      Follow up in Neurology clinic after MRI brain testing    Karsten Louis MD   of Neurology  [facility]      The total time of this encounter amounted to 66 minutes. This time included time spent with the patient, prep work, ordering tests, and performing post visit documentation.

## 2021-04-02 ENCOUNTER — OFFICE VISIT (OUTPATIENT)
Dept: NEUROLOGY | Facility: CLINIC | Age: 86
End: 2021-04-02
Payer: COMMERCIAL

## 2021-04-02 VITALS
DIASTOLIC BLOOD PRESSURE: 73 MMHG | SYSTOLIC BLOOD PRESSURE: 118 MMHG | RESPIRATION RATE: 16 BRPM | HEART RATE: 99 BPM | WEIGHT: 108 LBS | OXYGEN SATURATION: 97 % | BODY MASS INDEX: 20.13 KG/M2

## 2021-04-02 DIAGNOSIS — R41.3 MEMORY CHANGE: Primary | ICD-10-CM

## 2021-04-02 PROCEDURE — 99205 OFFICE O/P NEW HI 60 MIN: CPT | Performed by: INTERNAL MEDICINE

## 2021-04-02 RX ORDER — DONEPEZIL HYDROCHLORIDE 5 MG/1
5 TABLET, FILM COATED ORAL AT BEDTIME
Qty: 30 TABLET | Refills: 2 | Status: SHIPPED | OUTPATIENT
Start: 2021-04-02 | End: 2021-01-01

## 2021-04-02 ASSESSMENT — PAIN SCALES - GENERAL: PAINLEVEL: EXTREME PAIN (8)

## 2021-04-02 NOTE — LETTER
4/2/2021       RE: Teresa Lopez  [address]  [place] [address]     Dear Colleague,    Thank you for referring your patient, Teresa Lopez, to the [facility] [place] [facility]. Please see a copy of my visit note below.    [facility] Neurology Consultation    Teresa Lopez MRN# [id number]   Age: 85 year old YOB: 1935     Requesting physician: Tej Olvera     Reason for Consultation: memory change      History of Presenting Symptoms:   Teresa Lopez is a 85 year old female who presents today for evaluation of memory change.     Majority of history of gathered from daughter. Patient is very tangential and circumstantial in speech, often answering questions with monologues about unrelated topics.    About 2-3 years ago patient's daughter's noted that she was on an opiate medication. They saw a new doctor and opiates were stopped. Over the last 1-2 years daughter has noticed word finding difficulties, memory problems. Patient will switch words for similar sounding words. She will not be able to remember things that happened earlier in the day.     In the last several years there has been the development of some anxiety as well. Patient was started on Cymbalta for anxiety and pain and this has helped a little.     Patient has had balance problems for several years, which have been attributed to hip pain. She has been using a walker for about the last 2 years. Patient has not had a clear tremor. She doesn't have a known history of acting out her dreams. There was once instance where patient saw a man in her apartment while her daughter was there, otherwise no known hallucinations.     Patient lives by herself in an apartment. Daughter is looking for a place that has access to meals and social activities to participate in.     Patient is no longer driving.     Patient reports that she had a stroke in  2002 with right sided symptoms. There symptoms reportedly completely resolved.       Past Medical History:     Patient Active Problem List   Diagnosis     History of CVA (cerebrovascular accident)     Jaw Pain     Arthritis of knee     CARDIOVASCULAR SCREENING; LDL GOAL LESS THAN 100     Hypertension goal BP (blood pressure) < 140/90     Advanced directives, counseling/discussion     Hyperlipidemia LDL goal <130     OA (OSTEOARTHRITIS) OF KNEE - right     CKD (chronic kidney disease) stage 3, GFR 30-59 ml/min     Chronic pain disorder     DJD (degenerative joint disease), lumbosacral     Spinal stenosis of lumbar region with neurogenic claudication     Slow transit constipation     Mild cognitive impairment     Combined form of age-related cataract, mod, both eyes     Posterior vitreous detachment of both eyes     Arthritis, lumbar spine     Medical cannabis use     Moderate major depression (H)     Inflammatory spondylopathy of lumbar region (H)     Hammer toe of left foot     Loss of weight     Mild malnutrition (H)     Peripheral vascular disease, unspecified (H)     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        Past Surgical History:     Past Surgical History:   Procedure Laterality Date     HYSTERECTOMY, CERVIX STATUS UNKNOWN  age 31        Social History:     Social History     Tobacco Use     Smoking status: Never Smoker     Smokeless tobacco: Never Used   Substance Use Topics     Alcohol use: Yes     Alcohol/week: 0.0 standard drinks     Comment: 0-1 drink weekly     Drug use: No        Family History:     Family History   Problem Relation Age of Onset     Arthritis Mother      Cataracts Mother      Unknown/Adopted Father         adopted     Diabetes Brother      Cancer Brother       Cataracts Maternal Grandmother      Hypertension No family hx of      Cerebrovascular Disease No family hx of      Thyroid Disease No family hx of      Glaucoma No family hx of      Macular Degeneration No family hx of         Medications:     Current Outpatient Medications   Medication Sig     Acetaminophen (TYLENOL ARTHRITIS PAIN PO) As needed not taking daily     ADVIL 200 MG PO CAPS Reported on 4/20/2017     Aspirin-Acetaminophen-Caffeine (EXCEDRIN PO)      CALCIUM 1200 OR Reported on 4/20/2017     ciclopirox (LOPROX) 0.77 % cream Apply topically 2 times daily To feet and toenails.     DULoxetine (CYMBALTA) 30 MG capsule Take 1 capsule (30 mg) by mouth daily     Glucosamine-Chondroit-Vit C-Mn (GLUCOSAMINE 1500 COMPLEX PO) Take 1,500 mg by mouth daily Reported on 4/20/2017     lidocaine (LIDODERM) 5 % patch Place 3 patches onto the skin every 24 hours     Loratadine (CLARITIN) 10 MG capsule Take 10 mg by mouth as needed Reported on 4/20/2017     losartan-hydrochlorothiazide (HYZAAR) 100-25 MG tablet Take 1 tablet by mouth daily     magnesium 250 MG tablet Take 1 tablet by mouth daily     medical cannabis (Patient's own supply.  Not a prescription) See Admin Instructions (This is NOT a prescription, and does not certify that the patient has a qualifying medical condition for medical cannabis.  The purpose of this order is  to document that the patient reports taking medical cannabis.)   Taking 6-9 red capsules daily     MULTI-VITAMIN OR TABS 1 TABLET DAILY     potassium aminobenzoate 500 MG CAPS capsule      predniSONE (DELTASONE) 2.5 MG tablet TAKE 1 TABLET BY MOUTH DAILY. MAY REPEAT ONCE DAILY AS NEEDED     senna-docusate (SENOKOT-S;PERICOLACE) 8.6-50 MG per tablet Take 1 tablet by mouth 2 times daily     simvastatin (ZOCOR) 40 MG tablet Take 1 tablet (40 mg) by mouth At Bedtime     No current facility-administered medications for this visit.         Allergies:     Allergies   Allergen Reactions     Citalopram  Other (See Comments)     Sweating profusely and nausea     Gabapentin      Felt off, dizzy, hot and cold symptoms     Lexapro [Escitalopram] Other (See Comments)     Hot flashes     Salsalate      tinnitus     Sulfa Drugs Hives        Review of Systems:   A comprehensive 10 point review of systems (constitutional, ENT, cardiac, peripheral vascular, lymphatic, respiratory, GI, , Musculoskeletal, skin, Neurological) was performed and found to be negative except as described in this note.      Physical Exam:   Vitals: /73   Pulse 99   Resp 16   Wt 49 kg (108 lb)   SpO2 97%   BMI 20.13 kg/m     General: Seated comfortably in no acute distress.  Lungs: breathing comfortably  Neurologic:     Mental Status: MOCA 8/30 (1/5 visuospatial/executive, 2/3 naming, 0/2 digits, 0/1 list of letters, 1/3 serial 7s, 1/1 fluency, 02 abstraction, 0/5 delayed recall, 3/6 orientation). Occasional parasphasic errors in speech. Speech is very tangential and circumstantial needing redirection. Frequently would start talking about unrelated topics unclear interrupted.      Cranial Nerves: Visual fields intact. EOMI with normal smooth pursuit. Facial sensation intact/symmetric. Facial movements symmetric. Hearing not formally tested but intact to conversation. Palate elevation symmetric, uvula midline. No dysarthria. Shoulder shrug strong bilaterally. Tongue protrusion midline.     Motor: No tremors or other abnormal movements observed. Muscle tone normal throughout. Strength 5/5 throughout upper and lower extremities. Significant right hip pain with right hip flexion, but able to briefly get to 5/5.      Deep Tendon Reflexes: 2+/symmetric throughout upper and lower extremities.     Sensory: Mild vibration loss at the ankles, normal in the hands. Intact/symmetric throughout upper and lower extremities.      Coordination: Finger-nose-finger without dysmetria.      Gait: Unsteady gait requiring support which patient attributes to  severe hip pain.          Data: Pertinent prior to visit   Imaging:  None    Procedures:  None    Laboratory:  B12 ~ 1900, TSH normal, BMP with elevated BUN 40 and Ca 10.3 otherwise unremarkable (2/2021)         Assessment and Plan:     Teresa Lopez is a 85 year old female who presents today for evaluation of memory change. Short term memory changes, word finding difficulties, and anxiety have been noted over the last 1-2 years. There is one reported instance of a visual hallucinations. Patient has had balance issues over the last few years which have been attributed to chronic hip pain. No tremor or RBD symptoms. MoCA at primary care provider office was 9/30 when tested last week. MoCA today was similar at 8/30 with deficits across all categories. Symptoms and examination are concerning for dementia, most likely Alzheimer's type. We discussed pursuing MRI brain to evaluate for structural cause of symptoms. Recent B12 and TSH were unremarkable. We also discussed trial of Donepezil 5 mg nightly. We will plan to follow-up in clinic again after MRI testing.      Follow up in Neurology clinic after MRI brain testing    Karsten Louis MD   of [facility]  [facility]    The total time of this encounter amounted to 66 minutes. This time included time spent with the patient, prep work, ordering tests, and performing post visit documentation.

## 2021-04-02 NOTE — TELEPHONE ENCOUNTER
DIAGNOSIS: PAD   DATE RECEIVED: 7.2.21   NOTES STATUS DETAILS   OFFICE NOTE from referring provider Internal 3.3.21, 12.8.20. 9.18.20  Dr. Maulik Zavala  [facility]   OFFICE NOTE from other specialist Internal 12.16.20  Dr. German Lewis  [facility]   OPERATIVE REPORT na    MEDICATION LIST Internal    PERTINENT LABS Internal    CTA (CT ANGIOGRAPHY) na    CT na    MRI na    ULTRASOUND Internal 12.28.20  US [facility] Doppler/no exer/bilateral

## 2021-04-06 ENCOUNTER — PATIENT OUTREACH (OUTPATIENT)
Dept: CARE COORDINATION | Facility: CLINIC | Age: 86
End: 2021-04-06

## 2021-04-06 NOTE — PROGRESS NOTES
Clinic Care Coordination Contact    Follow Up Progress Note      Assessment: CC ANIRUDH spoke with daughter Tonya.  Family is looking at [facility].  They have an opening in May for Elderly waiver.  Pts family has been in contact with [facility] and providing necessary paperwork.    Pt has f/u MRI for neurology April 21.     Goals       Functional (pt-stated)      Goal Statement: My daughters will research assisted living facilities and create timeline for me to move    Date Goal set: 3/2/21  Barriers: need [facility] assistance/ MA-LTC  Strengths: daughters are very supportive  Date to Achieve By: 6 months  Patient expressed understanding of goal: yes    Action steps to achieve this goal:  1. My daughters will look at facilities using [facility] or other resources (done)  2. My daughters will complete necessary [facility] paperwork and submit documentation (in progress)  3. My daughters will work with admissions staff for tours, wait lists or other considerations (looking at [facility])           Intervention/Education provided during outreach:     Encouraged getting [facility] needed paperwork regarding burial plan in a timely manner.       Outreach Frequency: monthly    Plan:     Care Coordinator will follow up in one month      Clinic Care Coordination Contact  [facility]       Clinical Data: Care Coordinator Outreach  Outreach attempted x 1.  [facility] call back information and requested return call. Offered CHW contact information if she would like to schedule follow up call with pt and pts other daughter Tonya.    Plan: CHW to do next outreach on 5/5/21 unless daughters call back to schedule.  JANESSA OLEARY to review chart after pts 4/21/21 MRI.

## 2021-04-12 ENCOUNTER — NURSE TRIAGE (OUTPATIENT)
Dept: NURSING | Facility: CLINIC | Age: 86
End: 2021-04-12

## 2021-04-12 ENCOUNTER — TELEPHONE (OUTPATIENT)
Dept: PALLIATIVE MEDICINE | Facility: CLINIC | Age: 86
End: 2021-04-12

## 2021-04-12 NOTE — TELEPHONE ENCOUNTER
Per daughter pt is in chronic pain, stating she wants to die. Daughter is requesting a change in her medications. Daughter wants to speak with someone on what to do or recommended. Please call daughter to advise [phone] Haylee Lopez.      Taran MCMILLAN    [facility]

## 2021-04-12 NOTE — TELEPHONE ENCOUNTER
Problem: Pain  Goal: #Acceptable pain level achieved/maintained at rest using NRS/Faces  Description: This goal is used for patients who can self-report.  Acceptable means the level is at or below the identified comfort/function goal.  Outcome: Outcome Met, Complete Goal  Goal: # Acceptable pain level achieved/maintained at rest using NRS/Faces without oversedation (opioid naive or PCA/Epidural infusion)  Description: This goal is used if Opioid-naïve or on PCA/Epidural Infusion.  Outcome: Outcome Met, Complete Goal  Goal: # Acceptable pain level achieved/maintained with activity using NRS/Faces  Description: This goal is used for patients who can self-report and are not achieving acceptable pain control during activity.  Outcome: Outcome Met, Complete Goal  Goal: Acceptable pain/comfort level is achieved/maintained at rest (based on Pain Behaviors Scale)  Description: This goal is used for patients who are not able to self-report pain and are assessed for pain using the Pain Behaviors Scale  Outcome: Outcome Met, Complete Goal  Goal: Acceptable pain/comfort level is achieved/maintained at rest based on PAINAID scale (Dementia)  Description: This goal is used for patients who are not able to self-report pain, have dementia, and assessed using the PAINAD scale.  Outcome: Outcome Met, Complete Goal  Goal: Acceptable pain/comfort level is achieved/maintained at rest (based on pediatric behavior tool: NIPS, NPASS, or FLACC)  Description: This goal is used for pediatric patients who are not able to self report pain.  Outcome: Outcome Met, Complete Goal  Goal: # Verbalizes understanding of pain management  Description: Documented in Patient Education Activity  Outcome: Outcome Met, Complete Goal  Goal: Verbalizes understanding and effective use of Patient Controlled Analgesia (PCA)  Description: Documented in Patient Education Activity  This goal is used for patients with PCA  Outcome: Outcome Met, Complete Goal  Goal:  Information noted.   Marion SANCHES, RN CNP, FNP  [facility]  [facility]       Maximum comfort achieved/maintained at end of life (Hospice)  Outcome: Outcome Met, Complete Goal     Problem: At Risk for Falls  Goal: # Patient does not fall  Outcome: Outcome Met, Complete Goal  Goal: # Takes action to control fall-related risks  Outcome: Outcome Met, Complete Goal  Goal: # Verbalizes understanding of fall risk/precautions  Description: Document education using the patient education activity  Outcome: Outcome Met, Complete Goal

## 2021-04-12 NOTE — TELEPHONE ENCOUNTER
There is a release to communicate in chart to speak w/ Haylee.    Last office visit was 2/24/21.  At that time Marion had wanted to see her again 6-8 weeks later so an appointment would work.  MYRON Simon CNP has openings tomorrow.    Called Haylee. And relayed the above.    Pt is not actively suicidal but stating that her pain is unbearable.  Haylee states that she was doing fine yesterday.  She has difficulty walking so she is incontinent at times.  A video visit was scheduled for tomorrow. Haylee will be there w/ pt.     Routed to MYRON Simon CNP as an FYI.    Natalie RN-BSN  [facility]

## 2021-04-12 NOTE — TELEPHONE ENCOUNTER
Triage call:   Not currently with her mom  Being seen by the [facility]- last visit 3/18/21  Daughter states that patient has had significant pain in her leg   duloxetine and prednisone  Daughter states that about 2 hours ago patient calling and I  Ice, heat, steroid shot   Right leg towards her hip area   Shoots to her knee  Slight pain across her back as well  Denies swelling  Reports that yesterday she felt well  Today she is having trouble walking- unable to put weight on the leg, significant pain and unable to walk    Per triage guidelines, patient was advised to be seen in the ER today. Daughter will call the pain clinic before having patient seen in the ER. Provided with phone number for pain clinic.     Amna Menchaca RN BSN 4/12/2021 11:56 AM    COVID 19 Nurse Triage Plan/Patient Instructions    Please be aware that novel coronavirus (COVID-19) may be circulating in the community. If you develop symptoms such as fever, cough, or SOB or if you have concerns about the presence of another infection including coronavirus (COVID-19), please contact your health care provider or visit [email or web address].     Disposition/Instructions    ED Visit recommended. Follow protocol based instructions.     Bring Your Own Device:  Please also bring your smart device(s) (smart phones, tablets, laptops) and their charging cables for your personal use and to communicate with your care team during your visit.    Thank you for taking steps to prevent the spread of this virus.  o Limit your contact with others.  o Wear a simple mask to cover your cough.  o Wash your hands well and often.    Resources    [facility]: About COVID-19: [email or web address]    CDC: What to Do If You're Sick: www.cdc.gov/coronavirus/2019-ncov/about/steps-when-sick.html    CDC: Ending Home Isolation: www.cdc.gov/coronavirus/2019-ncov/hcp/disposition-in-home-patients.html     CDC: Caring for Someone:  www.cdc.gov/coronavirus/2019-ncov/if-you-are-sick/care-for-someone.html     [facility]: Interim Guidance for Hospital Discharge to Home: www.health.[facility].[place]/diseases/coronavirus/hcp/hospdischarge.pdf    [facility] clinical trials (COVID-19 research studies): [email or web address]     Below are the COVID-19 hotlines at the [place] Department of Health ([facility]). Interpreters are available.   o For health questions: Call 039-986-1279 or 1-442.375.2101 (7 a.m. to 7 p.m.)  o For questions about schools and childcare: Call 609-390-6670 or 1-682.968.7813 (7 a.m. to 7 p.m.)       Reason for Disposition    Unable to walk    Additional Information    Negative: Looks like a broken bone or dislocated joint (e.g., crooked or deformed)    Negative: Sounds like a life-threatening emergency to the triager    Negative: Followed a hip injury    Negative: Followed a knee injury    Negative: Followed an ankle or foot injury    Negative: Back pain radiating (shooting) into leg(s)    Negative: Foot pain is the main symptom    Negative: Ankle pain is the main symptom    Negative: Knee pain is the main symptom    Negative: Leg swelling is the main symptom    Negative: Chest pain    Negative: Difficulty breathing    Negative: Entire foot is cool or blue in comparison to other side    Protocols used: LEG PAIN-A-OH

## 2021-04-13 ENCOUNTER — VIRTUAL VISIT (OUTPATIENT)
Dept: PALLIATIVE MEDICINE | Facility: CLINIC | Age: 86
End: 2021-04-13
Payer: COMMERCIAL

## 2021-04-13 DIAGNOSIS — G89.4 CHRONIC PAIN SYNDROME: ICD-10-CM

## 2021-04-13 DIAGNOSIS — M51.369 DDD (DEGENERATIVE DISC DISEASE), LUMBAR: Primary | ICD-10-CM

## 2021-04-13 DIAGNOSIS — M62.838 MUSCLE SPASM: ICD-10-CM

## 2021-04-13 DIAGNOSIS — M25.551 HIP PAIN, RIGHT: ICD-10-CM

## 2021-04-13 DIAGNOSIS — M70.61 GREATER TROCHANTERIC BURSITIS OF RIGHT HIP: ICD-10-CM

## 2021-04-13 DIAGNOSIS — M16.11 PRIMARY OSTEOARTHRITIS OF RIGHT HIP: ICD-10-CM

## 2021-04-13 DIAGNOSIS — M48.062 SPINAL STENOSIS OF LUMBAR REGION WITH NEUROGENIC CLAUDICATION: ICD-10-CM

## 2021-04-13 PROCEDURE — 99215 OFFICE O/P EST HI 40 MIN: CPT | Mod: GT | Performed by: NURSE PRACTITIONER

## 2021-04-13 RX ORDER — BUPRENORPHINE 5 UG/H
1 PATCH TRANSDERMAL
Qty: 4 PATCH | Refills: 0 | Status: SHIPPED | OUTPATIENT
Start: 2021-04-13 | End: 2021-05-12 | Stop reason: DRUGHIGH

## 2021-04-13 RX ORDER — DULOXETIN HYDROCHLORIDE 30 MG/1
30 CAPSULE, DELAYED RELEASE ORAL DAILY
Qty: 30 CAPSULE | Refills: 1 | Status: SHIPPED | OUTPATIENT
Start: 2021-04-13 | End: 2022-01-01

## 2021-04-13 ASSESSMENT — PAIN SCALES - GENERAL: PAINLEVEL: WORST PAIN (10)

## 2021-04-13 NOTE — PATIENT INSTRUCTIONS
Plan:   1. Physical Therapy:  None at present  2. Clinical Health Psychologist: I agree with outside counseling as you are.  3. Diagnostic Studies:  None  4. Medication Management:    1. START BUTRANS (buprenorphine) 5mcg/hr transdermal patch. Change patch every 7 days.  Wear on clean, dry skin on the upper chest, upper arm or upper back.   2. Continue Cymbalta (Duloxetine) 30mg every day  3. Continue Lidocaine patches   4. Continue medical cannabis.   5.  use the Voltaren gel three to four times daily regularly for the right lateral hip pain  6. Trial Aspercreme with 4% lidocaine to right lateral hip  7. Could trial Theracare patches to right lateral hip, these are over-the-counter warming patches  5. Further procedures recommended: trigger point injections to right side of low back muscle spasm(s) with Dr. Eid, our office to contact you  6. Recommendations to PCP: none  7. Follow up: 2 weeks video due to COVID-19 viral threat. Please call [phone] to make your follow-up appointment with me.        Administration of Butrans      Butrans is for transdermal use (on intact skin) only.     Each Butrans patch is intended to be worn for 7 days    Do not to use Butrans if the pouch seal is broken or the patch is cut, damaged, or changed in any way    Do not to cut the Butrans patch    Apply immediately after removal from the individually sealed pouch    Apply Butrans to the upper outer arm, upper chest, upper back or the side of the chest. These 4 sites (each present on both sides of the body) provide 8 possible application sites. Rotate Butrans among the 8 described skin sites. After Butrans removal, wait a minimum of 21 days before reapplying to the same skin site            For the use of 2 patches, remove your current patch, and apply the 2 new patches adjacent to one another at a different application site    Apply Butrans to a hairless or nearly hairless skin site.     If none are available, the hair at the  site should be clipped, not shaven.     Do not apply Butrans to irritated skin.     If the application site must be cleaned, clean the site with water only. Do not use soaps, alcohol, oils, lotions, or abrasive devices.     Allow the skin to dry before applying Butrans    It is OK to bathe or shower with the patch on. Lightly pat it dry.     If problems with adhesion of Butrans occur, the edges may be taped with first aid tape.     If problems with lack of adhesion continue, the patch may be covered with waterproof or semipermeable adhesive dressings (Tegaderm) suitable for 7 days of wear.     If Butrans falls off during the 7-day dosing interval, dispose of the transdermal system properly and place a new Butrans patch on at a different skin site.     Dispose of used patches by folding the adhesive side of the patch to itself, then flushing the patch down the toilet immediately upon removal.     Unused patches should be removed from their pouches, the protective liners removed, the patches folded so that the adhesive side of the patch adheres to itself, and immediately flushed down the toilet    Dispose of any patches remaining from a prescription as soon as they are no longer needed    Butrans is supplied in cartons containing 4 individually packaged systems and   a pouch containing 4 Patch-Disposal Units           ----------------------------------------------------------------  [facility] Number:  [phone]     Call with any questions about your care and for scheduling assistance.     Calls are returned Monday through Friday between 8 AM and 4:30 PM. We usually get back to you within 2 business days depending on the issue/request.    If we are prescribing your medications:    For opioid medication refills, call the clinic or send a tocario message 7 days in advance.  Please include:    Name of requested medication    Name of the pharmacy.    For non-opioid medications, call your pharmacy directly to request a  refill. Please allow 3-4 days to be processed.     Per [place] State Law:    All controlled substance prescriptions must be filled within 30 days of being written.      For those controlled substances allowing refills, pickup must occur within 30 days of last fill.      We believe regular attendance is key to your success in our program!      Any time you are unable to keep your appointment we ask that you call us at least 24 hours in advance to cancel.This will allow us to offer the appointment time to another patient.     Multiple missed appointments may lead to dismissal from the clinic.

## 2021-04-15 ENCOUNTER — TELEPHONE (OUTPATIENT)
Dept: PALLIATIVE MEDICINE | Facility: CLINIC | Age: 86
End: 2021-04-15

## 2021-04-15 NOTE — TELEPHONE ENCOUNTER
Prior Authorization Retail Medication Request    Medication/Dose: buprenorphine (BUTRANS) 5 MCG/HR WK patch  ICD code (if different than what is on RX):    Previously Tried and Failed:    Rationale:      Insurance Name:    Insurance ID:  [id number]      Pharmacy Information      [facility]   [address] [place] [address]  Phone: [phone] Fax: [phone]

## 2021-04-15 NOTE — TELEPHONE ENCOUNTER
Central Prior Authorization Team   Phone: [phone]      PA Initiation    Medication: buprenorphine (BUTRANS) 5 MCG/HR WK patch - INITIATED  Insurance Company: [employer] Part D - Phone [phone] Fax [phone]  Pharmacy Filling the Rx: [id number] - [place] - [address] AT [facility] [address]  Filling Pharmacy Phone: [phone]  Filling Pharmacy Fax: [phone]  Start Date: 4/15/2021

## 2021-04-17 ENCOUNTER — HEALTH MAINTENANCE LETTER (OUTPATIENT)
Age: 86
End: 2021-04-17

## 2021-04-19 DIAGNOSIS — G89.4 CHRONIC PAIN SYNDROME: ICD-10-CM

## 2021-04-20 RX ORDER — LIDOCAINE 50 MG/G
PATCH TOPICAL
Qty: 90 PATCH | Refills: 0 | Status: SHIPPED | OUTPATIENT
Start: 2021-04-20 | End: 2021-01-01

## 2021-04-21 ENCOUNTER — HOSPITAL ENCOUNTER (INPATIENT)
Facility: CLINIC | Age: 86
LOS: 1 days | Discharge: HOME | DRG: 093 | End: 2021-04-22
Attending: EMERGENCY MEDICINE | Admitting: PSYCHIATRY & NEUROLOGY
Payer: COMMERCIAL

## 2021-04-21 ENCOUNTER — APPOINTMENT (OUTPATIENT)
Dept: MRI IMAGING | Facility: CLINIC | Age: 86
DRG: 093 | End: 2021-04-21
Attending: STUDENT IN AN ORGANIZED HEALTH CARE EDUCATION/TRAINING PROGRAM
Payer: COMMERCIAL

## 2021-04-21 ENCOUNTER — APPOINTMENT (OUTPATIENT)
Dept: CT IMAGING | Facility: CLINIC | Age: 86
DRG: 093 | End: 2021-04-21
Attending: EMERGENCY MEDICINE
Payer: COMMERCIAL

## 2021-04-21 ENCOUNTER — TELEPHONE (OUTPATIENT)
Dept: NEUROLOGY | Facility: CLINIC | Age: 86
End: 2021-04-21

## 2021-04-21 ENCOUNTER — ANCILLARY PROCEDURE (OUTPATIENT)
Dept: MRI IMAGING | Facility: CLINIC | Age: 86
End: 2021-04-21
Attending: INTERNAL MEDICINE
Payer: COMMERCIAL

## 2021-04-21 DIAGNOSIS — R41.3 MEMORY CHANGE: ICD-10-CM

## 2021-04-21 DIAGNOSIS — I63.9 CEREBROVASCULAR ACCIDENT (CVA), UNSPECIFIED MECHANISM (H): ICD-10-CM

## 2021-04-21 DIAGNOSIS — Q28.2 AVM (ARTERIOVENOUS MALFORMATION) BRAIN: Primary | ICD-10-CM

## 2021-04-21 DIAGNOSIS — Z11.52 ENCOUNTER FOR SCREENING LABORATORY TESTING FOR SEVERE ACUTE RESPIRATORY SYNDROME CORONAVIRUS 2 (SARS-COV-2): ICD-10-CM

## 2021-04-21 LAB
ALBUMIN SERPL-MCNC: 3.9 G/DL (ref 3.4–5)
ALP SERPL-CCNC: 90 U/L (ref 40–150)
ALT SERPL W P-5'-P-CCNC: 24 U/L (ref 0–50)
ANION GAP SERPL CALCULATED.3IONS-SCNC: 6 MMOL/L (ref 3–14)
AST SERPL W P-5'-P-CCNC: 24 U/L (ref 0–45)
BASOPHILS # BLD AUTO: 0 10E9/L (ref 0–0.2)
BASOPHILS NFR BLD AUTO: 0.5 %
BILIRUB SERPL-MCNC: 0.4 MG/DL (ref 0.2–1.3)
BUN SERPL-MCNC: 27 MG/DL (ref 7–30)
CALCIUM SERPL-MCNC: 10 MG/DL (ref 8.5–10.1)
CHLORIDE SERPL-SCNC: 94 MMOL/L (ref 94–109)
CHOLEST SERPL-MCNC: 168 MG/DL
CO2 SERPL-SCNC: 32 MMOL/L (ref 20–32)
CREAT SERPL-MCNC: 0.81 MG/DL (ref 0.52–1.04)
DIFFERENTIAL METHOD BLD: ABNORMAL
EOSINOPHIL # BLD AUTO: 0 10E9/L (ref 0–0.7)
EOSINOPHIL NFR BLD AUTO: 0.2 %
ERYTHROCYTE [DISTWIDTH] IN BLOOD BY AUTOMATED COUNT: 12.4 % (ref 10–15)
GFR SERPL CREATININE-BSD FRML MDRD: 66 ML/MIN/{1.73_M2}
GLUCOSE SERPL-MCNC: 136 MG/DL (ref 70–99)
HBA1C MFR BLD: 6.6 % (ref 0–5.6)
HCT VFR BLD AUTO: 37.5 % (ref 35–47)
HDLC SERPL-MCNC: 83 MG/DL
HGB BLD-MCNC: 12.5 G/DL (ref 11.7–15.7)
IMM GRANULOCYTES # BLD: 0 10E9/L (ref 0–0.4)
IMM GRANULOCYTES NFR BLD: 0.5 %
INTERPRETATION ECG - MUSE: NORMAL
INTERPRETATION ECG - MUSE: NORMAL
LABORATORY COMMENT REPORT: NORMAL
LDLC SERPL CALC-MCNC: 60 MG/DL
LYMPHOCYTES # BLD AUTO: 1 10E9/L (ref 0.8–5.3)
LYMPHOCYTES NFR BLD AUTO: 16.9 %
MAGNESIUM SERPL-MCNC: 1.7 MG/DL (ref 1.6–2.3)
MCH RBC QN AUTO: 35.9 PG (ref 26.5–33)
MCHC RBC AUTO-ENTMCNC: 33.3 G/DL (ref 31.5–36.5)
MCV RBC AUTO: 108 FL (ref 78–100)
MONOCYTES # BLD AUTO: 0.6 10E9/L (ref 0–1.3)
MONOCYTES NFR BLD AUTO: 9.4 %
NEUTROPHILS # BLD AUTO: 4.5 10E9/L (ref 1.6–8.3)
NEUTROPHILS NFR BLD AUTO: 72.5 %
NONHDLC SERPL-MCNC: 85 MG/DL
NRBC # BLD AUTO: 0 10*3/UL
NRBC BLD AUTO-RTO: 0 /100
PHOSPHATE SERPL-MCNC: 2.3 MG/DL (ref 2.5–4.5)
PLATELET # BLD AUTO: 427 10E9/L (ref 150–450)
POTASSIUM SERPL-SCNC: 3.2 MMOL/L (ref 3.4–5.3)
POTASSIUM SERPL-SCNC: 3.4 MMOL/L (ref 3.4–5.3)
PROT SERPL-MCNC: 8.4 G/DL (ref 6.8–8.8)
RADIOLOGIST FLAGS: ABNORMAL
RBC # BLD AUTO: 3.48 10E12/L (ref 3.8–5.2)
SARS-COV-2 RNA RESP QL NAA+PROBE: NEGATIVE
SODIUM SERPL-SCNC: 132 MMOL/L (ref 133–144)
SPECIMEN SOURCE: NORMAL
TRIGL SERPL-MCNC: 127 MG/DL
WBC # BLD AUTO: 6.2 10E9/L (ref 4–11)

## 2021-04-21 PROCEDURE — 70498 CT ANGIOGRAPHY NECK: CPT | Mod: 26 | Performed by: RADIOLOGY

## 2021-04-21 PROCEDURE — 120N000002 HC R&B MED SURG/OB UMMC

## 2021-04-21 PROCEDURE — 250N000011 HC RX IP 250 OP 636: Performed by: EMERGENCY MEDICINE

## 2021-04-21 PROCEDURE — 250N000013 HC RX MED GY IP 250 OP 250 PS 637: Performed by: EMERGENCY MEDICINE

## 2021-04-21 PROCEDURE — 83735 ASSAY OF MAGNESIUM: CPT | Performed by: STUDENT IN AN ORGANIZED HEALTH CARE EDUCATION/TRAINING PROGRAM

## 2021-04-21 PROCEDURE — 84100 ASSAY OF PHOSPHORUS: CPT | Performed by: STUDENT IN AN ORGANIZED HEALTH CARE EDUCATION/TRAINING PROGRAM

## 2021-04-21 PROCEDURE — 80061 LIPID PANEL: CPT | Performed by: EMERGENCY MEDICINE

## 2021-04-21 PROCEDURE — 70450 CT HEAD/BRAIN W/O DYE: CPT

## 2021-04-21 PROCEDURE — 70551 MRI BRAIN STEM W/O DYE: CPT | Mod: GC | Performed by: RADIOLOGY

## 2021-04-21 PROCEDURE — 99285 EMERGENCY DEPT VISIT HI MDM: CPT | Mod: 25

## 2021-04-21 PROCEDURE — 70496 CT ANGIOGRAPHY HEAD: CPT | Mod: 26 | Performed by: RADIOLOGY

## 2021-04-21 PROCEDURE — 83036 HEMOGLOBIN GLYCOSYLATED A1C: CPT | Performed by: EMERGENCY MEDICINE

## 2021-04-21 PROCEDURE — 93005 ELECTROCARDIOGRAM TRACING: CPT

## 2021-04-21 PROCEDURE — 255N000002 HC RX 255 OP 636: Performed by: EMERGENCY MEDICINE

## 2021-04-21 PROCEDURE — 250N000013 HC RX MED GY IP 250 OP 250 PS 637: Performed by: STUDENT IN AN ORGANIZED HEALTH CARE EDUCATION/TRAINING PROGRAM

## 2021-04-21 PROCEDURE — 70552 MRI BRAIN STEM W/DYE: CPT | Mod: 26 | Performed by: STUDENT IN AN ORGANIZED HEALTH CARE EDUCATION/TRAINING PROGRAM

## 2021-04-21 PROCEDURE — U0003 INFECTIOUS AGENT DETECTION BY NUCLEIC ACID (DNA OR RNA); SEVERE ACUTE RESPIRATORY SYNDROME CORONAVIRUS 2 (SARS-COV-2) (CORONAVIRUS DISEASE [COVID-19]), AMPLIFIED PROBE TECHNIQUE, MAKING USE OF HIGH THROUGHPUT TECHNOLOGIES AS DESCRIBED BY CMS-2020-01-R: HCPCS | Performed by: EMERGENCY MEDICINE

## 2021-04-21 PROCEDURE — U0005 INFEC AGEN DETEC AMPLI PROBE: HCPCS | Performed by: EMERGENCY MEDICINE

## 2021-04-21 PROCEDURE — 70498 CT ANGIOGRAPHY NECK: CPT

## 2021-04-21 PROCEDURE — 99207 CT HEAD W/O CONTRAST: CPT | Mod: 26 | Performed by: RADIOLOGY

## 2021-04-21 PROCEDURE — 84132 ASSAY OF SERUM POTASSIUM: CPT | Performed by: STUDENT IN AN ORGANIZED HEALTH CARE EDUCATION/TRAINING PROGRAM

## 2021-04-21 PROCEDURE — 93010 ELECTROCARDIOGRAM REPORT: CPT | Performed by: EMERGENCY MEDICINE

## 2021-04-21 PROCEDURE — 80053 COMPREHEN METABOLIC PANEL: CPT | Performed by: EMERGENCY MEDICINE

## 2021-04-21 PROCEDURE — 99223 1ST HOSP IP/OBS HIGH 75: CPT | Performed by: PSYCHIATRY & NEUROLOGY

## 2021-04-21 PROCEDURE — 85025 COMPLETE CBC W/AUTO DIFF WBC: CPT | Performed by: EMERGENCY MEDICINE

## 2021-04-21 PROCEDURE — 70552 MRI BRAIN STEM W/DYE: CPT

## 2021-04-21 PROCEDURE — C9803 HOPD COVID-19 SPEC COLLECT: HCPCS

## 2021-04-21 PROCEDURE — 99285 EMERGENCY DEPT VISIT HI MDM: CPT | Mod: 25 | Performed by: EMERGENCY MEDICINE

## 2021-04-21 PROCEDURE — A9585 GADOBUTROL INJECTION: HCPCS | Performed by: EMERGENCY MEDICINE

## 2021-04-21 RX ORDER — BUPRENORPHINE 5 UG/H
1 PATCH TRANSDERMAL
Status: DISCONTINUED | OUTPATIENT
Start: 2021-04-21 | End: 2021-04-22

## 2021-04-21 RX ORDER — LIDOCAINE 4 G/G
1 PATCH TOPICAL EVERY 24 HOURS
Status: DISCONTINUED | OUTPATIENT
Start: 2021-04-23 | End: 2021-04-22 | Stop reason: HOSPADM

## 2021-04-21 RX ORDER — LABETALOL HYDROCHLORIDE 5 MG/ML
10 INJECTION, SOLUTION INTRAVENOUS
Status: DISCONTINUED | OUTPATIENT
Start: 2021-04-21 | End: 2021-04-22 | Stop reason: HOSPADM

## 2021-04-21 RX ORDER — DONEPEZIL HYDROCHLORIDE 5 MG/1
5 TABLET, FILM COATED ORAL AT BEDTIME
Status: DISCONTINUED | OUTPATIENT
Start: 2021-04-21 | End: 2021-04-22 | Stop reason: HOSPADM

## 2021-04-21 RX ORDER — HYDROCHLOROTHIAZIDE 12.5 MG/1
25 CAPSULE ORAL DAILY
Status: DISCONTINUED | OUTPATIENT
Start: 2021-04-22 | End: 2021-04-21

## 2021-04-21 RX ORDER — SIMVASTATIN 40 MG
40 TABLET ORAL EVERY EVENING
Status: DISCONTINUED | OUTPATIENT
Start: 2021-04-21 | End: 2021-04-21

## 2021-04-21 RX ORDER — AMOXICILLIN 250 MG
1 CAPSULE ORAL AT BEDTIME
Status: DISCONTINUED | OUTPATIENT
Start: 2021-04-21 | End: 2021-04-22 | Stop reason: HOSPADM

## 2021-04-21 RX ORDER — DULOXETIN HYDROCHLORIDE 30 MG/1
30 CAPSULE, DELAYED RELEASE ORAL DAILY
Status: DISCONTINUED | OUTPATIENT
Start: 2021-04-22 | End: 2021-04-22 | Stop reason: HOSPADM

## 2021-04-21 RX ORDER — IOPAMIDOL 755 MG/ML
75 INJECTION, SOLUTION INTRAVASCULAR ONCE
Status: COMPLETED | OUTPATIENT
Start: 2021-04-21 | End: 2021-04-21

## 2021-04-21 RX ORDER — LIDOCAINE 4 G/G
1 PATCH TOPICAL EVERY 24 HOURS
Status: DISCONTINUED | OUTPATIENT
Start: 2021-04-21 | End: 2021-04-21

## 2021-04-21 RX ORDER — LOSARTAN POTASSIUM 100 MG/1
100 TABLET ORAL DAILY
Status: DISCONTINUED | OUTPATIENT
Start: 2021-04-22 | End: 2021-04-21

## 2021-04-21 RX ORDER — AMOXICILLIN 250 MG
1 CAPSULE ORAL 2 TIMES DAILY
Status: DISCONTINUED | OUTPATIENT
Start: 2021-04-21 | End: 2021-04-22 | Stop reason: HOSPADM

## 2021-04-21 RX ORDER — ACETAMINOPHEN 325 MG/1
650 TABLET ORAL EVERY 6 HOURS PRN
Status: DISCONTINUED | OUTPATIENT
Start: 2021-04-21 | End: 2021-04-22 | Stop reason: HOSPADM

## 2021-04-21 RX ORDER — HYDRALAZINE HYDROCHLORIDE 20 MG/ML
10 INJECTION INTRAMUSCULAR; INTRAVENOUS
Status: DISCONTINUED | OUTPATIENT
Start: 2021-04-21 | End: 2021-04-22 | Stop reason: HOSPADM

## 2021-04-21 RX ORDER — LIDOCAINE 40 MG/G
CREAM TOPICAL
Status: DISCONTINUED | OUTPATIENT
Start: 2021-04-21 | End: 2021-04-22 | Stop reason: HOSPADM

## 2021-04-21 RX ORDER — SIMVASTATIN 40 MG
40 TABLET ORAL AT BEDTIME
Status: DISCONTINUED | OUTPATIENT
Start: 2021-04-21 | End: 2021-04-22 | Stop reason: HOSPADM

## 2021-04-21 RX ORDER — LIDOCAINE 4 G/G
3 PATCH TOPICAL ONCE
Status: COMPLETED | OUTPATIENT
Start: 2021-04-21 | End: 2021-04-22

## 2021-04-21 RX ORDER — GADOBUTROL 604.72 MG/ML
7.5 INJECTION INTRAVENOUS ONCE
Status: COMPLETED | OUTPATIENT
Start: 2021-04-21 | End: 2021-04-21

## 2021-04-21 RX ADMIN — DONEPEZIL HYDROCHLORIDE 5 MG: 5 TABLET, FILM COATED ORAL at 21:22

## 2021-04-21 RX ADMIN — ACETAMINOPHEN 650 MG: 325 TABLET, FILM COATED ORAL at 21:19

## 2021-04-21 RX ADMIN — GADOBUTROL 7.5 ML: 604.72 INJECTION INTRAVENOUS at 18:10

## 2021-04-21 RX ADMIN — IOPAMIDOL 75 ML: 755 INJECTION, SOLUTION INTRAVENOUS at 15:24

## 2021-04-21 RX ADMIN — DOCUSATE SODIUM 50 MG AND SENNOSIDES 8.6 MG 1 TABLET: 8.6; 5 TABLET, FILM COATED ORAL at 21:17

## 2021-04-21 RX ADMIN — SIMVASTATIN 40 MG: 40 TABLET, FILM COATED ORAL at 21:22

## 2021-04-21 RX ADMIN — LIDOCAINE 3 PATCH: 560 PATCH PERCUTANEOUS; TOPICAL; TRANSDERMAL at 21:28

## 2021-04-21 ASSESSMENT — MIFFLIN-ST. JEOR: SCORE: 933.96

## 2021-04-21 NOTE — TELEPHONE ENCOUNTER
Called by radiology about patient's MRI brain scan. Patient has a subacute left MCA territory stroke. MRI was performed for suspected dementia. Discussed with patient's daughter Brooke who denies any new stroke symptoms in the last few weeks. Given new stroke I recommended patient present to the ER for stroke work-up. She has history of stroke in 2002 with right sided weakness which resolved. Case discussed with stroke doctor on call Dr Chaves.     Karsten Louis MD

## 2021-04-21 NOTE — ED TRIAGE NOTES
Pt had MRI of the brain this morning. Doctor was worried pt had a stroke and sent pt to the emergency department.

## 2021-04-21 NOTE — H&P
"  [facility]    Stroke Admission Note    Chief Complaint  \"I feel fine as far as I know\"    HPI  Teresa Lopez is a 85 year old female with PMH stroke in 2002, HTN, HLD, osteoarthritis, CKD3, mild cognitive impairment who presents after MRI brain today with concern for subacute left MCA territory stroke. Patient was recently seen by Neurology outpatient for memory concerns, which is the reason for recent MRI brain.     During visit, patient reports that she feels at her baseline. Her only complaint is chronic right hip pain for which she gets steroid injections and takes daily prednisone. Does endorse tingling in bilateral finger tips that has been present for at least 10 years. Denies recent weakness in extremities, sensory changes, changes in speech or vision, dizziness. It did appear as though patient was having visual hallucinations during visit, she noted seeing a cat walk across the corner of the room.     Daughter is present during visit and also notes that there have been no recent changes in patients presentation or behaviors. She has appeared to be at her baseline, which includes a slow decline in her memory for the past 1-2 years. She notes patient did have a stroke in 2002 with residual slurring of speech and right sided weakness. Since then the weakness has resolved and the slurred speech improved but still present. Daughter thinks her speech sounds at baseline today. Patient currently lives alone in an apartment, however does have a nurse check in 2 x a week and daughters 2 x a week. Family is working towards getting her in an assisted living facility.     TPA Treatment   Not given due to established infarct on imaging.    Endovascular Treatment  Not initiated due to absence of proximal vessel occlusion    Impression   Ischemic Stroke due to undetermined etiology  vs vascular malformation    Patient presented after outpatient MRI brain ordered for memory " concerns demonstrated T2 hyperintensity and diffusion restriction within the left subinsular region and temporal white matter with involvement of the cortex to a lesser extent. Also note of hemosiderin deposition and tiny vessels in this same region. It appears that this could be secondary to a vascular malformation rather than acute ischemic stroke, although not totally clear. Will obtain CTA head and neck, as well as MRI brain with contrast. Will discuss with neuro IR for possible cerebral angiogram.    Plan  #Acute Ischemic Stroke vs vascular malformation  - Admit to Neurology  - Neurochecks Q 4 hours  - SBP less than 140    - pta losartan 100 mg   - pta hydrochlorothiazide 25 mg   - hydralazine and labetalol prn for SBP > 140  - Avoid hypotonic IV fluids  - Holding ASA while evaluating lesion on MRI  - Statin: pta simvastatin 40 mg daily  - CT head without contrast  - CTA head and neck  - MRI brain with contrast  - TTE  - Telemetry, EKG  - Bedside Glucose Monitoring  - A1c, Lipid Panel  - PT/OT/SLP  - Stroke Education  - Stroke Class per [facility] ([facility]  - Depression Screen  - Apnea Screen  - Euthermia, Euglycemia    #Depression  - Continue pta Cymbalta 30 mg     #Chronic pain  #Osteoarthritis  - Continue pta lidocaine patch to be applied to right hip  - Continue buprenorphine patch changed weekly    #Mild cognitive impairment  - Continue pta donepezil 5 mg      Prophylaxis            For VTE Prevention:  - pneumatic compression device    For Acid Suppression:  - GI prophylaxis is not indicated    Code Status  Full Code, daughter at bedside was unsure what patients code status was and noted to keep full code until she is able to speak with her siblings.    During initial physical assessment, the plan of care was discussed and developed with patient and child.  Plan of care includes: admit to  for stroke work up.    Patient was admitted via [facility] ED ([place])    The patient will be admitted to  the Neuro Critical Care/Stroke team..     The patient was discussed with Stroke Fellow, Dr. Dunham.  The Stroke Staff is Dr. Chaves.    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 Authorizing Provider   Acetaminophen (TYLENOL ARTHRITIS PAIN PO) As needed not taking daily    Reported, Patient   ADVIL 200 MG PO CAPS Reported on 4/20/2017    Reported, Patient   Aspirin-Acetaminophen-Caffeine (EXCEDRIN PO)     Reported, Patient   buprenorphine (BUTRANS) 5 MCG/HR WK patch Place 1 patch onto the skin every 7 days Fill/begin 4/13/2021 to last 28 days for chronic pain. 4/13/21   Marion Munoz APRN CNP   CALCIUM 1200 OR Reported on 4/20/2017    Reported, Patient   ciclopirox (LOPROX) 0.77 % cream Apply topically 2 times daily To feet and toenails. 3/3/21   Maulik Zavala DPM   donepezil (ARICEPT) 5 MG tablet Take 1 tablet (5 mg) by mouth At Bedtime 4/2/21   Karsten Louis MD   DULoxetine (CYMBALTA) 30 MG capsule Take 1 capsule (30 mg) by mouth daily 4/13/21   Marion Munoz APRN CNP   Glucosamine-Chondroit-Vit C-Mn (GLUCOSAMINE 1500 COMPLEX PO) Take 1,500 mg by mouth daily Reported on 4/20/2017    Reported, Patient   lidocaine (LIDODERM) 5 % patch APPLY 3 PATCHES EXTERNALLY TO THE SKIN EVERY 24 HOURS 4/20/21   Eliza Ma PA-C   Loratadine (CLARITIN) 10 MG capsule Take 10 mg by mouth as needed Reported on  4/20/2017    Reported, Patient   losartan-hydrochlorothiazide (HYZAAR) 100-25 MG tablet Take 1 tablet by mouth daily 3/3/21   Gigi Martinez DO   magnesium 250 MG tablet Take 1 tablet by mouth daily    Reported, Patient   medical cannabis (Patient's own supply.  Not a prescription) See Admin Instructions (This is NOT a prescription, and does not certify that the patient has a qualifying medical condition for medical cannabis.  The purpose of this order is  to document that the patient reports taking medical cannabis.)   Taking 6-9 red capsules daily    Reported, Patient   MULTI-VITAMIN OR TABS 1 TABLET DAILY    Reported, Patient   potassium aminobenzoate 500 MG CAPS capsule     Reported, Patient   predniSONE (DELTASONE) 2.5 MG tablet TAKE 1 TABLET BY MOUTH DAILY. MAY REPEAT ONCE DAILY AS NEEDED 3/23/21   Gigi Martinez DO   senna-docusate (SENOKOT-S;PERICOLACE) 8.6-50 MG per tablet Take 1 tablet by mouth 2 times daily 3/1/16   Remigio Jerez MD   simvastatin (ZOCOR) 40 MG tablet Take 1 tablet (40 mg) by mouth At Bedtime 6/29/20   Tej Engle MD       Scheduled Meds      Infusion Meds      PRN Meds      Allergies   Allergies   Allergen Reactions     Citalopram Other (See Comments)     Sweating profusely and nausea     Gabapentin      Felt off, dizzy, hot and cold symptoms     Lexapro [Escitalopram] Other (See Comments)     Hot flashes     Salsalate      tinnitus     Sulfa Drugs Hives     Family History   Family History   Problem Relation Age of Onset     Arthritis Mother      Cataracts Mother      Unknown/Adopted Father         adopted     Diabetes Brother      Cancer Brother      Cataracts Maternal Grandmother      Hypertension No family hx of      Cerebrovascular Disease No family hx of      Thyroid Disease No family hx of      Glaucoma No family hx of      Macular Degeneration No family hx of      Social History   Social History     Tobacco Use     Smoking status: Never Smoker     Smokeless tobacco:  Never Used   Substance Use Topics     Alcohol use: Yes     Alcohol/week: 0.0 standard drinks     Comment: 0-1 drink weekly     Drug use: No       Review of Systems   The 10 point Review of Systems is negative other than noted in the HPI or here.        PHYSICAL EXAMINATION  Pulse:  [] 86  Resp:  [11-20] 11  BP: (133-147)/(70-87) 144/79  SpO2:  [97 %-98 %] 97 %    General:  patient lying in bed without any acute distress    HEENT:  normocephalic/atraumatic  Cardio:  regular rate and rhythm  Pulmonary:  no respiratory distress  Abdomen:  soft, non-tender  Extremities:  no edema, right leg is shorter than the left  Skin:  intact, warm/dry     Neurologic  Mental Status:  alert, oriented to self and time, thinks she is at home, follows commands, speech with mild dysarthria and fluent, naming and repetition normal  Cranial Nerves:  visual fields intact, PERRL, EOMI with normal smooth pursuit, facial sensation intact and symmetric, right sided facial droop most prominent at corner of mouth, hearing not formally tested but intact to conversation, palate elevation symmetric and uvula midline, mild dysarthria (noted to be patients baseline), shoulder shrug strong bilaterally, tongue protrusion midline  Motor:  normal muscle tone and bulk, no abnormal movements, able to move all limbs spontaneously, strength 5/5 throughout upper and lower extremities, no pronator drift  Reflexes:  toes down-going  Sensory:  light touch sensation intact and symmetric throughout upper and lower extremities, no extinction on double simultaneous stimulation   Coordination:  normal finger-to-nose and heel-to-shin bilaterally without dysmetria, rapid alternating movements symmetric  Station/Gait:  deferred    Dysphagia Screen  Per Nursing    Modified Maria M Scale (pre-stroke):   1-No significant disability despite symptoms; able to carry out all usual duties and activities     Stroke Scales    NIHSS  Interval baseline (04/21/21 1441)    Interval Comments     1a. Level of Consciousness 0-->Alert, keenly responsive   1b. LOC Questions 0-->Answers both questions correctly   1c. LOC Commands 0-->Performs both tasks correctly   2.   Best Gaze 0-->Normal   3.   Visual 0-->No visual loss   4.   Facial Palsy 1-->Minor paralysis (flattened nasolabial fold, asymmetry on smiling)   5a. Motor Arm, Left 0-->No drift, limb holds 90 (or 45) degrees for full 10 secs   5b. Motor Arm, Right 0-->No drift, limb holds 90 (or 45) degrees for full 10 secs   6a. Motor Leg, Left 0-->No drift, leg holds 30 degree position for full 5 secs   6b. Motor Leg, right 0-->No drift, leg holds 30 degree position for full 5 secs   7.   Limb Ataxia 0-->Absent   8.   Sensory 0-->Normal, no sensory loss   9.   Best Language 0-->No aphasia, normal   10. Dysarthria 1-->Mild-to-moderate dysarthria, patient slurs at least some words and, at worst, can be understood with some difficulty   11. Extinction and Inattention  0-->No abnormality   Total 2 (04/21/21 1441)       Imaging  I personally reviewed all imaging; relevant findings per the HPI.    Lab Results Data   CBC  Recent Labs   Lab 04/21/21  1350   WBC 6.2   RBC 3.48*   HGB 12.5   HCT 37.5        Basic Metabolic Panel   Recent Labs   Lab 04/21/21  1350   *   POTASSIUM 3.4   CHLORIDE 94   CO2 32   BUN 27   CR 0.81   *   AYLIN 10.0     Liver Panel  Recent Labs   Lab 04/21/21  1350   PROTTOTAL 8.4   ALBUMIN 3.9   BILITOTAL 0.4   ALKPHOS 90   AST 24   ALT 24     INR  No lab results found.   Lipid Profile    Recent Labs   Lab Test 04/21/21  1350 05/16/18  1137 02/17/17  0939 02/12/15  1120 02/12/15  1120 02/10/14  1547   CHOL 168 216* 290*   < > 160 186   HDL 83 56 87   < > 59 43*   LDL 60 133* 167*   < > 75 102   TRIG 127 136 182*   < > 128 203*   CHOLHDLRATIO  --   --   --   --  2.7 4.3    < > = values in this interval not displayed.     A1C    Recent Labs   Lab Test 04/03/19  1218 09/21/18  1108 05/04/18  1356   A1C  5.8* 5.8* 5.7*     Troponin I  No results for input(s): TROPI in the last 168 hours.       Stroke Code / Stroke Consult Data Data    Not a stroke code

## 2021-04-21 NOTE — ED PROVIDER NOTES
[place] EMERGENCY DEPARTMENT ([facility])  4/21/21     History     Chief Complaint   Patient presents with     Neurologic Problem     The history is provided by the patient, medical records and a relative.     Teresa Lopez is a 85 year old female with a past medical history significant for remote history of CVA (2002), diabetes mellitus, HTN, CKD stage 3, and degenerative joint disease who presents here to the Emergency Department due to stroke noted on MRI.  Patient is accompanied by her daughter.  Patient underwent MRI brain earlier today due to concern for dementia, however, there was a subacute left MCA territory stroke that was found and she was therefore advised to come into ED for stroke workup.  Patient and her daughter both deny recent stroke symptoms.  No difficulty walking, speaking, or chances in vision.  Patient has a remote history of stroke in 2002, but denies residual deficits from that stroke.  Patient denies recent sick symptoms.  She is a non smoker.  Denies drinking.  Patient denies recent falls.  She ambulates with a walker at baseline.  Patient notes some right hip pain at baseline due to her right leg being somewhat shorter than her left.       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     Past Surgical History:   Procedure Laterality Date     HYSTERECTOMY, CERVIX STATUS UNKNOWN  age 31     Acetaminophen (TYLENOL ARTHRITIS PAIN PO)  ADVIL 200 MG PO CAPS  Aspirin-Acetaminophen-Caffeine (EXCEDRIN PO)  buprenorphine (BUTRANS) 5 MCG/HR WK patch  CALCIUM 1200 OR  ciclopirox (LOPROX) 0.77 % cream  donepezil (ARICEPT) 5 MG tablet  DULoxetine (CYMBALTA) 30 MG capsule  Glucosamine-Chondroit-Vit C-Mn (GLUCOSAMINE 1500 COMPLEX PO)  lidocaine  "(LIDODERM) 5 % patch  Loratadine (CLARITIN) 10 MG capsule  losartan-hydrochlorothiazide (HYZAAR) 100-25 MG tablet  magnesium 250 MG tablet  medical cannabis (Patient's own supply.  Not a prescription)  MULTI-VITAMIN OR TABS  potassium aminobenzoate 500 MG CAPS capsule  predniSONE (DELTASONE) 2.5 MG tablet  senna-docusate (SENOKOT-S;PERICOLACE) 8.6-50 MG per tablet  simvastatin (ZOCOR) 40 MG tablet      Allergies   Allergen Reactions     Citalopram Other (See Comments)     Sweating profusely and nausea     Gabapentin      Felt off, dizzy, hot and cold symptoms     Lexapro [Escitalopram] Other (See Comments)     Hot flashes     Salsalate      tinnitus     Sulfa Drugs Hives     Family History  Family History   Problem Relation Age of Onset     Arthritis Mother      Cataracts Mother      Unknown/Adopted Father         adopted     Diabetes Brother      Cancer Brother      Cataracts Maternal Grandmother      Hypertension No family hx of      Cerebrovascular Disease No family hx of      Thyroid Disease No family hx of      Glaucoma No family hx of      Macular Degeneration No family hx of      Social History   Social History     Tobacco Use     Smoking status: Never Smoker     Smokeless tobacco: Never Used   Substance Use Topics     Alcohol use: Yes     Alcohol/week: 0.0 standard drinks     Comment: 0-1 drink weekly     Drug use: No      Past medical history, past surgical history, medications, allergies, family history, and social history were reviewed with the patient. No additional pertinent items.       Review of Systems  A complete review of systems was performed with pertinent positives and negatives noted in the HPI, and all other systems negative.    Physical Exam   BP: (!) 147/70  Pulse: 93  Resp: 20  Height: 162.6 cm (5' 4\")  Weight: 49.9 kg (110 lb)  SpO2: 98 %  Physical Exam  Constitutional:       General: She is not in acute distress.     Appearance: She is well-developed. She is not diaphoretic.   HENT:      " Head: Normocephalic and atraumatic.      Mouth/Throat:      Pharynx: No oropharyngeal exudate.   Eyes:      General: No scleral icterus.        Right eye: No discharge.         Left eye: No discharge.      Pupils: Pupils are equal, round, and reactive to light.   Neck:      Musculoskeletal: Normal range of motion and neck supple.   Cardiovascular:      Rate and Rhythm: Normal rate and regular rhythm.      Heart sounds: Normal heart sounds. No murmur. No friction rub. No gallop.    Pulmonary:      Effort: Pulmonary effort is normal. No respiratory distress.      Breath sounds: Normal breath sounds. No wheezing.   Chest:      Chest wall: No tenderness.   Abdominal:      General: Bowel sounds are normal. There is no distension.      Palpations: Abdomen is soft.      Tenderness: There is no abdominal tenderness.   Musculoskeletal: Normal range of motion.         General: No tenderness or deformity.   Skin:     General: Skin is warm and dry.      Coloration: Skin is not pale.      Findings: No erythema or rash.   Neurological:      General: No focal deficit present.      Mental Status: She is alert and oriented to person, place, and time.      Cranial Nerves: No cranial nerve deficit.      Sensory: No sensory deficit.      Motor: No weakness.         ED Course     2:00 PM  The patient was seen and examined by Curtis Rooney DO in Room ED15.    Procedures             EKG Interpretation:      Interpreted by Curtis Rooney DO  Time reviewed: 1503  Symptoms at time of EKG: none   Rhythm: normal sinus   Rate: 82  Axis: normal  Ectopy: none  Conduction: normal  ST Segments/ T Waves: No ST-T wave changes  Q Waves: none  Comparison to prior: No old EKG available    Clinical Impression: normal EKG             No results found for any visits on 04/21/21.  Medications - No data to display     Assessments & Plan (with Medical Decision Making)   This is an 85-year-old female who presents due to abnormal MRI findings.  Patient had  an outpatient MRI and there was concern for subacute left MCA stroke.  Patient denies any current symptoms.  Exam demonstrates no acute abnormalities.  Lab work shows no acute abnormalities.  ECG shows no acute abnormalities.  I discussed the case with Stroke Neurology who saw the patient.  They recommend obtaining CT head and CTA which shows possible vascular malformation but the lesion is not well characterized at this time.  I discussed all results with patient and family.  Neurology will admit to their service.    I have reviewed the nursing notes. I have reviewed the findings, diagnosis, plan and need for follow up with the patient.    New Prescriptions    No medications on file       Final diagnoses:   None   Isaura MARTINS, am serving as a trained medical scribe to document services personally performed by Curtis Rooney DO, based on the provider's statements to me.     Curtis MARTINS DO, was physically present and have reviewed and verified the accuracy of this note documented by Isaura Klein.     --  Curtis Rooney DO  [facility] EMERGENCY DEPARTMENT  4/21/2021     Curtis Rooney DO  04/21/21 2042

## 2021-04-22 ENCOUNTER — APPOINTMENT (OUTPATIENT)
Dept: SPEECH THERAPY | Facility: CLINIC | Age: 86
DRG: 093 | End: 2021-04-22
Payer: COMMERCIAL

## 2021-04-22 ENCOUNTER — APPOINTMENT (OUTPATIENT)
Dept: INTERVENTIONAL RADIOLOGY/VASCULAR | Facility: CLINIC | Age: 86
DRG: 093 | End: 2021-04-22
Payer: COMMERCIAL

## 2021-04-22 ENCOUNTER — APPOINTMENT (OUTPATIENT)
Dept: OCCUPATIONAL THERAPY | Facility: CLINIC | Age: 86
DRG: 093 | End: 2021-04-22
Payer: COMMERCIAL

## 2021-04-22 ENCOUNTER — PATIENT OUTREACH (OUTPATIENT)
Dept: CARE COORDINATION | Facility: CLINIC | Age: 86
End: 2021-04-22

## 2021-04-22 ENCOUNTER — APPOINTMENT (OUTPATIENT)
Dept: CARDIOLOGY | Facility: CLINIC | Age: 86
DRG: 093 | End: 2021-04-22
Payer: COMMERCIAL

## 2021-04-22 VITALS
RESPIRATION RATE: 16 BRPM | TEMPERATURE: 98 F | HEART RATE: 93 BPM | WEIGHT: 110 LBS | HEIGHT: 64 IN | OXYGEN SATURATION: 98 % | DIASTOLIC BLOOD PRESSURE: 74 MMHG | BODY MASS INDEX: 18.78 KG/M2 | SYSTOLIC BLOOD PRESSURE: 134 MMHG

## 2021-04-22 PROBLEM — Z11.52 ENCOUNTER FOR SCREENING LABORATORY TESTING FOR SEVERE ACUTE RESPIRATORY SYNDROME CORONAVIRUS 2 (SARS-COV-2): Status: ACTIVE | Noted: 2021-04-22

## 2021-04-22 PROBLEM — Q28.2 AVM (ARTERIOVENOUS MALFORMATION) BRAIN: Status: ACTIVE | Noted: 2021-04-22

## 2021-04-22 LAB
ANION GAP SERPL CALCULATED.3IONS-SCNC: 8 MMOL/L (ref 3–14)
BUN SERPL-MCNC: 19 MG/DL (ref 7–30)
CALCIUM SERPL-MCNC: 10 MG/DL (ref 8.5–10.1)
CHLORIDE SERPL-SCNC: 100 MMOL/L (ref 94–109)
CO2 SERPL-SCNC: 29 MMOL/L (ref 20–32)
CREAT SERPL-MCNC: 0.74 MG/DL (ref 0.52–1.04)
ERYTHROCYTE [DISTWIDTH] IN BLOOD BY AUTOMATED COUNT: 12.6 % (ref 10–15)
GFR SERPL CREATININE-BSD FRML MDRD: 74 ML/MIN/{1.73_M2}
GLUCOSE BLDC GLUCOMTR-MCNC: 117 MG/DL (ref 70–99)
GLUCOSE BLDC GLUCOMTR-MCNC: 127 MG/DL (ref 70–99)
GLUCOSE BLDC GLUCOMTR-MCNC: 132 MG/DL (ref 70–99)
GLUCOSE SERPL-MCNC: 116 MG/DL (ref 70–99)
HCT VFR BLD AUTO: 33 % (ref 35–47)
HGB BLD-MCNC: 11 G/DL (ref 11.7–15.7)
MCH RBC QN AUTO: 35.7 PG (ref 26.5–33)
MCHC RBC AUTO-ENTMCNC: 33.3 G/DL (ref 31.5–36.5)
MCV RBC AUTO: 107 FL (ref 78–100)
PLATELET # BLD AUTO: 366 10E9/L (ref 150–450)
POTASSIUM SERPL-SCNC: 3.2 MMOL/L (ref 3.4–5.3)
RBC # BLD AUTO: 3.08 10E12/L (ref 3.8–5.2)
SODIUM SERPL-SCNC: 136 MMOL/L (ref 133–144)
WBC # BLD AUTO: 10.8 10E9/L (ref 4–11)

## 2021-04-22 PROCEDURE — 97535 SELF CARE MNGMENT TRAINING: CPT | Mod: GO | Performed by: OCCUPATIONAL THERAPIST

## 2021-04-22 PROCEDURE — 250N000013 HC RX MED GY IP 250 OP 250 PS 637: Performed by: PSYCHIATRY & NEUROLOGY

## 2021-04-22 PROCEDURE — 97165 OT EVAL LOW COMPLEX 30 MIN: CPT | Mod: GO | Performed by: OCCUPATIONAL THERAPIST

## 2021-04-22 PROCEDURE — 99232 SBSQ HOSP IP/OBS MODERATE 35: CPT | Performed by: PSYCHIATRY & NEUROLOGY

## 2021-04-22 PROCEDURE — 36226 PLACE CATH VERTEBRAL ART: CPT | Mod: RT | Performed by: NEUROLOGICAL SURGERY

## 2021-04-22 PROCEDURE — 999N001017 HC STATISTIC GLUCOSE BY METER IP

## 2021-04-22 PROCEDURE — 92526 ORAL FUNCTION THERAPY: CPT | Mod: GN

## 2021-04-22 PROCEDURE — 85027 COMPLETE CBC AUTOMATED: CPT | Performed by: STUDENT IN AN ORGANIZED HEALTH CARE EDUCATION/TRAINING PROGRAM

## 2021-04-22 PROCEDURE — 36224 PLACE CATH CAROTD ART: CPT | Mod: 50

## 2021-04-22 PROCEDURE — 250N000013 HC RX MED GY IP 250 OP 250 PS 637: Performed by: EMERGENCY MEDICINE

## 2021-04-22 PROCEDURE — C1887 CATHETER, GUIDING: HCPCS

## 2021-04-22 PROCEDURE — 120N000002 HC R&B MED SURG/OB UMMC

## 2021-04-22 PROCEDURE — 250N000009 HC RX 250: Performed by: PSYCHIATRY & NEUROLOGY

## 2021-04-22 PROCEDURE — C1769 GUIDE WIRE: HCPCS

## 2021-04-22 PROCEDURE — 36223 PLACE CATH CAROTID/INOM ART: CPT | Mod: XS | Performed by: NEUROLOGICAL SURGERY

## 2021-04-22 PROCEDURE — 36226 PLACE CATH VERTEBRAL ART: CPT

## 2021-04-22 PROCEDURE — 99153 MOD SED SAME PHYS/QHP EA: CPT

## 2021-04-22 PROCEDURE — 97530 THERAPEUTIC ACTIVITIES: CPT | Mod: GO | Performed by: OCCUPATIONAL THERAPIST

## 2021-04-22 PROCEDURE — 92610 EVALUATE SWALLOWING FUNCTION: CPT | Mod: GN

## 2021-04-22 PROCEDURE — 272N000566 HC SHEATH CR3

## 2021-04-22 PROCEDURE — 272N000506 HC NEEDLE CR6

## 2021-04-22 PROCEDURE — 36224 PLACE CATH CAROTD ART: CPT | Mod: LT | Performed by: NEUROLOGICAL SURGERY

## 2021-04-22 PROCEDURE — 999N000147 HC STATISTIC PT IP EVAL DEFER

## 2021-04-22 PROCEDURE — 99152 MOD SED SAME PHYS/QHP 5/>YRS: CPT | Mod: GC | Performed by: NEUROLOGICAL SURGERY

## 2021-04-22 PROCEDURE — 80048 BASIC METABOLIC PNL TOTAL CA: CPT | Performed by: STUDENT IN AN ORGANIZED HEALTH CARE EDUCATION/TRAINING PROGRAM

## 2021-04-22 PROCEDURE — 93306 TTE W/DOPPLER COMPLETE: CPT

## 2021-04-22 PROCEDURE — 250N000011 HC RX IP 250 OP 636: Performed by: PSYCHIATRY & NEUROLOGY

## 2021-04-22 PROCEDURE — B31R1ZZ FLUOROSCOPY OF INTRACRANIAL ARTERIES USING LOW OSMOLAR CONTRAST: ICD-10-PCS | Performed by: NEUROLOGICAL SURGERY

## 2021-04-22 PROCEDURE — 250N000013 HC RX MED GY IP 250 OP 250 PS 637: Performed by: STUDENT IN AN ORGANIZED HEALTH CARE EDUCATION/TRAINING PROGRAM

## 2021-04-22 PROCEDURE — 99152 MOD SED SAME PHYS/QHP 5/>YRS: CPT

## 2021-04-22 PROCEDURE — 93306 TTE W/DOPPLER COMPLETE: CPT | Mod: 26 | Performed by: INTERNAL MEDICINE

## 2021-04-22 PROCEDURE — 36415 COLL VENOUS BLD VENIPUNCTURE: CPT | Performed by: STUDENT IN AN ORGANIZED HEALTH CARE EDUCATION/TRAINING PROGRAM

## 2021-04-22 PROCEDURE — G0378 HOSPITAL OBSERVATION PER HR: HCPCS

## 2021-04-22 RX ORDER — NALOXONE HYDROCHLORIDE 0.4 MG/ML
0.4 INJECTION, SOLUTION INTRAMUSCULAR; INTRAVENOUS; SUBCUTANEOUS
Status: DISCONTINUED | OUTPATIENT
Start: 2021-04-22 | End: 2021-04-22 | Stop reason: HOSPADM

## 2021-04-22 RX ORDER — FENTANYL CITRATE 50 UG/ML
25-50 INJECTION, SOLUTION INTRAMUSCULAR; INTRAVENOUS EVERY 5 MIN PRN
Status: DISCONTINUED | OUTPATIENT
Start: 2021-04-22 | End: 2021-04-22 | Stop reason: HOSPADM

## 2021-04-22 RX ORDER — METOCLOPRAMIDE 10 MG/1
10 TABLET ORAL EVERY 6 HOURS PRN
Status: CANCELLED | OUTPATIENT
Start: 2021-04-22

## 2021-04-22 RX ORDER — HEPARIN SODIUM 200 [USP'U]/100ML
1 INJECTION, SOLUTION INTRAVENOUS CONTINUOUS PRN
Status: DISCONTINUED | OUTPATIENT
Start: 2021-04-22 | End: 2021-04-22 | Stop reason: HOSPADM

## 2021-04-22 RX ORDER — SODIUM CHLORIDE 9 MG/ML
INJECTION, SOLUTION INTRAVENOUS CONTINUOUS
Status: CANCELLED | OUTPATIENT
Start: 2021-04-22

## 2021-04-22 RX ORDER — ONDANSETRON 2 MG/ML
4 INJECTION INTRAMUSCULAR; INTRAVENOUS EVERY 6 HOURS PRN
Status: CANCELLED | OUTPATIENT
Start: 2021-04-22

## 2021-04-22 RX ORDER — NALOXONE HYDROCHLORIDE 0.4 MG/ML
0.2 INJECTION, SOLUTION INTRAMUSCULAR; INTRAVENOUS; SUBCUTANEOUS
Status: DISCONTINUED | OUTPATIENT
Start: 2021-04-22 | End: 2021-04-22 | Stop reason: HOSPADM

## 2021-04-22 RX ORDER — PROCHLORPERAZINE 25 MG
12.5 SUPPOSITORY, RECTAL RECTAL EVERY 12 HOURS PRN
Status: CANCELLED | OUTPATIENT
Start: 2021-04-22

## 2021-04-22 RX ORDER — FLUMAZENIL 0.1 MG/ML
0.2 INJECTION, SOLUTION INTRAVENOUS
Status: DISCONTINUED | OUTPATIENT
Start: 2021-04-22 | End: 2021-04-22 | Stop reason: HOSPADM

## 2021-04-22 RX ORDER — METOCLOPRAMIDE HYDROCHLORIDE 5 MG/ML
5 INJECTION INTRAMUSCULAR; INTRAVENOUS EVERY 6 HOURS PRN
Status: CANCELLED | OUTPATIENT
Start: 2021-04-22

## 2021-04-22 RX ORDER — NICOTINE POLACRILEX 4 MG
15-30 LOZENGE BUCCAL
Status: DISCONTINUED | OUTPATIENT
Start: 2021-04-22 | End: 2021-04-22 | Stop reason: HOSPADM

## 2021-04-22 RX ORDER — BUPRENORPHINE 5 UG/H
1 PATCH TRANSDERMAL WEEKLY
Status: DISCONTINUED | OUTPATIENT
Start: 2021-04-22 | End: 2021-04-22 | Stop reason: HOSPADM

## 2021-04-22 RX ORDER — DEXTROSE MONOHYDRATE 25 G/50ML
25-50 INJECTION, SOLUTION INTRAVENOUS
Status: CANCELLED | OUTPATIENT
Start: 2021-04-22

## 2021-04-22 RX ORDER — LIDOCAINE 40 MG/G
CREAM TOPICAL
Status: CANCELLED | OUTPATIENT
Start: 2021-04-22

## 2021-04-22 RX ORDER — DEXTROSE MONOHYDRATE 25 G/50ML
25-50 INJECTION, SOLUTION INTRAVENOUS
Status: DISCONTINUED | OUTPATIENT
Start: 2021-04-22 | End: 2021-04-22 | Stop reason: HOSPADM

## 2021-04-22 RX ORDER — POTASSIUM CHLORIDE 750 MG/1
20 TABLET, EXTENDED RELEASE ORAL ONCE
Status: COMPLETED | OUTPATIENT
Start: 2021-04-22 | End: 2021-04-22

## 2021-04-22 RX ORDER — NICOTINE POLACRILEX 4 MG
15-30 LOZENGE BUCCAL
Status: CANCELLED | OUTPATIENT
Start: 2021-04-22

## 2021-04-22 RX ORDER — ACETAMINOPHEN 325 MG/1
325 TABLET ORAL EVERY 4 HOURS PRN
Status: CANCELLED | OUTPATIENT
Start: 2021-04-22

## 2021-04-22 RX ORDER — PROCHLORPERAZINE MALEATE 5 MG
5 TABLET ORAL EVERY 6 HOURS PRN
Status: CANCELLED | OUTPATIENT
Start: 2021-04-22

## 2021-04-22 RX ORDER — ONDANSETRON 4 MG/1
4 TABLET, ORALLY DISINTEGRATING ORAL EVERY 6 HOURS PRN
Status: CANCELLED | OUTPATIENT
Start: 2021-04-22

## 2021-04-22 RX ADMIN — DULOXETINE HYDROCHLORIDE 30 MG: 30 CAPSULE, DELAYED RELEASE ORAL at 15:22

## 2021-04-22 RX ADMIN — LIDOCAINE HYDROCHLORIDE 9 ML: 10 INJECTION, SOLUTION EPIDURAL; INFILTRATION; INTRACAUDAL; PERINEURAL at 10:27

## 2021-04-22 RX ADMIN — LOSARTAN POTASSIUM: 50 TABLET, FILM COATED ORAL at 15:20

## 2021-04-22 RX ADMIN — FENTANYL CITRATE 12.5 MCG: 50 INJECTION, SOLUTION INTRAMUSCULAR; INTRAVENOUS at 11:04

## 2021-04-22 RX ADMIN — POTASSIUM CHLORIDE 20 MEQ: 750 TABLET, EXTENDED RELEASE ORAL at 01:47

## 2021-04-22 RX ADMIN — MIDAZOLAM 0.5 MG: 1 INJECTION INTRAMUSCULAR; INTRAVENOUS at 10:22

## 2021-04-22 RX ADMIN — ACETAMINOPHEN 650 MG: 325 TABLET, FILM COATED ORAL at 15:29

## 2021-04-22 RX ADMIN — BUPRENORPHINE 1 PATCH: 5 PATCH, EXTENDED RELEASE TRANSDERMAL at 15:21

## 2021-04-22 RX ADMIN — HEPARIN SODIUM 3 BAG: 200 INJECTION, SOLUTION INTRAVENOUS at 11:08

## 2021-04-22 RX ADMIN — FENTANYL CITRATE 25 MCG: 50 INJECTION, SOLUTION INTRAMUSCULAR; INTRAVENOUS at 10:22

## 2021-04-22 RX ADMIN — Medication 1 PACKET: at 15:20

## 2021-04-22 ASSESSMENT — ACTIVITIES OF DAILY LIVING (ADL)
WALKING_OR_CLIMBING_STAIRS: STAIR CLIMBING DIFFICULTY, REQUIRES EQUIPMENT
ADLS_ACUITY_SCORE: 19
DIFFICULTY_COMMUNICATING: NO
HEARING_DIFFICULTY_OR_DEAF: NO
WEAR_GLASSES_OR_BLIND: YES
CONCENTRATING,_REMEMBERING_OR_MAKING_DECISIONS_DIFFICULTY: YES
PREVIOUS_RESPONSIBILITIES: MEAL PREP;HOUSEKEEPING;LAUNDRY
DIFFICULTY_EATING/SWALLOWING: NO
FALL_HISTORY_WITHIN_LAST_SIX_MONTHS: NO
TOILETING_ISSUES: NO
VISION_MANAGEMENT: GLASSES
ADLS_ACUITY_SCORE: 20
EQUIPMENT_CURRENTLY_USED_AT_HOME: WALKER, ROLLING
DOING_ERRANDS_INDEPENDENTLY_DIFFICULTY: YES
ADLS_ACUITY_SCORE: 18
DRESSING/BATHING_DIFFICULTY: OTHER (SEE COMMENTS)
WALKING_OR_CLIMBING_STAIRS_DIFFICULTY: YES

## 2021-04-22 ASSESSMENT — VISUAL ACUITY: OU: NORMAL ACUITY

## 2021-04-22 NOTE — PROGRESS NOTES
Pt discharged in stable condition home with daughter at 1840. AVS, stroke booklet given and discussed, questions answered. Belongings send with patient.

## 2021-04-22 NOTE — PROGRESS NOTES
Stroke Education Note    The following information has been reviewed with the patient and family:    1. Warning signs of stroke    2. Calling 911 if having warning signs of stroke    3. All modifiable risk factors: hypertension, CAD, atrial fib, diabetes, hypercholesterolemia, smoking, substance abuse, diet, physical inactivity, obesity, sleep apnea    4. Patient's risk factors for stroke which include: HTN, previous stroke    5. Follow-up plan for after discharge: apt scheduled with PCP - Neuro follow up in 4-6wk     6. Discharge medications which include: None - discontinue aspirin containing products    In addition, the Understanding Stroke Handbook has been given to the patient and family.    Learner's response to risk factors / lifestyle modification education: Taking steps     Cecilia Nowak RN

## 2021-04-22 NOTE — PROGRESS NOTES
SPIRITUAL HEALTH SERVICES  SPIRITUAL ASSESSMENT Progress Note  [facility] 6A  On-Call    REASON FOR [facility] C[facility]: Request upon admission    ILLNESS CIRCUMSTANCES:   Reflective conversation shared with Jefferson integrating elements of her illness and family narratives.     Context of Illness/Symptom(s) - In addition to talking about her admission, Jefferson reflected on her frailty and decline in functional status.    Resources for Support - two daughters and their husbands were named to be her primary sources of support    DISTRESS:     Emotional/Spiritual/Existential Distress - Jefferson spoke to the necessity of her shift in her living setting with a sense of understanding albeit with a recognition of her loss    Restorationism Struggle - no discussed    Social/Cultural/Economic Distress - She framed her current life experience with less social interaction in contrast to her previously active lifestyle.    SPIRITUAL/Hinduism COPING:     Temple/Nikki - Islam (raised Hinduism)    Spiritual Practice(s) - Prayer was shared    Emotional/Relational/Existential Connections - She emphasized how important her daughters are for her well-being.    GOALS OF CARE:    Goals of Care - Transition to [facility], per her report    Meaning/Sense-Making - Assimilate into her new living setting and circumstances    PLAN: I have no plan for follow-up.    Behzad Ascencio, MPH, M.Div., [facility]  Staff   Pager [phone]  [facility] remains available 24/7 for emergent requests/referrals, either by having the switchboard page the on-call  or by entering an ASAP/STAT consult in Epic (this will also page the on-call ).

## 2021-04-22 NOTE — IR NOTE
Patient Name: Teresa Lopez  Medical Record Number: [id number]  Today's Date: 4/22/2021    Procedure: Diagnostic Cerebral Angiogram  Proceduralist: Leatha Mckeon MD; Elva Marrero MD     Patient in room: 1000  Procedure Start: 1022  Procedure end: 1117  Sedation medications administered: 0.5 mg midazolam, 37.5 mcg fentanyl     Report given to: Cecilia [facility],   : n/a    Other Notes: Pt arrived to IR room 3 from . Consent reviewed. Pt denies any questions or concerns regarding procedure. Pt positioned supine and monitored per protocol. Pt tolerated procedure without any noted complications. Pt transferred back to .    Access obtained through L femoral artery. Exoseal used for closure- deployed at 1105. 3 hr bed rest as per MD.

## 2021-04-22 NOTE — PROGRESS NOTES
Clinic Care Coordination Contact    Situation: Patient chart reviewed by care coordinator.    Background: Enrolled CC patient who was sent to ED and admitted to [facility] from outpatient neurology visit with evidence of CVA    Assessment: Discharge disposition unknown at this time.     Plan/Recommendations: CC SW will continue to monitor for discharge plan.  No CHW outreach at this time.

## 2021-04-22 NOTE — PROCEDURES
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    Plan:    We will discuss in conference, however nothing urgent to treat at this time  Rest per stroke team    Primary Surgeon:  Dr. Leatha Mckeon    Fellow:  Elva Marrero MD      Prior to the start of the procedure and with procedural staff participation, I verbally confirmed: the patient s identity using two indicators, relevant allergies, that the procedure was appropriate and matched the consent or emergent situation, and that the correct equipment/implants were available. Immediately prior to starting the procedure I conducted the Time Out with the procedural staff and re-confirmed the patient s name, procedure, and site/side. (The Joint Commission universal protocol was followed.)  Yes    PRU value: Not applicable    Anesthesia:  Conscious Sedation  Medications:  Fentanyl, Midazolam  Puncture site:  Left Femoral Artery    Fluoroscopy time (minutes):  16.1   Radiation dose (mGy):  1295    Contrast amount (mL):  100     Estimated blood loss (mL):  minimal    Closure:  Device    Disposition:  Will be followed in hospital by the Neuro Critical Care/Stroke team.        Sedation Post-Procedure Summary    Sedatives: Fentanyl and Midazolam (Versed)    Vital signs and pulse oximetry were monitored and remained stable throughout the procedure, and sedation was maintained until the procedure was complete.  The patient was monitored by staff until sedation discharge criteria were met.    Patient tolerance:  Patient tolerated the procedure well with no immediate complications.    Time of sedation in minutes:  30 minutes from  beginning to end of physician one to one monitoring.    Elva Marrero MD  Pager:  [phone]    See official report in PACS  VERA Mckeon MD

## 2021-04-22 NOTE — PROGRESS NOTES
Care Management Follow Up    Length of Stay (days): 1    Expected Discharge Date: 04/23/21     Concerns to be Addressed:   Disposition planning    Patient plan of care discussed at interdisciplinary rounds: Yes    Anticipated Discharge Disposition:  Await OT and Physical Therapy recommendations     Anticipated Discharge Services:  Await OT and Physical Therapy recommendations  Anticipated Discharge DME:  Not applicable at this time    Patient/family educated on Medicare website which has current facility and service quality ratings:  No  Education Provided on the Discharge Plan:  Yes  Patient/Family in Agreement with the Plan:  Yes    Referrals Placed by CM/SW:  None  Private pay costs discussed:  Not applicable at this time    Additional Information:  SW received a return call from pt's daughter (Carolynn).  SW introduced role of SW and requested to confirm contents of assessment completed with pt earlier today.    Per discussion,   Carolynn indicates that pt was accurate with many of her responses.   Per Carolynn, pt is currently living alone in a indep seniors apt at [facility].  Carolynn states that she and her sister are meeting with [facility] today for a prescreening.  Carolynn indicates that pt's assistive living apt will be available to her as of May 1, 2021.  Carolynn indicates that they will be moving pt to the assisted living apt on May 2, 2021.  Carolynn states that she told pt inaccurately that the apt was on the 2nd floor but the apt is actually on the 4th floor.  Carolynn states that laundry is also on the 4th floor.  Carolynn confirms that pt provided accurate responses in regards to: previous level of indep with mobility and adl's,  IADL's, medications, transportation and in regards to assistance from daughters with financial mgnt.  Carolynn states that she and her sister also make meals for pt that pt microwaves.   Carolynn states that in addition to the DME reported by pt, pt also owns a  wally.  Brooke confirms that pt receives primary care at the [facility] and is followed by Dr. Martinez. Brooke states that [facility] (Nicolette [phone]) was also following pt.  Brooke indicates that pt was just recently approved for MA number [id number].  Brooke states that pt's only income is social security (pt had reported that she receives a pension and Brooke states that this is not accurate.  Brooke states that she lives in [place] and pt's daughter Tonya lives in [place].      Brooke states that she and her sister have arranged for a new bed (lower to the floor) to be delivered to [facility] on 5/1/2021.  Brooke states that staff at [facility] will launder pt's linen.  Brooke indicates that the assistive living apt has an emergency pull cord in the bathroom.  Brooke states that staff at [facility] assisted living will complete daily bed checks, provide 3 meals per day, snacks and happy hour as well as pass pt's medications.  Brooke states that she and her sister are meeting with [facility] nurse on 4/27/2021.    Brooke indicates that they will increase services if needed.    ANIRUDH informed Brooke that OT and Physical Therapy recommendations are pending, however, if return to home is recommended, pt will need 24 hour supervision (due to cognition) until she moves to assisted living.  Brooke states that she and her sister can arrange to provide 24 hours supervision to pt with either pt staying with one of them or one of the daughters staying with pt.        ANIRUDH will continue to follow.    DEXTER Hill  Social Work, 6A  Phone:  [phone]  Pager:  [phone]  4/22/2021          ROMEL Gill

## 2021-04-22 NOTE — PROGRESS NOTES
[facility]    Stroke Progress Note    Interval Events  No events since admission. Patient remains pleasantly confused and tangential this morning. She has no complaints. We discussed plan for today, which patient was in agreement with. Daughter aware of procedure and provided consent.    Impression   Ischemic Stroke vs vascular malformation     Patient presented after outpatient MRI brain ordered for memory concerns demonstrated T2 hyperintensity and diffusion restriction within the left subinsular region and temporal white matter with involvement of the cortex to a lesser extent. Also note of hemosiderin deposition and tiny vessels in this same region. It appears that this could be secondary to a vascular malformation rather than acute ischemic stroke. CTA with mixed calcification and hemorrhage in the subinsular region within likely vascular malformation. Also 20% stenosis of left ICA and 25 - 35% stenosis of right ICA.    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  - Neurochecks Q 4 hours  - SBP < 140               - pta losartan 100 mg              - pta hydrochlorothiazide 25 mg              - hydralazine and labetalol prn for SBP > 140  - Holding ASA while evaluating lesion on MRI  - Statin: pta simvastatin 40 mg daily  - MRI brain with contrast  - TTE  - Telemetry  - Bedside Glucose Monitoring  - A1c (6.6), LDL (60)  - PT/OT/SLP  - Stroke Education  - Stroke Class per [facility] ([facility]  - IR cerebral angiogram today     #Depression  - Continue pta Cymbalta 30 mg      #Chronic pain  #Osteoarthritis  - Continue pta lidocaine patch to be applied to right hip  - Continue buprenorphine patch changed weekly     #Mild cognitive impairment  - Continue pta donepezil 5 mg      The patient was discussed with Stroke Fellow, Dr. Dunham.  The Stroke Staff is Dr. Chaves.    Nichole Guy MD  Neurology Resident  Pager:   [phone]  ______________________________________________________    Medications   Home Meds  Prior to Admission medications    Medication Sig Start Date End Date Taking? Authorizing Provider   Acetaminophen (TYLENOL ARTHRITIS PAIN PO) As needed not taking daily    Reported, Patient   ADVIL 200 MG PO CAPS Reported on 4/20/2017    Reported, Patient   Aspirin-Acetaminophen-Caffeine (EXCEDRIN PO)     Reported, Patient   buprenorphine (BUTRANS) 5 MCG/HR WK patch Place 1 patch onto the skin every 7 days Fill/begin 4/13/2021 to last 28 days for chronic pain. 4/13/21   Marion Munoz APRN CNP   CALCIUM 1200 OR Reported on 4/20/2017    Reported, Patient   ciclopirox (LOPROX) 0.77 % cream Apply topically 2 times daily To feet and toenails. 3/3/21   Maulik Zavala DPM   donepezil (ARICEPT) 5 MG tablet Take 1 tablet (5 mg) by mouth At Bedtime 4/2/21   Karsten Louis MD   DULoxetine (CYMBALTA) 30 MG capsule Take 1 capsule (30 mg) by mouth daily 4/13/21   Marion Munoz APRN CNP   Glucosamine-Chondroit-Vit C-Mn (GLUCOSAMINE 1500 COMPLEX PO) Take 1,500 mg by mouth daily Reported on 4/20/2017    Reported, Patient   lidocaine (LIDODERM) 5 % patch APPLY 3 PATCHES EXTERNALLY TO THE SKIN EVERY 24 HOURS 4/20/21   Eliza Ma PA-C   Loratadine (CLARITIN) 10 MG capsule Take 10 mg by mouth as needed Reported on 4/20/2017    Reported, Patient   losartan-hydrochlorothiazide (HYZAAR) 100-25 MG tablet Take 1 tablet by mouth daily 3/3/21   Gigi Martinez DO   magnesium 250 MG tablet Take 1 tablet by mouth daily    Reported, Patient   MULTI-VITAMIN OR TABS 1 TABLET DAILY    Reported, Patient   potassium aminobenzoate 500 MG CAPS capsule     Reported, Patient   predniSONE (DELTASONE) 2.5 MG tablet TAKE 1 TABLET BY MOUTH DAILY. MAY REPEAT ONCE DAILY AS NEEDED 3/23/21   Gigi Martinez DO   senna-docusate (SENOKOT-S;PERICOLACE) 8.6-50 MG per tablet Take 1 tablet by mouth 2 times daily 3/1/16   Remigio Jerez MD    simvastatin (ZOCOR) 40 MG tablet Take 1 tablet (40 mg) by mouth At Bedtime 6/29/20   Tej Engle MD       Scheduled Meds    buprenorphine  1 patch Transdermal Q7 Days     donepezil  5 mg Oral or Feeding Tube At Bedtime     DULoxetine  30 mg Oral or Feeding Tube Daily     [START ON 4/23/2021] Lidocaine  1 patch Transdermal Q24H     lidocaine  3 patch Transdermal Once     lidocaine   Transdermal Q8H     losartan-hydrochlorothiazide (HYZAAR) 100-25 mg combo dose   Oral Daily     potassium & sodium phosphates  1 packet Oral TID     senna-docusate  1 tablet Oral or Feeding Tube BID     senna-docusate  1 tablet Oral or Feeding Tube At Bedtime     simvastatin  40 mg Oral or Feeding Tube At Bedtime     sodium chloride (PF)  3 mL Intracatheter Q8H       Infusion Meds    - MEDICATION INSTRUCTIONS -       - MEDICATION INSTRUCTIONS -         PRN Meds  acetaminophen, hydrALAZINE, labetalol, lidocaine 4%, lidocaine (buffered or not buffered), - MEDICATION INSTRUCTIONS -, - MEDICATION INSTRUCTIONS -, sodium chloride (PF), sodium chloride (PF)       PHYSICAL EXAMINATION  Temp:  [98.4  F (36.9  C)] 98.4  F (36.9  C)  Pulse:  [] 95  Resp:  [9-35] 18  BP: (112-147)/(65-96) 136/74  SpO2:  [94 %-98 %] 95 %     General:  patient lying in bed without any acute distress    HEENT:  normocephalic/atraumatic  Cardio:  regular rate and rhythm  Pulmonary:  no respiratory distress  Abdomen:  soft, non-tender  Extremities:  no edema, right leg is shorter than the left  Skin:  intact, warm/dry      Neurologic  Mental Status:  alert and oriented x3 this morning (did require hint for place), follows commands, speech with mild dysarthria at baseline but fluent, naming and repetition normal  Cranial Nerves:  visual fields intact, PERRL, EOMI with normal smooth pursuit, facial sensation intact and symmetric, right sided facial droop most prominent at corner of mouth, hearing not formally tested but intact to conversation, palate elevation  symmetric and uvula midline, mild dysarthria (noted to be patients baseline), shoulder shrug strong bilaterally, tongue protrusion midline  Motor:  normal muscle tone and bulk, no abnormal movements, able to move all limbs spontaneously, strength 5/5 throughout upper and lower extremities, no pronator drift  Reflexes:  toes down-going  Sensory:  light touch sensation intact and symmetric throughout upper and lower extremities, no extinction on double simultaneous stimulation   Coordination:  normal finger-to-nose and heel-to-shin bilaterally without dysmetria, rapid alternating movements symmetric  Station/Gait:  deferred    Imaging  I personally reviewed all imaging; relevant findings per HPI.     Lab Results Data   CBC  Recent Labs   Lab 04/21/21  1350   WBC 6.2   RBC 3.48*   HGB 12.5   HCT 37.5        Basic Metabolic Panel    Recent Labs   Lab 04/21/21  2043 04/21/21  1350   NA  --  132*   POTASSIUM 3.2* 3.4   CHLORIDE  --  94   CO2  --  32   BUN  --  27   CR  --  0.81   GLC  --  136*   AYLIN  --  10.0     Liver Panel  Recent Labs   Lab 04/21/21  1350   PROTTOTAL 8.4   ALBUMIN 3.9   BILITOTAL 0.4   ALKPHOS 90   AST 24   ALT 24     INR  No lab results found.   Lipid Profile    Recent Labs   Lab Test 04/21/21  1350 05/16/18  1137 02/17/17  0939 02/12/15  1120 02/12/15  1120 02/10/14  1547   CHOL 168 216* 290*   < > 160 186   HDL 83 56 87   < > 59 43*   LDL 60 133* 167*   < > 75 102   TRIG 127 136 182*   < > 128 203*   CHOLHDLRATIO  --   --   --   --  2.7 4.3    < > = values in this interval not displayed.     A1C    Recent Labs   Lab Test 04/21/21  1350 04/03/19  1218 09/21/18  1108   A1C 6.6* 5.8* 5.8*     Troponin I  No results for input(s): TROPI in the last 168 hours.

## 2021-04-22 NOTE — PROGRESS NOTES
04/22/21 1100   Quick Adds   Type of Visit Initial Occupational Therapy Evaluation   Living Environment   People in home alone   Current Living Arrangements apartment;assisted living   Home Accessibility no concerns   Self-Care   Usual Activity Tolerance fair   Current Activity Tolerance fair   Regular Exercise Yes   Activity/Exercise Type walking   Exercise Amount/Frequency daily   Equipment Currently Used at Home cane, straight;raised toilet seat;shower chair;walker, rolling   Disability/Function   Hearing Difficulty or Deaf no   Wear Glasses or Blind yes   Vision Management glasses   Difficulty Communicating no   Difficulty Eating/Swallowing no   Walking or Climbing Stairs Difficulty yes   Walking or Climbing Stairs ambulation difficulty, requires equipment   Fall history within last six months no   General Information   Referring Physician Nichole Guy   Patient/Family Therapy Goal Statement (OT) move into assisted living.    Additional Occupational Profile Info/Pertinent History of Current Problem Teresa Lopez is a 85 year old female with PMH stroke in 2002, HTN, HLD, osteoarthritis, CKD3, mild cognitive impairment who presents after MRI brain today with concern for subacute left MCA territory stroke. Patient was recently seen by Neurology outpatient for memory concerns, which is the reason for recent MRI brain.   Existing Precautions/Restrictions fall   General Observations and Info pt with confusion today and intermittently aggitated specifically with cognitive screening.    Cognitive Status Examination   Orientation Status orientation to person, place and time   Affect/Mental Status (Cognitive) confused   Follows Commands follows one-step commands;over 90% accuracy   Memory Deficit severe deficit;short-term memory;immediate recall;working memory   Cognitive Status Comments further testing required. pt with decreased working memory and with increased stimulation pt concentration worsening.    Visual  Perception   Visual Impairment/Limitations WFL   Sensory   Sensory Quick Adds No deficits were identified   Range of Motion Comprehensive   General Range of Motion no range of motion deficits identified   Comment, General Range of Motion B UE LE WFL   Strength Comprehensive (MMT)   General Manual Muscle Testing (MMT) Assessment other (see comments)   Comment, General Manual Muscle Testing (MMT) Assessment B UE grossly symetrical and WFL.    Coordination   Coordination Comments pt with shuffling steps, no new concerns.    Bed Mobility   Bed Mobility supine-sit;sit-supine   Supine-Sit [place] (Bed Mobility) modified independence   Sit-Supine [place] (Bed Mobility) modified independence   Transfers   Transfers bed-chair transfer;sit-stand transfer;toilet transfer   Transfer Skill: Bed to Chair/Chair to Bed   Bed-Chair [place] (Transfers) contact guard   Sit-Stand Transfer   Sit-Stand [place] (Transfers) contact guard   Toilet Transfer   Type (Toilet Transfer) sit-stand;stand-sit   [place] Level (Toilet Transfer) modified independence   Balance   Balance Assessment standing balance: dynamic   Standing Balance: Dynamic fair balance   Activities of Daily Living   BADL Assessment/Intervention lower body dressing   Lower Body Dressing Assessment/Training   [place] Level (Lower Body Dressing) contact guard assist   Instrumental Activities of Daily Living (IADL)   Previous Responsibilities meal prep;housekeeping;laundry   IADL Comments fmaily assisting as needed for IADL's.    Clinical Impression   Criteria for Skilled Therapeutic Interventions Met (OT) yes   OT Diagnosis decreased ADL I/safety   OT Problem List-Impairments impacting ADL balance;cognition;inability to direct their own care   Assessment of Occupational Performance 5 or more Performance Deficits   Identified Performance Deficits dressing, bathing, toileting, G/H, medication management, cooking   Planned Therapy Interventions (OT)  ADL retraining;IADL retraining;cognition;strengthening;transfer training;home program guidelines;progressive activity/exercise;risk factor education   Clinical Decision Making Complexity (OT) low complexity   Therapy Frequency (OT) 5x/week   Predicted Duration of Therapy 1 week   Risk & Benefits of therapy have been explained evaluation/treatment results reviewed;care plan/treatment goals reviewed;risks/benefits reviewed;current/potential barriers reviewed;participants voiced agreement with care plan;participants included;patient   Comment-Clinical Impression Pt presents to OT with confusion and cognitive deficits limiting ADL/IADL I/safety. Pt currently will require 24 hr assist with medication managmenet, cooking and any other ADL that poses threat if completed incorrectly.    OT Discharge Planning    OT Discharge Recommendation (DC Rec) Home with assist;home with home care occupational therapy  (with anticipated GAGAN. )   OT Rationale for DC Rec pt currently with cognitive deficits and it is unclear to exact baseline. Home OT to set up environment to promote success and continued monitoring of cognitive progress.    OT Brief overview of current status  Pt unable/unwilling to complete [facility] cognitive assessment after becoming frustrated. Pt was performing poorly and on track to score within the dementia range.    Total Evaluation Time (Minutes)   Total Evaluation Time (Minutes) 5

## 2021-04-22 NOTE — DISCHARGE SUMMARY
[facility]    Neurology Stroke Discharge Summary    Date of Admission: 4/21/2021  Date of Discharge: 04/22/2021    Disposition: Discharged home  Primary Care Physician: Tej Engle      Admission Diagnosis:   Ischemic stroke vs vascular malformation    Discharge Diagnosis:   Left MCA AVM    Problem Leading to Hospitalization (from HPI):   Teresa Lopez is a 85 year old female with PMH stroke in 2002, HTN, HLD, osteoarthritis, CKD3, mild cognitive impairment who presents after MRI brain today with concern for subacute left MCA territory stroke.     Please see H&P dated 4/21/2021 for further details about presentation.    Brief Hospital Course:   Patient presented asymptomatic. She was getting a work up outpatient for memory concerns, which included a MRI brain. On that imaging there was a T2 hyperintensity and diffusion restriction within the left subinsular region and temporal white matter, also with hemosiderin deposition and tiny vessels in this region. Patient and daughter note there was no recent changes in weakness, sensory, vision, speech. Patient was admitted for further work up of MRI findings. IV tPA was not given as patient with established lesion on MRI brain with both T2 hyperintensity and diffusion restriction.    Repeat MRI brain re-demonstrated findings from original imaging. Patient underwent IR cerebral angiogram as there was concern for lesion to be vascular malformation rather than an ischemic stroke. Diagnostic cervicocerebral angiogram demonstrated a likely small calcified left MCA AVM. IR did not feel there was additional intervention indicated at time of study.    Additional work-up as stated below under Pertinent Investigations.    PT/OT/SLP evaluations deferred as patient asymptomatic and at baseline. Patient is a non-smoker. Longer term blood pressure goal is normotension. Antithrombotic not started as this was no an ischemic stroke rather a  vascular malformation. Statin can be continued on discharge. Patient can follow up in 1-2 months with Dr. Mckeon in the neuro IR clinic.    Other problems addressed during this hospitalization:  #Depression  - Continue pta Cymbalta 30 mg      #Chronic pain  #Osteoarthritis  - Continue pta lidocaine patch to be applied to right hip  - Continue buprenorphine patch changed weekly     #Mild cognitive impairment  - Continue pta donepezil 5 mg     PERTINENT INVESTIGATIONS    Labs  Lipid Panel:   Recent Labs   Lab Test 04/21/21  1350 02/12/15  1120 02/12/15  1120   CHOL 168   < > 160   HDL 83   < > 59   LDL 60   < > 75   TRIG 127   < > 128   CHOLHDLRATIO  --   --  2.7    < > = values in this interval not displayed.     A1C:   Lab Results   Component Value Date    A1C 6.6 04/21/2021     INR: No lab results found in last 7 days.   Coag Panel / Hypercoag Workup: Not indicated  Pending test results: None    Echo:   Interpretation Summary  Global and regional left ventricular function is normal with an EF of 60-65%.  Right ventricle is not well visualized, but global function appears normal  based on limited views.  Mild aortic insufficiency.  IVC diameter and respiratory changes fall into an intermediate range  suggesting an RA pressure of 8 mmHg.      CTA  HEAD NECK WITH CONTRAST, CT HEAD W/O CONTRAST 4/21/2021 3:45 PM  Impression:      1. Constellation of findings suggesting a vascular malformation within  the left subinsular cortex. There is mixed calcification and  hemorrhage in the subinsular region adjacent within this vascular  malformation. A definite large arterial feeder is not identified.  There are abnormal appearing veins this region. This can be an AVM  with small arterial feeders or a slow flow venous malformation.   2. No arterial occlusion identified.  3. Mild atherosclerotic disease throughout the neck and cerebral  Vasculature.    MR BRAIN W CONTRAST 4/21/2021 6:20 PM  Impression:  1. 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 Correlated  with prior day MRI, central diffusion restriction and susceptibility  artifacts are likely from a combination of hemorrhage and  calcifications. Superimposed local infarct can not be ruled out.  2. 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  3. No leptomeningeal enhancement. Diffuse cortical abnormal signal in  the left temporal lobe, could be secondary to congestion from the  adjacent vascular malformation. Recommend correlation with EEG to rule  out potential seizure activity.       Endovascular procedure: Diagnostic cerebral angiogram     Cardiac Monitoring: Patient had > 24 hrs of cardiac monitor while in hospital.    Findings: No atrial fibrillation was found.    Sleep Apnea Screen:   Did not screen patient since this was not an ischemic stroke    Sleep Apnea Screen Findings: not ischemic stroke    PHQ-9 Depression Screen Score: 0    Education discussed with: patient and child on blood pressure management, medical management, follow-up recommendations/plan and 911 call if warning signs of stroke.    During daily rounds, the plan of care was discussed and developed with patient and child.  Plan of care includes: further work up for possible ischemic stroke.    PHYSICAL EXAMINATION  Vital Signs:  B/P: 134/74, T: 98, P: 93, R: 16    General:  patient lying in bed without any acute distress    HEENT:  normocephalic/atraumatic  Cardio:  regular rate and rhythm  Pulmonary:  no respiratory distress  Abdomen:  soft, non-tender  Extremities:  no edema, right leg is shorter than the left  Skin:  intact, warm/dry      Neurologic  Mental Status:  alert, oriented to self and  time, thinks she is at home, follows commands, speech with mild dysarthria and fluent, naming and repetition normal  Cranial Nerves:  visual fields intact, PERRL, EOMI with normal smooth pursuit, facial sensation intact and symmetric, right sided facial droop most prominent at corner of mouth, hearing not formally tested but intact to conversation, palate elevation symmetric and uvula midline, mild dysarthria (noted to be patients baseline), shoulder shrug strong bilaterally, tongue protrusion midline  Motor:  normal muscle tone and bulk, no abnormal movements, able to move all limbs spontaneously, strength 5/5 throughout upper and lower extremities, no pronator drift  Reflexes:  toes down-going  Sensory:  light touch sensation intact and symmetric throughout upper and lower extremities, no extinction on double simultaneous stimulation   Coordination:  normal finger-to-nose and heel-to-shin bilaterally without dysmetria, rapid alternating movements symmetric  Station/Gait:  deferred    National Institutes of Health Stroke Scale (on day of discharge)  NIHSS Total Score: 0    Modified Logan Scale (on day of discharge): 0-No symptoms    Medications    Discharge Medication List as of 4/22/2021  5:58 PM      CONTINUE these medications which have NOT CHANGED    Details   Acetaminophen (TYLENOL ARTHRITIS PAIN PO) As needed not taking daily, Historical      ADVIL 200 MG PO CAPS Reported on 4/20/2017, Historical      buprenorphine (BUTRANS) 5 MCG/HR WK patch Place 1 patch onto the skin every 7 days Fill/begin 4/13/2021 to last 28 days for chronic pain., Disp-4 patch, R-0, E-Prescribe      CALCIUM 1200 OR Reported on 4/20/2017, Historical      ciclopirox (LOPROX) 0.77 % cream Apply topically 2 times daily To feet and toenails.Disp-90 g, [id number]      donepezil (ARICEPT) 5 MG tablet Take 1 tablet (5 mg) by mouth At Bedtime, Disp-30 tablet, R-2, E-Prescribe      DULoxetine (CYMBALTA) 30 MG capsule Take 1 capsule (30 mg)  by mouth daily, Disp-30 capsule, R-1, E-Prescribe      Glucosamine-Chondroit-Vit C-Mn (GLUCOSAMINE 1500 COMPLEX PO) Take 1,500 mg by mouth daily Reported on 4/20/2017, Historical      lidocaine (LIDODERM) 5 % patch APPLY 3 PATCHES EXTERNALLY TO THE SKIN EVERY 24 HOURSDisp-90 patch, [id number]      Loratadine (CLARITIN) 10 MG capsule Take 10 mg by mouth as needed Reported on 4/20/2017, Historical      losartan-hydrochlorothiazide (HYZAAR) 100-25 MG tablet Take 1 tablet by mouth daily, Disp-90 tablet, R-3, E-Prescribe      magnesium 250 MG tablet Take 1 tablet by mouth daily, Historical      MULTI-VITAMIN OR TABS 1 TABLET DAILY, Oral, Historical      potassium aminobenzoate 500 MG CAPS capsule Historical      predniSONE (DELTASONE) 2.5 MG tablet TAKE 1 TABLET BY MOUTH DAILY. MAY REPEAT ONCE DAILY AS NEEDED, Disp-120 tablet, R-0, E-Prescribe      senna-docusate (SENOKOT-S;PERICOLACE) 8.6-50 MG per tablet Take 1 tablet by mouth 2 times daily, Disp-120 tablet, R-12, E-Prescribe      simvastatin (ZOCOR) 40 MG tablet Take 1 tablet (40 mg) by mouth At Bedtime, Disp-90 tablet,R-3, E-Prescribe         STOP taking these medications       Aspirin-Acetaminophen-Caffeine (EXCEDRIN PO) Comments:   Reason for Stopping:               Additional recommendations and follow up:       NEUROLOGY ADULT REFERRAL      Reason for your hospital stay    For assessment of abnormalities found on your brain imaging.     Adult [facility] Follow-up and recommended labs and tests    Follow up with Dr. Mckeon , at (location with clinic name or city) [facility], within 1-2 months  to follow up on results. No follow up labs or test are needed.    Appointments on [facility] and/or [facility] (with [facility] or [facility] provider or service). Call [phone] if you haven't heard regarding these appointments within 7 days of discharge.     Activity    Your activity upon discharge: activity as tolerated     Follow Up and recommended labs and tests    Follow  up with primary care provider, Tej Engle, within 7 days for hospital follow- up and blood pressure check.  No follow up labs or test are needed.     Diet    Follow this diet upon discharge: Orders Placed This Encounter      Dysphagia Diet Level 3 Advanced Thin Liquids (water, ice chips, juice, milk gelatin, ice cream, etc)       Patient was seen and discussed with the Attending, Dr. Chaves.    Nichole Guy MD  Pager: [phone]

## 2021-04-22 NOTE — PROGRESS NOTES
04/22/21 0900   General Information   Onset of Illness/Injury or Date of Surgery 04/21/21   Referring Physician Nichole Guy MD   Patient/Family Therapy Goal Statement (SLP) To eat and drink   Pertinent History of Current Problem Teresa Lopez is a 85 year old female with PMH stroke in 2002, HTN, HLD, osteoarthritis, CKD3, mild cognitive impairment who presents after MRI brain today with concern for subacute left MCA territory stroke. Patient was recently seen by Neurology outpatient for memory concerns, which is the reason for recent MRI brain. Per chart review, pt's daughter (not present in today's assessment) noted pt had a stroke in 2002 with residual slurring of speech and right sided weakness. Since then the weakness has resolved and the slurred speech improved but still present. Daughter thought her speech sounds at baseline. Per chart review and pt report, no hx of swallowing difficulty.  Clinical swallow evaluation completed per MD order.   General Observations Pt awake and alert.  Pt swallow assessment completed at 0845 on 4/22/21.       Present no   Pain Assessment   Patient Currently in Pain No   Type of Evaluation   Type of Evaluation Swallow Evaluation   Oral Motor   Oral Musculature anomalies present   Structural Abnormalities none present   Dentition (Oral Motor)   Dentition (Oral Motor) natural dentition;adequate dentition   Facial Symmetry (Oral Motor)   Facial Symmetry (Oral Motor) right side impairment   Right Side Facial Asymmetry minimal impairment   Lip Function (Oral Motor)   Lip Range of Motion (Oral Motor) retraction impairment;protrusion impairment   Protrusion, Lip Range of Motion right side;minimal impairment   Retraction, Lip Range of Motion right side;minimal impairment   Lip Strength (Oral Motor) WFL   Tongue Function (Oral Motor)   Tongue ROM (Oral Motor) WNL   Jaw Function (Oral Motor)   Jaw Function (Oral Motor) WNL   Cough/Swallow/Gag Reflex (Oral  Motor)   Volitional Throat Clear/Cough (Oral Motor) WNL   Volitional Swallow (Oral Motor) WNL   Vocal Quality/Secretion Management (Oral Motor)   Vocal Quality (Oral Motor) WNL   General Swallowing Observations   Current Diet/Method of Nutritional Intake (General Swallowing Observations, NIS) NPO   Respiratory Support (General Swallowing Observations) none   Comment, Secretions/Suctioning WNL   Swallowing Evaluation Clinical swallow evaluation   Clinical Swallow Evaluation   Feeding Assistance set up only required   Esophageal Phase of Swallow   Patient reports or presents with symptoms of esophageal dysphagia No   Swallowing Recommendations   Diet Consistency Recommendations NPO   Comment, Swallowing Recommendations Limited assessment per NPO staus for medical procedure   General Therapy Interventions   Planned Therapy Interventions Dysphagia Treatment   Dysphagia treatment Oropharyngeal exercise training;Modified diet education;Instruction of safe swallow strategies;Compensatory strategies for swallowing   SLP Therapy Assessment/Plan   Criteria for Skilled Therapeutic Interventions Met (SLP Eval) yes;treatment indicated   SLP Diagnosis Severe oropharyngeal dysphagia, limited assessment   Rehab Potential (SLP Eval) good, to achieve stated therapy goals   Therapy Frequency (SLP Eval) daily   Predicted Duration of Therapy Intervention (SLP Eval) 2 weeks   Comment, Therapy Assessment/Plan (SLP) Clinical swallow evaluation completed per MD order.  Pt presents with severe oropharyngeal dysphagia in the setting of L MCA stroke, however interpreted with caution as evaluation was limited per need for NPO status for medical procedure after limited PO trials.  Recommend NPO status with excellent oral cares 2-3x per day.  Anticipate pt will be able to tolerate oral diet upon re-assessment.  Pt awake and alert, minimal dysarthria present and some confusion noted.  Oral mech remarkable for R facial weakness resulting in reduced  labial retraction/protrusion.  Delayed cough with larger sip of thin water via cup, no overt s/sx of aspiration with puree via spoon.  Limited PO trials (x3 thin H20, x1 puree) as pt made NPO status for planned procedure in IR during SLP swallow assessment.  Pt requiring intermittent cues to follow safe swallow precautions.  SLP to follow for PO readiness, suspect pt at baseline status with regard to speech-language skills.   Therapy Plan Review/Discharge Plan (SLP)   Therapy Plan Review (SLP) evaluation/treatment results reviewed;care plan/treatment goals reviewed;risks/benefits reviewed;current/potential barriers reviewed;participants voiced agreement with care plan;participants included;patient   Demonstrates Need for Referral to Another Service (SLP) occupational therapist;physical therapist   SLP Discharge Planning    SLP Discharge Recommendation (DC Rec) Transitional Care Facility   SLP Rationale for DC Rec Swallow function below baseline status   SLP Brief overview of current status  Recommend NPO status with excellent oral cares 2-3x per day.  Anticipate pt will be able to tolerate oral diet upon re-assessment.  Limited PO trials (x3 thin H20, x1 puree) as pt made NPO status for planned procedure in IR during SLP swallow assessment.  Pt requiring intermittent cues to follow safe swallow precautions.  SLP to follow for PO readiness, suspect pt at baseline status with regard to speech-language skills.    Total Evaluation Time   Total Evaluation Time (Minutes) 8

## 2021-04-22 NOTE — UTILIZATION REVIEW
"Inpatient to Observation note:    Admission Status; Secondary Review Determination         Under the authority of the Utilization Management Committee, the utilization review process indicated a secondary review on the above patient.  The review outcome is based on review of the medical records, discussions with staff, and applying clinical experience noted on the date of the review.          (x) Observation Status Appropriate - This patient does not meet hospital inpatient criteria and is placed in observation status. If this patient's primary payer is Medicare and was admitted as an inpatient, Condition Code 44 should be used and patient status changed to \"observation\".     RATIONALE FOR DETERMINATION   85-year-old female with history of previous CVA, hypertension, hyperlipidemia, CKD stage III, mild cognitive impairment was admitted to [facility] on 4/21/2021 after out patient MRI done showed concerns for subacute left MCA stroke.  After reviewing MRI, neurology feels that it is unlikely to be a stroke.  CTA of the head and neck showed a heavily calcified left MCA lesion concerning for vascular malformation.  Patient underwent diagnostic angiography which showed likely calcified small left MCA AVM, however nothing urgent to treat at this time per endovascular neurology.  Discussed with , patient will most likely discharge home.  She does not meet inpatient criteria at this time.  The severity of illness, intensity of service provided, expected LOS and risk for adverse outcome make the care appropriate for further observation; however, doesn't meet criteria for hospital inpatient admission. Dr Chaves notified of this determination.    This document was produced using voice recognition software.      The information on this document is developed by the utilization review team in order for the business office to ensure compliance.  This only denotes the appropriateness of proper admission status and does not " reflect the quality of care rendered.         The definitions of Inpatient Status and Observation Status used in making the determination above are those provided in the CMS Coverage Manual, Chapter 1 and Chapter 6, section 70.4.      Sincerely,  Pelon Ramirez MD    Utilization Review  Physician Advisor  [facility].

## 2021-04-22 NOTE — PROGRESS NOTES
Care Management Initial Consult    General Information  Assessment completed with: Patient, (Patient (Teresa Lopez))  Type of CM/SW Visit: Initial Assessment    Primary Care Provider verified and updated as needed: Yes   Readmission within the last 30 days: no previous admission in last 30 days         Advance Care Planning: Advance Care Planning Reviewed: (Patient has a health care directive present in EPIC)  (Patient has a health care directive present in EPIC)       Communication Assessment  Patient's communication style: spoken language (English or Bilingual)    Hearing Difficulty or Deaf: no   Wear Glasses or Blind: yes    Cognitive  Cognitive/Neuro/Behavioral: .WDL except(pt had MRI for memory issues)        Orientation: person, place, situation        Speech: clear    Living Environment:   People in home: alone     Current living Arrangements: (Reportedly moving to [facility] )      Able to return to prior arrangements: (Reportedly moving to Assisted Living on 4/22/2021)       Family/Social Support:  Care provided by: self  Provides care for: no one  Marital Status:   Children          Description of Support System: Involved    Support Assessment: Adequate family and caregiver support    Current Resources:   Patient receiving home care services:       Community Resources:    Equipment currently used at home: walker, rolling  Supplies currently used at home:      Employment/Financial:  Employment Status: retired     Employment/ Comments: (Patient did not serve in the )  Financial Concerns: No concerns identified   Referral to Financial Counselor: No       Lifestyle & Psychosocial Needs:        Socioeconomic History     Marital status:      Spouse name: Not on file     Number of children: Not on file     Years of education: Not on file     Highest education level: Not on file     Tobacco Use     Smoking status: Never Smoker     Smokeless tobacco: Never Used    Substance and Sexual Activity     Alcohol use: Yes     Alcohol/week: 0.0 standard drinks     Comment: 0-1 drink weekly     Drug use: No     Sexual activity: Not Currently     Partners: Male       Functional Status:  Prior to admission patient needed assistance:              Mental Health Status:  Mental Health Status: (History of Moderate Major Depression)       Chemical Dependency Status:  Chemical Dependency Status: (Not applicable)             Values/Beliefs:  Spiritual, Cultural Beliefs, Taoist Practices, Values that affect care: yes  Description of Beliefs that Will Affect Care: (Noel)            Additional Information:  SW received orders to see pt for stroke assessment.    SW met with pt who tells SW that arrangements were in place for pt to move to [facility] today.  Pt states that this move will take place and is being coordinated by pt's daughters who are moving her furniture and possessions.  In regards to the the assisted living, there are no steps to enter the building and elevators are available.  Pt states that her bathroom has a large walk in shower.  Pt thinks that her apt is on the 2nd floor.  Pt thinks that laundry facility's are on the 2nd floor.  Prior to hospitalization, pt states that she was indep with all mobility (pt used a ww with brakes and seat, no falls in the past year), adl's and with meal preparation.  Pt states that she had a privately hired  2x per week.  Pt's states that her daughters were completing her laundry tasks, assisting with financial mgnt, providing transport to MD appts and coordinating pt's medication schedule.  Pt states that she is uncertain about what services the assisted living will be providing.    Pt states that she has a walker, heightened toilet, shower chair, grab bars and a hh shower nozzle.  Pt states that she has a handicapped parking certificate.  Pt states that her primary clinic is [facility].  Pt has  not had any add'l inpt hospitalizations in the past 30 days.  To pt's knowledge, pt does not have a .  Pt's income consists of Social Security and pension.  Pt has a health care directive, copy available in Epic.  Pt has a history of Depression that is treated with Cymbalta.  Pt does not have a CD history.  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  Pt states that she did not serve in the .  Pt states that she has 2 adult children: Tonya and Carolynn.  Pt states that her daughters live locally but pt is not able to state what city's her daughters live in, nor was pt able to state the name of her PCP.    Pt gave SW verbal permission to speak with her daughters in regards to discharge planning.      Pt has pending OT and Physical Therapy eval orders, SW will await recommendations.      Following completion of assessment with pt, ANIRUDH attended am unit rounds.  Dr. Guy states that she spoke with pt's daughter who told her that they are working on plans for pt to move to assistive living however, plans are not yet in place.  As this is the case, SW has left a message for daughters to call to confirm contents of above assessment.    DEXTER Hill  Social Work, 6A  Phone:  [phone]  Pager:  [phone]  4/22/2021          ROMEL Gill

## 2021-04-22 NOTE — PROGRESS NOTES
Arrived from:  ED  Belongings/meds:  Walker (red) from home, clothes, red jacket, glasses, shoes.  No meds  2 RN Skin Assessment Completed by:  [place] and Musa  Non-intact findings documented (yes/no/NA): Blanchable redness to sacrum,blanchable redness to bilateral heels, patches to right hip area.    Status: Arrived from ED at 0610. Stroke workup.   Vitals: VSS on RA  Neuros: Alert.  Oriented to self, place, situation.  Strength 5/5 throughout.  Gen weakness.  Denies N/T. Denies vision changes. R droop  IV: L PIV  Labs/Electrolytes: K+ replaced in ED  Resp/trach: WDL  Diet: NPO  Bowel status: BM on arrival to 6A, today.  : Voided on arrival to 6A, via toilet  Skin: see 2 RN skin assessment above  Pain: Right leg and back  Activity: A1/GB/walker  Social: Daughters, Tonya and Brooke, are aware that patient is here.  Updates this shift: Stroke book at bedside

## 2021-04-22 NOTE — PROGRESS NOTES
[facility]     Endovascular Surgical Neuroradiology Pre-Procedure Note      HPI:  Teresa Lopez is a 85 year old female with left basal ganglia stroke in 2002, hypertension, hyperlipidemia and osteoarthritis presents to stroke service for memory issues which prompted incidental finding of subacute left MCA territory stroke. This prompted admission to the stroke service. MRI brain and CTA were obtained which showed some hemosiderin deposition and calcification in left MCA distribution. Endovascular service consulted for formal angiogram to assess for any vascular malformation. Patient has some right lower extremity weakness and knee pain and right facial droop, otherwise neurologically intact.      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       Surgical History:  Past Surgical History:   Procedure Laterality Date     HYSTERECTOMY, CERVIX STATUS UNKNOWN  age 31       Family History:  Family History   Problem Relation Age of Onset     Arthritis Mother      Cataracts Mother      Unknown/Adopted Father         adopted     Diabetes Brother      Cancer Brother      Cataracts Maternal Grandmother      Hypertension No family hx of      Cerebrovascular Disease No family hx of      Thyroid Disease No family hx of      Glaucoma No family hx of      Macular Degeneration No family hx of        Social History:  Social History     Socioeconomic History     Marital status:      Spouse name: Not on file     Number of children: Not on file     Years of education: Not on file     Highest education level: Not on file   Occupational History     Not on file   Social Needs     Financial resource strain: Not on file     Food insecurity     Worry:  Not on file     Inability: Not on file     Transportation needs     Medical: Not on file     Non-medical: Not on file   Tobacco Use     Smoking status: Never Smoker     Smokeless tobacco: Never Used   Substance and Sexual Activity     Alcohol use: Yes     Alcohol/week: 0.0 standard drinks     Comment: 0-1 drink weekly     Drug use: No     Sexual activity: Not Currently     Partners: Male   Lifestyle     Physical activity     Days per week: Not on file     Minutes per session: Not on file     Stress: Not on file   Relationships     Social connections     Talks on phone: Not on file     Gets together: Not on file     Attends Yazdanism service: Not on file     Active member of club or organization: Not on file     Attends meetings of clubs or organizations: Not on file     Relationship status: Not on file     Intimate partner violence     Fear of current or ex partner: Not on file     Emotionally abused: Not on file     Physically abused: Not on file     Forced sexual activity: Not on file   Other Topics Concern     Parent/sibling w/ CABG, MI or angioplasty before 65F 55M? 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  No    Medications:  Medications Prior to Admission   Medication Sig Dispense Refill Last Dose     Acetaminophen (TYLENOL ARTHRITIS PAIN PO) As needed not taking daily        ADVIL 200 MG PO CAPS Reported on 4/20/2017        Aspirin-Acetaminophen-Caffeine (EXCEDRIN  PO)         buprenorphine (BUTRANS) 5 MCG/HR WK patch Place 1 patch onto the skin every 7 days Fill/begin 4/13/2021 to last 28 days for chronic pain. 4 patch 0      CALCIUM 1200 OR Reported on 4/20/2017        ciclopirox (LOPROX) 0.77 % cream Apply topically 2 times daily To feet and toenails. 90 g 3      donepezil (ARICEPT) 5 MG tablet Take 1 tablet (5 mg) by mouth At Bedtime 30 tablet 2      DULoxetine (CYMBALTA) 30 MG capsule Take 1 capsule (30 mg) by mouth daily 30 capsule 1      Glucosamine-Chondroit-Vit C-Mn (GLUCOSAMINE 1500 COMPLEX PO) Take 1,500 mg by mouth daily Reported on 4/20/2017        lidocaine (LIDODERM) 5 % patch APPLY 3 PATCHES EXTERNALLY TO THE SKIN EVERY 24 HOURS 90 patch 0      Loratadine (CLARITIN) 10 MG capsule Take 10 mg by mouth as needed Reported on 4/20/2017        losartan-hydrochlorothiazide (HYZAAR) 100-25 MG tablet Take 1 tablet by mouth daily 90 tablet 3      magnesium 250 MG tablet Take 1 tablet by mouth daily        MULTI-VITAMIN OR TABS 1 TABLET DAILY        potassium aminobenzoate 500 MG CAPS capsule         predniSONE (DELTASONE) 2.5 MG tablet TAKE 1 TABLET BY MOUTH DAILY. MAY REPEAT ONCE DAILY AS NEEDED 120 tablet 0      senna-docusate (SENOKOT-S;PERICOLACE) 8.6-50 MG per tablet Take 1 tablet by mouth 2 times daily 120 tablet 12      simvastatin (ZOCOR) 40 MG tablet Take 1 tablet (40 mg) by mouth At Bedtime 90 tablet 3    .    ROS:  The 10 point Review of Systems is negative other than noted in the HPI or here.     PHYSICAL EXAMINATION  Vital Signs:  B/P: 112/64,  T: 98.7,  P: 88,  R: 18    Rrr, lungs clear bilaterally, abdomen soft/nt/nd  aao X 3, right facial droop, no pronator drift all 4 ext 5/5 however RLE has some limited range of motion due to knee pain    LABS  (most recent Cr, BUN, GFR, PLT, INR, PTT within the past 7 days):  Recent Labs   Lab 04/22/21  0718   CR 0.74   BUN 19   GFRESTIMATED 74   GFRESTBLACK 86                 A/P: Proceed with angiogram              PRE-PROCEDURE SEDATION ASSESSMENT     Pre-Procedure Sedation Assessment done at 0830.    Expected Level:  Moderate Sedation    Indication:  Sedation is required to allow for neurointerventional procedure.    Consent obtained from patient after discussing the risks, benefits and alternatives.     PO Intake:  Appropriately NPO for procedure    ASA Class:  Class 2 - MILD SYSTEMIC DISEASE, NO ACUTE PROBLEMS, NO FUNCTIONAL LIMITATIONS.    Mallampati:  Grade 2:  Soft palate, base of uvula, tonsillar pillars, and portion of posterior pharyngeal wall visible    History and physical reviewed and no updates needed. I have reviewed the lab findings, diagnostic data, medications, and the plan for sedation. I have determined this patient to be an appropriate candidate for the planned sedation and procedure and have reassessed the patient IMMEDIATELY PRIOR to sedation and procedure.    Patient was discussed with the Attending, Dr. Mckeon, who agrees with the plan.    Elva Marrero MD  Neuroendovascular Fellow  Pager: [phone]  04/22/2021 9:04 AM

## 2021-04-23 ASSESSMENT — PATIENT HEALTH QUESTIONNAIRE - PHQ9: SUM OF ALL RESPONSES TO PHQ QUESTIONS 1-9: 0

## 2021-04-23 NOTE — PROGRESS NOTES
Attempted to contact the patient x2 for post-hospital call without answer. Will close encounter at this time.    Kacie Ocampo CMA, [facility]  Post Hospital Discharge Team

## 2021-04-23 NOTE — TELEPHONE ENCOUNTER
Central Prior Authorization Team   Phone: [phone]      Prior Authorization Approval    Authorization Effective Date: 4/15/2021  Authorization Expiration Date: 5/15/2022  Medication: buprenorphine (BUTRANS) 5 MCG/HR WK patch - APPROVED  Approved Dose/Quantity: 4 FOR 28  Reference #:     Insurance Company: [employer] Part D - Phone [phone] Fax [phone]  Expected CoPay:       CoPay Card Available:      Foundation Assistance Needed:    Which Pharmacy is filling the prescription (Not needed for infusion/clinic administered): [id number] - [place], [place] - [address] AT [facility]  Pharmacy Notified: Yes  Patient Notified: Yes (**Instructed pharmacy to notify patient when script is ready to /ship.**)

## 2021-04-23 NOTE — PLAN OF CARE
Occupational Therapy Discharge Summary    Reason for therapy discharge:    Discharged to home with home therapy.    Progress towards therapy goal(s). See goals on Care Plan in [facility] electronic health record for goal details.  Goals partially met.  Barriers to achieving goals:   discharge from facility.    Therapy recommendation(s):    Continued therapy is recommended.  Rationale/Recommendations:  To further assess cognition and maximize ADL/IADL I.      
PT 6A: HOLD. Pt changed to OBS status.  OT recommends home with HH therapy.  No need for IP PT at this time.   
SLP UPDATE: Re-assessment completed this afternoon post-procedure. Recommend pt initiate dysphagia diet 3 and thin liquids with 1:1 supervision. Caregivers to encourage upright positioning for all PO, small sips/bites, slow pacing, and minimize distractions during PO intake. ST to continue to follow.   
Speech Language Therapy Discharge Summary    Reason for therapy discharge:    Discharged to home with outpatient therapy.    Progress towards therapy goal(s). See goals on Care Plan in Epic electronic health record for goal details.  Goals partially met.  Barriers to achieving goals:   discharge from facility.    Therapy recommendation(s):    Continued therapy is recommended.  Rationale/Recommendations:  Recommend pt initiate dysphagia diet 3 and thin liquids with 1:1 supervision. Caregivers to encourage upright positioning for all PO, small sips/bites, slow pacing, and minimize distractions during PO intake. Per chart review, pt with mild cognitive impairment and speech-language skills at baseline status per daughter (not present during her acute SLP assessment).  May benefit from cognitive-linguistic assessment pending further conversations with caregivers.      
Status: Admitted r/t acute vs. Sub acute L MCA stroke  Vitals: VSS, RA  Neuros: A/O x4 - forgetful, rambling speech. 4/5 strength. Slight R facial droop. Denies N/T.  IV: PIV SL  Labs/Electrolytes: K+ and Phos replaced - redraws in AM  Resp/trach: RA O2 sats stable, LS clear  Diet: Dd3 diet w/thins  Bowel status: BS+ BM 4/22  : Voiding  Skin: Blanchable redness on coccyx and heels.   Pain: Back/hip pain and HA managed w/PRN tylenol and pain patch   Activity: Up w/SBA GB/Walker   Social: Daughter visited this shift  Plan: Discharge home w/24h supervision - transferring to assisted living home shortly.  Updates this shift: Workup completed - WNL    
Cigarettes

## 2021-04-26 DIAGNOSIS — E78.49 OTHER HYPERLIPIDEMIA: ICD-10-CM

## 2021-04-27 ENCOUNTER — MYC MEDICAL ADVICE (OUTPATIENT)
Dept: FAMILY MEDICINE | Facility: CLINIC | Age: 86
End: 2021-04-27

## 2021-04-27 ENCOUNTER — PATIENT OUTREACH (OUTPATIENT)
Dept: CARE COORDINATION | Facility: CLINIC | Age: 86
End: 2021-04-27

## 2021-04-27 RX ORDER — SIMVASTATIN 40 MG
40 TABLET ORAL AT BEDTIME
Qty: 90 TABLET | Refills: 0 | Status: SHIPPED | OUTPATIENT
Start: 2021-04-27

## 2021-04-27 NOTE — LETTER
"[facility] [place]  [address]  [place] [address]  Phone: [phone]    April 29, 2021        Teresa Lopez  [address]   [facility] [place] [address]          To whom it may concern:    RE: Teresa Lopez      \"Okay to be admitted to [facility] [place]\"          Please contact me for questions or concerns.      Sincerely,        Gigi Martinez, DO  "

## 2021-04-27 NOTE — PROGRESS NOTES
Clinic Care Coordination Contact    Situation: Patient chart reviewed by care coordinator.    Background: Pt admitted to [facility] 4/21-4/22/21 with stroke symptoms     Assessment:      Pt discharge home with daughters providing care/assistance. Pt unable to be reached by post discharge follow up call on 4/22/21.   Per Care Management note, pt scheduled to move into [facility] assisted living on May 1.  Daughters preparing for move (purchasing new bed, planning for 24/7 care if needed etc)    Goals       Functional (pt-stated)      Goal Statement: My daughters will research assisted living facilities and create timeline for me to move    Date Goal set: 3/2/21  Barriers: need [facility] assistance/ MA-LTC  Strengths: daughters are very supportive  Date to Achieve By: 6 months  Patient expressed understanding of goal: yes    Action steps to achieve this goal:  1. My daughters will look at facilities using [facility] or other resources (done)  2. My daughters will complete necessary [facility] paperwork and submit documentation (in progress)  3. My daughters will work with admissions staff for tours, wait lists or other considerations (per chart moving to [facility]isted Living at [facility] on May 1)            Plan/Recommendations: CC SW to outreach to daughter after pt has moved to assisted living.     No CHW outreach needed at this time.

## 2021-04-27 NOTE — TELEPHONE ENCOUNTER
"Prescription approved per [facility] Refill Protocol.  Requested Prescriptions   Pending Prescriptions Disp Refills     simvastatin (ZOCOR) 40 MG tablet 90 tablet 0     Sig: Take 1 tablet (40 mg) by mouth At Bedtime       [facility] Protocol Passed - 4/26/2021  8:13 AM        Passed - LDL on file in past 12 months     Recent Labs   Lab Test 04/21/21  1350   LDL 60             Passed - No abnormal creatine kinase in past 12 months     No lab results found.             Passed - Recent (12 mo) or future (30 days) visit within the authorizing provider's specialty     Patient has had an office visit with the authorizing provider or a provider within the authorizing providers department within the previous 12 mos or has a future within next 30 days. See \"Patient Info\" tab in inbasket, or \"Choose Columns\" in Meds & Orders section of the refill encounter.              Passed - Medication is active on med list        Passed - Patient is age 18 or older        Passed - No active pregnancy on record        Passed - No positive pregnancy test in past 12 months             "

## 2021-04-28 ENCOUNTER — VIRTUAL VISIT (OUTPATIENT)
Dept: PALLIATIVE MEDICINE | Facility: CLINIC | Age: 86
End: 2021-04-28
Payer: COMMERCIAL

## 2021-04-28 DIAGNOSIS — M51.369 DDD (DEGENERATIVE DISC DISEASE), LUMBAR: ICD-10-CM

## 2021-04-28 DIAGNOSIS — G89.4 CHRONIC PAIN SYNDROME: ICD-10-CM

## 2021-04-28 DIAGNOSIS — M25.551 HIP PAIN, RIGHT: ICD-10-CM

## 2021-04-28 DIAGNOSIS — M62.838 MUSCLE SPASM: ICD-10-CM

## 2021-04-28 DIAGNOSIS — M16.11 PRIMARY OSTEOARTHRITIS OF RIGHT HIP: ICD-10-CM

## 2021-04-28 DIAGNOSIS — M48.062 SPINAL STENOSIS OF LUMBAR REGION WITH NEUROGENIC CLAUDICATION: ICD-10-CM

## 2021-04-28 PROBLEM — E11.9 DIABETES MELLITUS, TYPE 2 (H): Status: ACTIVE | Noted: 2021-04-28

## 2021-04-28 PROCEDURE — 99214 OFFICE O/P EST MOD 30 MIN: CPT | Mod: 95 | Performed by: NURSE PRACTITIONER

## 2021-04-28 RX ORDER — BUPRENORPHINE 10 UG/H
1 PATCH TRANSDERMAL
Qty: 4 PATCH | Refills: 0 | Status: SHIPPED | OUTPATIENT
Start: 2021-04-28 | End: 2021-05-11

## 2021-04-28 ASSESSMENT — PAIN SCALES - GENERAL: PAINLEVEL: SEVERE PAIN (7)

## 2021-04-28 NOTE — PATIENT INSTRUCTIONS
Plan:   1. Physical Therapy:  None at present  2. Clinical Health Psychologist: I agree with outside counseling as you are.  3. Diagnostic Studies:  None  4. Medication Management:    1. INCREASE BUTRANS (buprenorphine) 10mcg/hr transdermal patch. Change patch every 7 days.  Wear on clean, dry skin on the upper chest, upper arm or upper back. Fill 4/28 and start 4/29 (if not immediately covered by insurance, may wear both 5mcg/hr patches for 1 week to equal 10mcg/hr)  2. Continue Cymbalta (Duloxetine) 30mg every day  3. Continue Lidocaine patches   4. Continue medical cannabis.   5.  use the Voltaren gel three to four times daily regularly for the right lateral hip pain  6. Trial Aspercreme with 4% lidocaine to right lateral hip  7. Could trial Theracare patches to right lateral hip, these are over-the-counter warming patches  5. Further procedures recommended: trigger point injections to right side of low back muscle spasm(s) with Dr. Eid, our office to contact you  6. Recommendations to PCP: none  7. Follow up: 3 weeks video due to COVID-19 viral threat. Please call [phone] to make your follow-up appointment with me.     ----------------------------------------------------------------  Clinic Number:  [phone]     Call with any questions about your care and for scheduling assistance.     Calls are returned Monday through Friday between 8 AM and 4:30 PM. We usually get back to you within 2 business days depending on the issue/request.    If we are prescribing your medications:    For opioid medication refills, call the clinic or send a Filao message 7 days in advance.  Please include:    Name of requested medication    Name of the pharmacy.    For non-opioid medications, call your pharmacy directly to request a refill. Please allow 3-4 days to be processed.     Per [place] State Law:    All controlled substance prescriptions must be filled within 30 days of being written.      For those controlled  substances allowing refills, pickup must occur within 30 days of last fill.      We believe regular attendance is key to your success in our program!      Any time you are unable to keep your appointment we ask that you call us at least 24 hours in advance to cancel.This will allow us to offer the appointment time to another patient.     Multiple missed appointments may lead to dismissal from the clinic.

## 2021-04-28 NOTE — PROGRESS NOTES
Jefferson is a 85 year old who is being evaluated via a billable video visit.      How would you like to obtain your AVS? [employer]  If the video visit is dropped, the invitation should be resent by: [facility] waiting room  Will anyone else be joining your video visit? No         More Ojeda CMA ([facility])        Video-Visit Details    Type of service:  Video Visit  Video Start Time: 11:32 AM  Video End Time:11:55 AM    Originating Location (pt. Location): Home    Distant Location (provider location):  [facility]     Platform used for Video Visit: [facility]    4/28/2021          Chief complaint:    Pain is across the low back and extends down the anterior aspect of her right thigh to the knee and the lateral hip on the right side      Interval history:  Teresa Lopez is a 85 year old female is known to me for .   Primary osteoarthritis    Chronic right hip pain   Chronic bilateral low back without sciatica   Spinal stenosis of lumbar region with neurogenic claudication   Piriformis syndrome of both sides   PMHx DJD, CVA, HTN, diabetes mellitus, and cataract   PSHx Hysterectomy        Recommendations/plan at the last visit on 2/24/2021 included:  1. Physical Therapy:  None at present  2. Clinical Health Psychologist: I agree with outside counseling as you are.  3. Diagnostic Studies:  None  4. Medication Management:    1. INCREASE Cymbalta (Duloxetine) 30mg every day  2. Continue Lidocaine patches   3. Continue medical cannabis.   4. Try using the Voltaren gel three to four times daily regularly for the right lateral hip pain  5. Trial Aspercreme with 4% lidocaine to right lateral hip  6. Could trial Theracare patches to right lateral hip, these are over-the-counter warming patches  5. Further procedures recommended: repeat right greater trochanteric bursal injection 2 weeks after 2/26/2021 2nd COVID-19 vaccine  6. Recommendations to PCP:  none  7. Follow up: 6-8 weeks telephone due to COVID-19 viral threat. Please call [phone] to make your follow-up appointment with me.         Since her last visit, Teresa Lopez reports: she is accompanied on video today by her daughterAbdi    Interval history April 28, 2021  -moving to [facility]. She moves in on May 1. She has some high school friends who live there. Currently living with her daughterAbdi until she moves to [facility]. Maite is very excited about this move.   -Butrans has been helpful. Tolerating well. Maite would like to increase her dosage on this. She also needs to reschedule injection with Dr. Jamaal Eid as she had to reschedule due to a recent short hospitalization.   -Maite Patton's daughter, notes Maite is walking better and is not wincing in pain as much since she has been using the Butrans patch.       Interval history April 13, 2021-Tries ice packs  Per Maite:  elastic brace on right knee, somewhat helpful  -she feels that her right knee feels weaker  -she has trouble with weakness in her ankles bilateral  -she uses ankle supports on her ankles (elastic)  -she feels that her ankles are swollen  -Maite feels that she has a muscle spasm on her right lower back  -her worst pain is over the low back on the right side and wraps around to the lateral right hip. She is unsure if the right GT injection was helpful as she bumped her hip recently and has some soreness from that.  -she is tangential in her speech, focuses on her wish to be more active like she was in her 70's when she used to still ice skate and go walking.     -Per Abdi, patient's daugther  Injection in right GT, unsure if helpful  -maite called paramedics last week when Abdi was out of town. [place] she could not walk or get to the bathroom due to pain  -yesterday she was the same way  -her daughters have told her to take an extra prednisone tablet on those flare  days.  -they have a list of her medication with list of when she is supposed to take it, Jefferson has some short-term memory loss and sometimes does not do so (yesterday woke at 0800 so she did not take her 0600 medication)  -Rubi notes that early last year, Dr. Engle (Primary Care Provider) had prescribed  hydrocodone/acetaminophen and it upset Jefferson's stomach. When Jefferson reports a severe flare,  Rubi or Radha (her daughters) have her take the hydrocodone/acetaminophen tablets. It is unclear if these are effective or if she takes them.   - her daughter's are now getting calls from Jefferson twice per week from her stating she is going to die and she wants the kids to come over.   -she does not sleep well when she has more pain and then Jefferson is calling them in the middle of the night  -Rubi and Radha are looking for a more suitable assisted living situation with more activities available to their mother, but have not yet found one that they like that is affordable.       Interval history February 24, 2021  -She states she is not certain she can go on like this. She is not crying today. Very talkative. Last telephone call, barely able to speak, crying throughout most of encounter.  -she is frustrated that she hurts all of the time  -she lays on the alexander and does stretching.   -she wants to have a repeat right greater trochanteric bursal injection, but she is having her 2nd COVID vaccine on 2/26/2021. We advise waiting 2 weeks after the 2nd COVID vaccine prior to elective steroid injections.  -Radha states she and her sister Haylee feel that the Cymbalta has been somewhat helpful. Will increase dosage to 30mg/day.       Interval history January 6, 2021  -spoke with Jefferson and her daughter Rubi on speakerphone.  -Jefferson ran out of her oral steroids for 6 days, her steroid injection  -she just picked the prednisone today.   -Jefferson had a right sided greater trochanteric bursal injection in November 2020.   -Jefferson and Rubi would like  "to re-trial the Cymbalta. She has stopped taking it because she was not eating and having issues with nausea.   -We talked about trying to use the Cymbalta either with food in the morning OR at bedtime with a snack.   -Jefferson indicates she is having right sided pain over the lateral hip pain.  -She has seen Dr. Lewis with podiatry. She may need to have her left 2nd toe removed.       Interval history 11/4/2020  -low back pain and lateral hip pain on the right side.    -new shoe insert about a week ago, her daughter Abdi states this has helped her balance.   -Jefferson states she has some \"snapping\" in the right hip.   -Jefferson has not yet begun the Cymbalta, she wanted to try the shoe insert first  -Jefferson has had a tough day especially on Sunday 11/1, she wasn't walking around as much as usual.  -patient is with her daughter Abdi throughout the encounter      Interval history 10/2/2020  -right SI joint injection has been helpful  -She is having pain in the right side of the hip, laterally, not anterior groin pain  -patient feels her left leg is longer than the right leg  -She has been more active  -she is seeing a counselor, has been seen x 3      Interval history 8/26/2020  -She states she is not doing very well sometimes.   -she feels her right hip is \"very bad.\"   -She had a 7/30/2020 right greater trochanteric bursal injection, her pain came down from an 8/10 to a 2/10 for her right GT.  -still has some trouble standing due to a spot that is painful, she is describing a spot of pain over the SI joint. This tends to hurt when she stands to do things and feels better when she is sitting down.   -she accompanied today by her daughter Abdi on the phone today  -recertified for medical cannabis today  -she has a hammer toe and this has been really painful for her the past 2 days.       Interval history 7/15/2020  -she is not feeling well  -she sleeps with a pillow between her legs  -she notes she is having \"bad pain\" on the " "lateral hip on the right side. She has pain when she lays on her right side in bed. She has had a greater trochanteric bursal injection in June 2019 that was helpful.   -she is not eating well as she is isolated and feels depressed and feels shaky as she doesn't eat as regularly as she should.   -discussed using Voltaren gel, Cymbalta and greater trochanteric injection      Interval history 5/6/2020  -Teresa is having some more hip pain on the right side between the top of the hip and up and the tailbone to the hip  -SI joint injection on the right side was beneficial and this has made her pain more focused on the right hip.   -she has pain if she lays on her right side  -she is having more pain in the deep right groin. She notes when she walks this pain is markedly worse.         At this point, the patient's participation with our multidisciplinary team includes:  The patient has been compliant with the program  PT - patient is interested  Health Psych  None      Pain scores:  Pain intensity on average is 7-8 on a scale of 0-10.   Range is 7-10/10.   Pain is described as \"miserable, tightness, muscle spasm, oh it just hurts so.\"  Pain is continuous in nature.    Current pain-related medication treatments include:  -Lidocaine patch (helpful)  -Medical cannabis (helpful)  -Cymbalta 30mg/day (tolerating well, somewhat helpful for mood if not for pain)  -Butrans 5mcg/hr TD Q 7 days (helpful)         Other pertinent medications:  -Senna-docusate PRN  -Aricept 5mg/day      Previous medication treatments included:  OPIATES:hydrocodone (not helpful), oxycodone (somewhat helpful), Butrans (helpful)  NSAIDS: ibuprofen (not helpful), Aleve (not helpful)  MUSCLE RELAXANTS: none  ANTI-MIGRAINE MEDS: none  ANTI-DEPRESSANTS: Cymbalta (somewhat helpful)  SLEEP AIDS: none  ANTI-CONVULSANTS: gabapentin (unsure if helpful, side effects: hot flashes, fogginess)  TOPICALS: none  Other meds: Tylenol (not " helpful)    Injections:  -11/10/2017 Caudal epidural steroid injection with Dr. Reymundo Bajwa (helpful for a very short period of time)  -1/5/2018 Ultrasound guided right intra-articular hip injection with Dr. Jean Meade  -1/12/2018 Right ankle injection with Dr. Lewis of podiatry.   -7/3/2018 L2-3 interlaminar epidural steroid injection with Dr Jamaal Eid (not at all helpful)  -8/3/2018 right hip steroid injection with Dr. Valverde (helped some)  -6/14/2019 bilateral hip greater trochanteric bursa injection with Dr. Jamaal Eid (helpful)   -1/23/2020 right SI joint injection with Dr. Chasidy Laura (quite helpful)  5/13/2020 right hip joint steroid injection with Dr. Valverde (quite helpful)  -7/30/2020 right hip greater trochanteric bursal injection with Dr. Jamaal Eid (quite helpful)  -9/15/2020 right SI joint injection with Dr. Jamaal Eid (pain diminished about 25%, sharpness went away)  -11/19/2020 right hip greater trochanteric bursal injection with Dr. Eid (helpful)  -3/18/2021 right hip greater trochanteric bursal injection with Dr. Jamaal Eid (unsure)    Side Effects: no side effect  Patient is using the medication as prescribed: YES    Medications:  Current Outpatient Medications   Medication Sig Dispense Refill     Acetaminophen (TYLENOL ARTHRITIS PAIN PO) As needed not taking daily       buprenorphine (BUTRANS) 5 MCG/HR WK patch Place 1 patch onto the skin every 7 days Fill/begin 4/13/2021 to last 28 days for chronic pain. 4 patch 0     CALCIUM 1200 OR Reported on 4/20/2017       ciclopirox (LOPROX) 0.77 % cream Apply topically 2 times daily To feet and toenails. 90 g 3     donepezil (ARICEPT) 5 MG tablet Take 1 tablet (5 mg) by mouth At Bedtime 30 tablet 2     DULoxetine (CYMBALTA) 30 MG capsule Take 1 capsule (30 mg) by mouth daily 30 capsule 1     Glucosamine-Chondroit-Vit C-Mn (GLUCOSAMINE 1500 COMPLEX PO) Take 1,500 mg by mouth daily Reported on 4/20/2017       lidocaine  (LIDODERM) 5 % patch APPLY 3 PATCHES EXTERNALLY TO THE SKIN EVERY 24 HOURS 90 patch 0     Loratadine (CLARITIN) 10 MG capsule Take 10 mg by mouth as needed Reported on 4/20/2017       losartan-hydrochlorothiazide (HYZAAR) 100-25 MG tablet Take 1 tablet by mouth daily 90 tablet 3     magnesium 250 MG tablet Take 1 tablet by mouth daily       MULTI-VITAMIN OR TABS 1 TABLET DAILY       potassium aminobenzoate 500 MG CAPS capsule        predniSONE (DELTASONE) 2.5 MG tablet TAKE 1 TABLET BY MOUTH DAILY. MAY REPEAT ONCE DAILY AS NEEDED 120 tablet 0     senna-docusate (SENOKOT-S;PERICOLACE) 8.6-50 MG per tablet Take 1 tablet by mouth 2 times daily 120 tablet 12     simvastatin (ZOCOR) 40 MG tablet Take 1 tablet (40 mg) by mouth At Bedtime 90 tablet 0     ADVIL 200 MG PO CAPS Reported on 4/20/2017         Medical History: any changes in medical history since they were last seen? No    Social History:  Home situation: . 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  Tobacco use: none  Alcohol use: very little, about 2 drinks per month  Drug use: none  History of chemical dependency treatment: none    Is patient a current smoker or tobacco user?  no  If yes, was cessation counseling offered?  no              Physical Exam:   Vital signs: There were no vitals taken for this visit.    Behavioral observations:  Awake and alert, cooperative  Pulm: respirations easy and unlabored. Able to speak in full sentences without SOB or cough noted.            Imaging  MRI LUMBAR SPINE WITHOUT CONTRAST   3/30/2018 1:22 PM      HISTORY:  Lumbar degenerative disc disease.     TECHNIQUE: Multiplanar multisequence MRI of the lumbar spine without  contrast.     COMPARISON: Lumbar spine x-ray 3/22/2018. Lumbar spine MR 3/17/2017.      FINDINGS: There are 5 lumbar-type vertebral bodies assumed for the  purposes of this dictation. The tip of the conus terminates at  the  L1-L2 level.     There is convex right scoliotic curvature of the lumbar spine centered  at the L2-L3 level. There is mild retrolisthesis of L2 on L3 and mild  anterolisthesis of L3 on L4. There is grade 1 anterolisthesis of L4 on  L5. There is right lateral listhesis of L3 on L4. There is slight loss  of left-sided vertebral body height at L3, probably due to  degenerative changes. Severe loss of intervertebral disc height seen  at L3-L4 with associated degenerative endplate changes. Severe loss of  disc height also seen on the left at L1-L2 and L2-L3 with severe  degenerative endplate changes. Mild loss of disc height at T11-12 and  T12-L1 with disc desiccation at L4-5 and L5-S1. There is mild STIR  hyperintense marrow edema at the opposing L2-L3 endplates.  Degenerative subchondral cysts seen anteriorly at L3.     Level by level as follows:      T12-L1:  Only seen on the lateral view, but there is a posterior disc  bulge and bilateral facet hypertrophy resulting in moderate right and  mild left neural foraminal narrowing. No significant spinal canal  narrowing.      L1-L2:  Convex right curvature with retrolisthesis and circumferential  disc bulge with endplate osteophytic spurring as well as bilateral  facet hypertrophy. Findings result in mild central spinal canal  narrowing and moderate bilateral neural foraminal narrowing.      L2-L3:  Convex right scoliotic curvature with left-sided disc bulge  and endplate osteophytic spurring as well as, left greater than right,  facet hypertrophy and ligamentum flavum infolding. Findings result in  moderate central spinal canal narrowing as well as moderate to severe  left neural foraminal narrowing. There is mild right neural foraminal  narrowing.      L3-L4:  Mild anterolisthesis along with circumferential disc bulge,  severe bilateral facet hypertrophy, and ligamentum flavum infolding.  Findings result in severe central spinal canal narrowing. There is  also  severe left and moderate to severe right neural foraminal  narrowing.      L4-L5:  Anterolisthesis with uncovering of the disc and small  posterior disc bulge along with severe bilateral facet hypertrophy and  ligamentum flavum infolding. Findings result in moderate to severe  central spinal canal narrowing. There is also moderate to severe  bilateral neural foraminal narrowing. Small bilateral facet joint  effusions.      L5-S1:  Mild posterior disc bulge and marked bilateral facet  hypertrophy results in moderate bilateral neural foraminal narrowing,  right greater than left. No significant central spinal canal  narrowing.      Paraspinous soft tissues:  Normal.          IMPRESSION:    1. Severe multilevel degenerative changes throughout the lumbar spine  as described above. Overall, there is no significant change since  previous MR 3/17/2017.  2. Severe central spinal canal narrowing at L3-L4 and moderate to  severe spinal canal narrowing at L4-L5.  3. Convex right scoliotic curvature of the spine with multiple levels  of spondylolisthesis and right lateral listhesis of L3 on L4.     DIANA HERNANDEZ MD        [place] Pres[place] Monitoring [place]  Reviewed [place] [facility] 4/28/2021- no concerning fills.  Marion SANCHES RN CNP, FNP  [facility]  [place] location        Assessment:   1. DDD (degenerative disc disease), lumbar  2. Spinal stenosis of lumbar region with neurogenic claudication  3. Right hip joint pain  4. Right hip osteoarthritis  5. Muscle spasms  6. Chronic pain syndrome        Plan:   1. Physical Therapy:  None at present  2. Clinical Health Psychologist: I agree with outside counseling as you are.  3. Diagnostic Studies:  None  4. Medication Management:    1. INCREASE BUTRANS (buprenorphine) 10mcg/hr transdermal patch. Change patch every 7 days.  Wear on clean, dry skin on the upper chest, upper arm or upper back. Fill 4/28 and start 4/29 (if not immediately covered by  insurance, may wear both 5mcg/hr patches for 1 week to equal 10mcg/hr)  2. Continue Cymbalta (Duloxetine) 30mg every day  3. Continue Lidocaine patches   4. Continue medical cannabis.   5.  use the Voltaren gel three to four times daily regularly for the right lateral hip pain  6. Trial Aspercreme with 4% lidocaine to right lateral hip  7. Could trial Theracare patches to right lateral hip, these are over-the-counter warming patches  5. Further procedures recommended: trigger point injections to right side of low back muscle spasm(s) with Dr. Eid, our office to contact you  6. Recommendations to PCP: none  7. Follow up: 3 weeks video due to COVID-19 viral threat. Please call [phone] to make your follow-up appointment with me.        BILLING TIME DOCUMENTATION:   The total TIME spent on this patient on the day of the appointment was 37 minutes.      TOTAL TIME includes:   Time spent preparing to see the patient: 5 minutes (reviewing records and tests)  Time spend face to face with the patient: 22 minutes  Time spent ordering tests, medications, procedures and referrals: 0 minutes  Time spent Referring and communicating with other healthcare professionals: 0 minutes  Documenting clinical information in Epic: 10 minutes            Marion SANCHES RN CNP, FNP  [facility]  [facility]

## 2021-04-29 ENCOUNTER — PATIENT OUTREACH (OUTPATIENT)
Dept: CARE COORDINATION | Facility: CLINIC | Age: 86
End: 2021-04-29

## 2021-04-29 NOTE — TELEPHONE ENCOUNTER
[facility] could not find an order, a letter together with the POLST form can be faxed. Please see saved letter and change as needed.POLST to in basket, will call daughter when done.

## 2021-04-29 NOTE — PROGRESS NOTES
Clinic Care Coordination Contact    Follow Up Progress Note      Assessment: JANESSA OLEARY spoke with daughter Brooke who confirmed move in date of May 1 at [facility].  Daughter shared pt is excited for the move.     Goals       Functional (pt-stated)      Goal Statement: My daughters will research assisted living facilities and create timeline for me to move    Date Goal set: 3/2/21  Barriers: need [place] assistance/ [place]  Strengths: daughters are very supportive  Date to Achieve By: 6 months  Patient expressed understanding of goal: yes    Action steps to achieve this goal:  1. My daughters will look at facilities using [facility] or other resources (done)  2. My daughters will complete necessary [facility] paperwork and submit documentation (in progress)  3. My daughters will work with admissions staff for tours, wait lists or other considerations (per chart moving to [facility] on May 1)           Intervention/Education provided during outreach:     JANESSA OLEARY messaged PCP and PCP team requesting paperwork be completed in timely fashion.     Outreach Frequency: monthly    Plan:     JANESSA OLEARY will continue to monitor. No CH[facility] outreach needed.    Daughter will call clinic if she has not heard anything by end of day.

## 2021-04-29 NOTE — TELEPHONE ENCOUNTER
"Brigida Mccullough, Gigi Napoles, DO; P Be-Rn Ramón; P Be Sunil/Lpn Pool             Hello all,     Apologies for the group message but I was contacted by pts daughter needing PCP to complete paperwork for assisted living placement ASAP- pts move in date is May 1.  I have pasted daughters [facility] message below:         Dr. Martinez, I (Brooke Lopez, daughter) am in the process of moving Teresa Lopez (Mom) into assisted living.  There are two pieces of information required from her doctor they will approve her move.     1. A signed order that says \"Okay to be admitted to [facility]\".   2. Signature of her physician for the [place] Provider Orders for Life-Sustaining Treatment (POLST).  I have attached the document with Teresa's signature.  Please provide your signature in section D and F.         I am assuming we can complete this process through [facility].  I will download/print these two items and provide the assisted living facility.  She is scheduled to move 5/1, pending final approval of all paperwork.         Thank you very much for providing us with the Care Coordinator to help us through this process.  Mom will be in a better place with experts that can take care of her.         Brooke Lopez        "

## 2021-04-29 NOTE — TELEPHONE ENCOUNTER
This will serve as my written order to approve admission to [facility] order signed.  In my outbasket.

## 2021-04-30 NOTE — TELEPHONE ENCOUNTER
RECORDS RECEIVED FROM: Internal   Date of Appt: 6/22/21   NOTES (FOR ALL VISITS) STATUS DETAILS   OFFICE NOTE from referring provider Internal SEE INPATIENT NOTES   OFFICE NOTE from other specialist Internal Dr Louis @ [facility] Neurology:  4/2/21   DISCHARGE SUMMARY from hospital Internal [facility]:  4/21/21-4/22/21   DISCHARGE REPORT from the ER N/A    OPERATIVE REPORT N/A    MEDICATION LIST Internal    IMAGING  (FOR ALL VISITS)     EMG N/A    EEG N/A    LUMBAR PUNCTURE N/A    TRINITY SCAN N/A    ULTRASOUND (CAROTID BILAT) *VASCULAR* N/A [facility]:  US Carotid Duplex 9/20/02    MRI (HEAD, NECK, SPINE) [facility]:  MRI Brain 4/21/21  MRI Brain 4/21/21    [facility]:  MRI Brain 9/30/02  MRA Head Neck 9/30/02   CT (HEAD, NECK, SPINE) [facility]:  CT Head 4/21/21  CTA Head Neck 4/21/21    [facility]:  CT Head 9/20/02      Action 4/30/21 MV 11.20am   Action Taken Need to call Daysi to see if they have the sept 2002 stroke records and images     Phone Call:  5/5/21 MV 10.39am   Contact Name Daysi BERNARD   [facility] Called and spoke with Kathi. Kathi will fax over 2002 stroke records.     Action 5/7/21 MV 10.54am   Action Taken Per Daysi [facility] fax that was received, no stroke records in 2002.     Phone Call:  5/7/21 MV 11.03am   Contact Name Brooke Chris Called and spoke with patient's daughter regarding 2002 stroke records. Pt's daughter could not recall where her mother had imaging done at, nor where the records are located at. She will speak with her mother and sister to gather what information she can and call me back with the information. Gave pt's daughter my call back number [phone].     Phone Call:  5/7/21 MV 11.10am   Contact Name Daysi MARCO ANTONIO   Outcome Called and spoke with Claudia in [facility]. She could not find the 2002 records. Asked her to look into the images. She was unable to find the imaging as well. Claudia will put in a 2nd request for [facility] to see if they can locate the 2002 stroke records.    Claudia transferred me to  [facility] where the images could have possibly been done - per radiology everything before 2003 has been destroyed.

## 2021-04-30 NOTE — TELEPHONE ENCOUNTER
Spoke with daughter Brooke, and patient. POLST form signed by Dr. Martinez. Faxed to [phone]. Sent to scanning. Letter obtained off of my chart per daughter.

## 2021-05-05 ENCOUNTER — OFFICE VISIT (OUTPATIENT)
Dept: PALLIATIVE MEDICINE | Facility: CLINIC | Age: 86
End: 2021-05-05
Payer: COMMERCIAL

## 2021-05-05 VITALS — SYSTOLIC BLOOD PRESSURE: 142 MMHG | DIASTOLIC BLOOD PRESSURE: 77 MMHG | HEART RATE: 83 BPM

## 2021-05-05 DIAGNOSIS — M76.31 ILIOTIBIAL BAND SYNDROME, RIGHT: ICD-10-CM

## 2021-05-05 DIAGNOSIS — M70.61 TROCHANTERIC BURSITIS OF RIGHT HIP: ICD-10-CM

## 2021-05-05 DIAGNOSIS — M79.18 MYOFASCIAL PAIN: Primary | ICD-10-CM

## 2021-05-05 DIAGNOSIS — M53.3 SI (SACROILIAC) JOINT DYSFUNCTION: ICD-10-CM

## 2021-05-05 PROCEDURE — 20610 DRAIN/INJ JOINT/BURSA W/O US: CPT | Mod: RT | Performed by: PSYCHIATRY & NEUROLOGY

## 2021-05-05 PROCEDURE — 20552 NJX 1/MLT TRIGGER POINT 1/2: CPT | Mod: 59 | Performed by: PSYCHIATRY & NEUROLOGY

## 2021-05-05 RX ORDER — TRIAMCINOLONE ACETONIDE 40 MG/ML
40 INJECTION, SUSPENSION INTRA-ARTICULAR; INTRAMUSCULAR ONCE
Status: COMPLETED | OUTPATIENT
Start: 2021-05-05 | End: 2021-05-05

## 2021-05-05 RX ORDER — BUPIVACAINE HYDROCHLORIDE 5 MG/ML
5 INJECTION, SOLUTION EPIDURAL; INTRACAUDAL ONCE
Status: COMPLETED | OUTPATIENT
Start: 2021-05-05 | End: 2021-05-05

## 2021-05-05 RX ADMIN — TRIAMCINOLONE ACETONIDE 40 MG: 40 INJECTION, SUSPENSION INTRA-ARTICULAR; INTRAMUSCULAR at 15:45

## 2021-05-05 RX ADMIN — BUPIVACAINE HYDROCHLORIDE 25 MG: 5 INJECTION, SOLUTION EPIDURAL; INTRACAUDAL at 15:44

## 2021-05-05 ASSESSMENT — PAIN SCALES - GENERAL: PAINLEVEL: WORST PAIN (10)

## 2021-05-05 NOTE — PROGRESS NOTES
Pre procedure Diagnosis: myofascial pain, right IT band syndrome, right trochanteric bursa   Post procedure Diagnosis: Same  Procedure performed: trigger point injections, right trochanteric bursa injection  Anesthesia: none  Complications: none  Operators: Bushra Alicia MD     Indications:   Teresa Lopez is a 85 year old female with a history of right buttock, hip, and lateral leg pain.  Exam shows myofascial pain of the muscle groups listed below and they have tried conservative treatment including medication.    Options/alternatives, benefits and risks were discussed with the patient including bleeding, infection, tissue trauma and pnuemothorax.  Questions were answered to her satisfaction and she agrees to proceed. Voluntary informed consent was obtained and signed.     Vitals were reviewed: Yes  Allergies were reviewed:  Yes   Medications were reviewed:  Yes   Pre-procedure pain score: 10/10    Procedure:  After getting informed consent, a Pause for the Cause was performed.    Trigger points were identified by patient, and marked when appropriate.  The area was prepped with Chloroprep.    Diagnosis: right  trochanteric bursitis  Procedure: right  trochanteric bursa injection    The area over the right trochanter was palpated, and the tender area was identified, corresponding to the area of the trochanteric bursa.  The area was cleaned with Chloroprep.  A 25 G 3.5 inch needle was inserted, aiming towards the trochanter.  When bone was encountered, the needle was slightly drawn.  A solution containing local anesthesic and steroid was injected.  The needle was removed.  Hemostasis was achieved. Bandaids were placed as appropriate.    Trigger points:  Using clean technique, injections were completed using a 25G, 1.5 inch needle.  After negative aspiration, injection was completed.  A total of 4 locations were injected.  When possible, tissue was retracted from the chest wall to avoid lung injury.    Muscle groups  injected:  Right gluteus max   Right IT band    In total:  Injection solution contained:  5ml of 1% lidocaine and 5ml of 0.5% bupivacaine and 40mg of kenalog .    Hemostasis was achieved, the area was cleaned, and bandaids were placed when appropriate.  The patient tolerated the procedure well.    Post-procedure pain score: /10  Follow-up includes:   -repeat prn, ok to double book   -she has R SI joint pain. Put in order for that injection    Bushra Alicia MD  [facility]

## 2021-05-05 NOTE — PATIENT INSTRUCTIONS
[facility]   Post Procedure Instructions    Today you had:  trigger point injections  , right trochanteric bursa injection     Medications used:  lidocaine   bupivicaine  kenalog        Go to the emergency room if you develop any shortness of breath    Monitor the injection sites for signs and symptoms of infection-fever, chills, redness, swelling, warmth, or drainage to areas.    You may have soreness at injection sites for up to 24 hours.    You may apply ice to the painful areas to help minimize the discomfort of the needle pokes.    Do not apply heat to sites for at least 12 hours.    You may use anti-inflammatory medications or Tylenol for pain control if necessary    Pain Clinic phone number during work hours Monday-Friday:  [phone]    After hours provider line: [phone]

## 2021-05-07 ENCOUNTER — TELEPHONE (OUTPATIENT)
Dept: NEUROLOGY | Facility: CLINIC | Age: 86
End: 2021-05-07

## 2021-05-07 NOTE — TELEPHONE ENCOUNTER
Writer clarified prescription order for patient. Pt utilizing [facility] for home delivery prescriptions. Verbalized current prescription dose of Donepezil 5mg tab, take 1 tab by mouth at bedtime. Pharmacist changed script to 90 tablets with zero refills as they prefer 90 day supply.       Nicolette Olson, RNCC  Neurology

## 2021-05-10 ENCOUNTER — TELEPHONE (OUTPATIENT)
Dept: FAMILY MEDICINE | Facility: CLINIC | Age: 86
End: 2021-05-10

## 2021-05-11 ENCOUNTER — OFFICE VISIT (OUTPATIENT)
Dept: PODIATRY | Facility: CLINIC | Age: 86
End: 2021-05-11
Payer: COMMERCIAL

## 2021-05-11 ENCOUNTER — MYC REFILL (OUTPATIENT)
Dept: PALLIATIVE MEDICINE | Facility: CLINIC | Age: 86
End: 2021-05-11

## 2021-05-11 VITALS — SYSTOLIC BLOOD PRESSURE: 165 MMHG | HEART RATE: 78 BPM | OXYGEN SATURATION: 98 % | DIASTOLIC BLOOD PRESSURE: 75 MMHG

## 2021-05-11 DIAGNOSIS — G89.4 CHRONIC PAIN SYNDROME: ICD-10-CM

## 2021-05-11 DIAGNOSIS — M62.838 MUSCLE SPASM: ICD-10-CM

## 2021-05-11 DIAGNOSIS — M48.062 SPINAL STENOSIS OF LUMBAR REGION WITH NEUROGENIC CLAUDICATION: ICD-10-CM

## 2021-05-11 DIAGNOSIS — M51.369 DDD (DEGENERATIVE DISC DISEASE), LUMBAR: ICD-10-CM

## 2021-05-11 DIAGNOSIS — N18.30 STAGE 3 CHRONIC KIDNEY DISEASE, UNSPECIFIED WHETHER STAGE 3A OR 3B CKD (H): ICD-10-CM

## 2021-05-11 DIAGNOSIS — M25.551 HIP PAIN, RIGHT: ICD-10-CM

## 2021-05-11 DIAGNOSIS — I73.9 PERIPHERAL ARTERIAL DISEASE (H): Primary | ICD-10-CM

## 2021-05-11 DIAGNOSIS — M79.672 FOOT PAIN, LEFT: ICD-10-CM

## 2021-05-11 DIAGNOSIS — L84 TYLOMA: ICD-10-CM

## 2021-05-11 DIAGNOSIS — B35.1 ONYCHOMYCOSIS: ICD-10-CM

## 2021-05-11 DIAGNOSIS — M16.11 PRIMARY OSTEOARTHRITIS OF RIGHT HIP: ICD-10-CM

## 2021-05-11 PROCEDURE — 11055 PARING/CUTG B9 HYPRKER LES 1: CPT | Mod: Q8 | Performed by: PODIATRIST

## 2021-05-11 PROCEDURE — 99213 OFFICE O/P EST LOW 20 MIN: CPT | Mod: 25 | Performed by: PODIATRIST

## 2021-05-11 NOTE — PROGRESS NOTES
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     Patient Active Problem List   Diagnosis     History of CVA (cerebrovascular accident)     Jaw Pain     Arthritis of knee     CARDIOVASCULAR SCREENING; LDL GOAL LESS THAN 100     Hypertension goal BP (blood pressure) < 140/90     Advanced directives, counseling/discussion     Hyperlipidemia LDL goal <130     OA (OSTEOARTHRITIS) OF KNEE - right     CKD (chronic kidney disease) stage 3, GFR 30-59 ml/min     Chronic pain disorder     DJD (degenerative joint disease), lumbosacral     Spinal stenosis of lumbar region with neurogenic claudication     Slow transit constipation     Mild cognitive impairment     Combined form of age-related cataract, mod, both eyes     Posterior vitreous detachment of both eyes     Arthritis, lumbar spine     Medical cannabis use     Moderate major depression (H)     Inflammatory spondylopathy of lumbar region (H)     Hammer toe of left foot     Loss of weight     Mild malnutrition (H)     Peripheral vascular disease, unspecified (H)     CVA (cerebral vascular accident) (H)     Stroke (H)     AVM (arteriovenous malformation) brain     Encounter for screening laboratory testing for severe acute respiratory syndrome coronavirus 2 (SARS-CoV-2)     Diabetes mellitus, type 2 (H)     Past Surgical History:   Procedure Laterality Date     HYSTERECTOMY, CERVIX STATUS UNKNOWN  age 31     Social History     Socioeconomic History     Marital status:      Spouse name: Not on file     Number of children: Not on file     Years of education: Not on file     Highest education level: Not on file   Occupational History     Not on file   Social Needs     Financial resource strain: Not on file     Food insecurity     Worry: Not on file      Inability: Not on file     Transportation needs     Medical: Not on file     Non-medical: Not on file   Tobacco Use     Smoking status: Never Smoker     Smokeless tobacco: Never Used   Substance and Sexual Activity     Alcohol use: Yes     Alcohol/week: 0.0 standard drinks     Comment: 0-1 drink weekly     Drug use: No     Sexual activity: Not Currently     Partners: Male   Lifestyle     Physical activity     Days per week: Not on file     Minutes per session: Not on file     Stress: Not on file   Relationships     Social connections     Talks on phone: Not on file     Gets together: Not on file     Attends Yazidi service: Not on file     Active member of club or organization: Not on file     Attends meetings of clubs or organizations: Not on file     Relationship status: Not on file     Intimate partner violence     Fear of current or ex partner: Not on file     Emotionally abused: Not on file     Physically abused: Not on file     Forced sexual activity: Not on file   Other Topics Concern     Parent/sibling w/ CABG, MI or angioplasty before 65F 55M? 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 [employer]    History of polka, schottisch, other dancing    2 daughters in area     Family History   Problem Relation Age of Onset     Arthritis Mother      Cataracts Mother      Unknown/Adopted Father         adopted     Diabetes Brother      Cancer Brother      Cataracts Maternal Grandmother      Hypertension No family hx of      Cerebrovascular Disease No family hx of      Thyroid Disease No family hx of      Glaucoma No family hx of      Macular Degeneration No family hx of      Lab Results   Component Value Date    WBC 10.8 04/22/2021     Lab Results   Component Value Date    RBC 3.08 04/22/2021     Lab Results   Component Value Date    HGB 11.0 04/22/2021     Lab Results    Component Value Date    HCT 33.0 04/22/2021     No components found for: MCT  Lab Results   Component Value Date     04/22/2021     Lab Results   Component Value Date    MCH 35.7 04/22/2021     Lab Results   Component Value Date    MCHC 33.3 04/22/2021     Lab Results   Component Value Date    RDW 12.6 04/22/2021     Lab Results   Component Value Date     04/22/2021     Last Comprehensive Metabolic Panel:  Sodium   Date Value Ref Range Status   04/22/2021 136 133 - 144 mmol/L Final     Potassium   Date Value Ref Range Status   04/22/2021 3.2 (L) 3.4 - 5.3 mmol/L Final     Chloride   Date Value Ref Range Status   04/22/2021 100 94 - 109 mmol/L Final     Carbon Dioxide   Date Value Ref Range Status   04/22/2021 29 20 - 32 mmol/L Final     Anion Gap   Date Value Ref Range Status   04/22/2021 8 3 - 14 mmol/L Final     Glucose   Date Value Ref Range Status   04/22/2021 116 (H) 70 - 99 mg/dL Final     Urea Nitrogen   Date Value Ref Range Status   04/22/2021 19 7 - 30 mg/dL Final     Creatinine   Date Value Ref Range Status   04/22/2021 0.74 0.52 - 1.04 mg/dL Final     GFR Estimate   Date Value Ref Range Status   04/22/2021 74 >60 mL/min/[1.73_m2] Final     Comment:     Non  GFR Calc  Starting 12/18/2018, serum creatinine based estimated GFR (eGFR) will be   calculated using the Chronic Kidney Disease Epidemiology Collaboration   (CKD-EPI) equation.       Calcium   Date Value Ref Range Status   04/22/2021 10.0 8.5 - 10.1 mg/dL Final     Lab Results   Component Value Date    A1C 6.6 04/21/2021    A1C 5.8 04/03/2019    A1C 5.8 09/21/2018    A1C 5.7 05/04/2018    A1C 6.5 02/17/2017     SUBJECTIVE FINDINGS:  An 85-year-old female returns to clinic for painful callus, left second toe.  She relates it feels better when it is trimmed down but then it builds up.  It hurts.  She has tried toe spacers and Band-Aids.  She has been using the Voltaren gel.  She relates she did see a surgeon and they did  not think surgery would be a good option right now.  She is scheduled to see Vascular.  She relates she needs her toenails cut as well.  Previous notes reviewed.    OBJECTIVE FINDINGS:  DP and PT are 2/4 bilaterally.  She has dorsally contracted digits, 2 through 5 bilaterally.  She has laterally deviated hallux with dorsomedial first MPJ prominence.  She has a tyloma on the left medial second toe with ecchymosis and pain on palpation.  There is no erythema, no drainage, no odor, no calor bilaterally.  She has incurvated nails with thickening, dystrophy, subungual debris and discoloration to differing degrees bilaterally.    ASSESSMENT AND PLAN:  Onychomycosis and onychauxis bilaterally; tyloma causing pain, left foot.  She has peripheral arterial disease, peripheral neuropathy, chronic kidney disease.  Diagnosis and treatment options discussed with the patient and daughter.  All the nails were debrided or reduced bilaterally upon consent.  The tyloma on the left second toe was sharp debrided with a 15 blade upon consent.  I dispensed some 2 x 2 gauze pads to help pad between the toes and use discussed with her.  She will return to clinic and see me in 2 months.    Also, she relates she is using the Loprox cream.  Previous notes reviewed.

## 2021-05-11 NOTE — PATIENT INSTRUCTIONS
Thanks for coming today.  Ortho/Sports Medicine Clinic  [address] [place] [address]    To schedule future appointments in Ortho Clinic, you may call [phone].    To schedule ordered imaging by your provider:   Call Central Imaging [phone]    To schedule an injection ordered by your provider:  Call Central Imaging Injection scheduling line: [phone]  [facility] available online at:  [email or web address]    Please call if any further questions or concerns ([phone]).  Clinic hours 8 am to 5 pm.    Return to clinic (call) if symptoms worsen or fail to improve.

## 2021-05-11 NOTE — LETTER
5/11/2021         RE: Teresa Lopez  [address] [place] [id number]        Dear Colleague,    Thank you for referring your patient, Teresa Lopez, to the [facility]. Please see a copy of my visit note below.    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     Patient Active Problem List   Diagnosis     History of CVA (cerebrovascular accident)     Jaw Pain     Arthritis of knee     CARDIOVASCULAR SCREENING; LDL GOAL LESS THAN 100     Hypertension goal BP (blood pressure) < 140/90     Advanced directives, counseling/discussion     Hyperlipidemia LDL goal <130     OA (OSTEOARTHRITIS) OF KNEE - right     CKD (chronic kidney disease) stage 3, GFR 30-59 ml/min     Chronic pain disorder     DJD (degenerative joint disease), lumbosacral     Spinal stenosis of lumbar region with neurogenic claudication     Slow transit constipation     Mild cognitive impairment     Combined form of age-related cataract, mod, both eyes     Posterior vitreous detachment of both eyes     Arthritis, lumbar spine     Medical cannabis use     Moderate major depression (H)     Inflammatory spondylopathy of lumbar region (H)     Hammer toe of left foot     Loss of weight     Mild malnutrition (H)     Peripheral vascular disease, unspecified (H)     CVA (cerebral vascular accident) (H)     Stroke (H)     AVM (arteriovenous malformation) brain     Encounter for screening laboratory testing for severe acute respiratory syndrome coronavirus 2 (SARS-CoV-2)     Diabetes mellitus, type 2 (H)     Past Surgical History:   Procedure Laterality Date     HYSTERECTOMY, CERVIX STATUS UNKNOWN  age 31     Social History     Socioeconomic History     Marital status:      Spouse name: Not on  file     Number of children: Not on file     Years of education: Not on file     Highest education level: Not on file   Occupational History     Not on file   Social Needs     Financial resource strain: Not on file     Food insecurity     Worry: Not on file     Inability: Not on file     Transportation needs     Medical: Not on file     Non-medical: Not on file   Tobacco Use     Smoking status: Never Smoker     Smokeless tobacco: Never Used   Substance and Sexual Activity     Alcohol use: Yes     Alcohol/week: 0.0 standard drinks     Comment: 0-1 drink weekly     Drug use: No     Sexual activity: Not Currently     Partners: Male   Lifestyle     Physical activity     Days per week: Not on file     Minutes per session: Not on file     Stress: Not on file   Relationships     Social connections     Talks on phone: Not on file     Gets together: Not on file     Attends Christian service: Not on file     Active member of club or organization: Not on file     Attends meetings of clubs or organizations: Not on file     Relationship status: Not on file     Intimate partner violence     Fear of current or ex partner: Not on file     Emotionally abused: Not on file     Physically abused: Not on file     Forced sexual activity: Not on file   Other Topics Concern     Parent/sibling w/ CABG, MI or angioplasty before 65F 55M? No   Social History Narrative    Teresa (pronounced JOE-elizabeth)    In [facility] assisted living 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08 04/22/2021     Lab Results   Component Value Date    HGB 11.0 04/22/2021     Lab Results   Component Value Date    HCT 33.0 04/22/2021     No components found for: MCT  Lab Results   Component Value Date     04/22/2021     Lab Results   Component Value Date    MCH 35.7 04/22/2021     Lab Results   Component Value Date    MCHC 33.3 04/22/2021     Lab Results   Component Value Date    RDW 12.6 04/22/2021     Lab Results   Component Value Date     04/22/2021     Last Comprehensive Metabolic Panel:  Sodium   Date Value Ref Range Status   04/22/2021 136 133 - 144 mmol/L Final     Potassium   Date Value Ref Range Status   04/22/2021 3.2 (L) 3.4 - 5.3 mmol/L Final     Chloride   Date Value Ref Range Status   04/22/2021 100 94 - 109 mmol/L Final     Carbon Dioxide   Date Value Ref Range Status   04/22/2021 29 20 - 32 mmol/L Final     Anion Gap   Date Value Ref Range Status   04/22/2021 8 3 - 14 mmol/L Final     Glucose   Date Value Ref Range Status   04/22/2021 116 (H) 70 - 99 mg/dL Final     Urea Nitrogen   Date Value Ref Range Status   04/22/2021 19 7 - 30 mg/dL Final     Creatinine   Date Value Ref Range Status   04/22/2021 0.74 0.52 - 1.04 mg/dL Final     GFR Estimate   Date Value Ref Range Status   04/22/2021 74 >60 mL/min/[1.73_m2] Final     Comment:     Non  GFR Calc  Starting 12/18/2018, serum creatinine based estimated GFR (eGFR) will be   calculated using the Chronic Kidney Disease Epidemiology Collaboration   (CKD-EPI) equation.       Calcium   Date Value Ref Range Status   04/22/2021 10.0 8.5 - 10.1 mg/dL Final     Lab Results   Component Value Date    A1C 6.6 04/21/2021    A1C 5.8 04/03/2019    A1C 5.8 09/21/2018    A1C 5.7 05/04/2018    A1C 6.5 02/17/2017     SUBJECTIVE FINDINGS:  An 85-year-old female returns  to clinic for painful callus, left second toe.  She relates it feels better when it is trimmed down but then it builds up.  It hurts.  She has tried toe spacers and Band-Aids.  She has been using the Voltaren gel.  She relates she did see a surgeon and they did not think surgery would be a good option right now.  She is scheduled to see Vascular.  She relates she needs her toenails cut as well.  Previous notes reviewed.    OBJECTIVE FINDINGS:  DP and PT are 2/4 bilaterally.  She has dorsally contracted digits, 2 through 5 bilaterally.  She has laterally deviated hallux with dorsomedial first MPJ prominence.  She has a tyloma on the left medial second toe with ecchymosis and pain on palpation.  There is no erythema, no drainage, no odor, no calor bilaterally.  She has incurvated nails with thickening, dystrophy, subungual debris and discoloration to differing degrees bilaterally.    ASSESSMENT AND PLAN:  Onychomycosis and onychauxis bilaterally; tyloma causing pain, left foot.  She has peripheral arterial disease, peripheral neuropathy, chronic kidney disease.  Diagnosis and treatment options discussed with the patient and daughter.  All the nails were debrided or reduced bilaterally upon consent.  The tyloma on the left second toe was sharp debrided with a 15 blade upon consent.  I dispensed some 2 x 2 gauze pads to help pad between the toes and use discussed with her.  She will return to clinic and see me in 2 months.    Also, she relates she is using the Loprox cream.  Previous notes reviewed.          Again, thank you for allowing me to participate in the care of your patient.        Sincerely,        Maulik Zavala DPM

## 2021-05-11 NOTE — NURSING NOTE
Teresa Lopez's chief complaint for this visit includes:  Chief Complaint   Patient presents with     RECHECK     left foot pain/hammertoe - toenail on right foot digging in     PCP: Tej Engle    Referring Provider:  No referring provider defined for this encounter.    BP (!) 165/75 (BP Location: Left arm, Patient Position: Sitting, Cuff Size: Adult Regular)   Pulse 78   SpO2 98%   Data Unavailable     Do you need any medication refills at today's visit? Jasmin Blanton CMA

## 2021-05-12 ENCOUNTER — MYC MEDICAL ADVICE (OUTPATIENT)
Dept: PALLIATIVE MEDICINE | Facility: CLINIC | Age: 86
End: 2021-05-12

## 2021-05-12 ENCOUNTER — TELEPHONE (OUTPATIENT)
Dept: INTERNAL MEDICINE | Facility: CLINIC | Age: 86
End: 2021-05-12

## 2021-05-12 ENCOUNTER — TELEPHONE (OUTPATIENT)
Dept: PALLIATIVE MEDICINE | Facility: CLINIC | Age: 86
End: 2021-05-12

## 2021-05-12 ENCOUNTER — RADIOLOGY INJECTION OFFICE VISIT (OUTPATIENT)
Dept: PALLIATIVE MEDICINE | Facility: CLINIC | Age: 86
End: 2021-05-12
Payer: COMMERCIAL

## 2021-05-12 VITALS — OXYGEN SATURATION: 97 % | HEART RATE: 70 BPM | SYSTOLIC BLOOD PRESSURE: 143 MMHG | DIASTOLIC BLOOD PRESSURE: 67 MMHG

## 2021-05-12 DIAGNOSIS — G89.4 CHRONIC PAIN SYNDROME: ICD-10-CM

## 2021-05-12 DIAGNOSIS — G89.29 CHRONIC BILATERAL LOW BACK PAIN WITHOUT SCIATICA: Primary | ICD-10-CM

## 2021-05-12 DIAGNOSIS — M54.50 CHRONIC BILATERAL LOW BACK PAIN WITHOUT SCIATICA: Primary | ICD-10-CM

## 2021-05-12 DIAGNOSIS — M53.3 SI (SACROILIAC) JOINT DYSFUNCTION: ICD-10-CM

## 2021-05-12 PROCEDURE — 27096 INJECT SACROILIAC JOINT: CPT | Mod: RT | Performed by: PSYCHIATRY & NEUROLOGY

## 2021-05-12 RX ORDER — BUPRENORPHINE 10 UG/H
1 PATCH TRANSDERMAL
Qty: 4 PATCH | Refills: 0 | Status: SHIPPED | OUTPATIENT
Start: 2021-05-12 | End: 2021-01-01

## 2021-05-12 NOTE — TELEPHONE ENCOUNTER
Medication refill information reviewed.     Last due:  Fill 4/28/2021 and start 4/29/2021 to last 28 days    Due date:  5/27/21    Weaning instructions:  N/A    Prescriptions prepped for review.     Natalie Garcia RN-BSN  [facility]

## 2021-05-12 NOTE — TELEPHONE ENCOUNTER
Pts daughter Brooke calling in regards to a need for orders to be sent to the pt's new assisted living. Brooke states they were instructed not to use the lidocaine patches after injections, but the assisted living can't discontinue them without orders. Brooke has been working with МАРИЯ Moser and Shanti can be reached at [phone]. Otherwise Brooke can be reached at [phone].    Liliya MATTHEW    [facility]

## 2021-05-12 NOTE — PATIENT INSTRUCTIONS
[facility]   Procedure Discharge Instructions    Today you saw:  Dr. Leah Alicia    You had an:  right  sacroiliac joint injection      Medications used:  Lidocaine   Omnipaque  Ropivicaine   Kenalog             Be cautious when walking. Numbness and/or weakness in the lower extremities may occur for up to 6-8 hours after the procedure due to effect of the local anesthetic    Do not drive for 6 hours. The effect of the local anesthetic could slow your reflexes.     You may resume your regular activities after 24 hours    Avoid strenuous activity for the first 24 hours    You may shower, however avoid swimming, tub baths or hot tubs for 24 hours following your procedure    You may have a mild to moderate increase in pain for several days following the injection.    It may take up to 14 days for the steroid medication to start working although you may feel the effect as early as a few days after the procedure.       You may use ice packs for 10-15 minutes, 3 to 4 times a day at the injection site for comfort    Do not use heat to painful areas for 6 to 8 hours. This will give the local anesthetic time to wear off and prevent you from accidentally burning your skin.     Unless you have been directed to avoid the use of anti-inflammatory medications (NSAIDS), you may use medications such as ibuprofen, Aleve or Tylenol for pain control if needed.     If you were fasting, you may resume your normal diet and medications after the procedure    If you have diabetes, check your blood sugar more frequently than usual as your blood sugar may be higher than normal for 10-14 days following a steroid injection. Contact your doctor who manages your diabetes if your blood sugar is higher than usual    Possible side effects of steroids that you may experience include flushing, elevated blood pressure, increased appetite, mild headaches and restlessness.  All of these symptoms will get better with  time.    If you experience any of the following, call the Pain Clinic during work hours (Mon-Friday 8-4:30 pm) at [phone] or the Provider Line after hours at [phone]:  -Fever over 100 degree F  -Swelling, bleeding, redness, drainage, warmth at the injection site  -Progressive weakness or numbness in your legs or arms  -Loss of bowel or bladder function  -Unusual headache that is not relieved by Tylenol or other pain reliever  -Unusual new onset of pain that is not improving

## 2021-05-12 NOTE — TELEPHONE ENCOUNTER
Called Shanti.   Informed her that pt can reapply the patch tomorrow.  She says she does not need an order for thsi then.    МАРИЯ Estrada-BSN  [facility]

## 2021-05-12 NOTE — TELEPHONE ENCOUNTER
Refills have been requested for the following medications:         buprenorphine (BUTRANS) 10 MCG/HR WK patch [Marion Munoz, APRN CNP]     Preferred pharmacy: [id number] - [place], [place] - [address] AT [facility] [address]

## 2021-05-12 NOTE — TELEPHONE ENCOUNTER
Received call from patient requesting refill(s) of buprenorphine (BUTRANS) 10 MCG/HR WK patch [Marion Munoz, APRN DEMOND]     Last dispensed from pharmacy on 4/28/21    Patient's last office/virtual visit by prescribing provider on 5/5/21  Next office/virtual appointment scheduled for none    Last urine drug screen date  6/2019   Current opioid agreement on file (completed within the last year) No Date of opioid agreement: none    E-prescribe to [id number] - [place], [place] - [address] AT [facility]    Will route to [facility] for review and preparation of prescription(s).

## 2021-05-12 NOTE — NURSING NOTE
Discharge Information    IV Discontiued Time:  NA    Amount of Fluid Infused:  NA    Discharge Criteria = When patient returns to baseline or as per MD order    Consciousness:  Pt is fully awake    Circulation:  BP +/- 20% of pre-procedure level    Respiration:  Patient is able to breathe deeply    O2 Sat:  Patient is able to maintain O2 Sat >92% on room air    Activity:  Moves 4 extremities on command    Ambulation:  Patient is able to stand and walk or stand and pivot into wheelchair    Dressing:  Clean/dry or No Dressing    Notes:   Discharge instructions and AVS given to patient    Patient meets criteria for discharge?  YES    Admitted to PCU?  No    Responsible adult present to accompany patient home?  Yes    Signature/Title:    naseem foy RN  RN Care Coordinator  [facility] [place]

## 2021-05-12 NOTE — TELEPHONE ENCOUNTER
Form to be filled out by Dr. Martinez. Please fax to Shanti at [facility] [phone]    Call taken on 5/12/2021 at 10:23 AM by Debra Carrillo

## 2021-05-12 NOTE — TELEPHONE ENCOUNTER
Physician Order Sheet to 's in basket, will fax to # below when done.   A message was left with [facility] nurse for a return call, will need to [facility] use.

## 2021-05-12 NOTE — TELEPHONE ENCOUNTER
Signed Prescriptions:                        Disp   Refills    buprenorphine (BUTRANS) 10 MCG/HR WK patch 4 patch0        Sig: Place 1 patch onto the skin every 7 days Fill           5/24/2021 and start 5/26/2021 to last 28 days.  Authorizing Provider: MARION CELAYA    Reviewed [place]  May 12, 2021- no concerning fills.    Marion SANCHES, RN CNP, FNP  [facility]

## 2021-05-13 NOTE — TELEPHONE ENCOUNTER
Lidocaine patch order faxed sucessfully to [facility]:  Shanti Dai, Nurse Manager, F = [phone]    Natalie, RN-BSN  [facility]

## 2021-05-13 NOTE — TELEPHONE ENCOUNTER
Signed Prescriptions:                        Disp   Refills    lidocaine (LIDODERM) 5 % patch             90 pat*3        Sig: Apply 1-3 patches to external skin Q 24 hours as           needed.  Okay for pt to self-administer.  Authorizing Provider: RENA CELAYA, RN CNP, FNP  [facility]  [facility]

## 2021-05-13 NOTE — TELEPHONE ENCOUNTER
Per patient Aionext message:  From: Jefferson Lopez      Created: 5/13/2021 9:37 AM      Natalie, Thank you!  For the next refill, we can use [facility] since it is self administered.  With the shots Mom has received, the pain is subsiding a little bit and the patch application is not as predictable.       Brooke (daughter)      ____________    Once this is signed it needs to be sent on back to nursing.  The lidocaine order needs to be faxed to   Contact at [facility]:  Shanti Dai, Nurse Manager, [facility].   Ph = [phone],   F = [phone]    Natalie, RN-BSN  [facility]

## 2021-05-13 NOTE — TELEPHONE ENCOUNTER
Per patient Schoohart message:  From: Jefferson JEANETTE Lopez      Created: 5/12/2021 1:35 PM      Marion, a quick update on Mom and a request.     The buprenorphine 10 MCG/HR WK patch is working fine along with the injections last week and today, Mom is feeling much better.  There is still pain, but it is not as debilitating.  I also believe the Assisted Living environment is the right fit for her.    Request:  I have placed her on a service plan for the nursing staff to administer her medication, along with the Ladicaine patches.  The management of the patches was the wrong thing to do (the other medication is fine).  The current dosage is 3 patches a day.  Mom rather control when and how many to apply.   Shanti Dai, the nurse manager, said I would need to get authorization from the Pain Clinic for her to self-administer.  Would you or your staff be able to assist with this?   Also, with today's shot, she is not to put the patches on for a day.    I was able to get them to not do the patches for 24 hours  (it was not easy).    Would you or your staff be able to help change the patch application to self-application?  Thank you.    Contact at [facility]:  Shanti Dai, Nurse Manager, [facility]. Ph = ,   F = [phone]  Thank you, Brooke      __________    Writer spoke matt/ Shanti yesterday about information on lidocaine patch after the injection so this has been addressed.  _________________________________    VoÃ¶lks SAhart sent to pt:  From: Natalie Garcia RN      Created: 5/13/2021 9:31 AM      Hi June,     Looks like she may be due for a refill also of the lidocaine patch.  I can prep a refill for MYRON Simon CNP to review and sign that says 'okay to self-administer' and it will say that it is as needed.  This script will also be faxed over to Shanti.  MYRON Simon CNP has to review this plan yet but want to gather the information before sending this on to her.  For the  refill do you still want [facility] or is it different now that she is in assisted living?     МАРИЯ Estrada-BSN  [facility]

## 2021-05-13 NOTE — TELEPHONE ENCOUNTER
Shanti returned call, the preferred pharmacy is [facility], [address] [facility],[place]  Patient doesn't need anything at this time.     Completed form faxed to [facility] @ [phone] and sent to scanning.

## 2021-05-19 NOTE — TELEPHONE ENCOUNTER
FUTURE VISIT INFORMATION      FUTURE VISIT INFORMATION:    Date: 6/1/2021    Time: 10am    Location: [facility]  REFERRAL INFORMATION:    Referring provider:      Referring providers clinic:      Reason for visit/diagnosis  AVM     RECORDS REQUESTED FROM:       Clinic name Comments Records Status Imaging Status   Internal CT Head-4/21/2021    MR Brain-4/21/2021    CTA Head/Neck-4/21/2021    MR Lumbar Spine -11/18/2019    Dr. Potter-4/2/2021    ED Visit-4/21/2021 Epic PACS

## 2021-05-25 NOTE — PROGRESS NOTES
Clinic Care Coordination Contact    Assessment: Per chart review pt moved to [facility] and continues to work with specialty providers.  Goal completed.     Enrollment status: Maintenance     Plan: Outreach set for 2 months.  If no new needs identified at next outreach send chart to CC to review for graduation.     No CHW outreach date needed

## 2021-06-02 NOTE — PROGRESS NOTES
Jefferson is a 85 year old who is being evaluated via a billable telephone visit.      What phone number would you like to be contacted at? [phone]  How would you like to obtain your AVS? Tesfaye  Phone call duration: 20 minutes    Telephone visit     William Moreno

## 2021-06-02 NOTE — LETTER
2021       RE: Teresa Lopez  [address] [address]     Dear Colleague,    Thank you for referring your patient, Teresa Lopez, to the [facility] [place] [facility]. Please see a copy of my visit note below.    Jefferson is a 85 year old who is being evaluated via a billable telephone visit.      What phone number would you like to be contacted at? [phone]  How would you like to obtain your AVS? Walldresshart  Phone call duration: 20 minutes    Telephone visit     William Paulino is a 85 year old who is being evaluated via a billable telephone visit.      What phone number would you like to be contacted at? [phone]  How would you like to obtain your AVS? Blurr  Phone call duration: 20 minutes    Chief Complaint   Patient presents with     Consult     VIDEO VISIT NEW      William Moreno    Lives at [facility]. Just moved to this place 21. Retired [employer] 20 years ago. Right handed. Was at a 55 and older living facility previously. Short term memory improved. Overall cognition is better. Stroke 2002 (dysphasia and right hemiparesis)   See dictated note.  VERA Mckeon MD      Service Date: 2021    Tej Engle MD  [facility]  [address] [place] [address]     RE:  Teresa Lopez  MRN: [id number]  : 1935    Dear Dr. Engle:    We spoke to Ms. Lopez as part of a telephone visit in Cerebrovascular Clinic on 2021.  She was accompanied by her daughter.      In summary, she is an 85-year-old right-handed woman whom we diagnosed a left insular arteriovenous malformation (AVM).  She was hospitalized back in April after she underwent an outpatient MRI for a workup of cognitive decline.  The MRI showed some restricted diffusion concerning for a stroke and she was admitted.  It appeared that the area of restricted diffusion was in the setting of this unruptured  AVM.  We defined the AVM with a formal cerebral angiogram.    She just moved to [facility] assisted living [facility].  She was at different living facility without assistance previously.  Her daughter offered some insight into the transition.  It seems the previous facility created isolation and she is much more engaged at the current place.  Her daughter has noted improvement in her overall cognition, including short-term memory.  Of note, the patient had a stroke back in  that resulted in dysphagia and right hemiparesis.    Over the phone, she sounded well.  Her speech was subtly dysarthric, but otherwise fluent and coherent.  Her language also seemed normal.    We went over the angiogram, MRIs and CT scans with them.  This AVM is calcified and that may be creating the restricted diffusion within the lesion.  There is no evidence of hemorrhage in the surrounding brain.  There are some white matter signal around the AVM in the temporal lobe and frontal operculum.  The AVM has a very fine, loose and diffuse nidus.    This seems to be an unruptured AVM.  Given the low annual risk of rupture and her advanced age, we do not recommend any treatment.  We briefly discussed the various management strategies for AVMs.  Overall, there is little benefit and increased risk in treating this AVM.  She is better off with that being left alone.  We will follow up with her clinically in 1 year with a followup CT scan.  We reviewed potential symptoms and signs to that second.  She denies any headaches.      We will keep you informed of her progress.  Please do not hesitate to contact us with questions.  She can be on antiplatelet stroke and cardiac prophylaxis from our standpoint.      Sincerely,    Leatha Mckeon MD       D: 2021   T: 2021   MT: LIZZY    Name:     TORY OROZCO  MRN:      [id number]        Account:      [id number]   :      1935           Service Date: 2021       Document:  [id number]

## 2021-06-02 NOTE — PROGRESS NOTES
Jefferson is a 85 year old who is being evaluated via a billable telephone visit.      What phone number would you like to be contacted at? [phone]  How would you like to obtain your AVS? Tesfaye  Phone call duration: 20 minutes    Chief Complaint   Patient presents with     Consult     VIDEO VISIT AMANDA Moreno    Lives at [facility]. Just moved to this place 5/1/21. Retired [employer] 20 years ago. Right handed. Was at a 55 and older living facility previously. Short term memory improved. Overall cognition is better. Stroke 2002 (dysphasia and right hemiparesis)   See dictated note.  VERA Mckeon MD

## 2021-06-02 NOTE — LETTER
2021      RE: Teresa Lopez  [address] [facility] [place] [address]       Jefferson is a 85 year old who is being evaluated via a billable telephone visit.      What phone number would you like to be contacted at? [phone]  How would you like to obtain your AVS? Maimai  Phone call duration: 20 minutes    Telephone visit     William Paulino is a 85 year old who is being evaluated via a billable telephone visit.      What phone number would you like to be contacted at? [phone]  How would you like to obtain your AVS? [employer] Communicationshart  Phone call duration: 20 minutes    Chief Complaint   Patient presents with     Consult     VIDEO VISIT NEW      William Moreno    Lives at [facility]. Just moved to this place 21. Retired [employer] 20 years ago. Right handed. Was at a 55 and older living facility previously. Short term memory improved. Overall cognition is better. Stroke  (dysphasia and right hemiparesis)   See dictated note.  VERA Mckeon MD      Service Date: 2021    Tej Engle MD  [facility]  [address] [place] [address]     RE:  Teresa Lopez  MRN: [id number]  : 1935    Dear Dr. Engle:    We spoke to Ms. Lopez as part of a telephone visit in Cerebrovascular Clinic on 2021.  She was accompanied by her daughter.      In summary, she is an 85-year-old right-handed woman whom we diagnosed a left insular arteriovenous malformation (AVM).  She was hospitalized back in April after she underwent an outpatient MRI for a workup of cognitive decline.  The MRI showed some restricted diffusion concerning for a stroke and she was admitted.  It appeared that the area of restricted diffusion was in the setting of this unruptured AVM.  We defined the AVM with a formal cerebral angiogram.    She just moved to [facility].  She was at different living facility without assistance previously.  Her daughter offered some insight into the  transition.  It seems the previous facility created isolation and she is much more engaged at the current place.  Her daughter has noted improvement in her overall cognition, including short-term memory.  Of note, the patient had a stroke back in  that resulted in dysphagia and right hemiparesis.    Over the phone, she sounded well.  Her speech was subtly dysarthric, but otherwise fluent and coherent.  Her language also seemed normal.    We went over the angiogram, MRIs and CT scans with them.  This AVM is calcified and that may be creating the restricted diffusion within the lesion.  There is no evidence of hemorrhage in the surrounding brain.  There are some white matter signal around the AVM in the temporal lobe and frontal operculum.  The AVM has a very fine, loose and diffuse nidus.    This seems to be an unruptured AVM.  Given the low annual risk of rupture and her advanced age, we do not recommend any treatment.  We briefly discussed the various management strategies for AVMs.  Overall, there is little benefit and increased risk in treating this AVM.  She is better off with that being left alone.  We will follow up with her clinically in 1 year with a followup CT scan.  We reviewed potential symptoms and signs to that second.  She denies any headaches.      We will keep you informed of her progress.  Please do not hesitate to contact us with questions.  She can be on antiplatelet stroke and cardiac prophylaxis from our standpoint.      Sincerely,      MD Leatha Sarkar MD        D: 2021   T: 2021   MT: LIZZY    Name:     TORY OROZCOMichael  MRN:      [id number]        Account:      [id number]   :      1935           Service Date: 2021       Document: [id number]      Leatha Mckeon MD

## 2021-06-04 NOTE — PATIENT INSTRUCTIONS
Repeat CT scan head without contrast in one year (ordered)  Notify us if any new, severe headaches or any weakness or speech changes.    If you have any questions please contact me at [phone], option 3.    Bry Colón RN, CNRN  Stroke & Endovascular Care Coordinator    Thank you for choosing [facility] for your health care needs.

## 2021-06-04 NOTE — PROGRESS NOTES
Service Date: 2021    Tej Engle MD  [facility]  [address] [place] [address]     RE:  Teresa Lopez  MRN: [id number]  : 1935    Dear Dr. Engle:    We spoke to Ms. Lopez as part of a telephone visit in Cerebrovascular Clinic on 2021.  She was accompanied by her daughter.      In summary, she is an 85-year-old right-handed woman whom we diagnosed a left insular arteriovenous malformation (AVM).  She was hospitalized back in April after she underwent an outpatient MRI for a workup of cognitive decline.  The MRI showed some restricted diffusion concerning for a stroke and she was admitted.  It appeared that the area of restricted diffusion was in the setting of this unruptured AVM.  We defined the AVM with a formal cerebral angiogram.    She just moved to [facility] living [facility].  She was at different living facility without assistance previously.  Her daughter offered some insight into the transition.  It seems the previous facility created isolation and she is much more engaged at the current place.  Her daughter has noted improvement in her overall cognition, including short-term memory.  Of note, the patient had a stroke back in  that resulted in dysphagia and right hemiparesis.    Over the phone, she sounded well.  Her speech was subtly dysarthric, but otherwise fluent and coherent.  Her language also seemed normal.    We went over the angiogram, MRIs and CT scans with them.  This AVM is calcified and that may be creating the restricted diffusion within the lesion.  There is no evidence of hemorrhage in the surrounding brain.  There are some white matter signal around the AVM in the temporal lobe and frontal operculum.  The AVM has a very fine, loose and diffuse nidus.    This seems to be an unruptured AVM.  Given the low annual risk of rupture and her advanced age, we do not recommend any treatment.  We briefly discussed the various management  strategies for AVMs.  Overall, there is little benefit and increased risk in treating this AVM.  She is better off with that being left alone.  We will follow up with her clinically in 1 year with a followup CT scan.  We reviewed potential symptoms and signs to that second.  She denies any headaches.      We will keep you informed of her progress.  Please do not hesitate to contact us with questions.  She can be on antiplatelet stroke and cardiac prophylaxis from our standpoint.      Sincerely,      MD Leatha Sarkar MD        D: 2021   T: 2021   MT: LIZZY    Name:     TORY OROZCO  MRN:      [id number]        Account:      [id number]   :      1935           Service Date: 2021       Document: [id number]

## 2021-06-15 NOTE — PROGRESS NOTES
Clinic Care Coordination Contact    Clinic Care Coordination Contact  OUTREACH- Initial Assessment    Referral Information:  Referral Source: PCP    Primary Diagnosis: Cognitive Impairment    Chief Complaint   Patient presents with     Clinic Care Coordination - Initial        Universal Utilization:   Clinic Utilization  Difficulty keeping appointments:: No  Compliance Concerns: No  No-Show Concerns: No  No PCP office visit in Past Year: No  Utilization    Last refreshed: 2/27/2021  8:03 PM: Hospital Admissions 0           Last refreshed: 2/27/2021  8:03 PM: ED Visits 0           Last refreshed: 2/27/2021  8:03 PM: No Show Count (past year) 2              Current as of: 2/27/2021  8:03 PM              Clinical Concerns:  Current Medical Concerns:  Chronic pain, cognitive impairment, spinal stenosis, hypertension, DJD, arthirits, hx CVA   Current Behavioral Concerns: depression      Education Provided to patient:     Detailed discussion with daughters and pt about funding for assisted living, researching facilities using [facility], [facility]    [email or web address]    Medication Management:  Independent with daughters checking for accuracy     Functional Status:  Dependent ADLs:: Ambulation-cane, Ambulation-walker  Dependent IADLs:: Cleaning, Cooking, Laundry, Shopping, Transportation, Money Management  Bed or wheelchair confined:: No  Mobility Status: Independent w/Device  Fallen 2 or more times in the past year?: No  Any fall with injury in the past year?: No    Living Situation:  Current living arrangement:: I live alone  Type of residence:: Apartment([address] apartment)    Lifestyle & Psychosocial Needs:        Diet:: Regular  Inadequate nutrition (GOAL):: No  Tube Feeding: No  Inadequate activity/exercise (GOAL):: No  Significant changes in sleep pattern (GOAL): No  Transportation means:: Family     Holiness or spiritual beliefs that impact  Last vitals:   Vitals:    02/17/21 0832   BP: 105/60   Pulse: (!) 114   Resp: 18   Temp: 36.6  C (97.8  F)   SpO2: 97%     Patient's level of consciousness is awake  Spontaneous respirations: yes  Maintains airway independently: yes  Dentition unchanged: yes  Oropharynx: oropharynx clear of all foreign objects    QCDR Measures:  ASA# 20 - Surgical Safety Checklist: WHO surgical safety checklist completed prior to induction    PQRS# 430 - Adult PONV Prevention: 4558F - Pt received => 2 anti-emetic agents (different classes) preop & intraop  ASA# 8 - Peds PONV Prevention: NA - Not pediatric patient, not GA or 2 or more risk factors NOT present  PQRS# 424 - Kenisha-op Temp Management: 4559F - At least one body temp DOCUMENTED => 35.5C or 95.9F within required timeframe  PQRS# 426 - PACU Transfer Protocol: - Transfer of care checklist used  ASA# 14 - Acute Post-op Pain: ASA14B - Patient did NOT experience pain >= 7 out of 10   treatment:: No  Mental health DX:: Yes  Mental health DX how managed:: Medication  Mental health management concern (GOAL):: No  Chemical Dependency Status: No Current Concerns  Informal Support system:: Family   Socioeconomic History     Marital status:      Spouse name: Not on file     Number of children: Not on file     Years of education: Not on file     Highest education level: Not on file     Tobacco Use     Smoking status: Never Smoker     Smokeless tobacco: Never Used   Substance and Sexual Activity     Alcohol use: Yes     Alcohol/week: 0.0 standard drinks     Comment: 0-1 drink weekly     Drug use: No     Sexual activity: Not Currently     Partners: Male        Resources and Interventions:  Current Resources:      Community Resources: None  Supplies used at home:: None  Equipment Currently Used at Home: walker, standard, shower chair, grab bar, toilet, grab bar, tub/shower  Employment Status: retired(Financially supported by daughters, rent is more than pts prison))   )    Advance Care Plan/Directive  Advanced Care Plans/Directives on file:: Yes  Type Advanced Care Plans/Directives: Advanced Directive - On File  Advanced Care Plan/Directive Status: Not Applicable    Referrals Placed: Assisted Living, [facility] Resources([facility])     Goals:   Goals        General    Functional (pt-stated)     Notes - Note created  3/2/2021  1:58 PM by Brigida Mccullough LSW    Goal Statement: My daughters will research assisted living facilities and create timeline for me to move    Date Goal set: 3/2/21  Barriers: need [facility] assistance/ MA-LTC  Strengths: daughters are very supportive  Date to Achieve By: 6 months  Patient expressed understanding of goal: yes    Action steps to achieve this goal:  1. My daughters will look at facilities using [facility] [employer] or other resources  2. My daughters will complete necessary [facility] paperwork and submit documentation  3. My daughters will work with  admissions staff for tours, wait lists or other considerations             Patient/Caregiver understanding: Daughters agreeable to monthly follow up from CC SW     Outreach Frequency: monthly  Future Appointments              Tomorrow Maulik Zavala DPM [facility] [place]    In 2 weeks Jamaal Eid MD [facility] [place], [facility] PAIN BLAI    In 3 weeks Gigi Martinez DO [facility] KIKO [place] CLINELIEZER    In 1 month Karsten Louis MD [facility]          Plan: CC SW to follow up in one month.  CHW schedule outreach for 2 months

## 2021-06-23 NOTE — LETTER
6/23/2021       RE: Teresa Lopez  [address]  [place] [address]     Dear Colleague,    Thank you for referring your patient, Teresa Lopez, to the [facility] [place] at [facility]. Please see a copy of my visit note below.    Jefferson is a 85 year old who is being evaluated via a billable video visit.      How would you like to obtain your AVS? MyChart  If the video visit is dropped, the invitation should be resent by: Send to e-mail at: [email or web address]  Will anyone else be joining your video visit? No      Video Start Time: 1223  Video-Visit Details    Type of service:  Video Visit    Video End Time:1311    Originating Location (pt. Location): Home    Distant Location (provider location):  [facility]     Platform used for Video Visit: Lisa Paulino is a 85 year old who is being evaluated via a billable video visit.      How would you like to obtain your AVS? MyChart  If the video visit is dropped, the invitation should be resent by: Send to e-mail at: [email or web address]  Will anyone else be joining your video visit? No      Video Start Time: 1223  Video-Visit Details    Type of service:  Video Visit    Video End Time:1311    Originating Location (pt. Location): Home    Distant Location (provider location):  [facility]     Platform used for Video Visit: Lisa      61 minutes spent on the date of the encounter doing chart review, review of outside records, review of test results, interpretation of tests, patient visit and documentation            Return in about 3 months (around 9/23/2021) for Follow up, in person.    Aj Ro MD  [facility]  ____________________________________      [facility]    at [facility]  __________________________________________________________    Chief Complaint: Patient presents with:  Consult: VIDEO VISIT NEW      History of Present Illness: Teresa Lopez is a 85 year old female presenting for follow-up for AVM / brain lesion     As per history she was getting a work up outpatient for memory concerns, which included a MRI brain. On that imaging there was a T2 hyperintensity and diffusion restriction within the left subinsular region and temporal white matter, also with hemosiderin deposition and tiny vessels in this region. Patient and daughter note there was no recent changes in weakness, sensory, vision, speech. Patient was admitted for further work up of MRI findings.Repeat MRI brain re-demonstrated findings from original imaging. Patient underwent IR cerebral angiogram as there was concern for lesion to be vascular malformation rather than an ischemic stroke. Diagnostic cervicocerebral angiogram demonstrated a likely small calcified left MCA AVM. IR did not feel there was additional intervention indicated at time of study. IR evaluation showed  left basal ganglia subcortical calcified atriovenous malformation with many angiomatous small feeders and deep and superficial venous drainage versus also possible diffuse proliferative angiopathic process involving the left middle cerebral artery perforators.     According to the daughter patient was admitted to [facility] for altered mental status and just got back home yesterday and is doing well. According to the daughter she called her on Saturday at 2130 and she was doing well. Daughter reports her speech was non-sensical and that prompted her to call EMS . She was taken to hospital and admitted. Work up showed completed included an MRI brain with and without contrast which showed   Left frontotemporal mass with peripheral enhancement and central calcifications. While this may represent an atypical vascular  malformation, as was provided in the history, some of its features suggest infiltrating neoplasm. There is adjacent parenchymal T2 signal hyperintensity which could represent vasogenic edema. No evidence of recent hemorrhage. 4 mm of left to right midline shift is present. Subtle infiltrating T2 signal hyperintensity is present throughout the white matter both cerebral hemispheres, corpus callosum, and right insular cortex. This appearance is more suggestive of extensive infiltrating tumor than a vascular anomaly    CTA showed : Infiltrative left temporal mass demonstrates multiple internal enhancing vascular structures and large draining veins into the deep venous system. This probably represents the known AV malformation. Arterial supply is not clearly delineated on this examination. Surrounding edema and mass effect with 5 mm left-to-right midline shift unchanged    Patient denies any episodes of focal neurological symptoms like unilateral numbness tingling weakness or vision loss. She denies any episodes of abnormal smell, epigastric rising sensation, dejavu or seizure like activity. Per daughter she had troubles with hot flashes which started about 4-5 years ago and after starting prednisone the severity has decreased significantly. Off note they tell me that she had a hysterectomy with oopherectomy in 1968 when she was 31 years old and immediately after the surgery never had issues with hot flashes.     Impression:   Problem List Items Addressed This Visit        Circulatory    AVM (arteriovenous malformation) brain      Other Visit Diagnoses     Word finding difficulty    -  Primary    Relevant Orders    EEG    Hot flashes        Relevant Orders    EEG    Mass of left temporal lobe        Relevant Orders    EEG        85 year old female with hx of mild cognitive impairment was found to have left frontal temporal mass back in April when she was getting outpatient evaluation for cognitive impairment. Work up  "including diagnostic angiogram showed lesion consistent with AVM. She recently followed up with Dr Mckeon who recommended no surgical treatment and monitoring with annual scans.  Recently on 19th of June 2021 she had an episode of non sensical speech for which she was evaluated at [facility] . Work up at OSH showed MRI suggestive of an infiltrative process although in differential they mention AVM.There is no report on acute stroke on the report. She denies any TIA like symptoms but she has an interesting history of hot flashes. The differential for these episodes includes seizure , less likely  TIA. I explained these findings to the daughter and patient. I presented them with an option of obtaining video EEG 6-8 hours to gather more data. We may see slowing or interictal discharges. I informed them that even if the EEG is negative and normal I will still consider using anti seizure medication. They decided to obtain EEG first. I counseled them on symptoms to monitor which includes focal neurological deficits (episode of weakness numbness loss of vision or speech changes) , seizures ( shaking , abnormal pungent smell, epigastric rising sensation, dejavu etc) and headache.     Plan:   - Obtain images from [facility] for direct comparison.  - Video EEG sleep deprived 6-8 hours.   - Based on MRI and head to head comparison we may consider a short term follow up scan as outpatient.   - Follow up in 3 months    Stroke Education provided.  She will call us with any questions.  For any acute neurologic deficits she was advised to  go directly to the hospital rather than call the clinic.    Aj Ro MD  Neurology  06/23/2021 12:19 PM  To page me or covering stroke neurology team member, click here: AMCOM  Choose \"On Call\" tab at top, then search dropdown box for \"Neurology Adult\" & press Enter, look for Neuro " ICU/Stroke    ___________________________________________________________________    Current Medications  Current Outpatient Medications   Medication Sig     Acetaminophen (TYLENOL ARTHRITIS PAIN PO) As needed not taking daily     ADVIL 200 MG PO CAPS Reported on 4/20/2017     buprenorphine (BUTRANS) 10 MCG/HR WK patch Place 1 patch onto the skin every 7 days Fill 5/24/2021 and start 5/26/2021 to last 28 days.     CALCIUM 1200 OR Reported on 4/20/2017     ciclopirox (LOPROX) 0.77 % cream Apply topically 2 times daily To feet and toenails.     donepezil (ARICEPT) 5 MG tablet Take 1 tablet (5 mg) by mouth At Bedtime     DULoxetine (CYMBALTA) 30 MG capsule Take 1 capsule (30 mg) by mouth daily     Glucosamine-Chondroit-Vit C-Mn (GLUCOSAMINE 1500 COMPLEX PO) Take 1,500 mg by mouth daily Reported on 4/20/2017     lidocaine (LIDODERM) 5 % patch Apply 1-3 patches to external skin Q 24 hours as needed.  Okay for pt to self-administer.     Loratadine (CLARITIN) 10 MG capsule Take 10 mg by mouth as needed Reported on 4/20/2017     losartan-hydrochlorothiazide (HYZAAR) 100-25 MG tablet Take 1 tablet by mouth daily     magnesium 250 MG tablet Take 1 tablet by mouth daily     MULTI-VITAMIN OR TABS 1 TABLET DAILY     potassium aminobenzoate 500 MG CAPS capsule      predniSONE (DELTASONE) 2.5 MG tablet TAKE 1 TABLET BY MOUTH DAILY. MAY REPEAT ONCE DAILY AS NEEDED     senna-docusate (SENOKOT-S;PERICOLACE) 8.6-50 MG per tablet Take 1 tablet by mouth 2 times daily     simvastatin (ZOCOR) 40 MG tablet Take 1 tablet (40 mg) by mouth At Bedtime     No current facility-administered medications for this visit.        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       Social History  Social History      Tobacco Use     Smoking status: Never Smoker     Smokeless tobacco: Never Used   Substance Use Topics     Alcohol use: Yes     Alcohol/week: 0.0 standard drinks     Comment: 0-1 drink weekly     Drug use: No       Family History  Family History   Problem Relation Age of Onset     Arthritis Mother      Cataracts Mother      Unknown/Adopted Father         adopted     Diabetes Brother      Cancer Brother      Cataracts Maternal Grandmother      Hypertension No family hx of      Cerebrovascular Disease No family hx of      Thyroid Disease No family hx of      Glaucoma No family hx of      Macular Degeneration No family hx of        ROS: 10 point relevant ROS neg other than the symptoms noted above in the HPI.    Physical Exam    There were no vitals taken for this visit.    General:  no acute distress  HEENT:  normocephalic/atraumatic  Pulmonary:  no respiratory distress    Neurologic  Mental Status:  alert, oriented x 3, follows commands, speech clear and fluent, naming and repetition normal  Cranial Nerves:  EOMI with normal smooth pursuit, ?R NL flattening, hearing not formally tested but intact to conversation, no dysarthria, shoulder shrug equal bilaterally, tongue protrusion midline  Motor:  no abnormal movements, able to move all limbs antigravity spontaneously with no signs of hemiparesis observed, no pronator drift  Reflexes:  unable to test (telestroke)  Sensory:  unable to test (telestroke)  Coordination:  normal finger-to-nose and heel-to-shin bilaterally without dysmetria, rapid alternating movements symmetric  Station/Gait:  unable to test (telestroke)    Neuroimaging: as per HPI.     Labs:    No lab results found.     Recent Labs   Lab Test 04/21/21  1350 05/16/18  1137   CHOL 168 216*   HDL 83 56   LDL 60 133*   TRIG 127 136       Recent Labs   Lab Test 04/21/21  1350 04/03/19  1218 09/21/18  1108   A1C 6.6* 5.8* 5.8*       No lab results found.        Again, thank you for allowing me to participate  in the care of your patient.      Sincerely,    Aj Ro MD

## 2021-06-23 NOTE — PROGRESS NOTES
Jefferson is a 85 year old who is being evaluated via a billable video visit.      How would you like to obtain your AVS? MyChart  If the video visit is dropped, the invitation should be resent by: Send to e-mail at: [email or web address]  Will anyone else be joining your video visit? No      Video Start Time: 1223  Video-Visit Details    Type of service:  Video Visit    Video End Time:1311    Originating Location (pt. Location): Home    Distant Location (provider location):  [facility]UROLOG[facility]     Platform used for Video Visit: Captio

## 2021-06-23 NOTE — PROGRESS NOTES
Jefferson is a 85 year old who is being evaluated via a billable video visit.      How would you like to obtain your AVS? MyChart  If the video visit is dropped, the invitation should be resent by: Send to e-mail at: [email or web address]  Will anyone else be joining your video visit? No      Video Start Time: 1223  Video-Visit Details    Type of service:  Video Visit    Video End Time:1311    Originating Location (pt. Location): Home    Distant Location (provider location):  [facility] NEUROLOGY [facility]     Platform used for Video Visit: Trivnet      61 minutes spent on the date of the encounter doing chart review, review of outside records, review of test results, interpretation of tests, patient visit and documentation            Return in about 3 months (around 9/23/2021) for Follow up, in person.    Aj Ro MD  [facility] NEUROLOGY [facility]  ____________________________________      [facility]    at [facility]  __________________________________________________________    Chief Complaint: Patient presents with:  Consult: VIDEO VISIT NEW      History of Present Illness: Teresa Lopez is a 85 year old female presenting for follow-up for AVM / brain lesion     As per history she was getting a work up outpatient for memory concerns, which included a MRI brain. On that imaging there was a T2 hyperintensity and diffusion restriction within the left subinsular region and temporal white matter, also with hemosiderin deposition and tiny vessels in this region. Patient and daughter note there was no recent changes in weakness, sensory, vision, speech. Patient was admitted for further work up of MRI findings.Repeat MRI brain re-demonstrated findings from original imaging. Patient underwent IR cerebral angiogram as there was concern for lesion to be vascular malformation rather than an ischemic stroke. Diagnostic  cervicocerebral angiogram demonstrated a likely small calcified left MCA AVM. IR did not feel there was additional intervention indicated at time of study. IR evaluation showed  left basal ganglia subcortical calcified atriovenous malformation with many angiomatous small feeders and deep and superficial venous drainage versus also possible diffuse proliferative angiopathic process involving the left middle cerebral artery perforators.     According to the daughter patient was admitted to [facility] for altered mental status and just got back home yesterday and is doing well. According to the daughter she called her on Saturday at 2130 and she was doing well. Daughter reports her speech was non-sensical and that prompted her to call EMS . She was taken to hospital and admitted. Work up showed completed included an MRI brain with and without contrast which showed   Left frontotemporal mass with peripheral enhancement and central calcifications. While this may represent an atypical vascular malformation, as was provided in the history, some of its features suggest infiltrating neoplasm. There is adjacent parenchymal T2 signal hyperintensity which could represent vasogenic edema. No evidence of recent hemorrhage. 4 mm of left to right midline shift is present. Subtle infiltrating T2 signal hyperintensity is present throughout the white matter both cerebral hemispheres, corpus callosum, and right insular cortex. This appearance is more suggestive of extensive infiltrating tumor than a vascular anomaly    CTA showed : Infiltrative left temporal mass demonstrates multiple internal enhancing vascular structures and large draining veins into the deep venous system. This probably represents the known AV malformation. Arterial supply is not clearly delineated on this examination. Surrounding edema and mass effect with 5 mm left-to-right midline shift unchanged    Patient denies any episodes of focal neurological  symptoms like unilateral numbness tingling weakness or vision loss. She denies any episodes of abnormal smell, epigastric rising sensation, dejavu or seizure like activity. Per daughter she had troubles with hot flashes which started about 4-5 years ago and after starting prednisone the severity has decreased significantly. Off note they tell me that she had a hysterectomy with oopherectomy in 1968 when she was 31 years old and immediately after the surgery never had issues with hot flashes.     Impression:   Problem List Items Addressed This Visit        Circulatory    AVM (arteriovenous malformation) brain      Other Visit Diagnoses     Word finding difficulty    -  Primary    Relevant Orders    EEG    Hot flashes        Relevant Orders    EEG    Mass of left temporal lobe        Relevant Orders    EEG        85 year old female with hx of mild cognitive impairment was found to have left frontal temporal mass back in April when she was getting outpatient evaluation for cognitive impairment. Work up including diagnostic angiogram showed lesion consistent with AVM. She recently followed up with Dr Mckeon who recommended no surgical treatment and monitoring with annual scans.  Recently on 19th of June 2021 she had an episode of non sensical speech for which she was evaluated at [facility] . Work up at OSH showed MRI suggestive of an infiltrative process although in differential they mention AVM.There is no report on acute stroke on the report. She denies any TIA like symptoms but she has an interesting history of hot flashes. The differential for these episodes includes seizure , less likely  TIA. I explained these findings to the daughter and patient. I presented them with an option of obtaining video EEG 6-8 hours to gather more data. We may see slowing or interictal discharges. I informed them that even if the EEG is negative and normal I will still consider using anti seizure medication. They decided  "to obtain EEG first. I counseled them on symptoms to monitor which includes focal neurological deficits (episode of weakness numbness loss of vision or speech changes) , seizures ( shaking , abnormal pungent smell, epigastric rising sensation, dejavu etc) and headache.     Plan:   - Obtain images from [facility] for direct comparison.  - Video EEG sleep deprived 6-8 hours.   - Based on MRI and head to head comparison we may consider a short term follow up scan as outpatient.   - Follow up in 3 months    Stroke Education provided.  She will call us with any questions.  For any acute neurologic deficits she was advised to  go directly to the hospital rather than call the clinic.    Aj Ro MD  Neurology  06/23/2021 12:19 PM  To page me or covering stroke neurology team member, click here: AMCOM  Choose \"On Call\" tab at top, then search dropdown box for \"Neurology Adult\" & press Enter, look for Neuro ICU/Stroke    ___________________________________________________________________    Current Medications  Current Outpatient Medications   Medication Sig     Acetaminophen (TYLENOL ARTHRITIS PAIN PO) As needed not taking daily     ADVIL 200 MG PO CAPS Reported on 4/20/2017     buprenorphine (BUTRANS) 10 MCG/HR WK patch Place 1 patch onto the skin every 7 days Fill 5/24/2021 and start 5/26/2021 to last 28 days.     CALCIUM 1200 OR Reported on 4/20/2017     ciclopirox (LOPROX) 0.77 % cream Apply topically 2 times daily To feet and toenails.     donepezil (ARICEPT) 5 MG tablet Take 1 tablet (5 mg) by mouth At Bedtime     DULoxetine (CYMBALTA) 30 MG capsule Take 1 capsule (30 mg) by mouth daily     Glucosamine-Chondroit-Vit C-Mn (GLUCOSAMINE 1500 COMPLEX PO) Take 1,500 mg by mouth daily Reported on 4/20/2017     lidocaine (LIDODERM) 5 % patch Apply 1-3 patches to external skin Q 24 hours as needed.  Okay for pt to self-administer.     Loratadine (CLARITIN) 10 MG capsule Take 10 mg by mouth as needed Reported on " 4/20/2017     losartan-hydrochlorothiazide (HYZAAR) 100-25 MG tablet Take 1 tablet by mouth daily     magnesium 250 MG tablet Take 1 tablet by mouth daily     MULTI-VITAMIN OR TABS 1 TABLET DAILY     potassium aminobenzoate 500 MG CAPS capsule      predniSONE (DELTASONE) 2.5 MG tablet TAKE 1 TABLET BY MOUTH DAILY. MAY REPEAT ONCE DAILY AS NEEDED     senna-docusate (SENOKOT-S;PERICOLACE) 8.6-50 MG per tablet Take 1 tablet by mouth 2 times daily     simvastatin (ZOCOR) 40 MG tablet Take 1 tablet (40 mg) by mouth At Bedtime     No current facility-administered medications for this visit.        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       Social History  Social History     Tobacco Use     Smoking status: Never Smoker     Smokeless tobacco: Never Used   Substance Use Topics     Alcohol use: Yes     Alcohol/week: 0.0 standard drinks     Comment: 0-1 drink weekly     Drug use: No       Family History  Family History   Problem Relation Age of Onset     Arthritis Mother      Cataracts Mother      Unknown/Adopted Father         adopted     Diabetes Brother      Cancer Brother      Cataracts Maternal Grandmother      Hypertension No family hx of      Cerebrovascular Disease No family hx of      Thyroid Disease No family hx of      Glaucoma No family hx of      Macular Degeneration No family hx of        ROS: 10 point relevant ROS neg other than the symptoms noted above in the HPI.    Physical Exam    There were no vitals taken for this visit.    General:  no acute distress  HEENT:  normocephalic/atraumatic  Pulmonary:  no respiratory distress    Neurologic  Mental Status:  alert, oriented x 3, follows commands, speech clear and fluent, naming and repetition normal  Cranial Nerves:   EOMI with normal smooth pursuit, ?R NL flattening, hearing not formally tested but intact to conversation, no dysarthria, shoulder shrug equal bilaterally, tongue protrusion midline  Motor:  no abnormal movements, able to move all limbs antigravity spontaneously with no signs of hemiparesis observed, no pronator drift  Reflexes:  unable to test (telestroke)  Sensory:  unable to test (telestroke)  Coordination:  normal finger-to-nose and heel-to-shin bilaterally without dysmetria, rapid alternating movements symmetric  Station/Gait:  unable to test (telestroke)    Neuroimaging: as per HPI.     Labs:    No lab results found.     Recent Labs   Lab Test 04/21/21  1350 05/16/18  1137   CHOL 168 216*   HDL 83 56   LDL 60 133*   TRIG 127 136       Recent Labs   Lab Test 04/21/21  1350 04/03/19  1218 09/21/18  1108   A1C 6.6* 5.8* 5.8*       No lab results found.

## 2021-06-24 NOTE — TELEPHONE ENCOUNTER
Alejandrina from [facility] home care calling for verbal orders. Patient was discharged with home care order to evaluation. She is needing verbal orders for:     -Skilled nursing once weekly for 6 weeks   -PT/OT/Speech therapy to evaluate and treat     Patient was last seen In March. Patient will need a hospital follow up within 30 days I believe for insurance to cover home care. Routed to  to reach out to the patient for an appointment and routed to the provider to review verbal orders.     Thank you,  Fariba Hamilton RN

## 2021-06-24 NOTE — PATIENT INSTRUCTIONS
We will obtain imaging from OSH to compare   Video 6-8 hour sleep deprive EEG ordered  Follow up in 3 months    If you have any questions please contact me at [phone], option 3.    Bry Colón RN, CNRN  Stroke & Endovascular Care Coordinator    Thank you for choosing [facility] for your health care needs.

## 2021-06-24 NOTE — TELEPHONE ENCOUNTER
Called [facility] at the film dept to push images to PACS. Left my direct number to call me back.

## 2021-06-25 NOTE — TELEPHONE ENCOUNTER
Notified Nora of the orders below.  She will also advise patient to make an appointment.    Routed to TC also to please call patient to schedule a hospital follow up.    Tara DUBOIS-RN  Triage Nurse  [facility]: [facility]

## 2021-06-25 NOTE — TELEPHONE ENCOUNTER
May give verbal orders for SN, PT, OT, and speech therapy.  Please offer a hospital follow-up appointment as well.

## 2021-06-25 NOTE — TELEPHONE ENCOUNTER
This is a Dr. Engle patient, he is still on indefinite leave.    I see Dr. Martinez gave approval for verbal home care orders yesterday, patient needs to schedule hospital follow up, appears TC called this AM and left message.    Routed today's request to postpone PT assessment to Monday 6/28 to Dr. Martinez to address/approve.    Cherelle Holden RN  [facility]

## 2021-06-25 NOTE — TELEPHONE ENCOUNTER
Reason for Call:  Home Health Care    Ankit with [facility] called regarding (reason for call): Verbal orders    Orders are needed for this patient. Speech    Looking for verbal orders. Speech 1 time a week for 4 weeks. Working on cognitive     Phone Number Homecare Nurse can be reached at: [phone]    Can we leave a detailed message on this number? YES    Phone number patient can be reached at: Home number on file [phone] (home)    Best Time: anytime    Call taken on 6/25/2021 at 4:44 PM by Cristy Leonard

## 2021-06-25 NOTE — TELEPHONE ENCOUNTER
Reason for Call:  Other     Detailed comments: Per patient request would like to reschedule PT johnathan til 06/28/2021    Phone Number   Joceline Washington  [phone]         Best Time:     Can we leave a detailed message on this number? YES    Call taken on 6/25/2021 at 11:56 AM by Angella Davis

## 2021-06-28 NOTE — TELEPHONE ENCOUNTER
Lory (OT), with home care, is calling for a delay of care for patient due to scheduling issues.  They will be seeing her on 6/30/21 to initiate start of care.    Advised patient needs to also make an appointment with another provider as PCP is out of leave indefinitely.    Will routed to provider to gave verbal orders on patient for home care.    Tara TAN-RN  Triage Nurse  [facility]: [facility]

## 2021-07-01 NOTE — TELEPHONE ENCOUNTER
Routed to the MA pool to process refill(s).      Per patient [employer] message:  From: Jefferson Lopez      Created: 6/30/2021 8:14 PM      Marion, prior to Jefferson's appointment on Friday, she will need a new prescription for Lidocaine patches.    Backstory:  Mom is now in [facility] and all her prescriptions moved to [facility].  However, upon renewal of her the Lidocaine prescription, [facility] sent [facility] type of patch that is very thin and does not stick.  In talking with the NP on site at [facility], the best course is to return the unopened packages and submit a new prescription to the [facility] on [address] in [facility].  This is where the previous supply was from prior to moving to [facility].  Abby, the NP cannot order the strength required since she in under pain management and requested I contact you to obtain a new prescription.     Thank you,   Brooke

## 2021-07-01 NOTE — TELEPHONE ENCOUNTER
Signed Prescriptions:                        Disp   Refills    lidocaine (LIDODERM) 5 % patch             90 pat*3        Sig: Apply 1-3 patches to external skin Q 24 hours as           needed.  Okay for pt to self-administer.  Authorizing Provider: RENA CELAYA, RN CNP, FNP  [facility]  [facility]

## 2021-07-01 NOTE — TELEPHONE ENCOUNTER
Chief Complaint   Patient presents with     Refill Request     lidocaine patch      Refill request received via mychart by patient or caregiver      [id number] - [place], [place] - [address] AT [address]        No prescriptions requested or ordered in this encounter         Last Written Prescription Date:  5/13/21  Last Fill Quantity: 90,   # refills: 3  Last Office Visit with prescribing provider: 5/5/21  Future Office visit: 7/2/21   Next 5 appointments (look out 90 days)    Jul 14, 2021 11:00 AM  Return Visit with Maulik Zavala DPM  [facility] ([facility]) [address] [address] [place] [id number] [phone]

## 2021-07-02 NOTE — PROGRESS NOTES
Jefferson is a 85 year old who is being evaluated via a billable video visit.      How would you like to obtain your AVS? MyChart  If the video visit is dropped, the invitation should be resent by: Other e-mail: My Chart  Will anyone else be joining your video visit? Abdi, daughter, will be joining the visit today.     Is Pt currently in [place]? Yes    Gabriella Vargas, DINAH  [facility]          Video-Visit Details    Type of service:  Video Visit  Video Start Time: 3:05 PM  Video End Time: 3:41 PM  Originating Location (pt. Location): Assisted Living    Distant Location (provider location):  [facility]     Platform used for Video Visit: [facility]    7/2/2021           Chief complaint:    Pain is across the low back and extends down the anterior aspect of her right thigh to the knee and the lateral hip on the right side      Interval history:  Teresa Lopez is a 85 year old female is known to me for .   Primary osteoarthritis    Chronic right hip pain   Chronic bilateral low back without sciatica   Spinal stenosis of lumbar region with neurogenic claudication   Piriformis syndrome of both sides   PMHx DJD, CVA, HTN, diabetes mellitus, and cataract   PSHx Hysterectomy        Recommendations/plan at the last visit on 4/28/2021 included:  1. Physical Therapy:  None at present  2. Clinical Health Psychologist: I agree with outside counseling as you are.  3. Diagnostic Studies:  None  4. Medication Management:    1. INCREASE BUTRANS (buprenorphine) 10mcg/hr transdermal patch. Change patch every 7 days.  Wear on clean, dry skin on the upper chest, upper arm or upper back. Fill 4/28 and start 4/29 (if not immediately covered by insurance, may wear both 5mcg/hr patches for 1 week to equal 10mcg/hr)  2. Continue Cymbalta (Duloxetine) 30mg every day  3. Continue Lidocaine patches   4. Continue medical cannabis.   5.  use the Voltaren gel  three to four times daily regularly for the right lateral hip pain  6. Trial Aspercreme with 4% lidocaine to right lateral hip  7. Could trial Theracare patches to right lateral hip, these are over-the-counter warming patches  5. Further procedures recommended: trigger point injections to right side of low back muscle spasm(s) with Dr. Eid, our office to contact you  6. Recommendations to PCP: none  7. Follow up: 3 weeks video due to COVID-19 viral threat. Please call [phone] to make your follow-up appointment with me.           Since her last visit, Teresa Lopez reports: she is accompanied on video today by her daughter, Abdi    Interval history July 2, 2021  -She was admitted to the hospital on 6/19/2021 for confusion and aphasia.  -she has a brain tumor and will be seeing her previous neurosurgeon at the [facility] in July. Will be having an EEG  -functionally she is doing better at Assisted Living, has made friends.   -prior to going to the ER, she had been going to the gym, she had been doing a seated elliptical machine  -Jefferson is still struggling with bilateral low back pain and right leg pain to the level of the knee.   -Jefferson states her worst pain is the right lateral hip and would like to repeat GT injection when able (last done in early May 2021, needs to wait 12 weeks)        Interval history April 28, 2021  -moving to [facility]. She moves in on May 1. She has some high school friends who live there. Currently living with her daughter, Abdi until she moves to [facility]. Jefferson is very excited about this move.   -Butrans has been helpful. Tolerating well. Jefferson would like to increase her dosage on this. She also needs to reschedule injection with Dr. Jamaal Eid as she had to reschedule due to a recent short hospitalization.   -Abdi, Jefferson's daughter, notes Jefferson is walking better and is not wincing in pain as much since she has been using the  Butrans patch.       Interval history April 13, 2021-Tries ice packs  Per Maite:  elastic brace on right knee, somewhat helpful  -she feels that her right knee feels weaker  -she has trouble with weakness in her ankles bilateral  -she uses ankle supports on her ankles (elastic)  -she feels that her ankles are swollen  -Maite feels that she has a muscle spasm on her right lower back  -her worst pain is over the low back on the right side and wraps around to the lateral right hip. She is unsure if the right GT injection was helpful as she bumped her hip recently and has some soreness from that.  -she is tangential in her speech, focuses on her wish to be more active like she was in her 70's when she used to still ice skate and go walking.     -Per Abdi, patient's daugther  Injection in right GT, unsure if helpful  -maite called paramedics last week when Abdi was out of town. Remer she could not walk or get to the bathroom due to pain  -yesterday she was the same way  -her daughters have told her to take an extra prednisone tablet on those flare days.  -they have a list of her medication with list of when she is supposed to take it, Maite has some short-term memory loss and sometimes does not do so (yesterday woke at 0800 so she did not take her 0600 medication)  -Abdi notes that early last year, Dr. Engle (Primary Care Provider) had prescribed  hydrocodone/acetaminophen and it upset Maite's stomach. When Maite reports a severe flare,  Abdi or Radha (her daughters) have her take the hydrocodone/acetaminophen tablets. It is unclear if these are effective or if she takes them.   - her daughter's are now getting calls from Maite twice per week from her stating she is going to die and she wants the kids to come over.   -she does not sleep well when she has more pain and then Maite is calling them in the middle of the night  -Abdi and Radha are looking for a more suitable assisted living situation with more activities available to  "their mother, but have not yet found one that they like that is affordable.       Interval history February 24, 2021  -She states she is not certain she can go on like this. She is not crying today. Very talkative. Last telephone call, barely able to speak, crying throughout most of encounter.  -she is frustrated that she hurts all of the time  -she lays on the alexander and does stretching.   -she wants to have a repeat right greater trochanteric bursal injection, but she is having her 2nd COVID vaccine on 2/26/2021. We advise waiting 2 weeks after the 2nd COVID vaccine prior to elective steroid injections.  -Radha states she and her sister Haylee feel that the Cymbalta has been somewhat helpful. Will increase dosage to 30mg/day.       Interval history January 6, 2021  -spoke with Jefferson and her daughter Rubi on speakerphone.  -Jefferson ran out of her oral steroids for 6 days, her steroid injection  -she just picked the prednisone today.   -Jefferson had a right sided greater trochanteric bursal injection in November 2020.   -Jefferson and Rubi would like to re-trial the Cymbalta. She has stopped taking it because she was not eating and having issues with nausea.   -We talked about trying to use the Cymbalta either with food in the morning OR at bedtime with a snack.   -Jefferson indicates she is having right sided pain over the lateral hip pain.  -She has seen Dr. Lewis with podiatry. She may need to have her left 2nd toe removed.       Interval history 11/4/2020  -low back pain and lateral hip pain on the right side.    -new shoe insert about a week ago, her daughter Rubi states this has helped her balance.   -Jefferson states she has some \"snapping\" in the right hip.   -Jefferson has not yet begun the Cymbalta, she wanted to try the shoe insert first  -Jefferson has had a tough day especially on Sunday 11/1, she wasn't walking around as much as usual.  -patient is with her daughter Rubi throughout the encounter      Interval history " "10/2/2020  -right SI joint injection has been helpful  -She is having pain in the right side of the hip, laterally, not anterior groin pain  -patient feels her left leg is longer than the right leg  -She has been more active  -she is seeing a counselor, has been seen x 3      Interval history 8/26/2020  -She states she is not doing very well sometimes.   -she feels her right hip is \"very bad.\"   -She had a 7/30/2020 right greater trochanteric bursal injection, her pain came down from an 8/10 to a 2/10 for her right GT.  -still has some trouble standing due to a spot that is painful, she is describing a spot of pain over the SI joint. This tends to hurt when she stands to do things and feels better when she is sitting down.   -she accompanied today by her daughter Abdi on the phone today  -recertified for medical cannabis today  -she has a hammer toe and this has been really painful for her the past 2 days.       Interval history 7/15/2020  -she is not feeling well  -she sleeps with a pillow between her legs  -she notes she is having \"bad pain\" on the lateral hip on the right side. She has pain when she lays on her right side in bed. She has had a greater trochanteric bursal injection in June 2019 that was helpful.   -she is not eating well as she is isolated and feels depressed and feels shaky as she doesn't eat as regularly as she should.   -discussed using Voltaren gel, Cymbalta and greater trochanteric injection      Interval history 5/6/2020  -Teresa is having some more hip pain on the right side between the top of the hip and up and the tailbone to the hip  -SI joint injection on the right side was beneficial and this has made her pain more focused on the right hip.   -she has pain if she lays on her right side  -she is having more pain in the deep right groin. She notes when she walks this pain is markedly worse.         At this point, the patient's participation with our multidisciplinary team includes:  The " "patient has been compliant with the program  PT - patient is interested  Health Psych  None      Pain scores:  Pain intensity on average is 8 on a scale of 0-10.   Range is 8-10/10.   Pain is described as \"miserable, tightness.\"  Pain is continuous in nature.      Current pain-related medication treatments include:  -Lidocaine patch (helpful)  -Medical cannabis (helpful)  -Cymbalta 30mg/day (tolerating well, somewhat helpful for mood if not for pain)  -Butrans 10mcg/hr TD Q 7 days (helpful)         Other pertinent medications:  -Senna-docusate PRN  -Aricept 5mg/day        Previous medication treatments included:  OPIATES:hydrocodone (not helpful), oxycodone (somewhat helpful), Butrans (helpful)  NSAIDS: ibuprofen (not helpful), Aleve (not helpful)  MUSCLE RELAXANTS: none  ANTI-MIGRAINE MEDS: none  ANTI-DEPRESSANTS: Cymbalta (somewhat helpful)  SLEEP AIDS: none  ANTI-CONVULSANTS: gabapentin (unsure if helpful, side effects: hot flashes, fogginess)  TOPICALS: none  Other meds: Tylenol (not helpful)        Injections:  -11/10/2017 Caudal epidural steroid injection with Dr. Reymundo Bajwa (helpful for a very short period of time)  -1/5/2018 Ultrasound guided right intra-articular hip injection with Dr. Jean Meade  -1/12/2018 Right ankle injection with Dr. Lewis of podiatry.   -7/3/2018 L2-3 interlaminar epidural steroid injection with Dr Jamaal Eid (not at all helpful)  -8/3/2018 right hip steroid injection with Dr. Valverde (helped some)  -6/14/2019 bilateral hip greater trochanteric bursa injection with Dr. Jamaal Eid (helpful)   -1/23/2020 right SI joint injection with Dr. Chasidy Laura (quite helpful)  5/13/2020 right hip joint steroid injection with Dr. Valverde (quite helpful)  -7/30/2020 right hip greater trochanteric bursal injection with Dr. Jamaal Eid (quite helpful)  -9/15/2020 right SI joint injection with Dr. aJmaal Eid (pain diminished about 25%, sharpness went away)  -11/19/2020 right " hip greater trochanteric bursal injection with Dr. Eid (helpful)  -3/18/2021 right hip greater trochanteric bursal injection with Dr. Jamaal Eid (unsure)  5/5/2021 trigger point injections, right trochanteric bursal injection with Dr. Bushra Alicia  (helpful)  -5/12/2021 right SI joint injection with Dr. Bushra Alicia  (helpful)    Side Effects: no side effect  Patient is using the medication as prescribed: YES    Medications:  Current Outpatient Medications   Medication Sig Dispense Refill     Acetaminophen (TYLENOL ARTHRITIS PAIN PO) As needed not taking daily       ADVIL 200 MG PO CAPS Reported on 4/20/2017       buprenorphine (BUTRANS) 10 MCG/HR WK patch Place 1 patch onto the skin every 7 days Fill 5/24/2021 and start 5/26/2021 to last 28 days. 4 patch 0     CALCIUM 1200 OR Reported on 4/20/2017       ciclopirox (LOPROX) 0.77 % cream Apply topically 2 times daily To feet and toenails. 90 g 3     donepezil (ARICEPT) 5 MG tablet Take 1 tablet (5 mg) by mouth At Bedtime 30 tablet 2     DULoxetine (CYMBALTA) 30 MG capsule Take 1 capsule (30 mg) by mouth daily 30 capsule 1     Glucosamine-Chondroit-Vit C-Mn (GLUCOSAMINE 1500 COMPLEX PO) Take 1,500 mg by mouth daily Reported on 4/20/2017       lidocaine (LIDODERM) 5 % patch Apply 1-3 patches to external skin Q 24 hours as needed.  Okay for pt to self-administer. 90 patch 3     Loratadine (CLARITIN) 10 MG capsule Take 10 mg by mouth as needed Reported on 4/20/2017       losartan-hydrochlorothiazide (HYZAAR) 100-25 MG tablet Take 1 tablet by mouth daily 90 tablet 3     magnesium 250 MG tablet Take 1 tablet by mouth daily       MULTI-VITAMIN OR TABS 1 TABLET DAILY       potassium aminobenzoate 500 MG CAPS capsule        predniSONE (DELTASONE) 2.5 MG tablet TAKE 1 TABLET BY MOUTH DAILY. MAY REPEAT ONCE DAILY AS NEEDED 120 tablet 0     senna-docusate (SENOKOT-S;PERICOLACE) 8.6-50 MG per tablet Take 1 tablet by mouth 2 times daily 120 tablet 12     simvastatin  (ZOCOR) 40 MG tablet Take 1 tablet (40 mg) by mouth At Bedtime 90 tablet 0       Medical History: any changes in medical history since they were last seen? No    Social History:  Home situation: . 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  Tobacco use: none  Alcohol use: very little, about 2 drinks per month  Drug use: none  History of chemical dependency treatment: none    Is patient a current smoker or tobacco user?  no  If yes, was cessation counseling offered?  no              Physical Exam:   Vital signs: There were no vitals taken for this visit.    Behavioral observations:  Awake and alert, cooperative  Pulm: respirations easy and unlabored. Able to speak in full sentences without SOB or cough noted.            Imaging  MRI LUMBAR SPINE WITHOUT CONTRAST   3/30/2018 1:22 PM      HISTORY:  Lumbar degenerative disc disease.     TECHNIQUE: Multiplanar multisequence MRI of the lumbar spine without  contrast.     COMPARISON: Lumbar spine x-ray 3/22/2018. Lumbar spine MR 3/17/2017.      FINDINGS: There are 5 lumbar-type vertebral bodies assumed for the  purposes of this dictation. The tip of the conus terminates at the  L1-L2 level.     There is convex right scoliotic curvature of the lumbar spine centered  at the L2-L3 level. There is mild retrolisthesis of L2 on L3 and mild  anterolisthesis of L3 on L4. There is grade 1 anterolisthesis of L4 on  L5. There is right lateral listhesis of L3 on L4. There is slight loss  of left-sided vertebral body height at L3, probably due to  degenerative changes. Severe loss of intervertebral disc height seen  at L3-L4 with associated degenerative endplate changes. Severe loss of  disc height also seen on the left at L1-L2 and L2-L3 with severe  degenerative endplate changes. Mild loss of disc height at T11-12 and  T12-L1 with disc desiccation at L4-5 and L5-S1. There is mild STIR  hyperintense marrow  edema at the opposing L2-L3 endplates.  Degenerative subchondral cysts seen anteriorly at L3.     Level by level as follows:      T12-L1:  Only seen on the lateral view, but there is a posterior disc  bulge and bilateral facet hypertrophy resulting in moderate right and  mild left neural foraminal narrowing. No significant spinal canal  narrowing.      L1-L2:  Convex right curvature with retrolisthesis and circumferential  disc bulge with endplate osteophytic spurring as well as bilateral  facet hypertrophy. Findings result in mild central spinal canal  narrowing and moderate bilateral neural foraminal narrowing.      L2-L3:  Convex right scoliotic curvature with left-sided disc bulge  and endplate osteophytic spurring as well as, left greater than right,  facet hypertrophy and ligamentum flavum infolding. Findings result in  moderate central spinal canal narrowing as well as moderate to severe  left neural foraminal narrowing. There is mild right neural foraminal  narrowing.      L3-L4:  Mild anterolisthesis along with circumferential disc bulge,  severe bilateral facet hypertrophy, and ligamentum flavum infolding.  Findings result in severe central spinal canal narrowing. There is  also severe left and moderate to severe right neural foraminal  narrowing.      L4-L5:  Anterolisthesis with uncovering of the disc and small  posterior disc bulge along with severe bilateral facet hypertrophy and  ligamentum flavum infolding. Findings result in moderate to severe  central spinal canal narrowing. There is also moderate to severe  bilateral neural foraminal narrowing. Small bilateral facet joint  effusions.      L5-S1:  Mild posterior disc bulge and marked bilateral facet  hypertrophy results in moderate bilateral neural foraminal narrowing,  right greater than left. No significant central spinal canal  narrowing.      Paraspinous soft tissues:  Normal.          IMPRESSION:    1. Severe multilevel degenerative changes  throughout the lumbar spine  as described above. Overall, there is no significant change since  previous MR 3/17/2017.  2. Severe central spinal canal narrowing at L3-L4 and moderate to  severe spinal canal narrowing at L4-L5.  3. Convex right scoliotic curvature of the spine with multiple levels  of spondylolisthesis and right lateral listhesis of L3 on L4.     DIANA HERNANDEZ MD        [place] Prescription Monitoring Program:  Reviewed [place]  7/2/2021- no concerning fills.  Marion SANCHES RN CNP, FNP  [facility]  [place] location        Assessment:   1. Greater trochanteric bursitis of the right hip  2. DDD (degenerative disc disease), lumbar  3. Spinal stenosis of lumbar region with neurogenic claudication  4. Chronic bilateral low back pain without sciatica  5. Chronic right SI joint pain  6. Right hip joint pain  7. Right hip osteoarthritis  8. Muscle spasms  9. Chronic continuous use of opioids  10. Chronic pain syndrome        Plan:   Physical Therapy:  None at present  Clinical Health Psychologist: I agree with outside counseling as you are.  Diagnostic Studies:  None  Medication Management:    INCREASE BUTRANS (buprenorphine) 15mcg/hr transdermal patch. Change patch every 7 days.  Wear on clean, dry skin on the upper chest, upper arm or upper back.   Continue Cymbalta (Duloxetine) 30mg every day  Continue Lidocaine patches   Continue medical cannabis.    use the Voltaren gel three to four times daily regularly for the right lateral hip pain  Trial Aspercreme with 4% lidocaine to right lateral hip  Could trial Theracare patches to right lateral hip, these are over-the-counter warming patches  8. Further procedures recommended: repeat right GT injection in early August 9. Recommendations to PCP: none  10. Follow up: 8-10 weeks video due to COVID-19 viral threat. Please call [phone] to make your follow-up appointment with me.                Marion SANCHES RN CNP,  SUGEY  [facility]  [facility]

## 2021-07-02 NOTE — PATIENT INSTRUCTIONS
----------------------------------------------------------------  [facility] Number:  [phone]     Call with any questions about your care and for scheduling assistance.     Calls are returned Monday through Friday between 8 AM and 4:30 PM. We usually get back to you within 2 business days depending on the issue/request.    If we are prescribing your medications:    For opioid medication refills, call the clinic or send a [employer] message 7 days in advance.  Please include:    Name of requested medication    Name of the pharmacy.    For non-opioid medications, call your pharmacy directly to request a refill. Please allow 3-4 days to be processed.     Per [place] State Law:    All controlled substance prescriptions must be filled within 30 days of being written.      For those controlled substances allowing refills, pickup must occur within 30 days of last fill.      We believe regular attendance is key to your success in our program!      Any time you are unable to keep your appointment we ask that you call us at least 24 hours in advance to cancel.This will allow us to offer the appointment time to another patient.     Multiple missed appointments may lead to dismissal from the clinic.

## 2021-07-02 NOTE — TELEPHONE ENCOUNTER
Reason for call:  Other   Patient called regarding (reason for call): prescription  Additional comments: Trina from [facility] assistant living calling requesting medication order for pt if signed to be faxed to them at [phone] otherwise they can also take a telephone order.    Phone number to reach patient:  Home number on file [phone] (home)    Can we leave a detailed message on this number?  YES    Travel screening: Not Applicable         Taran MCMILLAN    [facility] [place]

## 2021-07-05 NOTE — TELEPHONE ENCOUNTER
Called and relayed the verbal order to Lory. She verbalized understanding.     Please call patient and assist with setting up an appointment to establish care.     Thank you,  Fariba Hamilton RN

## 2021-07-05 NOTE — TELEPHONE ENCOUNTER
May continue with Home Health orders.  Agree with below plan that patient needs to establish care with new PCP.

## 2021-07-07 NOTE — TELEPHONE ENCOUNTER
Trina olivas from patient's assisted living facility requesting we fax over the new prescription for patient's Butrans patch. The fax needs to state that the prescription was signed off by Marion, or it will need to be physically signed and faxed to [phone].    Liliya MATTHEW    [facility]

## 2021-07-08 NOTE — TELEPHONE ENCOUNTER
I have been unable to contact the pts daughter Tonya to reschedule the pts appointment with .  Jamie Fountain on 7/8/2021 at 5:27 PM        LVM 7/3 RC  LVM  Jamie Fountain on 7/6/2021   at 1:41 PM  LVM & Amador Austin 7/8 RC

## 2021-07-08 NOTE — TELEPHONE ENCOUNTER
----- Message from Fariba Martínez LPN sent at 7/2/2021  9:50 AM CDT -----  Regarding: please reschedule pt call allen Garcia  There is a  pt call regarding appointment with   Needs to be rescheduled from today in person visit at 9:00.    Call allen Garcia    ThanksKenyetta

## 2021-07-13 NOTE — TELEPHONE ENCOUNTER
Trina returning call to nursing. States they just need Marion to sign off on a written order for patient's prescription. They are faxing the prescription to [facility] at [phone] and Marion will need to sign it and fax it back.    Liliya MATTHEW    [facility]

## 2021-07-13 NOTE — TELEPHONE ENCOUNTER
Trina calling asking to speak to a nurse [phone]        Kayla Baltazar    [place] [facility]

## 2021-07-13 NOTE — TELEPHONE ENCOUNTER
Called Trina-Left message for her to return call.     Alice DUBOIS, RN Care Coordinator  [facility]  [facility]

## 2021-07-14 NOTE — TELEPHONE ENCOUNTER
Signed orders faxed to [facility] [phone].  Audrey Mclean CMA   [facility]   [facility] [place]

## 2021-07-16 NOTE — LETTER
[place] CARE COORDINATION    July 16, 2021    Teresa Lopez  [address] [facility] [address] [place] [id number]    Dear Teresa,  Your Care Team congratulates you on your journey to maintain wellness. This document will help guide you on your journey to maintain a healthy lifestyle.  You can use this to help you overcome any barriers you may encounter.  If you should have any questions or concerns, you can contact the members of your Care Team or contact your Primary Care Clinic for assistance.     Health Maintenance  Health Maintenance Reviewed:      My Access Plan  Medical Emergency 911   Primary Clinic Line [facility] - [phone]   24 Hour Appointment Line [phone] or  [phone]) (toll-free)   24 Hour Nurse Line [phone] (toll-free)   Preferred Urgent Care     Preferred Hospital     Preferred Pharmacy [id number] - [place], [place] - [address] AT [facility] [address] Behavioral Health Crisis Line The National Suicide Prevention Lifeline at 1-464.820.4827 or 911     My Care Team Members  Patient Care Team       Relationship Specialty Notifications Start End    Heidi Galvez MD Assigned Surgical Provider   10/23/20     Phone: [phone] Fax: [phone]         [address] [facility] [address]    Maulik Zavala DPM Assigned Musculoskeletal Provider   10/23/20     Phone: [phone] Fax: [phone] [address] [address]    Gigi Martinez DO Assigned PCP   4/4/21     Phone: [phone] Fax: [phone] [address] [facility] [address] [facility] [address]    Karsten Louis MD MD Neurology  4/6/21     Phone: [phone] Fax: [phone] [address] [facility] [address]    Abby Colón, RN Specialty Care Coordinator Neurology Admissions 5/7/21     Stroke/Neuro-endovascular     Phone: [phone] Pager: [phone] Fax: [phone]       Tammy  MYRON Gutierrez CNP Assigned Neuroscience Provider   6/13/21     Phone: [phone] Fax: [phone] [address] [place] [address] [facility] [address]    Aj Ro MD MD Neurology  6/17/21     Phone: [phone] Fax: [phone]         [address] [facility] [address] [facility] [address]                   Advance Care Plans/Directives Type:      Thank you for providing us with your Advance Directive. We will keep this on file and recommend that it be updated every 2 years, if your health situation changes, if there is a death of one of your health care agents, and/or if there are changes in your marital status.    It has been your Clinic Care Team's pleasure to work with you on your goals.    Regards,  Your Clinic Care Team

## 2021-07-16 NOTE — PROGRESS NOTES
Clinic Care Coordination Contact    Assessment: Pt moved to [facility] and per chart review is seeing new provider at assisted living facility as of 6/28/21.    Enrollment status: Graduated.      Plan: No further outreaches at this time.  Patient no longer candidate for clinic care coordination.

## 2021-07-23 NOTE — TELEPHONE ENCOUNTER
Joceline called from [facility] [employer]. She needed to let patient's PCP know that they had to re-schedule patient's final Home Physical Therapy Visit.     The Home PT was ordered by a hospital provider upon discharge.     The final Home PT was scheduled for today, however patient had a social engagement.     The final Home PT visit has been moved to Monday, July 26. It is protocol for delayed visits to be reviewed with patient's PCP.     Joceline said she did not need a call back unless there were new orders.     Katherine Geronimo RN BSN  [facility]

## 2021-07-23 NOTE — TELEPHONE ENCOUNTER
Talked to daughter marion about the results of EEG. EEG showed signs of irritability as suspected based on our last visit. I have recommended to start anti seizure medication. I will recommend starting Keppra 750 mg BID. I counseled her about side effects specifically mood changes and irritability. If she does not tolerate this medication we can switch to Vimpat.     She agrees with the plan    I did talk to her again about use of decadron at the lowest possible dose to help with brain swelling. They have agreed to proceed with that plan already. I have asked her to monitor her symptoms while on both the medication particularly mood changes.    I will put in orders for both medications.

## 2021-07-28 NOTE — TELEPHONE ENCOUNTER
Dr. Martinez has reviewed. No further orders.     Katherine Geronimo RN BSN  [facility]

## 2021-07-28 NOTE — PATIENT INSTRUCTIONS
To: [facility] Fax: [phone]    The following medications were prescribed by Dr. Ro on 7/23/21.      levETIRAcetam (KEPPRA) 750 MG tablet 90 tablet 3 7/23/2021  --   Sig - Route: Take 1 tablet (750 mg) by mouth 2 times daily - Oral   Sent to pharmacy as: levETIRAcetam 750 MG Oral Tablet (KEPPRA)     dexamethasone (DECADRON) 0.5 MG tablet 60 tablet 3 7/23/2021  --   Sig - Route: Take 1 tablet (0.5 mg) by mouth 2 times daily (with meals) - Oral   Sent to pharmacy as: Dexamethasone 0.5 MG Oral Tablet (DECADRON)

## 2021-07-29 NOTE — TELEPHONE ENCOUNTER
Per 7/23/21 [facility] request, patient instructions signed by Dr. Ro faxed to [facility] at [phone]. Abby Colón RN 7/29/2021 3:36 PM

## 2021-08-04 NOTE — TELEPHONE ENCOUNTER
Medication refill information reviewed.     Due date for buprenorphine (BUTRANS) 15 MCG/HR Wk patch  is 8/12/21     Prescriptions prepped for review.     Will route to provider.     Jean Caceres, RN  Patient Care Supervisor   [facility] [place]

## 2021-08-04 NOTE — TELEPHONE ENCOUNTER
Received call from patient requesting refill(s) of buprenorphine (BUTRANS) 15 MCG/HR Wk patch     Last dispensed from pharmacy on 7/13/21    Patient's last office/virtual visit by prescribing provider on 7/2/21  Next office/virtual appointment scheduled for 8/11/21    Last urine drug screen date 6/5/19  Current opioid agreement on file (completed within the last year) No Date of opioid agreement: none    E-prescribe to [place], [place]  pharmacy    Will route to [facility] for review and preparation of prescription(s).

## 2021-08-04 NOTE — TELEPHONE ENCOUNTER
[employer] message sent with Rx approval from provider.  Jaren  Pain Clinic Management Team

## 2021-08-04 NOTE — TELEPHONE ENCOUNTER
Reason for call:  Medication   If this is a refill request, has the caller requested the refill from the pharmacy already? No  Will the patient be using a [place] [facility]? No  Name of the pharmacy and phone number for the current request: [facility], [address]    Name of the medication requested: buprenorphine (BUTRANS) 15 MCG/HR Wk patch    Phone number to reach patient:  Home number on file [phone] (home)    Can we leave a detailed message on this number?  YES    Travel screening: Not Applicable         Taran MCMILLAN    [facility]

## 2021-08-04 NOTE — TELEPHONE ENCOUNTER
Signed Prescriptions:                        Disp   Refills    buprenorphine (BUTRANS) 15 MCG/HR Wk patch 4 patch0        Sig: Place 1 patch onto the skin every 7 days Fill           8/10/2021 and start 8/12/2021. 28 days for           chronic pain  Authorizing Provider: MARION CELAYA    Reviewed [place]  August 4, 2021- no concerning fills.    Marion Celaya APRN, RN CNP, FNP  [facility]  List of hospitals in the [place]

## 2021-08-11 NOTE — NURSING NOTE
Pre-procedure Intake    Have you been fasting? NA    If yes, for how long?     Are you taking a prescribed blood thinner such as coumadin, Plavix, Xarelto?    No    If yes, when did you take your last dose?     Do you take aspirin?  No    If cervical procedure, have you held aspirin for 6 days?       Do you have any allergies to contrast dye, iodine, steroid and/or numbing medications?  NO    Are you currently taking antibiotics or have an active infection?  NO    Have you had a fever/elevated temperature within the past week? NO    Are you currently taking oral steroids? NO    Do you have a ? Yes       Are you pregnant or breastfeeding?  Not Applicable    Have you received the COVID-19 vaccine? Yes       If yes, was it your 1st, 2nd or only dose needed? 2nd dose     Date of most recent vaccine: 2/26/2021    Notify provider and RNs if systolic BP >170, diastolic BP >100, P >100 or O2 sats < 90%    An Menjivar MA

## 2021-08-11 NOTE — PROGRESS NOTES
Pre procedure Diagnosis: SI joint dysfunction, right trochanteric bursitis   Post procedure Diagnosis: Same  Procedure performed: right SI joint injection, right trochanteric bursa injection   Anesthesia: none  Complications: none  Operators: Bushra Alicia MD     Indications:   Teresa Lopez is a 86 year old female sent by Marion Munoz NP for injection. They have a history of right buttock, hip and thigh pain. She had improvement with recent IT band and trochanteric bursa injection, and today we are doing the right SI joint.  Exam shows SI joint tenderness on the right  and they have tried conservative treatment including medications.    Options/alternatives, benefits and risks were discussed with the patient including bleeding, infection, tissue trauma, exposure to radiation, reaction to medications including seizure, nerve injury, weakness, and numbness.  Questions were answered to her satisfaction and she agrees to proceed. Voluntary informed consent was obtained and signed.     Vitals were reviewed: Yes  Allergies were reviewed:  Yes   Medications were reviewed:  Yes   Pre-procedure pain score: 10/10    Procedure:  After getting informed consent, patient was brought into the procedure suite and was placed in a prone position on the procedure table.   A Pause for the Cause was performed.  Patient was prepped and draped in sterile fashion.     After identifying the right SI joint, the C-arm was rotated to a obliquely to obtain the best view of the inferior angle of the joint.  A total of 2 ml of Lidocaine 1%  was used to anesthetize the skin at a skin entry site coaxial with the fluoroscopy beam at this location.  A 22gauge 3.5 inch needle was advanced under intermittent fluoroscopy until it was felt to enter the SI joint.    A total of 1ml of Omnipaque-300 was injected, confirming appropriate position, with spread into the intraarticular space, with no intravascular uptake noted.  9ml was wasted. Location was  "verified in lateral view.    1.5 ml of 0.2% ropivacaine with 30mg of kenalog was injected.  The needle was flushed with lidocaine and removed.    The area over the right trochanter was palpated, and the tender area was identified, corresponding to the area of the trochanteric bursa. No fluoro used  The area was cleaned with Chloroprep.  A 25 G 3.5 inch needle was inserted, aiming towards the trochanter.  When bone was encountered, the needle was slightly drawn.  A solution containing local anesthesic and steroid was injected.  The needle was removed.  Hemostasis was achieved. Bandaids were placed as appropriate.    In total, 2ml of 0.5% ropivacaine, 2ml of 1% lidocaine, and 20mg of kenalog was injected.       Hemostasis was achieved, the area was cleaned, and bandaids were placed when appropriate.  The patient tolerated the procedure well, and was taken to the recovery room.    Images were saved to PACS.    Post-procedure pain score: \" better\"  Follow-up includes:   -f/u with referring provider  -can be sent back in the future for SI joint and trochanteric bursa at the same time.    Bushra Alicia MD  [facility]       "

## 2021-08-11 NOTE — PATIENT INSTRUCTIONS
[facility]   Procedure Discharge Instructions    Today you saw:  Dr. Leah Alicia      You had an:  right sacroiliac joint injection and trochanteric bursa injection  fa    Medications used:  Lidocaine    Omnipaque  Ropivicaine   Kenalog             Be cautious when walking. Numbness and/or weakness in the lower extremities may occur for up to 6-8 hours after the procedure due to effect of the local anesthetic    Do not drive for 6 hours. The effect of the local anesthetic could slow your reflexes.     You may resume your regular activities after 24 hours    Avoid strenuous activity for the first 24 hours    You may shower, however avoid swimming, tub baths or hot tubs for 24 hours following your procedure    You may have a mild to moderate increase in pain for several days following the injection.    It may take up to 14 days for the steroid medication to start working although you may feel the effect as early as a few days after the procedure.       You may use ice packs for 10-15 minutes, 3 to 4 times a day at the injection site for comfort    Do not use heat to painful areas for 6 to 8 hours. This will give the local anesthetic time to wear off and prevent you from accidentally burning your skin.     Unless you have been directed to avoid the use of anti-inflammatory medications (NSAIDS), you may use medications such as ibuprofen, Aleve or Tylenol for pain control if needed.     If you were fasting, you may resume your normal diet and medications after the procedure    If you have diabetes, check your blood sugar more frequently than usual as your blood sugar may be higher than normal for 10-14 days following a steroid injection. Contact your doctor who manages your diabetes if your blood sugar is higher than usual    Possible side effects of steroids that you may experience include flushing, elevated blood pressure, increased appetite, mild headaches and restlessness.  All of  these symptoms will get better with time.    If you experience any of the following, call the Pain Clinic during work hours (Mon-Friday 8-4:30 pm) at [phone] or the Provider Line after hours at [phone]:  -Fever over 100 degree F  -Swelling, bleeding, redness, drainage, warmth at the injection site  -Progressive weakness or numbness in your legs or arms  -Loss of bowel or bladder function  -Unusual headache that is not relieved by Tylenol or other pain reliever  -Unusual new onset of pain that is not improving

## 2021-08-30 NOTE — TELEPHONE ENCOUNTER
Received call from patient requesting refill(s) of buprenorphine (BUTRANS) 15 MCG/HR Wk patch    Last dispensed from pharmacy on 08/10/21    Patient's last office/virtual visit by prescribing provider on 07/02/21  Next office/virtual appointment scheduled for : none    Last urine drug screen date 6/05/19  Current opioid agreement on file (completed within the last year) No Date of opioid agreement: 01/16/17    E-prescribe to pharmacy-[facility], [address]     Will route to nursing [employer] for review and preparation of prescription(s).

## 2021-08-30 NOTE — TELEPHONE ENCOUNTER
Medication refill information reviewed.     Due date for buprenorphine (BUTRANS) 15 MCG/HR Wk patch is 9/9/21     Prescriptions prepped for review.     Will route to provider.     Jean Caceres, RN  Patient Care Supervisor   [facility] [place]

## 2021-08-30 NOTE — TELEPHONE ENCOUNTER
Reason for call:  Medication   If this is a refill request, has the caller requested the refill from the pharmacy already? No  Will the patient be using a [facility]? No  Name of the pharmacy and phone number for the current request: [facility], [address]    Name of the medication requested: buprenorphine (BUTRANS) 15 MCG/HR Wk patch    Other request: none    Phone number to reach patient:  Home number on file [phone] (home)    Best Time:  anytime    Can we leave a detailed message on this number?  YES    Travel screening: Not Applicable     Liliya MATTHEW    [facility]

## 2021-08-31 NOTE — TELEPHONE ENCOUNTER
Signed Prescriptions:                        Disp   Refills    buprenorphine (BUTRANS) 15 MCG/HR Wk patch 4 patch0        Sig: Place 1 patch onto the skin every 7 days Fill           9/7/2021 and start 9/9/2021. 28 days for chronic           pain  Authorizing Provider: MARION CELAYA    Reviewed [place]  August 31, 2021- no concerning fills.    Marion Celaya APRN, RN CNP, FNP  [facility]  [facility] [place] [facility]

## 2021-09-01 NOTE — TELEPHONE ENCOUNTER
[employer] message sent with Rx approval from provider.  Jaren  Pain Clinic Management Team

## 2021-09-13 NOTE — TELEPHONE ENCOUNTER
[facility] Call Center    Phone Message    May a detailed message be left on voicemail: yes     Reason for Call: Other: Brooke calling to schedule a return virtual visit for Teresa. No template is coming up for virtual visits with Dr. Ro. Please call Brooke to discuss scheduling options.      Action Taken: Message routed to:  [facility] neurology    Travel Screening: Not Applicable

## 2021-09-14 NOTE — TELEPHONE ENCOUNTER
Only new patient video visits available for Dr. Ro. Is there a way we can open up a slot for a return patient in his schedule?

## 2021-10-01 NOTE — TELEPHONE ENCOUNTER
Reason for call:  Medication   If this is a refill request, has the caller requested the refill from the pharmacy already? No  Will the patient be using a [facility] [place] [facility]? No  Name of the pharmacy and phone number for the current request: [facility], [address]     Name of the medication requested: buprenorphine (BUTRANS) 15 MCG/HR Wk patch     Other request: none     Phone number to reach patient:  Home number on file [phone] (home)     Best Time:  anytime     Can we leave a detailed message on this number?  YES     Travel screening: Not Applicable

## 2021-10-04 NOTE — TELEPHONE ENCOUNTER
Received call from patient requesting refill(s) of buprenorphine (BUTRANS) 15 MCG/HR Wk patch     Last dispensed from pharmacy on 9/7/21    Patient's last office/virtual visit by prescribing provider on 7/2/21  Next office/virtual appointment scheduled for 10/5/21    Last urine drug screen date none  Current opioid agreement on file (completed within the last year) No Date of opioid agreement: none    E-prescribe to [place]  pharmacy    Will route to [facility] for review and preparation of prescription(s).

## 2021-10-04 NOTE — TELEPHONE ENCOUNTER
Medication refill information reviewed.     Due date for buprenorphine (BUTRANS) 15 MCG/HR Wk patch is 10/05/2021     Prescriptions prepped for review.     Will route to provider.       Brenda Murrell RN, BSN, CMSRN  Care Coordinator  [facility] [place]

## 2021-10-04 NOTE — TELEPHONE ENCOUNTER
Called patient and left a voicemail to let her know that her buprenorphine (BUTRANS) 15 MCG/HR Wk patch with a fill date  10/5/2021 and start 10/7/2021.   Trina Sprague MA  [facility] [place]

## 2021-10-04 NOTE — TELEPHONE ENCOUNTER
Signed Prescriptions:                        Disp   Refills    buprenorphine (BUTRANS) 15 MCG/HR Wk patch 4 patch0        Sig: Place 1 patch onto the skin every 7 days Fill           10/5/2021 and start 10/7/2021. 28 days for           chronic pain  Authorizing Provider: MARION CELAYA    Reviewed [place]  October 4, 2021- no concerning fills.    Marion Celaya APRN, RN CNP, FNP  [facility]  [facility]

## 2021-10-05 NOTE — PATIENT INSTRUCTIONS
Plan:   1. Physical Therapy:  None at present  2. Clinical Health Psychologist: I agree with outside counseling as you are.  3. Diagnostic Studies:  None  4. Medication Management:    1. INCREASE BUTRANS (buprenorphine) 20mcg/hr transdermal patch. Change patch every 7 days.  Wear on clean, dry skin on the upper chest, upper arm or upper back. Fill 10/5 and start 10/7  2. INCREASE Cymbalta (Duloxetine) 60mg every day  3. Continue Lidocaine patches   4. Continue medical cannabis.   5.  use the Voltaren gel three to four times daily regularly for the right lateral hip pain  6. Trial Aspercreme with 4% lidocaine to right lateral hip  7. Could trial Theracare patches to right lateral hip, these are over-the-counter warming patches  5. Further procedures recommended: next injection would be in mid November, either right GT injection or right piriformis injection  6. Recommendations to PCP: none  7. Follow up: 6-8 weeks video or in-person, your choice.  Please call [phone] to make your follow-up appointment with me.     ----------------------------------------------------------------  Clinic Number:  [phone]     Call with any questions about your care and for scheduling assistance.     Calls are returned Monday through Friday between 8 AM and 4:30 PM. We usually get back to you within 2 business days depending on the issue/request.    If we are prescribing your medications:    For opioid medication refills, call the clinic or send a [employer] message 7 days in advance.  Please include:    Name of requested medication    Name of the pharmacy.    For non-opioid medications, call your pharmacy directly to request a refill. Please allow 3-4 days to be processed.     Per [place] State Law:    All controlled substance prescriptions must be filled within 30 days of being written.      For those controlled substances allowing refills, pickup must occur within 30 days of last fill.      We believe regular attendance is key to  your success in our program!      Any time you are unable to keep your appointment we ask that you call us at least 24 hours in advance to cancel.This will allow us to offer the appointment time to another patient.     Multiple missed appointments may lead to dismissal from the clinic.

## 2021-10-05 NOTE — PROGRESS NOTES
"[facility]    10/5/2021           Chief complaint:    Pain is across the low back and extends down the anterior aspect of her right thigh to the knee and the lateral hip on the right side      Interval history:  Teresa Lopez is a 86 year old female is known to me for .   Primary osteoarthritis    Chronic right hip pain   Chronic bilateral low back without sciatica   Spinal stenosis of lumbar region with neurogenic claudication   Piriformis syndrome of both sides   PMHx DJD, CVA, HTN, diabetes mellitus, and cataract   PSHx Hysterectomy        Recommendations/plan at the last visit on 7/2/2021 included:  1. Physical Therapy:  None at present  2. Clinical Health Psychologist: I agree with outside counseling as you are.  3. Diagnostic Studies:  None  4. Medication Management:    1. INCREASE BUTRANS (buprenorphine) 15mcg/hr transdermal patch. Change patch every 7 days.  Wear on clean, dry skin on the upper chest, upper arm or upper back.   2. Continue Cymbalta (Duloxetine) 30mg every day  3. Continue Lidocaine patches   4. Continue medical cannabis.   5.  use the Voltaren gel three to four times daily regularly for the right lateral hip pain  6. Trial Aspercreme with 4% lidocaine to right lateral hip  7. Could trial Theracare patches to right lateral hip, these are over-the-counter warming patches  5. Further procedures recommended: repeat right GT injection in early August   6. Recommendations to PCP: none  7. Follow up: 8-10 weeks video due to COVID-19 viral threat. Please call [phone] to make your follow-up appointment with me.         Since her last visit, Teresa Lopez reports: she is accompanied on video today by her daughter, Abdi    Interval history October 5, 2021  -Jefferson is doing \"like hell.\"  -having pain over the right GT and right piriformis  -her mood is low, wishes she would just die, but no specific wishes for suicide  -continues to be frustrated that she cannot " "participate in all of the athletics that she used to do when she was younger.   -wants her pain to be \"gone.\"   -explained much of her pain has to do with wear and tear arthritis and that we need to think in terms of managing her pain vs curing her pain. Additionally, we discussed how low mood can impact pain experience in a negative way      Haylee, patient's daugther's impression  -GT injection seemed to work better than the SI joint injection  -last 3 weeks Jefferson has been complaining more about her pain  -Butrans patch is changed on Thursdays  -Jefferson is doing jigsaw puzzles, rearranges a drawer  -Jefferson is outgoing with staff and people there  -goes to lunch and dinner but sometime needs assistance  -typically is better after lunch, mornings are the most bothersome.       Interval history July 2, 2021  -She was admitted to the hospital on 6/19/2021 for confusion and aphasia.  -she has a brain tumor and will be seeing her previous neurosurgeon at the [facility] in July. Will be having an EEG  -functionally she is doing better at Assisted Living, has made friends.   -prior to going to the ER, she had been going to the gym, she had been doing a seated elliptical machine  -Jefferson is still struggling with bilateral low back pain and right leg pain to the level of the knee.   -Jefferson states her worst pain is the right lateral hip and would like to repeat GT injection when able (last done in early May 2021, needs to wait 12 weeks)        Interval history April 28, 2021  -moving to [facility]. She moves in on May 1. She has some high school friends who live there. Currently living with her daughter, Abdi until she moves to [facility]. Jefferson is very excited about this move.   -Butrans has been helpful. Tolerating well. Jefferson would like to increase her dosage on this. She also needs to reschedule injection with Dr. Jamaal Eid as she had to reschedule due to a recent short " hospitalization.   -Abdi, Jefferson's daughter, notes Jefferson is walking better and is not wincing in pain as much since she has been using the Butrans patch.       Interval history April 13, 2021-Tries ice packs  Per Jefferson:  elastic brace on right knee, somewhat helpful  -she feels that her right knee feels weaker  -she has trouble with weakness in her ankles bilateral  -she uses ankle supports on her ankles (elastic)  -she feels that her ankles are swollen  -Jefferson feels that she has a muscle spasm on her right lower back  -her worst pain is over the low back on the right side and wraps around to the lateral right hip. She is unsure if the right GT injection was helpful as she bumped her hip recently and has some soreness from that.  -she is tangential in her speech, focuses on her wish to be more active like she was in her 70's when she used to still ice skate and go walking.     -Per Abdi, patient's daugther  Injection in right GT, unsure if helpful  -jefferson called paramedics last week when Abdi was out of town. [place] she could not walk or get to the bathroom due to pain  -yesterday she was the same way  -her daughters have told her to take an extra prednisone tablet on those flare days.  -they have a list of her medication with list of when she is supposed to take it, Jefferson has some short-term memory loss and sometimes does not do so (yesterday woke at 0800 so she did not take her 0600 medication)  -Abdi notes that early last year, Dr. Engle (Primary Care Provider) had prescribed  hydrocodone/acetaminophen and it upset Jefferson's stomach. When Jefferson reports a severe flare,  Abdi or Radha (her daughters) have her take the hydrocodone/acetaminophen tablets. It is unclear if these are effective or if she takes them.   - her daughter's are now getting calls from Jefferson twice per week from her stating she is going to die and she wants the kids to come over.   -she does not sleep well when she has more pain and then Jefferson is calling them in  "the middle of the night  -Rubi and Radha are looking for a more suitable assisted living situation with more activities available to their mother, but have not yet found one that they like that is affordable.       Interval history February 24, 2021  -She states she is not certain she can go on like this. She is not crying today. Very talkative. Last telephone call, barely able to speak, crying throughout most of encounter.  -she is frustrated that she hurts all of the time  -she lays on the alexander and does stretching.   -she wants to have a repeat right greater trochanteric bursal injection, but she is having her 2nd COVID vaccine on 2/26/2021. We advise waiting 2 weeks after the 2nd COVID vaccine prior to elective steroid injections.  -Radha states she and her sister Haylee feel that the Cymbalta has been somewhat helpful. Will increase dosage to 30mg/day.       Interval history January 6, 2021  -spoke with Jefferson and her daughter Rubi on speakerphone.  -Jefferson ran out of her oral steroids for 6 days, her steroid injection  -she just picked the prednisone today.   -Jefferson had a right sided greater trochanteric bursal injection in November 2020.   -Jefferson and Rubi would like to re-trial the Cymbalta. She has stopped taking it because she was not eating and having issues with nausea.   -We talked about trying to use the Cymbalta either with food in the morning OR at bedtime with a snack.   -Jefferson indicates she is having right sided pain over the lateral hip pain.  -She has seen Dr. Lewis with podiatry. She may need to have her left 2nd toe removed.       Interval history 11/4/2020  -low back pain and lateral hip pain on the right side.    -new shoe insert about a week ago, her daughter Rubi states this has helped her balance.   -Jefferson states she has some \"snapping\" in the right hip.   -Jefferson has not yet begun the Cymbalta, she wanted to try the shoe insert first  -Jefferson has had a tough day especially on Sunday 11/1, she " "wasn't walking around as much as usual.  -patient is with her daughter Abdi throughout the encounter      Interval history 10/2/2020  -right SI joint injection has been helpful  -She is having pain in the right side of the hip, laterally, not anterior groin pain  -patient feels her left leg is longer than the right leg  -She has been more active  -she is seeing a counselor, has been seen x 3      Interval history 8/26/2020  -She states she is not doing very well sometimes.   -she feels her right hip is \"very bad.\"   -She had a 7/30/2020 right greater trochanteric bursal injection, her pain came down from an 8/10 to a 2/10 for her right GT.  -still has some trouble standing due to a spot that is painful, she is describing a spot of pain over the SI joint. This tends to hurt when she stands to do things and feels better when she is sitting down.   -she accompanied today by her daughter Abdi on the phone today  -recertified for medical cannabis today  -she has a hammer toe and this has been really painful for her the past 2 days.       Interval history 7/15/2020  -she is not feeling well  -she sleeps with a pillow between her legs  -she notes she is having \"bad pain\" on the lateral hip on the right side. She has pain when she lays on her right side in bed. She has had a greater trochanteric bursal injection in June 2019 that was helpful.   -she is not eating well as she is isolated and feels depressed and feels shaky as she doesn't eat as regularly as she should.   -discussed using Voltaren gel, Cymbalta and greater trochanteric injection      Interval history 5/6/2020  -Teresa is having some more hip pain on the right side between the top of the hip and up and the tailbone to the hip  -SI joint injection on the right side was beneficial and this has made her pain more focused on the right hip.   -she has pain if she lays on her right side  -she is having more pain in the deep right groin. She notes when she walks " "this pain is markedly worse.         At this point, the patient's participation with our multidisciplinary team includes:  The patient has been compliant with the program  PT - patient is interested  Health Psych  None      Pain scores:  Pain intensity on average is 8-9 on a scale of 0-10.   Range is 8-10/10.   Pain is described as \"miserable, tightness.\"  Pain is continuous in nature.      Current pain-related medication treatments include:  -Lidocaine patch (helpful)  -Medical cannabis (helpful)  -Cymbalta 30mg/day (tolerating well, somewhat helpful for mood if not for pain)  -Butrans 15mcg/hr TD Q 7 days (helpful)         Other pertinent medications:  -Senna-docusate PRN  -Aricept 5mg/day        Previous medication treatments included:  OPIATES:hydrocodone (not helpful), oxycodone (somewhat helpful), Butrans (helpful)  NSAIDS: ibuprofen (not helpful), Aleve (not helpful)  MUSCLE RELAXANTS: none  ANTI-MIGRAINE MEDS: none  ANTI-DEPRESSANTS: Cymbalta (somewhat helpful)  SLEEP AIDS: none  ANTI-CONVULSANTS: gabapentin (unsure if helpful, side effects: hot flashes, fogginess)  TOPICALS: none  Other meds: Tylenol (not helpful)        Injections:  -11/10/2017 Caudal epidural steroid injection with Dr. Reymundo Bajwa (helpful for a very short period of time)  -1/5/2018 Ultrasound guided right intra-articular hip injection with Dr. Jean Meade  -1/12/2018 Right ankle injection with Dr. Lewis of podiatry.   -7/3/2018 L2-3 interlaminar epidural steroid injection with Dr Jamaal Eid (not at all helpful)  -8/3/2018 right hip steroid injection with Dr. Valverde (helped some)  -6/14/2019 bilateral hip greater trochanteric bursa injection with Dr. Jamaal Eid (helpful)   -1/23/2020 right SI joint injection with Dr. Chasidy Laura (quite helpful)  5/13/2020 right hip joint steroid injection with Dr. Valverde (quite helpful)  -7/30/2020 right hip greater trochanteric bursal injection with Dr. Jamaal Eid (quite " helpful)  -9/15/2020 right SI joint injection with Dr. Jamaal Eid (pain diminished about 25%, sharpness went away)  -11/19/2020 right hip greater trochanteric bursal injection with Dr. Eid (helpful)  -3/18/2021 right hip greater trochanteric bursal injection with Dr. Jamaal Eid (unsure)  5/5/2021 trigger point injections, right trochanteric bursal injection with Dr. Bushra Alicia  (helpful)  -5/12/2021 right SI joint injection with Dr. Bushra Alicia  (helpful)  -8/11/2021 right SI joint injection and right greater trochanteric bursal injection with Dr. Bushra Alicia  (helpful, GT was more helpful than the SI joint injection)      Side Effects: no side effect  Patient is using the medication as prescribed: YES    Medications:  Current Outpatient Medications   Medication Sig Dispense Refill     Acetaminophen (TYLENOL ARTHRITIS PAIN PO) As needed not taking daily       buprenorphine (BUTRANS) 15 MCG/HR Wk patch Place 1 patch onto the skin every 7 days Fill 10/5/2021 and start 10/7/2021. 28 days for chronic pain 4 patch 0     buprenorphine (BUTRANS) 20 MCG/HR WK patch Place 1 patch onto the skin every 7 days Fill 10/5 and start 10/7/2021 to last 28 days 4 patch 0     CALCIUM 1200 OR Reported on 4/20/2017       ciclopirox (LOPROX) 0.77 % cream Apply topically 2 times daily To feet and toenails. 90 g 3     dexamethasone (DECADRON) 0.5 MG tablet Take 1 tablet (0.5 mg) by mouth 2 times daily (with meals) 60 tablet 3     donepezil (ARICEPT) 5 MG tablet Take 1 tablet (5 mg) by mouth At Bedtime 30 tablet 2     DULoxetine (CYMBALTA) 30 MG capsule Take 1 capsule (30 mg) by mouth daily 30 capsule 1     DULoxetine (CYMBALTA) 60 MG capsule Take 1 capsule (60 mg) by mouth daily 3 month script 90 capsule 1     Glucosamine-Chondroit-Vit C-Mn (GLUCOSAMINE 1500 COMPLEX PO) Take 1,500 mg by mouth daily Reported on 4/20/2017       levETIRAcetam (KEPPRA) 750 MG tablet Take 1 tablet (750 mg) by mouth 2 times daily 90 tablet  3     lidocaine (LIDODERM) 5 % patch Apply 1-3 patches to external skin Q 24 hours as needed.  Okay for pt to self-administer. 90 patch 11     Loratadine (CLARITIN) 10 MG capsule Take 10 mg by mouth as needed Reported on 4/20/2017       losartan-hydrochlorothiazide (HYZAAR) 100-25 MG tablet Take 1 tablet by mouth daily 90 tablet 3     magnesium 250 MG tablet Take 1 tablet by mouth daily       MULTI-VITAMIN OR TABS 1 TABLET DAILY       potassium aminobenzoate 500 MG CAPS capsule        predniSONE (DELTASONE) 2.5 MG tablet TAKE 1 TABLET BY MOUTH DAILY. MAY REPEAT ONCE DAILY AS NEEDED 120 tablet 0     senna-docusate (SENOKOT-S;PERICOLACE) 8.6-50 MG per tablet Take 1 tablet by mouth 2 times daily 120 tablet 12     simvastatin (ZOCOR) 40 MG tablet Take 1 tablet (40 mg) by mouth At Bedtime 90 tablet 0       Medical History: any changes in medical history since they were last seen? No    Social History:  Home situation: . 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  Tobacco use: none  Alcohol use: very little, about 2 drinks per month  Drug use: none  History of chemical dependency treatment: none    Is patient a current smoker or tobacco user?  no  If yes, was cessation counseling offered?  no              Physical Exam:   Vital signs: BP (!) 179/81   Pulse 73   Wt 53.5 kg (118 lb)   BMI 20.25 kg/m      Behavioral observations:  Awake and alert, cooperative    Pulm: respirations easy and unlabored. Able to speak in full sentences without SOB or cough noted.      Musculoskeletal: strength is grossly equal  Significant tenderness over the right GT and right piriformis  No tenderness over the SI joints at present time    Neurologic: SILT in all extremities      Skin: No suspicious lesions on exposed skin.    Other: na          Imaging  MRI LUMBAR SPINE WITHOUT CONTRAST   3/30/2018 1:22 PM      HISTORY:  Lumbar degenerative disc  disease.     TECHNIQUE: Multiplanar multisequence MRI of the lumbar spine without  contrast.     COMPARISON: Lumbar spine x-ray 3/22/2018. Lumbar spine MR 3/17/2017.      FINDINGS: There are 5 lumbar-type vertebral bodies assumed for the  purposes of this dictation. The tip of the conus terminates at the  L1-L2 level.     There is convex right scoliotic curvature of the lumbar spine centered  at the L2-L3 level. There is mild retrolisthesis of L2 on L3 and mild  anterolisthesis of L3 on L4. There is grade 1 anterolisthesis of L4 on  L5. There is right lateral listhesis of L3 on L4. There is slight loss  of left-sided vertebral body height at L3, probably due to  degenerative changes. Severe loss of intervertebral disc height seen  at L3-L4 with associated degenerative endplate changes. Severe loss of  disc height also seen on the left at L1-L2 and L2-L3 with severe  degenerative endplate changes. Mild loss of disc height at T11-12 and  T12-L1 with disc desiccation at L4-5 and L5-S1. There is mild STIR  hyperintense marrow edema at the opposing L2-L3 endplates.  Degenerative subchondral cysts seen anteriorly at L3.     Level by level as follows:      T12-L1:  Only seen on the lateral view, but there is a posterior disc  bulge and bilateral facet hypertrophy resulting in moderate right and  mild left neural foraminal narrowing. No significant spinal canal  narrowing.      L1-L2:  Convex right curvature with retrolisthesis and circumferential  disc bulge with endplate osteophytic spurring as well as bilateral  facet hypertrophy. Findings result in mild central spinal canal  narrowing and moderate bilateral neural foraminal narrowing.      L2-L3:  Convex right scoliotic curvature with left-sided disc bulge  and endplate osteophytic spurring as well as, left greater than right,  facet hypertrophy and ligamentum flavum infolding. Findings result in  moderate central spinal canal narrowing as well as moderate to  severe  left neural foraminal narrowing. There is mild right neural foraminal  narrowing.      L3-L4:  Mild anterolisthesis along with circumferential disc bulge,  severe bilateral facet hypertrophy, and ligamentum flavum infolding.  Findings result in severe central spinal canal narrowing. There is  also severe left and moderate to severe right neural foraminal  narrowing.      L4-L5:  Anterolisthesis with uncovering of the disc and small  posterior disc bulge along with severe bilateral facet hypertrophy and  ligamentum flavum infolding. Findings result in moderate to severe  central spinal canal narrowing. There is also moderate to severe  bilateral neural foraminal narrowing. Small bilateral facet joint  effusions.      L5-S1:  Mild posterior disc bulge and marked bilateral facet  hypertrophy results in moderate bilateral neural foraminal narrowing,  right greater than left. No significant central spinal canal  narrowing.      Paraspinous soft tissues:  Normal.          IMPRESSION:    1. Severe multilevel degenerative changes throughout the lumbar spine  as described above. Overall, there is no significant change since  previous MR 3/17/2017.  2. Severe central spinal canal narrowing at L3-L4 and moderate to  severe spinal canal narrowing at L4-L5.  3. Convex right scoliotic curvature of the spine with multiple levels  of spondylolisthesis and right lateral listhesis of L3 on L4.     DIANA HERNANDEZ MD        [place] Pres[place] Monitoring [place]:  [place]ed [place]  10/5/2021- no concerning fills.  Marion SANCHES RN CNP, FNP  [facility]  [place] location        Assessment:   1. Right piriformis syndrome  2. Greater trochanteric bursitis of the right hip  3. DDD (degenerative disc disease), lumbar  4. Spinal stenosis of lumbar region with neurogenic claudication  5. Chronic bilateral low back pain without sciatica  6. Chronic right SI joint pain  7. Right hip joint pain  8. Right  hip osteoarthritis  9. Chronic continuous use of opioids  10. Chronic pain syndrome  11. Encounter for long term use of opiates        Plan:   1. Physical Therapy:  None at present  2. Clinical Health Psychologist: I agree with outside counseling as you are.  3. Diagnostic Studies:  None  4. Medication Management:    1. INCREASE BUTRANS (buprenorphine) 20mcg/hr transdermal patch. Change patch every 7 days.  Wear on clean, dry skin on the upper chest, upper arm or upper back. Fill 10/5 and start 10/7  2. INCREASE Cymbalta (Duloxetine) 60mg every day  3. Continue Lidocaine patches   4. Continue medical cannabis.   5.  use the Voltaren gel three to four times daily regularly for the right lateral hip pain  6. Trial Aspercreme with 4% lidocaine to right lateral hip  7. Could trial Theracare patches to right lateral hip, these are over-the-counter warming patches  5. Further procedures recommended: next injection would be in mid November, either right GT injection or right piriformis injection  6. Recommendations to PCP: none  7. Follow up: 6-8 weeks video or in-person, your choice.  Please call [phone] to make your follow-up appointment with me.        BILLING TIME DOCUMENTATION:   The total TIME spent on this patient on the day of the appointment was 51 minutes.      TOTAL TIME includes:   Time spent preparing to see the patient: 3 minutes (reviewing records and tests)  Time spend face to face with the patient: 38 minutes  Time spent ordering tests, medications, procedures and referrals: 0 minutes  Time spent Referring and communicating with other healthcare professionals: 0 minutes  Documenting clinical information in Epic: 10 minutes          Marion SANCHES, RN CNP, FNP  [facility]

## 2021-10-06 NOTE — TELEPHONE ENCOUNTER
Forms received from assisted living and memory care asking for documentation. Placed in Marion disla.     An Menjivar MA

## 2021-10-19 PROBLEM — F32.9 MAJOR DEPRESSION: Status: ACTIVE | Noted: 2019-01-02

## 2021-10-25 NOTE — TELEPHONE ENCOUNTER
Per patient myChart message:  From: Jefferson Lopez      Created: 10/25/2021 10:10 AM      Yes.  I think my mom is eligible around November 10.  Three months since the last injection.      Thank you.          Routed to provider.     Natalie RN-BSN  [facility]

## 2021-10-25 NOTE — TELEPHONE ENCOUNTER
"Per patient myChart message:  From: Jefferson Lopez      Created: 10/22/2021 5:21 PM      Marion, as discussed at Mom's last visit, this note is an updated on her reaction to the increased medication doses.    Up until today, Mom has been in a lot of pain and has not been walking much at all.  She does make it to the dining [place] for dinner, but not for lunch.  She is also saying \"this is it, I am not going to make it much longer\" the last week and 1/2.  Both the increased medication doses are into the second week and I am hoping more pain relief is to come with continued use.  Will you be able to place an order for her quarterly shots with Dr. Jarquin?   I and the nurse on staff at Mom's assisted living facility, have noticed that her pain complaints increase as the time after the injection increases, about the 2 to 2.5 month range.    Thank you,  Brooke Lopez        8/11/21: right SI joint injection, right trochanteric bursa injection   _________________________________    Mychart sent to pt:  From: Natalie Garcia RN      Created: 10/25/2021 9:53 AM        Natalie Paulino,  Are you wanting repeat right SI joint injection, right trochanteric bursa injection right now?     МАРИЯ Estrada-BSN  [facility]        "

## 2021-10-29 NOTE — TELEPHONE ENCOUNTER
Several attempts made to return call - call would not go through. Abby Colón RN 10/29/2021 5:20 PM

## 2021-10-29 NOTE — TELEPHONE ENCOUNTER
Screening Questions for Radiology Injections:    Injection to be done at which interventional clinic site? [facility] [place]    If [facility] location, tell patient that this procedure requires a COVID-19 lab test be done within 4 days of the procedure. Would you still like to move forward with scheduling the procedure?  Not Applicable   If YES, let patient know that someone will call them to schedule the COVID-19 test and that they will only receive a call back if the result is positive. Route to nursing to enter order.     Instruct patient to arrive as directed prior to the scheduled appointment time:    [place] minutes before      [place]: 30 minutes before; if IV needed 1 hour before     Procedure ordered by Tammy    Procedure ordered? repeat right SI joint injection AND right greater trochanteric bursal injection      Transforaminal Cervical DELFINA -  [facility] does NOT have providers that do these- [facility] and [facility] do have providers that do    As a reminder, receiving steroids can decrease your body's ability to fight infection.   Would you still like to move forward with scheduling the injection?  Yes    What insurance would patient like us to bill for this procedure? [facility] Medicare/ [employer]      Worker's comp or MVA (motor vehicle accident) -Any injection DO NOT SCHEDULE and route to [facility] [place].      [employer] insurance - For SI joint injections, DO NOT SCHEDULE and route [facility] [place].       ALL BCBS, [employer] and [employer]-Route to Stacy for review DO NOT SCHEDULE      IF SCHEDULING IN [place] AND NEEDS A PA, IT IS OKAY TO SCHEDULE. [place] HANDLES THEIR OWN PA'S AFTER THE PATIENT IS SCHEDULED. PLEASE SCHEDULE AT LEAST 1 WEEK OUT SO A PA CAN BE OBTAINED.    Any chance of pregnancy? NO   If YES, do NOT schedule and route to RN pool    Is an  needed? No     Patient has a drive home? (mandatory) YES: INFORMED    Is patient taking any blood thinners (That  is: Coumadin, Warfarin, Jantoven, Pradaxa Xarelto, Eliquis, Edoxaban, Enoxaparin, Lovenox, Heparin, Arixtra, Fondaparinux, or Fragmin? OR Antiplatelet medication such as Plavix, Brilinta, or Effient? )? No   If hold needed, do NOT schedule, route to RN pool     Is patient taking any aspirin products (includes Excedrin and Fiorinal)? No     If more than 325mg/day, OK to schedule; Instruct pt to decrease to less than 325 mg for 7 days AND route to RN pool    For CERVICAL procedures, hold all aspirin products for 6 days.     Tell pt that if aspirin product is not held for 6 days, the procedure WILL BE cancelled.      Does the patient have a bleeding or clotting disorder? No     If YES, okay to schedule AND route to RN nurse pool    For any patients with platelet count <100, must be forwarded to provider    Any allergies to contrast dye, iodine, shellfish, or numbing and steroid medications? No    If YES, add allergy information to appointment notes AND route to the RN pool     If DLEFINA and Contrast Dye / Iodine Allergy? DO NOT SCHEDULE, route to RN pool    Allergies: Citalopram, Gabapentin, Lexapro [escitalopram], Salsalate, and Sulfa drugs     Is patient diabetic?  No  If YES, instruct them to bring their glucometer.    Does patient have an active infection or treated for one within the past week? No     Is patient currently taking any antibiotics?  No     For patients on chronic, preventative, or prophylactic antibiotics, procedures may be scheduled.     For patients on antibiotics for active or recent infection:antibiotic course must have been completed for 4 days    Is patient currently taking any steroid medications? (i.e. Prednisone, Medrol)  No     For patients on steroid medications, course must have been completed for 4 days    Is patient actively being treated for cancer or immunocompromised? No  If YES, do NOT schedule and route to RN pool     Are you able to get on and off an exam table with minimal or no  assistance? Yes  If NO, do NOT schedule and route to RN pool    Are you able to roll over and lay on your stomach with minimal or no assistance? Yes  If NO, do NOT schedule and route to RN pool     Has the patient had a flu shot or any other vaccinations within 7 days before or after the procedure.  No     Have you recently had a COVID vaccine or have plans to get it in the near future? No    If yes, explain that for the vaccine to work best they need to:       wait 1 week before and 1 week after getting Vaccine #1    wait 1 week before and 2 weeks after getting Vaccine #2    wait 1 week before and 2 weeks after getting Vaccine BOOSTER    If patient has concerns about the timing, send to RN pool     Does patient have an MRI/CT?  Not Applicable  Check Procedure Scheduling Grid to see if required.      Was the MRI done within the last 3 years?  NA    If yes, where was the MRI done i.e.[facility] [place], [facility], [place], [facility] etc?       If no, do not schedule and route to RN pool    If MRI was not done at [place], [facility] or [facility] [place] do NOT schedule and route to RN pool.      If pt has an imaging disc, the injection MAY be scheduled but pt has to bring disc to appt.     If they show up without the disc the injection cannot be done    Procedure Specific Instructions:      If celiac plexus block, informed patient NPO for 6 hours and that it is okay to take medications with sips of water, especially blood pressure medications  Not Applicable         If this is for a cervical procedure, informed patient that aspirin needs to be held for 6 days.   Not Applicable      If IV needed:    Do not schedule procedures requiring IV placement in the first appointment of the day or first appointment after lunch. Do NOT schedule at 0745, 0815 or 1245.     Instructed pt to arrive 30 minutes early for IV start if required. (Check Procedure Scheduling Grid)  Not Applicable    Reminders:      If you are started on any  steroids or antibiotics between now and your appointment, you must contact us because the procedure may need to be cancelled.  Yes      For all procedures except radiofrequency ablations (RFAs) and spinal cord stimulator (SCS) trials, informed patient:    IV sedation is not provided for this procedure.  If you feel that an oral anti-anxiety medication is needed, you can discuss this further with your referring provider or primary care provider.  The Pain Clinic provider will discuss specifics of what the procedure includes at your appointment.  Most procedures last 10-20 minutes.  We use numbing medications to help with any discomfort during the procedure.  Not Applicable      For patients 85 or older we recommend having an adult stay w/ them for the remainder of the day.       Does the patient have any questions?  NO  Liliya Garvey  [facility]

## 2021-10-29 NOTE — TELEPHONE ENCOUNTER
Health Call Center    Phone Message    May a detailed message be left on voicemail: yes     Reason for Call: Medication Question or concern regarding medication   Prescription Clarification  Name of Medication: levETIRAcetam (KEPPRA) 750 MG tablet [02811]     Prescribing Provider: Missy   Pharmacy: [facility], [address]   What on the order needs clarification?  Patients PCP's had Kreppa levels drawn yesterday and the result at 54-   PCP is wondering if Dr. Louis would want to make adjustments to the Rx. Patient is  Currently taking 750mg twice a day. Nurse Trina will fax over result.     Please call Trina to discuss changes with verbal order on Rx.      Action Taken: Message routed to:  [facility]    Travel Screening: Not Applicable

## 2021-10-29 NOTE — TELEPHONE ENCOUNTER
Mahin Leach and Jefferson-     I have signed orders for Jefferson to have repeat right sided injections on/after November 10th.   I am sorry to hear that Jefferson is still struggling with pain.      Marion SANCHES, RN CNP, FNP  [facility]  [facility]

## 2021-11-01 NOTE — TELEPHONE ENCOUNTER
Received fax from pharmacy requesting refill(s) of buprenorphine (BUTRANS) 20 MCG/HR WK patch     Last dispensed from pharmacy on 10/05/21    Patient's last office/virtual visit by prescribing provider on 10/05/21    Next office/virtual appointment scheduled for 11/15/21 (Dr. Alicia)    Last urine drug screen date 10/05/21    Current opioid agreement on file (completed within the last year) No Date of opioid agreement: None on file    E-prescribe to   [address] [address]  Phone: [phone] Fax: [phone]    Will route to nursing [place] for review and preparation of prescription(s).     Gabriella Vargas [facility] [place]

## 2021-11-01 NOTE — TELEPHONE ENCOUNTER
Signed Prescriptions:                        Disp   Refills    buprenorphine (BUTRANS) 20 MCG/HR WK patch 4 patch0        Sig: Place 1 patch onto the skin every 7 days Fill 11/2           and start 11/4/2021 to last 28 days  Authorizing Provider: MARION CELAYA    Reviewed [place]  November 1, 2021- no concerning fills.    Marion SANCHES, RN CNP, FNP  [facility]  [facility]

## 2021-11-01 NOTE — TELEPHONE ENCOUNTER
Several attempts made to return call - call would not go through. Abby Colón RN 11/1/2021 10:50 AM     Addendum:  Several attempts made to return call - call would not go through. Abby Colón RN 11/1/2021 3:31 PM

## 2021-11-01 NOTE — TELEPHONE ENCOUNTER
Medication refill information reviewed.     Due date for Received fax from pharmacy requesting refill(s) of buprenorphine (BUTRANS) 20 MCG/HR WK patch      Last dispensed from pharmacy on 10/05/21 is 11/4/21     Prescriptions prepped for review.     Will route to provider.       Brenda Murrell RN, BSN, CMSRN  RN Care Coordinator  [facility]

## 2021-11-02 NOTE — TELEPHONE ENCOUNTER
Spoke with Shanti at facility. Reports patient has increased irritability, and is not socializing as she used to. Not coming down for meals any longer.    Patient taking levetiracetam 750 twice daily. No seizures that they are aware of.     Will consult with Dr. Ro. Abby Colón RN 11/2/2021 3:32 PM

## 2021-11-02 NOTE — TELEPHONE ENCOUNTER
[employer] message sent with Rx approval from provider.  Jaren  Pain Clinic Management Team

## 2021-11-02 NOTE — TELEPHONE ENCOUNTER
[facility] Call Center    Phone Message    May a detailed message be left on voicemail: yes     Reason for Call: Other: Trina at [facility] called back to follow up and provided corrected phone number of [phone].       Please call back t earliest convenience.    Action Taken: Message routed to:  [facility] Neurology    Travel Screening: Not Applicable

## 2021-11-03 NOTE — TELEPHONE ENCOUNTER
Per Dr. Ro - decrease levetiracetam to 500 mg twice daily and follow-up as scheduled.     Informed Trina of above. Understanding verbalized. New prescription sent to [facility] pharmacy. Abby Colón RN 11/3/2021 4:45 PM

## 2021-11-15 NOTE — NURSING NOTE
Pre-procedure Intake  If YES to any questions or NO to having a   Please complete laminated checklist and leave on the computer keyboard for Provider, verbally inform provider if able.    For SCS Trial, RFA's or any sedation procedure:  Have you been fasting? NA    If yes, for how long?     Are you taking any any blood thinners such as Coumadin, Warfarin, Jantoven, Pradaxa Xarelto, Eliquis, Edoxaban, Enoxaparin, Lovenox, Heparin, Arixtra, Fondaparinux, or Fragmin? OR Antiplatelet medication such as Plavix, Brilinta, or Effient?   No     If yes, when did you take your last dose?     Do you take aspirin?  No    If cervical procedure, have you held aspirin for 6 days?       Do you have any allergies to contrast dye, iodine, steroid and/or numbing medications?  NO    Are you currently taking antibiotics or have an active infection?  NO    Have you had a fever/elevated temperature within the past week? NO    Are you currently taking oral steroids? NO    Do you have a ? Yes    Are you pregnant or breastfeeding?  NO    Have you received the COVID-19 vaccine? Yes    If yes, was it your 1st, 2nd or only dose needed? 2nd dose     Date of most recent vaccine: 2/26/21    Notify provider and RNs if systolic BP >170, diastolic BP >100, P >100 or O2 sats < 90%

## 2021-11-15 NOTE — NURSING NOTE
Discharge Information    IV Discontiued Time:  NA    Amount of Fluid Infused:  NA    Discharge Criteria = When patient returns to baseline or as per MD order    Consciousness:  Pt is fully awake    Circulation:  BP +/- 20% of pre-procedure level    Respiration:  Patient is able to breathe deeply    O2 Sat:  Patient is able to maintain O2 Sat >92% on room air    Activity:  Moves 4 extremities on command    Ambulation:  Patient is able to stand and walk or stand and pivot into wheelchair    Dressing:  Clean/dry or No Dressing    Notes:   Discharge instructions and AVS given to patient    Patient meets criteria for discharge?  YES    Admitted to PCU?  No    Responsible adult present to accompany patient home?  Yes    Signature/Title:    Brenda Rivas RN  RN Care Coordinator  [facility] [place]

## 2021-11-15 NOTE — PATIENT INSTRUCTIONS
[facility]   Procedure Discharge Instructions    Today you saw: Dr. Leah Alicia     You had an:  Repeat right sacroiliac joint injection  & right greater trochanteric bursal injection     Medications used:  Lidocaine    Omnipaque  Ropivicaine   Kenalog             Be cautious when walking. Numbness and/or weakness in the lower extremities may occur for up to 6-8 hours after the procedure due to effect of the local anesthetic    Do not drive for 6 hours. The effect of the local anesthetic could slow your reflexes.     You may resume your regular activities after 24 hours    Avoid strenuous activity for the first 24 hours    You may shower, however avoid swimming, tub baths or hot tubs for 24 hours following your procedure    You may have a mild to moderate increase in pain for several days following the injection.    It may take up to 14 days for the steroid medication to start working although you may feel the effect as early as a few days after the procedure.       You may use ice packs for 10-15 minutes, 3 to 4 times a day at the injection site for comfort    Do not use heat to painful areas for 6 to 8 hours. This will give the local anesthetic time to wear off and prevent you from accidentally burning your skin.     Unless you have been directed to avoid the use of anti-inflammatory medications (NSAIDS), you may use medications such as ibuprofen, Aleve or Tylenol for pain control if needed.     If you were fasting, you may resume your normal diet and medications after the procedure    If you have diabetes, check your blood sugar more frequently than usual as your blood sugar may be higher than normal for 10-14 days following a steroid injection. Contact your doctor who manages your diabetes if your blood sugar is higher than usual    Possible side effects of steroids that you may experience include flushing, elevated blood pressure, increased appetite, mild headaches and  restlessness.  All of these symptoms will get better with time.    If you experience any of the following, call the Pain Clinic during work hours (Mon-Friday 8-4:30 pm) at [phone] or the Provider Line after hours at [phone]:  -Fever over 100 degree F  -Swelling, bleeding, redness, drainage, warmth at the injection site  -Progressive weakness or numbness in your legs   -Loss of bowel or bladder function  -Unusual headache that is not relieved by Tylenol or other pain reliever  -Unusual new onset of pain that is not improving

## 2021-11-15 NOTE — PROGRESS NOTES
Pre procedure Diagnosis: SI joint dysfunction, right trochanteric bursitis   Post procedure Diagnosis: Same  Procedure performed: right SI joint injection, right trochanteric bursa injection   Anesthesia: none  Complications: none  Operators: Bushra Alicia MD and Jarad Smith DO     Indications:   Teresa Lopez is a 86 year old female sent by Marion Munoz NP for injection. They have a history of right buttock, hip and thigh pain. She had improvement with previous injections.  Exam shows SI joint tenderness on the right trochanteric bursa tenderness and they have tried conservative treatment including medications.    Options/alternatives, benefits and risks were discussed with the patient including bleeding, infection, tissue trauma, exposure to radiation, reaction to medications including seizure, nerve injury, weakness, and numbness.  Questions were answered to her satisfaction and she agrees to proceed. Voluntary informed consent was obtained and signed.     Vitals were reviewed: Yes  Allergies were reviewed:  Yes   Medications were reviewed:  Yes   Pre-procedure pain score: 10/10    Procedure:  After getting informed consent, patient was brought into the procedure suite and was placed in a prone position on the procedure table.   A Pause for the Cause was performed.  Patient was prepped and draped in sterile fashion.     After identifying the right SI joint, the C-arm was rotated to a obliquely to obtain the best view of the inferior angle of the joint.  A total of 2 ml of Lidocaine 1%  was used to anesthetize the skin at a skin entry site coaxial with the fluoroscopy beam at this location.  A 22gauge 3.5 inch needle was advanced under intermittent fluoroscopy until it was felt to enter the SI joint.    A total of 1ml of Omnipaque-300 was injected, confirming appropriate position, with spread into the intraarticular space, with no intravascular uptake noted.  9ml was wasted. Location was verified in  lateral view.    1.5 ml of 0.2% ropivacaine with 30mg of kenalog was injected.  The needle was flushed with lidocaine and removed.    The area over the right trochanter was palpated, and the tender area was identified, corresponding to the area of the trochanteric bursa. No fluoro used  The area was cleaned with Chloroprep.  A 25 G 3.5 inch needle was inserted, aiming towards the trochanter.  When bone was encountered, the needle was slightly drawn.  A solution containing local anesthesic and steroid was injected.  The needle was removed.  Hemostasis was achieved. Bandaids were placed as appropriate.    In total, 2ml of 0.5% ropivacaine, 2ml of 1% lidocaine, and 20mg of kenalog was injected.       Hemostasis was achieved, the area was cleaned, and bandaids were placed when appropriate.  The patient tolerated the procedure well, and was taken to the recovery room.    Images were saved to PACS.    Post-procedure pain score: 8/10  Follow-up includes:   -f/u with referring provider  -can be sent back in the future for injection  Bushra Alicia MD  [facility]

## 2021-11-19 NOTE — PROGRESS NOTES
Chief Complaint   Patient presents with     Consult     Consult for PAD.  Pt c/o pain in back, right hip and RLE.  She noted this is worse with exertion.         Medications and allergies reconciled.  Vitals collected.    Christina Rose LPN

## 2021-11-19 NOTE — PROGRESS NOTES
[facility] VASCULAR [facility] CENTER  VASCULAR MEDICINE FOLLOW-UP VISIT      PRIMARY HEALTH CARE PROVIDER:  No Ref-Primary, Physician    REASON FOR VISIT: Right hip pain      HPI: Teresa Lopez is a very pleasant 86 year old is here for right hip pain, patient denies any history of claudication yet she mentioned that she has right hip pain which is constant 24/ 7.  Patient mentioned that the pain is localized to her area of the trochanteric bursa she just received a steroid shot yesterday and still with no relief patient was diagnosed with severe spinal stenosis at the level of L3-L4 back in 2019 and she did receive epidural injections with no improvement  As a mention patient leans forward to get relief from the pain and she is always having the pain and the right hip area  Patient denies any history of skin color or temperature changes no tissue loss no signs indicative of ischemia she has palpable DP pulses    WESTON was uninterpretable secondary to hardening of the great vessels          PAST MEDICAL HISTORY  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 28 days for chronic pain  buprenorphine (BUTRANS) 20 MCG/HR WK patch, Place 1 patch onto the skin every 7 days Fill 11/2 and start 11/4/2021 to last 28 days  CALCIUM 1200 OR, Reported on 4/20/2017  ciclopirox (LOPROX) 0.77 % cream, Apply topically 2 times  daily To feet and toenails.  dexamethasone (DECADRON) 0.5 MG tablet, Take 1 tablet (0.5 mg) by mouth 2 times daily (with meals)  donepezil (ARICEPT) 5 MG tablet, Take 1 tablet (5 mg) by mouth At Bedtime  DULoxetine (CYMBALTA) 30 MG capsule, Take 1 capsule (30 mg) by mouth daily  DULoxetine (CYMBALTA) 60 MG capsule, Take 1 capsule (60 mg) by mouth daily 3 month script  Glucosamine-Chondroit-Vit C-Mn (GLUCOSAMINE 1500 COMPLEX PO), Take 1,500 mg by mouth daily Reported on 4/20/2017  levETIRAcetam (KEPPRA) 500 MG tablet, Take 1 tablet (500 mg) by mouth 2 times daily  lidocaine (LIDODERM) 5 % patch, Apply 1-3 patches to external skin Q 24 hours as needed.  Okay for pt to self-administer.  Loratadine (CLARITIN) 10 MG capsule, Take 10 mg by mouth as needed Reported on 4/20/2017  losartan-hydrochlorothiazide (HYZAAR) 100-25 MG tablet, Take 1 tablet by mouth daily  magnesium 250 MG tablet, Take 1 tablet by mouth daily  MULTI-VITAMIN OR TABS, 1 TABLET DAILY  potassium aminobenzoate 500 MG CAPS capsule,   predniSONE (DELTASONE) 2.5 MG tablet, TAKE 1 TABLET BY MOUTH DAILY. MAY REPEAT ONCE DAILY AS NEEDED  senna-docusate (SENOKOT-S;PERICOLACE) 8.6-50 MG per tablet, Take 1 tablet by mouth 2 times daily  simvastatin (ZOCOR) 40 MG tablet, Take 1 tablet (40 mg) by mouth At Bedtime    No current facility-administered medications on file prior to visit.      ALLERGIES     Allergies   Allergen Reactions     Citalopram Other (See Comments)     Sweating profusely and nausea     Gabapentin      Felt off, dizzy, hot and cold symptoms     Lexapro [Escitalopram] Other (See Comments)     Hot flashes     Salsalate      tinnitus     Sulfa Drugs Hives       FAMILY HISTORY  Family History   Problem Relation Age of Onset     Arthritis Mother      Cataracts Mother      Unknown/Adopted Father         adopted     Diabetes Brother      Cancer Brother      Cataracts Maternal Grandmother      Hypertension No family hx of      Cerebrovascular Disease No  family hx of      Thyroid Disease No family hx of      Glaucoma No family hx of      Macular Degeneration No family hx of        SOCIAL HISTORY  Social History     Socioeconomic History     Marital status:      Spouse name: Not on file     Number of children: Not on file     Years of education: Not on file     Highest education level: Not on file   Occupational History     Not on file   Tobacco Use     Smoking status: Never Smoker     Smokeless tobacco: Never Used   Substance and Sexual Activity     Alcohol use: Yes     Alcohol/week: 0.0 standard drinks     Comment: 0-1 drink weekly     Drug use: No     Sexual activity: Not Currently     Partners: Male   Other Topics Concern     Parent/sibling w/ CABG, MI or angioplasty before 65F 55M? No   Social History Narrative    Teresa (pronounced JOE-elizabeth)    In [facility] living facility [facility] next door    Worked in safe deposit and credit card settlements for [employer]    History of polka, zee, other dancing    2 daughters in area     Social Determinants of Health     Financial Resource Strain: Not on file   Food Insecurity: Not on file   Transportation Needs: Not on file   Physical Activity: Not on file   Stress: Not on file   Social Connections: Not on file   Intimate Partner Violence: Not on file   Housing Stability: Not on file       ROS:   Complete ROS negative other than what is stated in HPI.     EXAM:  BP (!) 148/94 (BP Location: Left arm, Patient Position: Sitting, Cuff Size: Adult Regular)   Pulse 98   SpO2 95%   In general, the patient is a pleasant female in no apparent distress.    HEENT: NC/AT.  PERRLA.  EOMI.  Sclerae white, not injected.    Neck: No adenopathy.  Carotids +2/2 bilaterally without bruits.   Heart: RRR. Normal S1, S2 splits physiologically. No murmur, rub, click, or gallop.   Lungs: CTA.  No ronchi, wheezes, rales.    Abdomen: Soft, nontender, nondistended.   Extremities: No clubbing, cyanosis, or edema.   No wounds.     Labs:  LIPID RESULTS:  Lab Results   Component Value Date    CHOL 168 04/21/2021    HDL 83 04/21/2021    LDL 60 04/21/2021    TRIG 127 04/21/2021    CHOLHDLRATIO 2.7 02/12/2015       LIVER ENZYME RESULTS:  Lab Results   Component Value Date    AST 24 04/21/2021    ALT 24 04/21/2021       CBC RESULTS:  Lab Results   Component Value Date    WBC 10.8 04/22/2021    RBC 3.08 (L) 04/22/2021    HGB 11.0 (L) 04/22/2021    HCT 33.0 (L) 04/22/2021     (H) 04/22/2021    MCH 35.7 (H) 04/22/2021    MCHC 33.3 04/22/2021    RDW 12.6 04/22/2021     04/22/2021       BMP RESULTS:  Lab Results   Component Value Date     04/22/2021    POTASSIUM 3.2 (L) 04/22/2021    CHLORIDE 100 04/22/2021    CO2 29 04/22/2021    ANIONGAP 8 04/22/2021     (H) 04/22/2021    BUN 19 04/22/2021    CR 0.74 04/22/2021    GFRESTIMATED 74 04/22/2021    GFRESTBLACK 86 04/22/2021    AYLIN 10.0 04/22/2021        A1C RESULTS:  Lab Results   Component Value Date    A1C 6.6 (H) 04/21/2021       THYROID RESULTS:  Lab Results   Component Value Date    TSH 2.32 02/23/2021         Procedures: None      Assessment and Plan:  1-we will obtain Doppler arterial ultrasound of the right lower extremity  2-patient might need hip MRI/right side        Darius Denson MD       35 minutes spent on the date of the encounter doing chart review, history and exam, documentation, and further activities as noted above.  Answers for HPI/ROS submitted by the patient on 11/13/2021  General Symptoms: No  Skin Symptoms: No  HENT Symptoms: No  EYE SYMPTOMS: No  HEART SYMPTOMS: No  LUNG SYMPTOMS: No  INTESTINAL SYMPTOMS: No  URINARY SYMPTOMS: No  GYNECOLOGIC SYMPTOMS: No  BREAST SYMPTOMS: No  SKELETAL SYMPTOMS: Yes  BLOOD SYMPTOMS: No  NERVOUS SYSTEM SYMPTOMS: Yes  MENTAL HEALTH SYMPTOMS: No  Back pain: Yes  Muscle aches: No  Neck pain: No  Swollen joints: No  Joint pain: Yes  Bone pain: Yes  Muscle cramps: No  Muscle weakness: No  Joint stiffness:  Yes  Bone fracture: No  Trouble with coordination: Yes  Dizziness or trouble with balance: Yes  Fainting or black-out spells: No  Memory loss: Yes  Headache: No  Seizures: No  Speech problems: No  Tingling: No  Tremor: No  Weakness: Yes  Difficulty walking: Yes  Paralysis: No  Numbness: No

## 2021-11-19 NOTE — PATIENT INSTRUCTIONS
Thank you so much for choosing us for your care. It was a pleasure to see you at the vascular clinic today.     Follow-up recommendations: Dr. Denson would like to see you back in a few weeks once your ultrasound is complete.    Additional testing/imaging ordered today: Ultrasound of right leg arteries at your convenience.      Our scheduling team will get in touch with you to set up any follow-up testing/imaging and/or appointments. Please be aware that any testing/imaging recommended today will need to completed prior to your next visit with the provider. If testing/imaging is not completed prior to your next visit, your visit may be rescheduled.        If you have any questions, please call Yesi White RN at [phone]. We also encourage the use of [employer] to communicate with your HealthCare Provider.      If you have an urgent need after business hours (8:00 am to 4:30 pm) please call [phone], option 4, and ask for the vascular attending on call. For non-urgent after hours needs, please call the vascular nurse at [phone] and leave a detailed voicemail. For scheduling needs, please call our scheduling line at [phone].

## 2021-11-19 NOTE — LETTER
11/19/2021       RE: Teresa Lopez  [address]  [facility] [address]     Dear Colleague,    Thank you for referring your patient, Teresa Lopez, to the [facility] VASCULAR CL[facility] [place] [facility]. Please see a copy of my visit note below.    Chief Complaint   Patient presents with     Consult     Consult for PAD.  Pt c/o pain in back, right hip and RLE.  She noted this is worse with exertion.         Medications and allergies reconciled.  Vitals collected.    Christina Rose LPN      [facility]  VASCULAR MEDICINE FOLLOW-UP VISIT      PRIMARY HEALTH CARE PROVIDER:  No Ref-Primary, Physician    REASON FOR VISIT: Right hip pain      HPI: Teresa Lopez is a very pleasant 86 year old is here for right hip pain, patient denies any history of claudication yet she mentioned that she has right hip pain which is constant 24/ 7.  Patient mentioned that the pain is localized to her area of the trochanteric bursa she just received a steroid shot yesterday and still with no relief patient was diagnosed with severe spinal stenosis at the level of L3-L4 back in 2019 and she did receive epidural injections with no improvement  As a mention patient leans forward to get relief from the pain and she is always having the pain and the right hip area  Patient denies any history of skin color or temperature changes no tissue loss no signs indicative of ischemia she has palpable DP pulses    WESTON was uninterpretable secondary to hardening of the great vessels          PAST MEDICAL HISTORY  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 28 days for chronic pain  buprenorphine (BUTRANS) 20 MCG/HR WK patch, Place 1 patch onto the skin every 7 days Fill 11/2 and start 11/4/2021 to last 28 days  CALCIUM 1200 OR, Reported on 4/20/2017  ciclopirox (LOPROX) 0.77 % cream, Apply topically 2 times daily To feet and toenails.  dexamethasone (DECADRON) 0.5 MG tablet, Take 1 tablet (0.5 mg) by mouth 2 times daily (with meals)  donepezil (ARICEPT) 5 MG tablet, Take 1 tablet (5 mg) by mouth At Bedtime  DULoxetine (CYMBALTA) 30 MG capsule, Take 1 capsule (30 mg) by mouth daily  DULoxetine (CYMBALTA) 60 MG capsule, Take 1 capsule (60 mg) by mouth daily 3 month script  Glucosamine-Chondroit-Vit C-Mn (GLUCOSAMINE 1500 COMPLEX PO), Take 1,500 mg by mouth daily Reported on 4/20/2017  levETIRAcetam (KEPPRA) 500 MG tablet, Take 1 tablet (500 mg) by mouth 2 times daily  lidocaine (LIDODERM) 5 % patch, Apply 1-3 patches to external skin Q 24 hours as needed.  Okay for pt to self-administer.  Loratadine (CLARITIN) 10 MG capsule, Take 10 mg by mouth as needed Reported on 4/20/2017  losartan-hydrochlorothiazide (HYZAAR) 100-25 MG tablet, Take 1 tablet by mouth daily  magnesium 250 MG tablet, Take 1 tablet by mouth daily  MULTI-VITAMIN OR TABS, 1 TABLET DAILY  potassium aminobenzoate 500 MG CAPS capsule,   predniSONE (DELTASONE) 2.5 MG tablet, TAKE 1 TABLET BY MOUTH DAILY. MAY REPEAT ONCE DAILY AS NEEDED  senna-docusate (SENOKOT-S;PERICOLACE) 8.6-50 MG per tablet, Take 1 tablet by mouth 2 times daily  simvastatin (ZOCOR) 40 MG tablet, Take 1 tablet (40 mg) by mouth At Bedtime    No current facility-administered medications on file prior to visit.      ALLERGIES     Allergies    Allergen Reactions     Citalopram Other (See Comments)     Sweating profusely and nausea     Gabapentin      Felt off, dizzy, hot and cold symptoms     Lexapro [Escitalopram] Other (See Comments)     Hot flashes     Salsalate      tinnitus     Sulfa Drugs Hives       FAMILY HISTORY  Family History   Problem Relation Age of Onset     Arthritis Mother      Cataracts Mother      Unknown/Adopted Father         adopted     Diabetes Brother      Cancer Brother      Cataracts Maternal Grandmother      Hypertension No family hx of      Cerebrovascular Disease No family hx of      Thyroid Disease No family hx of      Glaucoma No family hx of      Macular Degeneration No family hx of        SOCIAL HISTORY  Social History     Socioeconomic History     Marital status:      Spouse name: Not on file     Number of children: Not on file     Years of education: Not on file     Highest education level: Not on file   Occupational History     Not on file   Tobacco Use     Smoking status: Never Smoker     Smokeless tobacco: Never Used   Substance and Sexual Activity     Alcohol use: Yes     Alcohol/week: 0.0 standard drinks     Comment: 0-1 drink weekly     Drug use: No     Sexual activity: Not Currently     Partners: Male   Other Topics Concern     Parent/sibling w/ CABG, MI or angioplasty before 65F 55M? 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     EXAM:  BP (!) 148/94 (BP Location: Left arm, Patient Position: Sitting, Cuff Size: Adult Regular)   Pulse 98   SpO2 95%   In general, the patient is a pleasant female in no apparent distress.    HEENT: NC/AT.  PERRLA.  EOMI.  Sclerae white, not injected.    Neck: No adenopathy.  Carotids +2/2 bilaterally without bruits.   Heart: RRR. Normal S1, S2 splits physiologically. No murmur, rub, click, or gallop.   Lungs: CTA.  No ronchi, wheezes, rales.    Abdomen: Soft, nontender, nondistended.   Extremities: No clubbing, cyanosis, or edema.  No wounds.     Labs:  LIPID RESULTS:  Lab Results   Component Value Date    CHOL 168 04/21/2021    HDL 83 04/21/2021    LDL 60 04/21/2021    TRIG 127 04/21/2021    CHOLHDLRATIO 2.7 02/12/2015       LIVER ENZYME RESULTS:  Lab Results   Component Value Date    AST 24 04/21/2021    ALT 24 04/21/2021       CBC RESULTS:  Lab Results   Component Value Date    WBC 10.8 04/22/2021    RBC 3.08 (L) 04/22/2021    HGB 11.0 (L) 04/22/2021    HCT 33.0 (L) 04/22/2021     (H) 04/22/2021    MCH 35.7 (H) 04/22/2021    MCHC 33.3 04/22/2021    RDW 12.6 04/22/2021     04/22/2021       BMP RESULTS:  Lab Results   Component Value Date     04/22/2021    POTASSIUM 3.2 (L) 04/22/2021    CHLORIDE 100 04/22/2021    CO2 29 04/22/2021    ANIONGAP 8 04/22/2021     (H) 04/22/2021    BUN 19 04/22/2021    CR 0.74 04/22/2021    GFRESTIMATED 74 04/22/2021    GFRESTBLACK 86 04/22/2021    AYLIN 10.0 04/22/2021        A1C RESULTS:  Lab Results   Component Value Date    A1C 6.6 (H) 04/21/2021       THYROID RESULTS:  Lab Results   Component Value Date    TSH 2.32 02/23/2021         Procedures: None      Assessment and Plan:  1-we will obtain Doppler arterial ultrasound of the right lower extremity  2-patient might need hip MRI/right side        Darius Denson MD       35 minutes spent on the date of the encounter doing chart review, history and exam, documentation, and further  activities as noted above.  Answers for HPI/ROS submitted by the patient on 11/13/2021  General Symptoms: No  Skin Symptoms: No  HENT Symptoms: No  EYE SYMPTOMS: No  HEART SYMPTOMS: No  LUNG SYMPTOMS: No  INTESTINAL SYMPTOMS: No  URINARY SYMPTOMS: No  GYNECOLOGIC SYMPTOMS: No  BREAST SYMPTOMS: No  SKELETAL SYMPTOMS: Yes  BLOOD SYMPTOMS: No  NERVOUS SYSTEM SYMPTOMS: Yes  MENTAL HEALTH SYMPTOMS: No  Back pain: Yes  Muscle aches: No  Neck pain: No  Swollen joints: No  Joint pain: Yes  Bone pain: Yes  Muscle cramps: No  Muscle weakness: No  Joint stiffness: Yes  Bone fracture: No  Trouble with coordination: Yes  Dizziness or trouble with balance: Yes  Fainting or black-out spells: No  Memory loss: Yes  Headache: No  Seizures: No  Speech problems: No  Tingling: No  Tremor: No  Weakness: Yes  Difficulty walking: Yes  Paralysis: No  Numbness: No          Again, thank you for allowing me to participate in the care of your patient.      Sincerely,    Darius Denson MD

## 2021-12-09 NOTE — TELEPHONE ENCOUNTER
See the other 12/9/21 mychart encounter for more information.    Natalie Garcia RN-BSN  [facility]

## 2021-12-09 NOTE — TELEPHONE ENCOUNTER
Signed Prescriptions:                        Disp   Refills    buprenorphine (BUTRANS) 20 MCG/HR WK patch 4 patch0        Sig: Place 1 patch onto the skin every 7 days Fill/begin           12/9/2021.  to last 28 days  Authorizing Provider: MARION CELAYA    Reviewed [place]  December 9, 2021- no concerning fills.    Marion SANCHES, RN CNP, FNP  [facility]  [facility]

## 2021-12-09 NOTE — TELEPHONE ENCOUNTER
Medication refill information reviewed.     Last due:  Fill 11/2 and start 11/4/2021 to last 28 days   Due date:  Overdue now.   Per notes pt is out today.        Prescriptions prepped for review.     Natalie RN-BSN  [facility]

## 2021-12-09 NOTE — TELEPHONE ENCOUNTER
Patient requesting refill(s) of buprenorphine (BUTRANS) 20 MCG/HR WK patch    Last dispensed from pharmacy on 11/02/2021    Patient's last office/virtual visit by prescribing provider on 11/15/2021  Next office/virtual appointment scheduled for None     Last urine drug screen date None   Current opioid agreement on file (completed within the last year) No Date of opioid agreement: None     E-prescribe to [facility], [place]     Will route to nursing [place] for review and preparation of prescription(s).

## 2021-12-09 NOTE — TELEPHONE ENCOUNTER
ed to the nursing pool and to the MA pool to process refill(s).  ______________________________    From: Jefferson Lopez      Created: 12/9/2021 9:09 AM      Refills have been requested for the following medications:       buprenorphine (BUTRANS) 20 MCG/HR WK patch [MYRON Ahumada CNP]      Patient Comment: The Nursing Home did not request a refill of this prescription.  Today the new patch is to be applied, but none are available.  Please approve patch and if possible, expedite to [facility].  Thank you.       Preferred pharmacy: [facility], [address]      and  From: Jefferson Lopez      Created: 12/9/2021 9:13 AM      adriane Schultz to bother you.  The Assisted Living facility where Mom lives did not request a refill of her bupreno. patch.  It is to be changed today.  I have requested a refill through [facility] for them.  If there is any way to expedite the prescription to [facility], it would be appreciated.  Mom's pain is worse and has had 4 falls in the last 3 weeks.       Brooke, daughter.      _________________________________    Mychart sent to pt:  From: Natalie Garcia RN      Created: 12/9/2021 10:12 AM      Hello.  I have sent this on to be processed urgently.  MYRON Simon CNP is out of office on Thursdays but will see what can be done.     МАРИЯ Estrada-BSN  [facility]

## 2021-12-10 NOTE — TELEPHONE ENCOUNTER
"Spoke to patient's daughter Brooke. Advised prescription was sent to pharmacy. Confirmed pharmacy.      Brooke stated understanding. She advised the [facility] was supposed to call this in sooner and they \"dropped the ball\" so that is why patient has been without. Brooke advised patient has fallen four times and has those injuries to deal with as well.     Brooke stated the [facility]'s nurse that normally would call on this med is on maternity leave, so it is understandable and she will be on top of them going forward.     Gabriella Vargas, [facility]    "

## 2022-01-01 ENCOUNTER — MYC MEDICAL ADVICE (OUTPATIENT)
Dept: FAMILY MEDICINE | Facility: CLINIC | Age: 87
End: 2022-01-01

## 2022-01-01 ENCOUNTER — HEALTH MAINTENANCE LETTER (OUTPATIENT)
Age: 87
End: 2022-01-01

## 2022-01-01 ENCOUNTER — TELEPHONE (OUTPATIENT)
Dept: PALLIATIVE MEDICINE | Facility: CLINIC | Age: 87
End: 2022-01-01
Payer: COMMERCIAL

## 2022-01-01 ENCOUNTER — ANCILLARY PROCEDURE (OUTPATIENT)
Dept: MRI IMAGING | Facility: CLINIC | Age: 87
End: 2022-01-01
Attending: INTERNAL MEDICINE
Payer: COMMERCIAL

## 2022-01-01 ENCOUNTER — MEDICAL CORRESPONDENCE (OUTPATIENT)
Dept: HEALTH INFORMATION MANAGEMENT | Facility: CLINIC | Age: 87
End: 2022-01-01
Payer: COMMERCIAL

## 2022-01-01 ENCOUNTER — TELEPHONE (OUTPATIENT)
Dept: OTHER | Facility: CLINIC | Age: 87
End: 2022-01-01

## 2022-01-01 ENCOUNTER — TELEPHONE (OUTPATIENT)
Dept: NEUROSURGERY | Facility: CLINIC | Age: 87
End: 2022-01-01

## 2022-01-01 ENCOUNTER — TELEPHONE (OUTPATIENT)
Dept: FAMILY MEDICINE | Facility: CLINIC | Age: 87
End: 2022-01-01

## 2022-01-01 ENCOUNTER — OFFICE VISIT (OUTPATIENT)
Dept: CARDIOLOGY | Facility: CLINIC | Age: 87
End: 2022-01-01
Attending: INTERNAL MEDICINE
Payer: COMMERCIAL

## 2022-01-01 ENCOUNTER — VIRTUAL VISIT (OUTPATIENT)
Dept: PALLIATIVE MEDICINE | Facility: CLINIC | Age: 87
End: 2022-01-01
Payer: COMMERCIAL

## 2022-01-01 ENCOUNTER — ANCILLARY PROCEDURE (OUTPATIENT)
Dept: ULTRASOUND IMAGING | Facility: CLINIC | Age: 87
End: 2022-01-01
Attending: INTERNAL MEDICINE
Payer: COMMERCIAL

## 2022-01-01 ENCOUNTER — TELEPHONE (OUTPATIENT)
Dept: PALLIATIVE MEDICINE | Facility: CLINIC | Age: 87
End: 2022-01-01

## 2022-01-01 ENCOUNTER — OFFICE VISIT (OUTPATIENT)
Dept: PALLIATIVE MEDICINE | Facility: CLINIC | Age: 87
End: 2022-01-01
Payer: COMMERCIAL

## 2022-01-01 ENCOUNTER — RADIOLOGY INJECTION OFFICE VISIT (OUTPATIENT)
Dept: PALLIATIVE MEDICINE | Facility: CLINIC | Age: 87
End: 2022-01-01
Payer: COMMERCIAL

## 2022-01-01 ENCOUNTER — MYC MEDICAL ADVICE (OUTPATIENT)
Dept: PALLIATIVE MEDICINE | Facility: CLINIC | Age: 87
End: 2022-01-01
Payer: COMMERCIAL

## 2022-01-01 ENCOUNTER — MYC MEDICAL ADVICE (OUTPATIENT)
Dept: FAMILY MEDICINE | Facility: CLINIC | Age: 87
End: 2022-01-01
Payer: COMMERCIAL

## 2022-01-01 VITALS — HEART RATE: 98 BPM | DIASTOLIC BLOOD PRESSURE: 79 MMHG | SYSTOLIC BLOOD PRESSURE: 137 MMHG

## 2022-01-01 VITALS
OXYGEN SATURATION: 97 % | SYSTOLIC BLOOD PRESSURE: 153 MMHG | HEART RATE: 95 BPM | BODY MASS INDEX: 20.25 KG/M2 | DIASTOLIC BLOOD PRESSURE: 95 MMHG | WEIGHT: 118 LBS

## 2022-01-01 VITALS — SYSTOLIC BLOOD PRESSURE: 164 MMHG | OXYGEN SATURATION: 97 % | DIASTOLIC BLOOD PRESSURE: 94 MMHG | HEART RATE: 97 BPM

## 2022-01-01 DIAGNOSIS — G57.01 PIRIFORMIS SYNDROME, RIGHT: ICD-10-CM

## 2022-01-01 DIAGNOSIS — M53.3 SI (SACROILIAC) JOINT DYSFUNCTION: Primary | ICD-10-CM

## 2022-01-01 DIAGNOSIS — M15.0 PRIMARY OSTEOARTHRITIS INVOLVING MULTIPLE JOINTS: ICD-10-CM

## 2022-01-01 DIAGNOSIS — M54.16 LUMBAR RADICULOPATHY: ICD-10-CM

## 2022-01-01 DIAGNOSIS — M25.551 HIP PAIN, RIGHT: ICD-10-CM

## 2022-01-01 DIAGNOSIS — F11.90 CHRONIC, CONTINUOUS USE OF OPIOIDS: ICD-10-CM

## 2022-01-01 DIAGNOSIS — M51.369 DDD (DEGENERATIVE DISC DISEASE), LUMBAR: ICD-10-CM

## 2022-01-01 DIAGNOSIS — M16.11 PRIMARY OSTEOARTHRITIS OF RIGHT HIP: ICD-10-CM

## 2022-01-01 DIAGNOSIS — M70.61 GREATER TROCHANTERIC BURSITIS OF RIGHT HIP: ICD-10-CM

## 2022-01-01 DIAGNOSIS — M48.062 SPINAL STENOSIS OF LUMBAR REGION WITH NEUROGENIC CLAUDICATION: ICD-10-CM

## 2022-01-01 DIAGNOSIS — I73.9 PERIPHERAL VASCULAR DISEASE, UNSPECIFIED (H): ICD-10-CM

## 2022-01-01 DIAGNOSIS — M54.50 CHRONIC BILATERAL LOW BACK PAIN WITHOUT SCIATICA: ICD-10-CM

## 2022-01-01 DIAGNOSIS — M53.3 CHRONIC RIGHT SI JOINT PAIN: ICD-10-CM

## 2022-01-01 DIAGNOSIS — G89.29 CHRONIC RIGHT SI JOINT PAIN: ICD-10-CM

## 2022-01-01 DIAGNOSIS — G89.4 CHRONIC PAIN SYNDROME: ICD-10-CM

## 2022-01-01 DIAGNOSIS — G89.4 CHRONIC PAIN DISORDER: ICD-10-CM

## 2022-01-01 DIAGNOSIS — G89.29 CHRONIC BILATERAL LOW BACK PAIN WITHOUT SCIATICA: ICD-10-CM

## 2022-01-01 DIAGNOSIS — G89.4 CHRONIC PAIN DISORDER: Primary | ICD-10-CM

## 2022-01-01 DIAGNOSIS — M70.61 GREATER TROCHANTERIC BURSITIS OF RIGHT HIP: Primary | ICD-10-CM

## 2022-01-01 PROCEDURE — 72148 MRI LUMBAR SPINE W/O DYE: CPT | Performed by: RADIOLOGY

## 2022-01-01 PROCEDURE — 93926 LOWER EXTREMITY STUDY: CPT | Mod: RT | Performed by: RADIOLOGY

## 2022-01-01 PROCEDURE — 27096 INJECT SACROILIAC JOINT: CPT | Mod: RT | Performed by: PSYCHIATRY & NEUROLOGY

## 2022-01-01 PROCEDURE — 99214 OFFICE O/P EST MOD 30 MIN: CPT | Performed by: INTERNAL MEDICINE

## 2022-01-01 PROCEDURE — 20610 DRAIN/INJ JOINT/BURSA W/O US: CPT | Mod: RT | Performed by: PSYCHIATRY & NEUROLOGY

## 2022-01-01 PROCEDURE — 99214 OFFICE O/P EST MOD 30 MIN: CPT | Performed by: NURSE PRACTITIONER

## 2022-01-01 PROCEDURE — G0463 HOSPITAL OUTPT CLINIC VISIT: HCPCS

## 2022-01-01 PROCEDURE — 99215 OFFICE O/P EST HI 40 MIN: CPT | Mod: 95 | Performed by: NURSE PRACTITIONER

## 2022-01-01 RX ORDER — PREGABALIN 25 MG/1
25 CAPSULE ORAL 2 TIMES DAILY
Qty: 60 CAPSULE | Refills: 1 | Status: SHIPPED | OUTPATIENT
Start: 2022-01-01 | End: 2022-01-01

## 2022-01-01 RX ORDER — TRIAMCINOLONE ACETONIDE 40 MG/ML
30 INJECTION, SUSPENSION INTRA-ARTICULAR; INTRAMUSCULAR ONCE
Status: COMPLETED | OUTPATIENT
Start: 2022-01-01 | End: 2022-01-01

## 2022-01-01 RX ORDER — OXYCODONE HYDROCHLORIDE 5 MG/1
2.5 TABLET ORAL
Qty: 30 TABLET | Refills: 0 | Status: SHIPPED | OUTPATIENT
Start: 2022-01-01 | End: 2022-01-01

## 2022-01-01 RX ORDER — DICLOFENAC EPOLAMINE 0.01 G/1
1 SYSTEM TOPICAL 2 TIMES DAILY
Qty: 30 PATCH | Refills: 2 | Status: SHIPPED | OUTPATIENT
Start: 2022-01-01 | End: 2022-01-01

## 2022-01-01 RX ORDER — DICLOFENAC EPOLAMINE 0.01 G/1
1 SYSTEM TOPICAL 2 TIMES DAILY
Qty: 60 PATCH | Refills: 2 | Status: SHIPPED | OUTPATIENT
Start: 2022-01-01

## 2022-01-01 RX ORDER — BUPRENORPHINE 20 UG/H
1 PATCH TRANSDERMAL
Qty: 4 PATCH | Refills: 0 | Status: SHIPPED | OUTPATIENT
Start: 2022-01-01 | End: 2022-01-01

## 2022-01-01 RX ORDER — BUPRENORPHINE 20 UG/H
1 PATCH TRANSDERMAL
Qty: 4 PATCH | Refills: 0 | Status: SHIPPED | OUTPATIENT
Start: 2022-01-01

## 2022-01-01 RX ORDER — DULOXETIN HYDROCHLORIDE 60 MG/1
60 CAPSULE, DELAYED RELEASE ORAL DAILY
Qty: 90 CAPSULE | Refills: 1 | Status: SHIPPED | OUTPATIENT
Start: 2022-01-01

## 2022-01-01 RX ORDER — TRIAMCINOLONE ACETONIDE 40 MG/ML
20 INJECTION, SUSPENSION INTRA-ARTICULAR; INTRAMUSCULAR ONCE
Status: COMPLETED | OUTPATIENT
Start: 2022-01-01 | End: 2022-01-01

## 2022-01-01 RX ORDER — PREGABALIN 25 MG/1
25 CAPSULE ORAL 2 TIMES DAILY
Qty: 60 CAPSULE | Refills: 1 | Status: SHIPPED | OUTPATIENT
Start: 2022-01-01

## 2022-01-01 RX ADMIN — TRIAMCINOLONE ACETONIDE 20 MG: 40 INJECTION, SUSPENSION INTRA-ARTICULAR; INTRAMUSCULAR at 15:47

## 2022-01-01 RX ADMIN — TRIAMCINOLONE ACETONIDE 30 MG: 40 INJECTION, SUSPENSION INTRA-ARTICULAR; INTRAMUSCULAR at 15:47

## 2022-01-01 ASSESSMENT — PAIN SCALES - GENERAL
PAINLEVEL: WORST PAIN (10)
PAINLEVEL: NO PAIN (0)
PAINLEVEL: EXTREME PAIN (9)

## 2022-01-01 ASSESSMENT — PATIENT HEALTH QUESTIONNAIRE - PHQ9
10. IF YOU CHECKED OFF ANY PROBLEMS, HOW DIFFICULT HAVE THESE PROBLEMS MADE IT FOR YOU TO DO YOUR WORK, TAKE CARE OF THINGS AT HOME, OR GET ALONG WITH OTHER PEOPLE: SOMEWHAT DIFFICULT
SUM OF ALL RESPONSES TO PHQ QUESTIONS 1-9: 20

## 2022-01-12 NOTE — TELEPHONE ENCOUNTER
Reason for call:  Medication   If this is a refill request, has the caller requested the refill from the pharmacy already? No  Will the patient be using a [facility]? No  Name of the pharmacy and phone number for the current request: [facility], [address]    Name of the medication requested: buprenorphine (BUTRANS) 20 MCG/HR WK patch    Other request:     Phone number to reach patient:  Cell number on file:    Telephone Information:   Mobile [phone]       Best Time:      Can we leave a detailed message on this number?  YES    Travel screening: Not Applicable

## 2022-01-12 NOTE — TELEPHONE ENCOUNTER
Medication refill information reviewed.     Due date for buprenorphine (BUTRANS) 20 MCG/HR WK patch      is 1/12/22.     Prescriptions prepped for review.     Will route to provider.       Brenda Murrell RN, BSN, CMSRN  RN Care Coordinator  [facility]

## 2022-01-12 NOTE — TELEPHONE ENCOUNTER
Signed Prescriptions:                        Disp   Refills    buprenorphine (BUTRANS) 20 MCG/HR WK patch 4 patch0        Sig: Place 1 patch onto the skin every 7 days Fill/begin           1/12/22.  to last 28 days  Authorizing Provider: MARION CELAYA    Reviewed [place]  January 12, 2022- no concerning fills.  Gently remind patient to call for a refill when she places the last patch in the box so that she calls 1 week before she needs a refill. This will decrease the chances that there could be a break in her medication refills. Thanks.   Marion Celaya APRN, RN CNP, FNP  [facility]  [facility]

## 2022-01-12 NOTE — TELEPHONE ENCOUNTER
Received call from patient requesting refill(s) of buprenorphine (BUTRANS) 20 MCG/HR WK patch    Last dispensed from pharmacy on 12/11/21    Patient's last office/virtual visit by prescribing provider on 10/05/21  Next office/virtual appointment scheduled for : none    Last urine drug screen date 06/05/19  Current opioid agreement on file (completed within the last year) No Date of opioid agreement: None    E-prescribe to pharmacy-[facility], [address]     Will route to [facility] for review and preparation of prescription(s).

## 2022-01-13 NOTE — TELEPHONE ENCOUNTER
E-prescription confirmation received. Spoke with patient's daughter and she is going to speak to the staff at assisted living about how important it is that they re-order medication when last patch is put on. Routed to provider as a FYI.

## 2022-01-20 NOTE — NURSING NOTE
"Laura Lowery 73 y.o. female is here today for Blood Pressure check.   History of HTN yes.    Review of patient's allergies indicates:  No Known Allergies  Creatinine   Date Value Ref Range Status   01/11/2022 1.2 0.5 - 1.4 mg/dL Final     Sodium   Date Value Ref Range Status   01/11/2022 135 (L) 136 - 145 mmol/L Final     Potassium   Date Value Ref Range Status   01/11/2022 4.8 3.5 - 5.1 mmol/L Final   ]  Patient verifies taking blood pressure medications on a regular basis at the same time of the day.     Current Outpatient Medications:     BD THERESA 2ND GEN PEN NEEDLE 32 gauge x 5/32" Ndle, TEST FOUR TIMES DAILY, Disp: 100 each, Rfl: 2    blood-glucose meter kit, To check BG 4 times daily, to use with insurance preferred meter, Disp: 1 each, Rfl: 0    chlorthalidone (HYGROTEN) 25 MG Tab, Take 1 tablet (25 mg total) by mouth once daily., Disp: 90 tablet, Rfl: 3    hepatitis A and B vaccine, PF, (TWINRIX) 720 JUAN unit- 20 mcg/mL Syrg suspension, Inject 1mL at 0, 1, and 6 months, Disp: 1 mL, Rfl: 2    insulin aspart U-100 (NOVOLOG FLEXPEN U-100 INSULIN) 100 unit/mL (3 mL) InPn pen, INJECT 5 UNITS UNDER THE SKIN THREE TIMES DAILY BEFORE EACH MEAL, PLUS SLIDING SCALE. MAXIMUM DAILY DOSE IS 40 UNITS, Disp: 12 mL, Rfl: 0    lancets (MICRO THIN LANCETS) 33 gauge Misc, USE TO TEST SUGAR FOUR TIMES DAILY, Disp: 200 each, Rfl: 6    metFORMIN (GLUCOPHAGE) 500 MG tablet, Take 2 tablets (1,000 mg total) by mouth 2 (two) times daily with meals., Disp: 120 tablet, Rfl: 3    MICRO THIN LANCETS 33 gauge Misc, USE TO TEST SUGAR FOUR TIMES DAILY, Disp: , Rfl:     TRUE METRIX GLUCOSE TEST STRIP Strp, TEST SUGAR FOUR TIMES DAILY, Disp: 200 strip, Rfl: 5  Does patient have record of home blood pressure readings no.   Last dose of blood pressure medication was taken at 6am  Patient is asymptomatic.  Patient brought in her blood pressure cuff to check accuracy cuff is to small to fit upper arm advised to get a larger cuff " Pre-procedure Intake    Have you been fasting? No    If yes, for how long?     Are you taking a prescribed blood thinner such as coumadin, Plavix, Xarelto?    No    If yes, when did you take your last dose?     Do you take aspirin?   NO    If cervical procedure, have you held aspirin for 6 days?   NA    Do you have any allergies to contrast dye, iodine, steroid and/or numbing medications?  NO    Are you currently taking antibiotics or have an active infection?  NO    Have you had a fever/elevated temperature within the past week? NO    Are you currently taking oral steroids? NO    Do you have a ? Yes       Are you pregnant or breastfeeding? NA    Have you received the COVID-19 vaccine? Yes     If yes, was it your 1st, 2nd or only dose needed? 2nd dose  February 2021      Notify provider and RNs if systolic BP >170, diastolic BP >100, P >100 or O2 sats < 90%     information given on digital HTN program patient does not own a smart phone.  Complains of none    Vitals:    01/20/22 0929   BP: 136/86   BP Location: Left arm   Patient Position: Sitting   BP Method: Thigh Cuff (Manual)   Pulse: 110   SpO2: 99%          informed of nurse visit.

## 2022-02-08 NOTE — PROGRESS NOTES
"[facility]    2/8/2022       Chief complaint:    Pain is across the low back and extends down the anterior aspect of her right thigh to the knee and the lateral hip on the right side      Interval history:  Teresa Lopez is a 86 year old female is known to me for .   Primary osteoarthritis    Chronic right hip pain   Chronic bilateral low back without sciatica   Spinal stenosis of lumbar region with neurogenic claudication   Piriformis syndrome of both sides   PMHx DJD, CVA, HTN, diabetes mellitus, and cataract   PSHx Hysterectomy        Recommendations/plan at the last visit on 10/5/2021 included:  1. Physical Therapy:  None at present  2. Clinical Health Psychologist: I agree with outside counseling as you are.  3. Diagnostic Studies:  None  4. Medication Management:    1. INCREASE BUTRANS (buprenorphine) 20mcg/hr transdermal patch. Change patch every 7 days.  Wear on clean, dry skin on the upper chest, upper arm or upper back. Fill 10/5 and start 10/7  2. INCREASE Cymbalta (Duloxetine) 60mg every day  3. Continue Lidocaine patches   4. Continue medical cannabis.   5.  use the Voltaren gel three to four times daily regularly for the right lateral hip pain  6. Trial Aspercreme with 4% lidocaine to right lateral hip  7. Could trial [employer] patches to right lateral hip, these are over-the-counter warming patches  5. Further procedures recommended: next injection would be in mid November, either right GT injection or right piriformis injection  6. Recommendations to PCP: none  7. Follow up: 6-8 weeks video or in-person, your choice.  Please call [phone] to make your follow-up appointment with me.      Since her last visit, Teresa Lopez reports:      Interval history February 8, 2022  -she states she is doing \"like hell.\"   -she thinks she would be better off dead. -  -Dementia has worsened. Some days her pain is so bad she is crying.   -she is using lidoderm patches and " "these help a little bit.       Interval history October 5, 2021  -Jefferson is doing \"like hell.\"  -having pain over the right GT and right piriformis  -her mood is low, wishes she would just die, but no specific wishes for suicide  -continues to be frustrated that she cannot participate in all of the athletics that she used to do when she was younger.   -wants her pain to be \"gone.\"   -explained much of her pain has to do with wear and tear arthritis and that we need to think in terms of managing her pain vs curing her pain. Additionally, we discussed how low mood can impact pain experience in a negative way      Haylee, patient's daugther's impression  -GT injection seemed to work better than the SI joint injection  -last 3 weeks Jefferson has been complaining more about her pain  -Butrans patch is changed on Thursdays  -Jefferson is doing jigsaw puzzles, rearranges a drawer  -Jefferson is outgoing with staff and people there  -goes to lunch and dinner but sometime needs assistance  -typically is better after lunch, mornings are the most bothersome.       Interval history July 2, 2021  -She was admitted to the hospital on 6/19/2021 for confusion and aphasia.  -she has a brain tumor and will be seeing her previous neurosurgeon at the [facility] in July. Will be having an EEG  -functionally she is doing better at [facility]isted [facility], has made friends.   -prior to going to the ER, she had been going to the gym, she had been doing a seated elliptical machine  -Jefferson is still struggling with bilateral low back pain and right leg pain to the level of the knee.   -Jefferson states her worst pain is the right lateral hip and would like to repeat GT injection when able (last done in early May 2021, needs to wait 12 weeks)      Interval history April 28, 2021  -moving to [facility]. She moves in on May 1. She has some high school friends who live there. Currently living with her daughter, Abdi until she moves to [facility]. Maite is very excited about this move.   -Butrans has been helpful. Tolerating well. Maite would like to increase her dosage on this. She also needs to reschedule injection with Dr. Jamaal Eid as she had to reschedule due to a recent short hospitalization.   -Abdi, Maite's daughter, notes Maite is walking better and is not wincing in pain as much since she has been using the Butrans patch.       Interval history April 13, 2021-Tries ice packs  Per Maite:  elastic brace on right knee, somewhat helpful  -she feels that her right knee feels weaker  -she has trouble with weakness in her ankles bilateral  -she uses ankle supports on her ankles (elastic)  -she feels that her ankles are swollen  -Maite feels that she has a muscle spasm on her right lower back  -her worst pain is over the low back on the right side and wraps around to the lateral right hip. She is unsure if the right GT injection was helpful as she bumped her hip recently and has some soreness from that.  -she is tangential in her speech, focuses on her wish to be more active like she was in her 70's when she used to still ice skate and go walking.     -Per Abdi, patient's daugther  Injection in right GT, unsure if helpful  -maite called paramedics last week when Abdi was out of town. [place] she could not walk or get to the bathroom due to pain  -yesterday she was the same way  -her daughters have told her to take an extra prednisone tablet on those flare days.  -they have a list of her medication with list of when she is supposed to take it, Maite has some short-term memory loss and sometimes does not do so (yesterday woke at 0800 so she did not take her 0600 medication)  -Abdi notes that early last year, Dr. Engle (Primary Care Provider) had prescribed  hydrocodone/acetaminophen and it upset Maite's stomach. When Maite reports a severe flare,  Abdi or Radha (her daughters) have her take the hydrocodone/acetaminophen tablets. It is unclear if  these are effective or if she takes them.   - her daughter's are now getting calls from Jefferson twice per week from her stating she is going to die and she wants the kids to come over.   -she does not sleep well when she has more pain and then Jefferson is calling them in the middle of the night  -Rubi and Radha are looking for a more suitable assisted living situation with more activities available to their mother, but have not yet found one that they like that is affordable.       Interval history February 24, 2021  -She states she is not certain she can go on like this. She is not crying today. Very talkative. Last telephone call, barely able to speak, crying throughout most of encounter.  -she is frustrated that she hurts all of the time  -she lays on the alexander and does stretching.   -she wants to have a repeat right greater trochanteric bursal injection, but she is having her 2nd COVID vaccine on 2/26/2021. We advise waiting 2 weeks after the 2nd COVID vaccine prior to elective steroid injections.  -Radha states she and her sister Haylee feel that the Cymbalta has been somewhat helpful. Will increase dosage to 30mg/day.       Interval history January 6, 2021  -spoke with Jefferson and her daughter Rubi on speakerphone.  -Jefferson ran out of her oral steroids for 6 days, her steroid injection  -she just picked the prednisone today.   -Jefferson had a right sided greater trochanteric bursal injection in November 2020.   -Jefferson and Rubi would like to re-trial the Cymbalta. She has stopped taking it because she was not eating and having issues with nausea.   -We talked about trying to use the Cymbalta either with food in the morning OR at bedtime with a snack.   -Jefferson indicates she is having right sided pain over the lateral hip pain.  -She has seen Dr. Lewis with podiatry. She may need to have her left 2nd toe removed.       Interval history 11/4/2020  -low back pain and lateral hip pain on the right side.    -new shoe insert about  "a week ago, her daughter Abdi states this has helped her balance.   -Jefferson states she has some \"snapping\" in the right hip.   -Jefferson has not yet begun the Cymbalta, she wanted to try the shoe insert first  -Jefferson has had a tough day especially on Sunday 11/1, she wasn't walking around as much as usual.  -patient is with her daughter Abdi throughout the encounter      Interval history 10/2/2020  -right SI joint injection has been helpful  -She is having pain in the right side of the hip, laterally, not anterior groin pain  -patient feels her left leg is longer than the right leg  -She has been more active  -she is seeing a counselor, has been seen x 3      Interval history 8/26/2020  -She states she is not doing very well sometimes.   -she feels her right hip is \"very bad.\"   -She had a 7/30/2020 right greater trochanteric bursal injection, her pain came down from an 8/10 to a 2/10 for her right GT.  -still has some trouble standing due to a spot that is painful, she is describing a spot of pain over the SI joint. This tends to hurt when she stands to do things and feels better when she is sitting down.   -she accompanied today by her daughter Abdi on the phone today  -recertified for medical cannabis today  -she has a hammer toe and this has been really painful for her the past 2 days.       Interval history 7/15/2020  -she is not feeling well  -she sleeps with a pillow between her legs  -she notes she is having \"bad pain\" on the lateral hip on the right side. She has pain when she lays on her right side in bed. She has had a greater trochanteric bursal injection in June 2019 that was helpful.   -she is not eating well as she is isolated and feels depressed and feels shaky as she doesn't eat as regularly as she should.   -discussed using Voltaren gel, Cymbalta and greater trochanteric injection      Interval history 5/6/2020  -Teresa is having some more hip pain on the right side between the top of the hip and up and " "the tailbone to the hip  -SI joint injection on the right side was beneficial and this has made her pain more focused on the right hip.   -she has pain if she lays on her right side  -she is having more pain in the deep right groin. She notes when she walks this pain is markedly worse.         At this point, the patient's participation with our multidisciplinary team includes:  The patient has been compliant with the program  PT - patient is interested  Health Psych  None      Pain scores:  Pain intensity on average is 8-9 on a scale of 0-10.   Range is 8-10/10.   Pain is described as \"miserable, tightness.\"  Pain is continuous in nature.      Current pain-related medication treatments include:  -Lidocaine patch (helpful)  -Medical cannabis (helpful)  -Cymbalta 30mg/day (tolerating well, somewhat helpful for mood if not for pain)  -Butrans 15mcg/hr TD Q 7 days (helpful)         Other pertinent medications:  -Senna-docusate PRN  -Aricept 5mg/day        Previous medication treatments included:  OPIATES:hydrocodone (not helpful), oxycodone (somewhat helpful), Butrans (helpful)  NSAIDS: ibuprofen (not helpful), Aleve (not helpful)  MUSCLE RELAXANTS: none  ANTI-MIGRAINE MEDS: none  ANTI-DEPRESSANTS: Cymbalta (somewhat helpful)  SLEEP AIDS: none  ANTI-CONVULSANTS: gabapentin (unsure if helpful, side effects: hot flashes, fogginess)  TOPICALS: none  Other meds: Tylenol (not helpful)        Injections:  -11/10/2017 Caudal epidural steroid injection with Dr. Reymundo Bajwa (helpful for a very short period of time)  -1/5/2018 Ultrasound guided right intra-articular hip injection with Dr. Jean Meade  -1/12/2018 Right ankle injection with Dr. Lewis of podiatry.   -7/3/2018 L2-3 interlaminar epidural steroid injection with Dr Jamaal Eid (not at all helpful)  -8/3/2018 right hip steroid injection with Dr. Valverde (helped some)  -6/14/2019 bilateral hip greater trochanteric bursa injection with Dr. Jamaal Eid " (helpful)   -1/23/2020 right SI joint injection with Dr. Chasidy Laura (quite helpful)  5/13/2020 right hip joint steroid injection with Dr. Valverde (quite helpful)  -7/30/2020 right hip greater trochanteric bursal injection with Dr. Jamaal Eid (quite helpful)  -9/15/2020 right SI joint injection with Dr. Jamaal Eid (pain diminished about 25%, sharpness went away)  -11/19/2020 right hip greater trochanteric bursal injection with Dr. Eid (helpful)  -3/18/2021 right hip greater trochanteric bursal injection with Dr. Jamaal Eid (unsure)  5/5/2021 trigger point injections, right trochanteric bursal injection with Dr. Bushra Alicia  (helpful)  -5/12/2021 right SI joint injection with Dr. Bushra Alicia  (helpful)  -8/11/2021 right SI joint injection and right greater trochanteric bursal injection with Dr. Bushra Alicia  (helpful, GT was more helpful than the SI joint injection)  -11/15/2021 right SI joint injection, right greater trochanteric bursal injection with Dr. Bushra Alicia  (somewhat helpful)        Side Effects: no side effect  Patient is using the medication as prescribed: YES    Medications:  Current Outpatient Medications   Medication Sig Dispense Refill     Acetaminophen (TYLENOL ARTHRITIS PAIN PO) As needed not taking daily       CALCIUM 1200 OR Reported on 4/20/2017 (Patient not taking: No sig reported)       ciclopirox (LOPROX) 0.77 % cream Apply topically 2 times daily To feet and toenails. 90 g 3     dexamethasone (DECADRON) 0.5 MG tablet Take 1 tablet (0.5 mg) by mouth 2 times daily (with meals) 60 tablet 3     diclofenac (FLECTOR) 1.3 % patch Externally apply 1 patch topically 2 times daily To the right lateral hip 30 patch 2     donepezil (ARICEPT) 5 MG tablet Take 1 tablet (5 mg) by mouth At Bedtime 30 tablet 2     DULoxetine (CYMBALTA) 60 MG capsule Take 1 capsule (60 mg) by mouth daily 3 month script 90 capsule 1     Glucosamine-Chondroit-Vit C-Mn (GLUCOSAMINE 1500 COMPLEX PO)  Take 1,500 mg by mouth daily Reported on 4/20/2017 (Patient not taking: Reported on 2/21/2022)       levETIRAcetam (KEPPRA) 500 MG tablet Take 1 tablet (500 mg) by mouth 2 times daily 60 tablet 3     lidocaine (LIDODERM) 5 % patch Apply 1-3 patches to external skin Q 24 hours as needed.  Okay for pt to self-administer. 90 patch 11     losartan-hydrochlorothiazide (HYZAAR) 100-25 MG tablet Take 1 tablet by mouth daily 90 tablet 3     magnesium 250 MG tablet Take 1 tablet by mouth daily       MULTI-VITAMIN OR TABS 1 TABLET DAILY       oxyCODONE (ROXICODONE) 5 MG tablet Take 0.5 tablets (2.5 mg) by mouth two times daily Take at 0800 and 1700. Fill and begin 2/8/2022. 30 days for chronic pain (Patient not taking: Reported on 3/18/2022) 30 tablet 0     potassium aminobenzoate 500 MG CAPS capsule  (Patient not taking: Reported on 3/18/2022)       predniSONE (DELTASONE) 2.5 MG tablet TAKE 1 TABLET BY MOUTH DAILY. MAY REPEAT ONCE DAILY AS NEEDED 120 tablet 0     senna-docusate (SENOKOT-S;PERICOLACE) 8.6-50 MG per tablet Take 1 tablet by mouth 2 times daily 120 tablet 12     simvastatin (ZOCOR) 40 MG tablet Take 1 tablet (40 mg) by mouth At Bedtime 90 tablet 0     buprenorphine (BUTRANS) 20 MCG/HR WK patch Place 1 patch onto the skin every 7 days Fill 3/7/2022, begin 3/9/22.  to last 28 days 4 patch 0     diclofenac (VOLTAREN) 1 % topical gel Apply topically 4 times daily       Loratadine (CLARITIN) 10 MG capsule Take 10 mg by mouth as needed Reported on 4/20/2017 (Patient not taking: Reported on 2/8/2022)       pregabalin (LYRICA) 25 MG capsule Take 1 capsule (25 mg) by mouth 2 times daily 60 capsule 1       Medical History: any changes in medical history since they were last seen? No    Social History:  Home situation: . 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  Tobacco use: none  Alcohol use: very little, about 2 drinks per month  Drug use:  none  History of chemical dependency treatment: none    Is patient a current smoker or tobacco user?  no  If yes, was cessation counseling offered?  no              Physical Exam:   Vital signs: /79   Pulse 98     Behavioral observations:  Awake and alert, cooperative    Pulm: respirations easy and unlabored. Able to speak in full sentences without SOB or cough noted.      Musculoskeletal:   strength is grossly equal  Significant tenderness over the right GT and right piriformis  No tenderness over the SI joints at present time    Neurologic: SILT in all extremities      Skin: No suspicious lesions on exposed skin.    Other: na          Imaging  MRI LUMBAR SPINE WITHOUT CONTRAST   3/30/2018 1:22 PM      HISTORY:  Lumbar degenerative disc disease.     TECHNIQUE: Multiplanar multisequence MRI of the lumbar spine without  contrast.     COMPARISON: Lumbar spine x-ray 3/22/2018. Lumbar spine MR 3/17/2017.      FINDINGS: There are 5 lumbar-type vertebral bodies assumed for the  purposes of this dictation. The tip of the conus terminates at the  L1-L2 level.     There is convex right scoliotic curvature of the lumbar spine centered  at the L2-L3 level. There is mild retrolisthesis of L2 on L3 and mild  anterolisthesis of L3 on L4. There is grade 1 anterolisthesis of L4 on  L5. There is right lateral listhesis of L3 on L4. There is slight loss  of left-sided vertebral body height at L3, probably due to  degenerative changes. Severe loss of intervertebral disc height seen  at L3-L4 with associated degenerative endplate changes. Severe loss of  disc height also seen on the left at L1-L2 and L2-L3 with severe  degenerative endplate changes. Mild loss of disc height at T11-12 and  T12-L1 with disc desiccation at L4-5 and L5-S1. There is mild STIR  hyperintense marrow edema at the opposing L2-L3 endplates.  Degenerative subchondral cysts seen anteriorly at L3.     Level by level as follows:      T12-L1:  Only seen on the  lateral view, but there is a posterior disc  bulge and bilateral facet hypertrophy resulting in moderate right and  mild left neural foraminal narrowing. No significant spinal canal  narrowing.      L1-L2:  Convex right curvature with retrolisthesis and circumferential  disc bulge with endplate osteophytic spurring as well as bilateral  facet hypertrophy. Findings result in mild central spinal canal  narrowing and moderate bilateral neural foraminal narrowing.      L2-L3:  Convex right scoliotic curvature with left-sided disc bulge  and endplate osteophytic spurring as well as, left greater than right,  facet hypertrophy and ligamentum flavum infolding. Findings result in  moderate central spinal canal narrowing as well as moderate to severe  left neural foraminal narrowing. There is mild right neural foraminal  narrowing.      L3-L4:  Mild anterolisthesis along with circumferential disc bulge,  severe bilateral facet hypertrophy, and ligamentum flavum infolding.  Findings result in severe central spinal canal narrowing. There is  also severe left and moderate to severe right neural foraminal  narrowing.      L4-L5:  Anterolisthesis with uncovering of the disc and small  posterior disc bulge along with severe bilateral facet hypertrophy and  ligamentum flavum infolding. Findings result in moderate to severe  central spinal canal narrowing. There is also moderate to severe  bilateral neural foraminal narrowing. Small bilateral facet joint  effusions.      L5-S1:  Mild posterior disc bulge and marked bilateral facet  hypertrophy results in moderate bilateral neural foraminal narrowing,  right greater than left. No significant central spinal canal  narrowing.      Paraspinous soft tissues:  Normal.          IMPRESSION:    1. Severe multilevel degenerative changes throughout the lumbar spine  as described above. Overall, there is no significant change since  previous MR 3/17/2017.  2. Severe central spinal canal  narrowing at L3-L4 and moderate to  severe spinal canal narrowing at L4-L5.  3. Convex right scoliotic curvature of the spine with multiple levels  of spondylolisthesis and right lateral listhesis of L3 on L4.     DIANA HERNANDEZ MD        [place] Prescription Monitoring [place]:  [place]ed [place]  2/8/2022- no concerning fills.  Marion SANCHES RN CNP, FNP  [facility]  [place] location        Assessment:   1. Greater trochanteric bursitis of the right hip  2. Primary osteoarthritis involving multiple joints  3. Right hip joint pain  4. DDD (degenerative disc disease), lumbar  5. Spinal stenosis of lumbar region with neurogenic claudication  6. Chronic bilateral low back pain without sciatica  7. Chronic right SI joint pain  8. Chronic continuous use of opioids  9. Chronic pain syndrome  10. Piriformis syndrome, right        Plan:   1. Physical Therapy:  None at present  2. Clinical Health Psychologist: I agree with outside counseling as you are.  3. Diagnostic Studies:  None  4. Medication Management:    5. Continue BUTRANS (buprenorphine) 20mcg/hr transdermal patch. Change patch every 7 days.  Wear on clean, dry skin on the upper chest, upper arm or upper back.  6. START oxycodone 5mg, take 0.5 tab twice daily, at 0800 and at 1700 (5pm)  7. Flector 1.3% patch and apply over right lateral hip twice daily for pain  8. Stop voltaren gel  9. continue Cymbalta (Duloxetine) 60mg every day  10. Continue Lidocaine patches   11. Continue medical cannabis.   12. Trial Aspercreme with 4% lidocaine to right lateral hip  13. Further procedures recommended: schedule mid February for right greater trochanteric bursal injection and right SI joint injection.   14. Recommendations to PCP: none  15. Follow up: 6-8 weeks video or in-person, your choice.  Please call [phone] to make your follow-up appointment with me.                Marion SANCHES RN CNP, SUGEY  [facility]  [facility]

## 2022-02-08 NOTE — PATIENT INSTRUCTIONS
Plan:   1. Physical Therapy:  None at present  2. Clinical Health Psychologist: I agree with outside counseling as you are.  3. Diagnostic Studies:  None  4. Medication Management:    5. Continue BUTRANS (buprenorphine) 20mcg/hr transdermal patch. Change patch every 7 days.  Wear on clean, dry skin on the upper chest, upper arm or upper back.  6. START oxycodone 5mg, take 0.5 tab twice daily, at 0800 and at 1700 (5pm)  7. Flector 1.3% patch and apply over right lateral hip twice daily for pain  8. Stop voltaren gel  9. continue Cymbalta (Duloxetine) 60mg every day  10. Continue Lidocaine patches   11. Continue medical cannabis.   12. Trial Aspercreme with 4% lidocaine to right lateral hip  13. Further procedures recommended: schedule mid February for right greater trochanteric bursal injection and right SI joint injection.   14. Recommendations to PCP: none  15. Follow up: 6-8 weeks video or in-person, your choice.  Please call [phone] to make your follow-up appointment with me.     ----------------------------------------------------------------  Clinic Number:  [phone]     Call with any questions about your care and for scheduling assistance.     Calls are returned Monday through Friday between 8 AM and 4:30 PM. We usually get back to you within 2 business days depending on the issue/request.    If we are prescribing your medications:    For opioid medication refills, call the clinic or send a [employer] message 7 days in advance.  Please include:    Name of requested medication    Name of the pharmacy.    For non-opioid medications, call your pharmacy directly to request a refill. Please allow 3-4 days to be processed.     Per [place] State Law:    All controlled substance prescriptions must be filled within 30 days of being written.      For those controlled substances allowing refills, pickup must occur within 30 days of last fill.      We believe regular attendance is key to your success in our program!       Any time you are unable to keep your appointment we ask that you call us at least 24 hours in advance to cancel.This will allow us to offer the appointment time to another patient.     Multiple missed appointments may lead to dismissal from the clinic.

## 2022-02-09 NOTE — TELEPHONE ENCOUNTER
Signed after summery visit was faxed to assisted living and memory care.   RightFax confirmed.     An Menjivar MA

## 2022-02-09 NOTE — TELEPHONE ENCOUNTER
Screening Questions for Radiology Injections:    Injection to be done at which interventional clinic site? [facility] [place]    Remind [place] Pt's that Covid test is no longer required except for sedation procedure Pt's.    Instruct patient to arrive as directed prior to the scheduled appointment time:    [facility]: 30 minutes before      [place]: 30 minutes before; if IV needed 1 hour before     Procedure ordered by [facility]    Procedure ordered? repeat right SI joint and right greater trochanteric bursal injection, last done 11/15/2021       Transforaminal Cervical DELFINA -  [facility] does NOT have providers that do these- [facility] do have providers that do    As a reminder, receiving steroids can decrease your body's ability to fight infection.   Would you still like to move forward with scheduling the injection?  Yes    What insurance would patient like us to bill for this procedure? [facility]      Worker's comp or MVA (motor vehicle accident) -Any injection DO NOT SCHEDULE and route to Taran Garcia.      [facility] insurance - For SI joint injections, DO NOT SCHEDULE and route Stacy Phoenix.       ALL BCBS, [employer] and [employer]-Route to Stacy for review DO NOT SCHEDULE      IF SCHEDULING IN [place] AND NEEDS A PA, IT IS OKAY TO SCHEDULE. [place] HANDLES THEIR OWN PA'S AFTER THE PATIENT IS SCHEDULED. PLEASE SCHEDULE AT LEAST 1 WEEK OUT SO A PA CAN BE OBTAINED.    Any chance of pregnancy? NO   If YES, do NOT schedule and route to RN pool    Is an  needed? No     Patient has a drive home? (mandatory) YES: ok    Is patient taking any blood thinners (That is: Coumadin, Warfarin, Jantoven, Pradaxa Xarelto, Eliquis, Edoxaban, Enoxaparin, Lovenox, Heparin, Arixtra, Fondaparinux, or Fragmin? OR Antiplatelet medication such as Plavix, Brilinta, or Effient? )? No   If hold needed, do NOT schedule, route to RN pool     Is patient taking any aspirin products (includes Excedrin and  Fiorinal)? No     If more than 325mg/day, OK to schedule; Instruct pt to decrease to less than 325 mg for 7 days AND route to RN pool    For CERVICAL procedures, hold all aspirin products for 6 days.     Tell pt that if aspirin product is not held for 6 days, the procedure WILL BE cancelled.      Does the patient have a bleeding or clotting disorder? No     If YES, okay to schedule AND route to RN nurse pool    For any patients with platelet count <100, must be forwarded to provider    Any allergies to contrast dye, iodine, shellfish, or numbing and steroid medications? No    If YES, add allergy information to appointment notes AND route to the RN pool     If DELFINA and Contrast Dye / Iodine Allergy? DO NOT SCHEDULE, route to RN pool    Allergies: Citalopram, Gabapentin, Lexapro [escitalopram], Salsalate, and Sulfa drugs     Is patient diabetic?  No  If YES, instruct them to bring their glucometer.    Does patient have an active infection or treated for one within the past week? No     Is patient currently taking any antibiotics?  No     For patients on chronic, preventative, or prophylactic antibiotics, procedures may be scheduled.     For patients on antibiotics for active or recent infection:antibiotic course must have been completed for 4 days    Is patient currently taking any steroid medications? (i.e. Prednisone, Medrol)  No     For patients on steroid medications, course must have been completed for 4 days    Is patient actively being treated for cancer or immunocompromised? No  If YES, do NOT schedule and route to RN pool     Are you able to get on and off an exam table with minimal or no assistance? Yes  If NO, do NOT schedule and route to RN pool    Are you able to roll over and lay on your stomach with minimal or no assistance? Yes  If NO, do NOT schedule and route to RN pool     Has the patient had a flu shot or any other vaccinations within 7 days before or after the procedure.  No     Have you recently  had a COVID vaccine or have plans to get it in the near future? No    If yes, explain that for the vaccine to work best they need to:       wait 1 week before and 1 week after getting Vaccine #1    wait 1 week before and 2 weeks after getting Vaccine #2    wait 1 week before and 2 weeks after getting Vaccine BOOSTER    If patient has concerns about the timing, send to RN pool     Does patient have an MRI/CT?  Not Applicable  Check Procedure Scheduling Grid to see if required.      Was the MRI done within the last 3 years?  NA    If yes, where was the MRI done i.e.[facility], [facility], [facility], [facility] etc?       If no, do not schedule and route to RN pool    If MRI was not done at [facility], [facility] or [facility] do NOT schedule and route to RN pool.      If pt has an imaging disc, the injection MAY be scheduled but pt has to bring disc to appt.     If they show up without the disc the injection cannot be done    Procedure Specific Instructions:      If celiac plexus block, informed patient NPO for 6 hours and that it is okay to take medications with sips of water, especially blood pressure medications  Not Applicable         If this is for a cervical procedure, informed patient that aspirin needs to be held for 6 days.   Not Applicable      If IV needed:    Do not schedule procedures requiring IV placement in the first appointment of the day or first appointment after lunch. Do NOT schedule at 0745, 0815 or 1245. ok    Instructed pt to arrive 30 minutes early for IV start if required. (Check Procedure Scheduling Grid)  Not Applicable    Reminders:      If you are started on any steroids or antibiotics between now and your appointment, you must contact us because the procedure may need to be cancelled.  Yes      For all procedures except radiofrequency ablations (RFAs) and spinal cord stimulator (SCS) trials, informed patient:    IV sedation is not provided for this procedure.  If you feel that an oral  anti-anxiety medication is needed, you can discuss this further with your referring provider or primary care provider.  The Pain Clinic provider will discuss specifics of what the procedure includes at your appointment.  Most procedures last 10-20 minutes.  We use numbing medications to help with any discomfort during the procedure.  Not Applicable      For patients 85 or older we recommend having an adult stay w/ them for the remainder of the day.   ok    Does the patient have any questions?  NO  Jen Garcia  [facility]

## 2022-02-21 NOTE — NURSING NOTE
Discharge Information    IV Discontiued Time:  NA    Amount of Fluid Infused:  NA    Discharge Criteria = When patient returns to baseline or as per MD order    Consciousness:  Pt is fully awake    Circulation:  BP +/- 20% of pre-procedure level    Respiration:  Patient is able to breathe deeply    O2 Sat:  Patient is able to maintain O2 Sat >92% on room air    Activity:  Moves 4 extremities on command    Ambulation:  Patient is able to stand and walk or stand and pivot into wheelchair    Dressing:  Clean/dry or No Dressing    Notes:   Discharge instructions and AVS given to patient    Patient meets criteria for discharge?  YES    Admitted to PCU?  No    Responsible adult present to accompany patient home?  Yes    Signature/Title:    naseem foy RN  RN Care Coordinator  [facility]

## 2022-02-21 NOTE — PROGRESS NOTES
Pre procedure Diagnosis: SI joint dysfunction, right trochanteric bursitis   Post procedure Diagnosis: Same  Procedure performed: right SI joint injection, right trochanteric bursa injection   Anesthesia: none  Complications: none  Operators: Bushra Alicia MD and Mauro Gee MD     Indications:   Teresa Lopez is a 86 year old female sent by Marion Munoz NP for injection. They have a history of right buttock, hip and thigh pain. She had improvement with previous injections. History provided by her daughter.  Exam shows SI joint tenderness on the right trochanteric bursa tenderness and they have tried conservative treatment including medications.    Options/alternatives, benefits and risks were discussed with the patient including bleeding, infection, tissue trauma, exposure to radiation, reaction to medications including seizure, nerve injury, weakness, and numbness.  Questions were answered to her satisfaction and she agrees to proceed. Voluntary informed consent was obtained and signed.     Vitals were reviewed: Yes  Allergies were reviewed:  Yes   Medications were reviewed:  Yes   Pre-procedure pain score: 8/10    Procedure:  After getting informed consent, patient was brought into the procedure suite and was placed in a prone position on the procedure table.   A Pause for the Cause was performed.  Patient was prepped and draped in sterile fashion.     After identifying the right SI joint, the C-arm was rotated to a obliquely to obtain the best view of the inferior angle of the joint.  A total of 2 ml of Lidocaine 1%  was used to anesthetize the skin at a skin entry site coaxial with the fluoroscopy beam at this location.  A 22gauge 3.5 inch needle was advanced under intermittent fluoroscopy until it was felt to enter the SI joint.    A total of 1.5ml of Omnipaque-300 was injected, confirming appropriate position, with spread into the intraarticular space, with no intravascular uptake noted.  8.5ml was  wasted. Location was verified in lateral view.    1.5 ml of 0.2% ropivacaine with 30mg of kenalog was injected.  The needle was flushed with lidocaine and removed.    The area over the right trochanter was palpated, and the tender area was identified, corresponding to the area of the trochanteric bursa. No fluoro used  The area was cleaned with Chloroprep.  A 25 G 3.5 inch needle was inserted, aiming towards the trochanter.  When bone was encountered, the needle was slightly drawn.  A solution containing local anesthesic and steroid was injected.  The needle was removed.  Hemostasis was achieved. Bandaids were placed as appropriate.    2ml of 0.5% ropivacaine, 2ml of 1% lidocaine, and 20mg of kenalog was injected.       Hemostasis was achieved, the area was cleaned, and bandaids were placed when appropriate.  The patient tolerated the procedure well, and was taken to the recovery room.    Images were saved to PACS.    Post-procedure pain score: not recorded  Follow-up includes:   -f/u with referring provider  -can be sent back in the future for injection    Mauro Gee MD  Pain Medicine Fellow  [facility]     Bushra Alicia MD  [facility]

## 2022-02-21 NOTE — NURSING NOTE
Pre-procedure Intake  If YES to any questions or NO to having a   Please complete laminated checklist and leave on the computer keyboard for Provider, verbally inform provider if able.    For SCS Trial, RFA's or any sedation procedure:  Have you been fasting? NA    If yes, for how long?     Are you taking any any blood thinners such as Coumadin, Warfarin, Jantoven, Pradaxa Xarelto, Eliquis, Edoxaban, Enoxaparin, Lovenox, Heparin, Arixtra, Fondaparinux, or Fragmin? OR Antiplatelet medication such as Plavix, Brilinta, or Effient?   No     If yes, when did you take your last dose?     Do you take aspirin?  No    If cervical procedure, have you held aspirin for 6 days?   }    Do you have any allergies to contrast dye, iodine, steroid and/or numbing medications?  NO    Are you currently taking antibiotics or have an active infection?  NO    Have you had a fever/elevated temperature within the past week? NO    Are you currently taking oral steroids? YES: prednisone     Do you have a ? Yes    Are you pregnant or breastfeeding?  NO    Have you received the COVID-19 vaccine? Yes    If yes, was it your 1st, 2nd or only dose needed? Booster     Date of most recent vaccine: 01/01/22    Notify provider and RNs if systolic BP >170, diastolic BP >100, P >100 or O2 sats < 90%

## 2022-02-21 NOTE — PATIENT INSTRUCTIONS
[facility]   Procedure Discharge Instructions    Today you saw: Dr. Leah Alicia     You had an:  sacroiliac joint injection   And trochateric bursa injection      Be cautious when walking. Numbness and/or weakness in the lower extremities may occur for up to 6-8 hours after the procedure due to effect of the local anesthetic    Do not drive for 6 hours. The effect of the local anesthetic could slow your reflexes.     You may resume your regular activities after 24 hours    Avoid strenuous activity for the first 24 hours    You may shower, however avoid swimming, tub baths or hot tubs for 24 hours following your procedure    You may have a mild to moderate increase in pain for several days following the injection.    It may take up to 14 days for the steroid medication to start working although you may feel the effect as early as a few days after the procedure.       You may use ice packs for 10-15 minutes, 3 to 4 times a day at the injection site for comfort    Do not use heat to painful areas for 6 to 8 hours. This will give the local anesthetic time to wear off and prevent you from accidentally burning your skin.     Unless you have been directed to avoid the use of anti-inflammatory medications (NSAIDS), you may use medications such as ibuprofen, Aleve or Tylenol for pain control if needed.     If you were fasting, you may resume your normal diet and medications after the procedure    If you have diabetes, check your blood sugar more frequently than usual as your blood sugar may be higher than normal for 10-14 days following a steroid injection. Contact your doctor who manages your diabetes if your blood sugar is higher than usual    Possible side effects of steroids that you may experience include flushing, elevated blood pressure, increased appetite, mild headaches and restlessness.  All of these symptoms will get better with time.    If you experience any of the following,  call the Pain Clinic during work hours (Mon-Friday 8-4:30 pm) at [phone] or the Provider Line after hours at [phone]:  -Fever over 100 degree F  -Swelling, bleeding, redness, drainage, warmth at the injection site  -Progressive weakness or numbness in your legs or arms  -Loss of bowel or bladder function  -Unusual headache that is not relieved by Tylenol or other pain reliever  -Unusual new onset of pain that is not improving

## 2022-03-03 NOTE — TELEPHONE ENCOUNTER
Reason for call:  Medication   If this is a refill request, has the caller requested the refill from the pharmacy already? No  Will the patient be using a [facility]? No  Name of the pharmacy and phone number for the current request: [facility], [address]     Name of the medication requested: buprenorphine (BUTRANS) 20 MCG/HR WK patch     Other request:      Phone number to reach patient:  Cell number on file:        Telephone Information:   Mobile [phone]         Best Time:       Can we leave a detailed message on this number?  YES     Travel screening: Not Applicable

## 2022-03-03 NOTE — TELEPHONE ENCOUNTER
Patient requesting refill(s) of buprenorphine (BUTRANS) 20 MCG/HR WK patch    Last dispensed from pharmacy on 2/9/22    Patient's last office/virtual visit by prescribing provider on 2/8/22  Next office/virtual appointment scheduled for None     Last urine drug screen date 6/5/19  Current opioid agreement on file (completed within the last year) No Date of opioid agreement: None     E-prescribe to [place], [place] pharmacy    Will route to nursing [place] for review and preparation of prescription(s).

## 2022-03-03 NOTE — TELEPHONE ENCOUNTER
Medication refill information reviewed.     Due date for buprenorphine (BUTRANS) 20 MCG/HR WK patch is 3/9/22     Prescriptions prepped for review.     Will route to provider.         Brenda Murrell RN, BSN, CMSRN  RN Care Coordinator  [facility]

## 2022-03-04 NOTE — TELEPHONE ENCOUNTER
Signed Prescriptions:                        Disp   Refills    buprenorphine (BUTRANS) 20 MCG/HR WK patch 4 patch0        Sig: Place 1 patch onto the skin every 7 days Fill           3/7/2022, begin 3/9/22.  to last 28 days  Authorizing Provider: MARION CELAYA    Reviewed [place]  March 3, 2022- no concerning fills.    Marion SANCHES, RN CNP, FNP  [facility]  [facility]

## 2022-03-18 NOTE — PATIENT INSTRUCTIONS
Patient Instructions:  It was a pleasure to see you in the Vascular Clinic today.        If you have any questions, you can reach my nurse, Yesi REID RN at [phone].     We are encouraging the use of Addyt to communicate with your HealthCare Provider     Medication Changes: Start Lyrica (pregabalin) 25 mg twice daily.     Recommendations: A referral has been place for the Spine Clinic.  Please call [phone] to schedule.     Studies Ordered: MRI Lumbar Spine w/o Contrast.    The results from today include: US art low ext uni right.     Please follow up: Once results are received and reviewed by Dr Denson, we will contact you.     Sincerely,     Dr Denson        Patient Education     Lyrica Oral Capsule 25 mg  Uses  This medicine is used for the following purposes:    anxiety    menopausal symptoms    muscle aches    nerve pain    pain    restless leg syndrome    seizures  Instructions  This medicine may be taken with or without food.  It is very important that you take the medicine at about the same time every day. It will work best if you do this.  Keep the medicine at room temperature. Avoid heat and direct light.  It is important that you keep taking each dose of this medicine on time even if you are feeling well.  If you forget to take a dose on time, take it as soon as you remember. If it is almost time for the next dose, do not take the missed dose. Return to your normal dosing schedule. Do not take 2 doses of this medicine at one time.  Please tell your doctor and pharmacist about all the medicines you take. Include both prescription and over-the-counter medicines. Also tell them about any vitamins, herbal medicines, or anything else you take for your health.  Do not suddenly stop taking this medicine. Check with your doctor before stopping.  Cautions  Tell your doctor and pharmacist if you ever had an allergic reaction to a medicine. Symptoms of an allergic reaction can include trouble  breathing, skin rash, itching, swelling, or severe dizziness.  Do not use the medication any more than instructed.  If possible, avoid using with marijuana or other medicines that can cause dizziness or drowsiness. These include allergy/cold products, muscle relaxers, sleep aids, and pain relievers.  Your ability to stay alert or to react quickly may be impaired by this medicine. Do not drive or operate machinery until you know how this medicine will affect you.  Do not drink beverages with alcohol while on this medicine.  Family should check on the patient often. Call the doctor if patient becomes more depressed, has thoughts of suicide, or shows changes in behavior.  Call the doctor if there are any signs of confusion or unusual changes in behavior.  Tell the doctor or pharmacist if you are pregnant, planning to be pregnant, or breastfeeding.  Ask your pharmacist if this medicine can interact with any of your other medicines. Be sure to tell them about all the medicines you take.  Please tell all your doctors and dentists that you are on this medicine before they provide care.  Do not start or stop any other medicines without first speaking to your doctor or pharmacist.  Call your doctor right away if you notice slow or shallow breathing.  Call your doctor right away if you notice any unusual bleeding or bruising.  Do not share this medicine with anyone who has not been prescribed this medicine.  This medicine can cause serious side effects in some patients. Important information from the U.S. Food and Drug Administration (FDA) is available from your pharmacist. Please review it carefully with your pharmacist to understand the risks associated with this medicine.  Side Effects  The following is a list of some common side effects from this medicine. Please speak with your doctor about what you should do if you experience these or other side effects.    constipation    dizziness    drowsiness or sedation    dry  mouth    lack of energy and tiredness    weight gain  Call your doctor or get medical help right away if you notice any of these more serious side effects:    swelling of the legs, feet, and hands    blurring or changes of vision  A few people may have an allergic reactions to this medicine. Symptoms can include difficulty breathing, skin rash, itching, swelling, or severe dizziness. If you notice any of these symptoms, seek medical help quickly.  Extra  Please speak with your doctor, nurse, or pharmacist if you have any questions about this medicine.  [email or web address]  IMPORTANT NOTE: This document tells you briefly how to take your medicine, but it does not tell you all there is to know about it.Your doctor or pharmacist may give you other documents about your medicine. Please talk to them if you have any questions.Always follow their advice. There is a more complete description of this medicine available in English.Scan this code on your smartphone or tablet or use the web address below. You can also ask your pharmacist for a printout. If you have any questions, please ask your pharmacist.     2021 [employer].

## 2022-03-18 NOTE — LETTER
3/18/2022      RE: Teresa Lopez  [address]  [place] [address]       Dear Colleague,    Thank you for the opportunity to participate in the care of your patient, Teresa Lopez, at the [facility] [place] [facility]. Please see a copy of my visit note below.    [facility]  Darius Denson MD - Vascular Medicine Progress Note    LOCATION: [facility]    Teresa Lopez  Medical Record #:  [id number]  YOB: 1935  Age:  86 year old     Date of Service: 3/18/2022     PRIMARY CARE PROVIDER: No Ref-Primary, Physician    REASON FOR VISIT:  evaluation for Doppler arterial ultrasound right lower extremity    IMPRESSION:  1-no evidence of significant PAD right lower extremity arterial system seems to be patent with no significant or hemodynamically significant stenosis, RCA has normal signal Doppler signal indicating possible occlusion  Waveforms are triphasic all through the right arterial system no indication of proximal external iliac common iliac lesions  No vascular cause or reason for the pressure is constant right hip pain for which he is getting injections with no relief on the contrary the pain is getting worse back in 2019 patient was diagnosed with moderate to severe spinal stenosis affecting L2 L3 dermatomes    RECOMMENDATION:  1-MRI without contrast lumbosacral spine.  2-Lyrica 25 mg p.o. twice daily for possible radiculopathy    If patients imaging is normal patient should follow up as needed    HPI: Teresa Lopez is a 86 year old female who was seen today for follow-up on Doppler arterial ultrasound  Patient still having persistent right hip pain this not relieved by hip injections pain is worse when she is sitting and is relieved by lying down      PAST MEDICAL HISTORY  Past Medical History:   Diagnosis Date     Cataract 3/7/2012     Continuous opioid dependence  (H) 1/2/2019     CVA (cerebral infarction)      Diabetes mellitus (H)      DJD (degenerative joint disease)      HTN      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  to last 28 days  ciclopirox (LOPROX) 0.77 % cream, Apply topically 2 times daily To feet and toenails.  dexamethasone (DECADRON) 0.5 MG tablet, Take 1 tablet (0.5 mg) by mouth 2 times daily (with meals)  diclofenac (FLECTOR) 1.3 % patch, Externally apply 1 patch topically 2 times daily To the right lateral hip  diclofenac (VOLTAREN) 1 % topical gel, Apply topically 4 times daily  donepezil (ARICEPT) 5 MG tablet, Take 1 tablet (5 mg) by mouth At Bedtime  DULoxetine (CYMBALTA) 60 MG capsule, Take 1 capsule (60 mg) by mouth daily 3 month script  levETIRAcetam (KEPPRA) 500 MG tablet, Take 1 tablet (500 mg) by mouth 2 times daily  lidocaine (LIDODERM) 5 % patch, Apply 1-3 patches to external skin Q 24 hours as needed.  Okay for pt to self-administer.  losartan-hydrochlorothiazide (HYZAAR) 100-25 MG tablet, Take 1 tablet by mouth daily  magnesium 250 MG tablet, Take 1 tablet by mouth daily  MULTI-VITAMIN OR TABS, 1 TABLET DAILY  predniSONE (DELTASONE) 2.5 MG tablet, TAKE 1 TABLET BY MOUTH DAILY. MAY REPEAT ONCE DAILY AS NEEDED  senna-docusate (SENOKOT-S;PERICOLACE) 8.6-50 MG per tablet, Take 1 tablet by mouth 2 times daily  simvastatin (ZOCOR) 40 MG tablet, Take 1 tablet (40 mg) by mouth At Bedtime  CALCIUM 1200 OR, Reported on 4/20/2017 (Patient not taking: No sig reported)  Glucosamine-Chondroit-Vit C-Mn (GLUCOSAMINE 1500 COMPLEX PO), Take 1,500 mg by mouth  daily Reported on 4/20/2017 (Patient not taking: Reported on 2/21/2022)  Loratadine (CLARITIN) 10 MG capsule, Take 10 mg by mouth as needed Reported on 4/20/2017 (Patient not taking: Reported on 2/8/2022)  oxyCODONE (ROXICODONE) 5 MG tablet, Take 0.5 tablets (2.5 mg) by mouth two times daily Take at 0800 and 1700. Fill and begin 2/8/2022. 30 days for chronic pain (Patient not taking: Reported on 3/18/2022)  potassium aminobenzoate 500 MG CAPS capsule,     No current facility-administered medications on file prior to visit.      ALLERGIES     Allergies   Allergen Reactions     Citalopram Other (See Comments)     Sweating profusely and nausea     Gabapentin      Felt off, dizzy, hot and cold symptoms     Lexapro [Escitalopram] Other (See Comments)     Hot flashes     Salsalate      tinnitus     Sulfa Drugs Hives       FAMILY HISTORY  Family History   Problem Relation Age of Onset     Arthritis Mother      Cataracts Mother      Unknown/Adopted Father         adopted     Diabetes Brother      Cancer Brother      Cataracts Maternal Grandmother      Hypertension No family hx of      Cerebrovascular Disease No family hx of      Thyroid Disease No family hx of      Glaucoma No family hx of      Macular Degeneration No family hx of        SOCIAL HISTORY  Social History     Socioeconomic History     Marital status:      Spouse name: Not on file     Number of children: Not on file     Years of education: Not on file     Highest education level: Not on file   Occupational History     Not on file   Tobacco Use     Smoking status: Never Smoker     Smokeless tobacco: Never Used   Substance and Sexual Activity     Alcohol use: Yes     Alcohol/week: 0.0 standard drinks     Comment: 0-1 drink weekly     Drug use: No     Sexual activity: Not Currently     Partners: Male   Other Topics Concern     Parent/sibling w/ CABG, MI or angioplasty before 65F 55M? No   Social History Narrative    Teresa (pronounced JOE-elizabeth)    In [place]  [facility] living facility - [facility] next door    Worked in safe deposit and credit card settlements for [employer]    History of polka, schharryisch, other dancing    2 daughters in area     Social Determinants of Health     Financial Resource Strain: Not on file   Food Insecurity: Not on file   Transportation Needs: Not on file   Physical Activity: Not on file   Stress: Not on file   Social Connections: Not on file   Intimate Partner Violence: Not on file   Housing Stability: Not on file       ROS:   Complete ROS negative other than what is stated in HPI.     EXAM:  BP (!) 153/95 (BP Location: Right arm, Patient Position: Chair, Cuff Size: Adult Regular)   Pulse 95   Wt 118 lb (53.5 kg)   SpO2 97%   BMI 20.25 kg/m    In general, the patient is a pleasant female in no apparent distress.    HEENT: NC/AT.  PERRLA.  EOMI.  Sclerae white, not injected.    Neck: No adenopathy.  Carotids +2/2 bilaterally without bruits.   Heart: RRR. Normal S1, S2 splits physiologically. No murmur, rub, click, or gallop.   Lungs: CTA.  No ronchi, wheezes, rales.    Abdomen: Soft, nontender, nondistended.   Extremities: No clubbing, cyanosis, or edema.  No wounds.     Labs:  LIPID RESULTS:  Lab Results   Component Value Date    CHOL 168 04/21/2021    HDL 83 04/21/2021    LDL 60 04/21/2021    TRIG 127 04/21/2021    CHOLHDLRATIO 2.7 02/12/2015       LIVER ENZYME RESULTS:  Lab Results   Component Value Date    AST 24 04/21/2021    ALT 24 04/21/2021       CBC RESULTS:  Lab Results   Component Value Date    WBC 10.8 04/22/2021    RBC 3.08 (L) 04/22/2021    HGB 11.0 (L) 04/22/2021    HCT 33.0 (L) 04/22/2021     (H) 04/22/2021    MCH 35.7 (H) 04/22/2021    MCHC 33.3 04/22/2021    RDW 12.6 04/22/2021     04/22/2021       BMP RESULTS:  Lab Results   Component Value Date     04/22/2021    POTASSIUM 3.2 (L) 04/22/2021    CHLORIDE 100 04/22/2021    CO2 29 04/22/2021    ANIONGAP 8 04/22/2021     (H) 04/22/2021     BUN 19 04/22/2021    CR 0.74 04/22/2021    GFRESTIMATED 74 04/22/2021    GFRESTBLACK 86 04/22/2021    AYLIN 10.0 04/22/2021        A1C RESULTS:  Lab Results   Component Value Date    A1C 6.6 (H) 04/21/2021       THYROID RESULTS:  Lab Results   Component Value Date    TSH 2.32 02/23/2021         DIAGNOSTIC STUDIES:     Images:  PAIN Joint Injection Sacroiliac Joint Right    Result Date: 2/21/2022  Pre procedure Diagnosis: SI joint dysfunction, right trochanteric bursitis Post procedure Diagnosis: Same Procedure performed: right SI joint injection, right trochanteric bursa injection Anesthesia: none Complications: none Operators: Bushra Alicia MD and Mauro Gee MD Indications: Teresa Lopez is a 86 year old female sent by Marion Munoz NP for injection. They have a history of right buttock, hip and thigh pain. She had improvement with previous injections. History provided by her daughter.  Exam shows SI joint tenderness on the right trochanteric bursa tenderness and they have tried conservative treatment including medications. Options/alternatives, benefits and risks were discussed with the patient including bleeding, infection, tissue trauma, exposure to radiation, reaction to medications including seizure, nerve injury, weakness, and numbness.  Questions were answered to her satisfaction and she agrees to proceed. Voluntary informed consent was obtained and signed. Vitals were reviewed: Yes Allergies were reviewed:  Yes Medications were reviewed:  Yes Pre-procedure pain score: 8/10 Procedure: After getting informed consent, patient was brought into the procedure suite and was placed in a prone position on the procedure table.   A Pause for the Cause was performed.  Patient was prepped and draped in sterile fashion. After identifying the right SI joint, the C-arm was rotated to a obliquely to obtain the best view of the inferior angle of the joint.  A total of 2 ml of Lidocaine 1%  was used to anesthetize  the skin at a skin entry site coaxial with the fluoroscopy beam at this location.  A 22gauge 3.5 inch needle was advanced under intermittent fluoroscopy until it was felt to enter the SI joint. A total of 1.5ml of Omnipaque-300 was injected, confirming appropriate position, with spread into the intraarticular space, with no intravascular uptake noted.  8.5ml was wasted. Location was verified in lateral view. 1.5 ml of 0.2% ropivacaine with 30mg of kenalog was injected.  The needle was flushed with lidocaine and removed. The area over the right trochanter was palpated, and the tender area was identified, corresponding to the area of the trochanteric bursa. No fluoro used  The area was cleaned with Chloroprep.  A 25 G 3.5 inch needle was inserted, aiming towards the trochanter.  When bone was encountered, the needle was slightly drawn.  A solution containing local anesthesic and steroid was injected.  The needle was removed.  Hemostasis was achieved. Bandaids were placed as appropriate. 2ml of 0.5% ropivacaine, 2ml of 1% lidocaine, and 20mg of kenalog was injected.  Hemostasis was achieved, the area was cleaned, and bandaids were placed when appropriate. The patient tolerated the procedure well, and was taken to the recovery room.  Images were saved to PACS. Post-procedure pain score: not recorded Follow-up includes: -f/u with referring provider -can be sent back in the future for injection Mauro Gee MD Pain Medicine Fellow [facility] Bushra Alicia MD [facility]        I personally reviewed and interpreted the images and my interpretation is patent right lower arterial extremity system, no hemodynamically significant lesions, triphasic waveform pattern all through the right lower extremity,.      Darius Denson MD        35 minutes spent on the date of the encounter doing chart review, history and exam, documentation, and further activities as noted above.      Please do  not hesitate to contact me if you have any questions/concerns.     Sincerely,     Darius Denson MD

## 2022-03-18 NOTE — LETTER
Date:March 22, 2022      Patient was self referred, no letter generated. Do not send.        [facility] Health Information

## 2022-03-18 NOTE — PROGRESS NOTES
[facility]  Darius Denson MD - Vascular Medicine Progress Note    LOCATION: [facility]    Teresa Lopez  Medical Record #:  [id number]  YOB: 1935  Age:  86 year old     Date of Service: 3/18/2022     PRIMARY CARE PROVIDER: No Ref-Primary, Physician    REASON FOR VISIT:  evaluation for Doppler arterial ultrasound right lower extremity    IMPRESSION:  1-no evidence of significant PAD right lower extremity arterial system seems to be patent with no significant or hemodynamically significant stenosis, RCA has normal signal Doppler signal indicating possible occlusion  Waveforms are triphasic all through the right arterial system no indication of proximal external iliac common iliac lesions  No vascular cause or reason for the pressure is constant right hip pain for which he is getting injections with no relief on the contrary the pain is getting worse back in 2019 patient was diagnosed with moderate to severe spinal stenosis affecting L2 L3 dermatomes    RECOMMENDATION:  1-MRI without contrast lumbosacral spine.  2-Lyrica 25 mg p.o. twice daily for possible radiculopathy    If patients imaging is normal patient should follow up as needed    HPI: Teresa Lopez is a 86 year old female who was seen today for follow-up on Doppler arterial ultrasound  Patient still having persistent right hip pain this not relieved by hip injections pain is worse when she is sitting and is relieved by lying down      PAST MEDICAL HISTORY  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  to last 28 days  ciclopirox (LOPROX) 0.77 % cream, Apply topically 2 times daily To feet and toenails.  dexamethasone (DECADRON) 0.5 MG tablet, Take 1 tablet (0.5 mg) by mouth 2 times daily (with meals)  diclofenac (FLECTOR) 1.3 % patch, Externally apply 1 patch topically 2 times daily To the right lateral hip  diclofenac (VOLTAREN) 1 % topical gel, Apply topically 4 times daily  donepezil (ARICEPT) 5 MG tablet, Take 1 tablet (5 mg) by mouth At Bedtime  DULoxetine (CYMBALTA) 60 MG capsule, Take 1 capsule (60 mg) by mouth daily 3 month script  levETIRAcetam (KEPPRA) 500 MG tablet, Take 1 tablet (500 mg) by mouth 2 times daily  lidocaine (LIDODERM) 5 % patch, Apply 1-3 patches to external skin Q 24 hours as needed.  Okay for pt to self-administer.  losartan-hydrochlorothiazide (HYZAAR) 100-25 MG tablet, Take 1 tablet by mouth daily  magnesium 250 MG tablet, Take 1 tablet by mouth daily  MULTI-VITAMIN OR TABS, 1 TABLET DAILY  predniSONE (DELTASONE) 2.5 MG tablet, TAKE 1 TABLET BY MOUTH DAILY. MAY REPEAT ONCE DAILY AS NEEDED  senna-docusate (SENOKOT-S;PERICOLACE) 8.6-50 MG per tablet, Take 1 tablet by mouth 2 times daily  simvastatin (ZOCOR) 40 MG tablet, Take 1 tablet (40 mg) by mouth At Bedtime  CALCIUM 1200 OR, Reported on 4/20/2017 (Patient not taking: No sig reported)  Glucosamine-Chondroit-Vit C-Mn (GLUCOSAMINE 1500 COMPLEX PO), Take 1,500 mg by mouth daily Reported on 4/20/2017 (Patient not taking: Reported on 2/21/2022)  Loratadine (CLARITIN) 10 MG capsule, Take 10 mg by mouth as needed Reported on 4/20/2017 (Patient not taking: Reported on 2/8/2022)  oxyCODONE (ROXICODONE) 5 MG tablet, Take 0.5 tablets (2.5 mg) by mouth two times daily Take at 0800 and 1700. Fill and begin 2/8/2022. 30 days  for chronic pain (Patient not taking: Reported on 3/18/2022)  potassium aminobenzoate 500 MG CAPS capsule,     No current facility-administered medications on file prior to visit.      ALLERGIES     Allergies   Allergen Reactions     Citalopram Other (See Comments)     Sweating profusely and nausea     Gabapentin      Felt off, dizzy, hot and cold symptoms     Lexapro [Escitalopram] Other (See Comments)     Hot flashes     Salsalate      tinnitus     Sulfa Drugs Hives       FAMILY HISTORY  Family History   Problem Relation Age of Onset     Arthritis Mother      Cataracts Mother      Unknown/Adopted Father         adopted     Diabetes Brother      Cancer Brother      Cataracts Maternal Grandmother      Hypertension No family hx of      Cerebrovascular Disease No family hx of      Thyroid Disease No family hx of      Glaucoma No family hx of      Macular Degeneration No family hx of        SOCIAL HISTORY  Social History     Socioeconomic History     Marital status:      Spouse name: Not on file     Number of children: Not on file     Years of education: Not on file     Highest education level: Not on file   Occupational History     Not on file   Tobacco Use     Smoking status: Never Smoker     Smokeless tobacco: Never Used   Substance and Sexual Activity     Alcohol use: Yes     Alcohol/week: 0.0 standard drinks     Comment: 0-1 drink weekly     Drug use: No     Sexual activity: Not Currently     Partners: Male   Other Topics Concern     Parent/sibling w/ CABG, MI or angioplasty before 65F 55M? 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     EXAM:  BP (!) 153/95 (BP Location: Right arm, Patient Position: Chair, Cuff Size: Adult Regular)   Pulse 95   Wt 118 lb (53.5 kg)   SpO2 97%   BMI 20.25 kg/m    In general, the patient is a pleasant female in no apparent distress.    HEENT: NC/AT.  PERRLA.  EOMI.  Sclerae white, not injected.    Neck: No adenopathy.  Carotids +2/2 bilaterally without bruits.   Heart: RRR. Normal S1, S2 splits physiologically. No murmur, rub, click, or gallop.   Lungs: CTA.  No ronchi, wheezes, rales.    Abdomen: Soft, nontender, nondistended.   Extremities: No clubbing, cyanosis, or edema.  No wounds.     Labs:  LIPID RESULTS:  Lab Results   Component Value Date    CHOL 168 04/21/2021    HDL 83 04/21/2021    LDL 60 04/21/2021    TRIG 127 04/21/2021    CHOLHDLRATIO 2.7 02/12/2015       LIVER ENZYME RESULTS:  Lab Results   Component Value Date    AST 24 04/21/2021    ALT 24 04/21/2021       CBC RESULTS:  Lab Results   Component Value Date    WBC 10.8 04/22/2021    RBC 3.08 (L) 04/22/2021    HGB 11.0 (L) 04/22/2021    HCT 33.0 (L) 04/22/2021     (H) 04/22/2021    MCH 35.7 (H) 04/22/2021    MCHC 33.3 04/22/2021    RDW 12.6 04/22/2021     04/22/2021       BMP RESULTS:  Lab Results   Component Value Date     04/22/2021    POTASSIUM 3.2 (L) 04/22/2021    CHLORIDE 100 04/22/2021    CO2 29 04/22/2021    ANIONGAP 8 04/22/2021     (H) 04/22/2021    BUN 19 04/22/2021    CR 0.74 04/22/2021    GFRESTIMATED 74 04/22/2021    GFRESTBLACK 86 04/22/2021    AYLIN 10.0 04/22/2021        A1C RESULTS:  Lab Results   Component Value Date    A1C 6.6 (H) 04/21/2021       THYROID RESULTS:  Lab Results   Component Value Date    TSH 2.32 02/23/2021         DIAGNOSTIC STUDIES:     Images:  PAIN Joint  Injection Sacroiliac Joint Right    Result Date: 2/21/2022  Pre procedure Diagnosis: SI joint dysfunction, right trochanteric bursitis Post procedure Diagnosis: Same Procedure performed: right SI joint injection, right trochanteric bursa injection Anesthesia: none Complications: none Operators: Bushra Alicia MD and Mauro Gee MD Indications: Teresa Lopez is a 86 year old female sent by Marion Munoz NP for injection. They have a history of right buttock, hip and thigh pain. She had improvement with previous injections. History provided by her daughter.  Exam shows SI joint tenderness on the right trochanteric bursa tenderness and they have tried conservative treatment including medications. Options/alternatives, benefits and risks were discussed with the patient including bleeding, infection, tissue trauma, exposure to radiation, reaction to medications including seizure, nerve injury, weakness, and numbness.  Questions were answered to her satisfaction and she agrees to proceed. Voluntary informed consent was obtained and signed. Vitals were reviewed: Yes Allergies were reviewed:  Yes Medications were reviewed:  Yes Pre-procedure pain score: 8/10 Procedure: After getting informed consent, patient was brought into the procedure suite and was placed in a prone position on the procedure table.   A Pause for the Cause was performed.  Patient was prepped and draped in sterile fashion. After identifying the right SI joint, the C-arm was rotated to a obliquely to obtain the best view of the inferior angle of the joint.  A total of 2 ml of Lidocaine 1%  was used to anesthetize the skin at a skin entry site coaxial with the fluoroscopy beam at this location.  A 22gauge 3.5 inch needle was advanced under intermittent fluoroscopy until it was felt to enter the SI joint. A total of 1.5ml of Omnipaque-300 was injected, confirming appropriate position, with spread into the intraarticular space, with no intravascular  uptake noted.  8.5ml was wasted. Location was verified in lateral view. 1.5 ml of 0.2% ropivacaine with 30mg of kenalog was injected.  The needle was flushed with lidocaine and removed. The area over the right trochanter was palpated, and the tender area was identified, corresponding to the area of the trochanteric bursa. No fluoro used  The area was cleaned with Chloroprep.  A 25 G 3.5 inch needle was inserted, aiming towards the trochanter.  When bone was encountered, the needle was slightly drawn.  A solution containing local anesthesic and steroid was injected.  The needle was removed.  Hemostasis was achieved. Bandaids were placed as appropriate. 2ml of 0.5% ropivacaine, 2ml of 1% lidocaine, and 20mg of kenalog was injected.  Hemostasis was achieved, the area was cleaned, and bandaids were placed when appropriate. The patient tolerated the procedure well, and was taken to the recovery room.  Images were saved to PACS. Post-procedure pain score: not recorded Follow-up includes: -f/u with referring provider -can be sent back in the future for injection Mauro Gee MD Pain Medicine Fellow [facility] Bushra Alicia MD [facility] Pain Management        I personally reviewed and interpreted the images and my interpretation is patent right lower arterial extremity system, no hemodynamically significant lesions, triphasic waveform pattern all through the right lower extremity,.      Darius Denson MD        35 minutes spent on the date of the encounter doing chart review, history and exam, documentation, and further activities as noted above.

## 2022-03-30 NOTE — TELEPHONE ENCOUNTER
[facility]    Who is the name of the provider?:  Janiya      What is the location you see this provider at?: [place]    Reason for call:  Needs order for Lyrica as listed in discharge summary.  Fax to: [phone]    Can we leave a detailed message on this number?  YES

## 2022-03-30 NOTE — PATIENT INSTRUCTIONS
----------------------------------------------------------------  [facility] Number:  [phone]     Call with any questions about your care and for scheduling assistance.     Calls are returned Monday through Friday between 8 AM and 4:30 PM. We usually get back to you within 2 business days depending on the issue/request.    If we are prescribing your medications:    For opioid medication refills, call the clinic or send a [employer] message 7 days in advance.  Please include:    Name of requested medication    Name of the pharmacy.    For non-opioid medications, call your pharmacy directly to request a refill. Please allow 3-4 days to be processed.     Per [place] State Law:    All controlled substance prescriptions must be filled within 30 days of being written.      For those controlled substances allowing refills, pickup must occur within 30 days of last fill.      We believe regular attendance is key to your success in our program!      Any time you are unable to keep your appointment we ask that you call us at least 24 hours in advance to cancel.This will allow us to offer the appointment time to another patient.     Multiple missed appointments may lead to dismissal from the clinic.

## 2022-03-30 NOTE — PROGRESS NOTES
Jefferson is a 86 year old who is being evaluated via a billable video visit.      How would you like to obtain your AVS? MyChart  If the video visit is dropped, the invitation should be resent by: Text to cell phone: [phone]  Will anyone else be joining your video visit? No   Is Pt currently in [place]? Yes    An Menjivar MA    NOTE:  If Pt is not in [place], Appointment needs to be canceled and rescheduled.        Video-Visit Details    Type of service:  Video Visit    Video Start Time: 1:30 PM  Video End Time:1:54 PM    Originating Location (pt. Location): Home    Distant Location (provider location):  [facility]     Platform used for Video Visit: Well        [facility]    3/30/2022          Chief complaint:    Pain is across the low back and extends down the anterior aspect of her right thigh to the knee and the lateral hip on the right side      Interval history:  Teresa Lopez is a 86 year old female is known to me for .   Primary osteoarthritis    Chronic right hip pain   Chronic bilateral low back without sciatica   Spinal stenosis of lumbar region with neurogenic claudication   Piriformis syndrome of both sides   PMHx DJD, CVA, HTN, diabetes mellitus, and cataract   PSHx Hysterectomy        Recommendations/plan at the last visit on 2/8/2022 included:  1. Physical Therapy:  None at present  2. Clinical Health Psychologist: I agree with outside counseling as you are.  3. Diagnostic Studies:  None  4. Medication Management:    5. Continue BUTRANS (buprenorphine) 20mcg/hr transdermal patch. Change patch every 7 days.  Wear on clean, dry skin on the upper chest, upper arm or upper back.  6. START oxycodone 5mg, take 0.5 tab twice daily, at 0800 and at 1700 (5pm)  7. Flector 1.3% patch and apply over right lateral hip twice daily for pain  8. Stop voltaren gel  9. continue Cymbalta (Duloxetine) 60mg every day  10. Continue Lidocaine patches   11. Continue  "medical cannabis.   12. Trial Aspercreme with 4% lidocaine to right lateral hip  13. Further procedures recommended: schedule mid February for right greater trochanteric bursal injection and right SI joint injection.   14. Recommendations to PCP: none  15. Follow up: 6-8 weeks video or in-person, your choice.  Please call [phone] to make your follow-up appointment with me.         Since her last visit, Teresapanda Lopez reports:    Interval history March 30, 2022- mostly taken from Jefferson's daughter, Brooke with Jefferson in the video.  -she was seen by vascular and he felt that she needed to be seen by neurology  -it is feared that she may have Lewy body dementia by the vascular physician  -she is more sleepy, especially since she had started the oxycodone. She has since stopped the oxycodone.   -she has had bursts of more normal activity for short periods of time.   -she was taken downstairs to the dining room and felt paranoid in the dining room  -she will be having an MRI of her lumbar spine  -she has been a bit less sleepy after stopping the oxycodone  -she is not moving around very much and she is sleeping a lot.   -she is with her daughter, Brooke  today and her other daughter visits as well as another friend.   -she is now getting her meals in her room and the aides are coming in to help her toilet at least 4 times a day.       Interval history February 8, 2022  -she states she is doing \"like hell.\"   -she thinks she would be better off dead. -  -Dementia has worsened. Some days her pain is so bad she is crying.   -she is using lidoderm patches and these help a little bit.       Interval history October 5, 2021  -Jefferson is doing \"like hell.\"  -having pain over the right GT and right piriformis  -her mood is low, wishes she would just die, but no specific wishes for suicide  -continues to be frustrated that she cannot participate in all of the athletics that she used to do when she was younger.   -wants her pain to be " "\"gone.\"   -explained much of her pain has to do with wear and tear arthritis and that we need to think in terms of managing her pain vs curing her pain. Additionally, we discussed how low mood can impact pain experience in a negative way      Haylee, patient's daugther's impression  -GT injection seemed to work better than the SI joint injection  -last 3 weeks Jefferson has been complaining more about her pain  -Butrans patch is changed on Thursdays  -Jefferson is doing jigsaw puzzles, rearranges a drawer  -Jefferson is outgoing with staff and people there  -goes to lunch and dinner but sometime needs assistance  -typically is better after lunch, mornings are the most bothersome.       Interval history July 2, 2021  -She was admitted to the hospital on 6/19/2021 for confusion and aphasia.  -she has a brain tumor and will be seeing her previous neurosurgeon at the [facility] in July. Will be having an EEG  -functionally she is doing better at [facility]isted [facility], has made friends.   -prior to going to the ER, she had been going to the gym, she had been doing a seated elliptical machine  -Jefferson is still struggling with bilateral low back pain and right leg pain to the level of the knee.   -Jefferson states her worst pain is the right lateral hip and would like to repeat GT injection when able (last done in early May 2021, needs to wait 12 weeks)      Interval history April 28, 2021  -moving to [facility]. She moves in on May 1. She has some high school friends who live there. Currently living with her daughter, Abdi until she moves to [facility]. Jefferson is very excited about this move.   -Butrans has been helpful. Tolerating well. Jefferson would like to increase her dosage on this. She also needs to reschedule injection with Dr. Jamaal Eid as she had to reschedule due to a recent short hospitalization.   -Abdi, Jefferson's daughter, notes Jefferson is walking better and is not wincing in pain as much since she " has been using the Butrans patch.       Interval history April 13, 2021-Tries ice packs  Per Maite:  elastic brace on right knee, somewhat helpful  -she feels that her right knee feels weaker  -she has trouble with weakness in her ankles bilateral  -she uses ankle supports on her ankles (elastic)  -she feels that her ankles are swollen  -Maite feels that she has a muscle spasm on her right lower back  -her worst pain is over the low back on the right side and wraps around to the lateral right hip. She is unsure if the right GT injection was helpful as she bumped her hip recently and has some soreness from that.  -she is tangential in her speech, focuses on her wish to be more active like she was in her 70's when she used to still ice skate and go walking.     -Per Abdi, patient's daugther  Injection in right GT, unsure if helpful  -maite called paramedics last week when Abdi was out of town. [place] she could not walk or get to the bathroom due to pain  -yesterday she was the same way  -her daughters have told her to take an extra prednisone tablet on those flare days.  -they have a list of her medication with list of when she is supposed to take it, Maite has some short-term memory loss and sometimes does not do so (yesterday woke at 0800 so she did not take her 0600 medication)  -Abdi notes that early last year, Dr. Engle (Primary Care Provider) had prescribed  hydrocodone/acetaminophen and it upset Maite's stomach. When Maite reports a severe flare,  Abdi or Radha (her daughters) have her take the hydrocodone/acetaminophen tablets. It is unclear if these are effective or if she takes them.   - her daughter's are now getting calls from Maite twice per week from her stating she is going to die and she wants the kids to come over.   -she does not sleep well when she has more pain and then Maite is calling them in the middle of the night  -Abdi and Radha are looking for a more suitable assisted living situation with more  "activities available to their mother, but have not yet found one that they like that is affordable.       Interval history February 24, 2021  -She states she is not certain she can go on like this. She is not crying today. Very talkative. Last telephone call, barely able to speak, crying throughout most of encounter.  -she is frustrated that she hurts all of the time  -she lays on the alexander and does stretching.   -she wants to have a repeat right greater trochanteric bursal injection, but she is having her 2nd COVID vaccine on 2/26/2021. We advise waiting 2 weeks after the 2nd COVID vaccine prior to elective steroid injections.  -Radha states she and her sister Haylee feel that the Cymbalta has been somewhat helpful. Will increase dosage to 30mg/day.       Interval history January 6, 2021  -spoke with Jefferson and her daughter Rubi on speakerphone.  -Jefferson ran out of her oral steroids for 6 days, her steroid injection  -she just picked the prednisone today.   -Jefferson had a right sided greater trochanteric bursal injection in November 2020.   -Jefferson and Rubi would like to re-trial the Cymbalta. She has stopped taking it because she was not eating and having issues with nausea.   -We talked about trying to use the Cymbalta either with food in the morning OR at bedtime with a snack.   -Jefferson indicates she is having right sided pain over the lateral hip pain.  -She has seen Dr. Lewis with podiatry. She may need to have her left 2nd toe removed.       Interval history 11/4/2020  -low back pain and lateral hip pain on the right side.    -new shoe insert about a week ago, her daughter Rubi states this has helped her balance.   -Jefferson states she has some \"snapping\" in the right hip.   -Jefferson has not yet begun the Cymbalta, she wanted to try the shoe insert first  -Jefferson has had a tough day especially on Sunday 11/1, she wasn't walking around as much as usual.  -patient is with her daughter Rubi throughout the " "encounter      Interval history 10/2/2020  -right SI joint injection has been helpful  -She is having pain in the right side of the hip, laterally, not anterior groin pain  -patient feels her left leg is longer than the right leg  -She has been more active  -she is seeing a counselor, has been seen x 3      Interval history 8/26/2020  -She states she is not doing very well sometimes.   -she feels her right hip is \"very bad.\"   -She had a 7/30/2020 right greater trochanteric bursal injection, her pain came down from an 8/10 to a 2/10 for her right GT.  -still has some trouble standing due to a spot that is painful, she is describing a spot of pain over the SI joint. This tends to hurt when she stands to do things and feels better when she is sitting down.   -she accompanied today by her daughter Abdi on the phone today  -recertified for medical cannabis today  -she has a hammer toe and this has been really painful for her the past 2 days.       Interval history 7/15/2020  -she is not feeling well  -she sleeps with a pillow between her legs  -she notes she is having \"bad pain\" on the lateral hip on the right side. She has pain when she lays on her right side in bed. She has had a greater trochanteric bursal injection in June 2019 that was helpful.   -she is not eating well as she is isolated and feels depressed and feels shaky as she doesn't eat as regularly as she should.   -discussed using Voltaren gel, Cymbalta and greater trochanteric injection      Interval history 5/6/2020  -Teresa is having some more hip pain on the right side between the top of the hip and up and the tailbone to the hip  -SI joint injection on the right side was beneficial and this has made her pain more focused on the right hip.   -she has pain if she lays on her right side  -she is having more pain in the deep right groin. She notes when she walks this pain is markedly worse.         At this point, the patient's participation with our " "multidisciplinary team includes:  The patient has been compliant with the program  PT - patient is interested  Health Psych  None      Pain scores:  Pain intensity on average is she is not able to rate this on a scale of 0-10.   Range is unable to rate/10.   Pain is described as \"miserable, tightness.\"  Pain is continuous in nature.      Current pain-related medication treatments include:  -Lidocaine patch (helpful)  -Medical cannabis (helpful)  -Cymbalta 30mg/day (tolerating well, somewhat helpful for mood if not for pain)  -Butrans 20mcg/hr TD Q 7 days (helpful)         Other pertinent medications:  -Senna-docusate PRN  -Aricept 5mg/day        Previous medication treatments included:  OPIATES:hydrocodone (not helpful), oxycodone (somewhat helpful, most recently it has made her more sleepy in 2022), Butrans (helpful)  NSAIDS: ibuprofen (not helpful), Aleve (not helpful)  MUSCLE RELAXANTS: none  ANTI-MIGRAINE MEDS: none  ANTI-DEPRESSANTS: Cymbalta (somewhat helpful)  SLEEP AIDS: none  ANTI-CONVULSANTS: gabapentin (unsure if helpful, side effects: hot flashes, fogginess)  TOPICALS: none  Other meds: Tylenol (not helpful)        Injections:  -11/10/2017 Caudal epidural steroid injection with Dr. Reymundo Bajwa (helpful for a very short period of time)  -1/5/2018 Ultrasound guided right intra-articular hip injection with Dr. Jean Sol  -1/12/2018 Right ankle injection with Dr. Lewis of podiatry.   -7/3/2018 L2-3 interlaminar epidural steroid injection with Dr Jamaal Eid (not at all helpful)  -8/3/2018 right hip steroid injection with Dr. Valverde (helped some)  -6/14/2019 bilateral hip greater trochanteric bursa injection with Dr. Jamaal Eid (helpful)   -1/23/2020 right SI joint injection with Dr. Chasidy Laura (quite helpful)  5/13/2020 right hip joint steroid injection with Dr. Valverde (quite helpful)  -7/30/2020 right hip greater trochanteric bursal injection with Dr. Jamaal Eid (quite " helpful)  -9/15/2020 right SI joint injection with Dr. Jamaal Eid (pain diminished about 25%, sharpness went away)  -11/19/2020 right hip greater trochanteric bursal injection with Dr. Eid (helpful)  -3/18/2021 right hip greater trochanteric bursal injection with Dr. Jamaal Eid (unsure)  5/5/2021 trigger point injections, right trochanteric bursal injection with Dr. Bushra Alicia  (helpful)  -5/12/2021 right SI joint injection with Dr. Bushra Alicia  (helpful)  -8/11/2021 right SI joint injection and right greater trochanteric bursal injection with Dr. Bushra Alicia  (helpful, GT was more helpful than the SI joint injection)  -11/15/2021 right SI joint injection, right greater trochanteric bursal injection with Dr. Bushra Alicia  (somewhat helpful)  2/21/2022 right SI joint injection, right greater trochanteric bursal injection with Dr. Bushra Alicia  (helpful per Jefferson's daughter's report. Less complaints of pain)        Side Effects: no side effect  Patient is using the medication as prescribed: YES    Medications:  Current Outpatient Medications   Medication Sig Dispense Refill     Acetaminophen (TYLENOL ARTHRITIS PAIN PO) As needed not taking daily       buprenorphine (BUTRANS) 20 MCG/HR WK patch Place 1 patch onto the skin every 7 days Fill 3/7/2022, begin 3/9/22.  to last 28 days 4 patch 0     CALCIUM 1200 OR Reported on 4/20/2017 (Patient not taking: No sig reported)       ciclopirox (LOPROX) 0.77 % cream Apply topically 2 times daily To feet and toenails. 90 g 3     dexamethasone (DECADRON) 0.5 MG tablet Take 1 tablet (0.5 mg) by mouth 2 times daily (with meals) 60 tablet 3     diclofenac (FLECTOR) 1.3 % patch Externally apply 1 patch topically 2 times daily To the right lateral hip 30 patch 2     diclofenac (VOLTAREN) 1 % topical gel Apply topically 4 times daily       donepezil (ARICEPT) 5 MG tablet Take 1 tablet (5 mg) by mouth At Bedtime 30 tablet 2     DULoxetine (CYMBALTA) 60 MG capsule  Take 1 capsule (60 mg) by mouth daily 3 month script 90 capsule 1     Glucosamine-Chondroit-Vit C-Mn (GLUCOSAMINE 1500 COMPLEX PO) Take 1,500 mg by mouth daily Reported on 4/20/2017 (Patient not taking: Reported on 2/21/2022)       levETIRAcetam (KEPPRA) 500 MG tablet Take 1 tablet (500 mg) by mouth 2 times daily 60 tablet 3     lidocaine (LIDODERM) 5 % patch Apply 1-3 patches to external skin Q 24 hours as needed.  Okay for pt to self-administer. 90 patch 11     Loratadine (CLARITIN) 10 MG capsule Take 10 mg by mouth as needed Reported on 4/20/2017 (Patient not taking: Reported on 2/8/2022)       losartan-hydrochlorothiazide (HYZAAR) 100-25 MG tablet Take 1 tablet by mouth daily 90 tablet 3     magnesium 250 MG tablet Take 1 tablet by mouth daily       MULTI-VITAMIN OR TABS 1 TABLET DAILY       oxyCODONE (ROXICODONE) 5 MG tablet Take 0.5 tablets (2.5 mg) by mouth two times daily Take at 0800 and 1700. Fill and begin 2/8/2022. 30 days for chronic pain (Patient not taking: Reported on 3/18/2022) 30 tablet 0     potassium aminobenzoate 500 MG CAPS capsule  (Patient not taking: Reported on 3/18/2022)       predniSONE (DELTASONE) 2.5 MG tablet TAKE 1 TABLET BY MOUTH DAILY. MAY REPEAT ONCE DAILY AS NEEDED 120 tablet 0     pregabalin (LYRICA) 25 MG capsule Take 1 capsule (25 mg) by mouth 2 times daily 60 capsule 1     senna-docusate (SENOKOT-S;PERICOLACE) 8.6-50 MG per tablet Take 1 tablet by mouth 2 times daily 120 tablet 12     simvastatin (ZOCOR) 40 MG tablet Take 1 tablet (40 mg) by mouth At Bedtime 90 tablet 0       Medical History: any changes in medical history since they were last seen? No    Social History:  Home situation: . 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  Tobacco use: none  Alcohol use: very little, about 2 drinks per month  Drug use: none  History of chemical dependency treatment: none    Is patient a current smoker or  tobacco user?  no  If yes, was cessation counseling offered?  no        Physical Exam:   Vital signs: There were no vitals taken for this visit.    Behavioral observations:  Awake, alert. Cooperative.   Pulm: respirations easy and unlabored. Able to speak in full sentences without SOB or cough noted.          Imaging  MRI LUMBAR SPINE WITHOUT CONTRAST   3/30/2018 1:22 PM      HISTORY:  Lumbar degenerative disc disease.     TECHNIQUE: Multiplanar multisequence MRI of the lumbar spine without  contrast.     COMPARISON: Lumbar spine x-ray 3/22/2018. Lumbar spine MR 3/17/2017.      FINDINGS: There are 5 lumbar-type vertebral bodies assumed for the  purposes of this dictation. The tip of the conus terminates at the  L1-L2 level.     There is convex right scoliotic curvature of the lumbar spine centered  at the L2-L3 level. There is mild retrolisthesis of L2 on L3 and mild  anterolisthesis of L3 on L4. There is grade 1 anterolisthesis of L4 on  L5. There is right lateral listhesis of L3 on L4. There is slight loss  of left-sided vertebral body height at L3, probably due to  degenerative changes. Severe loss of intervertebral disc height seen  at L3-L4 with associated degenerative endplate changes. Severe loss of  disc height also seen on the left at L1-L2 and L2-L3 with severe  degenerative endplate changes. Mild loss of disc height at T11-12 and  T12-L1 with disc desiccation at L4-5 and L5-S1. There is mild STIR  hyperintense marrow edema at the opposing L2-L3 endplates.  Degenerative subchondral cysts seen anteriorly at L3.     Level by level as follows:      T12-L1:  Only seen on the lateral view, but there is a posterior disc  bulge and bilateral facet hypertrophy resulting in moderate right and  mild left neural foraminal narrowing. No significant spinal canal  narrowing.      L1-L2:  Convex right curvature with retrolisthesis and circumferential  disc bulge with endplate osteophytic spurring as well as  bilateral  facet hypertrophy. Findings result in mild central spinal canal  narrowing and moderate bilateral neural foraminal narrowing.      L2-L3:  Convex right scoliotic curvature with left-sided disc bulge  and endplate osteophytic spurring as well as, left greater than right,  facet hypertrophy and ligamentum flavum infolding. Findings result in  moderate central spinal canal narrowing as well as moderate to severe  left neural foraminal narrowing. There is mild right neural foraminal  narrowing.      L3-L4:  Mild anterolisthesis along with circumferential disc bulge,  severe bilateral facet hypertrophy, and ligamentum flavum infolding.  Findings result in severe central spinal canal narrowing. There is  also severe left and moderate to severe right neural foraminal  narrowing.      L4-L5:  Anterolisthesis with uncovering of the disc and small  posterior disc bulge along with severe bilateral facet hypertrophy and  ligamentum flavum infolding. Findings result in moderate to severe  central spinal canal narrowing. There is also moderate to severe  bilateral neural foraminal narrowing. Small bilateral facet joint  effusions.      L5-S1:  Mild posterior disc bulge and marked bilateral facet  hypertrophy results in moderate bilateral neural foraminal narrowing,  right greater than left. No significant central spinal canal  narrowing.      Paraspinous soft tissues:  Normal.          IMPRESSION:    1. Severe multilevel degenerative changes throughout the lumbar spine  as described above. Overall, there is no significant change since  previous MR 3/17/2017.  2. Severe central spinal canal narrowing at L3-L4 and moderate to  severe spinal canal narrowing at L4-L5.  3. Convex right scoliotic curvature of the spine with multiple levels  of spondylolisthesis and right lateral listhesis of L3 on L4.     DIANA HERNANDEZ MD        [place] Prescription Monitoring Program:  Reviewed [facility] 3/30/2022- no concerning fills.  Marion  Newcomer APRN, RN CNP, SUGEY  [facility]  [place] location        Assessment:   1. Greater trochanteric bursitis of the right hip  2. Right hip joint pain  3. DDD (degenerative disc disease), lumbar  4. Spinal stenosis of lumbar region with neurogenic claudication  5. Chronic bilateral low back pain without sciatica  6. Chronic right SI joint pain  7. Primary osteoarthritis of right hip  8. Chronic continuous use of opioids  9. Piriformis syndrome, right  10. Primary osteoarthritis involving multiple joints  11. Chronic pain syndrome        Plan:   1. Physical Therapy:  None at present  2. Clinical Health Psychologist: I agree with outside counseling as you are.  3. Diagnostic Studies:  None  4. Medication Management:    1. Continue BUTRANS (buprenorphine) 20mcg/hr transdermal patch. Change patch every 7 days.  Wear on clean, dry skin on the upper chest, upper arm or upper back.  2. Agree with STOPPING oxycodone due to sleepiness  3. Flector 1.3% patch and apply over right lateral hip twice daily for pain  4. continue Cymbalta (Duloxetine) 60mg every day  5. Continue Lidocaine patches    6. Trial Aspercreme with 4% lidocaine to right lateral hip  5. Further procedures recommended: none  6. Recommendations to PCP: none  7. Follow up: 8-12 weeks video or in-person, your choice.  Please call [phone] to make your follow-up appointment with me.        BILLING TIME DOCUMENTATION:   TOTAL TIME includes:   Time spent preparing to see the patient: 5 minutes (reviewing records and tests)  Time spend face to face with the patient: 33 minutes  Time spent ordering tests, medications, procedures and referrals: 0 minutes  Time spent Referring and communicating with other healthcare professionals: 0 minutes  Documenting clinical information in Epic: 9 minutes    The total TIME spent on this patient on the day of the appointment was 48 minutes.           Marion SANCHES RN CNP, SUGEY  [facility]  [facility]

## 2022-03-30 NOTE — TELEPHONE ENCOUNTER
Patient Quality Outreach    Patient is due for the following:   Diabetes -  A1C, LDL (Fasting) and Diabetic Follow-Up Visit  Depression  -  PHQ-9 Needed      NEXT STEPS:   Schedule a office visit for diabetes    Type of outreach:    Sent [employer] message.      Questions for provider review:    None     Trina Ward, [facility]

## 2022-03-31 NOTE — TELEPHONE ENCOUNTER
I contacted Sofia at 's [facility]. Order was previously sent by vascular team but was apparently not received. I was unable to reach her but I did leave a voicemail and asked her to return my call.    KEYON WooN, RN  [facility]  Ph: [phone]  Fax: [phone]

## 2022-04-01 NOTE — TELEPHONE ENCOUNTER
Received fax from [facility], [facility]. Asking for new orders that were dicussed in 3/30/22 visit about Lyrica and pain patch.  Placed in provider's basket, Kiko.

## 2022-04-01 NOTE — TELEPHONE ENCOUNTER
Signed order faxed as requested.    SHERLY Woo, RN  [facility]  Ph: [phone]  Fax: [phone]

## 2022-04-05 NOTE — TELEPHONE ENCOUNTER
Maura calling from home to get new script for the medication pregabalin (LYRICA) 25 MG capsule signed and sent to [facility], [address]. Any questions call Maura at [phone].        Taran Garcia    [facility] [place]

## 2022-04-06 NOTE — TELEPHONE ENCOUNTER
Reviewed, filled out, signed and placed in [facility] Touchdown for fax back.     Marion SANCHES RN CNP, FNP  [facility]

## 2022-04-07 NOTE — TELEPHONE ENCOUNTER
Please schedule patient a Medicare Annual Wellness Visit (40 minutes) and depression follow-up with me.

## 2022-04-07 NOTE — TELEPHONE ENCOUNTER
Faxed forms to [facility] at [phone] and checked right fax for sent confirmation. Placed in the scanned bin.      Trina Sprague MA  [facility] [place]

## 2022-04-07 NOTE — TELEPHONE ENCOUNTER
Faxed forms to [facility] at [phone] and checked right fax for sent confirmation.     Trina Sprague MA  [facility] [place]

## 2022-04-08 NOTE — TELEPHONE ENCOUNTER
Signed Prescriptions:                        Disp   Refills    pregabalin (LYRICA) 25 MG capsule          60 cap*1        Sig: Take 1 capsule (25 mg) by mouth 2 times daily  Authorizing Provider: RENA CELAYA, RN CNP, FNP  [facility]

## 2022-04-08 NOTE — TELEPHONE ENCOUNTER
Chief Complaint   Patient presents with     Refill Request     (LYRICA) 25 MG capsule       Refill request received via phone by patient or caregiver      [facility], [address]       Pending Prescriptions:                       Disp   Refills    pregabalin (LYRICA) 25 MG capsule         60 cap*1            Sig: Take 1 capsule (25 mg) by mouth 2 times daily         Last Written Prescription Date:  3/21/22  Last Fill Quantity: 60,   # refills: 1  Last Office Visit with prescribing provider: 3/30/22  Future Office visit: none

## 2022-04-08 NOTE — TELEPHONE ENCOUNTER
Pt now in Assisted living at [facility] order will need to be faxed so facility can administer medication.  This was done.    Fax [phone]    Rx needs to be resent to [facility] [place].    Will forward to Marion Caceres RN  Patient Care Supervisor   [place] [facility] [place]

## 2022-04-12 NOTE — TELEPHONE ENCOUNTER
Spoke with daughter regarding appointments needed.  They are in the process of moving patient to memory care and setting up hospice.  Daughter will call back at a later time to schedule appointments.

## 2022-04-26 NOTE — TELEPHONE ENCOUNTER
Terra calling wondering if a nurse could call her as soon as possible.        Kayla Baltazar    [facility]

## 2022-04-27 NOTE — TELEPHONE ENCOUNTER
Called Terra from [facility].  She had a care conference w/ pt and family and they have decided to transfer care now to hospice.     Adriana Wyatt, NP from [facility], this is pts provider at  [facility]. Instructions are needed on how to wean off the butrans patch.  She cannot be in hospice until she is off this medication.    She says that pt and family really loves the care from MYRON iSmon CNP and will be sending a mychart about it.     Plan: MYRON Simon CNP to write down the wean. Rn to call МАРИЯ Ellison back w/ the instructions.      Routed to MYRON Simon CNP to review.    МАРИЯ Estrada-BSN  [facility]

## 2022-04-28 NOTE — TELEPHONE ENCOUNTER
Per patient Jeradt message:  From: Jefferson JEANETTE Lopez      Created: 4/27/2022 4:40 PM      Dear Marion, you will be receiving a call from Terra Collazo.  She is the hospice nurse that I have engaged as the next step in Mom's care.  She would like to discuss the current medications.  You make have noticed on the last video visit that Mom's cognitive skills are diminishing.  We moved her to the memory care floor of [facility] two weeks ago (she has not noticed the room is a little different).  The staff at [facility] is fantastic and Mom is the right place.  I will keep in touch with you on her status.    Thank you,  Brooke WITT for Marion Munoz, APRN CNP.  See the 4/25/22 for more information on plan.      Natalie RN-BSN  [facility]

## 2022-04-29 NOTE — TELEPHONE ENCOUNTER
Mahin Amaral--     Yes, I did note cognitive decline. I think you are making the right decision re: Jefferson's care. I spoke with Terra Collazo via phone today and gave recommendations re: the Butrans patch. I also let her know that Jefferson is sensitive to medications and I would absolutely medicate to help improve her comfort level, but recommended to start with lower doses and increase as she is able to tolerate since she did not do really well with the low dose hydrocodone/acetaminophen when we tried that recently. Know that Jefferson ever Cox and your family are in my thoughts and prayers at this time of transition. It has been my great pleasure to partner with you in Jefferson's care.      Marion SANCHES RN CNP, SUGEY  [facility]    I have sent the above [employer] message to the patient.     Marion SANCHES RN CNP, SUGEY  [facility]    There is a consent to communicate with Brooke dated 9/28/2017.     Marion SANCHES RN CNP, SPENCER  [facility]

## 2022-04-29 NOTE — TELEPHONE ENCOUNTER
Called and spoke with Terra with [facility]ospice [facility] Palliative Care as patient will be transitioning to Palliative/Hospice likely next week.     Recommended they can stop the Butrans patch completely.    Reviewed OK to use short-acting agents, they usually use either methadone or hydromorphone. I explained I do not use methadone in elderly folks myself, but that if that is something that they do, follow their typical guidelines.     Reviewed that Teresa Lopez has been pretty sensitive to meds, so while I think she would do okay with hydromorphone, I'd recommend starting with a low dose and titrate up from there. (patient was flat and sedated using hydrocodone/acetaminophen 5/325mg using 0.5 tab which would be 2.5mg of oral hydrocodone. Patient reportedly perked up when this was ceased based on previous reports by patient's family.)    Terra was appreciative our input going forward.     Marion SANCHES, RN CNP, FNP  [facility]  [facility]

## 2022-05-31 ENCOUNTER — TELEPHONE (OUTPATIENT)
Dept: FAMILY MEDICINE | Facility: CLINIC | Age: 87
End: 2022-05-31
Payer: COMMERCIAL

## 2022-05-31 NOTE — TELEPHONE ENCOUNTER
Brooke,     I am helping to cover some of Dr. Martinez's results/messages.     Very sorry to hear of your mother's passing.  We do not need a death certificate.    [facility]

## 2022-05-31 NOTE — TELEPHONE ENCOUNTER
Topic: Non-Medical Question.      Dear  Nancy,      On May 13, Teresapanda Lopez passed away.  This message is to inform you of the event.  Please let me know if I need to send you a death certificate.        Brooke Lopez, Daughter   [email or web address]

## 2022-06-07 NOTE — TELEPHONE ENCOUNTER
Contact Attempt      Outreach attempted x 2.  Left message on voicemail with call back information and requested return call.    Plan:  Will await for CB.     Radha Umaña RN, [facility]  Pain Clinic Care Coordinator      Consent Type: Consent 1 (Standard)

## 2023-04-02 NOTE — PATIENT INSTRUCTIONS
See cellulitis/abscess     Thanks for coming today.  Ortho[facility] Medicine Clinic  [address] [place] [address]    To schedule future appointments in Ortho Clinic, you may call [phone].    To schedule ordered imaging by your provider:   Call Central Imaging [phone]    To schedule an injection ordered by your provider:  Call Central Imaging Injection scheduling line: [phone]  Phonetimehart available online at:  [email or web address]    Please call if any further questions or concerns ([phone]).  Clinic hours 8 am to 5 pm.    Return to clinic (call) if symptoms worsen or fail to improve.

## 2025-07-10 NOTE — PROGRESS NOTES
Jefferson is a 85 year old who is being evaluated via a billable video visit.      How would you like to obtain your AVS? Cooliris  If the video visit is dropped, the invitation should be resent by: [facility] waiting room  Will anyone else be joining your video visit? No       More Ojeda CMA ([facility])      Video-Visit Details    Type of service:  Video Visit    Video Start Time: 1:07 PM  Video End Time:1:47 PM    Originating Location (pt. Location): Home    Distant Location (provider location):  [facility]     Platform used for Video Visit: [facility]    4/13/2021          Chief complaint:    Having a worse attitude  Pain is across the low back and extends down the anterior aspect of her right thigh to the knee and the lateral hip on the right side      Interval history:  Teresa Lopez is a 85 year old female is known to me for .   Primary osteoarthritis    Chronic right hip pain   Chronic bilateral low back without sciatica   Spinal stenosis of lumbar region with neurogenic claudication   Piriformis syndrome of both sides   PMHx DJD, CVA, HTN, diabetes mellitus, and cataract   PSHx Hysterectomy        Recommendations/plan at the last visit on 2/24/2021 included:  1. Physical Therapy:  None at present  2. Clinical Health Psychologist: I agree with outside counseling as you are.  3. Diagnostic Studies:  None  4. Medication Management:    1. INCREASE Cymbalta (Duloxetine) 30mg every day  2. Continue Lidocaine patches   3. Continue medical cannabis.   4. Try using the Voltaren gel three to four times daily regularly for the right lateral hip pain  5. Trial Aspercreme with 4% lidocaine to right lateral hip  6. Could trial Theracare patches to right lateral hip, these are over-the-counter warming patches  5. Further procedures recommended: repeat right greater trochanteric bursal injection 2 weeks after 2/26/2021 2nd COVID-19 vaccine  6. Recommendations to  SUBJECTIVE     Chief Complaint   Patient presents with    Follow-up       HPI  Ibrahima Phelps is a 71 y.o. male with CAD s/p PCI 2006, PCI to RCA w/ 2 DIEGO 2/28/2024, paroxysmal Afib (on amiodarone and Eliquis), HFpEF, PAD, ESRD on HD TTS, COPD that presents for follow-up.    Seen in the ED on 7/7/25 due to COPD exacerbation. Prescribed Azithromycin, prednisone. Had mildly elevated troponin that trended down, EKG without signs of STEMI. Patient only with chest pain when he coughed. Patient declined observation admission. Instructed to follow up with cardiology outpatient.     Patient was hospitalized 2 days last month for pneumonia. Chest x-ray showed consolidation of mid and upper right lung suggestive of right-sided pneumonia. Troponin was also much more elevated at that time and up trending. He was started on the ACS protocol and was seen by Cardiology. His presentation at that time was ultimately attributed to demand ischemia due to sepsis and ACS protocol was stopped.  Recommended outpatient stress test, but patient & wife were unaware of this recommendation, so it was not set up.     COPD:  He reports recent emergency room visit for COPD exacerbation where he was prescribed an antibiotic and Prednisone 20 mg. He notes improvement in cough severity since ER visit and denies current significant respiratory distress. He continues Breo inhaler and DuoNeb breathing treatments.    RECENT HOSPITALIZATION:  He was hospitalized in June for pneumonia with initial concerns for potential heart attack. Troponin was elevated, which was attributed to demand ischemia secondary to sepsis from pneumonia. An echocardiogram was scheduled but not completed prior to discharge. He has a pending cardiology follow-up with Dr. Vail in September.    RENAL:  He receives blood draws every 2-3 weeks for dialysis monitoring. Elevated troponin levels were explained as common with kidney dysfunction and dialysis.    ORTHOSTATIC SYMPTOMS:  He  PCP: none  7. Follow up: 6-8 weeks telephone due to COVID-19 viral threat. Please call [phone] to make your follow-up appointment with me.         Since her last visit, Teresa Lopez reports: she is accompanied on speakerphone today by her daughter, Abdi    Interval history April 13, 2021-Tries ice packs  Per Maite:  elastic brace on right knee, somewhat helpful  -she feels that her right knee feels weaker  -she has trouble with weakness in her ankles bilateral  -she uses ankle supports on her ankles (elastic)  -she feels that her ankles are swollen  -Maite feels that she has a muscle spasm on her right lower back  -her worst pain is over the low back on the right side and wraps around to the lateral right hip. She is unsure if the right GT injection was helpful as she bumped her hip recently and has some soreness from that.  -she is tangential in her speech, focuses on her wish to be more active like she was in her 70's when she used to still ice skate and go walking.     -Per Abdi, patient's daugther  Injection in right GT, unsure if helpful  -maite called paramedics last week when Abdi was out of town. Tyler she could not walk or get to the bathroom due to pain  -yesterday she was the same way  -her daughters have told her to take an extra prednisone tablet on those flare days.  -they have a list of her medication with list of when she is supposed to take it, Maite has some short-term memory loss and sometimes does not do so (yesterday woke at 0800 so she did not take her 0600 medication)  -Abdi notes that early last year, Dr. Engle (Primary Care Provider) had prescribed  hydrocodone/acetaminophen and it upset Maite's stomach. When Maite reports a severe flare,  Abdi or Radha (her daughters) have her take the hydrocodone/acetaminophen tablets. It is unclear if these are effective or if she takes them.   - her daughter's are now getting calls from Maite twice per week from her stating she is going to die and she  experiences severe dizziness when walking skilled nursing to the hallway after standing to use the restroom. The dizziness is not immediate upon standing but progressively worsens while walking, requiring him to hold onto something to prevent falling.    PSA MONITORING:  His PSA has decreased from 0.69 two years ago to 0.56 currently. He expresses interest in understanding his PSA levels and is open to rechecking.         PAST MEDICAL HISTORY:  Past Medical History:   Diagnosis Date    Atrial fibrillation     CAD (coronary artery disease) 2006    MI in 2006    CHF (congestive heart failure) 2017    CKD (chronic kidney disease) stage 3, GFR 30-59 ml/min     Dr. Latif.  in transplanted kidney    CKD (chronic kidney disease) stage 5, GFR less than 15 ml/min 02/02/2024    COVID-19 02/07/2023    Diverticulosis     Hyperlipidemia     Hypertension     Keloid cicatrix     Metabolic bone disease     Other emphysema 10/01/2019    Pericarditis     S/P kidney transplant 1992    x2 (1992 & 2005),    Thrombocytopenia     Thyroid disease     Tobacco use 09/05/2014       PAST SURGICAL HISTORY:  Past Surgical History:   Procedure Laterality Date    AV FISTULA PLACEMENT Left 12/18/2023    Procedure: CREATION, AV FISTULA;  Surgeon: JUANI Melendez III, MD;  Location: [facility] OR [address] [place] [address];  Service: Vascular;  Laterality: Left;  LUE AVF creation vs AVG insertion    CARDIOVERSION  04/30/15    CHOLECYSTECTOMY      COLONOSCOPY  April 20, 2011    Diverticulosis, repeat recommended in 3 yrs., repeat colonoscopy 2014 revealed 2 polyps.  He should return in 5 years.    COLONOSCOPY N/A 3/13/2020    Procedure: COLONOSCOPY;  Surgeon: Chon Casper MD;  Location: [facility] (4TH FLR);  Service: Endoscopy;  Laterality: N/A;  ok to hold coumadin x5days-see telephone encounter 2/4/20-tb    COLONOSCOPY N/A 2/4/2021    Procedure: COLONOSCOPY;  Surgeon: Chon Casper MD;  Location: [facility] (4TH FLR);  Service: Endoscopy;  Laterality: N/A;  Eliquis -  "per Dr. GAVIN Blunt ok to hold x 2 days and "restarted asap"- ERW  last prep "poor", [id number] ordered/ Pt requests Dr. Casper  prep ins. mailed - COVID screening on 2/1/21 PCW- ERW    COLONOSCOPY N/A 3/3/2021    Procedure: COLONOSCOPY;  Surgeon: Chon Casper MD;  Location: [facility] (4TH FLR);  Service: Endoscopy;  Laterality: N/A;  Patient with his second poor bowel pre and has poor prep today.  If patient not intersted or can't get colonoscopy tomorrow will need constipation bowel prep and will need to restart his Eliquis today.Thanks,Chon     per Dr Blunt-ok to hold Eliquis 2 days prior      COVID     COLONOSCOPY N/A 12/6/2024    Procedure: COLONOSCOPY;  Surgeon: Chon Casper MD;  Location: [facility] (2ND FLR);  Service: Endoscopy;  Laterality: N/A;  Plavix-Eliquis pending/ HD T-Th-Sat- lab ordered  2/25/24 ECHO PA 49  ref by / [facility] inst portal-RN  11/29-message to clearance nurse for plavix and eliquis hold-tb  ok to hold Eliquis 2 days per Dr Parmar  ok to hold Plavix 5 days per MEENA COLVINGT  12/2- p    CORONARY ANGIOGRAPHY N/A 2/28/2024    Procedure: ANGIOGRAM, CORONARY ARTERY;  Surgeon: Tylor Lincoln MD;  Location: [facility] CATH LAB;  Service: Cardiology;  Laterality: N/A;    CORONARY ANGIOGRAPHY N/A 7/22/2025    Procedure: ANGIOGRAM, CORONARY ARTERY;  Surgeon: Tylor Lincoln MD;  Location: [facility] CATH LAB;  Service: Cardiology;  Laterality: N/A;    CORONARY ANGIOPLASTY WITH STENT PLACEMENT  9/13/2006    FISTULOGRAM N/A 2/19/2024    Procedure: Fistulogram;  Surgeon: JUANI Melendez III, MD;  Location: [facility] CATH LAB;  Service: Peripheral Vascular;  Laterality: N/A;    FISTULOGRAM, WITH PTA Left 6/3/2024    Procedure: FISTULOGRAM, WITH PTA;  Surgeon: JUANI Melendez III, MD;  Location: [facility] OR 2ND FLR;  Service: Vascular;  Laterality: Left;  mGy: 51.58  GyCm2: 6.8285  Fluoro time: 5.1 min  Contrast: 28 cc    FISTULOGRAM, WITH PTA Left 3/5/2025    Procedure: FISTULOGRAM, WITH PTA;  " "wants the kids to come over.   -she does not sleep well when she has more pain and then Jefferson is calling them in the middle of the night  -Rubi and Radha are looking for a more suitable assisted living situation with more activities available to their mother, but have not yet found one that they like that is affordable.       Interval history February 24, 2021  -She states she is not certain she can go on like this. She is not crying today. Very talkative. Last telephone call, barely able to speak, crying throughout most of encounter.  -she is frustrated that she hurts all of the time  -she lays on the alexander and does stretching.   -she wants to have a repeat right greater trochanteric bursal injection, but she is having her 2nd COVID vaccine on 2/26/2021. We advise waiting 2 weeks after the 2nd COVID vaccine prior to elective steroid injections.  -Radha states she and her sister Haylee feel that the Cymbalta has been somewhat helpful. Will increase dosage to 30mg/day.       Interval history January 6, 2021  -spoke with Jefferson and her daughter Rubi on speakerphone.  -Jefferson ran out of her oral steroids for 6 days, her steroid injection  -she just picked the prednisone today.   -Jefferson had a right sided greater trochanteric bursal injection in November 2020.   -Jefferson and Rubi would like to re-trial the Cymbalta. She has stopped taking it because she was not eating and having issues with nausea.   -We talked about trying to use the Cymbalta either with food in the morning OR at bedtime with a snack.   -Jefferson indicates she is having right sided pain over the lateral hip pain.  -She has seen Dr. Lewis with podiatry. She may need to have her left 2nd toe removed.       Interval history 11/4/2020  -low back pain and lateral hip pain on the right side.    -new shoe insert about a week ago, her daughter Rubi states this has helped her balance.   -Jefferson states she has some \"snapping\" in the right hip.   -Jefferson has not yet begun the " "Cymbalta, she wanted to try the shoe insert first  -Jefferson has had a tough day especially on Sunday 11/1, she wasn't walking around as much as usual.  -patient is with her daughter Abdi throughout the encounter      Interval history 10/2/2020  -right SI joint injection has been helpful  -She is having pain in the right side of the hip, laterally, not anterior groin pain  -patient feels her left leg is longer than the right leg  -She has been more active  -she is seeing a counselor, has been seen x 3      Interval history 8/26/2020  -She states she is not doing very well sometimes.   -she feels her right hip is \"very bad.\"   -She had a 7/30/2020 right greater trochanteric bursal injection, her pain came down from an 8/10 to a 2/10 for her right GT.  -still has some trouble standing due to a spot that is painful, she is describing a spot of pain over the SI joint. This tends to hurt when she stands to do things and feels better when she is sitting down.   -she accompanied today by her daughter Abdi on the phone today  -recertified for medical cannabis today  -she has a hammer toe and this has been really painful for her the past 2 days.       Interval history 7/15/2020  -she is not feeling well  -she sleeps with a pillow between her legs  -she notes she is having \"bad pain\" on the lateral hip on the right side. She has pain when she lays on her right side in bed. She has had a greater trochanteric bursal injection in June 2019 that was helpful.   -she is not eating well as she is isolated and feels depressed and feels shaky as she doesn't eat as regularly as she should.   -discussed using Voltaren gel, Cymbalta and greater trochanteric injection      Interval history 5/6/2020  -Teresa is having some more hip pain on the right side between the top of the hip and up and the tailbone to the hip  -SI joint injection on the right side was beneficial and this has made her pain more focused on the right hip.   -she has pain " Surgeon: JUANI Melendez III, MD;  [facility];  Service: Vascular;  Laterality: Left;    IRRIGATION AND DEBRIDEMENT Right 8/30/2023    Procedure: IRRIGATION AND DEBRIDEMENT, RIGHT MIDDLE FINGER;  Surgeon: Martin Larsen MD;  [facility];  Service: Orthopedics;  Laterality: Right;    KIDNEY TRANSPLANT  2005    LEFT HEART CATHETERIZATION N/A 2/26/2024    Procedure: Left heart cath;  Surgeon: Tylor Lincoln MD;  Location: [facility] CATH LAB;  Service: Cardiology;  Laterality: N/A;    PARATHYROIDECTOMY      PERCUTANEOUS TRANSLUMINAL ANGIOPLASTY OF ARTERIOVENOUS FISTULA Left 2/19/2024    Procedure: PTA, AV FISTULA;  Surgeon: JUANI Melendez III, MD;  Location: [facility] CATH LAB;  Service: Peripheral Vascular;  Laterality: Left;    STENT, DRUG ELUTING, SINGLE VESSEL, CORONARY N/A 2/28/2024    Procedure: Stent, Drug Eluting, Single Vessel, Coronary;  Surgeon: Tylor Lincoln MD;  Location: [facility] CATH LAB;  Service: Cardiology;  Laterality: N/A;    STENT, PERIPHERAL GRAFT Left 3/5/2025    Procedure: STENT, PERIPHERAL GRAFT;  Surgeon: JUANI Melendez III, MD;  [facility];  Service: Vascular;  Laterality: Left;  mGy 63.17  Gycm2 8.3386  flouro 63.17 min  contrast 38 ml    TREATMENT OF CARDIAC ARRHYTHMIA N/A 3/28/2022    Procedure: CARDIOVERSION;  Surgeon: Migue Blunt MD;  Location: [facility] EP LAB;  Service: Cardiology;  Laterality: N/A;  AF, DCC, MAC, GP, 3 PREP*RODRIGO deferred pt 100% compliant*    VENOPLASTY  6/3/2024    Procedure: ANGIOPLASTY, VEIN;  Surgeon: JUANI Melendez III, MD;  [facility];  Service: Vascular;;  Left subclavian       FAMILY HISTORY:  Family History   Problem Relation Name Age of Onset    No Known Problems Sister      No Known Problems Brother      Thyroid disease Mother          s/p surgery    Heart disease Father          had pacemaker    No Known Problems Sister      Kidney failure Sister      Kidney disease Sister      No Known  "Problems Sister      Kidney disease Brother      Kidney failure Brother          s/p transplant    Diabetes Mellitus Neg Hx      Stroke Neg Hx      Heart attack Neg Hx      Cancer Neg Hx      Celiac disease Neg Hx      Cirrhosis Neg Hx      Colon cancer Neg Hx      Esophageal cancer Neg Hx      Inflammatory bowel disease Neg Hx      Rectal cancer Neg Hx      Stomach cancer Neg Hx      Ulcerative colitis Neg Hx      Liver disease Neg Hx      Liver cancer Neg Hx      Crohn's disease Neg Hx      Melanoma Neg Hx         ALLERGIES AND MEDICATIONS: updated and reviewed.  Review of patient's allergies indicates:   Allergen Reactions    Ace inhibitors Swelling    Iodine Itching    Verapamil Other (See Comments)     Other reaction(s): Unknown    Povidone-iodine Itching     Current Medications[1]    ROS  Review of Systems   Constitutional: Negative.  Negative for chills and fever.   HENT: Negative.  Negative for congestion and sore throat.    Respiratory:  Negative for chest tightness and shortness of breath.    Cardiovascular:  Negative for chest pain and palpitations.   Gastrointestinal:  Negative for abdominal pain, constipation and diarrhea.   Genitourinary:  Negative for dysuria and frequency.   Musculoskeletal:  Negative for back pain, joint swelling and neck pain.   Skin: Negative.    Neurological:  Positive for dizziness and light-headedness. Negative for syncope and headaches.   Psychiatric/Behavioral: Negative.           OBJECTIVE     Physical Exam  Vitals:    07/10/25 0909   BP: 120/62   Pulse: (!) 57    Body mass index is 27.14 kg/m².  Weight: 93.3 kg (205 lb 11 oz)   Height: 6' 1" (185.4 cm)     Physical Exam  Vitals reviewed.   Constitutional:       General: He is not in acute distress.     Appearance: Normal appearance.   HENT:      Head: Normocephalic and atraumatic.      Mouth/Throat:      Mouth: Mucous membranes are moist.      Pharynx: Oropharynx is clear.   Eyes:      Extraocular Movements: Extraocular " "if she lays on her right side  -she is having more pain in the deep right groin. She notes when she walks this pain is markedly worse.         At this point, the patient's participation with our multidisciplinary team includes:  The patient has been compliant with the program  PT - patient is interested  Health Psych  None      Pain scores:  Pain intensity on average is too hard to rate today on a scale of 0-10.   Range is 7-10/10.   Pain is described as \"miserable, tightness, muscle spasm, oh it just hurts so.\"  Pain is continuous in nature.    Current pain-related medication treatments include:  -Lidocaine patch (helpful)  -Medical cannabis (helpful)  -Cymbalta 30mg/day (tolerating well, somewhat helpful for mood if not for pain)         Other pertinent medications:  -Senna-docusate PRN  -Aricept 5mg/day      Previous medication treatments included:  OPIATES:hydrocodone (not helpful), oxycodone (somewhat helpful)  NSAIDS: ibuprofen (not helpful), Aleve (not helpful)  MUSCLE RELAXANTS: none  ANTI-MIGRAINE MEDS: none  ANTI-DEPRESSANTS: Cymbalta (somewhat helpful)  SLEEP AIDS: none  ANTI-CONVULSANTS: gabapentin (unsure if helpful, side effects: hot flashes, fogginess)  TOPICALS: none  Other meds: Tylenol (not helpful)    Injections:  -11/10/2017 Caudal epidural steroid injection with Dr. Reymundo Bajwa (helpful for a very short period of time)  -1/5/2018 Ultrasound guided right intra-articular hip injection with Dr. Jean Meade  -1/12/2018 Right ankle injection with Dr. Lewis of podiatry.   -7/3/2018 L2-3 interlaminar epidural steroid injection with Dr Jamaal Eid (not at all helpful)  -8/3/2018 right hip steroid injection with Dr. Valverde (helped some)  -6/14/2019 bilateral hip greater trochanteric bursa injection with Dr. Jamaal Eid (helpful)   -1/23/2020 right SI joint injection with Dr. Chasidy Laura (quite helpful)  5/13/2020 right hip joint steroid injection with Dr. Valverde (quite helpful)  -7/30/2020 " movements intact.      Conjunctiva/sclera: Conjunctivae normal.      Pupils: Pupils are equal, round, and reactive to light.   Cardiovascular:      Rate and Rhythm: Normal rate and regular rhythm.      Pulses: Normal pulses.      Heart sounds: Normal heart sounds.   Pulmonary:      Effort: Pulmonary effort is normal.      Breath sounds: Normal breath sounds.   Abdominal:      General: There is no distension.   Musculoskeletal:         General: Normal range of motion.      Cervical back: Normal range of motion and neck supple.   Skin:     General: Skin is warm and dry.   Neurological:      General: No focal deficit present.      Mental Status: He is alert.           ASSESSMENT     71 y.o. male with     1. Primary hypertension    2. Chronic obstructive pulmonary disease, unspecified COPD type    3. Elevated troponin    4. Screening PSA (prostate specific antigen)    5. Orthostatic dizziness        PLAN:     1. Primary hypertension  - Controlled off of medication. Continue to monitor.     2. Chronic obstructive pulmonary disease, unspecified COPD type  - Lungs CTAB. Continue Breo controller inhaler, DuoNebs rescue.     3. Elevated troponin  - At recent ED visit. Will need echo for further evaluation.   - Echo; Future    4. Screening PSA (prostate specific antigen)  - No LUTS  - PSA, SCREENING; Future    5. Orthostatic dizziness  - Reviewed medications. Counseled on changing positions slowly. Consider compression stockings. Follow up with cardiology.     RTC in 6 months, earlier PRN       Anatoliy Colindres MD  Family Medicine  [facility]  07/31/2025    This note was generated with the assistance of ambient listening technology. Verbal consent was obtained by the patient and accompanying visitor(s) for the recording of patient appointment to facilitate this note. I attest to having reviewed and edited the generated note for accuracy, though some syntax or spelling errors may persist.  right hip greater trochanteric bursal injection with Dr. Jamaal Eid (quite helpful)  -9/15/2020 right SI joint injection with Dr. Jamaal Eid (pain diminished about 25%, sharpness went away)  -11/19/2020 right hip greater trochanteric bursal injection with Dr. Eid (helpful)  -3/18/2021 right hip greater trochanteric bursal injection with Dr. Jamaal Eid (unsure)    Side Effects: no side effect  Patient is using the medication as prescribed: YES    Medications:  Current Outpatient Medications   Medication Sig Dispense Refill     Acetaminophen (TYLENOL ARTHRITIS PAIN PO) As needed not taking daily       ADVIL 200 MG PO CAPS Reported on 4/20/2017       Aspirin-Acetaminophen-Caffeine (EXCEDRIN PO)        CALCIUM 1200 OR Reported on 4/20/2017       ciclopirox (LOPROX) 0.77 % cream Apply topically 2 times daily To feet and toenails. 90 g 3     donepezil (ARICEPT) 5 MG tablet Take 1 tablet (5 mg) by mouth At Bedtime 30 tablet 2     DULoxetine (CYMBALTA) 30 MG capsule Take 1 capsule (30 mg) by mouth daily 30 capsule 1     Glucosamine-Chondroit-Vit C-Mn (GLUCOSAMINE 1500 COMPLEX PO) Take 1,500 mg by mouth daily Reported on 4/20/2017       lidocaine (LIDODERM) 5 % patch Place 3 patches onto the skin every 24 hours 90 patch 0     Loratadine (CLARITIN) 10 MG capsule Take 10 mg by mouth as needed Reported on 4/20/2017       losartan-hydrochlorothiazide (HYZAAR) 100-25 MG tablet Take 1 tablet by mouth daily 90 tablet 3     magnesium 250 MG tablet Take 1 tablet by mouth daily       medical cannabis (Patient's own supply.  Not a prescription) See Admin Instructions (This is NOT a prescription, and does not certify that the patient has a qualifying medical condition for medical cannabis.  The purpose of this order is  to document that the patient reports taking medical cannabis.)   Taking 6-9 red capsules daily       MULTI-VITAMIN OR TABS 1 TABLET DAILY       potassium aminobenzoate 500 MG CAPS capsule         Please contact the author of this note for any clarification.      Follow up in about 6 months (around 1/10/2026) for Check up .                       [1]   No current facility-administered medications for this visit.     No current outpatient medications on file.     Facility-Administered Medications Ordered in Other Visits   Medication Dose Route Frequency Provider Last Rate Last Admin    acetaminophen tablet 650 mg  650 mg Oral Q8H PRN Jaun Miller MD   650 mg at 07/31/25 0956    albuterol-ipratropium 2.5 mg-0.5 mg/3 mL nebulizer solution 3 mL  3 mL Nebulization Q4H PRN Juan Miller MD        amiodarone tablet 200 mg  200 mg Oral Daily Juan Miller MD   200 mg at 07/31/25 0845    apixaban tablet 5 mg  5 mg Oral BID Juan Miller MD   5 mg at 07/31/25 0845    atorvastatin tablet 80 mg  80 mg Oral Daily Juan Miller MD   80 mg at 07/31/25 0844    bisacodyL suppository 10 mg  10 mg Rectal Daily PRN Juan Miller MD        cefTRIAXone injection 1 g  1 g Intravenous Daily Juan Miller MD   1 g at 07/31/25 0846    clopidogreL tablet 75 mg  75 mg Oral Daily Juan Miller MD   75 mg at 07/31/25 0845    [START ON 8/1/2025] famotidine tablet 20 mg  20 mg Oral Every other day Juan Miller MD        fluticasone furoate-vilanteroL 100-25 mcg/dose diskus inhaler 1 puff  1 puff Inhalation Daily Juan Miller MD   1 puff at 07/31/25 0911    gabapentin capsule 300 mg  300 mg Oral QHS Juan Miller MD   300 mg at 07/30/25 2037    melatonin tablet 6 mg  6 mg Oral Nightly PRN Juan Miller MD        methocarbamoL (Robaxin) split tablet 250 mg  250 mg Oral QID Juan Miller MD        midodrine tablet 5 mg  5 mg Oral TID PRN Juan Miller MD   5 mg at 07/31/25 0234    naloxone 0.4 mg/mL injection 0.02 mg  0.02 mg Intravenous PRN Juan Miller MD        ondansetron disintegrating tablet 8 mg  8 mg Oral Q8H PRN Juan Miller MD         predniSONE (DELTASONE) 2.5 MG tablet TAKE 1 TABLET BY MOUTH DAILY. MAY REPEAT ONCE DAILY AS NEEDED 120 tablet 0     senna-docusate (SENOKOT-S;PERICOLACE) 8.6-50 MG per tablet Take 1 tablet by mouth 2 times daily 120 tablet 12     simvastatin (ZOCOR) 40 MG tablet Take 1 tablet (40 mg) by mouth At Bedtime 90 tablet 3       Medical History: any changes in medical history since they were last seen? No    Social History:  Home situation: . 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  Tobacco use: none  Alcohol use: very little, about 2 drinks per month  Drug use: none  History of chemical dependency treatment: none    Is patient a current smoker or tobacco user?  no  If yes, was cessation counseling offered?  no        Review of Systems:   The 14 system ROS was reviewed with patient and is positive for:  Constitutional: fever/chills, fatigue, weight gain, weight loss  Eyes/Head: headache, dizziness  ENT: ringing in ears  Allergy/Immune: allergies  Skin: itching, rash, hives  Hematologic: easy bruising  Respiratory: cough, wheezing, shortness of breath  Cardiovascular: swelling in feet, fainting, palpitations, chest pain  GI: abdominal pain, nausea, vomiting, diarrhea, constipation  Endocrine: steroid use  Musculoskeletal:  joint pain, arthritis, stiffness, gout, back pain, neck pain  Urinary: frequency, urgency, incontinence, hesitancy  Neurologic: weakness, numbness/tingling, seizure, stroke, memory loss (short term memory loss)  Mental health: depression, anxiety, stress, suicidal ideation        Physical Exam:   Vital signs: There were no vitals taken for this visit.    Behavioral observations:  Awake and alert, cooperative  Pulm: respirations easy and unlabored. Able to speak in full sentences without SOB or cough noted.            Imaging  MRI LUMBAR SPINE WITHOUT CONTRAST   3/30/2018 1:22 PM      HISTORY:  Lumbar degenerative disc  disease.     TECHNIQUE: Multiplanar multisequence MRI of the lumbar spine without  contrast.     COMPARISON: Lumbar spine x-ray 3/22/2018. Lumbar spine MR 3/17/2017.      FINDINGS: There are 5 lumbar-type vertebral bodies assumed for the  purposes of this dictation. The tip of the conus terminates at the  L1-L2 level.     There is convex right scoliotic curvature of the lumbar spine centered  at the L2-L3 level. There is mild retrolisthesis of L2 on L3 and mild  anterolisthesis of L3 on L4. There is grade 1 anterolisthesis of L4 on  L5. There is right lateral listhesis of L3 on L4. There is slight loss  of left-sided vertebral body height at L3, probably due to  degenerative changes. Severe loss of intervertebral disc height seen  at L3-L4 with associated degenerative endplate changes. Severe loss of  disc height also seen on the left at L1-L2 and L2-L3 with severe  degenerative endplate changes. Mild loss of disc height at T11-12 and  T12-L1 with disc desiccation at L4-5 and L5-S1. There is mild STIR  hyperintense marrow edema at the opposing L2-L3 endplates.  Degenerative subchondral cysts seen anteriorly at L3.     Level by level as follows:      T12-L1:  Only seen on the lateral view, but there is a posterior disc  bulge and bilateral facet hypertrophy resulting in moderate right and  mild left neural foraminal narrowing. No significant spinal canal  narrowing.      L1-L2:  Convex right curvature with retrolisthesis and circumferential  disc bulge with endplate osteophytic spurring as well as bilateral  facet hypertrophy. Findings result in mild central spinal canal  narrowing and moderate bilateral neural foraminal narrowing.      L2-L3:  Convex right scoliotic curvature with left-sided disc bulge  and endplate osteophytic spurring as well as, left greater than right,  facet hypertrophy and ligamentum flavum infolding. Findings result in  moderate central spinal canal narrowing as well as moderate to  polyethylene glycol packet 17 g  17 g Oral Daily PRN Juan Miller MD        prochlorperazine injection Soln 5 mg  5 mg Intravenous Q6H PRN Juan Miller MD        sevelamer carbonate tablet 800 mg  800 mg Oral Daily with breakfast Juan Miller MD   800 mg at 07/31/25 0846    sodium chloride 0.9% flush 10 mL  10 mL Intravenous Q12H PRN Juan Miller MD        torsemide tablet 40 mg  40 mg Oral Once per day on Monday Wednesday Friday Juan Miller MD   40 mg at 07/30/25 1101      severe  left neural foraminal narrowing. There is mild right neural foraminal  narrowing.      L3-L4:  Mild anterolisthesis along with circumferential disc bulge,  severe bilateral facet hypertrophy, and ligamentum flavum infolding.  Findings result in severe central spinal canal narrowing. There is  also severe left and moderate to severe right neural foraminal  narrowing.      L4-L5:  Anterolisthesis with uncovering of the disc and small  posterior disc bulge along with severe bilateral facet hypertrophy and  ligamentum flavum infolding. Findings result in moderate to severe  central spinal canal narrowing. There is also moderate to severe  bilateral neural foraminal narrowing. Small bilateral facet joint  effusions.      L5-S1:  Mild posterior disc bulge and marked bilateral facet  hypertrophy results in moderate bilateral neural foraminal narrowing,  right greater than left. No significant central spinal canal  narrowing.      Paraspinous soft tissues:  Normal.          IMPRESSION:    1. Severe multilevel degenerative changes throughout the lumbar spine  as described above. Overall, there is no significant change since  previous MR 3/17/2017.  2. Severe central spinal canal narrowing at L3-L4 and moderate to  severe spinal canal narrowing at L4-L5.  3. Convex right scoliotic curvature of the spine with multiple levels  of spondylolisthesis and right lateral listhesis of L3 on L4.     DIANA HERNANDEZ MD        [place] Prescription Monitoring Program:  Reviewed [place] [facility] 4/13/2021- no concerning fills.  Marion SANCHES RN CNP, FNP  [facility]  [place] location        Assessment:   1. DDD (degenerative disc disease), lumbar  2. Spinal stenosis of lumbar region with neurogenic claudication  3. Right hip joint pain  4. Right hip osteoarthritis  5. Muscle spasms  6. Chronic pain syndrome  7. Greater trochanteric bursitis of right hip      Plan:   1. Physical Therapy:  None at  present  2. Clinical Health Psychologist: I agree with outside counseling as you are.  3. Diagnostic Studies:  None  4. Medication Management:    1. START BUTRANS (buprenorphine) 5mcg/hr transdermal patch. Change patch every 7 days.  Wear on clean, dry skin on the upper chest, upper arm or upper back.   2. Continue Cymbalta (Duloxetine) 30mg every day  3. Continue Lidocaine patches   4. Continue medical cannabis.   5.  use the Voltaren gel three to four times daily regularly for the right lateral hip pain  6. Trial Aspercreme with 4% lidocaine to right lateral hip  7. Could trial Theracare patches to right lateral hip, these are over-the-counter warming patches  5. Further procedures recommended: trigger point injections to right side of low back muscle spasm(s) with Dr. Eid, our office to contact you  6. Recommendations to PCP: none  7. Follow up: 2 weeks video due to COVID-19 viral threat. Please call [phone] to make your follow-up appointment with me.        BILLING TIME DOCUMENTATION:   The total TIME spent on this patient on the day of the appointment was 50 minutes.      TOTAL TIME includes:   Time spent preparing to see the patient: 4 minutes (reviewing records and tests)  Time spend face to face with the patient: 40 minutes via video due to COVID-19 viral threat, used Micromedex to check drug interactions with addition of Butrans patch.   Time spent ordering tests, medications, procedures and referrals: 0 minutes  Time spent Referring and communicating with other healthcare professionals: 0 minutes  Documenting clinical information in Epic: 6 minutes          Marion SANCHES RN CNP, FNP  [facility]  [facility] [place]

## (undated) RX ORDER — LIDOCAINE HYDROCHLORIDE 10 MG/ML
INJECTION, SOLUTION EPIDURAL; INFILTRATION; INTRACAUDAL; PERINEURAL
Status: DISPENSED
Start: 2021-04-22

## (undated) RX ORDER — FENTANYL CITRATE 50 UG/ML
INJECTION, SOLUTION INTRAMUSCULAR; INTRAVENOUS
Status: DISPENSED
Start: 2021-04-22